# Patient Record
Sex: FEMALE | Race: WHITE | NOT HISPANIC OR LATINO | Employment: OTHER | URBAN - METROPOLITAN AREA
[De-identification: names, ages, dates, MRNs, and addresses within clinical notes are randomized per-mention and may not be internally consistent; named-entity substitution may affect disease eponyms.]

---

## 2017-01-03 ENCOUNTER — ALLSCRIPTS OFFICE VISIT (OUTPATIENT)
Dept: OTHER | Facility: OTHER | Age: 82
End: 2017-01-03

## 2017-02-13 ENCOUNTER — APPOINTMENT (OUTPATIENT)
Dept: LAB | Facility: CLINIC | Age: 82
End: 2017-02-13
Payer: COMMERCIAL

## 2017-02-13 DIAGNOSIS — E05.90 THYROTOXICOSIS WITHOUT THYROID STORM: ICD-10-CM

## 2017-02-13 LAB
T4 FREE SERPL-MCNC: 0.88 NG/DL (ref 0.76–1.46)
TSH SERPL DL<=0.05 MIU/L-ACNC: 4.49 UIU/ML (ref 0.36–3.74)

## 2017-02-13 PROCEDURE — 36415 COLL VENOUS BLD VENIPUNCTURE: CPT

## 2017-02-13 PROCEDURE — 84443 ASSAY THYROID STIM HORMONE: CPT

## 2017-02-13 PROCEDURE — 84439 ASSAY OF FREE THYROXINE: CPT

## 2017-02-15 ENCOUNTER — ALLSCRIPTS OFFICE VISIT (OUTPATIENT)
Dept: OTHER | Facility: OTHER | Age: 82
End: 2017-02-15

## 2017-02-15 LAB — GLUCOSE SERPL-MCNC: 164 MG/DL

## 2017-02-21 ENCOUNTER — ALLSCRIPTS OFFICE VISIT (OUTPATIENT)
Dept: OTHER | Facility: OTHER | Age: 82
End: 2017-02-21

## 2017-03-14 DIAGNOSIS — I10 ESSENTIAL (PRIMARY) HYPERTENSION: ICD-10-CM

## 2017-03-14 DIAGNOSIS — K58.2 MIXED IRRITABLE BOWEL SYNDROME: ICD-10-CM

## 2017-03-14 DIAGNOSIS — E78.5 HYPERLIPIDEMIA: ICD-10-CM

## 2017-03-24 ENCOUNTER — HOSPITAL ENCOUNTER (OUTPATIENT)
Dept: BONE DENSITY | Facility: IMAGING CENTER | Age: 82
Discharge: HOME | End: 2017-03-24
Payer: COMMERCIAL

## 2017-03-24 DIAGNOSIS — M81.0 AGE-RELATED OSTEOPOROSIS WITHOUT CURRENT PATHOLOGICAL FRACTURE: ICD-10-CM

## 2017-03-24 PROCEDURE — 77080 DXA BONE DENSITY AXIAL: CPT

## 2017-05-10 ENCOUNTER — ALLSCRIPTS OFFICE VISIT (OUTPATIENT)
Dept: OTHER | Facility: OTHER | Age: 82
End: 2017-05-10

## 2017-05-15 ENCOUNTER — LAB (OUTPATIENT)
Dept: LAB | Facility: CLINIC | Age: 82
End: 2017-05-15
Payer: COMMERCIAL

## 2017-05-15 DIAGNOSIS — E05.90 THYROTOXICOSIS WITHOUT THYROID STORM: ICD-10-CM

## 2017-05-15 DIAGNOSIS — E03.9 HYPOTHYROIDISM: ICD-10-CM

## 2017-05-15 DIAGNOSIS — E10.40 TYPE 1 DIABETES MELLITUS WITH DIABETIC NEUROPATHY (HCC): ICD-10-CM

## 2017-05-15 DIAGNOSIS — E78.5 HYPERLIPIDEMIA: ICD-10-CM

## 2017-05-15 DIAGNOSIS — I10 ESSENTIAL (PRIMARY) HYPERTENSION: ICD-10-CM

## 2017-05-15 DIAGNOSIS — K58.2 MIXED IRRITABLE BOWEL SYNDROME: ICD-10-CM

## 2017-05-15 DIAGNOSIS — E55.9 VITAMIN D DEFICIENCY: ICD-10-CM

## 2017-05-15 LAB
25(OH)D3 SERPL-MCNC: 37.8 NG/ML (ref 30–100)
ALBUMIN SERPL BCP-MCNC: 3.9 G/DL (ref 3.5–5)
ALP SERPL-CCNC: 76 U/L (ref 46–116)
ALT SERPL W P-5'-P-CCNC: 25 U/L (ref 12–78)
ANION GAP SERPL CALCULATED.3IONS-SCNC: 7 MMOL/L (ref 4–13)
AST SERPL W P-5'-P-CCNC: 14 U/L (ref 5–45)
BILIRUB SERPL-MCNC: 0.51 MG/DL (ref 0.2–1)
BUN SERPL-MCNC: 30 MG/DL (ref 5–25)
CALCIUM SERPL-MCNC: 9.4 MG/DL (ref 8.3–10.1)
CHLORIDE SERPL-SCNC: 103 MMOL/L (ref 100–108)
CHOLEST SERPL-MCNC: 194 MG/DL (ref 50–200)
CO2 SERPL-SCNC: 29 MMOL/L (ref 21–32)
CREAT SERPL-MCNC: 0.9 MG/DL (ref 0.6–1.3)
ERYTHROCYTE [DISTWIDTH] IN BLOOD BY AUTOMATED COUNT: 12.5 % (ref 11.6–15.1)
EST. AVERAGE GLUCOSE BLD GHB EST-MCNC: 146 MG/DL
GFR SERPL CREATININE-BSD FRML MDRD: >60 ML/MIN/1.73SQ M
GLUCOSE P FAST SERPL-MCNC: 66 MG/DL (ref 65–99)
HBA1C MFR BLD: 6.7 % (ref 4.2–6.3)
HCT VFR BLD AUTO: 39.1 % (ref 34.8–46.1)
HDLC SERPL-MCNC: 101 MG/DL (ref 40–60)
HGB BLD-MCNC: 13.1 G/DL (ref 11.5–15.4)
LDLC SERPL CALC-MCNC: 79 MG/DL (ref 0–100)
MCH RBC QN AUTO: 32.5 PG (ref 26.8–34.3)
MCHC RBC AUTO-ENTMCNC: 33.5 G/DL (ref 31.4–37.4)
MCV RBC AUTO: 97 FL (ref 82–98)
PHOSPHATE SERPL-MCNC: 3.5 MG/DL (ref 2.3–4.1)
PLATELET # BLD AUTO: 265 THOUSANDS/UL (ref 149–390)
PMV BLD AUTO: 9.2 FL (ref 8.9–12.7)
POTASSIUM SERPL-SCNC: 4.3 MMOL/L (ref 3.5–5.3)
PROT SERPL-MCNC: 7.7 G/DL (ref 6.4–8.2)
PTH-INTACT SERPL-MCNC: 32.7 PG/ML (ref 14–72)
RBC # BLD AUTO: 4.03 MILLION/UL (ref 3.81–5.12)
SODIUM SERPL-SCNC: 139 MMOL/L (ref 136–145)
T4 FREE SERPL-MCNC: 0.75 NG/DL (ref 0.76–1.46)
TRIGL SERPL-MCNC: 70 MG/DL
TSH SERPL DL<=0.05 MIU/L-ACNC: 12.7 UIU/ML (ref 0.36–3.74)
WBC # BLD AUTO: 5.79 THOUSAND/UL (ref 4.31–10.16)

## 2017-05-15 PROCEDURE — 85027 COMPLETE CBC AUTOMATED: CPT

## 2017-05-15 PROCEDURE — 84100 ASSAY OF PHOSPHORUS: CPT

## 2017-05-15 PROCEDURE — 80061 LIPID PANEL: CPT

## 2017-05-15 PROCEDURE — 80053 COMPREHEN METABOLIC PANEL: CPT

## 2017-05-15 PROCEDURE — 36415 COLL VENOUS BLD VENIPUNCTURE: CPT

## 2017-05-15 PROCEDURE — 84443 ASSAY THYROID STIM HORMONE: CPT

## 2017-05-15 PROCEDURE — 84439 ASSAY OF FREE THYROXINE: CPT

## 2017-05-15 PROCEDURE — 82306 VITAMIN D 25 HYDROXY: CPT

## 2017-05-15 PROCEDURE — 83036 HEMOGLOBIN GLYCOSYLATED A1C: CPT

## 2017-05-15 PROCEDURE — 83970 ASSAY OF PARATHORMONE: CPT

## 2017-05-15 PROCEDURE — 86376 MICROSOMAL ANTIBODY EACH: CPT

## 2017-05-16 DIAGNOSIS — E10.40 TYPE 1 DIABETES MELLITUS WITH DIABETIC NEUROPATHY (HCC): ICD-10-CM

## 2017-05-16 DIAGNOSIS — E05.90 THYROTOXICOSIS WITHOUT THYROID STORM: ICD-10-CM

## 2017-05-16 DIAGNOSIS — E03.9 HYPOTHYROIDISM: ICD-10-CM

## 2017-05-16 DIAGNOSIS — E55.9 VITAMIN D DEFICIENCY: ICD-10-CM

## 2017-05-16 LAB — THYROPEROXIDASE AB SERPL-ACNC: 29 IU/ML (ref 0–34)

## 2017-05-18 ENCOUNTER — GENERIC CONVERSION - ENCOUNTER (OUTPATIENT)
Dept: OTHER | Facility: OTHER | Age: 82
End: 2017-05-18

## 2017-05-22 ENCOUNTER — ALLSCRIPTS OFFICE VISIT (OUTPATIENT)
Dept: OTHER | Facility: OTHER | Age: 82
End: 2017-05-22

## 2017-06-20 ENCOUNTER — ALLSCRIPTS OFFICE VISIT (OUTPATIENT)
Dept: OTHER | Facility: OTHER | Age: 82
End: 2017-06-20

## 2017-06-30 ENCOUNTER — LAB REQUISITION (OUTPATIENT)
Dept: LAB | Facility: HOSPITAL | Age: 82
End: 2017-06-30
Payer: COMMERCIAL

## 2017-06-30 ENCOUNTER — ALLSCRIPTS OFFICE VISIT (OUTPATIENT)
Dept: OTHER | Facility: OTHER | Age: 82
End: 2017-06-30

## 2017-06-30 DIAGNOSIS — N94.9 UNSPECIFIED CONDITION ASSOCIATED WITH FEMALE GENITAL ORGANS AND MENSTRUAL CYCLE: ICD-10-CM

## 2017-06-30 LAB
BACTERIA UR QL AUTO: ABNORMAL /HPF
BILIRUB UR QL STRIP: NEGATIVE
BILIRUB UR QL STRIP: NORMAL
CLARITY UR: CLEAR
CLARITY UR: NORMAL
COLOR UR: YELLOW
COLOR UR: YELLOW
GLUCOSE (HISTORICAL): NORMAL
GLUCOSE UR STRIP-MCNC: NEGATIVE MG/DL
HGB UR QL STRIP.AUTO: ABNORMAL
HGB UR QL STRIP.AUTO: NORMAL
HYALINE CASTS #/AREA URNS LPF: ABNORMAL /LPF
KETONES UR STRIP-MCNC: NEGATIVE MG/DL
KETONES UR STRIP-MCNC: NORMAL MG/DL
LEUKOCYTE ESTERASE UR QL STRIP: ABNORMAL
LEUKOCYTE ESTERASE UR QL STRIP: NORMAL
NITRITE UR QL STRIP: NEGATIVE
NITRITE UR QL STRIP: NORMAL
NON-SQ EPI CELLS URNS QL MICRO: ABNORMAL /HPF
PH UR STRIP.AUTO: 7 [PH]
PH UR STRIP.AUTO: 7.5 [PH] (ref 4.5–8)
PROT UR STRIP-MCNC: NEGATIVE MG/DL
PROT UR STRIP-MCNC: NORMAL MG/DL
RBC #/AREA URNS AUTO: ABNORMAL /HPF
SP GR UR STRIP.AUTO: 1
SP GR UR STRIP.AUTO: 1.01 (ref 1–1.03)
UROBILINOGEN UR QL STRIP.AUTO: 0.2
UROBILINOGEN UR QL STRIP.AUTO: 0.2 E.U./DL
WBC #/AREA URNS AUTO: ABNORMAL /HPF

## 2017-06-30 PROCEDURE — 81001 URINALYSIS AUTO W/SCOPE: CPT | Performed by: NURSE PRACTITIONER

## 2017-07-03 DIAGNOSIS — E03.9 HYPOTHYROIDISM: ICD-10-CM

## 2017-07-03 DIAGNOSIS — M81.0 AGE-RELATED OSTEOPOROSIS WITHOUT CURRENT PATHOLOGICAL FRACTURE: ICD-10-CM

## 2017-07-03 DIAGNOSIS — E55.9 VITAMIN D DEFICIENCY: ICD-10-CM

## 2017-07-03 DIAGNOSIS — E10.40 TYPE 1 DIABETES MELLITUS WITH DIABETIC NEUROPATHY (HCC): ICD-10-CM

## 2017-07-05 ENCOUNTER — APPOINTMENT (OUTPATIENT)
Dept: LAB | Facility: CLINIC | Age: 82
End: 2017-07-05
Payer: COMMERCIAL

## 2017-07-05 DIAGNOSIS — E55.9 VITAMIN D DEFICIENCY: ICD-10-CM

## 2017-07-05 DIAGNOSIS — E10.40 TYPE 1 DIABETES MELLITUS WITH DIABETIC NEUROPATHY (HCC): ICD-10-CM

## 2017-07-05 DIAGNOSIS — M81.0 AGE-RELATED OSTEOPOROSIS WITHOUT CURRENT PATHOLOGICAL FRACTURE: ICD-10-CM

## 2017-07-05 DIAGNOSIS — E03.9 HYPOTHYROIDISM: ICD-10-CM

## 2017-07-05 LAB
T4 FREE SERPL-MCNC: 1.05 NG/DL (ref 0.76–1.46)
TSH SERPL DL<=0.05 MIU/L-ACNC: 3.95 UIU/ML (ref 0.36–3.74)

## 2017-07-05 PROCEDURE — 84443 ASSAY THYROID STIM HORMONE: CPT

## 2017-07-05 PROCEDURE — 84439 ASSAY OF FREE THYROXINE: CPT

## 2017-07-05 PROCEDURE — 36415 COLL VENOUS BLD VENIPUNCTURE: CPT

## 2017-07-10 ENCOUNTER — GENERIC CONVERSION - ENCOUNTER (OUTPATIENT)
Dept: OTHER | Facility: OTHER | Age: 82
End: 2017-07-10

## 2017-08-17 ENCOUNTER — LAB (OUTPATIENT)
Dept: LAB | Facility: CLINIC | Age: 82
End: 2017-08-17
Payer: COMMERCIAL

## 2017-08-17 DIAGNOSIS — E55.9 VITAMIN D DEFICIENCY: ICD-10-CM

## 2017-08-17 DIAGNOSIS — E03.9 HYPOTHYROIDISM: ICD-10-CM

## 2017-08-17 DIAGNOSIS — E10.40 TYPE 1 DIABETES MELLITUS WITH DIABETIC NEUROPATHY (HCC): ICD-10-CM

## 2017-08-17 DIAGNOSIS — M81.0 AGE-RELATED OSTEOPOROSIS WITHOUT CURRENT PATHOLOGICAL FRACTURE: ICD-10-CM

## 2017-08-17 DIAGNOSIS — I10 ESSENTIAL (PRIMARY) HYPERTENSION: ICD-10-CM

## 2017-08-17 DIAGNOSIS — E78.5 HYPERLIPIDEMIA: ICD-10-CM

## 2017-08-17 LAB
ALBUMIN SERPL BCP-MCNC: 3.8 G/DL (ref 3.5–5)
ANION GAP SERPL CALCULATED.3IONS-SCNC: 7 MMOL/L (ref 4–13)
BUN SERPL-MCNC: 25 MG/DL (ref 5–25)
CALCIUM SERPL-MCNC: 8.9 MG/DL (ref 8.3–10.1)
CHLORIDE SERPL-SCNC: 100 MMOL/L (ref 100–108)
CO2 SERPL-SCNC: 26 MMOL/L (ref 21–32)
CREAT SERPL-MCNC: 0.87 MG/DL (ref 0.6–1.3)
EST. AVERAGE GLUCOSE BLD GHB EST-MCNC: 151 MG/DL
GFR SERPL CREATININE-BSD FRML MDRD: 63 ML/MIN/1.73SQ M
GLUCOSE P FAST SERPL-MCNC: 73 MG/DL (ref 65–99)
HBA1C MFR BLD: 6.9 % (ref 4.2–6.3)
PHOSPHATE SERPL-MCNC: 3.4 MG/DL (ref 2.3–4.1)
POTASSIUM SERPL-SCNC: 4.3 MMOL/L (ref 3.5–5.3)
PTH-INTACT SERPL-MCNC: 33.8 PG/ML (ref 14–72)
SODIUM SERPL-SCNC: 133 MMOL/L (ref 136–145)
T4 FREE SERPL-MCNC: 0.95 NG/DL (ref 0.76–1.46)
TSH SERPL DL<=0.05 MIU/L-ACNC: 3.64 UIU/ML (ref 0.36–3.74)

## 2017-08-17 PROCEDURE — 36415 COLL VENOUS BLD VENIPUNCTURE: CPT

## 2017-08-17 PROCEDURE — 84443 ASSAY THYROID STIM HORMONE: CPT

## 2017-08-17 PROCEDURE — 84439 ASSAY OF FREE THYROXINE: CPT

## 2017-08-17 PROCEDURE — 83970 ASSAY OF PARATHORMONE: CPT

## 2017-08-17 PROCEDURE — 80069 RENAL FUNCTION PANEL: CPT

## 2017-08-17 PROCEDURE — 83036 HEMOGLOBIN GLYCOSYLATED A1C: CPT

## 2017-08-18 DIAGNOSIS — E78.5 HYPERLIPIDEMIA: ICD-10-CM

## 2017-08-18 DIAGNOSIS — M81.0 AGE-RELATED OSTEOPOROSIS WITHOUT CURRENT PATHOLOGICAL FRACTURE: ICD-10-CM

## 2017-08-18 DIAGNOSIS — I10 ESSENTIAL (PRIMARY) HYPERTENSION: ICD-10-CM

## 2017-08-18 DIAGNOSIS — E10.40 TYPE 1 DIABETES MELLITUS WITH DIABETIC NEUROPATHY (HCC): ICD-10-CM

## 2017-08-18 DIAGNOSIS — E55.9 VITAMIN D DEFICIENCY: ICD-10-CM

## 2017-08-18 DIAGNOSIS — E03.9 HYPOTHYROIDISM: ICD-10-CM

## 2017-08-20 ENCOUNTER — GENERIC CONVERSION - ENCOUNTER (OUTPATIENT)
Dept: OTHER | Facility: OTHER | Age: 82
End: 2017-08-20

## 2017-09-19 ENCOUNTER — ALLSCRIPTS OFFICE VISIT (OUTPATIENT)
Dept: OTHER | Facility: OTHER | Age: 82
End: 2017-09-19

## 2017-10-02 DIAGNOSIS — E78.5 HYPERLIPIDEMIA: ICD-10-CM

## 2017-10-02 DIAGNOSIS — E10.40 TYPE 1 DIABETES MELLITUS WITH DIABETIC NEUROPATHY (HCC): ICD-10-CM

## 2017-10-02 DIAGNOSIS — I10 ESSENTIAL (PRIMARY) HYPERTENSION: ICD-10-CM

## 2017-10-05 ENCOUNTER — APPOINTMENT (OUTPATIENT)
Dept: LAB | Facility: CLINIC | Age: 82
End: 2017-10-05
Payer: COMMERCIAL

## 2017-10-05 DIAGNOSIS — E10.40 TYPE 1 DIABETES MELLITUS WITH DIABETIC NEUROPATHY (HCC): ICD-10-CM

## 2017-10-05 DIAGNOSIS — E78.5 HYPERLIPIDEMIA: ICD-10-CM

## 2017-10-05 DIAGNOSIS — I10 ESSENTIAL (PRIMARY) HYPERTENSION: ICD-10-CM

## 2017-10-05 LAB
ALBUMIN SERPL BCP-MCNC: 3.8 G/DL (ref 3.5–5)
ALP SERPL-CCNC: 71 U/L (ref 46–116)
ALT SERPL W P-5'-P-CCNC: 21 U/L (ref 12–78)
ANION GAP SERPL CALCULATED.3IONS-SCNC: 8 MMOL/L (ref 4–13)
AST SERPL W P-5'-P-CCNC: 15 U/L (ref 5–45)
BILIRUB SERPL-MCNC: 0.66 MG/DL (ref 0.2–1)
BUN SERPL-MCNC: 24 MG/DL (ref 5–25)
CALCIUM SERPL-MCNC: 9.4 MG/DL (ref 8.3–10.1)
CHLORIDE SERPL-SCNC: 102 MMOL/L (ref 100–108)
CHOLEST SERPL-MCNC: 181 MG/DL (ref 50–200)
CO2 SERPL-SCNC: 26 MMOL/L (ref 21–32)
CREAT SERPL-MCNC: 1.02 MG/DL (ref 0.6–1.3)
ERYTHROCYTE [DISTWIDTH] IN BLOOD BY AUTOMATED COUNT: 12.6 % (ref 11.6–15.1)
GFR SERPL CREATININE-BSD FRML MDRD: 52 ML/MIN/1.73SQ M
GLUCOSE P FAST SERPL-MCNC: 73 MG/DL (ref 65–99)
HCT VFR BLD AUTO: 37.4 % (ref 34.8–46.1)
HDLC SERPL-MCNC: 98 MG/DL (ref 40–60)
HGB BLD-MCNC: 12.8 G/DL (ref 11.5–15.4)
LDLC SERPL CALC-MCNC: 71 MG/DL (ref 0–100)
MCH RBC QN AUTO: 32.9 PG (ref 26.8–34.3)
MCHC RBC AUTO-ENTMCNC: 34.2 G/DL (ref 31.4–37.4)
MCV RBC AUTO: 96 FL (ref 82–98)
PLATELET # BLD AUTO: 278 THOUSANDS/UL (ref 149–390)
PMV BLD AUTO: 9.5 FL (ref 8.9–12.7)
POTASSIUM SERPL-SCNC: 4.3 MMOL/L (ref 3.5–5.3)
PROT SERPL-MCNC: 7.6 G/DL (ref 6.4–8.2)
RBC # BLD AUTO: 3.89 MILLION/UL (ref 3.81–5.12)
SODIUM SERPL-SCNC: 136 MMOL/L (ref 136–145)
TRIGL SERPL-MCNC: 60 MG/DL
TSH SERPL DL<=0.05 MIU/L-ACNC: 4.06 UIU/ML (ref 0.36–3.74)
WBC # BLD AUTO: 5.23 THOUSAND/UL (ref 4.31–10.16)

## 2017-10-05 PROCEDURE — 80061 LIPID PANEL: CPT

## 2017-10-05 PROCEDURE — 85027 COMPLETE CBC AUTOMATED: CPT

## 2017-10-05 PROCEDURE — 36415 COLL VENOUS BLD VENIPUNCTURE: CPT

## 2017-10-05 PROCEDURE — 80053 COMPREHEN METABOLIC PANEL: CPT

## 2017-10-05 PROCEDURE — 84443 ASSAY THYROID STIM HORMONE: CPT

## 2017-10-30 ENCOUNTER — GENERIC CONVERSION - ENCOUNTER (OUTPATIENT)
Dept: OTHER | Facility: OTHER | Age: 82
End: 2017-10-30

## 2017-11-10 ENCOUNTER — ALLSCRIPTS OFFICE VISIT (OUTPATIENT)
Dept: OTHER | Facility: OTHER | Age: 82
End: 2017-11-10

## 2017-11-11 NOTE — PROGRESS NOTES
Assessment  Assessed    1  Hypertension (401 9) (I10)   2  Hyperlipidemia due to type 1 diabetes mellitus (136 83,102  4) (E10 69,E78 5)    Plan  Hyperlipidemia due to type 1 diabetes mellitus    · Follow-up PRN Evaluation and Treatment  Follow-up  Status: Complete  Done:10Nov2017   Ordered; For: Hyperlipidemia due to type 1 diabetes mellitus; Ordered By: Gucci Carrillo Performed:  Due: 11ISZ0158  Hypertension    · EKG/ECG- POC; Status:Complete;   Done: 87ZPR8777   Perform: In Office; Due:10Nov2018; Last Updated By:Jonathan Delarosa; 11/10/2017 10:10:04 AM;Ordered;Ordered By:Sol Cardoza; Discussion/Summary  Cardiology Discussion Summary Free Text Note Form [facility]:   1  Hypertension: BP is stable on medical therapy  Carotid Atherosclerosis: Less than 50% stenosis bilaterally  On aspirin and atorvastatin  Counseling Documentation With Imm: The patient was counseled regarding diagnostic results,-- instructions for management,-- risk factor reductions,-- impressions  total time of encounter was 25 minutes-- and-- 15 minutes was spent counseling  Chief Complaint  Chief Complaint Free Text Note Form: Patient is here for a follow up  Patient denies cardiac complaints  History of Present Illness  Cardiology HPI Free Text Note Form [facility]: Followup for HTN  well  No major issues  No chest pain, no dyspnea, no palpitations  Hypertension (Follow-Up): The patient presents for follow-up of essential hypertension  The patient states she has been stable with her blood pressure control since the last visit  Symptoms: denies impaired vision,-- denies dyspnea,-- denies chest pain,-- denies intermittent leg claudication-- and-- denies lower extremity edema  Associated symptoms include no headache,-- no focal neurologic deficits-- and-- no memory loss  Medications: the patient is adherent with her medication regimen  -- She denies medication side effects        Review of Systems  Cardiology Female ROS:    Cardiac: No complaints of chest pain, no palpitations, no fainting  Skin: No complaints of nonhealing sores or skin rash  Genitourinary: No complaints of recurrent urinary tract infections, frequent urination at night, difficult urination, blood in urine, kidney stones, loss of bladder control, kidney problems, denies any birth control or hormone replacement, is not post menopausal, not currently pregnant  Psychological: No complaints of feeling depressed, anxiety, panic attacks, or difficulty concentrating  General: No complaints of trouble sleeping, lack of energy, fatigue, appetite changes, weight changes, fever, frequent infections, or night sweats  Respiratory: No complaints of shortness of breath, cough with sputum, or wheezing  HEENT: No complaints of serious problems, hearing problems, nose problems, throat problems, or snoring  Gastrointestinal: No complaints of liver problems, nausea, vomiting, heartburn, constipation, bloody stools, diarrhea, problems swallowing, adbominal pain, or rectal bleeding  Hematologic: No complaints of bleeding disorders, anemia, blood clots, or excessive brusing  Neurological: No complaints of numbness, tingling, dizziness, weakness, seizures, headaches, syncope or fainting, AM fatigue, daytime sleepiness, no witnessed apnea episodes  Musculoskeletal: No complaints of arthritis, back pain, or painfull swelling  ROS Reviewed:   ROS reviewed  Active Problems  Problems    1  Adult onset hypothyroidism (244 8) (E03 8)   2  Allergic rhinitis (477 9) (J30 9)   3  Bunion of great toe of left foot (727 1) (M21 612)   4  Chronic foot pain, unspecified laterality (729 5,338 29) (M79 673,G89 29)   5  Chronic interstitial cystitis (595 1) (N30 10)   6  Disc degeneration, 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)   7  Dysfunction of left eustachian tube (381 81) (H69 82)   8  Dysuria (788 1) (R30 0)   9  Generalized anxiety disorder (300 02) (F41 1)   10  Denied: History of self breast exam   11  Hyperlipidemia due to type 1 diabetes mellitus (977 36,879  4) (E10 69,E78 5)   12  Hypertension (401 9) (I10)   13  Irritable bowel syndrome with both constipation and diarrhea (564 1) (K58 2)   14  Lumbar canal stenosis (724 02) (M48 061)   15  Lumbar radiculopathy (724 4) (M54 16)   16  Mastodynia (611 71) (N64 4)   17  Need for immunization against influenza (V04 81) (Z23)   18  Osteoporosis (733 00) (M81 0)   19  Peripheral neuropathy (356 9) (G62 9)   20  Retinopathy, diabetic, proliferative (250 50,362 02) (E11 3599)   21  Sinusitis (473 9) (J32 9)   22  Spondylosis of lumbar region without myelopathy or radiculopathy (721 3) (M47 816)   23  Type 1 diabetes mellitus with diabetic polyneuropathy (250 61,357 2) (E10 42)   24  Type 1 diabetes mellitus with hyperglycemia, with long-term current use of insulin  (250 01,790 29) (E10 65)   25  Vaginal burning (625 8) (N94 9)   26  Vaginal candidiasis (112 1) (B37 3)   27  Vitamin D deficiency (268 9) (E55 9)    Past Medical History  Problems    1  History of Acute foot pain, right (729 5) (M79 671)   2  History of Age At First Period 17 Years Old (Menarche)   3  History of Age At First Pregnancy 34 Years Old   4  History of Currently Wearing Eyeglasses   5  History of Difficulty Falling Asleep   6  History of Encounter for screening mammogram for malignant neoplasm of breast (V76 12) (Z12 31)   7  History of Fracture of fifth metatarsal bone of right foot (825 25) (S92 351A)   8   2 para 2 (V49 89) (Z78 9)   9  History of Hearing Loss (389 9)   10  History Of 2  Previous Pregnancies (V61 5)   11  History of cataract (V12 49) (Z86 69)   12  History of chest pain (V13 89) (Z87 898)   13  History of constipation (V12 79) (Z87 19)   14  History of diarrhea (V12 79) (Z87 898)   15  History of diplopia (V12 49) (Z86 69)   16  History of heartburn (V12 79) (Z87 898)   17  History of herpes zoster (V12 09) (Z86 19)   18   History of hypertension (V12 59) (Z86 79) 19  History of hyperthyroidism (V12 29) (Z86 39)   20  History of neoplasm of uncertain behavior of skin (V13 3) (Z86 03)   21  Denied: History of self breast exam   22  History of syncope (V15 89) (Z87 898)   23  History of thyroid disease (V12 29) (Z86 39)   24  History of upper respiratory infection (V12 09) (Z87 09)   25  History of urinary tract infection (V13 02) (Z87 440)   26  History of Hyponatremia (276 1) (E87 1)   27  History of Need for influenza vaccination (V04 81) (Z23)   28  History of Need for influenza vaccination (V04 81) (Z23)   29  History of Open wound of left foot, initial encounter (892 0) (S91 302A)   30  History of Previous Pregnancies Resulted In 2  Live Birth(S)   31  History of Skin lesions (709 9) (L98 9)   32  History of Type 1 diabetes mellitus without complication (553 22) (A58 2)   33  History of Visit for screening mammogram (V76 12) (Z12 31)   34  History of Vulvitis (616 10) (N76 2)  Active Problems And Past Medical History Reviewed: The active problems and past medical history were reviewed and updated today  Surgical History  Problems    1  History of Appendectomy   2  History of Cataract Surgery   3  History of Complete Colonoscopy   4  History of Excision Of Lesion Ears  Surgical History Reviewed: The surgical history was reviewed and updated today  Family History  Mother    1  Family history of Acute Myocardial Infarction (V17 3)   2  Family history of type 2 diabetes mellitus (V18 0) (Z83 3)  Father    3  Family history of Dementia   4  Family history of cerebrovascular accident (V17 1) (Z82 3)  Maternal Aunt    5  Family history of Breast Cancer (V16 3)  Maternal Uncle    6  Family history of Adenocarcinoma Of The Stomach (V16 0)  Family History    7  Family history of Diabetes Mellitus (V18 0)   8  Family history of Stroke Complications  Family History Reviewed: The family history was reviewed and updated today         Social History  Problems    · Caffeine use (V49 89) (F15 90)   · Denied: History of Drug Use (305 90)   · Marital History -    · Never A Smoker   · No preference on Mandaen beliefs   · Social alcohol use (Z78 9)   · Two children  Social History Reviewed: The social history was reviewed and updated today  Current Meds   1  Advil CAPS; Therapy: (Recorded:30Oct2017) to Recorded   2  AmLODIPine Besylate 5 MG Oral Tablet; take 1 tablet by mouth once daily after  supper; Therapy: 89TIX4701 to (Last Rx:47Low0427)  Requested for: 50VYO4276 Ordered   3  Aspirin 81 MG TABS; TAKE 1 TABLET DAILY; Therapy: (Recorded:11Aug2015) to Recorded   4  Atorvastatin Calcium 10 MG Oral Tablet; Take 1 tablet daily; Therapy: 11Aug2015 to (Evaluate:01Tis5717)  Requested for: 11Aug2017; Last Rx:11Aug2017 Ordered   5  BD Insulin Syr Ultrafine II 31G X 5/16 0 5 ML Miscellaneous; Use 4x daily; Therapy: 89EDG7131 to (Evaluate:44Yus9682)  Requested for: 75QGB5358; Last Rx:40Qjm3912 Ordered   6  Co Q-10 200 MG Oral Capsule; TAKE 1 CAPSULE DAILY; Therapy: (Recorded:11Aug2015) to Recorded   7  Cosopt SOLN; INSTILL 1 DROP INTO RIGHT EYE 2 TIMES DAILY; Therapy: (Recorded:11Aug2015) to Recorded   8  Fluconazole 150 MG Oral Tablet; TAKE AS DIRECTED; Therapy: 08FXP6298 to (Tiny Alosa)  Requested for: 96VGW6077; Last Rx:66Sjt4877 Ordered   9  Fluticasone Propionate 50 MCG/ACT Nasal Suspension; USE 2 SPRAYS IN EACH NOSTRIL ONCE DAILY; Therapy: 22AKC1615 to (Last KS:85WVT0702)  Requested for: 18RHI9091 Ordered   10  HumaLOG 100 UNIT/ML Subcutaneous Solution; INJECT SC 4 UNITS AT BREAKFAST -  5 UNITS AT LUNCH - 7 UNITS AT DINNER PLUS 1 UNIT PER 50  OVER 150; Therapy: 99YNI5992 to [phone]  Requested for: 27EMI4337; Last  Rx:30Oct2017 Ordered   11  HydroCHLOROthiazide 12 5 MG Oral Capsule; take 1 capsule by mouth once a day  as  needed for leg edema; Therapy: 17CDR4732 to (Evaluate:15Jun2018)  Requested for: 20Jun2017; Last  Rx:20Jun2017 Ordered   12  Irbesartan 150 MG Oral Tablet; TAKE 1 TABLET DAILY; Therapy: 73YKW2000 to (Evaluate:31Mar2018)  Requested for: 57Qpj4048; Last  Rx:09Apr2017 Ordered   13  Lantus 100 UNIT/ML Subcutaneous Solution; INJECT 12 UNITS UNDER THE SKIN  NIGHTLY AS DIRECTED; Therapy: 74Kxj3213 to ()  Requested for: 12JLO8275; Last  Rx:30Oct2017 Ordered   14  Levothyroxine Sodium 50 MCG Oral Tablet; TAKE ONE (1) TABLET(S) DAILY; Therapy: 23HFX7847 to (Evaluate:18Wep8886)  Requested for: 38VQY5685; Last  Rx:11Oct2017 Ordered   15  LORazepam 0 5 MG Oral Tablet; take 1 tablet 3 times a day as needed; Therapy: 66RXT1855 to (Lawrence Baires)  Requested for: 46WMK0787; Last  Rx:09Nov2017 Ordered   16  Magnesium 500 MG Oral Tablet; Take 1 tablet daily; Therapy: (Merna Scheuermann) to Recorded   17  OneTouch Ultra Blue In Vitro Strip; ONE TOUCH ULTRA STRIPS   TEST 6 TIMES PER DAY; Therapy: 29TYD8983 to 9615 9032); Last Rx:26Oct2017 Ordered   18  Refresh Tears 0 5 % Ophthalmic Solution; INSTILL 1 DROP INTO BOTH EYES 4 TIMES  DAILY; Therapy: (Recorded:11Aug2015) to Recorded   19  Super B Complex Maxi Oral Tablet; take one every other day; Therapy: 15DHC5666 to Recorded   20  Travatan Z 0 004 % Ophthalmic Solution; instill 2 drops in both eyes at bedtime; Therapy: (Recorded:11Aug2015) to Recorded   21  Turmeric 500 MG Oral Capsule; Take one daily; Therapy: 11CNE8286 to Recorded   22  Vitamin D3 2000 UNIT Oral Tablet; 2 CAPSULES DAILY; Therapy: 34ZFZ4797 to (Last Rx:30Oct2017) Ordered  Medication List Reviewed: The medication list was reviewed and updated today  Allergies  Medication    1   No Known Drug Allergies    Vitals  Vital Signs    Recorded: 10JBX7321 10:12AM   Heart Rate 67   Systolic 993, RUE, Sitting   Diastolic 70, RUE, Sitting   Height 5 ft 5 in   Weight 133 lb    BMI Calculated 22 13   BSA Calculated 1 66       Physical Exam   Constitutional  General appearance: No acute distress, well appearing and well nourished  Eyes  Conjunctiva and Sclera examination: Conjunctiva pink, sclera anicteric  Ears, Nose, Mouth, and Throat - Oropharynx: Clear, nares are clear, mucous membranes are moist   Neck  Neck and thyroid: Normal, supple, trachea midline, no thyromegaly  Pulmonary  Respiratory effort: No increased work of breathing or signs of respiratory distress  Auscultation of lungs: Clear to auscultation, no rales, no rhonchi, no wheezing, good air movement  Cardiovascular  Auscultation of heart: Normal rate and rhythm, normal S1 and S2, no murmurs  Carotid pulses: Normal, 2+ bilaterally  Peripheral vascular exam: Normal pulses throughout, no tenderness, erythema or swelling  Pedal pulses: Normal, 2+ bilaterally  Examination of extremities for edema and/or varicosities: Normal    Abdomen  Abdomen: Non-tender and no distention  Liver and spleen: No hepatomegaly or splenomegaly  Musculoskeletal Gait and station: Normal gait  -- Digits and nails: Normal without clubbing or cyanosis  -- Inspection/palpation of joints, bones, and muscles: Normal, ROM normal    Skin - Skin and subcutaneous tissue: Normal without rashes or lesions  Skin is warm and well perfused, normal turgor  Neurologic - Cranial nerves: II - XII intact  -- Speech: Normal    Psychiatric - Orientation to person, place, and time: Normal -- Mood and affect: Normal       Results/Data  ECG Report: A 12 lead ECG was performed and was normal   Rhythm and rate:  ventricular rate is 67 beats per minute  -- normal sinus rhythm   (1) LIPID PANEL FASTING W DIRECT LDL REFLEX 05Oct2017 07:06AM Terese Cranker Order Number: [id number]     Test Name Result Flag Reference   CHOLESTEROL 181 mg/dL     LDL CHOLESTEROL CALCULATED 71 mg/dL  0-100     Triglyceride:       Normal <150 mg/dl  Borderline High 150-199 mg/dl  High 200-499 mg/dl  Very High >499 mg/dl   Cholesterol:      Desirable <200 mg/dl   Borderline High 200-239 mg/dl   High >239 mg/dl   HDL Cholesterol:      High>59 mg/dL   Low <41 mg/dL   HDL Cholesterol:      High>59 mg/dL   Low <41 mg/dL   This screening LDL is a calculated result  It does not have the accuracy of the Direct Measured LDL in the monitoring of patients with hyperlipidemia and/or statin therapy  Direct Measure LDL (BAP533) must be ordered separately in these patients  TRIGLYCERIDES 60 mg/dL  <=150   Specimen collection should occur prior to N-Acetylcysteine or Metamizole administration due to the potential for falsely depressed results  HDL,DIRECT 98 mg/dL H 40-60   Specimen collection should occur prior to Metamizole administration due to the potential for falsley depressed results  Health Management  Health Maintenance   Medicare Annual Wellness Visit; every 1 year; Next Due: 84Pdn0259; Active    Future Appointments    Date/Time Provider Specialty Site   01/09/2018 01:00 PM BORIS Lee  Family Medicine [address] [place] [address]   11/16/2017 01:30 PM Saritha Hernandez RD Diabetes Educator [facility] ENDOCRINOLOG[facility]   02/20/2018 01:00 PM BORIS Cardenas   Endocrinology [address]       Signatures   Electronically signed by : BORIS Redd ; Nov 10 2017 10:42AM EST                       (Author)

## 2017-11-16 ENCOUNTER — ALLSCRIPTS OFFICE VISIT (OUTPATIENT)
Dept: OTHER | Facility: OTHER | Age: 82
End: 2017-11-16

## 2017-11-20 ENCOUNTER — APPOINTMENT (OUTPATIENT)
Dept: LAB | Facility: CLINIC | Age: 82
End: 2017-11-20
Payer: COMMERCIAL

## 2017-11-20 DIAGNOSIS — M81.0 AGE-RELATED OSTEOPOROSIS WITHOUT CURRENT PATHOLOGICAL FRACTURE: ICD-10-CM

## 2017-11-20 DIAGNOSIS — E10.65 TYPE 1 DIABETES MELLITUS WITH HYPERGLYCEMIA (HCC): ICD-10-CM

## 2017-11-20 DIAGNOSIS — E10.42 TYPE 1 DIABETES MELLITUS WITH DIABETIC POLYNEUROPATHY (HCC): ICD-10-CM

## 2017-11-20 DIAGNOSIS — I10 ESSENTIAL (PRIMARY) HYPERTENSION: ICD-10-CM

## 2017-11-20 DIAGNOSIS — E55.9 VITAMIN D DEFICIENCY: ICD-10-CM

## 2017-11-20 DIAGNOSIS — E03.8 OTHER SPECIFIED HYPOTHYROIDISM: ICD-10-CM

## 2017-11-20 DIAGNOSIS — E10.69 TYPE 1 DIABETES MELLITUS WITH OTHER SPECIFIED COMPLICATION (HCC): ICD-10-CM

## 2017-11-20 LAB
25(OH)D3 SERPL-MCNC: 39.8 NG/ML (ref 30–100)
EST. AVERAGE GLUCOSE BLD GHB EST-MCNC: 143 MG/DL
HBA1C MFR BLD: 6.6 % (ref 4.2–6.3)
T4 FREE SERPL-MCNC: 1.05 NG/DL (ref 0.76–1.46)
TSH SERPL DL<=0.05 MIU/L-ACNC: 1.98 UIU/ML (ref 0.36–3.74)

## 2017-11-20 PROCEDURE — 36415 COLL VENOUS BLD VENIPUNCTURE: CPT

## 2017-11-20 PROCEDURE — 83036 HEMOGLOBIN GLYCOSYLATED A1C: CPT

## 2017-11-20 PROCEDURE — 84439 ASSAY OF FREE THYROXINE: CPT

## 2017-11-20 PROCEDURE — 82306 VITAMIN D 25 HYDROXY: CPT

## 2017-11-20 PROCEDURE — 84443 ASSAY THYROID STIM HORMONE: CPT

## 2017-11-30 ENCOUNTER — GENERIC CONVERSION - ENCOUNTER (OUTPATIENT)
Dept: OTHER | Facility: OTHER | Age: 82
End: 2017-11-30

## 2018-01-09 ENCOUNTER — ALLSCRIPTS OFFICE VISIT (OUTPATIENT)
Dept: OTHER | Facility: OTHER | Age: 83
End: 2018-01-09

## 2018-01-10 DIAGNOSIS — Z12.31 ENCOUNTER FOR SCREENING MAMMOGRAM FOR MALIGNANT NEOPLASM OF BREAST: ICD-10-CM

## 2018-01-11 NOTE — RESULT NOTES
Discussion/Summary   she now has hypothyroidism and we will start her on levothyroxine  we will explain in office visit also  start levothyroxine 25 mcg once a day and follow advise as below  1 Please take your thyroid medication first thing in the morning on empty stomach with a sip of water and wait for 60 minutes before you take any food, medications, drinks, juices, coffee or milk  2  Call us if he develops fatigued, mental slowing or sluggishness      3  Call us if you develop tremors, sweats, or palpitations        Verified Results  (1) HEMOGLOBIN A1C 53LTN9887 07:07AM John Murray Order Number: [id number]     Test Name Result Flag Reference   HEMOGLOBIN A1C 6 7 % H 4 2-6 3   EST  AVG  GLUCOSE 146 mg/dl       (1) T4, FREE 06WVS1964 07:07AM John Murray Order Number: [id number]     Test Name Result Flag Reference   T4,FREE 0 75 ng/dL L 0 76-1 46     (1) TSH 18NCI3569 07:07AM Herb Arryoo    Order Number: [id number]     Test Name Result Flag Reference   TSH 12 700 uIU/mL H 0 358-3 740   - Patient Instructions: This bloodwork is non-fasting  Please drink two glasses of water morning of bloodwork  Patients undergoing fluorescein dye angiography may retain small amounts of fluorescein in the body for 48-72 hours post procedure  Samples containing fluorescein can produce falsely depressed TSH values  If the patient had this procedure,a specimen should be resubmitted post fluorescein clearance            The recommended reference ranges for TSH during pregnancy are as follows:  First trimester 0 1 to 2 5 uIU/mL  Second trimester  0 2 to 3 0 uIU/mL  Third trimester 0 3 to 3 0 uIU/m     (1) RENAL FUNCTION PANEL 33LWK9222 07:07AM John [facility] Order Number: [id number]     Test Name Result Flag Reference   PHOSPHORUS 3 5 mg/dL  2 3-4 1     (1) VITAMIN D 25-HYDROXY 28KRH7901 07:07AM John [facility]itePalir[facility] Order Number: [id number]     Test Name Result Flag Reference   VIT D 25-HYDROX 37 8 ng/mL  30 0-100 0   This assay is a certified procedure of the CDC Vitamin D Standardization Certification Program (VDSCP)     Deficiency <20ng/ml   Insufficiency 20-30ng/ml   Sufficient  ng/ml     *Patients undergoing fluorescein dye angiography may retain small amounts of fluorescein in the body for 48-72 hours post procedure  Samples containing fluorescein can produce falsely elevated Vitamin D values  If the patient had this procedure, a specimen should be resubmitted post fluorescein clearance       (1) PTH N-TERMINAL (INTACT) 26XWE7637 07:07AM Clearance [facility] Order Number: [id number]     Test Name Result Flag Reference   PARATHYROID HORMONE INTACT 32 7 pg/mL  14 0-72 0     (1) THYROID MICROSOMAL ANTIBODY 46ULI1381 07:07AM Clearance [facility] Order Number: [id number]     Test Name Result Flag Reference   THY MICROSOM AB 29 IU/mL  0 - 34   Performed at:  [facility] [place] [address]  [id number]  : Christie Mejía MD, Phone:  [phone]

## 2018-01-11 NOTE — RESULT NOTES
Discussion/Summary   A1c 6 6 better than goal  TSH normal, continue current dose of Levothyroxine  Verified Results  (1) TSH 05PSS5761 10:37AM [facility]     Test Name Result Flag Reference   TSH 1 980 uIU/mL  0 358-3 740   Patients undergoing fluorescein dye angiography may retain small amounts of fluorescein in the body for 48-72 hours post procedure  Samples containing fluorescein can produce falsely depressed TSH values  If the patient had this procedure,a specimen should be resubmitted post fluorescein clearance  The recommended reference ranges for TSH during pregnancy are as follows:  First trimester 0 1 to 2 5 uIU/mL  Second trimester  0 2 to 3 0 uIU/mL  Third trimester 0 3 to 3 0 uIU/m     (1) T4, FREE 20Nov2017 10:37AM [facility]     Test Name Result Flag Reference   T4,FREE 1 05 ng/dL  0 76-1 46   Specimen collection should occur prior to Sulfasalazine administration due to the potential for falsely elevated results  (1) VITAMIN D 25-HYDROXY 14CDJ3086 10:37AM [facility]     Test Name Result Flag Reference   VIT D 25-HYDROX 39 8 ng/mL  30 0-100 0   This assay is a certified procedure of the CDC Vitamin D Standardization Certification Program (VDSCP)     Deficiency <20ng/ml   Insufficiency 20-30ng/ml   Sufficient  ng/ml     *Patients undergoing fluorescein dye angiography may retain small amounts of fluorescein in the body for 48-72 hours post procedure  Samples containing fluorescein can produce falsely elevated Vitamin D values  If the patient had this procedure, a specimen should be resubmitted post fluorescein clearance  (1) HEMOGLOBIN A1C 15RUR3803 10:37AM [facility]     Test Name Result Flag Reference   HEMOGLOBIN A1C 6 6 % H 4 2-6 3   EST  AVG   GLUCOSE 143 mg/dl

## 2018-01-12 NOTE — MISCELLANEOUS
Provider Comments  Provider Comments:   Patient reschedule appointment due to seeing another specialist same day for double vision  Also, patient did not have labs completed so slips were mailed to her        Signatures   Electronically signed by : BORIS Combs ; May 17 2016  1:38PM EST                       (Review)

## 2018-01-12 NOTE — PROGRESS NOTES
Assessment   1  Hyperlipidemia due to type 1 diabetes mellitus (238 29,266  4) (E10 69,E78 5)   2  Adult onset hypothyroidism (244 8) (E03 8)   3  Hypertension (401 9) (I10)   4  Retinopathy, diabetic, proliferative (250 50,362 02) (E11 3599)   5  Type 1 diabetes mellitus with hyperglycemia, with long-term current use of insulin     (250 01,790 29) (E10 65)    Plan   Adult onset hypothyroidism    · Levothyroxine Sodium 50 MCG Oral Tablet; Take 1 tablet daily  Adult onset hypothyroidism, Hyperlipidemia due to type 1 diabetes mellitus,    Hypertension, Retinopathy, diabetic, proliferative, Type 1 diabetes mellitus with    hyperglycemia, with long-term current use of insulin    · (1) CBC/PLT/DIFF; Status:Active; Requested for:57Jmg4312;    · (1) COMPREHENSIVE METABOLIC PANEL; Status:Active; Requested for:44Rbk7427;    · (1) HEMOGLOBIN A1C; Status:Active; Requested for:91Pty5798;    · (1) LIPID PANEL FASTING W DIRECT LDL REFLEX; Status:Active; Requested    for:81Cwq8242;    · (1) TSH; Status:Active; Requested for:36Uei4437;   Encounter for screening mammogram for breast cancer    · * MAMMO SCREENING BILATERAL W CAD; Status:Hold For - Scheduling; Requested    for:10Jan2018;   Generalized anxiety disorder    · LORazepam 0 5 MG Oral Tablet; take 1 tablet 3 times a day as needed  Peripheral neuropathy    · Gabapentin 300 MG Oral Capsule; take 1 capsule  twice a day  and 2 caps at    bedtime    Discussion/Summary      Patient presents for follow-up of chronic medical conditions  1 diabetes  She remains on Lantus, pending follow-up with Endocrinology in a month  Lipitor  Will proceed with blood work as outlined above  controlled, continue regimen of Avapro, HCTZ and Norvasc  diabetic neuropathy  Patient remains on gabapentin  Will increase dose to 600 mg at bedtime, patient will remain on 300 mg twice a day throughout the day   is under ongoing care of redness specialist, ophthalmologist and podiatrist  is an excellent candidate for Prolia  Start of therapy pending insurance approval  Synthroid, will recheck TSH prior to follow up with Endocrinology  maintenance-proceed with annual mammogram  3 months  The patient was counseled regarding instructions for management,-- impressions  Possible side effects of new medications were reviewed with the patient/guardian today  The treatment plan was reviewed with the patient/guardian  The patient/guardian understands and agrees with the treatment plan      Chief Complaint   Pt is here for a f/u appt  Pt states that she has been well  Pt states that her Neuropathy also bothers her  She is requesting refills on two medications    Patient is here today for follow up of chronic conditions described in HPI  History of Present Illness   Patient presents for follow-up of chronic medical conditions  has been feeling stably  She remains on the same daily medications for type 1 diabetes, hyperlipidemia, hypertension and hypothyroidism  has pending follow-up with Endocrinology in February  She will be due for blood work at the same time  She is still experiencing unchanged symptoms of chronic diabetic/peripheral neuropathy    on gabapentin 300 mg po TID - feet hurt in am, patient tolerates current medication regimen well, no significant fatigue  uses advil 200 mg PRN with significant improvement of symptoms  is under care of 2 ophthalmologists for treatment of glaucoma and diabetic retinopathy  are in the process of approving patient's Prolia  She did try Fosamax the past and has developed significant GI side effects/constipation  The patient presents for follow-up of essential hypertension  The patient states she has been doing well with her blood pressure control since the last visit  Symptoms: The patient is currently asymptomatic  denies impaired vision,-- denies dyspnea,-- denies chest pain-- and-- denies intermittent leg claudication        Medications: the patient is adherent with her medication regimen  -- She denies medication side effects  Medication(s): a diuretic,-- a calcium channel blocker-- and-- an angiotensin receptor blocker  Review of Systems        Constitutional: No fever, no chills, feels well, no tiredness, no recent weight gain or weight loss  Eyes: No complaints of eye pain, no red eyes, no eyesight problems, no discharge, no dry eyes, no itching of eyes  ENT: no complaints of earache, no loss of hearing, no nose bleeds, no nasal discharge, no sore throat, no hoarseness  Cardiovascular: No complaints of slow heart rate, no fast heart rate, no chest pain, no palpitations, no leg claudication, no lower extremity edema  Respiratory: No complaints of shortness of breath, no wheezing, no cough, no SOB on exertion, no orthopnea, no PND  Gastrointestinal: No complaints of abdominal pain, no constipation, no nausea or vomiting, no diarrhea, no bloody stools  Genitourinary: No complaints of dysuria, no incontinence, no pelvic pain, no dysmenorrhea, no vaginal discharge or bleeding  Musculoskeletal: No complaints of arthralgias, no myalgias, no joint swelling or stiffness, no limb pain or swelling  Integumentary: No complaints of skin rash or lesions, no itching, no skin wounds, no breast pain or lump  Neurological: No complaints of headache, no confusion, no convulsions, no numbness, no dizziness or fainting, no tingling, no limb weakness, no difficulty walking  Psychiatric: Not suicidal, no sleep disturbance, no anxiety or depression, no change in personality, no emotional problems  Endocrine: No complaints of proptosis, no hot flashes, no muscle weakness, no deepening of the voice, no feelings of weakness  Hematologic/Lymphatic: No complaints of swollen glands, no swollen glands in the neck, does not bleed easily, does not bruise easily  Active Problems   1  Adult onset hypothyroidism (244 8) (E03 8)   2  Allergic rhinitis (477 9) (J30 9)   3  Bunion of great toe of left foot (727 1) (M21 612)   4  Chronic foot pain, unspecified laterality (729 5,338 29) (M79 673,G89 29)   5  Chronic interstitial cystitis (595 1) (N30 10)   6  Disc degeneration, 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)   7  Dysfunction of left eustachian tube (381 81) (H69 82)   8  Dysuria (788 1) (R30 0)   9  Generalized anxiety disorder (300 02) (F41 1)   10  Denied: History of self breast exam   11  Hyperlipidemia due to type 1 diabetes mellitus (602 22,627  4) (E10 69,E78 5)   12  Hypertension (401 9) (I10)   13  Irritable bowel syndrome with both constipation and diarrhea (564 1) (K58 2)   14  Lumbar canal stenosis (724 02) (M48 061)   15  Lumbar radiculopathy (724 4) (M54 16)   16  Mastodynia (611 71) (N64 4)   17  Need for immunization against influenza (V04 81) (Z23)   18  Osteoporosis (733 00) (M81 0)   19  Peripheral neuropathy (356 9) (G62 9)   20  Retinopathy, diabetic, proliferative (250 50,362 02) (E11 3599)   21  Sinusitis (473 9) (J32 9)   22  Spondylosis of lumbar region without myelopathy or radiculopathy (721 3) (M47 816)   23  Type 1 diabetes mellitus with diabetic polyneuropathy (250 61,357 2) (E10 42)   24  Type 1 diabetes mellitus with hyperglycemia, with long-term current use of insulin      (250 01,790 29) (E10 65)   25  Vaginal burning (625 8) (N94 9)   26  Vaginal candidiasis (112 1) (B37 3)   27  Vitamin D deficiency (268 9) (E55 9)    Past Medical History   1  History of Acute foot pain, right (729 5) (M79 671)   2  History of Age At First Period 17 Years Old (Menarche)   3  History of Age At First Pregnancy 34 Years Old   4  History of Currently Wearing Eyeglasses   5  History of Difficulty Falling Asleep   6  History of Encounter for screening mammogram for malignant neoplasm of breast     (V76 12) (Z12 31)   7  History of Fracture of fifth metatarsal bone of right foot (825 25) (S92 351A)   8   2 para 2 (V49 89) (Z78 9)   9  History of Hearing Loss (389 9)   10  History Of 2  Previous Pregnancies (V61 5)   11  History of cataract (V12 49) (Z86 69)   12  History of chest pain (V13 89) (Z87 898)   13  History of constipation (V12 79) (Z87 19)   14  History of diarrhea (V12 79) (Z87 898)   15  History of diplopia (V12 49) (Z86 69)   16  History of heartburn (V12 79) (Z87 898)   17  History of herpes zoster (V12 09) (Z86 19)   18  History of hypertension (V12 59) (Z86 79)   19  History of hyperthyroidism (V12 29) (Z86 39)   20  History of neoplasm of uncertain behavior of skin (V13 3) (Z86 03)   21  Denied: History of self breast exam   22  History of syncope (V15 89) (Z87 898)   23  History of thyroid disease (V12 29) (Z86 39)   24  History of upper respiratory infection (V12 09) (Z87 09)   25  History of urinary tract infection (V13 02) (Z87 440)   26  History of Hyponatremia (276 1) (E87 1)   27  History of Need for influenza vaccination (V04 81) (Z23)   28  History of Need for influenza vaccination (V04 81) (Z23)   29  History of Open wound of left foot, initial encounter (892 0) (S91 302A)   30  History of Previous Pregnancies Resulted In 2  Live Birth(S)   31  History of Skin lesions (709 9) (L98 9)   32  History of Type 1 diabetes mellitus without complication (382 21) (X50 6)   33  History of Visit for screening mammogram (V76 12) (Z12 31)   34  History of Vulvitis (616 10) (N76 2)     The active problems and past medical history were reviewed and updated today  Surgical History   1  History of Appendectomy   2  History of Cataract Surgery   3  History of Complete Colonoscopy   4  History of Excision Of Lesion Ears     The surgical history was reviewed and updated today  Family History   Mother    1  Family history of Acute Myocardial Infarction (V17 3)   2  Family history of type 2 diabetes mellitus (V18 0) (Z83 3)  Father    3  Family history of Dementia   4   Family history of cerebrovascular accident (V17 1) (Z82 3)  Maternal Aunt    5  Family history of Breast Cancer (V16 3)  Maternal Uncle    6  Family history of Adenocarcinoma Of The Stomach (V16 0)  Family History    7  Family history of Diabetes Mellitus (V18 0)   8  Family history of Stroke Complications     The family history was reviewed and updated today  Social History    · Caffeine use (V49 89) (F15 90)   · Denied: History of Drug Use (305 90)   · Marital History -    · Never A Smoker   · No preference on Latter-day beliefs   · Social alcohol use (Z78 9)   · Two children  The social history was reviewed and updated today  Current Meds    1  Advil CAPS; Therapy: (Recorded:30Oct2017) to Recorded   2  AmLODIPine Besylate 5 MG Oral Tablet; take 1 tablet by mouth once daily after  supper; Therapy: 37OMY1523 to (Last Rx:38Ugs4318)  Requested for: 38GXA5621 Ordered   3  Aspirin 81 MG TABS; TAKE 1 TABLET DAILY; Therapy: (Recorded:11Aug2015) to Recorded   4  Atorvastatin Calcium 10 MG Oral Tablet; Take 1 tablet daily; Therapy: 11Aug2015 to (Evaluate:66Hjv8918)  Requested for: 11Aug2017; Last     Rx:11Aug2017 Ordered   5  BD Insulin Syr Ultrafine II 31G X 5/16 0 5 ML Miscellaneous; Use 4x daily; Therapy: 24OVG9754 to (Evaluate:98Tqc8173)  Requested for: 00XRF6983; Last     Rx:10Evh4512 Ordered   6  Co Q-10 200 MG Oral Capsule; TAKE 1 CAPSULE DAILY; Therapy: (Recorded:11Aug2015) to Recorded   7  Cosopt SOLN; INSTILL 1 DROP INTO RIGHT EYE 2 TIMES DAILY; Therapy: (Recorded:11Aug2015) to Recorded   8  Fluconazole 150 MG Oral Tablet; TAKE AS DIRECTED; Therapy: 32LLW3749 to (Martin Carlson)  Requested for: 06QQZ3116; Last     Rx:30Jun2017 Ordered   9  Fluticasone Propionate 50 MCG/ACT Nasal Suspension; USE 2 SPRAYS IN EACH     NOSTRIL ONCE DAILY; Therapy: 06GGX5840 to (Last BH:87LZC1811)  Requested for: 99SXA0115 Ordered   10  Gabapentin 300 MG Oral Capsule; take 1 capsule three times a day;       Therapy: 80LAL3176 to (Evaluate:11Nov2018)  Requested for: 39EYC8398; Last      Rx:16Nov2017 Ordered   11  HumaLOG 100 UNIT/ML Subcutaneous Solution; INJECT SC 4 UNITS AT BREAKFAST -      5 UNITS AT LUNCH - 7 UNITS AT DINNER PLUS 1 UNIT PER 50      OVER 150; Therapy: 26GAN8122 to [phone])  Requested for: 72IZH1350; Last      Rx:02Jan2018 Ordered   12  HydroCHLOROthiazide 12 5 MG Oral Capsule; take 1 capsule by mouth once a day  as      needed for leg edema; Therapy: 29UIO9445 to (Evaluate:15Jun2018)  Requested for: 20Jun2017; Last      Rx:20Jun2017 Ordered   13  Irbesartan 150 MG Oral Tablet; TAKE 1 TABLET DAILY; Therapy: 46KQQ2488 to (Evaluate:31Mar2018)  Requested for: 77Kyp3380; Last      Rx:09Apr2017 Ordered   14  Lantus 100 UNIT/ML Subcutaneous Solution; INJECT 12 UNITS UNDER THE SKIN      NIGHTLY AS DIRECTED; Therapy: 14TRN9029 to (Evaluate:26Nye7582)  Requested for: 00UNZ0575; Last      Rx:16Nov2017 Ordered   15  Levothyroxine Sodium 50 MCG Oral Tablet; Take 1 tablet daily; Therapy: 23ZVL3501 to (Evaluate:04Jun2018)  Requested for: 10ZFM6693; Last      Rx:63Wfr1184 Ordered   16  LORazepam 0 5 MG Oral Tablet; take 1 tablet 3 times a day as needed; Therapy: 68IFX1321 to (Ariella Black)  Requested for: 36UVR4133; Last      Rx:54Sff1396 Ordered   17  Magnesium 500 MG Oral Tablet; Take 1 tablet daily; Therapy: (Relda Layman) to Recorded   18  OneTouch Ultra Blue In Vitro Strip; ONE TOUCH ULTRA STRIPS       TEST 6 TIMES PER DAY; Therapy: 49IFE2912 to [phone]); Last Rx:26Oct2017 Ordered   19  Refresh Tears 0 5 % Ophthalmic Solution; INSTILL 1 DROP INTO BOTH EYES 4 TIMES      DAILY; Therapy: (Recorded:11Aug2015) to Recorded   20  Super B Complex Maxi Oral Tablet; take one every other day; Therapy: 95TAC8826 to Recorded   21  Travatan Z 0 004 % Ophthalmic Solution; instill 2 drops in both eyes at bedtime;       Therapy: (Recorded:11Aug2015) to Recorded 22  Turmeric 500 MG Oral Capsule; Take one daily; Therapy: 93EQB8379 to Recorded   23  Vitamin D3 2000 UNIT Oral Tablet; 2 CAPSULES DAILY; Therapy: 91KYH6551 to (Last Rx:30Oct2017) Ordered     The medication list was reviewed and updated today  Allergies   1  No Known Drug Allergies    Vitals   Vital Signs    Recorded: 08ZEC8680 01:08PM   Temperature 97 6 F   Heart Rate 72   Systolic 014   Diastolic 60   Height 5 ft 5 in   Weight 134 lb 8 oz   BMI Calculated 22 38   BSA Calculated 1 67     Physical Exam        Constitutional      General appearance: No acute distress, well appearing and well nourished  Pulmonary      Respiratory effort: No increased work of breathing or signs of respiratory distress  Auscultation of lungs: Clear to auscultation  Cardiovascular      Auscultation of heart: Normal rate and rhythm, normal S1 and S2, without murmurs  Examination of extremities for edema and/or varicosities: Normal        Carotid pulses: Normal        Musculoskeletal      Gait and station: Normal        Neurologic      Cranial nerves: Cranial nerves 2-12 intact  Psychiatric      Orientation to person, place, and time: Normal        Mood and affect: Normal           Results/Data   (1) TSH 19VDH2139 10:37AM Olga Jaksch      Test Name Result Flag Reference   TSH 1 980 uIU/mL  0 358-3 740   Patients undergoing fluorescein dye angiography may retain small amounts of fluorescein in the body for 48-72 hours post procedure  Samples containing fluorescein can produce falsely depressed TSH values  If the patient had this procedure,a specimen should be resubmitted post fluorescein clearance                           The recommended reference ranges for TSH during pregnancy are as follows:     First trimester 0 1 to 2 5 uIU/mL     Second trimester  0 2 to 3 0 uIU/mL     Third trimester 0 3 to 3 0 uIU/m      (1) T4, FREE 68WCX3846 10:37AM Olga Jaksch      Test Name Result Flag Reference T4,FREE 1 05 ng/dL  0 76-1 46   Specimen collection should occur prior to Sulfasalazine administration due to the potential for falsely elevated results  (1) VITAMIN D 25-HYDROXY 38DBZ2454 10:37AM Yeni Kessler      Test Name Result Flag Reference   VIT D 25-HYDROX 39 8 ng/mL  30 0-100 0   This assay is a certified procedure of the CDC Vitamin D Standardization Certification Program (VDSCP)           Deficiency <20ng/ml      Insufficiency 20-30ng/ml      Sufficient  ng/ml           *Patients undergoing fluorescein dye angiography may retain small amounts of fluorescein in the body for 48-72 hours post procedure  Samples containing fluorescein can produce falsely elevated Vitamin D values  If the patient had this procedure, a specimen should be resubmitted post fluorescein clearance  (1) HEMOGLOBIN A1C 91RVY3526 10:37AM Yeni Kessler      Test Name Result Flag Reference   HEMOGLOBIN A1C 6 6 % H 4 2-6 3   EST  AVG   GLUCOSE 143 mg/dl        (1) CBC/ PLT (NO DIFF) 05Oct2017 07:06AM Junior Weinberg    Order Number: [id number]      Test Name Result Flag Reference   HEMATOCRIT 37 4 %  34 8-46 1   HEMOGLOBIN 12 8 g/dL  11 5-15 4   MCHC 34 2 g/dL  31 4-37 4   MCH 32 9 pg  26 8-34 3   MCV 96 fL  82-98   PLATELET COUNT 782 Thousands/uL  149-390   RBC COUNT 3 89 Million/uL  3 81-5 12   RDW 12 6 %  11 6-15 1   WBC COUNT 5 23 Thousand/uL  4 31-10 16   MPV 9 5 fL  8 9-12 7      (1) COMPREHENSIVE METABOLIC PANEL 21SFL1661 98:74OD Amira Huizar Order Number: [id number]      Test Name Result Flag Reference   SODIUM 136 mmol/L  136-145   POTASSIUM 4 3 mmol/L  3 5-5 3   CHLORIDE 102 mmol/L  100-108   CARBON DIOXIDE 26 mmol/L  21-32   ANION GAP (CALC) 8 mmol/L  4-13   BLOOD UREA NITROGEN 24 mg/dL  5-25   CREATININE 1 02 mg/dL  0 60-1 30   Standardized to IDMS reference method   CALCIUM 9 4 mg/dL  8 3-10 1   BILI, TOTAL 0 66 mg/dL  0 20-1 00   ALK PHOSPHATAS 71 U/L     ALT (SGPT) 21 U/L  12-78   Specimen collection should occur prior to Sulfasalazine and/or Sulfapyridine administration due to the potential for falsely depressed results  AST(SGOT) 15 U/L  5-45   Specimen collection should occur prior to Sulfasalazine administration due to the potential for falsely depressed results  ALBUMIN 3 8 g/dL  3 5-5 0   TOTAL PROTEIN 7 6 g/dL  6 4-8 2   eGFR 52 ml/min/1 73sq m     [facility] Disease Education Program recommendations are as follows:     GFR calculation is accurate only with a steady state creatinine     Chronic Kidney disease less than 60 ml/min/1 73 sq  meters     Kidney failure less than 15 ml/min/1 73 sq  meters  GLUCOSE FASTING 73 mg/dL  65-99   Specimen collection should occur prior to Sulfasalazine administration due to the potential for falsely depressed results  Specimen collection should occur prior to Sulfapyridine administration due to the potential for falsely elevated results  Health Management   Health Maintenance   Medicare Annual Wellness Visit; every 1 year; Next Due: 76Uix7351; Active    Future Appointments      Date/Time Provider Specialty Site   02/20/2018 01:00 PM BORIS Malin  Endocrinology [facility] ENDOCRINOLOGY [facility]     Signatures    Electronically signed by :  BORIS Concepcion ; Gabriel 10 2018  9:10PM EST                       (Author)

## 2018-01-12 NOTE — CONSULTS
Chief Complaint  Patient is here for a follow up  Patient denies cardiac complaints  History of Present Illness  Followup for HTN    DOing well  No major issues  No chest pain, no dyspnea, no palpitations  The patient presents for follow-up of essential hypertension  The patient states she has been stable with her blood pressure control since the last visit  Symptoms: denies impaired vision, denies dyspnea, denies chest pain, denies intermittent leg claudication and denies lower extremity edema  Associated symptoms include no headache, no focal neurologic deficits and no memory loss  Medications: the patient is adherent with her medication regimen  She denies medication side effects  Review of Systems      Cardiac: No complaints of chest pain, no palpitations, no fainting  Skin: No complaints of nonhealing sores or skin rash  Genitourinary: No complaints of recurrent urinary tract infections, frequent urination at night, difficult urination, blood in urine, kidney stones, loss of bladder control, kidney problems, denies any birth control or hormone replacement, is not post menopausal, not currently pregnant  Psychological: No complaints of feeling depressed, anxiety, panic attacks, or difficulty concentrating  General: No complaints of trouble sleeping, lack of energy, fatigue, appetite changes, weight changes, fever, frequent infections, or night sweats  Respiratory: No complaints of shortness of breath, cough with sputum, or wheezing  HEENT: No complaints of serious problems, hearing problems, nose problems, throat problems, or snoring  Gastrointestinal: No complaints of liver problems, nausea, vomiting, heartburn, constipation, bloody stools, diarrhea, problems swallowing, adbominal pain, or rectal bleeding  Hematologic: No complaints of bleeding disorders, anemia, blood clots, or excessive brusing     Neurological: No complaints of numbness, tingling, dizziness, weakness, seizures, headaches, syncope or fainting, AM fatigue, daytime sleepiness, no witnessed apnea episodes  Musculoskeletal: No complaints of arthritis, back pain, or painfull swelling  ROS reviewed  Active Problems    1  Adult onset hypothyroidism (244 8) (E03 8)   2  Allergic rhinitis (477 9) (J30 9)   3  Bunion of great toe of left foot (727 1) (M21 612)   4  Chronic foot pain, unspecified laterality (729 5,338 29) (M79 673,G89 29)   5  Chronic interstitial cystitis (595 1) (N30 10)   6  Disc degeneration, 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)   7  Dysfunction of left eustachian tube (381 81) (H69 82)   8  Dysuria (788 1) (R30 0)   9  Generalized anxiety disorder (300 02) (F41 1)   10  Denied: History of self breast exam   11  Hyperlipidemia due to type 1 diabetes mellitus (217 14,269  4) (E10 69,E78 5)   12  Hypertension (401 9) (I10)   13  Irritable bowel syndrome with both constipation and diarrhea (564 1) (K58 2)   14  Lumbar canal stenosis (724 02) (M48 061)   15  Lumbar radiculopathy (724 4) (M54 16)   16  Mastodynia (611 71) (N64 4)   17  Need for immunization against influenza (V04 81) (Z23)   18  Osteoporosis (733 00) (M81 0)   19  Peripheral neuropathy (356 9) (G62 9)   20  Retinopathy, diabetic, proliferative (250 50,362 02) (E11 3599)   21  Sinusitis (473 9) (J32 9)   22  Spondylosis of lumbar region without myelopathy or radiculopathy (721 3) (M47 816)   23  Type 1 diabetes mellitus with diabetic polyneuropathy (250 61,357 2) (E10 42)   24  Type 1 diabetes mellitus with hyperglycemia, with long-term current use of insulin    (250 01,790 29) (E10 65)   25  Vaginal burning (625 8) (N94 9)   26  Vaginal candidiasis (112 1) (B37 3)   27   Vitamin D deficiency (268 9) (E55 9)    Past Medical History    · History of Acute foot pain, right (729 5) (M79 671)   · History of Age At First Period 17 Years Old (Menarche)   · History of Age At First Pregnancy 34 Years Old   · History of Currently Wearing Eyeglasses   · History of Difficulty Falling Asleep   · History of Encounter for screening mammogram for malignant neoplasm of breast  (V76 12) (Z12 31)   · History of Fracture of fifth metatarsal bone of right foot (825 25) (S92 351A)   ·  2 para 2 (V49 89) (Z78 9)   · History of Hearing Loss (389 9)   · History Of 2  Previous Pregnancies (V61 5)   · History of cataract (V12 49) (Z86 69)   · History of chest pain (V13 89) (E38 277)   · History of constipation (V12 79) (Z87 19)   · History of diarrhea (V12 79) (Q39 869)   · History of diplopia (V12 49) (Z86 69)   · History of heartburn (V12 79) (Z62 346)   · History of herpes zoster (V12 09) (Z86 19)   · History of hypertension (V12 59) (Z86 79)   · History of hyperthyroidism (V12 29) (Z86 39)   · History of neoplasm of uncertain behavior of skin (V13 3) (Z86 03)   · Denied: History of self breast exam   · History of syncope (V15 89) (O45 694)   · History of thyroid disease (V12 29) (Z86 39)   · History of upper respiratory infection (V12 09) (Z87 09)   · History of urinary tract infection (V13 02) (Z87 440)   · History of Hyponatremia (276 1) (E87 1)   · History of Need for influenza vaccination (V04 81) (Z23)   · History of Need for influenza vaccination (V04 81) (Z23)   · History of Open wound of left foot, initial encounter (892 0) (S91 302A)   · History of Previous Pregnancies Resulted In 2  Live Birth(S)   · History of Skin lesions (709 9) (L98 9)   · History of Type 1 diabetes mellitus without complication (506 21) (Q31 7)   · History of Visit for screening mammogram (V76 12) (Z12 31)   · History of Vulvitis (616 10) (N76 2)    The active problems and past medical history were reviewed and updated today  Surgical History    · History of Appendectomy   · History of Cataract Surgery   · History of Complete Colonoscopy   · History of Excision Of Lesion Ears    The surgical history was reviewed and updated today         Family History    · Family history of Acute Myocardial Infarction (V17 3)   · Family history of type 2 diabetes mellitus (V18 0) (Z83 3)    · Family history of Dementia   · Family history of cerebrovascular accident (V17 1) (Z82 3)    · Family history of Breast Cancer (V16 3)    · Family history of Adenocarcinoma Of The Stomach (V16 0)    · Family history of Diabetes Mellitus (V18 0)   · Family history of Stroke Complications    The family history was reviewed and updated today  Social History    · Caffeine use (V49 89) (F15 90)   · Denied: History of Drug Use (305 90)   · Marital History -    · Never A Smoker   · No preference on Protestant beliefs   · Social alcohol use (Z78 9)   · Two children  The social history was reviewed and updated today  Current Meds   1  Advil CAPS; Therapy: (Recorded:2017) to Recorded   2  AmLODIPine Besylate 5 MG Oral Tablet; take 1 tablet by mouth once daily after  supper; Therapy: 60JDR2894 to (Last Rx:02Sia4902)  Requested for: 83RTH7992 Ordered   3  Aspirin 81 MG TABS; TAKE 1 TABLET DAILY; Therapy: (Recorded:2015) to Recorded   4  Atorvastatin Calcium 10 MG Oral Tablet; Take 1 tablet daily; Therapy: 2015 to (Evaluate:45Xxc2151)  Requested for: 2017; Last   Rx:2017 Ordered   5  BD Insulin Syr Ultrafine II 31G X 5/16" 0 5 ML Miscellaneous; Use 4x daily; Therapy: 68VDP1936 to (Evaluate:52Zql2934)  Requested for: 11NZM5772; Last   Rx:07Dlp8961 Ordered   6  Co Q-10 200 MG Oral Capsule; TAKE 1 CAPSULE DAILY; Therapy: (Recorded:2015) to Recorded   7  Cosopt SOLN; INSTILL 1 DROP INTO RIGHT EYE 2 TIMES DAILY; Therapy: (Recorded:2015) to Recorded   8  Fluconazole 150 MG Oral Tablet; TAKE AS DIRECTED; Therapy: 23NUD6959 to (Trang Kan)  Requested for: 70SJY5586; Last   Rx:2017 Ordered   9  Fluticasone Propionate 50 MCG/ACT Nasal Suspension; USE 2 SPRAYS IN EACH   NOSTRIL ONCE DAILY;    Therapy: 85YPD4344 to (Last D08OKW5021)  Requested for: 90SOZ0517 Ordered   10  HumaLOG 100 UNIT/ML Subcutaneous Solution; INJECT SC 4 UNITS AT BREAKFAST -    5 UNITS AT LUNCH - 7 UNITS AT DINNER PLUS 1 UNIT PER 50    OVER 150; Therapy: 70LAR8152 to [phone]  Requested for: 46PGA7027; Last    Rx:30Oct2017 Ordered   11  HydroCHLOROthiazide 12 5 MG Oral Capsule; take 1 capsule by mouth once a day  as    needed for leg edema; Therapy: 23WXH5469 to (Evaluate:15Jun2018)  Requested for: 20Jun2017; Last    Rx:72Ave2657 Ordered   12  Irbesartan 150 MG Oral Tablet; TAKE 1 TABLET DAILY; Therapy: 33UTH4924 to (Evaluate:31Mar2018)  Requested for: 06Frq2630; Last    Rx:26Yhe7806 Ordered   13  Lantus 100 UNIT/ML Subcutaneous Solution; INJECT 12 UNITS UNDER THE SKIN    NIGHTLY AS DIRECTED; Therapy: 82Ioe9845 to [phone]  Requested for: 86NSL8956; Last    Rx:30Oct2017 Ordered   14  Levothyroxine Sodium 50 MCG Oral Tablet; TAKE ONE (1) TABLET(S) DAILY; Therapy: 25HVQ9092 to (Evaluate:75Zet4328)  Requested for: 16LWM5095; Last    Rx:82Qtp5155 Ordered   15  LORazepam 0 5 MG Oral Tablet; take 1 tablet 3 times a day as needed; Therapy: 30BLS8656 to (Kwan Mckeon)  Requested for: 37UKQ8505; Last    Rx:09Nov2017 Ordered   16  Magnesium 500 MG Oral Tablet; Take 1 tablet daily; Therapy: (Refsarmad German) to Recorded   17  OneTouch Ultra Blue In Vitro Strip; ONE TOUCH ULTRA STRIPS     TEST 6 TIMES PER DAY; Therapy: 75LCM0962 to [phone]); Last Rx:26Oct2017 Ordered   18  Refresh Tears 0 5 % Ophthalmic Solution; INSTILL 1 DROP INTO BOTH EYES 4 TIMES    DAILY; Therapy: (Recorded:11Aug2015) to Recorded   19  Super B Complex Maxi Oral Tablet; take one every other day; Therapy: 33UVV8450 to Recorded   20  Travatan Z 0 004 % Ophthalmic Solution; instill 2 drops in both eyes at bedtime; Therapy: (Recorded:11Aug2015) to Recorded   21  Turmeric 500 MG Oral Capsule; Take one daily; Therapy: 41DAJ3131 to Recorded   22   Vitamin D3 2000 UNIT Oral Tablet; 2 CAPSULES DAILY; Therapy: 21JKJ3882 to (Last Rx:30Oct2017) Ordered    The medication list was reviewed and updated today  Allergies    1  No Known Drug Allergies    Vitals   Recorded: 13RDX6438 10:12AM   Heart Rate 67   Systolic 103, RUE, Sitting   Diastolic 70, RUE, Sitting   Height 5 ft 5 in   Weight 133 lb    BMI Calculated 22 13   BSA Calculated 1 66     Physical Exam    Constitutional   General appearance: No acute distress, well appearing and well nourished  Eyes   Conjunctiva and Sclera examination: Conjunctiva pink, sclera anicteric  Ears, Nose, Mouth, and Throat - Oropharynx: Clear, nares are clear, mucous membranes are moist    Neck   Neck and thyroid: Normal, supple, trachea midline, no thyromegaly  Pulmonary   Respiratory effort: No increased work of breathing or signs of respiratory distress  Auscultation of lungs: Clear to auscultation, no rales, no rhonchi, no wheezing, good air movement  Cardiovascular   Auscultation of heart: Normal rate and rhythm, normal S1 and S2, no murmurs  Carotid pulses: Normal, 2+ bilaterally  Peripheral vascular exam: Normal pulses throughout, no tenderness, erythema or swelling  Pedal pulses: Normal, 2+ bilaterally  Examination of extremities for edema and/or varicosities: Normal     Abdomen   Abdomen: Non-tender and no distention  Liver and spleen: No hepatomegaly or splenomegaly  Musculoskeletal Gait and station: Normal gait  Digits and nails: Normal without clubbing or cyanosis  Inspection/palpation of joints, bones, and muscles: Normal, ROM normal     Skin - Skin and subcutaneous tissue: Normal without rashes or lesions  Skin is warm and well perfused, normal turgor  Neurologic - Cranial nerves: II - XII intact   Speech: Normal     Psychiatric - Orientation to person, place, and time: Normal  Mood and affect: Normal       Results/Data  A 12 lead ECG was performed and was normal    Rhythm and rate:  ventricular rate is 67 beats per minute  normal sinus rhythm  (1) LIPID PANEL FASTING W DIRECT LDL REFLEX 05Oct2017 07:06AM Micky Weldon Order Number: [id number]     Test Name Result Flag Reference   CHOLESTEROL 181 mg/dL     LDL CHOLESTEROL CALCULATED 71 mg/dL  0-100   Triglyceride:        Normal <150 mg/dl   Borderline High 150-199 mg/dl   High 200-499 mg/dl   Very High >499 mg/dl      Cholesterol:       Desirable <200 mg/dl    Borderline High 200-239 mg/dl    High >239 mg/dl      HDL Cholesterol:       High>59 mg/dL    Low <41 mg/dL      HDL Cholesterol:       High>59 mg/dL    Low <41 mg/dL      This screening LDL is a calculated result  It does not have the accuracy of the Direct Measured LDL in the monitoring of patients with hyperlipidemia and/or statin therapy  Direct Measure LDL (CMZ918) must be ordered separately in these patients  TRIGLYCERIDES 60 mg/dL  <=150   Specimen collection should occur prior to N-Acetylcysteine or Metamizole administration due to the potential for falsely depressed results  HDL,DIRECT 98 mg/dL H 40-60   Specimen collection should occur prior to Metamizole administration due to the potential for falsley depressed results  Assessment    1  Hypertension (401 9) (I10)   2  Hyperlipidemia due to type 1 diabetes mellitus (598 35,635  4) (E10 69,E78 5)    Plan  Hyperlipidemia due to type 1 diabetes mellitus    · Follow-up PRN Evaluation and Treatment  Follow-up  Status: Complete  Done:  10KKA6245   Ordered; For: Hyperlipidemia due to type 1 diabetes mellitus; Ordered By: Ashley Ramesh Performed:  Due: 19VYZ3798  Hypertension    · EKG/ECG- POC; Status:Complete;   Done: 36SYC9576   Perform: In Office; Due:10Nov2018; Last Updated Marvin rAellano; 11/10/2017 10:10:04 AM;Ordered;  For:Hypertension; Ordered By:Kimberly Cardoza; Discussion/Summary    1  Hypertension: BP is stable on medical therapy       3  Carotid Atherosclerosis: Less than 50% stenosis bilaterally  On aspirin and atorvastatin  The patient was counseled regarding diagnostic results, instructions for management, risk factor reductions, impressions  total time of encounter was 25 minutes and 15 minutes was spent counseling        Future Appointments    Signatures   Electronically signed by : BORIS Trevino ; Nov 10 2017 10:42AM EST                       (Author)

## 2018-01-13 VITALS
HEIGHT: 65 IN | WEIGHT: 136.25 LBS | DIASTOLIC BLOOD PRESSURE: 70 MMHG | TEMPERATURE: 99 F | BODY MASS INDEX: 22.7 KG/M2 | SYSTOLIC BLOOD PRESSURE: 132 MMHG | RESPIRATION RATE: 14 BRPM | HEART RATE: 70 BPM

## 2018-01-13 VITALS
HEIGHT: 65 IN | RESPIRATION RATE: 14 BRPM | DIASTOLIC BLOOD PRESSURE: 60 MMHG | BODY MASS INDEX: 21.89 KG/M2 | SYSTOLIC BLOOD PRESSURE: 142 MMHG | HEART RATE: 74 BPM | WEIGHT: 131.38 LBS | TEMPERATURE: 98.3 F

## 2018-01-13 VITALS
DIASTOLIC BLOOD PRESSURE: 70 MMHG | HEIGHT: 65 IN | WEIGHT: 133 LBS | HEART RATE: 67 BPM | BODY MASS INDEX: 22.16 KG/M2 | SYSTOLIC BLOOD PRESSURE: 130 MMHG

## 2018-01-13 NOTE — RESULT NOTES
Discussion/Summary   A1c 6 9  TSH normal, continue current dose of Levothyroxine  Verified Results  (1) HEMOGLOBIN A1C 17Aug2017 07:09AM Beth Sprinkle   TW Order Number: [id number]     Test Name Result Flag Reference   HEMOGLOBIN A1C 6 9 % H 4 2-6 3   EST  AVG  GLUCOSE 151 mg/dl       (1) RENAL FUNCTION PANEL 17Aug2017 07:09AM Beth Sprinkle   TW Order Number: [id number]     Test Name Result Flag Reference   ANION GAP (CALC) 7 mmol/L  4-13   ALBUMIN 3 8 g/dL  3 5-5 0   BLOOD UREA NITROGEN 25 mg/dL  5-25   CALCIUM 8 9 mg/dL  8 3-10 1   CHLORIDE 100 mmol/L  100-108   CARBON DIOXIDE 26 mmol/L  21-32   CREATININE 0 87 mg/dL  0 60-1 30   Standardized to IDMS reference method   POTASSIUM 4 3 mmol/L  3 5-5 3   SODIUM 133 mmol/L L 136-145   PHOSPHORUS 3 4 mg/dL  2 3-4 1   eGFR 63 ml/min/1 73sq m     GLUCOSE FASTING 73 mg/dL  65-99   Specimen collection should occur prior to Sulfasalazine administration due to the potential for falsely depressed results  Specimen collection should occur prior to Sulfapyridine administration due to the potential for falsely elevated results  Specimen collection should occur prior to Sulfasalazine administration due to the potential for falsely depressed results  Specimen collection should occur prior to Sulfapyridine administration due to the potential for falsely elevated results  National Kidney Disease Education Program recommendations are as follows:  GFR calculation is accurate only with a steady state creatinine  Chronic Kidney disease less than 60 ml/min/1 73 sq  meters  Kidney failure less than 15 ml/min/1 73 sq  meters       (1) PTH N-TERMINAL (INTACT) 17Aug2017 07:09AM Beth Sprinkle   TW Order Number: [id number]     Test Name Result Flag Reference   PARATHYROID HORMONE INTACT 33 8 pg/mL  14 0-72 0     (1) TSH 10YYY7261 07:09AM Beth Sprinkle   TW Order Number: [id number]     Test Name Result Flag Reference   TSH 3 640 uIU/mL  0 358-3 740 Patients undergoing fluorescein dye angiography may retain small amounts of fluorescein in the body for 48-72 hours post procedure  Samples containing fluorescein can produce falsely depressed TSH values  If the patient had this procedure,a specimen should be resubmitted post fluorescein clearance  The recommended reference ranges for TSH during pregnancy are as follows:  First trimester 0 1 to 2 5 uIU/mL  Second trimester  0 2 to 3 0 uIU/mL  Third trimester 0 3 to 3 0 uIU/m     (1) T4, FREE 92Bsw6474 07:09AM Stephanie Duff    Order Number: [id number]     Test Name Result Flag Reference   T4,FREE 0 95 ng/dL  0 76-1 46   Specimen collection should occur prior to Sulfasalazine administration due to the potential for falsely elevated results

## 2018-01-14 VITALS
HEART RATE: 66 BPM | BODY MASS INDEX: 22.85 KG/M2 | DIASTOLIC BLOOD PRESSURE: 64 MMHG | WEIGHT: 137.13 LBS | SYSTOLIC BLOOD PRESSURE: 126 MMHG | HEIGHT: 65 IN

## 2018-01-14 VITALS
BODY MASS INDEX: 22.16 KG/M2 | TEMPERATURE: 98.1 F | WEIGHT: 133 LBS | SYSTOLIC BLOOD PRESSURE: 138 MMHG | HEIGHT: 65 IN | HEART RATE: 72 BPM | DIASTOLIC BLOOD PRESSURE: 80 MMHG

## 2018-01-14 VITALS
WEIGHT: 136 LBS | SYSTOLIC BLOOD PRESSURE: 144 MMHG | BODY MASS INDEX: 22.66 KG/M2 | HEIGHT: 65 IN | TEMPERATURE: 95.6 F | DIASTOLIC BLOOD PRESSURE: 70 MMHG | HEART RATE: 76 BPM

## 2018-01-14 VITALS
HEART RATE: 70 BPM | TEMPERATURE: 97.7 F | HEIGHT: 65 IN | WEIGHT: 134.38 LBS | DIASTOLIC BLOOD PRESSURE: 60 MMHG | BODY MASS INDEX: 22.39 KG/M2 | SYSTOLIC BLOOD PRESSURE: 142 MMHG

## 2018-01-14 VITALS
HEART RATE: 68 BPM | HEIGHT: 65 IN | DIASTOLIC BLOOD PRESSURE: 58 MMHG | RESPIRATION RATE: 16 BRPM | WEIGHT: 134 LBS | SYSTOLIC BLOOD PRESSURE: 116 MMHG | BODY MASS INDEX: 22.33 KG/M2 | TEMPERATURE: 98 F

## 2018-01-14 NOTE — RESULT NOTES
Message   Please call patient  Arterial Dopplers of her legs are normal  No problem with circulation! Her symptoms are triggered by neuropathy     Verified Results  VAS LOWER LIMB ARTERIAL DUPLEX LIMITED - TASH, PRESSURES, WAVEFORMS 97Zjd1272 12:49PM Laura Calle Order Number: [id number]    - Patient Instructions: To schedule this appointment, please contact Central Scheduling at [phone]  Test Name Result Flag Reference   VAS LOWER LIMB ARTERIAL DUPLEX LIMITED - TASH, PRESSURES, WAVEFORMS (Report)     [facility] REPORT   CLINICAL:   Indications: Unspecified atherosclerosis [I70 90]  has severe burning in feet   worse at night and pain throughout the day DM type 1 HTN denies claudication or   rest pain Risk Factors : The patient has history of HTN and diabetes (diabetes)  Clinical   Right Pressure: 174/ mm Hg, Left Pressure: 175/ mm Hg  CONCLUSION:   Impression:   RIGHT LOWER LIMB:   Ankle/Brachial Index: 1 13  which is within normal range  Metatarsal pressure of 135 mmHg   Great toe pressure of 111 mmHg, within the healing range  LEFT LOWER LIMB:   Ankle/Brachial Index:1 13  which is within normal range   Metatarsal pressure of 156 mmHg   Great toe pressure o 131 f mmHg, within the healing range  SIGNATURE:   Electronically Signed by: Angela Oakes MD, [address] on 2016-08-25 06:53:00 PM       Signatures   Electronically signed by :  BORIS Lindsey ; Aug 26 2016  5:38PM EST                       (Author)

## 2018-01-15 NOTE — RESULT NOTES
Message      I only see a T3  Did you get other results? Were they done outside [facility]? Is there a scanned copy of results?  Thanks     Verified Results  (1) T3 TOTAL 92ZKZ9489 07:43AM [place] Prieto     Test Name Result Flag Reference   T3 0 9 ng/mL  0 6-1 8       Signatures   Electronically signed by : BORIS Evans ; Jan 11 2016 10:57PM EST                       (Author)

## 2018-01-15 NOTE — PROGRESS NOTES
Plan     1  DSMT/MNT Time Record; Status:Complete;   Done: 84QFA8069 05:31PM    HumaLOG 100 UNIT/ML Subcutaneous Solution; INJECT SC 4 UNITS AT BREAKFAST - 5 UNITS AT LUNCH - 7 UNITS AT DINNER PLUS 1 UNIT PER 50 OVER 150; Therapy: 52XBZ4929 to (Evaluate:79Ypp9942)  Requested for: 06SSE2747; Last Rx:16Nov2017; Status: ACTIVE Ordered  Rx By: Kailee Chaudhry; Dispense: 30 Days ; #:1 X 10 ML Vial; Refill: 2;   For: Type 1 diabetes mellitus with diabetic polyneuropathy; ADRIAN = N; Verified Transmission to [address] [place] [address]; Msg to Pharmacy: E10 42  Lantus 100 UNIT/ML Subcutaneous Solution; INJECT 12 UNITS UNDER THE SKIN NIGHTLY AS DIRECTED; Therapy: 05Xaw0009 to (Evaluate:17Tth4506)  Requested for: 08WNE5645; Last Rx:16Nov2017; Status: ACTIVE Ordered  Rx By: Kailee Chaudhry; Dispense: 30 Days ; #:1 X 10 ML Vial; Refill: 2;   For: Type 1 diabetes mellitus with diabetic polyneuropathy; ADRIAN = N; Verified Transmission to [address] [place] [address]; Msg to Pharmacy: E10 42     Discussion/Summary    PATIENT EDUCATION RECORD   Indication for Services: type 1 Diabetes Mellitus, hypertension and Peripheral neuropathy  She is ready to learn  She has no barriers to learning  Healthy Eating:   Discussed importance of meal timing/consistency: Method: Instruction and Handout  Response: Verbalizes Understanding   Discussed nutrient types ( Cho/Fat/Protein): Method: Instruction and Handout  Response: Verbalizes Understanding   Discussed portion sizes: Method: Instruction, Handout and Demonstration  Response: Verbalizes Understanding   Discussed food label reading: Method: Instruction, Handout and Demonstration  Response: Verbalizes Understanding  Provided food diary and instructions on use: Method: Instruction and HandoutResponse: Verbalizes Understanding   Provided meal planning: Method: Instruction, Handout and Demonstration  Response: Verbalizes Understanding Her current weight is 133 8  Her keal needs are 1295   Her CHO's per meal are 97 g/day 30% carb    He/She was provided a meal plan for: fixed carbohydrates  Response:   Her current BMI 22  Discussed basic carbohydrate counting: Method: Instruction and Handout  Response: Verbalizes Understanding   Taking Medication:   Discussed devices-syringe/pen  Method: Instruction  Response: [facility]   She uses Syringe  Response:  She demonstrated syringe/pen administration  Discussed site selection and rotation of injections  Method: Instruction and Handout  Response: [facility]  She is taking   She is taking insulin Lantus 12 U pm, Humalog 4-5-7  Discussed onset, peak, and duration of insulin  Method: Instruction  Response: [facility]  Discussed basal-bolus concept  Method: Instruction  Response: [facility]  Healthy Coping Class:   Identified lifestyle behaviors that need to change: Method: Instruction  Response: Verbalizes Understanding   Discussed motivation to change: Method: Instruction  Response: Verbalizes Understanding   Identified goals for behavior change: Method: Instruction  Response: Verbalizes Understanding   Discussed strategies for change: Method: Instruction  Response: Verbalizes Understanding   She participated in goal setting  She was given the following educational materials: portion book, Personal Meal Plan 1295 calories, Planning Healthy Meals and Calorie and Carbohydrate Tracking Books/Websites/Phone Apps   Chief Complaint  Patient is here today for Medical Nutrition Therapy for T1DM      History of Present Illness  Patient is a [de-identified] y/o female with T1DM, HTN  A1c 6 9, BMI 22  Patient tests her BG fasting, before and after meals and bedtime  Some hyperglycemia postprandially and bedtime (200's), DIscussed effect of hyperglycemia on progression of neuropathy  We discussed the effect of carb intake on BG especially ingesting carb snacks between meals when insulin is waning and need for consistency with set bolus doses   Patient is injecting into musculature of thigh which she did admit to having a slower effect on reducing her BG  Suggested injecting in abdominal area, rotating sites  Patients food history shows a medium carb intake at breakfast and lower intake at lunch and dinner  Reviewed carb counting and patient was given a 1300 calorie low carb (30%) meal plan  This is her initial assessment    Present at session: patient    She has no special learning needs  Her caloric needs are 1295  She has had no recent weight change  Patient  shops for food  Patient  cooks the food  She eats breakfast at  AM 3/4 c  Cheerioes, 1/2 large banana, 1 c 1% milk, coffee with half and halfm [place]    She eats lunch at  [facility] with 2 oz meat, 1 oz cheese, tomatoes, cucumbers, 4 oz apple   [facility] crackers with peanut butter   She eats dinner at  PM 4 oz meat/chicken, green beans/broccoli/cauliflower, salad, 1/2 c  fruit (sometimes potato      NUTRITION DIAGNOSES   Food And Nutrition Related Knowledge Defici   Food and nutrition related knowledge deficit related to  lack of prior exposure to accurate nutrition related information  As evidenced by  verbalizes unwillingness or disinterest in learning information  Medical Nutrition Therapy Intervention: Carbohydrate counting, Meal planning, Individualized meal plan and Meal timing  Her comprehension was good   Her motivation was good   Her compliance was fair   Goals:  1  Follow meal plan/count carb due to set bolus doses  2  Eat protein foods in between meals when insulin is waning      Active Problems    1  Adult onset hypothyroidism (244 8) (E03 8)   2  Allergic rhinitis (477 9) (J30 9)   3  Bunion of great toe of left foot (727 1) (M21 612)   4  Chronic foot pain, unspecified laterality (729 5,338 29) (M79 673,G89 29)   5  Chronic interstitial cystitis (595 1) (N30 10)   6  Disc degeneration, 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)   7   Dysfunction of left eustachian tube (381 81) (H69 82)   8  Dysuria (788 1) (R30 0)   9  Generalized anxiety disorder (300 02) (F41 1)   10  Denied: History of self breast exam   11  Hyperlipidemia due to type 1 diabetes mellitus (464 40,552  4) (E10 69,E78 5)   12  Hypertension (401 9) (I10)   13  Irritable bowel syndrome with both constipation and diarrhea (564 1) (K58 2)   14  Lumbar canal stenosis (724 02) (M48 061)   15  Lumbar radiculopathy (724 4) (M54 16)   16  Mastodynia (611 71) (N64 4)   17  Need for immunization against influenza (V04 81) (Z23)   18  Osteoporosis (733 00) (M81 0)   19  Peripheral neuropathy (356 9) (G62 9)   20  Retinopathy, diabetic, proliferative (250 50,362 02) (E11 3599)   21  Sinusitis (473 9) (J32 9)   22  Spondylosis of lumbar region without myelopathy or radiculopathy (721 3) (M47 816)   23  Type 1 diabetes mellitus with diabetic polyneuropathy (250 61,357 2) (E10 42)   24  Type 1 diabetes mellitus with hyperglycemia, with long-term current use of insulin    (250 01,790 29) (E10 65)   25  Vaginal burning (625 8) (N94 9)   26  Vaginal candidiasis (112 1) (B37 3)   27  Vitamin D deficiency (268 9) (E55 9)    Past Medical History    1  History of Acute foot pain, right (729 5) (M79 671)   2  History of Age At First Period 17 Years Old (Menarche)   3  History of Age At First Pregnancy 34 Years Old   4  History of Currently Wearing Eyeglasses   5  History of Difficulty Falling Asleep   6  History of Encounter for screening mammogram for malignant neoplasm of breast   (V76 12) (Z12 31)   7  History of Fracture of fifth metatarsal bone of right foot (825 25) (S92 351A)   8   2 para 2 (V49 89) (Z78 9)   9  History of Hearing Loss (389 9)   10  History Of 2  Previous Pregnancies (V61 5)   11  History of cataract (V12 49) (Z86 69)   12  History of chest pain (V13 89) (Z87 898)   13  History of constipation (V12 79) (Z87 19)   14  History of diarrhea (V12 79) (Z87 898)   15  History of diplopia (V12 49) (Z86 69)   16  History of heartburn (V12 79) (Z87 898)   17  History of herpes zoster (V12 09) (Z86 19)   18  History of hypertension (V12 59) (Z86 79)   19  History of hyperthyroidism (V12 29) (Z86 39)   20  History of neoplasm of uncertain behavior of skin (V13 3) (Z86 03)   21  Denied: History of self breast exam   22  History of syncope (V15 89) (Z87 898)   23  History of thyroid disease (V12 29) (Z86 39)   24  History of upper respiratory infection (V12 09) (Z87 09)   25  History of urinary tract infection (V13 02) (Z87 440)   26  History of Hyponatremia (276 1) (E87 1)   27  History of Need for influenza vaccination (V04 81) (Z23)   28  History of Need for influenza vaccination (V04 81) (Z23)   29  History of Open wound of left foot, initial encounter (892 0) (S91 302A)   30  History of Previous Pregnancies Resulted In 2  Live Birth(S)   31  History of Skin lesions (709 9) (L98 9)   32  History of Type 1 diabetes mellitus without complication (433 83) (D01 6)   33  History of Visit for screening mammogram (V76 12) (Z12 31)   34  History of Vulvitis (616 10) (N76 2)    Surgical History    1  History of Appendectomy   2  History of Cataract Surgery   3  History of Complete Colonoscopy   4  History of Excision Of Lesion Ears    Family History  Mother    1  Family history of Acute Myocardial Infarction (V17 3)   2  Family history of type 2 diabetes mellitus (V18 0) (Z83 3)  Father    3  Family history of Dementia   4  Family history of cerebrovascular accident (V17 1) (Z82 3)  Maternal Aunt    5  Family history of Breast Cancer (V16 3)  Maternal Uncle    6  Family history of Adenocarcinoma Of The Stomach (V16 0)  Family History    7  Family history of Diabetes Mellitus (V18 0)   8   Family history of Stroke Complications    Social History    · Caffeine use (V49 89) (F15 90)   · Denied: History of Drug Use (305 90)   · Marital History -    · Never A Smoker   · No preference on Qu[employer] beliefs   · Social alcohol use (Z78 9)   · Two children    Current Meds   1  Advil CAPS; Therapy: (Recorded:30Oct2017) to Recorded   2  AmLODIPine Besylate 5 MG Oral Tablet; take 1 tablet by mouth once daily after  supper; Therapy: 12JTH6179 to (Last Rx:02Sxw0657)  Requested for: 11DFZ0884 Ordered   3  Aspirin 81 MG TABS; TAKE 1 TABLET DAILY; Therapy: (Recorded:11Aug2015) to Recorded   4  Atorvastatin Calcium 10 MG Oral Tablet; Take 1 tablet daily; Therapy: 11Aug2015 to (Evaluate:30Zoh1064)  Requested for: 11Aug2017; Last   Rx:11Aug2017 Ordered   5  BD Insulin Syr Ultrafine II 31G X 5/16" 0 5 ML Miscellaneous; Use 4x daily; Therapy: 75JKA6487 to (Evaluate:16May2017)  Requested for: 80OEY5648; Last   Rx:67Idi3293 Ordered   6  Co Q-10 200 MG Oral Capsule; TAKE 1 CAPSULE DAILY; Therapy: (Recorded:11Aug2015) to Recorded   7  Cosopt SOLN; INSTILL 1 DROP INTO RIGHT EYE 2 TIMES DAILY; Therapy: (Recorded:11Aug2015) to Recorded   8  Fluconazole 150 MG Oral Tablet; TAKE AS DIRECTED; Therapy: 36HIG3741 to (Yinka Monet)  Requested for: 32FTF8190; Last   Rx:30Jun2017 Ordered   9  Fluticasone Propionate 50 MCG/ACT Nasal Suspension; USE 2 SPRAYS IN EACH   NOSTRIL ONCE DAILY; Therapy: 81GXJ6235 to (Last XN:02FHH9620)  Requested for: 52GDQ8643 Ordered   10  Gabapentin 300 MG Oral Capsule; take 1 capsule three times a day; Therapy: 23ZOH7729 to (Evaluate:11Nov2018)  Requested for: 54PTF4686; Last    Rx:16Nov2017 Ordered   11  HumaLOG 100 UNIT/ML Subcutaneous Solution; INJECT SC 4 UNITS AT BREAKFAST -    5 UNITS AT LUNCH - 7 UNITS AT DINNER PLUS 1 UNIT PER 50    OVER 150; Therapy: 92VCP7009 to [phone])  Requested for: 03BGW0739; Last    Rx:30Oct2017 Ordered   12  HydroCHLOROthiazide 12 5 MG Oral Capsule; take 1 capsule by mouth once a day  as    needed for leg edema; Therapy: 92TQA6250 to (Evaluate:15Jun2018)  Requested for: 20Jun2017; Last    Rx:20Jun2017 Ordered   13   Irbesartan 150 MG Oral Tablet; TAKE 1 TABLET DAILY; Therapy: 89UIU1504 to (Evaluate:31Mar2018)  Requested for: 77Zqg9750; Last    Rx:09Apr2017 Ordered   14  Lantus 100 UNIT/ML Subcutaneous Solution; INJECT 12 UNITS UNDER THE SKIN    NIGHTLY AS DIRECTED; Therapy: 37Iqf0306 to ()  Requested for: 55HOM6639; Last    Rx:30Oct2017 Ordered   15  Levothyroxine Sodium 50 MCG Oral Tablet; TAKE ONE (1) TABLET(S) DAILY; Therapy: 84URG8105 to (Evaluate:47Khs6188)  Requested for: 52UZU5950; Last    Rx:11Oct2017 Ordered   16  LORazepam 0 5 MG Oral Tablet; take 1 tablet 3 times a day as needed; Therapy: 68ZST0675 to (Jeniffer Johansen)  Requested for: 16KJZ9415; Last    Rx:09Nov2017 Ordered   17  Magnesium 500 MG Oral Tablet; Take 1 tablet daily; Therapy: (Victorino Carrel) to Recorded   18  OneTouch Ultra Blue In Vitro Strip; ONE TOUCH ULTRA STRIPS     TEST 6 TIMES PER DAY; Therapy: 54SHT3466 to 446 1104); Last Rx:26Oct2017 Ordered   19  Refresh Tears 0 5 % Ophthalmic Solution; INSTILL 1 DROP INTO BOTH EYES 4 TIMES    DAILY; Therapy: (Recorded:11Aug2015) to Recorded   20  Super B Complex Maxi Oral Tablet; take one every other day; Therapy: 64RLB3248 to Recorded   21  Travatan Z 0 004 % Ophthalmic Solution; instill 2 drops in both eyes at bedtime; Therapy: (Recorded:11Aug2015) to Recorded   22  Turmeric 500 MG Oral Capsule; Take one daily; Therapy: 41TZD6382 to Recorded   23  Vitamin D3 2000 UNIT Oral Tablet; 2 CAPSULES DAILY; Therapy: 17HJW6689 to (Last Rx:30Oct2017) Ordered    Allergies    1   No Known Drug Allergies    Vitals  Signs   Recorded: 60BHM5394 05:31PM   Weight: 133 lb 8 oz  BMI Calculated: 22 22  BSA Calculated: 1 67    Results/Data  DSMT/MNT Time Record 44HOE1263 05:31PM Italo Master     Test Name Result Flag Reference   Date of Service 11/16/17     Start - Stop Time 1:30-2:45     Total MInutes 75     Group Or Individual Instruction I-MNT       Future Appointments    Date/Time Provider Specialty Site   01/09/2018 01:00 PM BORIS Arciniega  [place] [address]   02/20/2018 01:00 PM BORIS Meek   Endocrinology [facility] ENDOCRINOLOGY [facility]     Signatures   Electronically signed by : Deshawn Fink RD; Nov 17 2017 10:28AM EST                       (Author)    Electronically signed by : BORIS Donovan ; Nov 20 2017  9:06AM EST

## 2018-01-16 NOTE — RESULT NOTES
Message   Please call patient, Lyme testing is negative     Verified Results  (1) LYME ANTIBODY PROFILE W/REFLEX TO WESTERN BLOT 26RIM4904 08:33AM Jeniffer Becker Order Number: [id number]     Test Name Result Flag Reference   LYME IGG 0 07  0 00-0 79   NEGATIVE(0 00-0 79)-Absence of detectable Borrelia IgG Antibodies  A negative result does not exclude the possibility of Borrelia infection  If early Lyme disease is suspected,a second sample should be collected & tested 4 weeks after initial testing  LYME IGM 0 18  0 00-0 79   NEGATIVE (0 00-0 79)-Absence of detectable Borrelia IgM antibodies  A negative result does not exclude the possibility of Borrelia infection  If early lyme disease is suspected, a second sample should be collected & tested 4 weeks after initial testing  Signatures   Electronically signed by :  BORIS Madison ; Jul 18 2016  2:10PM EST                       (Author)

## 2018-01-16 NOTE — RESULT NOTES
Message   A1c 7 improved from previous level  Vitamin D 40 at goal, continue OTC D3 3000 iu daily  She needs to reschedule follow-up  Verified Results  (1) HEMOGLOBIN A1C 79ZLR2074 07:05AM [employer] Order Number: [id number]     Test Name Result Flag Reference   HEMOGLOBIN A1C 7 0 % H 4 2-6 3   EST  AVG  GLUCOSE 154 mg/dl       (1) RENAL FUNCTION PANEL 85Htv5791 07:05AM [employer] Order Number: [id number]     Test Name Result Flag Reference   PHOSPHORUS 3 3 mg/dL  2 3-4 1   - Patient Instructions: This is a fasting blood test  Water, black tea or black coffee only after 9:00pm the night before test Drink 2 glasses of water the morning of test      (1) VITAMIN D 25-HYDROXY 96JNL0161 07:05AM [employer] Order Number: [id number]     Test Name Result Flag Reference   VIT D 25-HYDROX 40 2 ng/mL  30 0-100 0   This assay is a certified procedure of the CDC Vitamin D Standardization Certification Program (VDSCP)     Deficiency <20ng/ml   Insufficiency 20-30ng/ml   Sufficient  ng/ml     *Patients undergoing fluorescein dye angiography may retain small amounts of fluorescein in the body for 48-72 hours post procedure  Samples containing fluorescein can produce falsely elevated Vitamin D values  If the patient had this procedure, a specimen should be resubmitted post fluorescein clearance       (1) PTH N-TERMINAL (INTACT) 24JEZ1501 07:05AM [employer] Order Number: [id number]     Test Name Result Flag Reference   PARATHYROID HORMONE INTACT 42 3 pg/mL  14 0-72 0

## 2018-01-16 NOTE — RESULT NOTES
Message   Please call patient  I received her blood work  Sodium level has improved  Please continue same medications and repeat BMP again in 2 weeks, slip printed  Thank you     Verified Results  (1) Aurelia 74DAM9975 11:01AM [employer]    Order Number: [id number]      National Kidney Disease Education Program recommendations are as follows:  GFR calculation is accurate only with a steady state creatinine  Chronic Kidney disease less than 60 ml/min/1 73 sq  meters  Kidney failure less than 15 ml/min/1 73 sq  meters  Test Name Result Flag Reference   GLUCOSE,RANDM 182 mg/dL H    SODIUM 134 mmol/L L 136-145   POTASSIUM 4 8 mmol/L  3 5-5 3   CHLORIDE 100 mmol/L  100-108   CARBON DIOXIDE 29 mmol/L  21-32   ANION GAP (CALC) 5 mmol/L  4-13   BLOOD UREA NITROGEN 32 mg/dL H 5-25   CREATININE 0 96 mg/dL  0 60-1 30   Standardized to IDMS reference method   CALCIUM 9 0 mg/dL  8 3-10 1   eGFR Non-African American 56 2 ml/min/1 73sq m         Signatures   Electronically signed by :  BORIS Sheets ; Jan 29 2016  3:24PM EST                       (Author)

## 2018-01-17 NOTE — RESULT NOTES
Discussion/Summary   TSH improved but still slightly elevated with normal free T4, if patient feels fine, continue Levothyroxine 25 mcg daily in am  Repeat TSH/free T4 in 6 weeks  If having symptoms of hypothyroidism, can take 1 extra tab a week and repeat labs in 6 weeks  Verified Results  (1) T4, FREE 39ASC6067 08:39AM [employer] Order Number: [id number]     Test Name Result Flag Reference   T4,FREE 1 05 ng/dL  0 76-1 46     (1) TSH 14RNA1583 08:39AM [employer] Order Number: [id number]     Test Name Result Flag Reference   TSH 3 950 uIU/mL H 0 358-3 740   Patients undergoing fluorescein dye angiography may retain small amounts of fluorescein in the body for 48-72 hours post procedure  Samples containing fluorescein can produce falsely depressed TSH values  If the patient had this procedure,a specimen should be resubmitted post fluorescein clearance            The recommended reference ranges for TSH during pregnancy are as follows:  First trimester 0 1 to 2 5 uIU/mL  Second trimester  0 2 to 3 0 uIU/mL  Third trimester 0 3 to 3 0 uIU/m

## 2018-01-17 NOTE — MISCELLANEOUS
Message   Recorded as Task   Date: 05/23/2016 04:08 PM, Created By: Finesse Carreon   Task Name: Go to Note   Assigned To: Godwin Kuhn   Regarding Patient: Dwayne Lopez, Status: Active   CommentMelchristina Shannon - 23 May 2016 4:08 PM     TASK Vianey Rodriguez - 24 May 2016 1:27 PM     TASK EDITED  spoke with pt  Jose Corrales - 24 May 2016 1:31 PM     TASK EDITED  pt doesn't want to go to 12 units of Lantus she would like to stay at the 15 units   she is afraid that her morning numbers will be to high   pt refused to decrease lantus dose as advised      Plan  Type 1 diabetes mellitus with diabetic neuropathy    · From  Lantus 100 UNIT/ML Subcutaneous Solution INJECT 14 UNITS  NIGHTLY AS DIRECTED To Lantus 100 UNIT/ML Subcutaneous Solution INJECT 12  UNITS NIGHTLY AS DIRECTED    Signatures   Electronically signed by : BORIS Fisher ; May 24 2016  2:43PM EST                       (Author)

## 2018-01-18 NOTE — RESULT NOTES
Message   I spoke with patient  Informed her about results of x-ray  Appointment with podiatry scheduled for Monday, December 5  I advised patient for minimal weightbearing on the right  She understands and agrees  Verified Results  * XR FOOT 3+ VIEW RIGHT 44Aqv3669 12:56PM Juan Joseph Order Number: [id number]     Test Name Result Flag Reference   XR FOOT 3+ VW RIGHT (Report)     RIGHT FOOT     INDICATION: Pain and swelling laterally  COMPARISON: None     VIEWS: 3; 3 images     FINDINGS:     There is a transverse nondisplaced fracture involving the proximal aspect of the 5th metatarsal      No degenerative changes  No lytic or blastic lesions are seen  Soft tissues are unremarkable  IMPRESSION:     Nondisplaced fracture base of 5th metatarsal       ##imslh##imslh       Workstation performed: [id number]     Signed by:   Siva Snyder MD   12/2/16       Signatures   Electronically signed by :  Vona Harada, M D ; Dec  2 2016  4:04PM EST                       (Author)

## 2018-01-22 VITALS — SYSTOLIC BLOOD PRESSURE: 128 MMHG | HEART RATE: 72 BPM | DIASTOLIC BLOOD PRESSURE: 58 MMHG

## 2018-01-22 VITALS — BODY MASS INDEX: 22.22 KG/M2 | WEIGHT: 133.5 LBS

## 2018-01-22 VITALS
HEIGHT: 65 IN | SYSTOLIC BLOOD PRESSURE: 150 MMHG | DIASTOLIC BLOOD PRESSURE: 84 MMHG | HEART RATE: 76 BPM | BODY MASS INDEX: 22.24 KG/M2 | WEIGHT: 133.5 LBS

## 2018-01-22 VITALS — BODY MASS INDEX: 22.06 KG/M2 | WEIGHT: 132.38 LBS | HEIGHT: 65 IN

## 2018-01-23 VITALS
DIASTOLIC BLOOD PRESSURE: 60 MMHG | HEART RATE: 72 BPM | TEMPERATURE: 97.6 F | HEIGHT: 65 IN | WEIGHT: 134.5 LBS | BODY MASS INDEX: 22.41 KG/M2 | SYSTOLIC BLOOD PRESSURE: 128 MMHG

## 2018-01-23 NOTE — PROGRESS NOTES
Assessment    1  Type 1 diabetes mellitus with diabetic neuropathy (250 61,357 2) (E10 40)   2  Type 1 diabetes mellitus with hypoglycemia and without coma (250 81) (E10 649)   3  Osteoporosis (733 00) (M81 0)   4  Hypertension (401 9) (I10)   5  Hyperlipidemia (272 4) (E78 5)   6  Vitamin D deficiency (268 9) (E55 9)   7  Peripheral neuropathy (356 9) (G62 9)   8  Subclinical hypothyroidism (244 8) (E03 9)        #2 type 1 diabetes mellitus: x 21 yrs, normal gfr, Hemoglobin A1c 7%  -Doing very well on Lantus 13 units at bedtime, Humalog 4-4-7 with B-L-D  - No hypo or hyperglycemia noted in the blood sugar log's,  for most part  -has macular edema and gets inj by eye MD    #3 hypertension and hyperlipidemia:  -usually well controlled on current medications   -labs reviewed and ordered    4 vitamin D deficiency:  - Continue the same D dose and monitor levels    #5 history of hyperthyroidism:   - TSI normal, off MMI  -Now slightly elevated TSH but normal and stable free T4     - give her age and lack of symptoms, can continue to observe for now  - the acceptable upper normal limit of TSH would be 6-7 at her age  - we will consider Lt4 if she has new sx of hypothyroidism    6  Bone health:  -recent toe fracture  -she will update DXA scan with PCP next week      Plan  Hyperthyroidism, Type 1 diabetes mellitus with diabetic neuropathy, Vitamin D deficiency    · (1) HEMOGLOBIN A1C; Status:Active; Requested for:16May2017;    Perform:[facility]; NON:[id number]; Ordered;  For:Hyperthyroidism, Type 1 diabetes mellitus with diabetic neuropathy, Vitamin D deficiency; Ordered By:Godwin Kuhn;   · (1) PTH N-TERMINAL (INTACT); Status:Active; Requested for:16May2017;    Perform:[facility]; BRN:[id number]; Ordered;  For:Hyperthyroidism, Type 1 diabetes mellitus with diabetic neuropathy, Vitamin D deficiency; Ordered By:Godwin Kuhn;   · (1) RENAL FUNCTION PANEL; Status:Active;  Requested for:16May2017; Perform:[facility]; UWP:[id number]; Ordered;  For:Hyperthyroidism, Type 1 diabetes mellitus with diabetic neuropathy, Vitamin D deficiency; Ordered By:Godwin Kuhn;   · (1) T4, FREE; Status:Active; Requested for:16May2017;    Perform:[facility]; UYZ:[id number]; Ordered;  For:Hyperthyroidism, Type 1 diabetes mellitus with diabetic neuropathy, Vitamin D deficiency; Ordered By:Godwin Kuhn;   · (1) TSH; Status:Active; Requested for:16May2017;    Perform:[facility]; IPP:[id number]; Ordered;  For:Hyperthyroidism, Type 1 diabetes mellitus with diabetic neuropathy, Vitamin D deficiency; Ordered By:Godwin Kuhn;   · (1) VITAMIN D 25-HYDROXY; Status:Active; Requested for:16May2017;    Perform:[facility]; MQN:[id number]; Ordered;  For:Hyperthyroidism, Type 1 diabetes mellitus with diabetic neuropathy, Vitamin D deficiency; Ordered By:Godwin Kuhn;   · Follow-up visit in 3 months Evaluation and Treatment  Follow-up  Status: Complete   Done: 43XTA8398   Ordered; For: Hyperthyroidism, Type 1 diabetes mellitus with diabetic neuropathy, Vitamin D deficiency; Ordered By: Donal Slater Performed:  Due: 03LCL0983; Last Updated By: Rafi Solomon; 2/15/2017 3:31:21 PM   · Follow-Up With Advanced Practitioner Evaluation and Treatment  Follow-up  Status:  Complete  Done: 38DHA2726   Ordered; For: Hyperthyroidism, Type 1 diabetes mellitus with diabetic neuropathy, Vitamin D deficiency; Ordered By: Donal Slater Performed:  Due: 38OWM7344; Last Updated By: Rafi Solomon; 2/15/2017 3:32:10 PM  Subclinical hypothyroidism    · (1) THYROID MICROSOMAL ANTIBODY; Status:Active; Requested for:16May2017;    Perform:[facility]; NUW:[id number]; Ordered;   For:Subclinical hypothyroidism; Ordered By:Godwin Kuhn;  Type 1 diabetes mellitus with diabetic neuropathy    · From  HumaLOG 100 UNIT/ML Subcutaneous Solution inject 5 units at  breakfast ; 5 units at lunch ;  7 units at dinner To HumaLOG 100  UNIT/ML Subcutaneous Solution inject 4 units at breakfast ; 4 units at lunch ;  7 units at  dinner   Rx By: Parris Sheridan; Dispense: 0 Days ; #:1 X 10 ML Vial; Refill: 1; For: Type 1 diabetes mellitus with diabetic neuropathy; ADRIAN = N; Print Rx   · From  Lantus 100 UNIT/ML Subcutaneous Solution INJECT 14 UNITS  NIGHTLY AS DIRECTED To Lantus 100 UNIT/ML Subcutaneous Solution INJECT 13  UNITS NIGHTLY AS DIRECTED   Rx By: Parris Sheridan; Dispense: 0 Days ; #:10 ML; Refill: 1; For: Type 1 diabetes mellitus with diabetic neuropathy; ADRIAN = Y; Print Rx   · BD Insulin Syr Ultrafine II 31G X 5/16" 0 5 ML Miscellaneous; Use 4x daily   Rx By: Parris Sheridan; Dispense: 30 Days ; #:2 X 100 Miscellaneous Box; Refill: 2; For: Type 1 diabetes mellitus with diabetic neuropathy; ADRIAN = N; Print Rx; Msg to Pharmacy: DX E11 9  Type 1 diabetes mellitus with hypoglycemia and without coma    · Blood Glucose- POC; Status:Active - Perform Order; Requested for:81Ovf1913;    Performed: In Office; Due:23Jzo1197; Ordered; For:Type 1 diabetes mellitus with hypoglycemia and without coma; Ordered By:Godwin Kuhn; Fasting (Y/N) : No - N    Discussion/Summary  Discussion Summary:   1  Please check blood sugars 2-4 times a day and send us logs  2  please carry glucose tablets with you all the time  3  Please call us probably the blood sugar is less than 70 or more than 300 more than twice  4  Please followed regularly with diabetes educator, podiatrist and ophthalmologist  5  Please perform lab work as advised and make sure we have discussed it in the office visit or on the phone within 7-14 days  6  If you are on insulin therapy, please make sure you rotate insulin injection sites to avoid any hard areas  Medication SE Review and Pt Understands Tx: Possible side effects of new medications were reviewed with the patient/guardian today     Counseling Documentation With Imm: The patient was counseled regarding diagnostic results, instructions for management, risk factor reductions, impressions, risks and benefits of treatment options, importance of compliance with treatment  Chief Complaint  Chief Complaint Free Text Note Form: Follow up      History of Present Illness  HPI: had a toe fracture unclear how, or minimal trauma was in boot x 2 months, healed now  BG are good  no hypoglycemia  fu with Podiatry and eye MD  no complaints  Had sixth n palsy in past which resolved completely   Diabetes: The patient is being seen for routine follow-up of Diabetes Mellitus 1  The HbA1c was   See Medication List for current medication(s)  Source of information reported and indicates that the patient checks her blood sugar three times per day  Fasting blood sugars: generally less than 120  Pre-prandial blood sugars: generally 120-150  Post-prandial blood sugars: generally 120-150  Weight loss diet followed  Her exercise regimen is composed of fewer than three times a week  Diabetes education performed   By report, there is good compliance with treatment, good tolerance of treatment and good symptom control  There are no known contributing risk factors or pertinent lifestyle factors  The patient is currently asymptomatic   Disease Course and Complications:  there have been no previous episodes of diabetic ketoacidosis  there have been no previous hospitalizations  The last dilated fundus exam showed moderate nonproliferative retinopathy  Cardiovascular: no coronary artery disease  Renal: no nephropathy and no proteinuria  Musculoskeletal / Integumentary / Circulatory: no foot ulcers  Neurologic: peripheral neuropathy  Other complications: no stroke history  Pertinent Medical and Social History: hyperglycemia and hyperlipidemia  Hyperlipidemia (Follow-Up): The patient states her hyperlipidemia has been under good control since the last visit  Comorbid Illnesses: diabetes mellitus and hypertension  Interval Events: High HDL   She has no significant interval events  Symptoms: The patient is currently asymptomatic  Medications: the patient is adherent with her medication regimen  She denies medication side effects  Vitamin D Deficiency: The patient is being seen for follow-up of vitamin D deficiency  Disease type: vitamin D deficiency  Recent laboratory results: date, 25-hydroxyvitamin D ng/mL  Current treatment includes vitamin D3 (cholecalciferol)  The patient is currently asymptomatic  Hypertension (Follow-Up): The patient presents for follow-up of essential hypertension  The patient states she has been doing well with her blood pressure control since the last visit  She has no significant interval events  Symptoms: The patient is currently asymptomatic  Review of Systems  ROS Reviewed:   ROS reviewed  Endo Adult ROS Female Established v2 Update - [facility]:   Constitutional/General: no recent weight gain, no recent weight loss, no poor energy/fatigue, no increased energy level, no insomnia/sleep problems, no fever and no feeling weak  Breasts: no nipple discharge  Heart: high blood pressure, but no chest pain/tightness, no rapid/racing heart rate and no palpitations  Genitourinary - Urinary: urinating during the night, but no frequent urination and no excess urination  Eyes: no blurred vision, no double vision, no bulging eyes, no gritty/scratchy eyes and no excessive tearing  Mouth / Throat: no hoarseness and no difficulty swallowing  Neck: no lumps, no swollen glands, no neck pain, no neck stiffness and no enlarged thyroid  Respiratory: no wheezing, no asthma and no persistent cough  Musculoskeletal: muscle aches/pain, joint aches/pain and muscle weakness  Skin & Hair: dry skin, no acne, the hair texture was not oily, no hair loss and no excessive hair growth  Gastrointestinal: constipation, no diarrhea, no waking at night to drink and no stomach ache  Neurological: no blackouts, no weakness and no tremors  Reproductive: Eneida Diamante frequency of period is not applicable  duration of period is not applicable  Date of last menstruation is not applicable  regular periods are not applicable  discomfort with periods is not applicable  excessive bleeding during period is not applicable  mood swings are not applicable  Endocrine: no feeling hot frequently, no feeling cold frequently, no shifts between feeling hot and cold, no cold hands or feet, no excessive sweating, thyroid problems, blood sugar problems, no excessive thirst, no excessive hunger, no change in shoe size, no nausea or vomiting and no shaky hands  Active Problems    1  Abdominal bloating (787 3) (R14 0)   2  Acute foot pain, right (729 5) (M79 671)   3  Allergic rhinitis (477 9) (J30 9)   4  Bunion of great toe of left foot (727 1) (M21 612)   5  Cataract (366 9) (H26 9)   6  Chest pain (786 50) (R07 9)   7  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)   8  Chronic constipation (564 00) (K59 09)   9  Chronic foot pain, unspecified laterality (729 5,338 29) (M79 673,G89 29)   10  Chronic interstitial cystitis (595 1) (N30 10)   11  Diplopia (368 2) (H53 2)   12  Disc degeneration, 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)   13  Encounter for screening mammogram for malignant neoplasm of breast (V76 12)    (Z12 31)   14  Fracture of fifth metatarsal bone of right foot (825 25) (S92 351A)   15  Generalized anxiety disorder (300 02) (F41 1)   16  Denied: History of self breast exam   17  Hyperlipidemia (272 4) (E78 5)   18  Hypertension (401 9) (I10)   19  Hyperthyroidism (242 90) (E05 90)   20  Hyponatremia (276 1) (E87 1)   21  Lower back pain (724 2) (M54 5)   22  Lumbar canal stenosis (724 02) (M48 06)   23  Lumbar radiculopathy (724 4) (M54 16)   24  Mastodynia (611 71) (N64 4)   25  Need for influenza vaccination (V04 81) (Z23)   26  Need for prophylactic vaccination and inoculation against influenza (V04 81) (Z23)   27   Neoplasm of uncertain behavior of skin (238 2) (D48 5) 28  Open wound of left foot, initial encounter (892 0) (S91 302A)   29  Osteoporosis (733 00) (M81 0)   30  Peripheral neuropathy (356 9) (G62 9)   31  Retinopathy, diabetic, proliferative (250 50,362 02) (E11 3599)   32  Sinusitis (473 9) (J32 9)   33  Skin lesions (709 9) (L98 9)   34  Spondylosis of lumbar region without myelopathy or radiculopathy (721 3) (M47 816)   35  Syncope (780 2) (R55)   36  Type 1 diabetes mellitus with diabetic neuropathy (250 61,357 2) (E10 40)   37  Type 1 diabetes mellitus with hypoglycemia and without coma (250 81) (E10 649)   38  Upper respiratory infection (465 9) (J06 9)   39  UTI (urinary tract infection) (599 0) (N39 0)   40  Vaginitis (616 10) (N76 0)   41  Visit for screening mammogram (V76 12) (Z12 31)   42  Vitamin D deficiency (268 9) (E55 9)   43  Vulvar discomfort (625 79) (N94 818)   44  Vulvitis (616 10) (N76 2)    Past Medical History    1  History of Age At First Period 17 Years Old (Menarche)   2  History of Age At First Pregnancy 34 Years Old   3  History of Currently Wearing Eyeglasses   4  History of Difficulty Falling Asleep   5   2 para 2 (V49 89) (Z78 9)   6  History of Hearing Loss (389 9)   7  History Of 2  Previous Pregnancies (V61 5)   8  History of constipation (V12 79) (Z87 19)   9  History of diarrhea (V12 79) (Z87 898)   10  History of heartburn (V12 79) (Z87 898)   11  History of herpes zoster (V12 09) (Z86 19)   12  History of hypertension (V12 59) (Z86 79)   13  Denied: History of self breast exam   14  History of thyroid disease (V12 29) (Z86 39)   15  History of Need for influenza vaccination (V04 81) (Z23)   16  History of Previous Pregnancies Resulted In 2  Live Birth(S)   17  History of Type 1 diabetes mellitus without complication (485 00) (F61 2)  Active Problems And Past Medical History Reviewed: The active problems and past medical history were reviewed and updated today  Surgical History    1  History of Appendectomy   2  History of Cataract Surgery   3  History of Complete Colonoscopy   4  History of Excision Of Lesion Ears  Surgical History Reviewed: The surgical history was reviewed and updated today  Family History  Mother    1  Family history of Acute Myocardial Infarction (V17 3)   2  Family history of type 2 diabetes mellitus (V18 0) (Z83 3)  Father    3  Family history of Dementia   4  Family history of cerebrovascular accident (V17 1) (Z82 3)  Maternal Aunt    5  Family history of Breast Cancer (V16 3)  Maternal Uncle    6  Family history of Adenocarcinoma Of The Stomach (V16 0)  Family History    7  Family history of Diabetes Mellitus (V18 0)   8  Family history of Stroke Complications  Family History Reviewed: The family history was reviewed and updated today  Social History    · Caffeine use (V49 89) (F15 90)   · Denied: History of Drug Use (305 90)   · Marital History -    · Never A Smoker   · No preference on Zoroastrianism beliefs   · Social alcohol use (Z78 9)   · Two children  Social History Reviewed: The social history was reviewed and updated today  Current Meds   1  AmLODIPine Besylate 5 MG Oral Tablet; take 1 tablet by mouth once daily after  supper; Therapy: 36HVB3909 to (Last Rx:06Dec2016)  Requested for: 35URO9262 Ordered   2  Aspirin 81 MG TABS; TAKE 1 TABLET DAILY; Therapy: (Recorded:11Aug2015) to Recorded   3  Atorvastatin Calcium 10 MG Oral Tablet; take one tablet by mouth daily; Therapy: 11Aug2015 to (Evaluate:10Nov2017)  Requested for: 20KDJ9061; Last   Rx:15Nov2016 Ordered   4  BD Insulin Syr Ultrafine II 31G X 5/16" 0 5 ML Miscellaneous; USE AS DIRECTED  4x   daily; Therapy: 34CMA3464 to (Evaluate:13Jun2017)  Requested for: 85AWA9274; Last   Rx:13Xfw0871 Ordered   5  Co Q-10 200 MG Oral Capsule; TAKE 1 CAPSULE DAILY; Therapy: (Recorded:11Aug2015) to Recorded   6  Cosopt SOLN; INSTILL 1 DROP INTO RIGHT EYE 2 TIMES DAILY; Therapy: (Recorded:11Aug2015) to Recorded   7  Gabapentin 300 MG Oral Capsule; TAKE 1 CAPSULE 3 times daily; Therapy: 76IOF1057 to (Evaluate:10Nov2017)  Requested for: 57ALU7175; Last   Rx:80Exx2218 Ordered   8  HumaLOG 100 UNIT/ML Subcutaneous Solution; inject 5 units at breakfast ; 5 units at   lunch ;  7 units at dinner  Requested for: 67SSJ7810; Last Rx:97Ibq4104 Ordered   9  HydroCHLOROthiazide 12 5 MG Oral Capsule; take 1 capsule by mouth once a day  as   needed for leg edema; Therapy: 90DND4501 to (Hammad Valle)  Requested for: 69LFG9712; Last   Rx:08Hrb4638 Ordered   10  Irbesartan 150 MG Oral Tablet; Take 1 tablet daily; Therapy: 20QTU4773 to (Evaluate:10Nov2017)  Requested for: 90IYE0912; Last    Rx:29Uog8725 Ordered   11  Lantus 100 UNIT/ML Subcutaneous Solution; INJECT 14 UNITS NIGHTLY AS    DIRECTED; Therapy: 41OBN1783 to (Evaluate:10Mar2017)  Requested for: 43IKR1852; Last    Rx:40Umb7430 Ordered   12  LORazepam 0 5 MG Oral Tablet; TAKE 1 TABLET THREE TIMES A DAY AS NEEDED PRN    : anxiety; Therapy: 37OKM3167 to (Evaluate:07Uxc9087)  Requested for: 51RAR2403; Last    Rx:34Lvi8865 Ordered   13  Magnesium 500 MG Oral Tablet; Take 1 tablet daily; Therapy: (Alejandrina Azevedo) to Recorded   14  OneTouch Ultra Blue In Vitro Strip; ONE TOUCH ULTRA STRIPS     TEST 6 TIMES PER DAY; Therapy: 32GBN3028 to (Evaluate:61Jfk9312); Last Rx:01Oct2015 Ordered   15  Refresh Tears 0 5 % Ophthalmic Solution; INSTILL 1 DROP INTO BOTH EYES 4 TIMES    DAILY; Therapy: (Recorded:11Aug2015) to Recorded   16  Super B Complex Maxi Oral Tablet; take one every other day; Therapy: 27RZN4084 to Recorded   17  Travatan Z 0 004 % Ophthalmic Solution; instill 2 drops in both eyes at bedtime; Therapy: (Recorded:11Aug2015) to Recorded   18  Turmeric 500 MG Oral Capsule; Take one daily; Therapy: 08POC2693 to Recorded   19  Vitamin D3 3000 UNIT Oral Tablet; take as directed; Therapy: 45UMM6116 to Recorded  Medication List Reviewed:    The medication list was reviewed and updated today  Allergies    1  No Known Drug Allergies    Vitals  Vital Signs    Recorded: 88HOL0759 02:28PM   Heart Rate 76   Systolic 834   Diastolic 84   Height 5 ft 5 in   Weight 133 lb 8 0 oz   BMI Calculated 22 22   BSA Calculated 1 67     Physical Exam    Constitutional   General appearance: No acute distress, well appearing and well nourished  Eyes   Conjunctiva and lids: No swelling, erythema, or discharge  Pupils: Equal, round and reactive to light  The sclera are anicteric  Extraocular movements are intact  Ears, Nose, Mouth, and Throat   External inspection of ears, nose and lips: Normal     Oropharynx: Normal with no erythema, edema, exudate or lesions  Exam of Head: The head is atraumatic and normocephalic  Neck: The neck is supple  The thyroid is normal in size with no palpable nodules  no palpable nodules  Pulmonary   Auscultation of lungs: Clear to auscultation bilaterally with normal chest expansion  Cardiovascular   Auscultation of heart: Normal rate and rhythm with no murmurs, gallops or rubs  Examination of pulses: Dorsalis pedal pulses are +2 and equal bilaterally  Examination of carotids: No bruit    Abdomen   Abdomen: Abdomen is soft, non-tender with normal bowel sounds  Lymphatic   Palpation of lymph nodes: No supraclavicular or suboccipital lymphadenopathy  Musculoskeletal   Inspection/palpation of joints, bones, and muscles: Muscle bulk and tone is normal     Skin   Skin and subcutaneous tissue: Normal skin temperature and color  Neurologic   Reflexes: 2+ and symmetric  Motor Strength: Strength is 5/5 bilaterally  Psychiatric   Orientation to person, place and time: Normal     Mood and affect: Affect and attention span are normal        Results/Data  Diagnostic Studies Reviewed: I personally reviewed the films/images/results in the office today  My interpretation follows   (1) TSH WITH FT4 REFLEX 07POF6374 10: 97SL Rebecca Ace Order Number: [id number]     Test Name Result Flag Reference   TSH 4 490 uIU/mL H 0 358-3 740   Patients undergoing fluorescein dye angiography may retain small amounts of fluorescein in the body for 48-72 hours post procedure  Samples containing fluorescein can produce falsely depressed TSH values  If the patient had this procedure,a specimen should be resubmitted post fluorescein clearance  The recommended reference ranges for TSH during pregnancy are as follows:  First trimester 0 1 to 2 5 uIU/mL  Second trimester  0 2 to 3 0 uIU/mL  Third trimester 0 3 to 3 0 uIU/m   T4,FREE 0 88 ng/dL  0 76-1 46     (1) HEMOGLOBIN A1C 98FYT7464 07:05AM [employer] Order Number: [id number]     Test Name Result Flag Reference   HEMOGLOBIN A1C 7 0 % H 4 2-6 3   EST  AVG  GLUCOSE 154 mg/dl       (1) RENAL FUNCTION PANEL 42Rzj4283 07:05AM [employer] Order Number: [id number]     Test Name Result Flag Reference   PHOSPHORUS 3 3 mg/dL  2 3-4 1   - Patient Instructions: This is a fasting blood test  Water, black tea or black coffee only after 9:00pm the night before test Drink 2 glasses of water the morning of test      (1) VITAMIN D 25-HYDROXY 59LVZ8848 07:05AM [employer] Order Number: [id number]     Test Name Result Flag Reference   VIT D 25-HYDROX 40 2 ng/mL  30 0-100 0   This assay is a certified procedure of the CDC Vitamin D Standardization Certification Program (VDSCP)     Deficiency <20ng/ml   Insufficiency 20-30ng/ml   Sufficient  ng/ml     *Patients undergoing fluorescein dye angiography may retain small amounts of fluorescein in the body for 48-72 hours post procedure  Samples containing fluorescein can produce falsely elevated Vitamin D values  If the patient had this procedure, a specimen should be resubmitted post fluorescein clearance       (1) PTH N-TERMINAL (INTACT) 20OJG4015 07:05AM [employer] Order Number: [id number]     Test Name Result Flag Reference   PARATHYROID HORMONE INTACT 42 3 pg/mL  14 0-72 0     (1) LIPID PANEL FASTING W DIRECT LDL REFLEX 46WHL2607 07:05AM Dylan Capps Order Number: [id number]     Test Name Result Flag Reference   CHOLESTEROL 183 mg/dL     LDL CHOLESTEROL CALCULATED 68 mg/dL  0-100   - Patient Instructions: This is a fasting blood test  Water, black tea or black coffee only after 9:00pm the night before test   Drink 2 glasses of water the morning of test     - Patient Instructions: This is a fasting blood test  Water, black tea or black coffee only after 9:00pm the night before test Drink 2 glasses of water the morning of test   Triglyceride:         Normal              <150 mg/dl       Borderline High    150-199 mg/dl       High               200-499 mg/dl       Very High          >499 mg/dl  Cholesterol:         Desirable        <200 mg/dl      Borderline High  200-239 mg/dl      High             >239 mg/dl  HDL Cholesterol:        High    >59 mg/dL      Low     <41 mg/dL  LDL Cholesterol:        Optimal          <100 mg/dl        Near Optimal     100-129 mg/dl        Above Optimal          Borderline High   130-159 mg/dl          High              160-189 mg/dl          Very High        >189 mg/dl  LDL CALCULATED:    This screening LDL is a calculated result  It does not have the accuracy of the Direct Measured LDL in the monitoring of patients with hyperlipidemia and/or statin therapy  Direct Measure LDL (LAU045) must be ordered separately in these patients  TRIGLYCERIDES 66 mg/dL  <=150   Specimen collection should occur prior to N-Acetylcysteine or Metamizole administration due to the potential for falsely depressed results  HDL,DIRECT 102 mg/dL H 40-60   Specimen collection should occur prior to Metamizole administration due to the potential for falsely depressed results       (1) COMPREHENSIVE METABOLIC PANEL 82VJY6055 97:69VO [facility] Order Number: [id number]     Test Name Result Flag Reference   GLUCOSE,RANDM 146 mg/dL H    If the patient is fasting, the ADA then defines impaired fasting glucose as > 100 mg/dL and diabetes as > or equal to 123 mg/dL  SODIUM 136 mmol/L  136-145   POTASSIUM 4 2 mmol/L  3 5-5 3   CHLORIDE 100 mmol/L  100-108   CARBON DIOXIDE 30 mmol/L  21-32   ANION GAP (CALC) 6 mmol/L  4-13   BLOOD UREA NITROGEN 21 mg/dL  5-25   CREATININE 0 94 mg/dL  0 60-1 30   Standardized to IDMS reference method   CALCIUM 9 7 mg/dL  8 3-10 1   BILI, TOTAL 0 86 mg/dL  0 20-1 00   ALK PHOSPHATAS 84 U/L     ALT (SGPT) 36 U/L  12-78   AST(SGOT) 16 U/L  5-45   ALBUMIN 3 9 g/dL  3 5-5 0   TOTAL PROTEIN 7 8 g/dL  6 4-8 2   eGFR Non-African American 57 4 ml/min/1 73sq m     - Patient Instructions: This is a fasting blood test  Water, black tea or black coffee only after 9:00pm the night before test Drink 2 glasses of water the morning of test   National Kidney Disease Education Program recommendations are as follows:  GFR calculation is accurate only with a steady state creatinine  Chronic Kidney disease less than 60 ml/min/1 73 sq  meters  Kidney failure less than 15 ml/min/1 73 sq  meters  (1) TSH WITH FT4 REFLEX 69MXC1090 07:05AM Jean Carlos Oden    Order Number: [id number]     Test Name Result Flag Reference   TSH 5 360 uIU/mL H 0 358-3 740   - Patient Instructions: This is a fasting blood test  Water, black tea or black coffee only after 9:00pm the night before test Drink 2 glasses of water the morning of test   Patients undergoing fluorescein dye angiography may retain small amounts of fluorescein in the body for 48-72 hours post procedure  Samples containing fluorescein can produce falsely depressed TSH values  If the patient had this procedure,a specimen should be resubmitted post fluorescein clearance            The recommended reference ranges for TSH during pregnancy are as follows:  First trimester 0 1 to 2 5 uIU/mL  Second trimester  0 2 to 3 0 uIU/mL  Third trimester 0 3 to 3 0 uIU/m   T4,FREE 0 96 ng/dL  0 76-1 46   - Patient Instructions: This is a fasting blood test  Water, black tea or black coffee only after 9:00pm the night before test Drink 2 glasses of water the morning of test      Health Management  Health Maintenance   Medicare Annual Wellness Visit; every 1 year; Next Due: 80WBP6500; Overdue    Future Appointments    Date/Time Provider Specialty Site   02/21/2017 01:30 PM BORIS Aguila   Family Medicine [address] [place] [address]   05/22/2017 01:45 PM Tian Michaels, [facility] ENDOCRINOLOGY [facility]     Signatures   Electronically signed by : BORIS Aguilar ; Feb 15 2017  4:53PM EST                       (Author)

## 2018-01-23 NOTE — PROGRESS NOTES
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:  -It is possible that this was related to isolated diabetes cranial neuropathy  -I would however order MRI and MRA of the brain with the orbits to rule out any anatomical lesion  -Continue to follow-up with ophthalmology at [facility] for their advice    #2 type 1 diabetes mellitus: Hemoglobin A1c 7 3%  -Doing very well on Lantus 14 units at bedtime, Humalog 4-4-7  - No hypo or hyperglycemia noted in the blood sugar log's,  for most part    #3 hypertension and hyperlipidemia:  Very well controlled on current medications  4 vitamin D deficiency:  Continue the same D dose and monitor levels    #5 history of hyperthyroidism: Now in remission  Recent TSH and free T4 is normal      Plan  Diplopia    · MRI BRAIN AND ORBITS WO AND W CONTRAST; Status:Need Information - Financial  Authorization; Requested GIANLUCA:[id number];    Perform:[facility] Radiology; MAR:[id number];IXZWXNI; For:Diplopia; Ordered By:Godwin Kuhn;   · MRI BRAIN W WO MRA HEAD WO MRA NECK W WO; Status:Need Information - Financial  Authorization; Requested ZOZ:[id number];    Perform:[facility] Radiology; YTQ:[id number];FXQHREX; For:Diplopia; Ordered By:Godwin Kuhn;   · Follow-up visit in 3 months Evaluation and Treatment  Follow-up  Status: Hold For -  Scheduling  Requested for: 04EVN3767   Ordered; For: Diplopia;  Ordered By: Ryan Chamberlain Performed:  Due: 51CYF9675   · Follow-Up With Advanced Practitioner Evaluation and Treatment  Follow-up  Status: Hold  For - Scheduling  Requested for: 69JFS2852   Ordered; For: Diplopia; Ordered By: Samreen Vega Performed:  Due: 17DJQ8183  Diplopia, Hyperlipidemia, Hypertension, Hyperthyroidism, Peripheral neuropathy,  Retinopathy, diabetic, proliferative, Type 1 diabetes mellitus  with diabetic neuropathy, Vitamin D deficiency    · (1) HEMOGLOBIN A1C; Status:Active; Requested ZOA:[id number];    Perform:[facility]; [facility]; For:Diplopia, Hyperlipidemia, Hypertension, Hyperthyroidism, Peripheral neuropathy, Retinopathy, diabetic, proliferative, Type 1 diabetes mellitus with diabetic neuropathy, Vitamin D deficiency; Ordered By:Godwin Kuhn;   · (1) MICROALBUMIN CREATININE RATIO, RANDOM URINE; Status:Active; Requested  TSS:[id number];    Perform:[facility]; [facility]; For:Diplopia, Hyperlipidemia, Hypertension, Hyperthyroidism, Peripheral neuropathy, Retinopathy, diabetic, proliferative, Type 1 diabetes mellitus with diabetic neuropathy, Vitamin D deficiency; Ordered By:Godwin Kuhn;   · (1) PTH N-TERMINAL (INTACT); Status:Active; Requested VLS:[id number];    Perform:[facility]; [facility]; For:Diplopia, Hyperlipidemia, Hypertension, Hyperthyroidism, Peripheral neuropathy, Retinopathy, diabetic, proliferative, Type 1 diabetes mellitus with diabetic neuropathy, Vitamin D deficiency; Ordered By:Godwin Kuhn;   · (1) RENAL FUNCTION PANEL; Status:Active; Requested TAP:[id number];    Perform:[facility]; [facility]; For:Diplopia, Hyperlipidemia, Hypertension, Hyperthyroidism, Peripheral neuropathy, Retinopathy, diabetic, proliferative, Type 1 diabetes mellitus with diabetic neuropathy, Vitamin D deficiency; Ordered By:Godwin Kuhn;   · (1) T4, FREE; Status:Active; Requested PDO:[id number];    Perform:[facility]; [facility];   For:Diplopia, Hyperlipidemia, Hypertension, Hyperthyroidism, Peripheral neuropathy, Retinopathy, diabetic, proliferative, Type 1 diabetes mellitus with diabetic neuropathy, Vitamin D deficiency; Ordered By:Godwin Kuhn;   · (1) TSH; Status:Active; Requested [id number];    Perform:[facility]; [facility]; For:Diplopia, Hyperlipidemia, Hypertension, Hyperthyroidism, Peripheral neuropathy, Retinopathy, diabetic, proliferative, Type 1 diabetes mellitus with diabetic neuropathy, Vitamin D deficiency; Ordered By:Godwin Kuhn;   · (1) VITAMIN D 25-HYDROXY; Status:Active; Requested CPA:[id number];    Perform:[facility]; [facility]; For:Diplopia, Hyperlipidemia, Hypertension, Hyperthyroidism, Peripheral neuropathy, Retinopathy, diabetic, proliferative, Type 1 diabetes mellitus with diabetic neuropathy, Vitamin D deficiency; Ordered By:Gowdin Kuhn;  Type 1 diabetes mellitus with diabetic neuropathy    · BD Insulin Syr Ultrafine II 31G X 5/16" 0 5 ML Miscellaneous; USE AS  DIRECTED  4x daily   Rx By: Rob Quach; Dispense: 30 Days ; #:2 X 100 Miscellaneous Box; Refill: 1; For: Type 1 diabetes mellitus with diabetic neuropathy; ADRIAN = N; Verified Transmission to [address] [place] [address]; Msg to Pharmacy: E10 40; Last Updated By: System, [employer]; 2016 10:27:29 AM   · Fingerstick - POC; Status:Resulted - Requires Verification,Retrospective By Protocol  Authorization;   Done: 38VWH4341 10:01AM   Performed: In Office; [id number]; Last Updated Vipin Acosta; 2016 10:01:59 AM;Ordered; For:Type 1 diabetes mellitus with diabetic neuropathy; Ordered By:Godwin Kuhn; Discussion/Summary  Discussion Summary:   Pls do MRI of brain as ordered  Counseling Documentation With Imm: The patient was counseled regarding diagnostic results, instructions for management, risk factor reductions, impressions, risks and benefits of treatment options, importance of compliance with treatment  total time of encounter was 46 minutes and 35 minutes was spent counseling     Medication SE Review and Pt Understands Tx: Possible side effects of new medications were reviewed with the patient/guardian today  The treatment plan was reviewed with the patient/guardian  The patient/guardian understands and agrees with the treatment plan      Chief Complaint  Chief Complaint Free Text Note Form: Follow up      History of Present Illness  HPI: Developed double vision in the eye suddenly about a month ago followed by some headaches and sinus pressure  She did receive a course of steroids and also saw an ophthalmologist in [facility]  She was told to have a 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  She is advised eye exercises and patch but she is not doing the latter  Her diabetes is well-controlled on Lantus 14 units and NovoLog 4-4-7 units without any hypo-or hyperglycemia  Her last A1c 7 3%  Diabetes: The patient is being seen for routine follow-up of Diabetes Mellitus 1  The HbA1c was   See Medication List for current medication(s)  Source of information reported and indicates that the patient checks her blood sugar three times per day  Fasting blood sugars: generally less than 120  Pre-prandial blood sugars: generally 120-150  Post-prandial blood sugars: generally 120-150  Weight loss diet followed  Her exercise regimen is composed of fewer than three times a week  Diabetes education performed   By report, there is good compliance with treatment, good tolerance of treatment and good symptom control  There are no known contributing risk factors or pertinent lifestyle factors  The patient is currently asymptomatic   Disease Course and Complications:  there have been no previous episodes of diabetic ketoacidosis  there have been no previous hospitalizations  The last dilated fundus exam showed proliferative retinopathy     Cardiovascular: no coronary artery disease  Renal: no nephropathy and no proteinuria  Musculoskeletal / Integumentary / Circulatory: no foot ulcers  Neurologic: peripheral neuropathy  Other complications: no stroke history  The patient reports hypoglycemia symptoms 1 times a week  Pertinent Medical and Social History: hyperglycemia and hyperlipidemia  Hyperlipidemia (Follow-Up): The patient states her hyperlipidemia has been under good control since the last visit  Comorbid Illnesses: diabetes mellitus and hypertension  Interval Events: High HUD L  She has no significant interval events  Symptoms: The patient is currently asymptomatic  Medications: the patient is adherent with her medication regimen  She denies medication side effects  Vitamin D Deficiency: The patient is being seen for follow-up of vitamin D deficiency  Disease type: vitamin D deficiency  Recent laboratory results: date, 25-hydroxyvitamin D ng/mL  Current treatment includes vitamin D3 (cholecalciferol)  The patient is currently asymptomatic  Review of Systems  Endo Adult ROS Female Established v2 - [facility]:   Constitutional/General: no recent weight gain, no recent weight loss, poor energy/fatigue, no increased energy level, no insomnia/sleep problems, no fever and feeling weak  Breasts: no nipple discharge  Heart: high blood pressure, but no chest pain/tightness, no rapid/racing heart rate and no palpitations  Genitourinary - Urinary urinating during the night, but no frequent urination and no excess urination  Eyes: double vision, but no blurred vision, no bulging eyes, no gritty/scratchy eyes and no excessive tearing  Mouth / Throat: no hoarseness and no difficulty swallowing  Neck: no lumps, no swollen glands, no neck pain, no neck stiffness and no enlarged thyroid  Respiratory: no wheezing, no asthma and no persistent cough  Musculoskeletal: muscle aches/pain, joint aches/pain and muscle weakness  Skin & Hair: no dry skin, no acne, the hair texture was not oily, no hair loss and no excessive hair growth  Gastrointestinal: constipation, no diarrhea, no waking at night to drink and no stomach ache     Neurological: no blackouts, no weakness and no tremors  Reproductive: NA, NA, NA, NA, NA, NA and NA  Endocrine: no feeling hot frequently, no feeling cold frequently, no shifts between feeling hot and cold, no cold hands or feet, no excessive sweating, thyroid problems, blood sugar problems, no excessive thirst, no excessive hunger, no change in shoe size, no nausea or vomiting and no shaky hands  ROS Reviewed:   ROS reviewed  Active Problems    1  Abdominal bloating (787 3) (R14 0)   2  Allergic rhinitis (477 9) (J30 9)   3  Bunion of great toe of left foot (727 1) (M20 12)   4  Cataract (366 9) (H26 9)   5  Chest pain (786 50) (R07 9)   6  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)   7  Chronic constipation (564 00) (K59 09)   8  Chronic interstitial cystitis (595 1) (N30 10)   9  Disc degeneration, 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)   10  Encounter for screening mammogram for malignant neoplasm of breast (V76 12)    (Z12 31)   11  Generalized anxiety disorder (300 02) (F41 1)   12  Denied: History of self breast exam   13  Hyperlipidemia (272 4) (E78 5)   14  Hypertension (401 9) (I10)   15  Hyperthyroidism (242 90) (E05 90)   16  Hyponatremia (276 1) (E87 1)   17  Lower back pain (724 2) (M54 5)   18  Lumbar canal stenosis (724 02) (M48 06)   19  Lumbar radiculopathy (724 4) (M54 16)   20  Mastodynia (611 71) (N64 4)   21  Need for influenza vaccination (V04 81) (Z23)   22  Neoplasm of uncertain behavior of skin (238 2) (D48 5)   23  Open wound of left foot, initial encounter (892 0) (S91 302A)   24  Osteoporosis (733 00) (M81 0)   25  Peripheral neuropathy (356 9) (G62 9)   26  Sinusitis (473 9) (J32 9)   27  Spondylosis of lumbar region without myelopathy or radiculopathy (721 3) (M47 816)   28  Syncope (780 2) (R55)   29  Type 1 diabetes mellitus with diabetic neuropathy (250 61,357 2) (E10 40)   30  UTI (urinary tract infection) (599 0) (N39 0)   31  Vaginitis (616 10) (N76 0)   32   Visit for screening mammogram (V76 12) (Z12 31)   33  Vitamin D deficiency (268 9) (E55 9)   34  Vulvar discomfort (625 79) (N94 818)   35  Vulvitis (616 10) (N76 2)    Past Medical History    1  History of Age At First Period 17 Years Old (Menarche)   2  History of Age At First Pregnancy 34 Years Old   3  History of Currently Wearing Eyeglasses   4  History of Difficulty Falling Asleep   5   2 para 2 (V22 2) (Z33 1)   6  History of Hearing Loss (389 9)   7  History Of 2  Previous Pregnancies (V61 5)   8  History of constipation (V12 79) (Z87 19)   9  History of diarrhea (V12 79) (Z87 898)   10  History of heartburn (V12 79) (Z87 898)   11  History of herpes zoster (V12 09) (Z86 19)   12  History of hypertension (V12 59) (Z86 79)   13  Denied: History of self breast exam   14  History of thyroid disease (V12 29) (Z86 39)   15  History of Need for influenza vaccination (V04 81) (Z23)   16  History of Previous Pregnancies Resulted In 2  Live Birth(S)   17  History of Type 1 diabetes mellitus without complication (188 12) (B33 6)  Active Problems And Past Medical History Reviewed: The active problems and past medical history were reviewed and updated today  Surgical History    1  History of Appendectomy   2  History of Cataract Surgery   3  History of Complete Colonoscopy   4  History of Excision Of Lesion Ears  Surgical History Reviewed: The surgical history was reviewed and updated today  Family History  Mother    1  Family history of Acute Myocardial Infarction (V17 3)   2  Family history of type 2 diabetes mellitus (V18 0) (Z83 3)  Father    3  Family history of Dementia   4  Family history of cerebrovascular accident (V17 1) (Z82 3)  Maternal Aunt    5  Family history of Breast Cancer (V16 3)  Maternal Uncle    6  Family history of Adenocarcinoma Of The Stomach (V16 0)  Family History    7  Family history of Diabetes Mellitus (V18 0)   8  Family history of Stroke Complications  Family History Reviewed:    The family history was reviewed and updated today  Social History    · Caffeine use (V49 89) (F15 90)   · Denied: History of Drug Use (305 90)   · Marital History -    · Never A Smoker   · No preference on Druze beliefs   · Social alcohol use (Z78 9)   · Two children  Social History Reviewed: The social history was reviewed and updated today  Current Meds   1  Advil CAPS; TAKE 1 CAPSULE EVERY 4 TO 6 HOURS; Therapy: (Recorded:11Aug2015) to Recorded   2  Aspirin 81 MG TABS; TAKE 1 TABLET DAILY; Therapy: (Recorded:11Aug2015) to Recorded   3  Atenolol 25 MG Oral Tablet; Take 1 tablet daily; Last Rx:01Oct2015 Ordered   4  Atorvastatin Calcium 10 MG Oral Tablet; take one tablet by mouth daily; Therapy: 11Aug2015 to (Evaluate:08May2017); Last Rx:13May2016 Ordered   5  BD Insulin Syr Ultrafine II 31G X 5/16" 0 5 ML Miscellaneous; USE AS DIRECTED  4x   daily; Therapy: 00PQE8949 to (Evaluate:86Uqr0773)  Requested for: 40UOT8567; Last   Rx:17May2016 Ordered   6  Co Q-10 200 MG Oral Capsule; TAKE 1 CAPSULE DAILY; Therapy: (Recorded:11Aug2015) to Recorded   7  Cosopt SOLN; INSTILL 1 DROP INTO RIGHT EYE 2 TIMES DAILY; Therapy: (Recorded:11Aug2015) to Recorded   8  Estrace 0 1 MG/GM Vaginal Cream; APPLY A PEA-SIZED AMOUNT TO VAGINAL OPENING    2-3 times per week; Therapy: 44LWM3140 to (Last Rx:01Oct2015) Ordered   9  Gabapentin 100 MG Oral Capsule; TAKE 1 -2 CAPSULEs  Bedtime; Therapy: 71GZT3972 to (Evaluate:70Nay9837)  Requested for: 22Mar2016; Last   Rx:22Mar2016 Ordered   10  HumaLOG 100 UNIT/ML Subcutaneous Solution; inject 5 units at breakfast ; 5 units at    lunch ;  7 units at dinner  Requested for: 72JET2510; Last Rx:18Feb2016 Ordered   11  Irbesartan-Hydrochlorothiazide 150-12 5 MG Oral Tablet; Take 1 tablet daily; Therapy: 20JXJ3608 to (Evaluate:76Qjm0174)  Requested for: 73ZSN7139; Last    Rx:89Trh1921 Ordered   12   Lantus 100 UNIT/ML Subcutaneous Solution; INJECT 14 UNITS NIGHTLY AS    DIRECTED; Therapy: 10TEU7188 to (Evaluate:16Qxh1944)  Requested for: 13RPJ2656 Recorded   13  LORazepam 0 5 MG Oral Tablet; TAKE 1 TABLET 3 TIMES DAILY PRN : anxiety; Last    Rx:13May2016 Ordered   14  Magnesium 500 MG Oral Tablet; Take 1 tablet daily; Therapy: (Floy Barley) to Recorded   15  Medrol 4 MG Oral Tablet Therapy Pack; use as directed; Therapy: 32TMB2501 to (Last Rx:13May2016)  Requested for: 52CJA5136 Ordered   16  OneTouch Ultra Blue In Vitro Strip; ONE TOUCH ULTRA STRIPS     TEST 6 TIMES PER DAY; Therapy: 49BRF7863 to (Evaluate:24Csl7480); Last Rx:01Oct2015 Ordered   17  Refresh Tears 0 5 % Ophthalmic Solution; INSTILL 1 DROP INTO BOTH EYES 4 TIMES    DAILY; Therapy: (Recorded:11Aug2015) to Recorded   18  Travatan Z 0 004 % Ophthalmic Solution; instill 2 drops in both eyes at bedtime; Therapy: (Recorded:11Aug2015) to Recorded   19  Vitamin B Complex CAPS; TAKE 1 CAPSULE DAILY; Therapy: (Recorded:11Aug2015) to Recorded   20  Vitamin D 2000 UNIT Oral Tablet; Therapy: (Recorded:31Cwa1426) to Recorded  Medication List Reviewed: The medication list was reviewed and updated today  Allergies    1  No Known Drug Allergies    Vitals  Vital Signs [Data Includes: Current Encounter]    Recorded: [id number] 09:54AM Recorded: 29CSY1048 09:49AM   Heart Rate 58    Systolic 366    Diastolic 72    Height  5 ft 5 in   Weight  130 lb 2 00 oz   BMI Calculated  21 65   BSA Calculated  1 65     Physical Exam    Constitutional   General appearance: No acute distress, well appearing and well nourished  Eyes   Conjunctiva and lids: No swelling, erythema, or discharge  Pupils: Equal, round and reactive to light  The sclera are anicteric  Extraocular movements are intact  Ears, Nose, Mouth, and Throat   External inspection of ears, nose and lips: Normal   Horizontal diplopia   Cannot abduct the right eye with extreme lateral gaze, sees double vision in convergence, visual fields are full  With any I closed the vision is singular  Oropharynx: Normal with no erythema, edema, exudate or lesions  Exam of Head: The head is atraumatic and normocephalic  Neck: The neck is supple  The thyroid is normal in size with no palpable nodules  no palpable nodules  Pulmonary   Auscultation of lungs: Clear to auscultation bilaterally with normal chest expansion  Cardiovascular   Auscultation of heart: Normal rate and rhythm with no murmurs, gallops or rubs  Examination of pulses: Dorsalis pedal pulses are +2 and equal bilaterally  Examination of carotids: No bruit    Abdomen   Abdomen: Abdomen is soft, non-tender with normal bowel sounds  Lymphatic   Palpation of lymph nodes: No supraclavicular or suboccipital lymphadenopathy  Musculoskeletal   Inspection/palpation of joints, bones, and muscles: Muscle bulk and tone is normal     Skin   Skin and subcutaneous tissue: Normal skin temperature and color  Neurologic   Reflexes: 2+ and symmetric  Motor Strength: Strength is 5/5 bilaterally  Psychiatric   Orientation to person, place and time: Normal     Mood and affect: Affect and attention span are normal        Results/Data  Encounter Results   Fingerstick - POC 99GFH0263 10:01AM Page Tmi     Test Name Result Flag Reference   Glucose Finger Stick 138                   Diagnostic Studies Reviewed: I personally reviewed the films/images/results in the office today  My interpretation follows   Results   (1) PTH N-TERMINAL (INTACT) 56BZV3831 10:06AM Page Tim    Order Number: [id number]   Order Number: [id number]     Test Name Result Flag Reference   PARATHYROID HORMONE INTACT 54 6 pg/mL  14 0-72 0     (1) RENAL FUNCTION PANEL 73MVA0335 10:06AM Ava Jon Order Number: [id number]   Order Number: [id number]     Test Name Result Flag Reference   ANION GAP (CALC) 5 mmol/L  4-13   ALBUMIN 3 8 g/dL  3 5-5 0   BLOOD UREA NITROGEN 35 mg/dL H 5-25   National Kidney Disease Education Program recommendations are as follows:  GFR calculation is accurate only with a steady state creatinine  Chronic Kidney disease less than 60 ml/min/1 73 sq  meters  Kidney failure less than 15 ml/min/1 73 sq  meters  CALCIUM 9 1 mg/dL  8 3-10 1   CHLORIDE 100 mmol/L  100-108   CARBON DIOXIDE 29 mmol/L  21-32   CREATININE 1 06 mg/dL  0 60-1 30   Standardized to IDMS reference method   GLUCOSE,RANDM 74 mg/dL     POTASSIUM 5 2 mmol/L  3 5-5 3   eGFR Non-African American 50 1 ml/min/1 73sq m     SODIUM 134 mmol/L L 136-145   PHOSPHORUS 3 7 mg/dL  2 3-4 1     (1) VITAMIN D 25-HYDROXY 31ZCY7477 10:06AM London Dai Order Number: [id number]  TW Order Number: [id number]     Test Name Result Flag Reference   VIT D 25-HYDROX 34 4 ng/mL  30 0-100 0     (1) HEMOGLOBIN A1C 30BDJ5657 10:06AM London Dai Order Number: [id number]   Order Number: [id number]     Test Name Result Flag Reference   HEMOGLOBIN A1C 7 3 % H 4 2-6 3   EST  AVG  GLUCOSE 163 mg/dl       (1) TSH 39YXG2722 10:06AM Chloé Ra    Order Number: [id number]  TW Order Number: [id number]     Test Name Result Flag Reference   TSH 2 440 uIU/mL  0 358-3 740   The recommended reference ranges for TSH during pregnancy are as follows:  First trimester 0 1 to 2 5 uIU/mL  Second trimester  0 2 to 3 0 uIU/mL  Third trimester 0 3 to 3 0 uIU/m     Health Management  Health Maintenance   Medicare Annual Wellness Visit; every 1 year; Next Due: 03TRQ6189; Active    Future Appointments    Date/Time Provider Specialty Site   07/25/2016 01:30 PM BORIS Peterson   Family Medicine [facility]   09/12/2016 09:45 AM Abelino Pat Endocrinology [facility] ENDOCRINOLOGY [facility]     Signatures   Electronically signed by : BORIS Sher ; Jun 9 2016 10:41AM EST                       (Author)    Electronically signed by : BORIS Sher ; Jun 9 2016 10:42AM EST                       (Author)

## 2018-02-05 DIAGNOSIS — E11.8 TYPE 2 DIABETES MELLITUS WITH COMPLICATION, WITH LONG-TERM CURRENT USE OF INSULIN (HCC): Primary | ICD-10-CM

## 2018-02-05 DIAGNOSIS — I10 ESSENTIAL HYPERTENSION: Primary | ICD-10-CM

## 2018-02-05 DIAGNOSIS — Z79.4 TYPE 2 DIABETES MELLITUS WITH COMPLICATION, WITH LONG-TERM CURRENT USE OF INSULIN (HCC): Primary | ICD-10-CM

## 2018-02-05 RX ORDER — INSULIN GLARGINE 100 [IU]/ML
INJECTION, SOLUTION SUBCUTANEOUS
Qty: 10 ML | Refills: 2 | Status: SHIPPED | OUTPATIENT
Start: 2018-02-05 | End: 2018-02-15 | Stop reason: SDUPTHER

## 2018-02-05 RX ORDER — INSULIN GLARGINE 100 [IU]/ML
INJECTION, SOLUTION SUBCUTANEOUS
COMMUNITY
Start: 2015-12-29 | End: 2018-02-05 | Stop reason: SDUPTHER

## 2018-02-05 RX ORDER — AMLODIPINE BESYLATE 5 MG/1
TABLET ORAL
Qty: 90 TABLET | Refills: 3 | Status: SHIPPED | OUTPATIENT
Start: 2018-02-05 | End: 2019-04-29 | Stop reason: SDUPTHER

## 2018-02-15 DIAGNOSIS — E11.8 TYPE 2 DIABETES MELLITUS WITH COMPLICATION, WITH LONG-TERM CURRENT USE OF INSULIN (HCC): ICD-10-CM

## 2018-02-15 DIAGNOSIS — Z79.4 TYPE 2 DIABETES MELLITUS WITH COMPLICATION, WITH LONG-TERM CURRENT USE OF INSULIN (HCC): ICD-10-CM

## 2018-02-15 DIAGNOSIS — Z12.31 ENCOUNTER FOR SCREENING MAMMOGRAM FOR MALIGNANT NEOPLASM OF BREAST: Primary | ICD-10-CM

## 2018-02-15 RX ORDER — INSULIN GLARGINE 100 [IU]/ML
INJECTION, SOLUTION SUBCUTANEOUS
Qty: 10 ML | Refills: 0 | Status: SHIPPED | OUTPATIENT
Start: 2018-02-15 | End: 2018-02-20 | Stop reason: SDUPTHER

## 2018-02-20 DIAGNOSIS — E11.8 TYPE 2 DIABETES MELLITUS WITH COMPLICATION, WITH LONG-TERM CURRENT USE OF INSULIN (HCC): ICD-10-CM

## 2018-02-20 DIAGNOSIS — I10 ESSENTIAL (PRIMARY) HYPERTENSION: ICD-10-CM

## 2018-02-20 DIAGNOSIS — Z79.4 TYPE 2 DIABETES MELLITUS WITH COMPLICATION, WITH LONG-TERM CURRENT USE OF INSULIN (HCC): ICD-10-CM

## 2018-02-20 DIAGNOSIS — E10.65 TYPE 1 DIABETES MELLITUS WITH HYPERGLYCEMIA (HCC): ICD-10-CM

## 2018-02-20 DIAGNOSIS — E03.8 OTHER SPECIFIED HYPOTHYROIDISM: ICD-10-CM

## 2018-02-20 DIAGNOSIS — E11.3599: ICD-10-CM

## 2018-02-20 DIAGNOSIS — E10.69 TYPE 1 DIABETES MELLITUS WITH OTHER SPECIFIED COMPLICATION (HCC): ICD-10-CM

## 2018-02-20 RX ORDER — INSULIN GLARGINE 100 [IU]/ML
12 INJECTION, SOLUTION SUBCUTANEOUS DAILY
Qty: 30 ML | Refills: 3 | Status: SHIPPED | OUTPATIENT
Start: 2018-02-20 | End: 2018-04-09 | Stop reason: SDUPTHER

## 2018-02-27 ENCOUNTER — LAB (OUTPATIENT)
Dept: LAB | Facility: CLINIC | Age: 83
End: 2018-02-27
Payer: COMMERCIAL

## 2018-02-27 ENCOUNTER — TRANSCRIBE ORDERS (OUTPATIENT)
Dept: LAB | Facility: CLINIC | Age: 83
End: 2018-02-27

## 2018-02-27 DIAGNOSIS — E10.69 TYPE 1 DIABETES MELLITUS WITH OTHER SPECIFIED COMPLICATION (HCC): ICD-10-CM

## 2018-02-27 DIAGNOSIS — E11.3599: ICD-10-CM

## 2018-02-27 DIAGNOSIS — E03.8 OTHER SPECIFIED HYPOTHYROIDISM: ICD-10-CM

## 2018-02-27 DIAGNOSIS — E10.65 TYPE 1 DIABETES MELLITUS WITH HYPERGLYCEMIA (HCC): ICD-10-CM

## 2018-02-27 DIAGNOSIS — I10 ESSENTIAL (PRIMARY) HYPERTENSION: ICD-10-CM

## 2018-02-27 LAB
ALBUMIN SERPL BCP-MCNC: 3.7 G/DL (ref 3.5–5)
ALP SERPL-CCNC: 82 U/L (ref 46–116)
ALT SERPL W P-5'-P-CCNC: 23 U/L (ref 12–78)
ANION GAP SERPL CALCULATED.3IONS-SCNC: 5 MMOL/L (ref 4–13)
AST SERPL W P-5'-P-CCNC: 13 U/L (ref 5–45)
BASOPHILS # BLD AUTO: 0.01 THOUSANDS/ΜL (ref 0–0.1)
BASOPHILS NFR BLD AUTO: 0 % (ref 0–1)
BILIRUB SERPL-MCNC: 0.62 MG/DL (ref 0.2–1)
BUN SERPL-MCNC: 24 MG/DL (ref 5–25)
CALCIUM SERPL-MCNC: 9.2 MG/DL (ref 8.3–10.1)
CHLORIDE SERPL-SCNC: 102 MMOL/L (ref 100–108)
CHOLEST SERPL-MCNC: 161 MG/DL (ref 50–200)
CO2 SERPL-SCNC: 29 MMOL/L (ref 21–32)
CREAT SERPL-MCNC: 0.82 MG/DL (ref 0.6–1.3)
EOSINOPHIL # BLD AUTO: 0.19 THOUSAND/ΜL (ref 0–0.61)
EOSINOPHIL NFR BLD AUTO: 3 % (ref 0–6)
ERYTHROCYTE [DISTWIDTH] IN BLOOD BY AUTOMATED COUNT: 12.2 % (ref 11.6–15.1)
EST. AVERAGE GLUCOSE BLD GHB EST-MCNC: 143 MG/DL
GFR SERPL CREATININE-BSD FRML MDRD: 68 ML/MIN/1.73SQ M
GLUCOSE P FAST SERPL-MCNC: 151 MG/DL (ref 65–99)
HBA1C MFR BLD: 6.6 % (ref 4.2–6.3)
HCT VFR BLD AUTO: 35.4 % (ref 34.8–46.1)
HDLC SERPL-MCNC: 91 MG/DL (ref 40–60)
HGB BLD-MCNC: 11.9 G/DL (ref 11.5–15.4)
LDLC SERPL CALC-MCNC: 60 MG/DL (ref 0–100)
LYMPHOCYTES # BLD AUTO: 1.78 THOUSANDS/ΜL (ref 0.6–4.47)
LYMPHOCYTES NFR BLD AUTO: 29 % (ref 14–44)
MCH RBC QN AUTO: 32.2 PG (ref 26.8–34.3)
MCHC RBC AUTO-ENTMCNC: 33.6 G/DL (ref 31.4–37.4)
MCV RBC AUTO: 96 FL (ref 82–98)
MONOCYTES # BLD AUTO: 0.57 THOUSAND/ΜL (ref 0.17–1.22)
MONOCYTES NFR BLD AUTO: 9 % (ref 4–12)
NEUTROPHILS # BLD AUTO: 3.53 THOUSANDS/ΜL (ref 1.85–7.62)
NEUTS SEG NFR BLD AUTO: 59 % (ref 43–75)
NRBC BLD AUTO-RTO: 0 /100 WBCS
PLATELET # BLD AUTO: 287 THOUSANDS/UL (ref 149–390)
PMV BLD AUTO: 9.5 FL (ref 8.9–12.7)
POTASSIUM SERPL-SCNC: 4.3 MMOL/L (ref 3.5–5.3)
PROT SERPL-MCNC: 7.4 G/DL (ref 6.4–8.2)
RBC # BLD AUTO: 3.69 MILLION/UL (ref 3.81–5.12)
SODIUM SERPL-SCNC: 136 MMOL/L (ref 136–145)
TRIGL SERPL-MCNC: 48 MG/DL
TSH SERPL DL<=0.05 MIU/L-ACNC: 3.28 UIU/ML (ref 0.36–3.74)
WBC # BLD AUTO: 6.09 THOUSAND/UL (ref 4.31–10.16)

## 2018-02-27 PROCEDURE — 80053 COMPREHEN METABOLIC PANEL: CPT

## 2018-02-27 PROCEDURE — 36415 COLL VENOUS BLD VENIPUNCTURE: CPT

## 2018-02-27 PROCEDURE — 80061 LIPID PANEL: CPT

## 2018-02-27 PROCEDURE — 85025 COMPLETE CBC W/AUTO DIFF WBC: CPT

## 2018-02-27 PROCEDURE — 84443 ASSAY THYROID STIM HORMONE: CPT

## 2018-02-27 PROCEDURE — 83036 HEMOGLOBIN GLYCOSYLATED A1C: CPT

## 2018-04-02 DIAGNOSIS — I10 ESSENTIAL HYPERTENSION: Primary | ICD-10-CM

## 2018-04-02 RX ORDER — IRBESARTAN 150 MG/1
TABLET ORAL
Qty: 90 TABLET | Refills: 3 | Status: SHIPPED | OUTPATIENT
Start: 2018-04-02 | End: 2019-03-21 | Stop reason: SDUPTHER

## 2018-04-09 ENCOUNTER — OFFICE VISIT (OUTPATIENT)
Dept: FAMILY MEDICINE CLINIC | Facility: CLINIC | Age: 83
End: 2018-04-09
Payer: COMMERCIAL

## 2018-04-09 ENCOUNTER — TELEPHONE (OUTPATIENT)
Dept: FAMILY MEDICINE CLINIC | Facility: CLINIC | Age: 83
End: 2018-04-09

## 2018-04-09 VITALS
HEART RATE: 72 BPM | TEMPERATURE: 98.6 F | WEIGHT: 135.4 LBS | SYSTOLIC BLOOD PRESSURE: 126 MMHG | HEIGHT: 65 IN | BODY MASS INDEX: 22.56 KG/M2 | DIASTOLIC BLOOD PRESSURE: 72 MMHG | RESPIRATION RATE: 14 BRPM

## 2018-04-09 DIAGNOSIS — F41.1 GENERALIZED ANXIETY DISORDER: ICD-10-CM

## 2018-04-09 DIAGNOSIS — E78.5 HYPERLIPIDEMIA, UNSPECIFIED HYPERLIPIDEMIA TYPE: ICD-10-CM

## 2018-04-09 DIAGNOSIS — Z00.00 ENCOUNTER FOR MEDICARE ANNUAL WELLNESS EXAM: ICD-10-CM

## 2018-04-09 DIAGNOSIS — I10 ESSENTIAL HYPERTENSION: ICD-10-CM

## 2018-04-09 DIAGNOSIS — Z79.4 TYPE 2 DIABETES MELLITUS WITH COMPLICATION, WITH LONG-TERM CURRENT USE OF INSULIN (HCC): ICD-10-CM

## 2018-04-09 DIAGNOSIS — E10.3599 PROLIFERATIVE DIABETIC RETINOPATHY ASSOCIATED WITH TYPE 1 DIABETES MELLITUS, UNSPECIFIED LATERALITY, UNSPECIFIED PROLIFERATIVE RETINOPATHY TYPE (HCC): ICD-10-CM

## 2018-04-09 DIAGNOSIS — R26.89 NEED FOR ASSISTANCE DUE TO UNSTEADY GAIT: Primary | ICD-10-CM

## 2018-04-09 DIAGNOSIS — E10.40 TYPE 1 DIABETES MELLITUS WITH DIABETIC NEUROPATHY (HCC): Primary | ICD-10-CM

## 2018-04-09 DIAGNOSIS — E11.8 TYPE 2 DIABETES MELLITUS WITH COMPLICATION, WITH LONG-TERM CURRENT USE OF INSULIN (HCC): ICD-10-CM

## 2018-04-09 PROBLEM — E10.65 TYPE 1 DIABETES MELLITUS WITH HYPERGLYCEMIA, WITH LONG-TERM CURRENT USE OF INSULIN (HCC): Status: ACTIVE | Noted: 2017-10-30

## 2018-04-09 PROBLEM — E03.8 ADULT ONSET HYPOTHYROIDISM: Status: ACTIVE | Noted: 2017-10-18

## 2018-04-09 PROCEDURE — G0439 PPPS, SUBSEQ VISIT: HCPCS | Performed by: FAMILY MEDICINE

## 2018-04-09 PROCEDURE — 99214 OFFICE O/P EST MOD 30 MIN: CPT | Performed by: FAMILY MEDICINE

## 2018-04-09 RX ORDER — DULOXETIN HYDROCHLORIDE 30 MG/1
30 CAPSULE, DELAYED RELEASE ORAL DAILY
Qty: 30 CAPSULE | Refills: 3 | Status: SHIPPED | OUTPATIENT
Start: 2018-04-09 | End: 2018-05-01 | Stop reason: ALTCHOICE

## 2018-04-09 RX ORDER — INSULIN GLARGINE 100 [IU]/ML
INJECTION, SOLUTION SUBCUTANEOUS
Qty: 10 ML | Refills: 11 | Status: SHIPPED | OUTPATIENT
Start: 2018-04-09 | End: 2019-10-18 | Stop reason: HOSPADM

## 2018-04-09 NOTE — PROGRESS NOTES
FAMILY PRACTICE OFFICE VISIT       NAME: Linda Brock  AGE: [de-identified] y o  SEX: female       : 1935        MRN: [id number]    DATE: 2018  TIME: 10:35 PM    Assessment and Plan     Problem List Items Addressed This Visit     Generalized anxiety disorder    Relevant Medications    DULoxetine (CYMBALTA) 30 mg delayed release capsule    Hyperlipidemia    Relevant Orders    Lipid Panel with Direct LDL reflex    Hypertension    Relevant Orders    CBC    Comprehensive metabolic panel    Lipid Panel with Direct LDL reflex    TSH, 3rd generation with T4 reflex    Retinopathy, diabetic, proliferative (HCC)    Relevant Medications    insulin lispro (HUMALOG) 100 units/mL injection    insulin glargine (LANTUS) 100 units/mL subcutaneous injection    Type 1 diabetes mellitus with diabetic neuropathy (HCC) - Primary    Relevant Medications    DULoxetine (CYMBALTA) 30 mg delayed release capsule    insulin lispro (HUMALOG) 100 units/mL injection    BD INSULIN SYRINGE ULTRAFINE 31G X 15/64" 0 3 ML MISC    insulin glargine (LANTUS) 100 units/mL subcutaneous injection    Other Relevant Orders    CBC    Comprehensive metabolic panel    HEMOGLOBIN A1C W/ EAG ESTIMATION    Lipid Panel with Direct LDL reflex    TSH, 3rd generation with T4 reflex      Other Visit Diagnoses     Type 2 diabetes mellitus with complication, with long-term current use of insulin (HCC)        Relevant Medications    insulin lispro (HUMALOG) 100 units/mL injection    insulin glargine (LANTUS) 100 units/mL subcutaneous injection       Patient presents for follow-up of chronic medical conditions  Type 1 diabetes  She remains on regimen of Lantus and Humalog  No hypoglycemia  Hemoglobin A1c is excellent  Painful diabetic neuropathy  Partial relief of symptoms of gabapentin  We discussed additional/alternative medication options  Will try patient on low-dose Cymbalta 30 mg daily  She will contact me with an update in 3-4 weeks    Patient is under ongoing care of 2 ophthalmologists for treatment of glaucoma and diabetic retinopathy  Hypertension-well controlled, continue Norvasc and Avapro  Hyperlipidemia-LDL is at goal, continue Lipitor  Health maintenance-patient is due for mammography  Hypothyroidism-TSH is at goal, continue Synthroid  Osteoporosis-will re-attempt Prolia approval   Blood work and follow-up in 3 months     There are no Patient Instructions on file for this visit  Chief Complaint     Chief Complaint   Patient presents with   [facility] Visit     subsequent    Follow-up     Patient is also here for a followup  Patient c/o a sore on her left foot which isn't healing  History of Present Illness     Patient presents for follow-up of chronic medical conditions  She remains on daily medications for type 1 diabetes, hypertension, hyperlipidemia, chronic diabetic neuropathy  She feels well overall aside from persistent painful diabetic neuropathy, she was evaluated by multiple podiatristsand  currently treated with Gabapentin  300 mg po TID with partial relief of her symptoms  Hypertension-she remains on Avapro and Norvasc daily  Patient uses HCTZ 12 5 mg 3 days a week    Labs d/w pt  HbA1C 6 6, LDL is at goal on Lipitor 10 mg daily    Patient did not see endocrinology   Patient is not  following up with podiatry on a regular basis at present time  Burning on feet at night and with walking   pending mammography   DEXA  - 3/2017 -  Osteoporosis   Insurance denied prolia injections  Regimen for type 1 diabetes includes Lantus and Humalog  No episodes of hypoglycemia  Patient uses lorazepam as needed for symptoms of generalized anxiety disorder  Review of Systems   Review of Systems   Constitutional: Negative  HENT: Negative  Eyes: Negative  Respiratory: Negative  Cardiovascular: Negative  Gastrointestinal: Negative  Endocrine: Negative  Genitourinary: Negative      Musculoskeletal: Negative  Neurological:        As outlined in HPI  Painful diabetic neuropathy, burning sensation in bilateral feet   Hematological: Negative  Psychiatric/Behavioral: The patient is nervous/anxious  Active Problem List     Patient Active Problem List   Diagnosis    Adult onset hypothyroidism    Generalized anxiety disorder    Hyperlipidemia    Hypertension    Retinopathy, diabetic, proliferative ([facility]    Type 1 diabetes mellitus with diabetic neuropathy ([facility] )       Past Medical History:  Past Medical History:   Diagnosis Date    Cataract     Last Assessed: 8/11/2015    Chest pain     Last Assessed: 8/25/2015    Diabetes mellitus ([facility]     Difficulty falling asleep at night until early morning hours     sleeping flat    Diplopia     Last Assessed: 6/9/2016    Fracture of fifth metatarsal bone of right foot     Last Assessed: 12/9/2016    Hearing loss     Herpes zoster     Last Assessed: 12/9/2015    Hypertension     Hyperthyroidism     Last Assessed: 2/25/2017    Hyponatremia     Last Assessed: 3/22/2016    Neoplasm of uncertain behavior of skin     Last Assessed: 10/17/2013    Skin lesions     Last Assessed: 6/27/2016    Syncope     Last Assessed: 9/18/2015    Thyroid disease     Type 1 diabetes mellitus without complication ([facility] )     Wears glasses        Past Surgical History:  Past Surgical History:   Procedure Laterality Date    APPENDECTOMY  1956    CATARACT EXTRACTION      COLONOSCOPY  09/2014    Completed - Dr Natasha Campos, due in 11 years    SKIN LESION EXCISION      Ears       Family History:  Family History   Problem Relation Age of Onset    Heart attack Mother     Diabetes type II Mother     Dementia Father     Stroke Father      CVA    Breast cancer Maternal Aunt     Stomach cancer Maternal Uncle     Diabetes Family     Stroke Family      Complications       Social History:  Social History     Social History    Marital status:       Spouse name: N/A  Number of children: 2    Years of education: N/A     Occupational History    Not on file  Social History Main Topics    Smoking status: Never Smoker    Smokeless tobacco: Never Used    Alcohol use Yes      Comment: social    Drug use: No    Sexual activity: Not on file     Other Topics Concern    Not on file     Social History Narrative    Caffeine use    No preference on Yazidism beliefs           Objective     Vitals:    04/09/18 1304   BP: 126/72   Pulse: 72   Resp: 14   Temp: 98 6 °F (37 °C)     Wt Readings from Last 3 Encounters:   04/09/18 61 4 kg (135 lb 6 4 oz)   01/09/18 61 kg (134 lb 8 oz)   11/16/17 60 6 kg (133 lb 8 oz)       Physical Exam   Constitutional: She is oriented to person, place, and time  She appears well-developed and well-nourished  HENT:   Head: Normocephalic and atraumatic  Eyes: Conjunctivae are normal    Neck: Neck supple  Carotid bruit is not present  Cardiovascular: Normal rate, regular rhythm and normal heart sounds  Pulses are no weak pulses  No murmur heard  Pulses:       Dorsalis pedis pulses are 2+ on the right side, and 2+ on the left side  Pulmonary/Chest: Effort normal and breath sounds normal  No respiratory distress  She has no wheezes  Musculoskeletal: Normal range of motion  Feet:    Feet:   Right Foot:   Skin Integrity: Negative for ulcer, skin breakdown, erythema, warmth, callus or dry skin  Left Foot:   Skin Integrity: Positive for callus (Left big toe)  Negative for ulcer, skin breakdown, erythema, warmth or dry skin  Neurological: She is alert and oriented to person, place, and time  No cranial nerve deficit  Psychiatric: She has a normal mood and affect  Her behavior is normal    Nursing note and vitals reviewed    Right Foot/Ankle   Right Foot Inspection  Skin Exam: skin normal and skin intact no dry skin, no warmth, no callus, no erythema, no maceration, no abnormal color, no pre-ulcer, no ulcer and no callus Toe Exam: ROM and strength within normal limits and erythema  Sensory   Vibration: diminished  Proprioception: intact   Monofilament testing: intact  Vascular    The right DP pulse is 2+  Left Foot/Ankle  Left Foot Inspection  Skin Exam: skin normal, skin intact and callus (Left big toe)no dry skin, no warmth, no erythema, no maceration, normal color, no pre-ulcer and no ulcer                         Toe Exam: ROM and strength within normal limits and erythema                   Sensory   Vibration: diminished  Proprioception: intact  Monofilament: intact  Vascular    The left DP pulse is 2+  Assign Risk Category:  Deformity present; Loss of protective sensation;  No weak pulses       Risk: 1        Pertinent Laboratory/Diagnostic Studies:  Lab Results   Component Value Date    GLUCOSE 146 (H) 12/08/2016    BUN 24 02/27/2018    CREATININE 0 82 02/27/2018    CALCIUM 9 2 02/27/2018     02/27/2018    K 4 3 02/27/2018    CO2 29 02/27/2018     02/27/2018     Lab Results   Component Value Date    ALT 23 02/27/2018    AST 13 02/27/2018    ALKPHOS 82 02/27/2018    BILITOT 0 62 02/27/2018       Lab Results   Component Value Date    WBC 6 09 02/27/2018    HGB 11 9 02/27/2018    HCT 35 4 02/27/2018    MCV 96 02/27/2018     02/27/2018       No results found for: TSH    Lab Results   Component Value Date    CHOL 161 02/27/2018     Lab Results   Component Value Date    TRIG 48 02/27/2018     Lab Results   Component Value Date    HDL 91 (H) 02/27/2018     Lab Results   Component Value Date    LDLCALC 60 02/27/2018     Lab Results   Component Value Date    HGBA1C 6 6 (H) 02/27/2018       Results for orders placed or performed in visit on 02/27/18   CBC and differential   Result Value Ref Range    WBC 6 09 4 31 - 10 16 Thousand/uL    RBC 3 69 (L) 3 81 - 5 12 Million/uL    Hemoglobin 11 9 11 5 - 15 4 g/dL    Hematocrit 35 4 34 8 - 46 1 %    MCV 96 82 - 98 fL    MCH 32 2 26 8 - 34 3 pg    MCHC 33 6 31 4 - 37 4 g/dL    RDW 12 2 11 6 - 15 1 %    MPV 9 5 8 9 - 12 7 fL    Platelets 914 863 - 292 Thousands/uL    nRBC 0 /100 WBCs    Neutrophils Relative 59 43 - 75 %    Lymphocytes Relative 29 14 - 44 %    Monocytes Relative 9 4 - 12 %    Eosinophils Relative 3 0 - 6 %    Basophils Relative 0 0 - 1 %    Neutrophils Absolute 3 53 1 85 - 7 62 Thousands/µL    Lymphocytes Absolute 1 78 0 60 - 4 47 Thousands/µL    Monocytes Absolute 0 57 0 17 - 1 22 Thousand/µL    Eosinophils Absolute 0 19 0 00 - 0 61 Thousand/µL    Basophils Absolute 0 01 0 00 - 0 10 Thousands/µL   Comprehensive metabolic panel   Result Value Ref Range    Sodium 136 136 - 145 mmol/L    Potassium 4 3 3 5 - 5 3 mmol/L    Chloride 102 100 - 108 mmol/L    CO2 29 21 - 32 mmol/L    Anion Gap 5 4 - 13 mmol/L    BUN 24 5 - 25 mg/dL    Creatinine 0 82 0 60 - 1 30 mg/dL    Glucose, Fasting 151 (H) 65 - 99 mg/dL    Calcium 9 2 8 3 - 10 1 mg/dL    AST 13 5 - 45 U/L    ALT 23 12 - 78 U/L    Alkaline Phosphatase 82 46 - 116 U/L    Total Protein 7 4 6 4 - 8 2 g/dL    Albumin 3 7 3 5 - 5 0 g/dL    Total Bilirubin 0 62 0 20 - 1 00 mg/dL    eGFR 68 ml/min/1 73sq m   TSH, 3rd generation   Result Value Ref Range    TSH 3RD GENERATON 3 280 0 358 - 3 740 uIU/mL   Lipid Panel with Direct LDL reflex   Result Value Ref Range    Cholesterol 161 50 - 200 mg/dL    Triglycerides 48 <=150 mg/dL    HDL, Direct 91 (H) 40 - 60 mg/dL    LDL Calculated 60 0 - 100 mg/dL   Hemoglobin A1c   Result Value Ref Range    Hemoglobin A1C 6 6 (H) 4 2 - 6 3 %     mg/dl       Orders Placed This Encounter   Procedures    CBC    Comprehensive metabolic panel    HEMOGLOBIN A1C W/ EAG ESTIMATION    Lipid Panel with Direct LDL reflex    TSH, 3rd generation with T4 reflex       ALLERGIES:  No Known Allergies    Current Medications     Current Outpatient Prescriptions   Medication Sig Dispense Refill    amLODIPine (NORVASC) 5 mg tablet TAKE 1 TABLET DAILY AFTER SUPPER 90 tablet 3    aspirin 81 MG tablet Take 1 tablet by mouth daily      atorvastatin (LIPITOR) 10 mg tablet Take 1 tablet by mouth daily      B Complex-Folic Acid (SUPER B COMPLEX MAXI) TABS Take by mouth      BD INSULIN SYRINGE ULTRAFINE 31G X 15/64" 0 3 ML MISC Inject under the skin 4 (four) times a day 120 each 11    carboxymethylcellulose (REFRESH TEARS) 0 5 % SOLN Apply 1 drop to eye 4 (four) times a day      Cholecalciferol (VITAMIN D3) 2000 units capsule Take 2 capsules by mouth daily      Coenzyme Q10 (CO Q-10) 200 MG CAPS Take 1 capsule by mouth daily      dorzolamide-timolol (COSOPT) 22 3-6 8 MG/ML ophthalmic solution Apply 1 drop to eye 2 (two) times a day      fluticasone (FLONASE) 50 mcg/act nasal spray 2 sprays into each nostril daily      gabapentin (NEURONTIN) 300 mg capsule Take by mouth      glucose blood (ONE TOUCH ULTRA TEST) test strip by In Vitro route      hydrochlorothiazide (MICROZIDE) 12 5 mg capsule Take by mouth      Ibuprofen (ADVIL) 200 MG CAPS Take by mouth      insulin glargine (LANTUS) 100 units/mL subcutaneous injection Inject 12units under the skin nightly as directed 10 mL 11    insulin lispro (HUMALOG) 100 units/mL injection Inject 4 units at breakfast, 5 units at lunch and 7 units at dinnertime 10 mL 11    Insulin Syringe-Needle U-100 (B-D INS SYRINGE 0 5CC/31GX5/16) 31G X 5/16" 0 5 ML MISC by Does not apply route      irbesartan (AVAPRO) 150 mg tablet TAKE 1 TABLET DAILY 90 tablet 3    levothyroxine 50 mcg tablet Take 1 tablet by mouth daily      LORazepam (ATIVAN) 0 5 mg tablet Take 1 tablet by mouth 3 (three) times a day as needed      Magnesium 500 MG TABS Take 1 tablet by mouth daily      travoprost (TRAVATAN Z) 0 004 % ophthalmic solution Apply to eye      DULoxetine (CYMBALTA) 30 mg delayed release capsule Take 1 capsule (30 mg total) by mouth daily 30 capsule 3    Turmeric 500 MG CAPS Take by mouth daily       No current facility-administered medications for this visit  Health Maintenance     Health Maintenance   Topic Date Due    SLP PLAN OF CARE  1935    OPHTHALMOLOGY EXAM  06/01/1947    Depression Screening PHQ-9  06/01/1949    DTaP,Tdap,and Td Vaccines (1 - Tdap) 06/01/1958    Fall Risk  06/01/2002    Urinary Incontinence Screening  06/01/2002    GLAUCOMA SCREENING 67+ YR  06/01/2004    URINE MICROALBUMIN  09/02/2017    Diabetic Foot Exam  11/17/2017    INFLUENZA VACCINE  Completed    PNEUMOCOCCAL POLYSACCHARIDE VACCINE AGE 72 AND OVER  Completed     Immunization History   Administered Date(s) Administered    Influenza 10/01/2010, 10/02/2012, 10/14/2013, 09/10/2014, 10/01/2015, 11/15/2016    Influenza Split High Dose Preservative Free IM 10/01/2015, 11/15/2016, 09/19/2017    Influenza TIV (IM) 1935    Pneumococcal Conjugate 13-Valent 10/19/2015    Pneumococcal Polysaccharide PPV23 11/01/2007    Td (adult), Unspecified 02/01/2003    Zoster 03/11/2013       April Lobo MD

## 2018-04-09 NOTE — PROGRESS NOTES
HPI:  Kaykay Reyes is a [de-identified] y o  female here for her Subsequent Wellness Visit      Patient Active Problem List   Diagnosis    Adult onset hypothyroidism    Generalized anxiety disorder    Hyperlipidemia    Hypertension    Retinopathy, diabetic, proliferative ([address] )    Type 1 diabetes mellitus with diabetic neuropathy ([address] )     Past Medical History:   Diagnosis Date    Cataract     Last Assessed: 8/11/2015    Chest pain     Last Assessed: 8/25/2015    Diabetes mellitus ([address] )     Difficulty falling asleep at night until early morning hours     sleeping flat    Diplopia     Last Assessed: 6/9/2016    Fracture of fifth metatarsal bone of right foot     Last Assessed: 12/9/2016    Hearing loss     Herpes zoster     Last Assessed: 12/9/2015    Hypertension     Hyperthyroidism     Last Assessed: 2/25/2017    Hyponatremia     Last Assessed: 3/22/2016    Neoplasm of uncertain behavior of skin     Last Assessed: 10/17/2013    Skin lesions     Last Assessed: 6/27/2016    Syncope     Last Assessed: 9/18/2015    Thyroid disease     Type 1 diabetes mellitus without complication ([address] )     Wears glasses      Past Surgical History:   Procedure Laterality Date    APPENDECTOMY  1956    CATARACT EXTRACTION      COLONOSCOPY  09/2014    Completed - Dr Natasha Campos, due in 5 years    SKIN LESION EXCISION      Ears     Family History   Problem Relation Age of Onset    Heart attack Mother     Diabetes type II Mother     Dementia Father     Stroke Father      CVA    Breast cancer Maternal Aunt     Stomach cancer Maternal Uncle     Diabetes Family     Stroke Family      Complications     History   Smoking Status    Never Smoker   Smokeless Tobacco    Never Used     History   Alcohol Use    Yes     Comment: social      History   Drug Use No     /72   Pulse 72   Temp 98 6 °F (37 °C) (Tympanic)   Resp 14   Ht 5' 5" (1 651 m)   Wt 61 4 kg (135 lb 6 4 oz)   BMI 22 53 kg/m²       Current Outpatient Prescriptions   Medication Sig Dispense Refill    amLODIPine (NORVASC) 5 mg tablet TAKE 1 TABLET DAILY AFTER SUPPER 90 tablet 3    aspirin 81 MG tablet Take 1 tablet by mouth daily      atorvastatin (LIPITOR) 10 mg tablet Take 1 tablet by mouth daily      B Complex-Folic Acid (SUPER B COMPLEX MAXI) TABS Take by mouth      BD INSULIN SYRINGE ULTRAFINE 31G X 15/64" 0 3 ML MISC Inject under the skin 4 (four) times a day 120 each 11    carboxymethylcellulose (REFRESH TEARS) 0 5 % SOLN Apply 1 drop to eye 4 (four) times a day      Cholecalciferol (VITAMIN D3) 2000 units capsule Take 2 capsules by mouth daily      Coenzyme Q10 (CO Q-10) 200 MG CAPS Take 1 capsule by mouth daily      dorzolamide-timolol (COSOPT) 22 3-6 8 MG/ML ophthalmic solution Apply 1 drop to eye 2 (two) times a day      fluticasone (FLONASE) 50 mcg/act nasal spray 2 sprays into each nostril daily      gabapentin (NEURONTIN) 300 mg capsule Take by mouth      glucose blood (ONE TOUCH ULTRA TEST) test strip by In Vitro route      hydrochlorothiazide (MICROZIDE) 12 5 mg capsule Take by mouth      Ibuprofen (ADVIL) 200 MG CAPS Take by mouth      insulin glargine (LANTUS) 100 units/mL subcutaneous injection Inject 12units under the skin nightly as directed 10 mL 11    insulin lispro (HUMALOG) 100 units/mL injection Inject 4 units at breakfast, 5 units at lunch and 7 units at dinnertime 10 mL 11    Insulin Syringe-Needle U-100 (B-D INS SYRINGE 0 5CC/31GX5/16) 31G X 5/16" 0 5 ML MISC by Does not apply route      irbesartan (AVAPRO) 150 mg tablet TAKE 1 TABLET DAILY 90 tablet 3    levothyroxine 50 mcg tablet Take 1 tablet by mouth daily      LORazepam (ATIVAN) 0 5 mg tablet Take 1 tablet by mouth 3 (three) times a day as needed      Magnesium 500 MG TABS Take 1 tablet by mouth daily      travoprost (TRAVATAN Z) 0 004 % ophthalmic solution Apply to eye      DULoxetine (CYMBALTA) 30 mg delayed release capsule Take 1 capsule (30 mg total) by mouth daily 30 capsule 3    Turmeric 500 MG CAPS Take by mouth daily       No current facility-administered medications for this visit  No Known Allergies  Immunization History   Administered Date(s) Administered    Influenza 10/01/2010, 10/02/2012, 10/14/2013, 09/10/2014, 10/01/2015, 11/15/2016    Influenza Split High Dose Preservative Free IM 10/01/2015, 11/15/2016, 09/19/2017    Influenza TIV (IM) 1935    Pneumococcal Conjugate 13-Valent 10/19/2015    Pneumococcal Polysaccharide PPV23 11/01/2007    Td (adult), Unspecified 02/01/2003    Zoster 03/11/2013       Patient Care Team:  Rusty Siegel MD as PCP - Clarence Haynes MD as PCP - General Lizette Cummings, MD Chen Lisa MD Nanci Dennis, MD Daneen Goon, MELODY Workman, MD Rusty Siegel MD    Medicare Screening Tests and Risk Assessments:  AWV Clinical     ISAR:   Previous hospitalizations?:  No       Once in a Lifetime Medicare Screening:   EKG performed?:  No    AAA screening performed? (if performed, please add date to Health Maintenance):  No       Medicare Screening Tests and Risk Assessment:   AAA Risk Assessment    None Indicated:  Yes    Osteoporosis Risk Assessment     Female:  Yes   :  Yes :  No   Age over 48:  Yes Low body weight (<127lbs):  No   Tobacco use:  No Alcohol use:  Yes   Low calcium diet:  No PMHX of fractures:  Yes   FHX of fractures:  Yes    HIV Risk Assessment    None indicated:  Yes        Drug and Alcohol Use:   Tobacco use    Cigarettes:  never smoker    Tobacco use duration    Tobacco Cessation Readiness    Alcohol use    Alcohol use:  occasional use    Amount of alcohol consumed:  1 drink per week     Concern about alcohol use:  No    Alcohol Treatment Readiness   Illicit Drug Use    Drug use:  never    Drug type:  no sedative use       Diet & Exercise:   Diet   What is your diet?:  Regular, Low Saturated Fat   How many servings a day of the following:   Fruits and Vegetables:  5 or more Meat:  1-2   Whole Grains:  3 Simple Carbs:  0   Dairy:  3 Soda:  0   Coffee:  1 Tea:  0   Exercise    Do you currently exercise?:  yes    Frequency:  daily   stretching        Cognitive Impairment Screening:   Depression screening preformed:  Yes     PHQ-9 Depression scale score:  3   Depression screening results:  no significant symptoms   Cognitive Impairment Screening    Do you have difficulty learning or retaining new information?:  No Do you have difficulty handling new tasks?:  No   Do you have difficulty with reasoning?:  No Do you have difficulty with spatial ability and orientation?:  No   Do you have difficulty with language?:  No Do you have difficulty with behavior?:  No       Functional Ability/Level of Safety:   Hearing    Hearing difficulties:  Yes Bilateral:  slightly decreased   Hearing aid:  No    Hearing Impairment Assessment    Hearing status:  Hearing loss in left ear, Hearing loss in right ear   Current Activities    Status:  unlimited ADL's, unlimited IADL's, unlimited social activities, unlimited driving   Help needed with the folllowing:    Using the phone:  No Transportation:  No   Shopping:  No Preparing Meals:  No   Doing Housework:  Yes Doing Laundry:  No   Managing Medications:  No Managing Money:  No   ADL    Feeding:  Independant   Oral hygiene and Facial grooming:  Independant   Bathing:  Independant   Upper Body Dressing:  Independant   Lower Body Dressing:  Independant   Toileting:  Independant   Bed Mobility:  Independant   Fall Risk   Have you fallen in the last 12 months?:  No Are you unsteady on your feet?:  No    Are you taking any medications that may cause fatigue or dizziness?:  Yes   Do you have any chronic conditions that may contribute to a fall?:  Diabetes, Neuopathy, Arthritis Do you rush to the bathroom potentially risking a fall?:  No   Injury History   Polypharmacy:  Yes Antidepressant Use:  No   Sedative Use: No Antihypertensive Use:  Yes   Previous Fall:  No Alcohol Use:  Yes   Deconditioning:  No Visual Impairment:  No   Cogitive Impairment:  No Mmobility Impairment:  Yes   Postural Hypotension:  No Urinary Incontinence:  No       Home Safety:   Are there hazards in your environment?:  No   Home Safety Risk Factors   Unfamilar with surroundings:  No Uneven floors:  No   Stairs or handrail saftey risk:  No Loose rugs:  No   Household clutter:  No Poor household lighting:  No   No grab bars in bathroom:  No Further evaluation needed:  No       Advanced Directives:   Advanced Directives    Living Will:  Yes Durable POA for healthcare: Yes   Advanced directive:  Yes    Patient's End of Life Decisions        Urinary Incontinence:   Do you have urinary incontinence?:  No        Glaucoma:            Provider Screening     Preventative Screening/Counseling:   Cardiovascular Screening/Counseling:   (Labs Q5 years, EKG optional one-time)   General:  Risks and Benefits Discussed, Screening Current           Diabetes Screening/Counseling:   (2 tests/year if Pre-Diabetes or 1 test/year if no Diabetes)   General:  Risks and Benefits Discussed, Screening Current           Colorectal Cancer Screening/Counseling:   (FOBT Q1 yr; Flex Sig Q4 yrs or Q10 yrs after Screening Colonoscopy; Screening Colonoscpy Q2 yrs High Risk or Q10 yrs Low Risk; Barium Enema Q2 yrs High Risk or Q4 yrs Low Risk)   General:  Risks and Benefits Discussed, Screening Current           Prostate Cancer Screening/Counseling:   (Annual)          Breast Cancer Screening/Counseling:   (Baseline Age 28 - 43; Annual Age 36+)   General:  Risks and Benefits Discussed Due for: Studies:  mammogram         Cervical Cancer Screening/Counseling:   (Annual for High Risk or Childbearing Age with Abnormal Pap in Last 3 yrs;  Every 2 all others)   General:  Risks and Benefits Discussed, Screening Not Indicated           Osteoporosis Screening/Counseling:   (Every 2 Yrs if at risk or more if medically necessary)   General:  Risks and Benefits Discussed, Screening Current Counseling:  Calcium and Vitamin D Intake          AAA Screening/Counseling:   (Once per Lifetime with risk factors)          Glaucoma Screening/Counseling:   (Annual)   General:  Risks and Benefits Discussed, Screening Current          HIV Screening/Counseling:   (Voluntary; Once annually for high risk OR 3 times for Pregnancy at diagnosis of IUP; 3rd trimester; and at Labor         Hepatitis C Screening:             Immunizations:   Influenza (annual):  Risks & Benefits Discussed, Influenza Recommended Annually, Influenza UTD This Year   Pneumococcal (Once in a Lifetime):  Lifetime Vaccine Completed   Zostavax (Medicare D Coverage, Pt >64 yo):  Zostavax Vaccine UTD       Other Preventative Couseling (Non-Medicare Wellness Visit Required): Increased physical activity counseling given       Referrals (Non-Medicare Wellness Visit Required):   referral to podiatry       Medical Equipment/Suppliers:   Diabetic shoes           No exam data present    Physical Exam : MMSE=30  Physical Exam    Reviewed Updated [facility] Prior Wellness Visits:   Last Medicare wellness visit information was reviewed, patient interviewed , no change since last AWVyes  Last Medicare wellness visit information was reviewed, patient interviewed and updates made to the record today yes    Assessment and Plan:  1  Type 1 diabetes mellitus with diabetic neuropathy (HCC)  DULoxetine (CYMBALTA) 30 mg delayed release capsule    insulin lispro (HUMALOG) 100 units/mL injection    BD INSULIN SYRINGE ULTRAFINE 31G X 15/64" 0 3 ML MISC    CBC    Comprehensive metabolic panel    HEMOGLOBIN A1C W/ EAG ESTIMATION    Lipid Panel with Direct LDL reflex    TSH, 3rd generation with T4 reflex    Ambulatory referral to Podiatry   2   Proliferative diabetic retinopathy associated with type 1 diabetes mellitus, unspecified laterality, unspecified proliferative retinopathy type ([facility] [address] )     3  Essential hypertension  CBC    Comprehensive metabolic panel    Lipid Panel with Direct LDL reflex    TSH, 3rd generation with T4 reflex   4  Hyperlipidemia, unspecified hyperlipidemia type  Lipid Panel with Direct LDL reflex   5  Generalized anxiety disorder     6  Type 2 diabetes mellitus with complication, with long-term current use of insulin (HCC)  insulin glargine (LANTUS) 100 units/mL subcutaneous injection   7   Encounter for Medicare annual wellness exam         Health Maintenance Due   Topic Date Due    SLP PLAN OF CARE  1935    OPHTHALMOLOGY EXAM  06/01/1947    Depression Screening PHQ-9  06/01/1949    DTaP,Tdap,and Td Vaccines (1 - Tdap) 06/01/1958    Fall Risk  06/01/2002    Urinary Incontinence Screening  06/01/2002    GLAUCOMA SCREENING 67+ YR  06/01/2004    URINE MICROALBUMIN  09/02/2017    Diabetic Foot Exam  11/17/2017

## 2018-04-17 LAB
LEFT EYE DIABETIC RETINOPATHY: NORMAL
RIGHT EYE DIABETIC RETINOPATHY: NORMAL

## 2018-04-24 DIAGNOSIS — F41.1 GAD (GENERALIZED ANXIETY DISORDER): Primary | ICD-10-CM

## 2018-04-24 NOTE — TELEPHONE ENCOUNTER
Patient called and stated that she was given Duloxetine 30 mg and is taking Gabapentin 900 mg  Should she cut back on the gabapentin or should she be taking both?   Please call to advise

## 2018-04-25 RX ORDER — LORAZEPAM 0.5 MG/1
TABLET ORAL
Qty: 90 TABLET | Refills: 2 | Status: SHIPPED | OUTPATIENT
Start: 2018-04-25 | End: 2018-07-23 | Stop reason: SDUPTHER

## 2018-04-25 NOTE — TELEPHONE ENCOUNTER
Please advise patient to decrease dose of gabapentin from 300 mg 3 times a day to 300 mg twice a day    Thank you

## 2018-04-26 NOTE — TELEPHONE ENCOUNTER
Spoke with patient and gave her instructions on the Gabapentin and she stated that the Cymbalta is giving her an upset stomach and she wants to know how long it would take to know how long it takes to kick in? Patient is taking it with food  She is also constipated    Is this normal?   Please call and advise

## 2018-05-01 ENCOUNTER — TELEPHONE (OUTPATIENT)
Dept: FAMILY MEDICINE CLINIC | Facility: CLINIC | Age: 83
End: 2018-05-01

## 2018-05-01 DIAGNOSIS — E10.40 TYPE 1 DIABETES MELLITUS WITH DIABETIC NEUROPATHY (HCC): Primary | ICD-10-CM

## 2018-05-01 RX ORDER — DULOXETIN HYDROCHLORIDE 20 MG/1
20 CAPSULE, DELAYED RELEASE ORAL DAILY
Qty: 30 CAPSULE | Refills: 0 | Status: SHIPPED | OUTPATIENT
Start: 2018-05-01 | End: 2018-06-05 | Stop reason: SDUPTHER

## 2018-05-01 NOTE — TELEPHONE ENCOUNTER
Patient wants you to know that she stopped taking the Cymbalta for 3 days  It did help & wants to know if she can take a lower dosage? She has 30 mg & wants to know if there is a 15 mg  Please call to advise as soon as you can because this has been going on since week  I have step out later this afternoon to go to a doctors appointment

## 2018-05-01 NOTE — TELEPHONE ENCOUNTER
I can  prescribe Cymbalta 20 milligrams daily, prescription will be sent to patient's pharmacy    Thanks

## 2018-05-07 DIAGNOSIS — E11.69 HYPERLIPIDEMIA ASSOCIATED WITH TYPE 2 DIABETES MELLITUS (HCC): Primary | ICD-10-CM

## 2018-05-07 DIAGNOSIS — E78.5 HYPERLIPIDEMIA ASSOCIATED WITH TYPE 2 DIABETES MELLITUS (HCC): Primary | ICD-10-CM

## 2018-05-07 RX ORDER — ATORVASTATIN CALCIUM 10 MG/1
TABLET, FILM COATED ORAL
Qty: 90 TABLET | Refills: 0 | Status: SHIPPED | OUTPATIENT
Start: 2018-05-07 | End: 2018-12-27 | Stop reason: SDUPTHER

## 2018-05-29 ENCOUNTER — TELEPHONE (OUTPATIENT)
Dept: FAMILY MEDICINE CLINIC | Facility: CLINIC | Age: 83
End: 2018-05-29

## 2018-05-29 NOTE — TELEPHONE ENCOUNTER
I agree, I would retry 30 mg  Of Cymbalta once a day  Since patient has been on the medication for a while she hopefully will tolerate 30 mg dose well now  If she has any leftovers of 30 mg capsule she can start, if she needs a new prescription -please let me know      Thank you

## 2018-05-29 NOTE — TELEPHONE ENCOUNTER
Spoke w/ pt and gave MD instructions   Pt states she has Cymbalta 30 mg tab left over and she will take those and call when needs a refill

## 2018-06-04 ENCOUNTER — LAB (OUTPATIENT)
Dept: LAB | Facility: CLINIC | Age: 83
End: 2018-06-04
Payer: COMMERCIAL

## 2018-06-04 DIAGNOSIS — I10 ESSENTIAL HYPERTENSION: ICD-10-CM

## 2018-06-04 DIAGNOSIS — E78.5 HYPERLIPIDEMIA, UNSPECIFIED HYPERLIPIDEMIA TYPE: ICD-10-CM

## 2018-06-04 DIAGNOSIS — E10.40 TYPE 1 DIABETES MELLITUS WITH DIABETIC NEUROPATHY (HCC): ICD-10-CM

## 2018-06-04 LAB
ALBUMIN SERPL BCP-MCNC: 3.8 G/DL (ref 3.5–5)
ALP SERPL-CCNC: 69 U/L (ref 46–116)
ALT SERPL W P-5'-P-CCNC: 26 U/L (ref 12–78)
ANION GAP SERPL CALCULATED.3IONS-SCNC: 6 MMOL/L (ref 4–13)
AST SERPL W P-5'-P-CCNC: 12 U/L (ref 5–45)
BILIRUB SERPL-MCNC: 0.66 MG/DL (ref 0.2–1)
BUN SERPL-MCNC: 22 MG/DL (ref 5–25)
CALCIUM SERPL-MCNC: 9.2 MG/DL (ref 8.3–10.1)
CHLORIDE SERPL-SCNC: 100 MMOL/L (ref 100–108)
CHOLEST SERPL-MCNC: 163 MG/DL (ref 50–200)
CO2 SERPL-SCNC: 28 MMOL/L (ref 21–32)
CREAT SERPL-MCNC: 0.87 MG/DL (ref 0.6–1.3)
ERYTHROCYTE [DISTWIDTH] IN BLOOD BY AUTOMATED COUNT: 12 % (ref 11.6–15.1)
EST. AVERAGE GLUCOSE BLD GHB EST-MCNC: 146 MG/DL
GFR SERPL CREATININE-BSD FRML MDRD: 63 ML/MIN/1.73SQ M
GLUCOSE P FAST SERPL-MCNC: 170 MG/DL (ref 65–99)
HBA1C MFR BLD: 6.7 % (ref 4.2–6.3)
HCT VFR BLD AUTO: 38 % (ref 34.8–46.1)
HDLC SERPL-MCNC: 89 MG/DL (ref 40–60)
HGB BLD-MCNC: 12.6 G/DL (ref 11.5–15.4)
LDLC SERPL CALC-MCNC: 67 MG/DL (ref 0–100)
MCH RBC QN AUTO: 32.7 PG (ref 26.8–34.3)
MCHC RBC AUTO-ENTMCNC: 33.2 G/DL (ref 31.4–37.4)
MCV RBC AUTO: 99 FL (ref 82–98)
PLATELET # BLD AUTO: 263 THOUSANDS/UL (ref 149–390)
PMV BLD AUTO: 9.7 FL (ref 8.9–12.7)
POTASSIUM SERPL-SCNC: 4.7 MMOL/L (ref 3.5–5.3)
PROT SERPL-MCNC: 7.4 G/DL (ref 6.4–8.2)
RBC # BLD AUTO: 3.85 MILLION/UL (ref 3.81–5.12)
SODIUM SERPL-SCNC: 134 MMOL/L (ref 136–145)
TRIGL SERPL-MCNC: 33 MG/DL
TSH SERPL DL<=0.05 MIU/L-ACNC: 1.85 UIU/ML (ref 0.36–3.74)
WBC # BLD AUTO: 5.25 THOUSAND/UL (ref 4.31–10.16)

## 2018-06-04 PROCEDURE — 80053 COMPREHEN METABOLIC PANEL: CPT

## 2018-06-04 PROCEDURE — 83036 HEMOGLOBIN GLYCOSYLATED A1C: CPT

## 2018-06-04 PROCEDURE — 80061 LIPID PANEL: CPT

## 2018-06-04 PROCEDURE — 85027 COMPLETE CBC AUTOMATED: CPT

## 2018-06-04 PROCEDURE — 84443 ASSAY THYROID STIM HORMONE: CPT

## 2018-06-04 PROCEDURE — 36415 COLL VENOUS BLD VENIPUNCTURE: CPT

## 2018-06-05 ENCOUNTER — OFFICE VISIT (OUTPATIENT)
Dept: FAMILY MEDICINE CLINIC | Facility: CLINIC | Age: 83
End: 2018-06-05
Payer: COMMERCIAL

## 2018-06-05 VITALS
TEMPERATURE: 97.8 F | BODY MASS INDEX: 22.06 KG/M2 | HEIGHT: 65 IN | RESPIRATION RATE: 14 BRPM | SYSTOLIC BLOOD PRESSURE: 128 MMHG | DIASTOLIC BLOOD PRESSURE: 66 MMHG | WEIGHT: 132.4 LBS | HEART RATE: 64 BPM

## 2018-06-05 DIAGNOSIS — I10 ESSENTIAL HYPERTENSION: ICD-10-CM

## 2018-06-05 DIAGNOSIS — E03.8 ADULT ONSET HYPOTHYROIDISM: ICD-10-CM

## 2018-06-05 DIAGNOSIS — E10.3599 PROLIFERATIVE DIABETIC RETINOPATHY ASSOCIATED WITH TYPE 1 DIABETES MELLITUS, UNSPECIFIED LATERALITY, UNSPECIFIED PROLIFERATIVE RETINOPATHY TYPE (HCC): ICD-10-CM

## 2018-06-05 DIAGNOSIS — E78.5 HYPERLIPIDEMIA, UNSPECIFIED HYPERLIPIDEMIA TYPE: ICD-10-CM

## 2018-06-05 DIAGNOSIS — E10.40 TYPE 1 DIABETES MELLITUS WITH DIABETIC NEUROPATHY (HCC): Primary | ICD-10-CM

## 2018-06-05 DIAGNOSIS — F41.1 GENERALIZED ANXIETY DISORDER: ICD-10-CM

## 2018-06-05 PROCEDURE — 99214 OFFICE O/P EST MOD 30 MIN: CPT | Performed by: FAMILY MEDICINE

## 2018-06-05 RX ORDER — DULOXETIN HYDROCHLORIDE 30 MG/1
30 CAPSULE, DELAYED RELEASE ORAL DAILY
Qty: 30 CAPSULE | Refills: 2
Start: 2018-06-05 | End: 2018-09-15 | Stop reason: SDUPTHER

## 2018-06-05 NOTE — PROGRESS NOTES
FAMILY PRACTICE OFFICE VISIT       NAME: Zeb Ren  AGE: 82 y o  SEX: female       : 1935        MRN: [id number]    DATE: 2018  TIME: 11:00 PM    Assessment and Plan     Problem List Items Addressed This Visit     Adult onset hypothyroidism    Relevant Orders    TSH, 3rd generation    Generalized anxiety disorder    Relevant Medications    DULoxetine (CYMBALTA) 30 mg delayed release capsule    Hyperlipidemia    Relevant Orders    Lipid panel    Hypertension    Relevant Orders    Comprehensive metabolic panel    Retinopathy, diabetic, proliferative (HCC)    Type 1 diabetes mellitus with diabetic neuropathy (HCC) - Primary    Relevant Medications    DULoxetine (CYMBALTA) 30 mg delayed release capsule    Other Relevant Orders    CBC    Lipid panel    Vitamin D 25 hydroxy    TSH, 3rd generation    Lipid Panel with Direct LDL reflex       Follow-up chronic medical conditions  Type 1 diabetes, hemoglobin A1c is excellent, rare hypoglycemic episodes  Patient is managing well overall  Continue Lantus and Humalog  Hypothyroidism:  Synthroid 50 mcg daily  Recent TSH is normal   Hypertension:  Continue Avapro, Norvasc and HCTZ 12 5 mg once a day as needed  Hyperlipidemia:  Excellent lipid panel  Patient may reduce dose of Lipitor to 5 days a week  Generalized anxiety disorder:  She remains on lorazepam as needed  Painful diabetic neuropathy  Patient is tolerating Cymbalta 30 mg once a day well  Will continue same dose of medication  She is unfortunately experiencing symptoms of daytime fatigue, it could be related to current gabapentin use  I advised her to consider decreasing dose of gabapentin to 300 mg in the afternoon and using Cymbalta 30 mg once a day either in the morning or at night  Follow-up 3 months with blood work prior  Patient remains under care of Ophthalmology for treatment of retinopathy      Patient Instructions   You may HOLD Lipitor on  and Sundays  Please consider skipping one gabapentin per day and observe of s/o  Fatigue are improving or not ? Please stay  on Cymbalta 30 mg po daily    will repeat blood work in early September            Chief Complaint     Chief Complaint   Patient presents with    Follow-up     Patient is here for a followup  History of Present Illness     Patient presents for follow-up of chronic medical conditions  She is here for follow-up of type 1 diabetes with painful peripheral neuropathy, hyperlipidemia, hypertension, hypothyroidism  She has been feeling stably  She denies chest pain, palpitations, shortness of breath or dizziness  Patient is experiencing chronic and persitent peripheral neuroparthy, burning in her feet, usually worse with walking, relieved by rest   She had extensive evaluation in the past   No evidence of claudication  She overall has improved on gabapentin 300 mg twice a day but is complaining of significant daytime fatigue  Patient recently has started on Cymbalta  She developed fatigue on 30 mg dose and has decrease it to 20 mg for quite a while  She recently was able to restart back on 30 mg daily and is tolerating medication well  She did not notice any significant improvement in symptoms of neuropathy so far   Patient has been using ibuprofen 400 mg once a day that relieves pain in her feet significantly  She denies any adverse side effects    Rare hypoglycemia episodes, in 60s, once a week only  HbA1C is excellent at 6 7    Patient remains unde ongoing care of Ophthalmology,  [address] unfortunately denied Prolia  I believe patient would be poor candidate for Fosamax or Boniva due to type 1 diabetes, and  increased susceptibility to gastroparesis  Review of Systems   Review of Systems   Constitutional: Positive for fatigue  HENT: Negative  Respiratory: Negative  Cardiovascular: Negative  Gastrointestinal: Negative  Endocrine: Negative      Genitourinary: Negative  Musculoskeletal: Positive for arthralgias  Neurological:        Painful peripheral neuropathy bilateral feet, described as burning, worse with ambulation and activity, relieved by rest    Hematological: Negative  Psychiatric/Behavioral: The patient is nervous/anxious          Active Problem List     Patient Active Problem List   Diagnosis    Adult onset hypothyroidism    Generalized anxiety disorder    Hyperlipidemia    Hypertension    Retinopathy, diabetic, proliferative [facility] [address] [facility]    Type 1 diabetes mellitus with diabetic neuropathy ([facility] [address] [facility]       Past Medical History:  Past Medical History:   Diagnosis Date    Cataract     Last Assessed: 8/11/2015    Chest pain     Last Assessed: 8/25/2015    Diabetes mellitus ([facility] [address] [facility]     Difficulty falling asleep at night until early morning hours     sleeping flat    Diplopia     Last Assessed: 6/9/2016    Fracture of fifth metatarsal bone of right foot     Last Assessed: 12/9/2016    Hearing loss     Herpes zoster     Last Assessed: 12/9/2015    Hypertension     Hyperthyroidism     Last Assessed: 2/25/2017    Hyponatremia     Last Assessed: 3/22/2016    Neoplasm of uncertain behavior of skin     Last Assessed: 10/17/2013    Skin lesions     Last Assessed: 6/27/2016    Syncope     Last Assessed: 9/18/2015    Thyroid disease     Type 1 diabetes mellitus without complication ([facility] [address] [facility]     Wears glasses        Past Surgical History:  Past Surgical History:   Procedure Laterality Date    APPENDECTOMY  1956    CATARACT EXTRACTION      COLONOSCOPY  09/2014    Completed - Dr Azam Flores, due in 11 years    SKIN LESION EXCISION      Ears       Family History:  Family History   Problem Relation Age of Onset    Heart attack Mother     Diabetes type II Mother     Dementia Father     Stroke Father      CVA    Breast cancer Maternal Aunt     Stomach cancer Maternal Uncle     Diabetes Family     Stroke Family      Complications       Social History:  Social History     Social History    Marital status:      Spouse name: N/A    Number of children: 2    Years of education: N/A     Occupational History    Not on file  Social History Main Topics    Smoking status: Never Smoker    Smokeless tobacco: Never Used    Alcohol use Yes      Comment: social    Drug use: No    Sexual activity: Not on file     Other Topics Concern    Not on file     Social History Narrative    Caffeine use    No preference on Voodoo beliefs           Objective     Vitals:    06/05/18 1343   BP: 128/66   Pulse: 64   Resp: 14   Temp: 97 8 °F (36 6 °C)     Wt Readings from Last 3 Encounters:   06/05/18 60 1 kg (132 lb 6 4 oz)   04/09/18 61 4 kg (135 lb 6 4 oz)   01/09/18 61 kg (134 lb 8 oz)       Physical Exam   Constitutional: She is oriented to person, place, and time  She appears well-developed and well-nourished  HENT:   Head: Normocephalic and atraumatic  Eyes: Conjunctivae are normal    Neck: Neck supple  Carotid bruit is not present  No thyromegaly present  Cardiovascular: Normal rate, regular rhythm and normal heart sounds  No murmur heard  Pulmonary/Chest: Effort normal and breath sounds normal    Abdominal: Mass:      Musculoskeletal: Normal range of motion  Neurological: She is alert and oriented to person, place, and time  No cranial nerve deficit  Psychiatric: Her behavior is normal  Her mood appears anxious  Nursing note and vitals reviewed        Pertinent Laboratory/Diagnostic Studies:  Lab Results   Component Value Date    GLUCOSE 146 (H) 12/08/2016    BUN 22 06/04/2018    CREATININE 0 87 06/04/2018    CALCIUM 9 2 06/04/2018     (L) 06/04/2018    K 4 7 06/04/2018    CO2 28 06/04/2018     06/04/2018     Lab Results   Component Value Date    ALT 26 06/04/2018    AST 12 06/04/2018    ALKPHOS 69 06/04/2018    BILITOT 0 66 06/04/2018       Lab Results   Component Value Date    WBC 5 25 06/04/2018    HGB 12 6 06/04/2018 HCT 38 0 06/04/2018    MCV 99 (H) 06/04/2018     06/04/2018       No results found for: TSH    Lab Results   Component Value Date    CHOL 163 06/04/2018     Lab Results   Component Value Date    TRIG 33 06/04/2018     Lab Results   Component Value Date    HDL 89 (H) 06/04/2018     Lab Results   Component Value Date    LDLCALC 67 06/04/2018     Lab Results   Component Value Date    HGBA1C 6 7 (H) 06/04/2018       Results for orders placed or performed in visit on 06/04/18   CBC   Result Value Ref Range    WBC 5 25 4 31 - 10 16 Thousand/uL    RBC 3 85 3 81 - 5 12 Million/uL    Hemoglobin 12 6 11 5 - 15 4 g/dL    Hematocrit 38 0 34 8 - 46 1 %    MCV 99 (H) 82 - 98 fL    MCH 32 7 26 8 - 34 3 pg    MCHC 33 2 31 4 - 37 4 g/dL    RDW 12 0 11 6 - 15 1 %    Platelets 500 096 - 661 Thousands/uL    MPV 9 7 8 9 - 12 7 fL   Comprehensive metabolic panel   Result Value Ref Range    Sodium 134 (L) 136 - 145 mmol/L    Potassium 4 7 3 5 - 5 3 mmol/L    Chloride 100 100 - 108 mmol/L    CO2 28 21 - 32 mmol/L    Anion Gap 6 4 - 13 mmol/L    BUN 22 5 - 25 mg/dL    Creatinine 0 87 0 60 - 1 30 mg/dL    Glucose, Fasting 170 (H) 65 - 99 mg/dL    Calcium 9 2 8 3 - 10 1 mg/dL    AST 12 5 - 45 U/L    ALT 26 12 - 78 U/L    Alkaline Phosphatase 69 46 - 116 U/L    Total Protein 7 4 6 4 - 8 2 g/dL    Albumin 3 8 3 5 - 5 0 g/dL    Total Bilirubin 0 66 0 20 - 1 00 mg/dL    eGFR 63 ml/min/1 73sq m   HEMOGLOBIN A1C W/ EAG ESTIMATION   Result Value Ref Range    Hemoglobin A1C 6 7 (H) 4 2 - 6 3 %     mg/dl   Lipid Panel with Direct LDL reflex   Result Value Ref Range    Cholesterol 163 50 - 200 mg/dL    Triglycerides 33 <=150 mg/dL    HDL, Direct 89 (H) 40 - 60 mg/dL    LDL Calculated 67 0 - 100 mg/dL   TSH, 3rd generation with T4 reflex   Result Value Ref Range    TSH 3RD GENERATON 1 850 0 358 - 3 740 uIU/mL       Orders Placed This Encounter   Procedures    CBC    Lipid panel    Vitamin D 25 hydroxy    TSH, 3rd generation    Lipid Panel with Direct LDL reflex    Comprehensive metabolic panel       ALLERGIES:  No Known Allergies    Current Medications     Current Outpatient Prescriptions   Medication Sig Dispense Refill    amLODIPine (NORVASC) 5 mg tablet TAKE 1 TABLET DAILY AFTER SUPPER 90 tablet 3    aspirin 81 MG tablet Take 1 tablet by mouth daily      atorvastatin (LIPITOR) 10 mg tablet TAKE 1 TABLET DAILY 90 tablet 0    B Complex-Folic Acid (SUPER B COMPLEX MAXI) TABS Take by mouth      BD INSULIN SYRINGE ULTRAFINE 31G X 15/64" 0 3 ML MISC Inject under the skin 4 (four) times a day 120 each 11    carboxymethylcellulose (REFRESH TEARS) 0 5 % SOLN Apply 1 drop to eye 4 (four) times a day      Cholecalciferol (VITAMIN D3) 2000 units capsule Take 2 capsules by mouth daily      Coenzyme Q10 (CO Q-10) 200 MG CAPS Take 1 capsule by mouth daily      dorzolamide-timolol (COSOPT) 22 3-6 8 MG/ML ophthalmic solution Apply 1 drop to eye 2 (two) times a day      DULoxetine (CYMBALTA) 30 mg delayed release capsule Take 1 capsule (30 mg total) by mouth daily 30 capsule 2    fluticasone (FLONASE) 50 mcg/act nasal spray 2 sprays into each nostril daily      gabapentin (NEURONTIN) 300 mg capsule Take 300 capsules by mouth 2 (two) times a day       glucose blood (ONE TOUCH ULTRA TEST) test strip by In Vitro route      hydrochlorothiazide (MICROZIDE) 12 5 mg capsule Take by mouth      Ibuprofen (ADVIL) 200 MG CAPS Take by mouth      insulin glargine (LANTUS) 100 units/mL subcutaneous injection Inject 12units under the skin nightly as directed 10 mL 11    insulin lispro (HUMALOG) 100 units/mL injection Inject 4 units at breakfast, 5 units at lunch and 7 units at dinnertime 10 mL 11    Insulin Syringe-Needle U-100 (B-D INS SYRINGE 0 5CC/31GX5/16) 31G X 5/16" 0 5 ML MISC by Does not apply route      irbesartan (AVAPRO) 150 mg tablet TAKE 1 TABLET DAILY 90 tablet 3    levothyroxine 50 mcg tablet Take 1 tablet by mouth daily      LORazepam (ATIVAN) 0 5 mg tablet TAKE ONE TABLET BY MOUTH THREE TIMES A DAY 90 tablet 2    Magnesium 500 MG TABS Take 1 tablet by mouth daily      travoprost (TRAVATAN Z) 0 004 % ophthalmic solution Apply to eye       No current facility-administered medications for this visit            Health Maintenance     Health Maintenance   Topic Date Due    Depression Screening PHQ-9  1935    SLP PLAN OF CARE  1935    OPHTHALMOLOGY EXAM  06/01/1947    DTaP,Tdap,and Td Vaccines (1 - Tdap) 06/01/1958    Fall Risk  06/01/2002    Urinary Incontinence Screening  06/01/2002    GLAUCOMA SCREENING 67+ YR  06/01/2004    URINE MICROALBUMIN  09/02/2017    INFLUENZA VACCINE  09/01/2018    HEMOGLOBIN A1C  12/04/2018    Diabetic Foot Exam  04/09/2019    PNEUMOCOCCAL POLYSACCHARIDE VACCINE AGE 65 AND OVER  Completed     Immunization History   Administered Date(s) Administered    Influenza 10/01/2010, 10/02/2012, 10/14/2013, 09/10/2014, 10/01/2015, 11/15/2016    Influenza Split High Dose Preservative Free IM 10/01/2015, 11/15/2016, 09/19/2017    Influenza TIV (IM) 1935    Pneumococcal Conjugate 13-Valent 10/19/2015    Pneumococcal Polysaccharide PPV23 11/01/2007    Td (adult), Unspecified 02/01/2003    Zoster 03/11/2013       Yandy Bishop MD

## 2018-06-05 NOTE — PATIENT INSTRUCTIONS
You may HOLD Lipitor on Saturdays and Sundays  Please consider skipping one gabapentin per day and observe of s/o  Fatigue are improving or not ?   Please stay  on Cymbalta 30 mg po daily    will repeat blood work in early September

## 2018-07-10 DIAGNOSIS — G62.9 NEUROPATHY: Primary | ICD-10-CM

## 2018-07-10 DIAGNOSIS — E03.9 HYPOTHYROIDISM, UNSPECIFIED TYPE: ICD-10-CM

## 2018-07-11 ENCOUNTER — TELEPHONE (OUTPATIENT)
Dept: FAMILY MEDICINE CLINIC | Facility: CLINIC | Age: 83
End: 2018-07-11

## 2018-07-11 RX ORDER — LEVOTHYROXINE SODIUM 0.05 MG/1
50 TABLET ORAL DAILY
Qty: 90 TABLET | Refills: 0 | Status: SHIPPED | OUTPATIENT
Start: 2018-07-11 | End: 2018-10-08 | Stop reason: SDUPTHER

## 2018-07-11 RX ORDER — GABAPENTIN 300 MG/1
300 CAPSULE ORAL 2 TIMES DAILY
Qty: 60 CAPSULE | Refills: 5 | Status: SHIPPED | OUTPATIENT
Start: 2018-07-11 | End: 2018-11-13 | Stop reason: SDUPTHER

## 2018-07-11 NOTE — TELEPHONE ENCOUNTER
Patient called to check the quantity of the Gabapentin  It should be 60 caps for 1 month supply because she takes 2 a day  Please call if any questions  Also states she only has 2 pills left & need to have both medications called in today if at all possible

## 2018-07-23 DIAGNOSIS — F41.1 GAD (GENERALIZED ANXIETY DISORDER): ICD-10-CM

## 2018-07-23 RX ORDER — LORAZEPAM 0.5 MG/1
TABLET ORAL
Qty: 90 TABLET | Refills: 4 | Status: SHIPPED | OUTPATIENT
Start: 2018-07-23 | End: 2018-12-28 | Stop reason: SDUPTHER

## 2018-08-21 DIAGNOSIS — Z79.4 TYPE 2 DIABETES MELLITUS WITH COMPLICATION, WITH LONG-TERM CURRENT USE OF INSULIN (HCC): ICD-10-CM

## 2018-08-21 DIAGNOSIS — E11.8 TYPE 2 DIABETES MELLITUS WITH COMPLICATION, WITH LONG-TERM CURRENT USE OF INSULIN (HCC): ICD-10-CM

## 2018-08-21 RX ORDER — INSULIN GLARGINE 100 [IU]/ML
INJECTION, SOLUTION SUBCUTANEOUS
Qty: 10 ML | Refills: 2 | Status: SHIPPED | OUTPATIENT
Start: 2018-08-21 | End: 2018-12-17 | Stop reason: SDUPTHER

## 2018-09-05 ENCOUNTER — APPOINTMENT (OUTPATIENT)
Dept: LAB | Facility: CLINIC | Age: 83
End: 2018-09-05
Payer: COMMERCIAL

## 2018-09-05 DIAGNOSIS — E78.5 HYPERLIPIDEMIA, UNSPECIFIED HYPERLIPIDEMIA TYPE: ICD-10-CM

## 2018-09-05 DIAGNOSIS — E10.40 TYPE 1 DIABETES MELLITUS WITH DIABETIC NEUROPATHY (HCC): Primary | ICD-10-CM

## 2018-09-05 DIAGNOSIS — E03.8 ADULT ONSET HYPOTHYROIDISM: ICD-10-CM

## 2018-09-05 DIAGNOSIS — E10.40 TYPE 1 DIABETES MELLITUS WITH DIABETIC NEUROPATHY (HCC): ICD-10-CM

## 2018-09-05 DIAGNOSIS — I10 ESSENTIAL HYPERTENSION: ICD-10-CM

## 2018-09-05 LAB
25(OH)D3 SERPL-MCNC: 39.7 NG/ML (ref 30–100)
ALBUMIN SERPL BCP-MCNC: 3.7 G/DL (ref 3.5–5)
ALP SERPL-CCNC: 69 U/L (ref 46–116)
ALT SERPL W P-5'-P-CCNC: 22 U/L (ref 12–78)
ANION GAP SERPL CALCULATED.3IONS-SCNC: 5 MMOL/L (ref 4–13)
AST SERPL W P-5'-P-CCNC: 15 U/L (ref 5–45)
BILIRUB SERPL-MCNC: 0.76 MG/DL (ref 0.2–1)
BUN SERPL-MCNC: 17 MG/DL (ref 5–25)
CALCIUM SERPL-MCNC: 8.7 MG/DL (ref 8.3–10.1)
CHLORIDE SERPL-SCNC: 102 MMOL/L (ref 100–108)
CHOLEST SERPL-MCNC: 184 MG/DL (ref 50–200)
CO2 SERPL-SCNC: 26 MMOL/L (ref 21–32)
CREAT SERPL-MCNC: 0.88 MG/DL (ref 0.6–1.3)
ERYTHROCYTE [DISTWIDTH] IN BLOOD BY AUTOMATED COUNT: 11.9 % (ref 11.6–15.1)
EST. AVERAGE GLUCOSE BLD GHB EST-MCNC: 123 MG/DL
GFR SERPL CREATININE-BSD FRML MDRD: 62 ML/MIN/1.73SQ M
GLUCOSE P FAST SERPL-MCNC: 157 MG/DL (ref 65–99)
HBA1C MFR BLD: 5.9 % (ref 4.2–6.3)
HCT VFR BLD AUTO: 37 % (ref 34.8–46.1)
HDLC SERPL-MCNC: 97 MG/DL (ref 40–60)
HGB BLD-MCNC: 12.1 G/DL (ref 11.5–15.4)
LDLC SERPL CALC-MCNC: 78 MG/DL (ref 0–100)
MCH RBC QN AUTO: 32.4 PG (ref 26.8–34.3)
MCHC RBC AUTO-ENTMCNC: 32.7 G/DL (ref 31.4–37.4)
MCV RBC AUTO: 99 FL (ref 82–98)
PLATELET # BLD AUTO: 252 THOUSANDS/UL (ref 149–390)
PMV BLD AUTO: 9.3 FL (ref 8.9–12.7)
POTASSIUM SERPL-SCNC: 4.7 MMOL/L (ref 3.5–5.3)
PROT SERPL-MCNC: 7.5 G/DL (ref 6.4–8.2)
RBC # BLD AUTO: 3.74 MILLION/UL (ref 3.81–5.12)
SODIUM SERPL-SCNC: 133 MMOL/L (ref 136–145)
TRIGL SERPL-MCNC: 45 MG/DL
TSH SERPL DL<=0.05 MIU/L-ACNC: 2.47 UIU/ML (ref 0.36–3.74)
WBC # BLD AUTO: 5.47 THOUSAND/UL (ref 4.31–10.16)

## 2018-09-05 PROCEDURE — 83036 HEMOGLOBIN GLYCOSYLATED A1C: CPT

## 2018-09-05 PROCEDURE — 80053 COMPREHEN METABOLIC PANEL: CPT

## 2018-09-05 PROCEDURE — 84443 ASSAY THYROID STIM HORMONE: CPT

## 2018-09-05 PROCEDURE — 85027 COMPLETE CBC AUTOMATED: CPT

## 2018-09-05 PROCEDURE — 80061 LIPID PANEL: CPT

## 2018-09-05 PROCEDURE — 36415 COLL VENOUS BLD VENIPUNCTURE: CPT

## 2018-09-05 PROCEDURE — 82306 VITAMIN D 25 HYDROXY: CPT

## 2018-09-12 RX ORDER — BECAPLERMIN 0.01 %
GEL (GRAM) TOPICAL
COMMUNITY
Start: 2018-06-19 | End: 2018-12-13

## 2018-09-13 ENCOUNTER — OFFICE VISIT (OUTPATIENT)
Dept: FAMILY MEDICINE CLINIC | Facility: CLINIC | Age: 83
End: 2018-09-13
Payer: COMMERCIAL

## 2018-09-13 VITALS
SYSTOLIC BLOOD PRESSURE: 124 MMHG | WEIGHT: 132.2 LBS | HEIGHT: 65 IN | HEART RATE: 68 BPM | BODY MASS INDEX: 22.02 KG/M2 | DIASTOLIC BLOOD PRESSURE: 60 MMHG | TEMPERATURE: 98.6 F | RESPIRATION RATE: 14 BRPM

## 2018-09-13 DIAGNOSIS — E10.40 TYPE 1 DIABETES MELLITUS WITH DIABETIC NEUROPATHY (HCC): Primary | ICD-10-CM

## 2018-09-13 DIAGNOSIS — E03.8 ADULT ONSET HYPOTHYROIDISM: ICD-10-CM

## 2018-09-13 DIAGNOSIS — E78.5 HYPERLIPIDEMIA, UNSPECIFIED HYPERLIPIDEMIA TYPE: ICD-10-CM

## 2018-09-13 DIAGNOSIS — I10 ESSENTIAL HYPERTENSION: ICD-10-CM

## 2018-09-13 DIAGNOSIS — Z23 NEED FOR INFLUENZA VACCINATION: ICD-10-CM

## 2018-09-13 DIAGNOSIS — E10.3599 PROLIFERATIVE DIABETIC RETINOPATHY ASSOCIATED WITH TYPE 1 DIABETES MELLITUS, UNSPECIFIED LATERALITY, UNSPECIFIED PROLIFERATIVE RETINOPATHY TYPE (HCC): ICD-10-CM

## 2018-09-13 DIAGNOSIS — F41.1 GENERALIZED ANXIETY DISORDER: ICD-10-CM

## 2018-09-13 PROCEDURE — 1160F RVW MEDS BY RX/DR IN RCRD: CPT | Performed by: FAMILY MEDICINE

## 2018-09-13 PROCEDURE — G0008 ADMIN INFLUENZA VIRUS VAC: HCPCS

## 2018-09-13 PROCEDURE — 90662 IIV NO PRSV INCREASED AG IM: CPT

## 2018-09-13 PROCEDURE — 3078F DIAST BP <80 MM HG: CPT | Performed by: FAMILY MEDICINE

## 2018-09-13 PROCEDURE — 3074F SYST BP LT 130 MM HG: CPT | Performed by: FAMILY MEDICINE

## 2018-09-13 PROCEDURE — 1160F RVW MEDS BY RX/DR IN RCRD: CPT

## 2018-09-13 PROCEDURE — 3008F BODY MASS INDEX DOCD: CPT | Performed by: FAMILY MEDICINE

## 2018-09-13 PROCEDURE — 3725F SCREEN DEPRESSION PERFORMED: CPT | Performed by: FAMILY MEDICINE

## 2018-09-13 PROCEDURE — 99214 OFFICE O/P EST MOD 30 MIN: CPT | Performed by: FAMILY MEDICINE

## 2018-09-13 NOTE — PROGRESS NOTES
FAMILY PRACTICE OFFICE VISIT       NAME: Reji Chapman  AGE: 82 y o  SEX: female       : 1935        MRN: [id number]        Assessment and Plan     Problem List Items Addressed This Visit     Adult onset hypothyroidism    Relevant Orders    TSH, 3rd generation    Generalized anxiety disorder    Hyperlipidemia    Relevant Orders    Lipid panel    Hypertension    Relevant Orders    Comprehensive metabolic panel    CBC and differential    Hemoglobin A1C    Lipid panel    Basic metabolic panel    Retinopathy, diabetic, proliferative (HCC)    Type 1 diabetes mellitus with diabetic neuropathy (HCC) - Primary    Relevant Orders    Comprehensive metabolic panel    CBC and differential    Hemoglobin A1C    Lipid panel    TSH, 3rd generation      Other Visit Diagnoses     Need for influenza vaccination        Relevant Orders    Flu Vaccine High Dose Split Preservative Free IM (Completed)        Patient presents for follow-up of chronic medical conditions  Type 1 diabetes  I expressed my concern about occasional episodes of hypoglycemia and low level of hemoglobin A1c  Had long discussion with patient explaining that risk of hypoglycemia significantly are higher than risk of hyperglycemia at her reach  I strongly advised her to decrease dose of Lantus from 12 to 10-11 units at bedtime  Patient will continue monitoring her blood sugars and will update me in 2 weeks  I advised her to continue with regular and frequent meals  Hypertension:  Continue Norvasc and Avapro  Recent blood work reveals mild hyponatremia  Will repeat BMP in 10-14 days  Hyperlipidemia:  Continue Lipitor  Diabetic neuropathy:  Continue Cymbalta and gabapentin  Health maintenance:  Flu vaccine administered today  Patient remains under care of Ophthalmology and podiatry  Will repeat complete blood work and schedule follow-up in 3 months  There are no Patient Instructions on file for this visit          Chief Complaint     Chief Complaint   Patient presents with    Follow-up     Patient is here for a followup   Flu Vaccine     Patient would like to recieve the influenza vaccination  History of Present Illness     Patient presents for follow-up of chronic medical conditions  Results of recent blood work discussed  TSH is normal, LDL is at goal   Hemoglobin A1c is low at 5 9  Patient remains on 12 units units of basal insulin  Dose was decreased from 13-12 over 6 months ago  She reports episodes of hypoglycemia no more than once a week  BS 40  Last night    TSH  Normal    HbA1c-  5 9    Lost 3 lbs since last OV    Humalog TID 4 IU qam , 4-5 IU at lunch and 7-8 IU  With dinner    Lantus 12 IU at bedtime     podiatry - Dr Abreu, chronic symptoms of diabetic neuropathy  Patient remains on gabapentin and Cymbalta  Medications work fairly well  Patient remains in daily pain due to neuropathy but reports overall improvement with start of medication regimen  Patient is up-to-date with diabetic eye exam       Patient uses Lipitor 10 mg once a day as directed, she remains on Synthroid for hypothyroidism and Norvasc and Avapro for hypertension  Patient denies chest pain, palpitations, shortness of breath or dizziness  Review of Systems   Review of Systems   Constitutional: Positive for fatigue (Occasional daytime fatigue, relieved by nap/rest)  HENT: Negative  Respiratory: Negative  Cardiovascular: Negative  Gastrointestinal: Negative  Genitourinary: Negative  Musculoskeletal: Positive for arthralgias  Neurological:        As outlined in HPI   Hematological: Negative  Psychiatric/Behavioral: The patient is nervous/anxious          Active Problem List     Patient Active Problem List   Diagnosis    Adult onset hypothyroidism    Generalized anxiety disorder    Hyperlipidemia    Hypertension    Retinopathy, diabetic, proliferative ([address] )    Type 1 diabetes mellitus with diabetic neuropathy [facility])       Past Medical History:  Past Medical History:   Diagnosis Date    Cataract     Last Assessed: 8/11/2015    Chest pain     Last Assessed: 8/25/2015    Diabetes mellitus ([address] )     Difficulty falling asleep at night until early morning hours     sleeping flat    Diplopia     Last Assessed: 6/9/2016    Fracture of fifth metatarsal bone of right foot     Last Assessed: 12/9/2016    Hearing loss     Herpes zoster     Last Assessed: 12/9/2015    Hypertension     Hyperthyroidism     Last Assessed: 2/25/2017    Hyponatremia     Last Assessed: 3/22/2016    Neoplasm of uncertain behavior of skin     Last Assessed: 10/17/2013    Skin lesions     Last Assessed: 6/27/2016    Syncope     Last Assessed: 9/18/2015    Thyroid disease     Type 1 diabetes mellitus without complication [address] [address]     Wears glasses        Past Surgical History:  Past Surgical History:   Procedure Laterality Date    APPENDECTOMY  1956    CATARACT EXTRACTION      COLONOSCOPY  09/2014    Completed - Dr Zahra Link, due in 5 years    SKIN LESION EXCISION      Ears       Family History:  Family History   Problem Relation Age of Onset    Heart attack Mother     Diabetes type II Mother     Dementia Father     Stroke Father         CVA    Breast cancer Maternal Aunt     Stomach cancer Maternal Uncle     Diabetes Family     Stroke Family         Complications       Social History:  Social History     Social History    Marital status:      Spouse name: N/A    Number of children: 2    Years of education: N/A     Occupational History    Not on file       Social History Main Topics    Smoking status: Never Smoker    Smokeless tobacco: Never Used    Alcohol use Yes      Comment: social    Drug use: No    Sexual activity: Not on file     Other Topics Concern    Not on file     Social History Narrative    Caffeine use    No preference on Faith beliefs         PHQ-9 Depression Screening    PHQ-9:    Frequency of the following problems over the past two weeks:       Little interest or pleasure in doing things:  0 - not at all  Feeling down, depressed, or hopeless:  0 - not at all  PHQ-2 Score:  0             Objective     Vitals:    09/13/18 1355   BP: 124/60   Pulse:    Resp:    Temp:      Wt Readings from Last 3 Encounters:   09/13/18 60 kg (132 lb 3 2 oz)   06/05/18 60 1 kg (132 lb 6 4 oz)   04/09/18 61 4 kg (135 lb 6 4 oz)       Physical Exam   Constitutional: She is oriented to person, place, and time  She appears well-developed and well-nourished  HENT:   Head: Normocephalic and atraumatic  Eyes: Conjunctivae are normal    Neck: Neck supple  Carotid bruit is not present  No thyromegaly present  Cardiovascular: Normal rate, regular rhythm and normal heart sounds  No murmur heard  Pulmonary/Chest: Effort normal and breath sounds normal  No respiratory distress  She has no wheezes  Musculoskeletal: Normal range of motion  Neurological: She is alert and oriented to person, place, and time  No cranial nerve deficit  Psychiatric: Her behavior is normal  Her mood appears anxious  Nursing note and vitals reviewed        Pertinent Laboratory/Diagnostic Studies:  Lab Results   Component Value Date    GLUCOSE 75 01/08/2016    BUN 17 09/05/2018    CREATININE 0 88 09/05/2018    CALCIUM 8 7 09/05/2018     (L) 09/05/2018    K 4 7 09/05/2018    CO2 26 09/05/2018     09/05/2018     Lab Results   Component Value Date    ALT 22 09/05/2018    AST 15 09/05/2018    ALKPHOS 69 09/05/2018    BILITOT 0 83 01/08/2016       Lab Results   Component Value Date    WBC 5 47 09/05/2018    HGB 12 1 09/05/2018    HCT 37 0 09/05/2018    MCV 99 (H) 09/05/2018     09/05/2018       No results found for: TSH    Lab Results   Component Value Date    CHOL 183 01/08/2016     Lab Results   Component Value Date    TRIG 45 09/05/2018     Lab Results   Component Value Date    HDL 97 (H) 09/05/2018     Lab Results   Component Value Date    LDLCALC 78 09/05/2018     Lab Results   Component Value Date    HGBA1C 5 9 09/05/2018       Results for orders placed or performed in visit on 09/05/18   CBC   Result Value Ref Range    WBC 5 47 4 31 - 10 16 Thousand/uL    RBC 3 74 (L) 3 81 - 5 12 Million/uL    Hemoglobin 12 1 11 5 - 15 4 g/dL    Hematocrit 37 0 34 8 - 46 1 %    MCV 99 (H) 82 - 98 fL    MCH 32 4 26 8 - 34 3 pg    MCHC 32 7 31 4 - 37 4 g/dL    RDW 11 9 11 6 - 15 1 %    Platelets 865 993 - 624 Thousands/uL    MPV 9 3 8 9 - 12 7 fL   Vitamin D 25 hydroxy   Result Value Ref Range    Vit D, 25-Hydroxy 39 7 30 0 - 100 0 ng/mL   TSH, 3rd generation   Result Value Ref Range    TSH 3RD GENERATON 2 470 0 358 - 3 740 uIU/mL   Lipid Panel with Direct LDL reflex   Result Value Ref Range    Cholesterol 184 50 - 200 mg/dL    Triglycerides 45 <=150 mg/dL    HDL, Direct 97 (H) 40 - 60 mg/dL    LDL Calculated 78 0 - 100 mg/dL   Comprehensive metabolic panel   Result Value Ref Range    Sodium 133 (L) 136 - 145 mmol/L    Potassium 4 7 3 5 - 5 3 mmol/L    Chloride 102 100 - 108 mmol/L    CO2 26 21 - 32 mmol/L    ANION GAP 5 4 - 13 mmol/L    BUN 17 5 - 25 mg/dL    Creatinine 0 88 0 60 - 1 30 mg/dL    Glucose, Fasting 157 (H) 65 - 99 mg/dL    Calcium 8 7 8 3 - 10 1 mg/dL    AST 15 5 - 45 U/L    ALT 22 12 - 78 U/L    Alkaline Phosphatase 69 46 - 116 U/L    Total Protein 7 5 6 4 - 8 2 g/dL    Albumin 3 7 3 5 - 5 0 g/dL    Total Bilirubin 0 76 0 20 - 1 00 mg/dL    eGFR 62 ml/min/1 73sq m   Hemoglobin A1C   Result Value Ref Range    Hemoglobin A1C 5 9 4 2 - 6 3 %     mg/dl       Orders Placed This Encounter   Procedures    Flu Vaccine High Dose Split Preservative Free IM    Comprehensive metabolic panel    CBC and differential    Hemoglobin A1C    Lipid panel    TSH, 3rd generation    Basic metabolic panel       ALLERGIES:  No Known Allergies    Current Medications     Current Outpatient Prescriptions   Medication Sig Dispense Refill    amLODIPine (NORVASC) 5 mg tablet TAKE 1 TABLET DAILY AFTER SUPPER 90 tablet 3    aspirin 81 MG tablet Take 1 tablet by mouth daily      atorvastatin (LIPITOR) 10 mg tablet TAKE 1 TABLET DAILY 90 tablet 0    B Complex-Folic Acid (SUPER B COMPLEX MAXI) TABS Take by mouth      BD INSULIN SYRINGE ULTRAFINE 31G X 15/64" 0 3 ML MISC Inject under the skin 4 (four) times a day 120 each 11    carboxymethylcellulose (REFRESH TEARS) 0 5 % SOLN Apply 1 drop to eye 4 (four) times a day      Cholecalciferol (VITAMIN D3) 2000 units capsule Take 2 capsules by mouth daily      Coenzyme Q10 (CO Q-10) 200 MG CAPS Take 1 capsule by mouth daily      dorzolamide-timolol (COSOPT) 22 3-6 8 MG/ML ophthalmic solution Apply 1 drop to eye 2 (two) times a day      fluticasone (FLONASE) 50 mcg/act nasal spray 2 sprays into each nostril daily      gabapentin (NEURONTIN) 300 mg capsule Take 1 capsule (300 mg total) by mouth 2 (two) times a day 60 capsule 5    glucose blood (ONE TOUCH ULTRA TEST) test strip by In Vitro route      hydrochlorothiazide (MICROZIDE) 12 5 mg capsule Take by mouth      Ibuprofen (ADVIL) 200 MG CAPS Take by mouth      insulin glargine (LANTUS) 100 units/mL subcutaneous injection Inject 12units under the skin nightly as directed 10 mL 11    insulin lispro (HUMALOG) 100 units/mL injection Inject 4 units at breakfast, 5 units at lunch and 7 units at dinnertime 10 mL 11    Insulin Syringe-Needle U-100 (B-D INS SYRINGE 0 5CC/31GX5/16) 31G X 5/16" 0 5 ML MISC by Does not apply route      irbesartan (AVAPRO) 150 mg tablet TAKE 1 TABLET DAILY 90 tablet 3    LANTUS 100 UNIT/ML subcutaneous injection INJECT 12 UNITS UNDER THE SKIN NIGHTLY AS DIRECTED (Patient taking differently: INJECT 12 OR 13 UNITS UNDER THE SKIN NIGHTLY AS DIRECTED) 10 mL 2    levothyroxine 50 mcg tablet Take 1 tablet (50 mcg total) by mouth daily 90 tablet 0    LORazepam (ATIVAN) 0 5 mg tablet TAKE ONE TABLET BY MOUTH THREE TIMES A DAY 90 tablet 4    Magnesium 500 MG TABS Take 1 tablet by mouth daily      REGRANEX 0 01 % gel       travoprost (TRAVATAN Z) 0 004 % ophthalmic solution Apply to eye      DULoxetine (CYMBALTA) 30 mg delayed release capsule TAKE ONE CAPSULE BY MOUTH EVERY DAY 30 capsule 3     No current facility-administered medications for this visit            Health Maintenance     Health Maintenance   Topic Date Due    SLP PLAN OF CARE  1935    DM Eye Exam  06/01/1947    DTaP,Tdap,and Td Vaccines (1 - Tdap) 09/12/2019 (Originally [birth year]1/1956)    HEMOGLOBIN A1C  03/05/2019    Fall Risk  04/09/2019    Urinary Incontinence Screening  04/09/2019    Diabetic Foot Exam  04/09/2019    Depression Screening PHQ  09/13/2019    INFLUENZA VACCINE  Completed    Pneumococcal PPSV23/PCV13 65+ Years / High and Highest Risk  Completed     Immunization History   Administered Date(s) Administered    Influenza 10/01/2010, 10/02/2012, 10/14/2013, 09/10/2014, 10/01/2015, 11/15/2016    Influenza Split High Dose Preservative Free IM 10/01/2015, 11/15/2016, 09/19/2017    Influenza TIV (IM) 1935    Influenza, high dose seasonal 0 5 mL 09/13/2018    Pneumococcal Conjugate 13-Valent 10/19/2015    Pneumococcal Polysaccharide PPV23 11/01/2007    Td (adult), Unspecified 02/01/2003    Zoster 03/11/2013       Geoff Mccullough MD

## 2018-09-15 DIAGNOSIS — E10.40 TYPE 1 DIABETES MELLITUS WITH DIABETIC NEUROPATHY (HCC): ICD-10-CM

## 2018-09-15 RX ORDER — DULOXETIN HYDROCHLORIDE 30 MG/1
CAPSULE, DELAYED RELEASE ORAL
Qty: 30 CAPSULE | Refills: 3 | Status: SHIPPED | OUTPATIENT
Start: 2018-09-15 | End: 2019-01-14 | Stop reason: SDUPTHER

## 2018-09-28 ENCOUNTER — APPOINTMENT (OUTPATIENT)
Dept: LAB | Facility: CLINIC | Age: 83
End: 2018-09-28
Payer: COMMERCIAL

## 2018-09-28 ENCOUNTER — TRANSCRIBE ORDERS (OUTPATIENT)
Dept: LAB | Facility: CLINIC | Age: 83
End: 2018-09-28

## 2018-09-28 ENCOUNTER — HOSPITAL ENCOUNTER (EMERGENCY)
Facility: HOSPITAL | Age: 83
Discharge: HOME | End: 2018-09-28
Attending: EMERGENCY MEDICINE | Admitting: EMERGENCY MEDICINE
Payer: COMMERCIAL

## 2018-09-28 ENCOUNTER — TELEPHONE (OUTPATIENT)
Dept: FAMILY MEDICINE CLINIC | Facility: CLINIC | Age: 83
End: 2018-09-28

## 2018-09-28 VITALS
HEART RATE: 82 BPM | SYSTOLIC BLOOD PRESSURE: 133 MMHG | BODY MASS INDEX: 22.3 KG/M2 | TEMPERATURE: 98.7 F | DIASTOLIC BLOOD PRESSURE: 74 MMHG | OXYGEN SATURATION: 100 % | WEIGHT: 134 LBS | RESPIRATION RATE: 17 BRPM

## 2018-09-28 DIAGNOSIS — E87.6 LOW BLOOD POTASSIUM: Primary | ICD-10-CM

## 2018-09-28 DIAGNOSIS — E87.1 LOW SODIUM LEVELS: ICD-10-CM

## 2018-09-28 DIAGNOSIS — E87.1 HYPONATREMIA: Primary | ICD-10-CM

## 2018-09-28 DIAGNOSIS — I10 ESSENTIAL HYPERTENSION: ICD-10-CM

## 2018-09-28 LAB
ANION GAP SERPL CALCULATED.3IONS-SCNC: 4 MMOL/L (ref 4–13)
ANION GAP SERPL CALCULATED.3IONS-SCNC: 7 MMOL/L (ref 4–13)
BASOPHILS # BLD AUTO: 0.02 THOUSANDS/ΜL (ref 0–0.1)
BASOPHILS NFR BLD AUTO: 0 % (ref 0–1)
BUN SERPL-MCNC: 21 MG/DL (ref 5–25)
BUN SERPL-MCNC: 25 MG/DL (ref 5–25)
CALCIUM SERPL-MCNC: 8.9 MG/DL (ref 8.3–10.1)
CALCIUM SERPL-MCNC: 8.9 MG/DL (ref 8.3–10.1)
CHLORIDE SERPL-SCNC: 96 MMOL/L (ref 100–108)
CHLORIDE SERPL-SCNC: 97 MMOL/L (ref 100–108)
CO2 SERPL-SCNC: 27 MMOL/L (ref 21–32)
CO2 SERPL-SCNC: 29 MMOL/L (ref 21–32)
CREAT SERPL-MCNC: 0.98 MG/DL (ref 0.6–1.3)
CREAT SERPL-MCNC: 1.1 MG/DL (ref 0.6–1.3)
EOSINOPHIL # BLD AUTO: 0.19 THOUSAND/ΜL (ref 0–0.61)
EOSINOPHIL NFR BLD AUTO: 3 % (ref 0–6)
ERYTHROCYTE [DISTWIDTH] IN BLOOD BY AUTOMATED COUNT: 11.9 % (ref 11.6–15.1)
GFR SERPL CREATININE-BSD FRML MDRD: 47 ML/MIN/1.73SQ M
GFR SERPL CREATININE-BSD FRML MDRD: 54 ML/MIN/1.73SQ M
GLUCOSE SERPL-MCNC: 147 MG/DL (ref 65–140)
GLUCOSE SERPL-MCNC: 201 MG/DL (ref 65–140)
HCT VFR BLD AUTO: 36.1 % (ref 34.8–46.1)
HGB BLD-MCNC: 12.2 G/DL (ref 11.5–15.4)
IMM GRANULOCYTES # BLD AUTO: 0.01 THOUSAND/UL (ref 0–0.2)
IMM GRANULOCYTES NFR BLD AUTO: 0 % (ref 0–2)
LYMPHOCYTES # BLD AUTO: 1.7 THOUSANDS/ΜL (ref 0.6–4.47)
LYMPHOCYTES NFR BLD AUTO: 29 % (ref 14–44)
MCH RBC QN AUTO: 32.7 PG (ref 26.8–34.3)
MCHC RBC AUTO-ENTMCNC: 33.8 G/DL (ref 31.4–37.4)
MCV RBC AUTO: 97 FL (ref 82–98)
MONOCYTES # BLD AUTO: 0.63 THOUSAND/ΜL (ref 0.17–1.22)
MONOCYTES NFR BLD AUTO: 11 % (ref 4–12)
NEUTROPHILS # BLD AUTO: 3.39 THOUSANDS/ΜL (ref 1.85–7.62)
NEUTS SEG NFR BLD AUTO: 57 % (ref 43–75)
NRBC BLD AUTO-RTO: 0 /100 WBCS
PLATELET # BLD AUTO: 252 THOUSANDS/UL (ref 149–390)
PMV BLD AUTO: 9.1 FL (ref 8.9–12.7)
POTASSIUM SERPL-SCNC: 3.9 MMOL/L (ref 3.5–5.3)
POTASSIUM SERPL-SCNC: 5.5 MMOL/L (ref 3.5–5.3)
RBC # BLD AUTO: 3.73 MILLION/UL (ref 3.81–5.12)
SODIUM SERPL-SCNC: 128 MMOL/L (ref 136–145)
SODIUM SERPL-SCNC: 132 MMOL/L (ref 136–145)
WBC # BLD AUTO: 5.94 THOUSAND/UL (ref 4.31–10.16)

## 2018-09-28 PROCEDURE — 93005 ELECTROCARDIOGRAM TRACING: CPT

## 2018-09-28 PROCEDURE — 80048 BASIC METABOLIC PNL TOTAL CA: CPT

## 2018-09-28 PROCEDURE — 85025 COMPLETE CBC W/AUTO DIFF WBC: CPT | Performed by: EMERGENCY MEDICINE

## 2018-09-28 PROCEDURE — 36415 COLL VENOUS BLD VENIPUNCTURE: CPT

## 2018-09-28 PROCEDURE — 96360 HYDRATION IV INFUSION INIT: CPT

## 2018-09-28 PROCEDURE — 99284 EMERGENCY DEPT VISIT MOD MDM: CPT

## 2018-09-28 PROCEDURE — 80048 BASIC METABOLIC PNL TOTAL CA: CPT | Performed by: EMERGENCY MEDICINE

## 2018-09-28 RX ADMIN — SODIUM CHLORIDE 500 ML: 0.9 INJECTION, SOLUTION INTRAVENOUS at 19:17

## 2018-09-28 NOTE — TELEPHONE ENCOUNTER
----- Message from Romy Vallejo MD sent at 9/28/2018  4:54 PM EDT -----  Please try to reach patient or her daughter  I just called both numbers, no answer, I did not leave messages  I just received her blood work  It reveals elevated potassium level and low sodium level of 128  This blood work needs to be repeated ASAP, preferably today, low sodium and elevated potassium can cause irregular heart rhythm or excessive muscle twitching   If patient is agreeable-I will place orders to either [facility] or [employer]    I just need her to be re-evaluated, blood work redone, thank you, please let me know

## 2018-09-28 NOTE — ED PROVIDER NOTES
History  Chief Complaint   Patient presents with    Abnormal Lab     pt had labs taken today, was told to come in for low sodium and high potassium of 5 5  pt states she has no complaints, feels well     81-year-old female with history of hypertension, diabetes, hyperlipidemia presents to the emergency department after being called regarding an abnormal lab value  Patient was found to have a sodium of 128 and a potassium of 5 5 on outpatient testing and her primary care physician told her to come to the emergency department for further evaluation  Patient is completely asymptomatic at this time she denies any palpitations no chest pain no shortness of breath no nausea or vomiting  Patient does state that she has been eating spinach and bananas every day and thinks this may be contributing to her high potassium level  Patient's remaining ROS is negative  History provided by:  Patient   used: No        Prior to Admission Medications   Prescriptions Last Dose Informant Patient Reported? Taking?    B Complex-Folic Acid (SUPER B COMPLEX MAXI) TABS   Yes No   Sig: Take by mouth   BD INSULIN SYRINGE ULTRAFINE 31G X 15/64" 0 3 ML MISC   No No   Sig: Inject under the skin 4 (four) times a day   Cholecalciferol (VITAMIN D3) 2000 units capsule   Yes No   Sig: Take 2 capsules by mouth daily   Coenzyme Q10 (CO Q-10) 200 MG CAPS   Yes No   Sig: Take 1 capsule by mouth daily   DULoxetine (CYMBALTA) 30 mg delayed release capsule   No No   Sig: TAKE ONE CAPSULE BY MOUTH EVERY DAY   Ibuprofen (ADVIL) 200 MG CAPS   Yes No   Sig: Take by mouth   Insulin Syringe-Needle U-100 (B-D INS SYRINGE 0 5CC/31GX5/16) 31G X 5/16" 0 5 ML MISC   Yes No   Sig: by Does not apply route   LANTUS 100 UNIT/ML subcutaneous injection  Self No No   Sig: INJECT 12 UNITS UNDER THE SKIN NIGHTLY AS DIRECTED   Patient taking differently: INJECT 12 OR 13 UNITS UNDER THE SKIN NIGHTLY AS DIRECTED   LORazepam (ATIVAN) 0 5 mg tablet   No No   Sig: TAKE ONE TABLET BY MOUTH THREE TIMES A DAY   Magnesium 500 MG TABS   Yes No   Sig: Take 1 tablet by mouth daily   REGRANEX 0 01 % gel   Yes No   amLODIPine (NORVASC) 5 mg tablet   No No   Sig: TAKE 1 TABLET DAILY AFTER SUPPER   aspirin 81 MG tablet   Yes No   Sig: Take 1 tablet by mouth daily   atorvastatin (LIPITOR) 10 mg tablet   No No   Sig: TAKE 1 TABLET DAILY   carboxymethylcellulose (REFRESH TEARS) 0 5 % SOLN   Yes No   Sig: Apply 1 drop to eye 4 (four) times a day   dorzolamide-timolol (COSOPT) 22 3-6 8 MG/ML ophthalmic solution   Yes No   Sig: Apply 1 drop to eye 2 (two) times a day   fluticasone (FLONASE) 50 mcg/act nasal spray   Yes No   Si sprays into each nostril daily   gabapentin (NEURONTIN) 300 mg capsule   No No   Sig: Take 1 capsule (300 mg total) by mouth 2 (two) times a day   glucose blood (ONE TOUCH ULTRA TEST) test strip   Yes No   Sig: by In Vitro route   hydrochlorothiazide (MICROZIDE) 12 5 mg capsule   Yes No   Sig: Take by mouth   insulin glargine (LANTUS) 100 units/mL subcutaneous injection   No No   Sig: Inject 12units under the skin nightly as directed   insulin lispro (HUMALOG) 100 units/mL injection   No No   Sig: Inject 4 units at breakfast, 5 units at lunch and 7 units at dinnertime   irbesartan (AVAPRO) 150 mg tablet   No No   Sig: TAKE 1 TABLET DAILY   levothyroxine 50 mcg tablet   No No   Sig: Take 1 tablet (50 mcg total) by mouth daily   travoprost (TRAVATAN Z) 0 004 % ophthalmic solution   Yes No   Sig: Apply to eye      Facility-Administered Medications: None       Past Medical History:   Diagnosis Date    Cataract     Last Assessed: 2015    Chest pain     Last Assessed: 2015    Diabetes mellitus ([facility] [address] )     Difficulty falling asleep at night until early morning hours     sleeping flat    Diplopia     Last Assessed: 2016    Fracture of fifth metatarsal bone of right foot     Last Assessed: 2016    Hearing loss     Herpes zoster     Last Assessed: 12/9/2015    Hypertension     Hyperthyroidism     Last Assessed: 2/25/2017    Hyponatremia     Last Assessed: 3/22/2016    Neoplasm of uncertain behavior of skin     Last Assessed: 10/17/2013    Skin lesions     Last Assessed: 6/27/2016    Syncope     Last Assessed: 9/18/2015    Thyroid disease     Type 1 diabetes mellitus without complication ([facility] [address] [facility]     Wears glasses        Past Surgical History:   Procedure Laterality Date    APPENDECTOMY  1956    CATARACT EXTRACTION      COLONOSCOPY  09/2014    Completed - Dr Desirae Bhatia, due in 5 years    SKIN LESION EXCISION      Ears       Family History   Problem Relation Age of Onset    Heart attack Mother     Diabetes type II Mother     Dementia Father     Stroke Father         CVA    Breast cancer Maternal Aunt     Stomach cancer Maternal Uncle     Diabetes Family     Stroke Family         Complications     I have reviewed and agree with the history as documented  Social History   Substance Use Topics    Smoking status: Never Smoker    Smokeless tobacco: Never Used    Alcohol use Yes      Comment: social        Review of Systems   Constitutional: Negative for chills and fever  HENT: Negative for sore throat  Eyes: Negative for visual disturbance  Respiratory: Negative for chest tightness, shortness of breath and wheezing  Cardiovascular: Negative for chest pain  Gastrointestinal: Negative for abdominal pain, constipation, diarrhea, nausea and vomiting  Genitourinary: Negative for dysuria and hematuria  Musculoskeletal: Negative for arthralgias and myalgias  Skin: Negative for color change  Neurological: Negative for light-headedness  Hematological: Negative for adenopathy  Psychiatric/Behavioral: Negative for agitation and behavioral problems  All other systems reviewed and are negative        Physical Exam  ED Triage Vitals   Temperature Pulse Respirations Blood Pressure SpO2   09/28/18 1819 09/28/18 1819 09/28/18 1819 09/28/18 1819 09/28/18 1819   98 7 °F (37 1 °C) 76 18 155/68 99 %      Temp Source Heart Rate Source Patient Position - Orthostatic VS BP Location FiO2 (%)   09/28/18 1819 09/28/18 1915 09/28/18 1915 09/28/18 1915 --   Tympanic Monitor Sitting Right arm       Pain Score       09/28/18 1819       No Pain           Orthostatic Vital Signs  Vitals:    09/28/18 1819 09/28/18 1915 09/28/18 2000   BP: 155/68 155/67 133/74   Pulse: 76 72 82   Patient Position - Orthostatic VS:  Sitting Sitting       Physical Exam   Constitutional: She is oriented to person, place, and time  She appears well-developed and well-nourished  No distress  HENT:   Head: Normocephalic and atraumatic  Eyes: Conjunctivae and EOM are normal  No scleral icterus  Neck: Normal range of motion  Neck supple  Cardiovascular: Normal rate, regular rhythm and normal heart sounds  No murmur heard  Pulmonary/Chest: Effort normal and breath sounds normal  No respiratory distress  Abdominal: Soft  Bowel sounds are normal  There is no tenderness  Musculoskeletal: Normal range of motion  Neurological: She is alert and oriented to person, place, and time  Skin: Skin is warm and dry  Psychiatric: She has a normal mood and affect  Her behavior is normal    Nursing note and vitals reviewed        ED Medications  Medications   sodium chloride 0 9 % bolus 500 mL (0 mL Intravenous Stopped 9/28/18 2017)       Diagnostic Studies  Results Reviewed     Procedure Component Value Units Date/Time    Basic metabolic panel [[id number]]  (Abnormal) Collected:  09/28/18 1916    Lab Status:  Final result Specimen:  Blood from Arm, Left Updated:  09/28/18 1946     Sodium 132 (L) mmol/L      Potassium 3 9 mmol/L      Chloride 96 (L) mmol/L      CO2 29 mmol/L      ANION GAP 7 mmol/L      BUN 25 mg/dL      Creatinine 1 10 mg/dL      Glucose 147 (H) mg/dL      Calcium 8 9 mg/dL      eGFR 47 ml/min/1 73sq m     Narrative:         National Kidney Disease Education Program recommendations are as follows:  GFR calculation is accurate only with a steady state creatinine  Chronic Kidney disease less than 60 ml/min/1 73 sq  meters  Kidney failure less than 15 ml/min/1 73 sq  meters      CBC and differential [[id number]]  (Abnormal) Collected:  09/28/18 1916    Lab Status:  Final result Specimen:  Blood from Arm, Left Updated:  09/28/18 1928     WBC 5 94 Thousand/uL      RBC 3 73 (L) Million/uL      Hemoglobin 12 2 g/dL      Hematocrit 36 1 %      MCV 97 fL      MCH 32 7 pg      MCHC 33 8 g/dL      RDW 11 9 %      MPV 9 1 fL      Platelets 922 Thousands/uL      nRBC 0 /100 WBCs      Neutrophils Relative 57 %      Immat GRANS % 0 %      Lymphocytes Relative 29 %      Monocytes Relative 11 %      Eosinophils Relative 3 %      Basophils Relative 0 %      Neutrophils Absolute 3 39 Thousands/µL      Immature Grans Absolute 0 01 Thousand/uL      Lymphocytes Absolute 1 70 Thousands/µL      Monocytes Absolute 0 63 Thousand/µL      Eosinophils Absolute 0 19 Thousand/µL      Basophils Absolute 0 02 Thousands/µL                  No orders to display         Procedures  ECG 12 Lead Documentation  Date/Time: 9/28/2018 8:36 PM  Performed by: Adrienne Eugene by: Karina Chance     ECG reviewed by me, the ED Provider: yes    Patient location:  ED  Previous ECG:     Previous ECG:  Unavailable    Comparison to cardiac monitor: Yes    Interpretation:     Interpretation: normal    Rate:     ECG rate assessment: normal    Rhythm:     Rhythm: sinus rhythm    Ectopy:     Ectopy: none    QRS:     QRS axis:  Normal  Conduction:     Conduction: normal    ST segments:     ST segments:  Normal  T waves:     T waves: normal            Phone Consults  ED Phone Contact    ED Course  ED Course as of Sep 28 2037   Fri Sep 28, 2018   1955 Sodium: (!) 132           Identification of Seniors at Risk      Most Recent Value   (ISAR) Identification of Seniors at Risk   Before the illness or injury that brought you to the Emergency, did you need someone to help you on a regular basis? 0 Filed at: 09/28/2018 1821   In the last 24 hours, have you needed more help than usual?  0 Filed at: 09/28/2018 1821   Have you been hospitalized for one or more nights during the past 6 months? 0 Filed at: 09/28/2018 1821   In general, do you see well?  0 Filed at: 09/28/2018 1821   In general, do you have serious problems with your memory? 0 Filed at: 09/28/2018 1821   Do you take more than three different medications every day? 1 Filed at: 09/28/2018 1821   ISAR Score  1 Filed at: 09/28/2018 1821                          [facility]  Number of Diagnoses or Management Options  Hyponatremia: new and requires workup  Diagnosis management comments: Patient presenting for abnormal laboratories, will repeat  Patient found to be mildly hyponatremic at 132,  patient was not hyperkalemic potassium was 3 9, discussed management with changing her antidepression medications with her CP patient and patient's daughter plan  EKG normal as noted above  Amount and/or Complexity of Data Reviewed  Clinical lab tests: ordered and reviewed  Tests in the medicine section of CPT®: ordered and reviewed  Review and summarize past medical records: yes  Independent visualization of images, tracings, or specimens: yes      CritCare Time    Disposition  Final diagnoses:   Hyponatremia     Time reflects when diagnosis was documented in both MDM as applicable and the Disposition within this note     Time User Action Codes Description Comment    9/28/2018  7:56 PM Anand Brandt Add [E87 1] Hyponatremia       ED Disposition     ED Disposition Condition Comment    Discharge  Chandra Fuelling discharge to home/self care      Condition at discharge: Stable        Follow-up Information     Follow up With Specialties Details Why Contact Info Additional Information    Luana Pereira MD [facility]   [address] [facility] [address]  Emergency Department Emergency Medicine   [address]  [phone]  ED, [address], [address]          Discharge Medication List as of 9/28/2018  7:56 PM      CONTINUE these medications which have NOT CHANGED    Details   amLODIPine (NORVASC) 5 mg tablet TAKE 1 TABLET DAILY AFTER SUPPER, Normal      aspirin 81 MG tablet Take 1 tablet by mouth daily, Historical Med      atorvastatin (LIPITOR) 10 mg tablet TAKE 1 TABLET DAILY, Normal      B Complex-Folic Acid (SUPER B COMPLEX MAXI) TABS Take by mouth, Starting Tue 11/15/2016, Historical Med      BD INSULIN SYRINGE ULTRAFINE 31G X 15/64" 0 3 ML MISC Inject under the skin 4 (four) times a day, Starting Mon 4/9/2018, Print      carboxymethylcellulose (REFRESH TEARS) 0 5 % SOLN Apply 1 drop to eye 4 (four) times a day, Historical Med      Cholecalciferol (VITAMIN D3) 2000 units capsule Take 2 capsules by mouth daily, Starting Tue 11/15/2016, Historical Med      Coenzyme Q10 (CO Q-10) 200 MG CAPS Take 1 capsule by mouth daily, Historical Med      dorzolamide-timolol (COSOPT) 22 3-6 8 MG/ML ophthalmic solution Apply 1 drop to eye 2 (two) times a day, Historical Med      DULoxetine (CYMBALTA) 30 mg delayed release capsule TAKE ONE CAPSULE BY MOUTH EVERY DAY, Normal      fluticasone (FLONASE) 50 mcg/act nasal spray 2 sprays into each nostril daily, Starting Wed 5/10/2017, Historical Med      gabapentin (NEURONTIN) 300 mg capsule Take 1 capsule (300 mg total) by mouth 2 (two) times a day, Starting Wed 7/11/2018, Normal      glucose blood (ONE TOUCH ULTRA TEST) test strip by In Vitro route, Starting Thu 10/1/2015, Historical Med      hydrochlorothiazide (MICROZIDE) 12 5 mg capsule Take by mouth, Starting Tue 11/29/2016, Historical Med      Ibuprofen (ADVIL) 200 MG CAPS Take by mouth, Historical Med      !! insulin glargine (LANTUS) 100 units/mL subcutaneous injection Inject 12units under the skin nightly as directed, Print      insulin lispro (HUMALOG) 100 units/mL injection Inject 4 units at breakfast, 5 units at lunch and 7 units at dinnertime, Normal      Insulin Syringe-Needle U-100 (B-D INS SYRINGE 0 5CC/31GX5/16) 31G X 5/16" 0 5 ML MISC by Does not apply route, Starting Tue 5/17/2016, Historical Med      irbesartan (AVAPRO) 150 mg tablet TAKE 1 TABLET DAILY, Normal      !! LANTUS 100 UNIT/ML subcutaneous injection INJECT 12 UNITS UNDER THE SKIN NIGHTLY AS DIRECTED, Normal      levothyroxine 50 mcg tablet Take 1 tablet (50 mcg total) by mouth daily, Starting Wed 7/11/2018, Normal      LORazepam (ATIVAN) 0 5 mg tablet TAKE ONE TABLET BY MOUTH THREE TIMES A DAY, Normal      Magnesium 500 MG TABS Take 1 tablet by mouth daily, Historical Med      REGRANEX 0 01 % gel Starting Tue 6/19/2018, Historical Med      travoprost (TRAVATAN Z) 0 004 % ophthalmic solution Apply to eye, Historical Med       !! - Potential duplicate medications found  Please discuss with provider  No discharge procedures on file  ED Provider  Attending physically available and evaluated Roseline Carney I managed the patient along with the ED Attending      Electronically Signed by         Beata Christy MD  09/28/18 2037

## 2018-09-28 NOTE — ED ATTENDING ATTESTATION
I, Dania Yo DO, saw and evaluated the patient  I have discussed the patient with the resident/non-physician practitioner and agree with the resident's/non-physician practitioner's findings, Plan of Care, and MDM as documented in the resident's/non-physician practitioner's note, except where noted  All available labs and Radiology studies were reviewed  At this point I agree with the current assessment done in the Emergency Department  I have conducted an independent evaluation of this patient a history and physical is as follows:      Critical Care Time  CritCare Time    Procedures     82 yr old fem to the ED with elevated Na and low K  Recently started on Cymbalta  No sx  Exm; vitals normal  Pln: fluids, EKG, recheck lab

## 2018-09-28 NOTE — DISCHARGE INSTRUCTIONS
Hyponatremia   WHAT YOU NEED TO KNOW:   Hyponatremia occurs when the amount of sodium (salt) in your blood is lower than normal  Sodium is an electrolyte (mineral) that helps your muscles, heart, and digestive system work properly  It helps control blood pressure and fluid balance  DISCHARGE INSTRUCTIONS:   Follow up with your healthcare provider as directed: You may need to return for more tests  Write down your questions so you remember to ask them during your visits  Nutrition:  You may need to increase your intake of sodium  Foods that are high in sodium include milk, packaged snacks such as pretzels, or processed meats (vickers, sausage, and ham)  Ask your dietitian to help you create a meal plan that is right for you  Liquids: Follow your healthcare provider's advice if you need to limit the amount of liquid you drink  Ask how much liquid to drink each day and which liquids are best for you  You may be asked to drink liquids that have water, sugar, and salt, such as juices, milk, or sports drinks  These liquids help your body hold in fluid and prevent dehydration  Contact your healthcare provider if:   · You have muscle cramps or twitching  · You feel very weak or tired  · You have nausea or are vomiting  · You have questions or concerns about your condition or care  Return to the emergency department if:   · You have a seizure  · You have an irregular heartbeat  · You have trouble breathing  · You cannot move your arms and legs  · You are confused or cannot think clearly  © 2017 [address] [employer] Information is for End User's use only and may not be sold, redistributed or otherwise used for commercial purposes  All illustrations and images included in CareNotes® are the copyrighted property of [employer]  or [employer]  The above information is an  only  It is not intended as medical advice for individual conditions or treatments   Talk to your doctor, nurse or pharmacist before following any medical regimen to see if it is safe and effective for you

## 2018-09-29 LAB
ATRIAL RATE: 71 BPM
P AXIS: 76 DEGREES
PR INTERVAL: 164 MS
QRS AXIS: 62 DEGREES
QRSD INTERVAL: 74 MS
QT INTERVAL: 374 MS
QTC INTERVAL: 406 MS
T WAVE AXIS: 58 DEGREES
VENTRICULAR RATE: 71 BPM

## 2018-09-29 PROCEDURE — 93010 ELECTROCARDIOGRAM REPORT: CPT | Performed by: PHYSICIAN ASSISTANT

## 2018-10-04 ENCOUNTER — VBI (OUTPATIENT)
Dept: ADMINISTRATIVE | Facility: OTHER | Age: 83
End: 2018-10-04

## 2018-10-05 NOTE — TELEPHONE ENCOUNTER
[facility]    ED Visit Information     Ed visit date: 9/28/18  Diagnosis Description: Hyponatremia  In Network? Yes [facility]  Discharge status: Home  Discharged with meds ? No  Number of ED visits to date: 1  ED Severity:3     Outreach Information    Outreach successful: Yes 2  Date letter mailed:0  Date Finalized:10/5/18    Care Coordination    Follow up appointment with pcp: no declined  Transportation issues ? No    Value [employer] type:  3 Day Outreach  Emergent necessity warranted by diagnosis:  Yes  [facility] PCP:  Yes  Transportation:  Self Transport  Called PCP first?:  Yes  Told to go to ED by PCP?:  Yes  Same-Day or Next Day Appointment Offered?:  No  Would have used same-day or next-day if offered?:  No  Feels able to call PCP for urgent problems ?:  Yes  Understands what emergencies can be handled by PCP ?:  Yes  Ever any problems getting appointment with PCP for minor emergency/urgency problems?:  No  Practice Contacted Patient ?:  No  Pt had ED follow up with pcp/staff ?:  No    Seen for follow-up out of network ?:  No  Reason Patient went to ED instead of Urgent Care or PCP?:  PCP instructions  Urgent care Education?:  Yes  Spoke with Nancy Warner today and she stated her PCP sent her to the ED  PCP office called her about her most recent blood work that was done, and was abnormal  PCP office instructed her to go to ED and  have labs repeated,and tests done  Pt stated everything came back normal, "somewhere there  was a mistake"   She is fine and declined f/u at this time  Pt states she is aware of [facility] in her area

## 2018-10-08 DIAGNOSIS — E03.9 HYPOTHYROIDISM, UNSPECIFIED TYPE: ICD-10-CM

## 2018-10-08 RX ORDER — LEVOTHYROXINE SODIUM 0.05 MG/1
TABLET ORAL
Qty: 90 TABLET | Refills: 1 | Status: SHIPPED | OUTPATIENT
Start: 2018-10-08 | End: 2019-04-05 | Stop reason: SDUPTHER

## 2018-10-15 ENCOUNTER — HOSPITAL ENCOUNTER (OUTPATIENT)
Dept: MAMMOGRAPHY | Facility: CLINIC | Age: 83
Discharge: HOME | End: 2018-10-15
Payer: COMMERCIAL

## 2018-10-15 DIAGNOSIS — Z12.31 ENCOUNTER FOR SCREENING MAMMOGRAM FOR MALIGNANT NEOPLASM OF BREAST: ICD-10-CM

## 2018-10-15 PROCEDURE — 77067 SCR MAMMO BI INCL CAD: CPT

## 2018-10-15 PROCEDURE — 77063 BREAST TOMOSYNTHESIS BI: CPT

## 2018-10-17 ENCOUNTER — TELEPHONE (OUTPATIENT)
Dept: FAMILY MEDICINE CLINIC | Facility: CLINIC | Age: 83
End: 2018-10-17

## 2018-10-17 DIAGNOSIS — E10.40 TYPE 1 DIABETES MELLITUS WITH DIABETIC NEUROPATHY (HCC): Primary | ICD-10-CM

## 2018-10-17 NOTE — TELEPHONE ENCOUNTER
Patient called stating that she needs One Touch ultra Test strips   11 boxes and She tests 6 times a day sent to [facility]    Please fax script to [phone]

## 2018-11-13 DIAGNOSIS — G62.9 NEUROPATHY: ICD-10-CM

## 2018-11-13 RX ORDER — GABAPENTIN 300 MG/1
300 CAPSULE ORAL 2 TIMES DAILY
Qty: 60 CAPSULE | Refills: 0 | Status: SHIPPED | OUTPATIENT
Start: 2018-11-13 | End: 2018-12-13 | Stop reason: SDUPTHER

## 2018-12-06 ENCOUNTER — APPOINTMENT (OUTPATIENT)
Dept: LAB | Facility: CLINIC | Age: 83
End: 2018-12-06
Payer: COMMERCIAL

## 2018-12-06 ENCOUNTER — TRANSCRIBE ORDERS (OUTPATIENT)
Dept: LAB | Facility: CLINIC | Age: 83
End: 2018-12-06

## 2018-12-06 DIAGNOSIS — E10.40 TYPE 1 DIABETES MELLITUS WITH DIABETIC NEUROPATHY (HCC): ICD-10-CM

## 2018-12-06 DIAGNOSIS — I10 ESSENTIAL HYPERTENSION: ICD-10-CM

## 2018-12-06 DIAGNOSIS — E03.8 ADULT ONSET HYPOTHYROIDISM: ICD-10-CM

## 2018-12-06 DIAGNOSIS — E78.5 HYPERLIPIDEMIA, UNSPECIFIED HYPERLIPIDEMIA TYPE: ICD-10-CM

## 2018-12-06 LAB
ALBUMIN SERPL BCP-MCNC: 3.7 G/DL (ref 3.5–5)
ALP SERPL-CCNC: 79 U/L (ref 46–116)
ALT SERPL W P-5'-P-CCNC: 31 U/L (ref 12–78)
ANION GAP SERPL CALCULATED.3IONS-SCNC: 9 MMOL/L (ref 4–13)
AST SERPL W P-5'-P-CCNC: 14 U/L (ref 5–45)
BASOPHILS # BLD AUTO: 0.02 THOUSANDS/ΜL (ref 0–0.1)
BASOPHILS NFR BLD AUTO: 0 % (ref 0–1)
BILIRUB SERPL-MCNC: 0.8 MG/DL (ref 0.2–1)
BUN SERPL-MCNC: 21 MG/DL (ref 5–25)
CALCIUM SERPL-MCNC: 9.1 MG/DL (ref 8.3–10.1)
CHLORIDE SERPL-SCNC: 101 MMOL/L (ref 100–108)
CHOLEST SERPL-MCNC: 176 MG/DL (ref 50–200)
CO2 SERPL-SCNC: 26 MMOL/L (ref 21–32)
CREAT SERPL-MCNC: 0.91 MG/DL (ref 0.6–1.3)
EOSINOPHIL # BLD AUTO: 0.17 THOUSAND/ΜL (ref 0–0.61)
EOSINOPHIL NFR BLD AUTO: 3 % (ref 0–6)
ERYTHROCYTE [DISTWIDTH] IN BLOOD BY AUTOMATED COUNT: 11.9 % (ref 11.6–15.1)
EST. AVERAGE GLUCOSE BLD GHB EST-MCNC: 151 MG/DL
GFR SERPL CREATININE-BSD FRML MDRD: 59 ML/MIN/1.73SQ M
GLUCOSE P FAST SERPL-MCNC: 132 MG/DL (ref 65–99)
HBA1C MFR BLD: 6.9 % (ref 4.2–6.3)
HCT VFR BLD AUTO: 37.4 % (ref 34.8–46.1)
HDLC SERPL-MCNC: 93 MG/DL (ref 40–60)
HGB BLD-MCNC: 12.5 G/DL (ref 11.5–15.4)
IMM GRANULOCYTES # BLD AUTO: 0.03 THOUSAND/UL (ref 0–0.2)
IMM GRANULOCYTES NFR BLD AUTO: 1 % (ref 0–2)
LDLC SERPL CALC-MCNC: 76 MG/DL (ref 0–100)
LYMPHOCYTES # BLD AUTO: 1.56 THOUSANDS/ΜL (ref 0.6–4.47)
LYMPHOCYTES NFR BLD AUTO: 26 % (ref 14–44)
MCH RBC QN AUTO: 33.1 PG (ref 26.8–34.3)
MCHC RBC AUTO-ENTMCNC: 33.4 G/DL (ref 31.4–37.4)
MCV RBC AUTO: 99 FL (ref 82–98)
MONOCYTES # BLD AUTO: 0.58 THOUSAND/ΜL (ref 0.17–1.22)
MONOCYTES NFR BLD AUTO: 10 % (ref 4–12)
NEUTROPHILS # BLD AUTO: 3.63 THOUSANDS/ΜL (ref 1.85–7.62)
NEUTS SEG NFR BLD AUTO: 60 % (ref 43–75)
NONHDLC SERPL-MCNC: 83 MG/DL
NRBC BLD AUTO-RTO: 0 /100 WBCS
PLATELET # BLD AUTO: 253 THOUSANDS/UL (ref 149–390)
PMV BLD AUTO: 9.6 FL (ref 8.9–12.7)
POTASSIUM SERPL-SCNC: 4.9 MMOL/L (ref 3.5–5.3)
PROT SERPL-MCNC: 7.4 G/DL (ref 6.4–8.2)
RBC # BLD AUTO: 3.78 MILLION/UL (ref 3.81–5.12)
SODIUM SERPL-SCNC: 136 MMOL/L (ref 136–145)
TRIGL SERPL-MCNC: 36 MG/DL
TSH SERPL DL<=0.05 MIU/L-ACNC: 2.99 UIU/ML (ref 0.36–3.74)
WBC # BLD AUTO: 5.99 THOUSAND/UL (ref 4.31–10.16)

## 2018-12-06 PROCEDURE — 83036 HEMOGLOBIN GLYCOSYLATED A1C: CPT

## 2018-12-06 PROCEDURE — 84443 ASSAY THYROID STIM HORMONE: CPT

## 2018-12-06 PROCEDURE — 80053 COMPREHEN METABOLIC PANEL: CPT

## 2018-12-06 PROCEDURE — 36415 COLL VENOUS BLD VENIPUNCTURE: CPT

## 2018-12-06 PROCEDURE — 80061 LIPID PANEL: CPT

## 2018-12-06 PROCEDURE — 85025 COMPLETE CBC W/AUTO DIFF WBC: CPT

## 2018-12-13 ENCOUNTER — OFFICE VISIT (OUTPATIENT)
Dept: FAMILY MEDICINE CLINIC | Facility: CLINIC | Age: 83
End: 2018-12-13
Payer: COMMERCIAL

## 2018-12-13 VITALS
DIASTOLIC BLOOD PRESSURE: 58 MMHG | HEIGHT: 65 IN | RESPIRATION RATE: 16 BRPM | SYSTOLIC BLOOD PRESSURE: 128 MMHG | WEIGHT: 133 LBS | HEART RATE: 80 BPM | BODY MASS INDEX: 22.16 KG/M2 | TEMPERATURE: 98.1 F

## 2018-12-13 DIAGNOSIS — F41.1 GENERALIZED ANXIETY DISORDER: ICD-10-CM

## 2018-12-13 DIAGNOSIS — E10.40 TYPE 1 DIABETES MELLITUS WITH DIABETIC NEUROPATHY (HCC): ICD-10-CM

## 2018-12-13 DIAGNOSIS — E10.3599 PROLIFERATIVE DIABETIC RETINOPATHY ASSOCIATED WITH TYPE 1 DIABETES MELLITUS, UNSPECIFIED LATERALITY, UNSPECIFIED PROLIFERATIVE RETINOPATHY TYPE (HCC): ICD-10-CM

## 2018-12-13 DIAGNOSIS — I10 ESSENTIAL HYPERTENSION: ICD-10-CM

## 2018-12-13 DIAGNOSIS — E03.8 ADULT ONSET HYPOTHYROIDISM: ICD-10-CM

## 2018-12-13 DIAGNOSIS — G62.9 NEUROPATHY: ICD-10-CM

## 2018-12-13 DIAGNOSIS — E78.5 HYPERLIPIDEMIA, UNSPECIFIED HYPERLIPIDEMIA TYPE: Primary | ICD-10-CM

## 2018-12-13 PROCEDURE — 1036F TOBACCO NON-USER: CPT | Performed by: FAMILY MEDICINE

## 2018-12-13 PROCEDURE — 4040F PNEUMOC VAC/ADMIN/RCVD: CPT | Performed by: FAMILY MEDICINE

## 2018-12-13 PROCEDURE — 3074F SYST BP LT 130 MM HG: CPT | Performed by: FAMILY MEDICINE

## 2018-12-13 PROCEDURE — 3078F DIAST BP <80 MM HG: CPT | Performed by: FAMILY MEDICINE

## 2018-12-13 PROCEDURE — 1160F RVW MEDS BY RX/DR IN RCRD: CPT | Performed by: FAMILY MEDICINE

## 2018-12-13 PROCEDURE — 3008F BODY MASS INDEX DOCD: CPT | Performed by: FAMILY MEDICINE

## 2018-12-13 PROCEDURE — 99214 OFFICE O/P EST MOD 30 MIN: CPT | Performed by: FAMILY MEDICINE

## 2018-12-13 RX ORDER — GABAPENTIN 300 MG/1
300 CAPSULE ORAL 3 TIMES DAILY
Qty: 90 CAPSULE | Refills: 11 | Status: SHIPPED | OUTPATIENT
Start: 2018-12-13 | End: 2019-10-18 | Stop reason: HOSPADM

## 2018-12-13 NOTE — PROGRESS NOTES
FAMILY PRACTICE OFFICE VISIT       NAME: Berkeley Sandifer  AGE: 82 y o  SEX: female       : 1935        MRN: [id number]        Assessment and Plan     Problem List Items Addressed This Visit     Adult onset hypothyroidism    Generalized anxiety disorder    Hyperlipidemia - Primary    Relevant Orders    CBC    Comprehensive metabolic panel    Hemoglobin A1C    Lipid panel    TSH, 3rd generation    Vitamin D 25 hydroxy    Hypertension    Relevant Orders    CBC    Comprehensive metabolic panel    Hemoglobin A1C    Lipid panel    TSH, 3rd generation    Vitamin D 25 hydroxy    Retinopathy, diabetic, proliferative (HCC)    Type 1 diabetes mellitus with diabetic neuropathy (HCC)    Relevant Orders    CBC    Comprehensive metabolic panel    Hemoglobin A1C    Lipid panel    TSH, 3rd generation    Vitamin D 25 hydroxy      Other Visit Diagnoses     Neuropathy        Relevant Medications    gabapentin (NEURONTIN) 300 mg capsule      Type 1 diabetes  Continue regimen of Lantus and regular insulin  Patient's hemoglobin A1c went up and is currently 6 9  I advised patient that I would prefer to keep her hemoglobin A1c in that range to avoid any hypoglycemia  Patient is maintaining tight blood sugar control with 11 units of Lantus and t i d  Injections of regular insulin  Hypothyroidism:  Synthroid 50 mcg daily  Recent TSH is normal   Hypertension:  Continue Avapro, Norvasc and HCTZ 12 5 mg once a day as needed  Hyperlipidemia:  Excellent lipid panel  Patient uses Lipitor to 5 days a week  Generalized anxiety disorder:  She remains on lorazepam as needed  Persistent symptoms of painful diabetic neuropathy  She has been using ibuprofen 400 mg once a daily with excellent control of her symptoms  Patient is tolerating Cymbalta 30 mg once a day well, she uses medication at bedtime  She Has been experiencing symptoms of daytime somnolence, possibly side effect of gabapentin    I advised her to consider decreasing dose of gabapentin to 300 mg twice a day, alternatively she may try 300 mg in the morning and 600 mg at night  Patient remains under care of Ophthalmology for treatment of retinopathy and glaucoma  Patient remains under ongoing care of Podiatry, longstanding symptoms of diabetic neuropathy  Patient is up-to-date with pneumococcal and influenza vaccinations  Blood work and follow-up in 3 months  There are no Patient Instructions on file for this visit  [employer] software was used to dictate this note  It may contain errors with dictating incorrect words/spelling  Please contact provider directly with any questions  Chief Complaint     Chief Complaint   Patient presents with    Follow-up     Pt is here for 3 mos f/u test results       History of Present Illness       Patient presents for follow-up  Results of recent blood work discussed  Hemoglobin A1c has improved currently at 6 9  Patient is using basal insulin at 11 units at night, less frequent hypoglycemic episodes  Patient uses regular insulin with meals as directed  She remains on medications for type 1 diabetes, hypothyroidism, hyperlipidemia, hypertension  She denies chest pain, palpitations, shortness of breath or dizziness  Patient remains under care of Ophthalmology and podiatry  Generalized anxiety disorder  Chronic pain  Patient remains on Cymbalta 30 mg once a day and lorazepam as needed  She tolerates current medication regimen well, denies symptoms of daytime somnolence   Dr Vincent - follows glaucoma   DR Torres-follows macular degeneration    [address] [place] [address], regular visits on a monthly basis  Chronic symptoms of painful peripheral neuropathy due to diabetes  Review of Systems   Review of Systems   Constitutional: Negative  HENT: Negative  Eyes: Negative  Respiratory: Negative  Cardiovascular: Negative  Gastrointestinal: Negative  Endocrine: Negative  Genitourinary: Negative  Musculoskeletal: Positive for arthralgias  Skin: Negative  Allergic/Immunologic: Negative  Neurological:        Chronic symptoms of painful peripheral neuropathy  Hematological: Negative  Psychiatric/Behavioral: The patient is nervous/anxious          Active Problem List     Patient Active Problem List   Diagnosis    Adult onset hypothyroidism    Generalized anxiety disorder    Hyperlipidemia    Hypertension    [facility]inopathy, diabetic, proliferative [facility]    Type 1 diabetes mellitus with diabetic neuropathy ([facility] )       Past Medical History:  Past Medical History:   Diagnosis Date    Cataract     Last Assessed: 8/11/2015    Chest pain     Last Assessed: 8/25/2015    Diabetes mellitus ([facility]     Difficulty falling asleep at night until early morning hours     sleeping flat    Diplopia     Last Assessed: 6/9/2016    Fracture of fifth metatarsal bone of right foot     Last Assessed: 12/9/2016    Hearing loss     Herpes zoster     Last Assessed: 12/9/2015    Hypertension     Hyperthyroidism     Last Assessed: 2/25/2017    Hyponatremia     Last Assessed: 3/22/2016    Neoplasm of uncertain behavior of skin     Last Assessed: 10/17/2013    Skin lesions     Last Assessed: 6/27/2016    Syncope     Last Assessed: 9/18/2015    Thyroid disease     [facility]     Wears glasses        Past Surgical History:  Past Surgical History:   Procedure Laterality Date    APPENDECTOMY  1956    CATARACT EXTRACTION      COLONOSCOPY  09/2014    Completed - Dr Nallely Cristobal, due in 11 years    SKIN LESION EXCISION      Ears       Family History:  Family History   Problem Relation Age of Onset    Heart attack Mother     Diabetes type II Mother     Dementia Father     Stroke Father         CVA    Breast cancer Maternal Aunt     Stomach cancer Maternal Uncle     Diabetes Family     Stroke Family         Complications       Social History:  Social History Social History    Marital status:      Spouse name: N/A    Number of children: 2    Years of education: N/A     Occupational History    Not on file  Social History Main Topics    Smoking status: Never Smoker    Smokeless tobacco: Never Used    Alcohol use Yes      Comment: social    Drug use: No    Sexual activity: Not on file     Other Topics Concern    Not on file     Social History Narrative    Caffeine use    No preference on Confucianist beliefs           Objective     Vitals:    12/13/18 1304   BP: 128/58   Pulse: 80   Resp: 16   Temp: 98 1 °F (36 7 °C)   TempSrc: Tympanic   Weight: 60 3 kg (133 lb)   Height: 5' 5" (1 651 m)     Wt Readings from Last 3 Encounters:   12/13/18 60 3 kg (133 lb)   09/28/18 60 8 kg (134 lb)   09/13/18 60 kg (132 lb 3 2 oz)       Physical Exam   Constitutional: She is oriented to person, place, and time  She appears well-developed and well-nourished  HENT:   Head: Normocephalic and atraumatic  Eyes: Conjunctivae are normal    Neck: Neck supple  Carotid bruit is not present  No thyromegaly present  Cardiovascular: Normal rate, regular rhythm and normal heart sounds  No murmur heard  Pulmonary/Chest: Effort normal and breath sounds normal  No respiratory distress  She has no wheezes  Abdominal: She exhibits no abdominal bruit  Musculoskeletal: Normal range of motion  She exhibits no edema  Neurological: She is alert and oriented to person, place, and time  No cranial nerve deficit  Coordination normal    Psychiatric: She has a normal mood and affect  Her behavior is normal    Nursing note and vitals reviewed        Pertinent Laboratory/Diagnostic Studies:  Lab Results   Component Value Date    GLUCOSE 75 01/08/2016    BUN 21 12/06/2018    CREATININE 0 91 12/06/2018    CALCIUM 9 1 12/06/2018     (L) 01/08/2016    K 4 9 12/06/2018    CO2 26 12/06/2018     12/06/2018     Lab Results   Component Value Date    ALT 31 12/06/2018    AST 14 12/06/2018    ALKPHOS 79 12/06/2018    BILITOT 0 83 01/08/2016       Lab Results   Component Value Date    WBC 5 99 12/06/2018    HGB 12 5 12/06/2018    HCT 37 4 12/06/2018    MCV 99 (H) 12/06/2018     12/06/2018       No results found for: TSH    Lab Results   Component Value Date    CHOL 183 01/08/2016     Lab Results   Component Value Date    TRIG 36 12/06/2018     Lab Results   Component Value Date    HDL 93 (H) 12/06/2018     Lab Results   Component Value Date    LDLCALC 76 12/06/2018     Lab Results   Component Value Date    HGBA1C 6 9 (H) 12/06/2018       Results for orders placed or performed in visit on 12/06/18   Comprehensive metabolic panel   Result Value Ref Range    Sodium 136 136 - 145 mmol/L    Potassium 4 9 3 5 - 5 3 mmol/L    Chloride 101 100 - 108 mmol/L    CO2 26 21 - 32 mmol/L    ANION GAP 9 4 - 13 mmol/L    BUN 21 5 - 25 mg/dL    Creatinine 0 91 0 60 - 1 30 mg/dL    Glucose, Fasting 132 (H) 65 - 99 mg/dL    Calcium 9 1 8 3 - 10 1 mg/dL    AST 14 5 - 45 U/L    ALT 31 12 - 78 U/L    Alkaline Phosphatase 79 46 - 116 U/L    Total Protein 7 4 6 4 - 8 2 g/dL    Albumin 3 7 3 5 - 5 0 g/dL    Total Bilirubin 0 80 0 20 - 1 00 mg/dL    eGFR 59 ml/min/1 73sq m   CBC and differential   Result Value Ref Range    WBC 5 99 4 31 - 10 16 Thousand/uL    RBC 3 78 (L) 3 81 - 5 12 Million/uL    Hemoglobin 12 5 11 5 - 15 4 g/dL    Hematocrit 37 4 34 8 - 46 1 %    MCV 99 (H) 82 - 98 fL    MCH 33 1 26 8 - 34 3 pg    MCHC 33 4 31 4 - 37 4 g/dL    RDW 11 9 11 6 - 15 1 %    MPV 9 6 8 9 - 12 7 fL    Platelets 409 764 - 056 Thousands/uL    nRBC 0 /100 WBCs    Neutrophils Relative 60 43 - 75 %    Immat GRANS % 1 0 - 2 %    Lymphocytes Relative 26 14 - 44 %    Monocytes Relative 10 4 - 12 %    Eosinophils Relative 3 0 - 6 %    Basophils Relative 0 0 - 1 %    Neutrophils Absolute 3 63 1 85 - 7 62 Thousands/µL    Immature Grans Absolute 0 03 0 00 - 0 20 Thousand/uL    Lymphocytes Absolute 1 56 0 60 - 4 47 Thousands/µL Monocytes Absolute 0 58 0 17 - 1 22 Thousand/µL    Eosinophils Absolute 0 17 0 00 - 0 61 Thousand/µL    Basophils Absolute 0 02 0 00 - 0 10 Thousands/µL   Hemoglobin A1C   Result Value Ref Range    Hemoglobin A1C 6 9 (H) 4 2 - 6 3 %     mg/dl   Lipid panel   Result Value Ref Range    Cholesterol 176 50 - 200 mg/dL    Triglycerides 36 <=150 mg/dL    HDL, Direct 93 (H) 40 - 60 mg/dL    LDL Calculated 76 0 - 100 mg/dL    Non-HDL-Chol (CHOL-HDL) 83 mg/dl   TSH, 3rd generation   Result Value Ref Range    TSH 3RD GENERATON 2 994 0 358 - 3 740 uIU/mL       Orders Placed This Encounter   Procedures    CBC    Comprehensive metabolic panel    Hemoglobin A1C    Lipid panel    TSH, 3rd generation    Vitamin D 25 hydroxy       ALLERGIES:  No Known Allergies    Current Medications     Current Outpatient Prescriptions   Medication Sig Dispense Refill    amLODIPine (NORVASC) 5 mg tablet TAKE 1 TABLET DAILY AFTER SUPPER 90 tablet 3    aspirin 81 MG tablet Take 1 tablet by mouth daily      atorvastatin (LIPITOR) 10 mg tablet TAKE 1 TABLET DAILY 90 tablet 0    B Complex-Folic Acid (SUPER B COMPLEX MAXI) TABS Take by mouth      BD INSULIN SYRINGE ULTRAFINE 31G X 15/64" 0 3 ML MISC Inject under the skin 4 (four) times a day 120 each 11    carboxymethylcellulose (REFRESH TEARS) 0 5 % SOLN Apply 1 drop to eye 4 (four) times a day      Cholecalciferol (VITAMIN D3) 2000 units capsule Take 2 capsules by mouth daily      Coenzyme Q10 (CO Q-10) 200 MG CAPS Take 1 capsule by mouth daily      dorzolamide-timolol (COSOPT) 22 3-6 8 MG/ML ophthalmic solution Apply 1 drop to eye 2 (two) times a day      DULoxetine (CYMBALTA) 30 mg delayed release capsule TAKE ONE CAPSULE BY MOUTH EVERY DAY 30 capsule 3    fluticasone (FLONASE) 50 mcg/act nasal spray 2 sprays into each nostril daily      gabapentin (NEURONTIN) 300 mg capsule Take 1 capsule (300 mg total) by mouth 3 (three) times a day 90 capsule 11    glucose blood (ONE TOUCH ULTRA TEST) test strip 1 each by Other route 6 (six) times a day 100 each 11    hydrochlorothiazide (MICROZIDE) 12 5 mg capsule Take by mouth        Ibuprofen (ADVIL) 200 MG CAPS Take by mouth      insulin glargine (LANTUS) 100 units/mL subcutaneous injection Inject 12units under the skin nightly as directed 10 mL 11    insulin lispro (HUMALOG) 100 units/mL injection Inject 4 units at breakfast, 5 units at lunch and 7 units at dinnertime 10 mL 11    Insulin Syringe-Needle U-100 (B-D INS SYRINGE 0 5CC/31GX5/16) 31G X 5/16" 0 5 ML MISC by Does not apply route      irbesartan (AVAPRO) 150 mg tablet TAKE 1 TABLET DAILY 90 tablet 3    LANTUS 100 UNIT/ML subcutaneous injection INJECT 12 UNITS UNDER THE SKIN NIGHTLY AS DIRECTED (Patient taking differently: INJECT 12 OR 13 UNITS UNDER THE SKIN NIGHTLY AS DIRECTED) 10 mL 2    levothyroxine 50 mcg tablet TAKE ONE TABLET BY MOUTH EVERY DAY 90 tablet 1    LORazepam (ATIVAN) 0 5 mg tablet TAKE ONE TABLET BY MOUTH THREE TIMES A DAY 90 tablet 4    Magnesium 500 MG TABS Take 1 tablet by mouth daily      travoprost (TRAVATAN Z) 0 004 % ophthalmic solution Apply to eye       No current facility-administered medications for this visit            Health Maintenance     Health Maintenance   Topic Date Due    Medicare Annual Wellness Visit (AWV)  1935    SLP PLAN OF CARE  1935    DTaP,Tdap,and Td Vaccines (1 - Tdap) 09/12/2019 (Originally 2/2/2003)    Fall Risk  04/09/2019    Urinary Incontinence Screening  04/09/2019    Diabetic Foot Exam  04/09/2019    DM Eye Exam  04/17/2019    HEMOGLOBIN A1C  06/06/2019    Depression Screening PHQ  09/13/2019    INFLUENZA VACCINE  Completed    Pneumococcal PPSV23/PCV13 65+ Years / High and Highest Risk  Completed     Immunization History   Administered Date(s) Administered    Influenza 10/01/2010, 10/02/2012, 10/14/2013, 09/10/2014, 10/01/2015, 11/15/2016    Influenza Split High Dose Preservative Free IM 10/01/2015, 11/15/2016, 09/19/2017    Influenza TIV (IM) 1935    Influenza, high dose seasonal 0 5 mL 09/13/2018    Pneumococcal Conjugate 13-Valent 10/19/2015    Pneumococcal Polysaccharide PPV23 11/01/2007    Td (adult), Unspecified 02/01/2003    Zoster 03/11/2013       Su Farris MD

## 2018-12-17 DIAGNOSIS — E11.8 TYPE 2 DIABETES MELLITUS WITH COMPLICATION, WITH LONG-TERM CURRENT USE OF INSULIN (HCC): ICD-10-CM

## 2018-12-17 DIAGNOSIS — Z79.4 TYPE 2 DIABETES MELLITUS WITH COMPLICATION, WITH LONG-TERM CURRENT USE OF INSULIN (HCC): ICD-10-CM

## 2018-12-17 RX ORDER — INSULIN GLARGINE 100 [IU]/ML
INJECTION, SOLUTION SUBCUTANEOUS
Qty: 10 ML | Refills: 2 | Status: SHIPPED | OUTPATIENT
Start: 2018-12-17 | End: 2019-04-16 | Stop reason: SDUPTHER

## 2018-12-27 DIAGNOSIS — E78.5 HYPERLIPIDEMIA ASSOCIATED WITH TYPE 2 DIABETES MELLITUS (HCC): ICD-10-CM

## 2018-12-27 DIAGNOSIS — E11.69 HYPERLIPIDEMIA ASSOCIATED WITH TYPE 2 DIABETES MELLITUS (HCC): ICD-10-CM

## 2018-12-27 RX ORDER — ATORVASTATIN CALCIUM 10 MG/1
TABLET, FILM COATED ORAL
Qty: 90 TABLET | Refills: 0 | Status: SHIPPED | OUTPATIENT
Start: 2018-12-27 | End: 2019-03-21 | Stop reason: ALTCHOICE

## 2018-12-28 DIAGNOSIS — F41.1 GAD (GENERALIZED ANXIETY DISORDER): ICD-10-CM

## 2018-12-28 RX ORDER — LORAZEPAM 0.5 MG/1
0.5 TABLET ORAL 3 TIMES DAILY
Qty: 90 TABLET | Refills: 0 | Status: SHIPPED | OUTPATIENT
Start: 2018-12-28 | End: 2019-02-08 | Stop reason: SDUPTHER

## 2019-01-14 DIAGNOSIS — E10.40 TYPE 1 DIABETES MELLITUS WITH DIABETIC NEUROPATHY (HCC): ICD-10-CM

## 2019-01-14 RX ORDER — DULOXETIN HYDROCHLORIDE 30 MG/1
CAPSULE, DELAYED RELEASE ORAL
Qty: 30 CAPSULE | Refills: 5 | Status: SHIPPED | OUTPATIENT
Start: 2019-01-14 | End: 2019-07-10 | Stop reason: SDUPTHER

## 2019-02-06 DIAGNOSIS — F41.1 GAD (GENERALIZED ANXIETY DISORDER): ICD-10-CM

## 2019-02-06 RX ORDER — LORAZEPAM 0.5 MG/1
TABLET ORAL
Qty: 90 TABLET | Refills: 0 | Status: CANCELLED | OUTPATIENT
Start: 2019-02-06

## 2019-02-08 ENCOUNTER — TELEPHONE (OUTPATIENT)
Dept: FAMILY MEDICINE CLINIC | Facility: CLINIC | Age: 84
End: 2019-02-08

## 2019-02-08 DIAGNOSIS — F41.1 GAD (GENERALIZED ANXIETY DISORDER): ICD-10-CM

## 2019-02-08 RX ORDER — LORAZEPAM 0.5 MG/1
0.5 TABLET ORAL 3 TIMES DAILY
Qty: 90 TABLET | Refills: 3 | Status: SHIPPED | OUTPATIENT
Start: 2019-02-08 | End: 2019-03-21 | Stop reason: SDUPTHER

## 2019-02-08 NOTE — TELEPHONE ENCOUNTER
RE: LORazepam (ATIVAN) 0 5 mg tablet [Pharmacy Med Name    Patient is checking on the status of this refill  She states she called a few days ago & she stopped by the pharmacy & it's still not there  Please call to advise

## 2019-03-12 ENCOUNTER — APPOINTMENT (OUTPATIENT)
Dept: LAB | Facility: CLINIC | Age: 84
End: 2019-03-12
Payer: COMMERCIAL

## 2019-03-12 ENCOUNTER — TRANSCRIBE ORDERS (OUTPATIENT)
Dept: LAB | Facility: CLINIC | Age: 84
End: 2019-03-12

## 2019-03-12 DIAGNOSIS — I10 ESSENTIAL HYPERTENSION: ICD-10-CM

## 2019-03-12 DIAGNOSIS — E78.5 HYPERLIPIDEMIA, UNSPECIFIED HYPERLIPIDEMIA TYPE: ICD-10-CM

## 2019-03-12 DIAGNOSIS — E10.40 TYPE 1 DIABETES MELLITUS WITH DIABETIC NEUROPATHY (HCC): ICD-10-CM

## 2019-03-12 LAB
25(OH)D3 SERPL-MCNC: 48.8 NG/ML (ref 30–100)
ALBUMIN SERPL BCP-MCNC: 3.7 G/DL (ref 3.5–5)
ALP SERPL-CCNC: 87 U/L (ref 46–116)
ALT SERPL W P-5'-P-CCNC: 22 U/L (ref 12–78)
ANION GAP SERPL CALCULATED.3IONS-SCNC: 6 MMOL/L (ref 4–13)
AST SERPL W P-5'-P-CCNC: 10 U/L (ref 5–45)
BILIRUB SERPL-MCNC: 0.75 MG/DL (ref 0.2–1)
BUN SERPL-MCNC: 20 MG/DL (ref 5–25)
CALCIUM SERPL-MCNC: 9 MG/DL (ref 8.3–10.1)
CHLORIDE SERPL-SCNC: 104 MMOL/L (ref 100–108)
CHOLEST SERPL-MCNC: 199 MG/DL (ref 50–200)
CO2 SERPL-SCNC: 27 MMOL/L (ref 21–32)
CREAT SERPL-MCNC: 0.89 MG/DL (ref 0.6–1.3)
ERYTHROCYTE [DISTWIDTH] IN BLOOD BY AUTOMATED COUNT: 11.9 % (ref 11.6–15.1)
EST. AVERAGE GLUCOSE BLD GHB EST-MCNC: 140 MG/DL
GFR SERPL CREATININE-BSD FRML MDRD: 61 ML/MIN/1.73SQ M
GLUCOSE P FAST SERPL-MCNC: 127 MG/DL (ref 65–99)
HBA1C MFR BLD: 6.5 % (ref 4.2–6.3)
HCT VFR BLD AUTO: 37 % (ref 34.8–46.1)
HDLC SERPL-MCNC: 94 MG/DL (ref 40–60)
HGB BLD-MCNC: 12 G/DL (ref 11.5–15.4)
LDLC SERPL CALC-MCNC: 91 MG/DL (ref 0–100)
MCH RBC QN AUTO: 32.3 PG (ref 26.8–34.3)
MCHC RBC AUTO-ENTMCNC: 32.4 G/DL (ref 31.4–37.4)
MCV RBC AUTO: 100 FL (ref 82–98)
NONHDLC SERPL-MCNC: 105 MG/DL
PLATELET # BLD AUTO: 271 THOUSANDS/UL (ref 149–390)
PMV BLD AUTO: 9.2 FL (ref 8.9–12.7)
POTASSIUM SERPL-SCNC: 4.3 MMOL/L (ref 3.5–5.3)
PROT SERPL-MCNC: 7.1 G/DL (ref 6.4–8.2)
RBC # BLD AUTO: 3.71 MILLION/UL (ref 3.81–5.12)
SODIUM SERPL-SCNC: 137 MMOL/L (ref 136–145)
TRIGL SERPL-MCNC: 69 MG/DL
TSH SERPL DL<=0.05 MIU/L-ACNC: 3.33 UIU/ML (ref 0.36–3.74)
WBC # BLD AUTO: 5.15 THOUSAND/UL (ref 4.31–10.16)

## 2019-03-12 PROCEDURE — 80053 COMPREHEN METABOLIC PANEL: CPT

## 2019-03-12 PROCEDURE — 36415 COLL VENOUS BLD VENIPUNCTURE: CPT

## 2019-03-12 PROCEDURE — 85027 COMPLETE CBC AUTOMATED: CPT

## 2019-03-12 PROCEDURE — 82306 VITAMIN D 25 HYDROXY: CPT

## 2019-03-12 PROCEDURE — 80061 LIPID PANEL: CPT

## 2019-03-12 PROCEDURE — 84443 ASSAY THYROID STIM HORMONE: CPT

## 2019-03-12 PROCEDURE — 83036 HEMOGLOBIN GLYCOSYLATED A1C: CPT

## 2019-03-21 ENCOUNTER — OFFICE VISIT (OUTPATIENT)
Dept: FAMILY MEDICINE CLINIC | Facility: CLINIC | Age: 84
End: 2019-03-21
Payer: COMMERCIAL

## 2019-03-21 VITALS
DIASTOLIC BLOOD PRESSURE: 70 MMHG | SYSTOLIC BLOOD PRESSURE: 130 MMHG | WEIGHT: 133 LBS | TEMPERATURE: 98 F | RESPIRATION RATE: 16 BRPM | BODY MASS INDEX: 22.16 KG/M2 | HEART RATE: 68 BPM | HEIGHT: 65 IN

## 2019-03-21 DIAGNOSIS — R10.84 GENERALIZED ABDOMINAL PAIN: ICD-10-CM

## 2019-03-21 DIAGNOSIS — E10.40 TYPE 1 DIABETES MELLITUS WITH DIABETIC NEUROPATHY (HCC): Primary | ICD-10-CM

## 2019-03-21 DIAGNOSIS — E78.5 HYPERLIPIDEMIA, UNSPECIFIED HYPERLIPIDEMIA TYPE: ICD-10-CM

## 2019-03-21 DIAGNOSIS — K43.2 INCISIONAL HERNIA, WITHOUT OBSTRUCTION OR GANGRENE: ICD-10-CM

## 2019-03-21 DIAGNOSIS — E10.3599 PROLIFERATIVE DIABETIC RETINOPATHY ASSOCIATED WITH TYPE 1 DIABETES MELLITUS, UNSPECIFIED LATERALITY, UNSPECIFIED PROLIFERATIVE RETINOPATHY TYPE (HCC): ICD-10-CM

## 2019-03-21 DIAGNOSIS — F41.1 GAD (GENERALIZED ANXIETY DISORDER): ICD-10-CM

## 2019-03-21 DIAGNOSIS — I10 ESSENTIAL HYPERTENSION: ICD-10-CM

## 2019-03-21 DIAGNOSIS — E03.8 ADULT ONSET HYPOTHYROIDISM: ICD-10-CM

## 2019-03-21 PROCEDURE — 99215 OFFICE O/P EST HI 40 MIN: CPT | Performed by: FAMILY MEDICINE

## 2019-03-21 PROCEDURE — 3075F SYST BP GE 130 - 139MM HG: CPT | Performed by: FAMILY MEDICINE

## 2019-03-21 PROCEDURE — 1160F RVW MEDS BY RX/DR IN RCRD: CPT | Performed by: FAMILY MEDICINE

## 2019-03-21 PROCEDURE — 3078F DIAST BP <80 MM HG: CPT | Performed by: FAMILY MEDICINE

## 2019-03-21 RX ORDER — HYDROCHLOROTHIAZIDE 12.5 MG/1
CAPSULE, GELATIN COATED ORAL
Qty: 90 CAPSULE | Refills: 1 | Status: SHIPPED | OUTPATIENT
Start: 2019-03-21 | End: 2019-08-13

## 2019-03-21 RX ORDER — LORAZEPAM 0.5 MG/1
0.5 TABLET ORAL 3 TIMES DAILY
Qty: 90 TABLET | Refills: 5 | Status: SHIPPED | OUTPATIENT
Start: 2019-03-21

## 2019-03-21 RX ORDER — PRAVASTATIN SODIUM 20 MG
20 TABLET ORAL
Qty: 90 TABLET | Refills: 1 | Status: SHIPPED | OUTPATIENT
Start: 2019-03-21 | End: 2019-06-10 | Stop reason: SDUPTHER

## 2019-03-21 RX ORDER — IRBESARTAN 150 MG/1
150 TABLET ORAL DAILY
Qty: 90 TABLET | Refills: 3 | Status: SHIPPED | OUTPATIENT
Start: 2019-03-21 | End: 2019-10-18 | Stop reason: HOSPADM

## 2019-03-21 NOTE — PROGRESS NOTES
FAMILY PRACTICE OFFICE VISIT       NAME: Nila Smith  AGE: 82 y o  SEX: female       : 1935        MRN: [id number]        Assessment and Plan     Problem List Items Addressed This Visit        Endocrine    Adult onset hypothyroidism     TSH is normal, continue Synthroid 50 mcg daily         Retinopathy, diabetic, proliferative (HCC)     Lab Results   Component Value Date    HGBA1C 6 5 (H) 2019     Patient remains under care of Ophthalmology  No results for input(s): POCGLU in the last 72 hours  Blood Sugar Average: Last 72 hrs:           Type 1 diabetes mellitus with diabetic neuropathy (HCC) - Primary     Lab Results   Component Value Date    HGBA1C 6 5 (H) 2019     Continue Lantus 11 units at bedtime and regular insulin with meals, patient uses 4 units at breakfast, 5 units at lunch and 7 units at dinner time  Very rare episodes of hypoglycemia  Patient is following up with Podiatry and Ophthalmology on a regular basis  Painful symptoms of diabetic neuropathy  Continue Cymbalta 30 mg once a day and gabapentin 300 mg twice a day  No results for input(s): POCGLU in the last 72 hours  Blood Sugar Average: Last 72 hrs:           Relevant Orders    CBC    Comprehensive metabolic panel    Hemoglobin A1C    Lipid Panel with Direct LDL reflex    TSH, 3rd generation       Cardiovascular and Mediastinum    Hypertension     Continue regimen of Norvasc 5 mg once a day, Avapro 150 mg daily and HCTZ 12 5 mg that patient uses on ,  and   Relevant Medications    hydrochlorothiazide (MICROZIDE) 12 5 mg capsule    irbesartan (AVAPRO) 150 mg tablet    Other Relevant Orders    CBC    Comprehensive metabolic panel       Other    ANAYA (generalized anxiety disorder)     Patient uses lorazepam as needed, no sedating side effects  Relevant Medications    LORazepam (ATIVAN) 0 5 mg tablet    Hyperlipidemia     Chronic myalgias    Patient has been using Lipitor 10 mg 5 days out of 7  LDL is suboptimal   She would like to try alternative statin  Will switch therapy to pravastatin 20 mg once a day and follow-up on labs in 3 months  Relevant Medications    pravastatin (PRAVACHOL) 20 mg tablet    Other Relevant Orders    Hemoglobin A1C    Lipid Panel with Direct LDL reflex    TSH, 3rd generation      Other Visit Diagnoses     Generalized abdominal pain        Relevant Orders    US abdomen complete    Incisional hernia, without obstruction or gangrene           Patient with type 1 diabetes painful diabetic neuropathy and known diabetic retinopathy presents for follow-up of chronic medical conditions  Results of recent blood work discussed, hemoglobin A1c is excellent at 6 5  Rare and infrequent episodes of hypoglycemia  Patient remains under close care of Ophthalmology and podiatry  Chronic symptoms of painful diabetic neuropathy  Assessment and plan as outlined above  Will discontinue Lipitor due to concern of side effects per patient, will try her on pravastatin 20 mg daily and will follow LFTs and lipids closely  Incisional hernia right abdomen, easily retractable and nontender  Patient admits to occasional abdominal bloating, mild right upper quadrant tenderness on exam   Will proceed with abdominal ultrasound  Patient will continue same daily medications otherwise and schedule follow-up with blood work in 3 months  There are no Patient Instructions on file for this visit  [employer] software was used to dictate this note  It may contain errors with dictating incorrect words/spelling  Please contact provider directly with any questions  Chief Complaint     Chief Complaint   Patient presents with    Follow-up     Pt is here for 3 mo f/u blood work       History of Present Illness      Follow-up of chronic medical conditions  Results of recent blood work discussed  Patient is compliant with daily medications  Blood sugar readings reviewed    No hypoglycemia  Patient remains on Lantus 11 units at bedtime and uses regular insulin at mealtime  Total cholesterol is 199 with LDL of 91  Cholesterol 1 up with reduction dose of Lipitor from 10 mg every day to 10 mg 5 days a week  Patient is complaining of chronic bilateral calf pain which she partially attributes to Lipitor and would like to try alternative anti-lipid medication  She denies leg swelling or discoloration      Chronic symptoms of diabetic neuropathy, constant pain, burning in both feet  Patient remains under care of Podiatry, every 9 weeks  Poor healing dry lesion left big toe  Patient has tried multiple diabetic shoes and is not able to find a pair that is comfortable for her  Patient remains on Cymbalta 30 mg once a day and gabapentin 300 mg b i d  For symptoms of painful diabetic neuropathy  Medications are helpful but she has been experiencing symptoms of daytime fatigue, patient is napping frequently after lunch  Patient denies symptoms of chest pain, palpitations, shortness of breath or dizziness  She remains on medications for hypertension and hypothyroidism as directed  Patient is concerned about abdominal protrusion on the right side that is apparent only with standing  Occasional abdominal bloating  Bowel movements are regular  No blood or melena, no nausea vomiting, appetite is normal, weight is stable  Review of Systems   Review of Systems   Constitutional: Positive for fatigue  HENT: Negative  Eyes: Negative  Respiratory: Negative  Cardiovascular: Negative  Gastrointestinal: Positive for abdominal distention  Negative for blood in stool, constipation and nausea  As outlined in HPI   Endocrine: Negative  Genitourinary: Negative  Musculoskeletal: Positive for arthralgias and myalgias  Skin: Negative  Allergic/Immunologic: Negative  Neurological: Positive for numbness  Negative for dizziness and headaches          Painful symptoms of diabetic neuropathy, bilateral feet   Hematological: Negative  Psychiatric/Behavioral: The patient is nervous/anxious  Active Problem List     Patient Active Problem List   Diagnosis    Adult onset hypothyroidism    ANAYA (generalized anxiety disorder)    Hyperlipidemia    Hypertension    [facility]inopathy, diabetic[facility] proliferative [facility]    Type 1 diabetes mellitus with diabetic neuropathy ([facility] )       Past Medical History:  Past Medical History:   Diagnosis Date    Cataract     Last Assessed: 8/11/2015    Chest pain     Last Assessed: 8/25/2015    Diabetes mellitus ([facility]     Difficulty falling asleep at night until early morning hours     sleeping flat    Diplopia     Last Assessed: 6/9/2016    Fracture of fifth metatarsal bone of right foot     Last Assessed: 12/9/2016    Hearing loss     Herpes zoster     Last Assessed: 12/9/2015    Hypertension     Hyperthyroidism     Last Assessed: 2/25/2017    Hyponatremia     Last Assessed: 3/22/2016    Neoplasm of uncertain behavior of skin     Last Assessed: 10/17/2013    Skin lesions     Last Assessed: 6/27/2016    Syncope     Last Assessed: 9/18/2015    Thyroid disease     [facility]     Wears glasses        Past Surgical History:  Past Surgical History:   Procedure Laterality Date    APPENDECTOMY  1956    CATARACT EXTRACTION      COLONOSCOPY  09/2014    Completed - Dr Devyn Winter, due in 5 years    SKIN LESION EXCISION      Ears       Family History:  Family History   Problem Relation Age of Onset    Heart attack Mother     Diabetes type II Mother     Dementia Father     Stroke Father         CVA    Breast cancer Maternal Aunt     Stomach cancer Maternal Uncle     Diabetes Family     Stroke Family         Complications       Social History:  Social History     Socioeconomic History    Marital status:       Spouse name: Not on file    Number of children: 2    Years of education: Not on file    Highest education level: Not on file   Occupational History    Not on file   Social Needs    Financial resource strain: Not on file    Food insecurity:     Worry: Not on file     Inability: Not on file    Transportation needs:     Medical: Not on file     Non-medical: Not on file   Tobacco Use    Smoking status: Never Smoker    Smokeless tobacco: Never Used   Substance and Sexual Activity    Alcohol use: Yes     Comment: social    Drug use: No    Sexual activity: Not on file   Lifestyle    Physical activity:     Days per week: Not on file     Minutes per session: Not on file    Stress: Not on file   Relationships    Social connections:     Talks on phone: Not on file     Gets together: Not on file     Attends Church service: Not on file     Active member of club or organization: Not on file     Attends meetings of clubs or organizations: Not on file     Relationship status: Not on file    Intimate partner violence:     Fear of current or ex partner: Not on file     Emotionally abused: Not on file     Physically abused: Not on file     Forced sexual activity: Not on file   Other Topics Concern    Not on file   Social History Narrative    Caffeine use    No preference on Church beliefs     PHQ-9 Depression Screening    PHQ-9:    Frequency of the following problems over the past two weeks:                    Objective     Vitals:    03/21/19 1101   BP: 130/70   Pulse: 68   Resp: 16   Temp: 98 °F (36 7 °C)   TempSrc: Tympanic   Weight: 60 3 kg (133 lb)   Height: 5' 5" (1 651 m)     Wt Readings from Last 3 Encounters:   03/21/19 60 3 kg (133 lb)   12/13/18 60 3 kg (133 lb)   09/28/18 60 8 kg (134 lb)       Physical Exam   Constitutional: She is oriented to person, place, and time  She appears well-developed and well-nourished  HENT:   Head: Normocephalic and atraumatic  Eyes: Conjunctivae are normal    Neck: Neck supple  Carotid bruit is not present   No thyromegaly present  Cardiovascular: Normal rate, regular rhythm and normal heart sounds  Pulses are no weak pulses  No murmur heard  Pulses:       Dorsalis pedis pulses are 2+ on the right side, and 2+ on the left side  Pulmonary/Chest: Effort normal and breath sounds normal  No respiratory distress  She has no wheezes  Abdominal: Soft  She exhibits no distension and no abdominal bruit  Bowel sounds are increased  There is no hepatomegaly  There is tenderness in the right upper quadrant  There is no rigidity, no guarding and negative Sommers's sign  A hernia is present  Incisional hernia at the site of right lateral abdominal scar reportedly after remote appendectomy  Easily retractable, no tenderness   Musculoskeletal: Normal range of motion  She exhibits no edema  Feet:    Feet:   Right Foot:   Skin Integrity: Negative for ulcer, skin breakdown, erythema, warmth, callus or dry skin  Left Foot:   Skin Integrity: Negative for ulcer, skin breakdown, erythema, warmth, callus or dry skin  Neurological: She is alert and oriented to person, place, and time  No cranial nerve deficit  Coordination normal    Psychiatric: She has a normal mood and affect  Her behavior is normal    Nursing note and vitals reviewed  Patient's shoes and socks removed  Right Foot/Ankle   Right Foot Inspection  Skin Exam: skin normal and skin intact no dry skin, no warmth, no callus, no erythema, no maceration, no abnormal color, no pre-ulcer, no ulcer and no callus                          Toe Exam: ROM and strength within normal limits  Sensory   Vibration: intact  Proprioception: intact   Monofilament testing: diminished  Vascular    The right DP pulse is 2+       Left Foot/Ankle  Left Foot Inspection  Skin Exam: skin normal and skin intactno dry skin, no warmth, no erythema, no maceration, normal color, no pre-ulcer, no ulcer and no callus                         Toe Exam: ROM and strength within normal limits Sensory   Vibration: intact  Proprioception: intact  Monofilament: diminished  Vascular    The left DP pulse is 2+  Assign Risk Category:  No deformity present; Loss of protective sensation;  No weak pulses       Risk: 1           Pertinent Laboratory/Diagnostic Studies:  Lab Results   Component Value Date    GLUCOSE 75 01/08/2016    BUN 20 03/12/2019    CREATININE 0 89 03/12/2019    CALCIUM 9 0 03/12/2019     (L) 01/08/2016    K 4 3 03/12/2019    CO2 27 03/12/2019     03/12/2019     Lab Results   Component Value Date    ALT 22 03/12/2019    AST 10 03/12/2019    ALKPHOS 87 03/12/2019    BILITOT 0 83 01/08/2016       Lab Results   Component Value Date    WBC 5 15 03/12/2019    HGB 12 0 03/12/2019    HCT 37 0 03/12/2019     (H) 03/12/2019     03/12/2019       No results found for: TSH    Lab Results   Component Value Date    CHOL 183 01/08/2016     Lab Results   Component Value Date    TRIG 69 03/12/2019     Lab Results   Component Value Date    HDL 94 (H) 03/12/2019     Lab Results   Component Value Date    LDLCALC 91 03/12/2019     Lab Results   Component Value Date    HGBA1C 6 5 (H) 03/12/2019       Results for orders placed or performed in visit on 03/12/19   CBC   Result Value Ref Range    WBC 5 15 4 31 - 10 16 Thousand/uL    RBC 3 71 (L) 3 81 - 5 12 Million/uL    Hemoglobin 12 0 11 5 - 15 4 g/dL    Hematocrit 37 0 34 8 - 46 1 %     (H) 82 - 98 fL    MCH 32 3 26 8 - 34 3 pg    MCHC 32 4 31 4 - 37 4 g/dL    RDW 11 9 11 6 - 15 1 %    Platelets 065 274 - 399 Thousands/uL    MPV 9 2 8 9 - 12 7 fL   Comprehensive metabolic panel   Result Value Ref Range    Sodium 137 136 - 145 mmol/L    Potassium 4 3 3 5 - 5 3 mmol/L    Chloride 104 100 - 108 mmol/L    CO2 27 21 - 32 mmol/L    ANION GAP 6 4 - 13 mmol/L    BUN 20 5 - 25 mg/dL    Creatinine 0 89 0 60 - 1 30 mg/dL    Glucose, Fasting 127 (H) 65 - 99 mg/dL    Calcium 9 0 8 3 - 10 1 mg/dL    AST 10 5 - 45 U/L    ALT 22 12 - 78 U/L Alkaline Phosphatase 87 46 - 116 U/L    Total Protein 7 1 6 4 - 8 2 g/dL    Albumin 3 7 3 5 - 5 0 g/dL    Total Bilirubin 0 75 0 20 - 1 00 mg/dL    eGFR 61 ml/min/1 73sq m   Hemoglobin A1C   Result Value Ref Range    Hemoglobin A1C 6 5 (H) 4 2 - 6 3 %     mg/dl   Lipid panel   Result Value Ref Range    Cholesterol 199 50 - 200 mg/dL    Triglycerides 69 <=150 mg/dL    HDL, Direct 94 (H) 40 - 60 mg/dL    LDL Calculated 91 0 - 100 mg/dL    Non-HDL-Chol (CHOL-HDL) 105 mg/dl   TSH, 3rd generation   Result Value Ref Range    TSH 3RD GENERATON 3 330 0 358 - 3 740 uIU/mL   Vitamin D 25 hydroxy   Result Value Ref Range    Vit D, 25-Hydroxy 48 8 30 0 - 100 0 ng/mL       Orders Placed This Encounter   Procedures    US abdomen complete    CBC    Comprehensive metabolic panel    Hemoglobin A1C    Lipid Panel with Direct LDL reflex    TSH, 3rd generation       ALLERGIES:  No Known Allergies    Current Medications     Current Outpatient Medications   Medication Sig Dispense Refill    amLODIPine (NORVASC) 5 mg tablet TAKE 1 TABLET DAILY AFTER SUPPER 90 tablet 3    aspirin 81 MG tablet Take 1 tablet by mouth daily      B Complex-Folic Acid (SUPER B COMPLEX MAXI) TABS Take by mouth      BD INSULIN SYRINGE ULTRAFINE 31G X 15/64" 0 3 ML MISC Inject under the skin 4 (four) times a day 120 each 11    Calcium Polycarbophil (KONSYL FIBER PO) Take by mouth      carboxymethylcellulose (REFRESH TEARS) 0 5 % SOLN Apply 1 drop to eye 4 (four) times a day      Cholecalciferol (VITAMIN D3) 2000 units capsule Take 2 capsules by mouth daily      Coenzyme Q10 (CO Q-10) 200 MG CAPS Take 1 capsule by mouth daily      dorzolamide-timolol (COSOPT) 22 3-6 8 MG/ML ophthalmic solution Apply 1 drop to eye 2 (two) times a day      DULoxetine (CYMBALTA) 30 mg delayed release capsule TAKE ONE CAPSULE BY MOUTH EVERY DAY 30 capsule 5    fluticasone (FLONASE) 50 mcg/act nasal spray 2 sprays into each nostril daily      gabapentin (NEURONTIN) 300 mg capsule Take 1 capsule (300 mg total) by mouth 3 (three) times a day 90 capsule 11    glucose blood (ONE TOUCH ULTRA TEST) test strip 1 each by Other route 6 (six) times a day 100 each 11    hydrochlorothiazide (MICROZIDE) 12 5 mg capsule Take 1 capsule once a day as needed PRN : leg edema 90 capsule 1    Ibuprofen (ADVIL) 200 MG CAPS Take by mouth      insulin glargine (LANTUS) 100 units/mL subcutaneous injection Inject 12units under the skin nightly as directed 10 mL 11    insulin lispro (HUMALOG) 100 units/mL injection Inject 4 units at breakfast, 5 units at lunch and 7 units at dinnertime 10 mL 11    Insulin Syringe-Needle U-100 (B-D INS SYRINGE 0 5CC/31GX5/16) 31G X 5/16" 0 5 ML MISC by Does not apply route      irbesartan (AVAPRO) 150 mg tablet Take 1 tablet (150 mg total) by mouth daily 90 tablet 3    LANTUS 100 UNIT/ML subcutaneous injection INJECT 12 UNITS UNDER THE SKIN NIGHTLY AS DIRECTED 10 mL 2    levothyroxine 50 mcg tablet TAKE ONE TABLET BY MOUTH EVERY DAY 90 tablet 1    lidocaine (XYLOCAINE) 2 % topical gel apply take affected area daily  0    LORazepam (ATIVAN) 0 5 mg tablet Take 1 tablet (0 5 mg total) by mouth 3 (three) times a day 90 tablet 5    Magnesium 500 MG TABS Take 1 tablet by mouth daily      travoprost (TRAVATAN Z) 0 004 % ophthalmic solution Apply to eye      pravastatin (PRAVACHOL) 20 mg tablet Take 1 tablet (20 mg total) by mouth daily at bedtime 90 tablet 1     No current facility-administered medications for this visit            Health Maintenance     Health Maintenance   Topic Date Due    SLP PLAN OF CARE  1935    HEPATITIS B VACCINES (1 of 3 - Risk 3-dose series) 04/20/2019 (Originally 6/1/1954)    DTaP,Tdap,and Td Vaccines (1 - Tdap) 09/12/2019 (Originally 2/2/2003)    Fall Risk  04/09/2019    Urinary Incontinence Screening  04/09/2019    Medicare Annual Wellness Visit (AWV)  04/09/2019    Diabetic Foot Exam  04/09/2019    DM Eye Exam  04/17/2019    HEMOGLOBIN A1C  09/12/2019    Depression Screening PHQ  09/13/2019    BMI: Adult  03/21/2020    INFLUENZA VACCINE  Completed    Pneumococcal PPSV23/PCV13 65+ Years / High and Highest Risk  Completed     Immunization History   Administered Date(s) Administered    INFLUENZA 10/01/2010, 10/02/2012, 10/14/2013, 09/10/2014, 10/01/2015, 11/15/2016    Influenza Split High Dose Preservative Free IM 10/01/2015, 11/15/2016, 09/19/2017    Influenza TIV (IM) 1935    Influenza, high dose seasonal 0 5 mL 09/13/2018    Pneumococcal Conjugate 13-Valent 10/19/2015    Pneumococcal Polysaccharide PPV23 11/01/2007    Td (adult), Unspecified 02/01/2003    Zoster 03/11/2013       Beatrice Childress MD

## 2019-03-25 NOTE — ASSESSMENT & PLAN NOTE
Continue regimen of Norvasc 5 mg once a day, Avapro 150 mg daily and HCTZ 12 5 mg that patient uses on Mondays, Wednesdays and Fridays

## 2019-03-25 NOTE — ASSESSMENT & PLAN NOTE
Chronic myalgias  Patient has been using Lipitor 10 mg 5 days out of 7  LDL is suboptimal   She would like to try alternative statin  Will switch therapy to pravastatin 20 mg once a day and follow-up on labs in 3 months

## 2019-03-25 NOTE — ASSESSMENT & PLAN NOTE
Lab Results   Component Value Date    HGBA1C 6 5 (H) 03/12/2019     Patient remains under care of Ophthalmology  No results for input(s): POCGLU in the last 72 hours      Blood Sugar Average: Last 72 hrs:

## 2019-03-25 NOTE — ASSESSMENT & PLAN NOTE
Lab Results   Component Value Date    HGBA1C 6 5 (H) 03/12/2019     Continue Lantus 11 units at bedtime and regular insulin with meals, patient uses 4 units at breakfast, 5 units at lunch and 7 units at dinner time  Very rare episodes of hypoglycemia  Patient is following up with Podiatry and Ophthalmology on a regular basis  Painful symptoms of diabetic neuropathy  Continue Cymbalta 30 mg once a day and gabapentin 300 mg twice a day  No results for input(s): POCGLU in the last 72 hours      Blood Sugar Average: Last 72 hrs:

## 2019-04-05 DIAGNOSIS — E03.9 HYPOTHYROIDISM, UNSPECIFIED TYPE: ICD-10-CM

## 2019-04-06 RX ORDER — LEVOTHYROXINE SODIUM 0.05 MG/1
50 TABLET ORAL DAILY
Qty: 90 TABLET | Refills: 1 | Status: SHIPPED | OUTPATIENT
Start: 2019-04-06 | End: 2019-10-01 | Stop reason: SDUPTHER

## 2019-04-08 ENCOUNTER — HOSPITAL ENCOUNTER (OUTPATIENT)
Dept: ULTRASOUND IMAGING | Facility: HOSPITAL | Age: 84
Discharge: HOME | End: 2019-04-08
Payer: COMMERCIAL

## 2019-04-08 DIAGNOSIS — R10.84 GENERALIZED ABDOMINAL PAIN: ICD-10-CM

## 2019-04-08 PROCEDURE — 76700 US EXAM ABDOM COMPLETE: CPT

## 2019-04-12 ENCOUNTER — TELEPHONE (OUTPATIENT)
Dept: FAMILY MEDICINE CLINIC | Facility: CLINIC | Age: 84
End: 2019-04-12

## 2019-04-16 DIAGNOSIS — E11.8 TYPE 2 DIABETES MELLITUS WITH COMPLICATION, WITH LONG-TERM CURRENT USE OF INSULIN (HCC): ICD-10-CM

## 2019-04-16 DIAGNOSIS — E10.40 TYPE 1 DIABETES MELLITUS WITH DIABETIC NEUROPATHY (HCC): ICD-10-CM

## 2019-04-16 DIAGNOSIS — Z79.4 TYPE 2 DIABETES MELLITUS WITH COMPLICATION, WITH LONG-TERM CURRENT USE OF INSULIN (HCC): ICD-10-CM

## 2019-04-16 RX ORDER — INSULIN GLARGINE 100 [IU]/ML
INJECTION, SOLUTION SUBCUTANEOUS
Qty: 10 ML | Refills: 2 | Status: SHIPPED | OUTPATIENT
Start: 2019-04-16 | End: 2019-08-13

## 2019-04-29 DIAGNOSIS — I10 ESSENTIAL HYPERTENSION: ICD-10-CM

## 2019-04-29 RX ORDER — AMLODIPINE BESYLATE 5 MG/1
TABLET ORAL
Qty: 90 TABLET | Refills: 3 | Status: SHIPPED | OUTPATIENT
Start: 2019-04-29 | End: 2019-10-18 | Stop reason: HOSPADM

## 2019-05-10 ENCOUNTER — OFFICE VISIT (OUTPATIENT)
Dept: FAMILY MEDICINE CLINIC | Facility: CLINIC | Age: 84
End: 2019-05-10
Payer: COMMERCIAL

## 2019-05-10 VITALS
RESPIRATION RATE: 16 BRPM | TEMPERATURE: 98.3 F | DIASTOLIC BLOOD PRESSURE: 60 MMHG | HEIGHT: 65 IN | BODY MASS INDEX: 21.89 KG/M2 | SYSTOLIC BLOOD PRESSURE: 130 MMHG | OXYGEN SATURATION: 99 % | HEART RATE: 62 BPM | WEIGHT: 131.4 LBS

## 2019-05-10 DIAGNOSIS — E11.40 DIABETIC NEUROPATHY, PAINFUL (HCC): ICD-10-CM

## 2019-05-10 DIAGNOSIS — E10.40 TYPE 1 DIABETES MELLITUS WITH DIABETIC NEUROPATHY (HCC): Primary | ICD-10-CM

## 2019-05-10 PROCEDURE — 99213 OFFICE O/P EST LOW 20 MIN: CPT | Performed by: FAMILY MEDICINE

## 2019-05-12 PROBLEM — G89.29 OTHER CHRONIC PAIN: Status: RESOLVED | Noted: 2019-05-12 | Resolved: 2019-05-12

## 2019-05-12 PROBLEM — E11.40 DIABETIC NEUROPATHY, PAINFUL (HCC): Status: ACTIVE | Noted: 2019-05-12

## 2019-05-12 PROBLEM — G89.29 OTHER CHRONIC PAIN: Status: ACTIVE | Noted: 2019-05-12

## 2019-05-17 DIAGNOSIS — E10.40 TYPE 1 DIABETES MELLITUS WITH DIABETIC NEUROPATHY (HCC): ICD-10-CM

## 2019-06-06 LAB
LEFT EYE DIABETIC RETINOPATHY: NORMAL
RIGHT EYE DIABETIC RETINOPATHY: NORMAL

## 2019-06-10 DIAGNOSIS — E78.5 HYPERLIPIDEMIA, UNSPECIFIED HYPERLIPIDEMIA TYPE: ICD-10-CM

## 2019-06-10 RX ORDER — PRAVASTATIN SODIUM 20 MG
20 TABLET ORAL
Qty: 90 TABLET | Refills: 1 | Status: SHIPPED | OUTPATIENT
Start: 2019-06-10 | End: 2019-09-04 | Stop reason: SDUPTHER

## 2019-06-21 ENCOUNTER — APPOINTMENT (OUTPATIENT)
Dept: LAB | Facility: CLINIC | Age: 84
End: 2019-06-21
Payer: COMMERCIAL

## 2019-06-21 ENCOUNTER — TRANSCRIBE ORDERS (OUTPATIENT)
Dept: LAB | Facility: CLINIC | Age: 84
End: 2019-06-21

## 2019-06-21 DIAGNOSIS — I10 ESSENTIAL HYPERTENSION: ICD-10-CM

## 2019-06-21 DIAGNOSIS — E78.5 HYPERLIPIDEMIA, UNSPECIFIED HYPERLIPIDEMIA TYPE: ICD-10-CM

## 2019-06-21 DIAGNOSIS — E10.40 TYPE 1 DIABETES MELLITUS WITH DIABETIC NEUROPATHY (HCC): ICD-10-CM

## 2019-06-21 LAB
ALBUMIN SERPL BCP-MCNC: 3.7 G/DL (ref 3.5–5)
ALP SERPL-CCNC: 84 U/L (ref 46–116)
ALT SERPL W P-5'-P-CCNC: 21 U/L (ref 12–78)
ANION GAP SERPL CALCULATED.3IONS-SCNC: 3 MMOL/L (ref 4–13)
AST SERPL W P-5'-P-CCNC: 8 U/L (ref 5–45)
BILIRUB SERPL-MCNC: 0.54 MG/DL (ref 0.2–1)
BUN SERPL-MCNC: 27 MG/DL (ref 5–25)
CALCIUM SERPL-MCNC: 8.7 MG/DL (ref 8.3–10.1)
CHLORIDE SERPL-SCNC: 103 MMOL/L (ref 100–108)
CHOLEST SERPL-MCNC: 174 MG/DL (ref 50–200)
CO2 SERPL-SCNC: 28 MMOL/L (ref 21–32)
CREAT SERPL-MCNC: 0.92 MG/DL (ref 0.6–1.3)
ERYTHROCYTE [DISTWIDTH] IN BLOOD BY AUTOMATED COUNT: 11.9 % (ref 11.6–15.1)
EST. AVERAGE GLUCOSE BLD GHB EST-MCNC: 131 MG/DL
GFR SERPL CREATININE-BSD FRML MDRD: 58 ML/MIN/1.73SQ M
GLUCOSE P FAST SERPL-MCNC: 74 MG/DL (ref 65–99)
HBA1C MFR BLD: 6.2 % (ref 4.2–6.3)
HCT VFR BLD AUTO: 37 % (ref 34.8–46.1)
HDLC SERPL-MCNC: 84 MG/DL (ref 40–60)
HGB BLD-MCNC: 12.2 G/DL (ref 11.5–15.4)
LDLC SERPL CALC-MCNC: 82 MG/DL (ref 0–100)
MCH RBC QN AUTO: 32.7 PG (ref 26.8–34.3)
MCHC RBC AUTO-ENTMCNC: 33 G/DL (ref 31.4–37.4)
MCV RBC AUTO: 99 FL (ref 82–98)
PLATELET # BLD AUTO: 266 THOUSANDS/UL (ref 149–390)
PMV BLD AUTO: 9.6 FL (ref 8.9–12.7)
POTASSIUM SERPL-SCNC: 4.1 MMOL/L (ref 3.5–5.3)
PROT SERPL-MCNC: 7.2 G/DL (ref 6.4–8.2)
RBC # BLD AUTO: 3.73 MILLION/UL (ref 3.81–5.12)
SODIUM SERPL-SCNC: 134 MMOL/L (ref 136–145)
TRIGL SERPL-MCNC: 41 MG/DL
TSH SERPL DL<=0.05 MIU/L-ACNC: 1.89 UIU/ML (ref 0.36–3.74)
WBC # BLD AUTO: 7.02 THOUSAND/UL (ref 4.31–10.16)

## 2019-06-21 PROCEDURE — 36415 COLL VENOUS BLD VENIPUNCTURE: CPT

## 2019-06-21 PROCEDURE — 80061 LIPID PANEL: CPT

## 2019-06-21 PROCEDURE — 83036 HEMOGLOBIN GLYCOSYLATED A1C: CPT

## 2019-06-21 PROCEDURE — 80053 COMPREHEN METABOLIC PANEL: CPT

## 2019-06-21 PROCEDURE — 84443 ASSAY THYROID STIM HORMONE: CPT

## 2019-06-21 PROCEDURE — 85027 COMPLETE CBC AUTOMATED: CPT

## 2019-06-28 ENCOUNTER — OFFICE VISIT (OUTPATIENT)
Dept: FAMILY MEDICINE CLINIC | Facility: CLINIC | Age: 84
End: 2019-06-28
Payer: COMMERCIAL

## 2019-06-28 VITALS
OXYGEN SATURATION: 98 % | TEMPERATURE: 96.5 F | SYSTOLIC BLOOD PRESSURE: 118 MMHG | DIASTOLIC BLOOD PRESSURE: 62 MMHG | RESPIRATION RATE: 16 BRPM | HEIGHT: 65 IN | WEIGHT: 131.8 LBS | BODY MASS INDEX: 21.96 KG/M2 | HEART RATE: 68 BPM

## 2019-06-28 DIAGNOSIS — I10 ESSENTIAL HYPERTENSION: ICD-10-CM

## 2019-06-28 DIAGNOSIS — E10.3599 PROLIFERATIVE DIABETIC RETINOPATHY ASSOCIATED WITH TYPE 1 DIABETES MELLITUS, UNSPECIFIED LATERALITY, UNSPECIFIED PROLIFERATIVE RETINOPATHY TYPE (HCC): ICD-10-CM

## 2019-06-28 DIAGNOSIS — E03.8 ADULT ONSET HYPOTHYROIDISM: ICD-10-CM

## 2019-06-28 DIAGNOSIS — E10.40 TYPE 1 DIABETES MELLITUS WITH DIABETIC NEUROPATHY (HCC): Primary | ICD-10-CM

## 2019-06-28 DIAGNOSIS — E78.5 HYPERLIPIDEMIA, UNSPECIFIED HYPERLIPIDEMIA TYPE: ICD-10-CM

## 2019-06-28 DIAGNOSIS — Z00.00 ENCOUNTER FOR MEDICARE ANNUAL WELLNESS EXAM: ICD-10-CM

## 2019-06-28 DIAGNOSIS — F41.1 GAD (GENERALIZED ANXIETY DISORDER): Chronic | ICD-10-CM

## 2019-06-28 DIAGNOSIS — Z23 ENCOUNTER FOR IMMUNIZATION: ICD-10-CM

## 2019-06-28 DIAGNOSIS — E11.40 DIABETIC NEUROPATHY, PAINFUL (HCC): ICD-10-CM

## 2019-06-28 DIAGNOSIS — M81.0 AGE-RELATED OSTEOPOROSIS WITHOUT CURRENT PATHOLOGICAL FRACTURE: ICD-10-CM

## 2019-06-28 PROCEDURE — 4040F PNEUMOC VAC/ADMIN/RCVD: CPT

## 2019-06-28 PROCEDURE — 99214 OFFICE O/P EST MOD 30 MIN: CPT | Performed by: FAMILY MEDICINE

## 2019-06-28 PROCEDURE — 1125F AMNT PAIN NOTED PAIN PRSNT: CPT | Performed by: FAMILY MEDICINE

## 2019-06-28 PROCEDURE — G0439 PPPS, SUBSEQ VISIT: HCPCS | Performed by: FAMILY MEDICINE

## 2019-06-28 PROCEDURE — 1160F RVW MEDS BY RX/DR IN RCRD: CPT | Performed by: FAMILY MEDICINE

## 2019-06-28 PROCEDURE — G0009 ADMIN PNEUMOCOCCAL VACCINE: HCPCS

## 2019-06-28 PROCEDURE — 3078F DIAST BP <80 MM HG: CPT | Performed by: FAMILY MEDICINE

## 2019-06-28 PROCEDURE — 3074F SYST BP LT 130 MM HG: CPT | Performed by: FAMILY MEDICINE

## 2019-06-28 PROCEDURE — 1170F FXNL STATUS ASSESSED: CPT | Performed by: FAMILY MEDICINE

## 2019-06-28 PROCEDURE — 90732 PPSV23 VACC 2 YRS+ SUBQ/IM: CPT

## 2019-06-30 PROBLEM — M81.0 AGE-RELATED OSTEOPOROSIS WITHOUT CURRENT PATHOLOGICAL FRACTURE: Chronic | Status: ACTIVE | Noted: 2019-06-30

## 2019-06-30 PROBLEM — M81.0 AGE-RELATED OSTEOPOROSIS WITHOUT CURRENT PATHOLOGICAL FRACTURE: Status: ACTIVE | Noted: 2019-06-30

## 2019-06-30 PROBLEM — E10.40 TYPE 1 DIABETES MELLITUS WITH DIABETIC NEUROPATHY (HCC): Chronic | Status: ACTIVE | Noted: 2017-10-30

## 2019-07-08 ENCOUNTER — APPOINTMENT (OUTPATIENT)
Dept: LAB | Facility: CLINIC | Age: 84
End: 2019-07-08
Payer: COMMERCIAL

## 2019-07-08 DIAGNOSIS — I10 ESSENTIAL HYPERTENSION: ICD-10-CM

## 2019-07-08 LAB
ANION GAP SERPL CALCULATED.3IONS-SCNC: 4 MMOL/L (ref 4–13)
BUN SERPL-MCNC: 24 MG/DL (ref 5–25)
CALCIUM SERPL-MCNC: 8.9 MG/DL (ref 8.3–10.1)
CHLORIDE SERPL-SCNC: 103 MMOL/L (ref 100–108)
CO2 SERPL-SCNC: 26 MMOL/L (ref 21–32)
CREAT SERPL-MCNC: 0.97 MG/DL (ref 0.6–1.3)
GFR SERPL CREATININE-BSD FRML MDRD: 55 ML/MIN/1.73SQ M
GLUCOSE SERPL-MCNC: 152 MG/DL (ref 65–140)
POTASSIUM SERPL-SCNC: 4.7 MMOL/L (ref 3.5–5.3)
SODIUM SERPL-SCNC: 133 MMOL/L (ref 136–145)

## 2019-07-08 PROCEDURE — 80048 BASIC METABOLIC PNL TOTAL CA: CPT

## 2019-07-08 PROCEDURE — 36415 COLL VENOUS BLD VENIPUNCTURE: CPT

## 2019-07-10 DIAGNOSIS — E10.40 TYPE 1 DIABETES MELLITUS WITH DIABETIC NEUROPATHY (HCC): ICD-10-CM

## 2019-07-10 RX ORDER — DULOXETIN HYDROCHLORIDE 30 MG/1
30 CAPSULE, DELAYED RELEASE ORAL DAILY
Qty: 30 CAPSULE | Refills: 5 | Status: SHIPPED | OUTPATIENT
Start: 2019-07-10 | End: 2019-10-18 | Stop reason: HOSPADM

## 2019-07-10 NOTE — TELEPHONE ENCOUNTER
----- Message from Tere Townsend MD sent at 7/10/2019  3:59 PM EDT -----  Please contact patient  I received results of her blood work  Her sodium level is still slightly decreased  I would like her to hold HCTZ, water pill, that she uses few days a week if possible  I would like to repeat her BMP again in 2-3 weeks, nonfasting, I will place orders    Thank you

## 2019-07-11 ENCOUNTER — TELEPHONE (OUTPATIENT)
Dept: FAMILY MEDICINE CLINIC | Facility: CLINIC | Age: 84
End: 2019-07-11

## 2019-07-11 NOTE — TELEPHONE ENCOUNTER
Patient called stating she is confused on taking her Hydrochlorothiazide 12 5 mg , she states Dr Vipul Martinez told her to take the medication differently and she is only taking it on Monday, Wednesdays, and Fridays  Can someone call patient to clarify the directions of the medication

## 2019-07-11 NOTE — TELEPHONE ENCOUNTER
My message from earlier was to hold HCTZ as I am concerned that HCTZ is contributing to  decreased sodium levels    Normally patient was taking it on Mondays, Wednesdays and Fridays  Now I would like her to stop HCTZ altogether

## 2019-07-25 ENCOUNTER — TELEPHONE (OUTPATIENT)
Dept: FAMILY MEDICINE CLINIC | Facility: CLINIC | Age: 84
End: 2019-07-25

## 2019-07-25 ENCOUNTER — TRANSCRIBE ORDERS (OUTPATIENT)
Dept: LAB | Facility: CLINIC | Age: 84
End: 2019-07-25

## 2019-07-25 NOTE — TELEPHONE ENCOUNTER
Patient was to do follow up lab work but was told by the lab that there was no order in the system  She wants to know if she still has to do the labwork since Dr Ronn Valverde had taken her off the medication  Patient states she wants to wait to give this message to Dr Ronn Valverde upon her return   (sending to Dr Ronn Valverde tasks)

## 2019-07-29 ENCOUNTER — APPOINTMENT (OUTPATIENT)
Dept: LAB | Facility: CLINIC | Age: 84
End: 2019-07-29
Payer: COMMERCIAL

## 2019-07-29 ENCOUNTER — CLINICAL SUPPORT (OUTPATIENT)
Dept: FAMILY MEDICINE CLINIC | Facility: CLINIC | Age: 84
End: 2019-07-29
Payer: COMMERCIAL

## 2019-07-29 DIAGNOSIS — I10 ESSENTIAL HYPERTENSION: Chronic | ICD-10-CM

## 2019-07-29 DIAGNOSIS — E87.1 LOW SODIUM LEVELS: ICD-10-CM

## 2019-07-29 DIAGNOSIS — I10 ESSENTIAL HYPERTENSION: Primary | Chronic | ICD-10-CM

## 2019-07-29 DIAGNOSIS — M81.0 AGE-RELATED OSTEOPOROSIS WITHOUT CURRENT PATHOLOGICAL FRACTURE: Primary | Chronic | ICD-10-CM

## 2019-07-29 LAB
ANION GAP SERPL CALCULATED.3IONS-SCNC: 3 MMOL/L (ref 4–13)
BUN SERPL-MCNC: 22 MG/DL (ref 5–25)
CALCIUM SERPL-MCNC: 9.1 MG/DL (ref 8.3–10.1)
CHLORIDE SERPL-SCNC: 103 MMOL/L (ref 100–108)
CO2 SERPL-SCNC: 27 MMOL/L (ref 21–32)
CREAT SERPL-MCNC: 1.01 MG/DL (ref 0.6–1.3)
GFR SERPL CREATININE-BSD FRML MDRD: 52 ML/MIN/1.73SQ M
GLUCOSE SERPL-MCNC: 239 MG/DL (ref 65–140)
POTASSIUM SERPL-SCNC: 4.8 MMOL/L (ref 3.5–5.3)
SODIUM SERPL-SCNC: 133 MMOL/L (ref 136–145)

## 2019-07-29 PROCEDURE — 36415 COLL VENOUS BLD VENIPUNCTURE: CPT

## 2019-07-29 PROCEDURE — 80048 BASIC METABOLIC PNL TOTAL CA: CPT

## 2019-07-29 PROCEDURE — 96372 THER/PROPH/DIAG INJ SC/IM: CPT

## 2019-07-29 NOTE — TELEPHONE ENCOUNTER
As per my message as of July 8th , patient was supposed to repeat BMP in 2-3 weeks, I am placing order for BMP now  She should proceed    Thank you

## 2019-07-30 ENCOUNTER — TELEPHONE (OUTPATIENT)
Dept: FAMILY MEDICINE CLINIC | Facility: CLINIC | Age: 84
End: 2019-07-30

## 2019-07-30 DIAGNOSIS — E87.1 HYPONATREMIA: Primary | ICD-10-CM

## 2019-07-30 NOTE — TELEPHONE ENCOUNTER
----- Message from Braeden Moncada MD sent at 7/30/2019  9:59 AM EDT -----  Please contact patient  Her sodium level is still stably and slightly decreased  No changes  I would like her to proceed with evaluation by  [facility]  I will place orders  Thank you    Please continue same daily medications for now

## 2019-07-31 ENCOUNTER — TELEPHONE (OUTPATIENT)
Dept: FAMILY MEDICINE CLINIC | Facility: CLINIC | Age: 84
End: 2019-07-31

## 2019-07-31 NOTE — TELEPHONE ENCOUNTER
----- Message from Terri Sow MD sent at 7/30/2019  9:59 AM EDT -----  Please contact patient  Her sodium level is still stably and slightly decreased  No changes  I would like her to proceed with evaluation by  [facility]  I will place orders  Thank you    Please continue same daily medications for now

## 2019-07-31 NOTE — TELEPHONE ENCOUNTER
----- Message from April Lobo MD sent at 7/30/2019  9:59 AM EDT -----  Please contact patient  Her sodium level is still stably and slightly decreased  No changes  I would like her to proceed with evaluation by  [facility]  I will place orders  Thank you    Please continue same daily medications for now

## 2019-08-05 ENCOUNTER — TELEPHONE (OUTPATIENT)
Dept: NEPHROLOGY | Facility: CLINIC | Age: 84
End: 2019-08-05

## 2019-08-06 ENCOUNTER — CONSULT (OUTPATIENT)
Dept: NEPHROLOGY | Facility: CLINIC | Age: 84
End: 2019-08-06
Payer: COMMERCIAL

## 2019-08-06 VITALS — WEIGHT: 130 LBS | BODY MASS INDEX: 21.97 KG/M2

## 2019-08-06 DIAGNOSIS — E87.1 HYPONATREMIA: Primary | ICD-10-CM

## 2019-08-06 DIAGNOSIS — I10 ESSENTIAL HYPERTENSION: Chronic | ICD-10-CM

## 2019-08-06 DIAGNOSIS — R31.29 OTHER MICROSCOPIC HEMATURIA: ICD-10-CM

## 2019-08-06 LAB
SL AMB  POCT GLUCOSE, UA: ABNORMAL
SL AMB LEUKOCYTE ESTERASE,UA: ABNORMAL
SL AMB POCT BILIRUBIN,UA: ABNORMAL
SL AMB POCT BLOOD,UA: ABNORMAL
SL AMB POCT CLARITY,UA: CLEAR
SL AMB POCT COLOR,UA: YELLOW
SL AMB POCT KETONES,UA: ABNORMAL
SL AMB POCT NITRITE,UA: ABNORMAL
SL AMB POCT PH,UA: ABNORMAL
SL AMB POCT SPECIFIC GRAVITY,UA: 1.01
SL AMB POCT URINE PROTEIN: ABNORMAL
SL AMB POCT UROBILINOGEN: ABNORMAL

## 2019-08-06 PROCEDURE — 99204 OFFICE O/P NEW MOD 45 MIN: CPT | Performed by: INTERNAL MEDICINE

## 2019-08-06 PROCEDURE — 81002 URINALYSIS NONAUTO W/O SCOPE: CPT | Performed by: INTERNAL MEDICINE

## 2019-08-06 RX ORDER — TORSEMIDE 5 MG/1
5 TABLET ORAL DAILY
Qty: 30 TABLET | Refills: 5 | Status: SHIPPED | OUTPATIENT
Start: 2019-08-06 | End: 2019-10-18 | Stop reason: HOSPADM

## 2019-08-06 NOTE — PATIENT INSTRUCTIONS
1  Medication changes today:  -begin torsemide 5 mg daily but wait until you have done your testing now which includes the urine tests    2  Please go for  labs 1 week -2 weeks after beginning the above medications  3  Please go for a CT scan of the head chest abdomen pelvis in the very near future over the next few weeks  4  Please wait 2 weeks after making the above medication changes in take 1 week a blood pressure readings morning and evening:  -the morning readings before taking any medications  -the evening readings closer to bedtime but before taking her evening medications  THEN PLEASE BRING IN YOUR BLOOD PRESSURE MACHINE ALONG WITH YOUR BLOOD PRESSURE READINGS TO SEE 1 OF MY ADVANCED PRACTITIONER'S IN ABOUT 3-4 WEEKS TO CORRELATE YOUR BLOOD PRESSURE MACHINE AND TO MAKE SURE YOUR  BLOOD PRESSURE IS AT GOAL  YOUR BLOOD PRESSURE GOAL IS LESS THAN 135/85 ON AVERAGE AT HOME    5  Please go for nonfasting lab work in 1 month    6  Follow-up in 3 months:  -please bring in 1 week a blood pressure readings morning evening, sitting and standing:  · Take the morning readings before any medications  · Take the evening readings closer to bedtime  · When taking standing readings, keep your arm supported at heart level and not dangling  -please go for nonfasting lab work prior to your appointment    7  Measures to help keep your sodium normal:  -please keep to a 1500 mL or 48 oz fluid restriction  -if you become dehydrated use Gatorade or Powerade sugar free  -add small amount of salt to each meal 3 times a day such as a half a tsp  -AVOID ADVIL AND MEDICATIONS LIKE ADVIL IF POSSIBLE  AS DISCUSSED PLEASE TALK WITH HER FAMILY PHYSICIAN TO COME UP WITH A SCHEME/PLAN TO KEEP YOUR NEUROPATHY CONTROLLED AT THE SAME TIME AVOIDING ADVIL AS MUCH AS POSSIBLE  USE CERTAINLY CAN TAKE ADVIL IF ABSOLUTELY NECESSARY  8  PLEASE CONTACT YOUR COLORECTAL SURGEON/GI REGARDING A COLONOSCOPY    I have made the referral as requested

## 2019-08-06 NOTE — LETTER
August 6, 2019     Dorinda Aguilar MD  [address] [address]    Patient: Pina Vazquez   YOB: 1935   Date of Visit: 8/6/2019       Dear Dr Fozia Patel: Thank you for referring Ernestina Llamas to me for evaluation  Below are my notes for this consultation  If you have questions, please do not hesitate to call me  I look forward to following your patient along with you  Sincerely,        Lance Douglass MD        CC: No Recipients  Lance Douglass MD  8/6/2019  9:03 AM  Sign at close encounter  Consultation - Nephrology 8/6/2019        History of Present Illness   Reason for Consult / Principal Problem:  Hyponatremia    HPI: Pina Vazquez is a 82y o  year old female with a history of diabetes mellitus type 2 complicated by retinopathy and neuropathy/hypothyroidism/hypertension/dyslipidemia who we are asked to see regarding hyponatremia  Reviewing the sodium levels:  -08/14/2015 133  -since that time ranging 133-139  -03/12/2019 137  -06/21/2019 134  -07/08/2019 133  -07/29/2019:  133 associated with a glucose of 239 correcting about 135  TSH 1 89 as of 06/21/2019  Abdominal ultrasound as of 04/08/2019:  Right-sided extrarenal pelvis that evidence of hydronephrosis otherwise unremarkable  She drinks about 4-8 oz of water a day  Patient was on HCTZ but apparently stopped approximately 4-6 months ago  No leg edema  Advil 2 a day and Cymbalta and gabapentin for neuropathy  No history of weight loss or loss of appetite    History of diabetes mellitus type 1 late onset for approximately 26 years complicated by retinopathy and neuropathy the insulin-dependent  History of hypertension for approximately 10 years:   Well controlled   -Avapro 150 mg daily in the morning  -amlodipine 5 mg daily at supper            General:  Overall feeling well except for neuropathy, good appetite energy, no fevers chills cough or colds and no weight change  Cardiovascular:  No chest pain or shortness of breath or leg swelling  Respiratory:  No coughing or wheezing  Gastrointestinal:  No nausea vomiting diarrhea occasional constipation, no abdominal pain no hematochezia and no melanotic type stools  Genitourinary:  No dysuria, hematuria, foamy urine; episodes of cystitis in the past, but no kidney stones  Neurology:  No headaches, occasional dizziness and lightheadedness if she stands abruptly; she has significant neuropathy of her feet and legs  Rest of review of systems as completely reviewed with the patient are negative    Historical Information   Past Medical History:   Diagnosis Date    Cataract     Last Assessed: 8/11/2015    Chest pain     Last Assessed: 8/25/2015    Diabetes mellitus ([facility] [address] )     Difficulty falling asleep at night until early morning hours     sleeping flat    Diplopia     Last Assessed: 6/9/2016    Fracture of fifth metatarsal bone of right foot     Last Assessed: 12/9/2016    Hearing loss     Herpes zoster     Last Assessed: 12/9/2015    Hyperlipidemia     Hypertension     Hyperthyroidism     Last Assessed: 2/25/2017    Hyponatremia     Last Assessed: 3/22/2016    Neoplasm of uncertain behavior of skin     Last Assessed: 10/17/2013    Skin lesions     Last Assessed: 6/27/2016    Syncope     Last Assessed: 9/18/2015    Thyroid disease     Type 1 diabetes mellitus without complication ([facility] [address] )     Wears glasses    ·  Hypothyroidism at this time  · No CAD and CHF  · No history of cancer  · No lung  Disease, emphysema or asthma  · No CVA or seizures  · No liver disease      Past Surgical History:   Procedure Laterality Date    APPENDECTOMY  1956    CATARACT EXTRACTION      COLONOSCOPY  09/2014    Completed - Dr Oliver Sever, due in 5 years    SKIN LESION EXCISION      Ears     Social History   Social History     Substance and Sexual Activity   Alcohol Use Yes    Comment: social     Social History     Substance and Sexual Activity   Drug Use No     Social History     Tobacco Use   Smoking Status Never Smoker   Smokeless Tobacco Never Used    No history of smoking  Only social alcohol  She is active but no dedicated exercise  She does add occasional salt to eating    Family History   Problem Relation Age of Onset    Heart attack Mother     Diabetes type II Mother     Dementia Father     Stroke Father         CVA    Breast cancer Maternal Aunt     Stomach cancer Maternal Uncle     Diabetes Family     Stroke Family         Complications       Meds/Allergies   all current active meds have been reviewed, current meds:   Current Outpatient Medications:     amLODIPine (NORVASC) 5 mg tablet, TAKE 1 TABLET DAILY AFTER SUPPER, Disp: 90 tablet, Rfl: 3    aspirin 81 MG tablet, Take 1 tablet by mouth daily, Disp: , Rfl:     B Complex-Folic Acid (SUPER B COMPLEX MAXI) TABS, Take by mouth, Disp: , Rfl:     BD INSULIN SYRINGE ULTRAFINE 31G X 15/64" 0 3 ML MISC, Inject under the skin 4 (four) times a day, Disp: 120 each, Rfl: 11    Calcium Polycarbophil (KONSYL FIBER PO), Take by mouth, Disp: , Rfl:     carboxymethylcellulose (REFRESH TEARS) 0 5 % SOLN, Apply 1 drop to eye 4 (four) times a day, Disp: , Rfl:     cholecalciferol (VITAMIN D3) 1,000 units tablet, Take 3,000 capsules by mouth daily , Disp: , Rfl:     cyanocobalamin 1000 MCG tablet, Take 1,000 mcg by mouth daily, Disp: , Rfl:     dorzolamide-timolol (COSOPT) 22 3-6 8 MG/ML ophthalmic solution, Apply 1 drop to eye 2 (two) times a day, Disp: , Rfl:     DULoxetine (CYMBALTA) 30 mg delayed release capsule, Take 1 capsule (30 mg total) by mouth daily, Disp: 30 capsule, Rfl: 5    gabapentin (NEURONTIN) 300 mg capsule, Take 1 capsule (300 mg total) by mouth 3 (three) times a day (Patient taking differently: Take 300 mg by mouth 2 (two) times a day ), Disp: 90 capsule, Rfl: 11    glucose blood (ONE TOUCH ULTRA TEST) test strip, 1 each by Other route 6 (six) times a day, Disp: 100 each, Rfl: 11    Ibuprofen (ADVIL) 200 MG CAPS, Take by mouth, Disp: , Rfl:     insulin glargine (LANTUS) 100 units/mL subcutaneous injection, Inject 12units under the skin nightly as directed, Disp: 10 mL, Rfl: 11    insulin lispro (HUMALOG) 100 units/mL injection, INJECT 4 UNITS AT BREAKFAST, 5 UNITS AT LUNCH, AND 7 UNITS AT DINNER TIME (Patient taking differently: INJECT 4 UNITS AT BREAKFAST, 4 UNITS AT LUNCH, AND 7 UNITS AT DINNER TIME), Disp: 10 mL, Rfl: 11    Insulin Syringe-Needle U-100 (B-D INS SYRINGE 0 5CC/31GX5/16) 31G X 5/16" 0 5 ML MISC, by Does not apply route, Disp: , Rfl:     irbesartan (AVAPRO) 150 mg tablet, Take 1 tablet (150 mg total) by mouth daily, Disp: 90 tablet, Rfl: 3    LANTUS 100 UNIT/ML subcutaneous injection, INJECT 12 UNITS UNDER THE SKIN NIGHTLY AS DIRECTED, Disp: 10 mL, Rfl: 2    levothyroxine 50 mcg tablet, Take 1 tablet (50 mcg total) by mouth daily, Disp: 90 tablet, Rfl: 1    LORazepam (ATIVAN) 0 5 mg tablet, Take 1 tablet (0 5 mg total) by mouth 3 (three) times a day (Patient taking differently: Take 0 5 mg by mouth 2 (two) times a day ), Disp: 90 tablet, Rfl: 5    Magnesium 500 MG TABS, Take 1 tablet by mouth daily, Disp: , Rfl:     pravastatin (PRAVACHOL) 20 mg tablet, Take 1 tablet (20 mg total) by mouth daily at bedtime, Disp: 90 tablet, Rfl: 1    travoprost (TRAVATAN Z) 0 004 % ophthalmic solution, Apply to eye, Disp: , Rfl:     fluticasone (FLONASE) 50 mcg/act nasal spray, 2 sprays into each nostril daily, Disp: , Rfl:     hydrochlorothiazide (MICROZIDE) 12 5 mg capsule, Take 1 capsule once a day as needed PRN : leg edema (Patient not taking: Reported on 8/6/2019), Disp: 90 capsule, Rfl: 1    lidocaine (XYLOCAINE) 2 % topical gel, apply take affected area daily, Disp: , Rfl: 0    torsemide (DEMADEX) 5 MG tablet, Take 1 tablet (5 mg total) by mouth daily, Disp: 30 tablet, Rfl: 5    Allergies   Allergen Reactions    Thiazide-Type Diuretics      Hyponatremia       Objective   Weight:  59 kg  BP sitting on right:  146/64 with a heart rate of 72 and regular, same on left  BP standing on right:  150/64 with a heart rate of 72 and regular  Body mass index is 21 97 kg/m²  General:  Well-developed well-nourished no acute distress  Skin:  No acute rash  Eyes:  No scleral icterus, and noninjected  ENT:  Normocephalic/atraumatic, mucous membranes moist  Neck:  Supple, no jugular venous distention, no carotid bruits, 1+ carotid upstroke, no thyromegaly  Back:  CVA tenderness  Chest:  Clear to auscultation percussion, good respiratory effort, no use of accessory respiratory muscles  CVS:  Regular rate and rhythm without a murmur rub or gallops appreciable  Abdomen:  Soft and nontender, no hepatosplenomegaly or bruits and no masses  Extremities:  No clubbing, no cyanosis, no edema, 1+ dorsalis pedis pulses, no femoral bruits  Neuro:  No gross focality  Psych:  Alert oriented and appropriate    Current Weight:   Weight (last 2 days)     Date/Time   Weight    08/06/19 0756   59 (130)                Lab Results:  I have personally reviewed pertinent labs  Results for orders placed or performed in visit on 08/06/19   POCT urine dip   Result Value Ref Range    LEUKOCYTE ESTERASE,UA neg     NITRITE,UA neg     SL AMB POCT UROBILINOGEN neg     POCT URINE PROTEIN neg      PH,UA 6 5  BLOOD,UA +     SPECIFIC GRAVITY,UA 1 015     KETONES,UA neg     BILIRUBIN,UA neg     GLUCOSE, UA neg      COLOR,UA yellow     CLARITY,UA clear                ASSESSMENT AND PLAN:  1  Hyponatremia:  Differential diagnosis would include thiazide diuretics/rule out adrenal insufficiency, thyroid profile is normal; SIADH? Etiology; NSAIDs are contributing  Also, the hyperglycemia partially corrects the sodium at 135 but has not done so in the past   Recommendations: Workup:  -urine sodium  -urine osmolality  -serum osmolality  -a m   Cortisol  -uric acid  -CT of the head  -CT of the chest  -CT the abdomen pelvis with oral contrast only given diabetes mellitus  Treatment:  -modest fluid restriction 48 oz or 1500 mL per day  -add small amount of salt to each food  -if any situation of dehydration use electrolyte solutions such as Gatorade or Powerade  -AVOID THIAZIDE DIURETICS  -add torsemide 5 mg daily after obtaining the urinary studies both for hypertension and hyponatremia  -I HAVE ENCOURAGED HER TO CHECK WITH HER FAMILY PHYSICIAN REGARDING STOPPING THE ADVIL AND SEARCHING FOR ANOTHER COMBINATION THAT WILL CONTROL HER NEUROPATHY  SHE IS AGREEABLE WITH THIS PLAN  -GOAL SODIUM IS CLOSE  TO GIVE BUFFER IN CASE AN ACUTE EVENT  Further workup and treatment recommendations will be forthcoming depending upon the above evaluation and course    2  Hypertension:  Her blood pressure appears potentially well controlled especially with the low diastolic readings but we need to get formal readings from home given borderline high systolic readings  Recommendations:  -maintain Avapro 150 mg daily in the morning  -maintain amlodipine 5 mg daily in the evening  -add torsemide 5 mg daily  -obtain readings at home for 1 week in about 2-3 weeks morning evening; bring in the readings as well as the blood pressure machine for correlation with the office machine  -GOAL BLOOD PRESSURE AT THIS TIME LESS THAN 135/85 GIVEN LACK OF PAD/CAD/PROTEINURIA    3 ?  Microscopic hematuria:  Only by dipstick not by microscopic exam   I will recheck in the lab  If persistent potentially further evaluation  4  Patient is due for colonoscopy  I have encouraged her to contact the colorectal surgeon  I have discussed the above workup and plan with the patient and her daughter  They are agreeable  I have answered all questions  Patient Instructions   1  Medication changes today:  -begin torsemide 5 mg daily but wait until you have done your testing now which includes the urine tests    2  Please go for  labs 1 week -2 weeks after beginning the above medications  3   Please go for a CT scan of the head chest abdomen pelvis in the very near future over the next few weeks  4  Please wait 2 weeks after making the above medication changes in take 1 week a blood pressure readings morning and evening:  -the morning readings before taking any medications  -the evening readings closer to bedtime but before taking her evening medications  THEN PLEASE BRING IN YOUR BLOOD PRESSURE MACHINE ALONG WITH YOUR BLOOD PRESSURE READINGS TO SEE 1 OF MY ADVANCED PRACTITIONER'S IN ABOUT 3-4 WEEKS TO CORRELATE YOUR BLOOD PRESSURE MACHINE AND TO MAKE SURE YOUR  BLOOD PRESSURE IS AT GOAL  YOUR BLOOD PRESSURE GOAL IS LESS THAN 135/85 ON AVERAGE AT HOME    5  Please go for nonfasting lab work in 1 month    6  Follow-up in 3 months:  -please bring in 1 week a blood pressure readings morning evening, sitting and standing:  · Take the morning readings before any medications  · Take the evening readings closer to bedtime  · When taking standing readings, keep your arm supported at heart level and not dangling  -please go for nonfasting lab work prior to your appointment    7  Measures to help keep your sodium normal:  -please keep to a 1500 mL or 48 oz fluid restriction  -if you become dehydrated use Gatorade or Powerade sugar free  -add small amount of salt to each meal 3 times a day such as a half a tsp  -AVOID ADVIL AND MEDICATIONS LIKE ADVIL IF POSSIBLE  AS DISCUSSED PLEASE TALK WITH HER FAMILY PHYSICIAN TO COME UP WITH A SCHEME/PLAN TO KEEP YOUR NEUROPATHY CONTROLLED AT THE SAME TIME AVOIDING ADVIL AS MUCH AS POSSIBLE  USE CERTAINLY CAN TAKE ADVIL IF ABSOLUTELY NECESSARY  8  PLEASE CONTACT YOUR COLORECTAL SURGEON/GI REGARDING A COLONOSCOPY  I have made the referral as requested        Portions of the record may have been created with voice recognition software   Occasional wrong word or "sound a like" substitutions may have occurred due to the inherent limitations of voice recognition software   Read the chart carefully and recognize, using context, where substitutions have occurred      Petros Dong MD

## 2019-08-07 ENCOUNTER — APPOINTMENT (OUTPATIENT)
Dept: LAB | Facility: CLINIC | Age: 84
End: 2019-08-07
Payer: COMMERCIAL

## 2019-08-07 ENCOUNTER — TELEPHONE (OUTPATIENT)
Dept: NEPHROLOGY | Facility: CLINIC | Age: 84
End: 2019-08-07

## 2019-08-07 DIAGNOSIS — I10 ESSENTIAL HYPERTENSION: Primary | ICD-10-CM

## 2019-08-07 DIAGNOSIS — E87.1 HYPONATREMIA: ICD-10-CM

## 2019-08-07 DIAGNOSIS — I10 ESSENTIAL HYPERTENSION: Chronic | ICD-10-CM

## 2019-08-07 LAB
ALBUMIN SERPL BCP-MCNC: 4.1 G/DL (ref 3.5–5)
ALP SERPL-CCNC: 90 U/L (ref 46–116)
ALT SERPL W P-5'-P-CCNC: 19 U/L (ref 12–78)
ANION GAP SERPL CALCULATED.3IONS-SCNC: 6 MMOL/L (ref 4–13)
AST SERPL W P-5'-P-CCNC: 10 U/L (ref 5–45)
BACTERIA UR QL AUTO: ABNORMAL /HPF
BILIRUB SERPL-MCNC: 0.79 MG/DL (ref 0.2–1)
BILIRUB UR QL STRIP: NEGATIVE
BUN SERPL-MCNC: 20 MG/DL (ref 5–25)
CALCIUM SERPL-MCNC: 9 MG/DL (ref 8.3–10.1)
CHLORIDE SERPL-SCNC: 104 MMOL/L (ref 100–108)
CK SERPL-CCNC: 65 U/L (ref 26–192)
CLARITY UR: CLEAR
CO2 SERPL-SCNC: 27 MMOL/L (ref 21–32)
COLOR UR: YELLOW
CORTIS AM PEAK SERPL-MCNC: 25.3 UG/DL (ref 4.2–22.4)
CREAT SERPL-MCNC: 0.85 MG/DL (ref 0.6–1.3)
CREAT UR-MCNC: 51.1 MG/DL
GFR SERPL CREATININE-BSD FRML MDRD: 64 ML/MIN/1.73SQ M
GLUCOSE P FAST SERPL-MCNC: 175 MG/DL (ref 65–99)
GLUCOSE UR STRIP-MCNC: ABNORMAL MG/DL
HGB UR QL STRIP.AUTO: ABNORMAL
HYALINE CASTS #/AREA URNS LPF: ABNORMAL /LPF
KETONES UR STRIP-MCNC: NEGATIVE MG/DL
LEUKOCYTE ESTERASE UR QL STRIP: ABNORMAL
MAGNESIUM SERPL-MCNC: 2.7 MG/DL (ref 1.6–2.6)
NITRITE UR QL STRIP: NEGATIVE
NON-SQ EPI CELLS URNS QL MICRO: ABNORMAL /HPF
OSMOLALITY UR/SERPL-RTO: 294 MMOL/KG (ref 282–298)
OSMOLALITY UR: 427 MMOL/KG
PH UR STRIP.AUTO: 7 [PH]
POTASSIUM SERPL-SCNC: 4.8 MMOL/L (ref 3.5–5.3)
PROT SERPL-MCNC: 7.7 G/DL (ref 6.4–8.2)
PROT UR STRIP-MCNC: NEGATIVE MG/DL
PROT UR-MCNC: 8 MG/DL
PROT/CREAT UR: 0.16 MG/G{CREAT} (ref 0–0.1)
RBC #/AREA URNS AUTO: ABNORMAL /HPF
SODIUM 24H UR-SCNC: 65 MOL/L
SODIUM SERPL-SCNC: 137 MMOL/L (ref 136–145)
SP GR UR STRIP.AUTO: 1.02 (ref 1–1.03)
TSH SERPL DL<=0.05 MIU/L-ACNC: 2.36 UIU/ML (ref 0.36–3.74)
URATE SERPL-MCNC: 3.4 MG/DL (ref 2–6.8)
UROBILINOGEN UR QL STRIP.AUTO: 0.2 E.U./DL
WBC #/AREA URNS AUTO: ABNORMAL /HPF

## 2019-08-07 PROCEDURE — 36415 COLL VENOUS BLD VENIPUNCTURE: CPT

## 2019-08-07 PROCEDURE — 81001 URINALYSIS AUTO W/SCOPE: CPT

## 2019-08-07 PROCEDURE — 82550 ASSAY OF CK (CPK): CPT

## 2019-08-07 PROCEDURE — 82570 ASSAY OF URINE CREATININE: CPT | Performed by: INTERNAL MEDICINE

## 2019-08-07 PROCEDURE — 82533 TOTAL CORTISOL: CPT

## 2019-08-07 PROCEDURE — 80053 COMPREHEN METABOLIC PANEL: CPT

## 2019-08-07 PROCEDURE — 83930 ASSAY OF BLOOD OSMOLALITY: CPT

## 2019-08-07 PROCEDURE — 84443 ASSAY THYROID STIM HORMONE: CPT

## 2019-08-07 PROCEDURE — 83735 ASSAY OF MAGNESIUM: CPT

## 2019-08-07 PROCEDURE — 83935 ASSAY OF URINE OSMOLALITY: CPT | Performed by: INTERNAL MEDICINE

## 2019-08-07 PROCEDURE — 84550 ASSAY OF BLOOD/URIC ACID: CPT

## 2019-08-07 PROCEDURE — 84156 ASSAY OF PROTEIN URINE: CPT | Performed by: INTERNAL MEDICINE

## 2019-08-07 PROCEDURE — 84300 ASSAY OF URINE SODIUM: CPT | Performed by: INTERNAL MEDICINE

## 2019-08-07 NOTE — TELEPHONE ENCOUNTER
----- Message from Jovana Parks MD sent at 8/7/2019 11:51 AM EDT -----  Given her high serum osmolality I would check an SPEP/UPEP and light chain ratio to rule out multiple myeloma  It is non likely diagnosis but just to be sure

## 2019-08-07 NOTE — TELEPHONE ENCOUNTER
I spoke to patient and she is aware of results and is agreeable to have follow up lab work  I will mail out spep/upep and light chains

## 2019-08-08 ENCOUNTER — TELEPHONE (OUTPATIENT)
Dept: NEPHROLOGY | Facility: CLINIC | Age: 84
End: 2019-08-08

## 2019-08-08 NOTE — TELEPHONE ENCOUNTER
----- Message from Yaneli Verdin MD sent at 8/7/2019  4:20 PM EDT -----  Please let the patient know that her sodium is doing well a ready at 137 and the rest of the lab work thus far is okay

## 2019-08-12 ENCOUNTER — APPOINTMENT (OUTPATIENT)
Dept: LAB | Facility: CLINIC | Age: 84
End: 2019-08-12
Payer: COMMERCIAL

## 2019-08-12 DIAGNOSIS — I10 ESSENTIAL HYPERTENSION: Chronic | ICD-10-CM

## 2019-08-12 DIAGNOSIS — E87.1 HYPONATREMIA: ICD-10-CM

## 2019-08-12 DIAGNOSIS — R31.29 OTHER MICROSCOPIC HEMATURIA: ICD-10-CM

## 2019-08-12 PROCEDURE — 84166 PROTEIN E-PHORESIS/URINE/CSF: CPT | Performed by: PATHOLOGY

## 2019-08-12 PROCEDURE — 84166 PROTEIN E-PHORESIS/URINE/CSF: CPT

## 2019-08-13 ENCOUNTER — OFFICE VISIT (OUTPATIENT)
Dept: FAMILY MEDICINE CLINIC | Facility: CLINIC | Age: 84
End: 2019-08-13
Payer: COMMERCIAL

## 2019-08-13 VITALS
TEMPERATURE: 98.4 F | WEIGHT: 130.8 LBS | HEIGHT: 65 IN | DIASTOLIC BLOOD PRESSURE: 68 MMHG | OXYGEN SATURATION: 98 % | HEART RATE: 69 BPM | RESPIRATION RATE: 16 BRPM | SYSTOLIC BLOOD PRESSURE: 142 MMHG | BODY MASS INDEX: 21.79 KG/M2

## 2019-08-13 DIAGNOSIS — N94.9 VAGINAL BURNING: Primary | ICD-10-CM

## 2019-08-13 DIAGNOSIS — R31.29 OTHER MICROSCOPIC HEMATURIA: ICD-10-CM

## 2019-08-13 DIAGNOSIS — N89.8 VAGINAL ITCHING: ICD-10-CM

## 2019-08-13 LAB
SL AMB  POCT GLUCOSE, UA: NORMAL
SL AMB LEUKOCYTE ESTERASE,UA: NORMAL
SL AMB POCT BILIRUBIN,UA: NORMAL
SL AMB POCT BLOOD,UA: NORMAL
SL AMB POCT CLARITY,UA: CLEAR
SL AMB POCT COLOR,UA: NORMAL
SL AMB POCT KETONES,UA: NORMAL
SL AMB POCT NITRITE,UA: NORMAL
SL AMB POCT PH,UA: 7.5
SL AMB POCT SPECIFIC GRAVITY,UA: 1
SL AMB POCT URINE PROTEIN: NORMAL
SL AMB POCT UROBILINOGEN: 0.2

## 2019-08-13 PROCEDURE — 87186 SC STD MICRODIL/AGAR DIL: CPT | Performed by: FAMILY MEDICINE

## 2019-08-13 PROCEDURE — 87077 CULTURE AEROBIC IDENTIFY: CPT | Performed by: FAMILY MEDICINE

## 2019-08-13 PROCEDURE — 81003 URINALYSIS AUTO W/O SCOPE: CPT | Performed by: FAMILY MEDICINE

## 2019-08-13 PROCEDURE — 99213 OFFICE O/P EST LOW 20 MIN: CPT | Performed by: FAMILY MEDICINE

## 2019-08-13 PROCEDURE — 87086 URINE CULTURE/COLONY COUNT: CPT | Performed by: FAMILY MEDICINE

## 2019-08-13 PROCEDURE — 1160F RVW MEDS BY RX/DR IN RCRD: CPT | Performed by: FAMILY MEDICINE

## 2019-08-13 RX ORDER — FLUCONAZOLE 150 MG/1
150 TABLET ORAL ONCE
Qty: 2 TABLET | Refills: 0 | Status: SHIPPED | OUTPATIENT
Start: 2019-08-13 | End: 2019-08-13

## 2019-08-13 NOTE — PROGRESS NOTES
FAMILY PRACTICE OFFICE VISIT       NAME: Juliana Sofia  AGE: 82 y o  SEX: female       : 1935        MRN: [id number]    DATE: 8/15/2019  TIME: 10:43 PM    Assessment and Plan     Problem List Items Addressed This Visit        Musculoskeletal and Integument    Vaginal itching       Genitourinary    Other microscopic hematuria    Relevant Orders    POCT urine dip auto non-scope (Completed)    Urine culture (Completed)       Other    Vaginal burning - Primary        56-year-old female with a history of diabetes presents today for evaluation of vaginal burning, itching that started last night  Patient feels this is similar to when she had her yeast infection  I will go ahead and start empiric treatment with Diflucan  May benefit from external hydrocortisone if symptoms persist as patient has atrophic vaginitis as well  POC urine dip positive for microscopic blood  Will send for urinalysis and culture  There are no Patient Instructions on file for this visit  Chief Complaint     Chief Complaint   Patient presents with    Vaginitis     Pt is here for vaginal burning, itching 1 + days       History of Present Illness     HPI  56-year-old female presents with 1 day history of Vaginal itching, burning  Symptoms started last night  Patient states she did take Advil    took advil  Patient states she did start using an ointment that she has home which has helped  Blood sugars have been good overall  Denies discharge present  Review of Systems   Review of Systems   Constitutional: Negative for chills and fever  Gastrointestinal: Negative for abdominal pain and constipation  Genitourinary: Negative for dysuria          Vaginal burning and itching       Active Problem List     Patient Active Problem List   Diagnosis    Adult onset hypothyroidism    ANAYA (generalized anxiety disorder)    Hyperlipidemia    Hypertension    [facility]inopathy, diabetic, proliferative [facility]    Type 1 diabetes mellitus with diabetic neuropathy ([facility] )    [facility], [facility]    Age-related osteoporosis without current pathological fracture    Hyponatremia    Other microscopic hematuria    Vaginal burning    Vaginal itching       Past Medical History:  Past Medical History:   Diagnosis Date    Cataract     Last Assessed: 8/11/2015    Chest pain     Last Assessed: 8/25/2015    Diabetes mellitus ([facility]     Difficulty falling asleep at night until early morning hours     sleeping flat    Diplopia     Last Assessed: 6/9/2016    Fracture of fifth metatarsal bone of right foot     Last Assessed: 12/9/2016    Hearing loss     Herpes zoster     Last Assessed: 12/9/2015    Hyperlipidemia     Hypertension     Hyperthyroidism     Last Assessed: 2/25/2017    Hyponatremia     Last Assessed: 3/22/2016    Neoplasm of uncertain behavior of skin     Last Assessed: 10/17/2013    Skin lesions     Last Assessed: 6/27/2016    Syncope     Last Assessed: 9/18/2015    Thyroid disease     Type 1 diabetes mellitus without complication ([facility] )     Wears glasses        Past Surgical History:  Past Surgical History:   Procedure Laterality Date    APPENDECTOMY  1956    CATARACT EXTRACTION      COLONOSCOPY  09/2014    Completed - Dr Dedra Carlton, due in 5 years    SKIN LESION EXCISION      Ears       Family History:  Family History   Problem Relation Age of Onset    Heart attack Mother     Diabetes type II Mother     Dementia Father     Stroke Father         CVA    Breast cancer Maternal Aunt     Stomach cancer Maternal Uncle     Diabetes Family     Stroke Family         Complications       Social History:  Social History     Socioeconomic History    Marital status:       Spouse name: Not on file    Number of children: 2    Years of education: Not on file    Highest education level: Not on file   Occupational History    Not on file   Social Needs    Financial resource strain: Not on file   Yash-Oscar insecurity:     Worry: Not on file     Inability: Not on file    Transportation needs:     Medical: Not on file     Non-medical: Not on file   Tobacco Use    Smoking status: Never Smoker    Smokeless tobacco: Never Used   Substance and Sexual Activity    Alcohol use: Yes     Comment: social    Drug use: No    Sexual activity: Not on file   Lifestyle    Physical activity:     Days per week: Not on file     Minutes per session: Not on file    Stress: Not on file   Relationships    Social connections:     Talks on phone: Not on file     Gets together: Not on file     Attends Orthodox service: Not on file     Active member of club or organization: Not on file     Attends meetings of clubs or organizations: Not on file     Relationship status: Not on file    Intimate partner violence:     Fear of current or ex partner: Not on file     Emotionally abused: Not on file     Physically abused: Not on file     Forced sexual activity: Not on file   Other Topics Concern    Not on file   Social History Narrative    Caffeine use    No preference on Orthodox beliefs     I have reviewed the patient's medical history in detail; there are no changes to the history as noted in the electronic medical record  Objective     Vitals:    08/13/19 1459   BP: 142/68   Pulse: 69   Resp: 16   Temp: 98 4 °F (36 9 °C)   SpO2: 98%     Wt Readings from Last 3 Encounters:   08/13/19 59 3 kg (130 lb 12 8 oz)   08/06/19 59 kg (130 lb)   06/28/19 59 8 kg (131 lb 12 8 oz)       Physical Exam   Constitutional: She appears well-developed and well-nourished  HENT:   Head: Normocephalic and atraumatic  Eyes: Pupils are equal, round, and reactive to light  Conjunctivae are normal    Neck: Neck supple  Cardiovascular: Normal rate, regular rhythm and normal heart sounds  Pulmonary/Chest: Effort normal and breath sounds normal    Abdominal: Soft  Bowel sounds are normal  She exhibits no distension  There is no tenderness     Genitourinary: Genitourinary Comments: Mild clear discharge noted  Erythema noted over perineum  Atrophic vaginitis   Lymphadenopathy:     She has no cervical adenopathy  Nursing note and vitals reviewed  Pertinent Laboratory/Diagnostic Studies:  Lab Results   Component Value Date    GLUCOSE 75 01/08/2016    BUN 19 08/14/2019    CREATININE 0 93 08/14/2019    CALCIUM 9 7 08/14/2019     (L) 01/08/2016    K 4 7 08/14/2019    CO2 28 08/14/2019    CL 99 (L) 08/14/2019     Lab Results   Component Value Date    ALT 19 08/07/2019    AST 10 08/07/2019    ALKPHOS 90 08/07/2019    BILITOT 0 83 01/08/2016       Lab Results   Component Value Date    WBC 7 02 06/21/2019    HGB 12 2 06/21/2019    HCT 37 0 06/21/2019    MCV 99 (H) 06/21/2019     06/21/2019       No results found for: TSH    Lab Results   Component Value Date    CHOL 183 01/08/2016     Lab Results   Component Value Date    TRIG 41 06/21/2019     Lab Results   Component Value Date    HDL 84 (H) 06/21/2019     Lab Results   Component Value Date    LDLCALC 82 06/21/2019     Lab Results   Component Value Date    HGBA1C 6 2 06/21/2019       Results for orders placed or performed in visit on 08/13/19   Urine culture   Result Value Ref Range    Urine Culture Culture results to follow      POCT urine dip auto non-scope   Result Value Ref Range     COLOR,UA pale     CLARITY,UA clear     SPECIFIC GRAVITY,UA 1 005      PH,UA 7 5     LEUKOCYTE ESTERASE,UA -     NITRITE,UA -     GLUCOSE, UA -     KETONES,UA -     BILIRUBIN,UA -     BLOOD,UA 5-10     POCT URINE PROTEIN -     SL AMB POCT UROBILINOGEN 0 2        Orders Placed This Encounter   Procedures    Urine culture    POCT urine dip auto non-scope       ALLERGIES:  Allergies   Allergen Reactions    Thiazide-Type Diuretics      Hyponatremia       Current Medications     Current Outpatient Medications   Medication Sig Dispense Refill    amLODIPine (NORVASC) 5 mg tablet TAKE 1 TABLET DAILY AFTER SUPPER 90 tablet 3    aspirin 81 MG tablet Take 1 tablet by mouth daily      B Complex-Folic Acid (SUPER B COMPLEX MAXI) TABS Take by mouth      Calcium Polycarbophil (KONSYL FIBER PO) Take by mouth      carboxymethylcellulose (REFRESH TEARS) 0 5 % SOLN Apply 1 drop to eye 4 (four) times a day      cholecalciferol (VITAMIN D3) 1,000 units tablet Take 3,000 capsules by mouth daily       cyanocobalamin 1000 MCG tablet Take 1,000 mcg by mouth daily      dorzolamide-timolol (COSOPT) 22 3-6 8 MG/ML ophthalmic solution Apply 1 drop to eye 2 (two) times a day      DULoxetine (CYMBALTA) 30 mg delayed release capsule Take 1 capsule (30 mg total) by mouth daily 30 capsule 5    fluticasone (FLONASE) 50 mcg/act nasal spray 2 sprays into each nostril daily      gabapentin (NEURONTIN) 300 mg capsule Take 1 capsule (300 mg total) by mouth 3 (three) times a day (Patient taking differently: Take 300 mg by mouth 2 (two) times a day ) 90 capsule 11    glucose blood (ONE TOUCH ULTRA TEST) test strip 1 each by Other route 6 (six) times a day 100 each 11    insulin glargine (LANTUS) 100 units/mL subcutaneous injection Inject 12units under the skin nightly as directed 10 mL 11    insulin lispro (HUMALOG) 100 units/mL injection INJECT 4 UNITS AT BREAKFAST, 5 UNITS AT LUNCH, AND 7 UNITS AT DINNER TIME (Patient taking differently: INJECT 4 UNITS AT BREAKFAST, 4 UNITS AT LUNCH, AND 7 UNITS AT DINNER TIME) 10 mL 11    Insulin Syringe-Needle U-100 (B-D INS SYRINGE 0 5CC/31GX5/16) 31G X 5/16" 0 5 ML MISC by Does not apply route      irbesartan (AVAPRO) 150 mg tablet Take 1 tablet (150 mg total) by mouth daily 90 tablet 3    levothyroxine 50 mcg tablet Take 1 tablet (50 mcg total) by mouth daily 90 tablet 1    LORazepam (ATIVAN) 0 5 mg tablet Take 1 tablet (0 5 mg total) by mouth 3 (three) times a day (Patient taking differently: Take 0 5 mg by mouth 2 (two) times a day ) 90 tablet 5    Magnesium 500 MG TABS Take 1 tablet by mouth daily      pravastatin (PRAVACHOL) 20 mg tablet Take 1 tablet (20 mg total) by mouth daily at bedtime 90 tablet 1    torsemide (DEMADEX) 5 MG tablet Take 1 tablet (5 mg total) by mouth daily 30 tablet 5    travoprost (TRAVATAN Z) 0 004 % ophthalmic solution Apply to eye      BD INSULIN SYRINGE ULTRAFINE 31G X 15/64" 0 3 ML MISC Inject under the skin 4 (four) times a day (Patient not taking: Reported on 8/13/2019) 120 each 11     No current facility-administered medications for this visit            Health Maintenance     Health Maintenance   Topic Date Due    SLP PLAN OF CARE  1935    HEPATITIS B VACCINES (1 of 3 - Risk 3-dose series) 06/01/1956    INFLUENZA VACCINE  07/01/2019    CRC Screening: Colonoscopy  09/05/2019    DTaP,Tdap,and Td Vaccines (1 - Tdap) 09/12/2019 (Originally 2/2/2003)    HEMOGLOBIN A1C  12/21/2019    Diabetic Foot Exam  03/25/2020    Fall Risk  06/28/2020    Depression Screening PHQ  06/28/2020    Urinary Incontinence Screening  06/28/2020    Medicare Annual Wellness Visit (AWV)  06/28/2020    BMI: Adult  08/13/2020    DM Eye Exam  06/06/2021    Pneumococcal Vaccine: 65+ Years  Completed    Pneumococcal Vaccine: Pediatrics (0 to 5 Years) and At-Risk Patients (6 to 59 Years)  Aged [employer] History   Administered Date(s) Administered    INFLUENZA 10/01/2010, 10/02/2012, 10/14/2013, 09/10/2014, 10/01/2015, 11/15/2016    Influenza Split High Dose Preservative Free IM 10/01/2015, 11/15/2016, 09/19/2017    Influenza TIV (IM) 1935    Influenza, high dose seasonal 0 5 mL 09/13/2018    Pneumococcal Conjugate 13-Valent 10/19/2015    Pneumococcal Polysaccharide PPV23 11/01/2007, 06/28/2019    Td (adult), Unspecified 02/01/2003    Zoster 03/11/2013    Zoster Vaccine Recombinant 08/09/2019       Liam Singh MD

## 2019-08-14 ENCOUNTER — APPOINTMENT (OUTPATIENT)
Dept: LAB | Facility: CLINIC | Age: 84
End: 2019-08-14
Payer: COMMERCIAL

## 2019-08-14 DIAGNOSIS — E87.1 HYPONATREMIA: Primary | ICD-10-CM

## 2019-08-14 LAB
ALBUMIN UR ELPH-MCNC: 100 %
ALPHA1 GLOB MFR UR ELPH: 0 %
ALPHA2 GLOB MFR UR ELPH: 0 %
ANION GAP SERPL CALCULATED.3IONS-SCNC: 8 MMOL/L (ref 4–13)
B-GLOBULIN MFR UR ELPH: 0 %
BUN SERPL-MCNC: 19 MG/DL (ref 5–25)
CALCIUM SERPL-MCNC: 9.7 MG/DL (ref 8.3–10.1)
CHLORIDE SERPL-SCNC: 99 MMOL/L (ref 100–108)
CO2 SERPL-SCNC: 28 MMOL/L (ref 21–32)
CREAT SERPL-MCNC: 0.93 MG/DL (ref 0.6–1.3)
GAMMA GLOB MFR UR ELPH: 0 %
GFR SERPL CREATININE-BSD FRML MDRD: 57 ML/MIN/1.73SQ M
GLUCOSE SERPL-MCNC: 146 MG/DL (ref 65–140)
POTASSIUM SERPL-SCNC: 4.7 MMOL/L (ref 3.5–5.3)
PROT PATTERN UR ELPH-IMP: NORMAL
PROT UR-MCNC: 6 MG/DL
SODIUM SERPL-SCNC: 135 MMOL/L (ref 136–145)

## 2019-08-14 PROCEDURE — 84165 PROTEIN E-PHORESIS SERUM: CPT

## 2019-08-14 PROCEDURE — 84165 PROTEIN E-PHORESIS SERUM: CPT | Performed by: PATHOLOGY

## 2019-08-14 PROCEDURE — 83883 ASSAY NEPHELOMETRY NOT SPEC: CPT

## 2019-08-14 PROCEDURE — 80048 BASIC METABOLIC PNL TOTAL CA: CPT

## 2019-08-14 PROCEDURE — 36415 COLL VENOUS BLD VENIPUNCTURE: CPT

## 2019-08-14 NOTE — Clinical Note
See my note and order for chest x-ray I did order it have the patient go for chest x-ray at this time

## 2019-08-14 NOTE — PROGRESS NOTES
I did a peer to peer review with Dr Jerry Dillard with the patient's insurance to denied a CT of the chest   She recommended a chest x-ray 1st and then depending upon the results we can appeal for a CT of the chest   I did state my concern that there have been some cases with a chest x-ray was negative but the CT of the chest was positive  She understood this  She still insisted we do a plain chest x-ray

## 2019-08-15 ENCOUNTER — HOSPITAL ENCOUNTER (OUTPATIENT)
Dept: RADIOLOGY | Facility: HOSPITAL | Age: 84
Discharge: HOME | End: 2019-08-15
Attending: INTERNAL MEDICINE
Payer: COMMERCIAL

## 2019-08-15 ENCOUNTER — TELEPHONE (OUTPATIENT)
Dept: NEPHROLOGY | Facility: CLINIC | Age: 84
End: 2019-08-15

## 2019-08-15 DIAGNOSIS — E87.1 HYPONATREMIA: ICD-10-CM

## 2019-08-15 DIAGNOSIS — E87.1 HYPONATREMIA: Primary | ICD-10-CM

## 2019-08-15 DIAGNOSIS — I10 ESSENTIAL HYPERTENSION: ICD-10-CM

## 2019-08-15 PROBLEM — N89.8 VAGINAL ITCHING: Status: ACTIVE | Noted: 2019-08-15

## 2019-08-15 PROBLEM — N94.9 VAGINAL BURNING: Status: ACTIVE | Noted: 2019-08-15

## 2019-08-15 LAB
ALBUMIN SERPL ELPH-MCNC: 4.38 G/DL (ref 3.5–5)
ALBUMIN SERPL ELPH-MCNC: 58.4 % (ref 52–65)
ALPHA1 GLOB SERPL ELPH-MCNC: 0.36 G/DL (ref 0.1–0.4)
ALPHA1 GLOB SERPL ELPH-MCNC: 4.8 % (ref 2.5–5)
ALPHA2 GLOB SERPL ELPH-MCNC: 0.85 G/DL (ref 0.4–1.2)
ALPHA2 GLOB SERPL ELPH-MCNC: 11.3 % (ref 7–13)
BETA GLOB ABNORMAL SERPL ELPH-MCNC: 0.49 G/DL (ref 0.4–0.8)
BETA1 GLOB SERPL ELPH-MCNC: 6.5 % (ref 5–13)
BETA2 GLOB SERPL ELPH-MCNC: 4.9 % (ref 2–8)
BETA2+GAMMA GLOB SERPL ELPH-MCNC: 0.37 G/DL (ref 0.2–0.5)
GAMMA GLOB ABNORMAL SERPL ELPH-MCNC: 1.06 G/DL (ref 0.5–1.6)
GAMMA GLOB SERPL ELPH-MCNC: 14.1 % (ref 12–22)
IGG/ALB SER: 1.4 {RATIO} (ref 1.1–1.8)
KAPPA LC FREE SER-MCNC: 21.8 MG/L (ref 3.3–19.4)
KAPPA LC FREE/LAMBDA FREE SER: 1.7 {RATIO} (ref 0.26–1.65)
LAMBDA LC FREE SERPL-MCNC: 12.8 MG/L (ref 5.7–26.3)
PROT PATTERN SERPL ELPH-IMP: NORMAL
PROT SERPL-MCNC: 7.5 G/DL (ref 6.4–8.2)

## 2019-08-15 PROCEDURE — 71046 X-RAY EXAM CHEST 2 VIEWS: CPT

## 2019-08-15 NOTE — TELEPHONE ENCOUNTER
----- Message from Yaneli Verdin MD sent at 8/14/2019  9:23 AM EDT -----  See my note and order for chest x-ray I did order it have the patient go for chest x-ray at this time

## 2019-08-15 NOTE — TELEPHONE ENCOUNTER
----- Message from Stella Brush MD sent at 8/15/2019  1:08 PM EDT -----  Let the patient know that so far her testing is negative  Her sodium remains essentially goal at 135  Continue current regimen    Repeat a basic metabolic profile in 1 month nonfasting

## 2019-08-15 NOTE — TELEPHONE ENCOUNTER
Patient is aware that insurance will not cover her CT , I advised pt per insurance they want her to have  Chest xray done as soon as she can depending on results insurance will decide to cover CT or deny it will have to go for an appeal again

## 2019-08-15 NOTE — TELEPHONE ENCOUNTER
I spoke to patient she is aware labs are negative will continue same regimen  Pt will follow up in 1 month with nonfasting blood work

## 2019-08-16 ENCOUNTER — TELEPHONE (OUTPATIENT)
Dept: NEPHROLOGY | Facility: CLINIC | Age: 84
End: 2019-08-16

## 2019-08-16 DIAGNOSIS — R82.79 POSITIVE URINE CULTURE: Primary | ICD-10-CM

## 2019-08-16 DIAGNOSIS — E10.3599 PROLIFERATIVE DIABETIC RETINOPATHY ASSOCIATED WITH TYPE 1 DIABETES MELLITUS, UNSPECIFIED LATERALITY, UNSPECIFIED PROLIFERATIVE RETINOPATHY TYPE (HCC): ICD-10-CM

## 2019-08-16 DIAGNOSIS — E87.1 HYPONATREMIA: Primary | ICD-10-CM

## 2019-08-16 DIAGNOSIS — E10.40 TYPE 1 DIABETES MELLITUS WITH DIABETIC NEUROPATHY (HCC): Primary | Chronic | ICD-10-CM

## 2019-08-16 LAB
BACTERIA UR CULT: ABNORMAL
BACTERIA UR CULT: ABNORMAL

## 2019-08-16 RX ORDER — NITROFURANTOIN 25; 75 MG/1; MG/1
100 CAPSULE ORAL 2 TIMES DAILY
Qty: 10 CAPSULE | Refills: 0 | Status: SHIPPED | OUTPATIENT
Start: 2019-08-16 | End: 2019-08-21

## 2019-08-16 NOTE — TELEPHONE ENCOUNTER
Pt called and had a culture done and was diagnosed with a UTI and was given antibiotics  She wants to know if you will still want her to go ahead with scheduling the CT scan or did you want to hold off on it for now  She doesn't want to do it if its not necessary

## 2019-08-19 ENCOUNTER — TELEPHONE (OUTPATIENT)
Dept: NEPHROLOGY | Facility: CLINIC | Age: 84
End: 2019-08-19

## 2019-08-19 DIAGNOSIS — E10.40 TYPE 1 DIABETES MELLITUS WITH DIABETIC NEUROPATHY (HCC): Primary | ICD-10-CM

## 2019-08-19 DIAGNOSIS — E10.3599 PROLIFERATIVE DIABETIC RETINOPATHY ASSOCIATED WITH TYPE 1 DIABETES MELLITUS, UNSPECIFIED LATERALITY, UNSPECIFIED PROLIFERATIVE RETINOPATHY TYPE (HCC): ICD-10-CM

## 2019-08-19 DIAGNOSIS — E87.1 HYPONATREMIA: ICD-10-CM

## 2019-08-19 NOTE — TELEPHONE ENCOUNTER
Lm for patient stating Chest XR was normal will follow up with Ct chest wo contrast     Diagnosis ? - please advise

## 2019-08-19 NOTE — TELEPHONE ENCOUNTER
I spoke to patient she will call to schedule Ct daughter will be taking her   Made aware to scheduled to Ct abdomen pelvis w contrast     Pt aware that chest XR is still pending we will call with results once radiologist read the test

## 2019-08-19 NOTE — TELEPHONE ENCOUNTER
I WOULD DO THE CT SCAN TO MAKE SURE NOTHING ELSE IS GOING ON AT DOES NOT HAVE ANYTHING TO DO WITH HER UTI

## 2019-08-19 NOTE — TELEPHONE ENCOUNTER
----- Message from Silvio Mart MD sent at 8/19/2019  2:37 PM EDT -----  The chest x-ray was normal  Please see if we can do the CT of the chest without contrast

## 2019-08-19 NOTE — TELEPHONE ENCOUNTER
----- Message from Ed Gallagher MD sent at 8/16/2019  7:40 AM EDT -----  Nora Prom chain ratio is slightly abnormal  Please repeat a light chain ratio at this time

## 2019-08-21 ENCOUNTER — APPOINTMENT (OUTPATIENT)
Dept: LAB | Facility: CLINIC | Age: 84
End: 2019-08-21
Payer: COMMERCIAL

## 2019-08-21 DIAGNOSIS — E87.1 HYPONATREMIA: ICD-10-CM

## 2019-08-21 DIAGNOSIS — E10.3599 PROLIFERATIVE DIABETIC RETINOPATHY ASSOCIATED WITH TYPE 1 DIABETES MELLITUS, UNSPECIFIED LATERALITY, UNSPECIFIED PROLIFERATIVE RETINOPATHY TYPE (HCC): ICD-10-CM

## 2019-08-21 DIAGNOSIS — I10 ESSENTIAL HYPERTENSION: ICD-10-CM

## 2019-08-21 DIAGNOSIS — E10.40 TYPE 1 DIABETES MELLITUS WITH DIABETIC NEUROPATHY (HCC): ICD-10-CM

## 2019-08-21 LAB
ANION GAP SERPL CALCULATED.3IONS-SCNC: 4 MMOL/L (ref 4–13)
BUN SERPL-MCNC: 20 MG/DL (ref 5–25)
CALCIUM SERPL-MCNC: 9.2 MG/DL (ref 8.3–10.1)
CHLORIDE SERPL-SCNC: 100 MMOL/L (ref 100–108)
CO2 SERPL-SCNC: 28 MMOL/L (ref 21–32)
CREAT SERPL-MCNC: 1.02 MG/DL (ref 0.6–1.3)
GFR SERPL CREATININE-BSD FRML MDRD: 51 ML/MIN/1.73SQ M
GLUCOSE SERPL-MCNC: 118 MG/DL (ref 65–140)
POTASSIUM SERPL-SCNC: 5 MMOL/L (ref 3.5–5.3)
SODIUM SERPL-SCNC: 132 MMOL/L (ref 136–145)

## 2019-08-21 PROCEDURE — 80048 BASIC METABOLIC PNL TOTAL CA: CPT

## 2019-08-21 PROCEDURE — 36415 COLL VENOUS BLD VENIPUNCTURE: CPT

## 2019-08-21 PROCEDURE — 83883 ASSAY NEPHELOMETRY NOT SPEC: CPT

## 2019-08-22 DIAGNOSIS — E10.40 TYPE 1 DIABETES MELLITUS WITH DIABETIC NEUROPATHY (HCC): ICD-10-CM

## 2019-08-22 DIAGNOSIS — I10 ESSENTIAL HYPERTENSION: ICD-10-CM

## 2019-08-22 DIAGNOSIS — E87.1 HYPONATREMIA: Primary | ICD-10-CM

## 2019-08-22 LAB
KAPPA LC FREE SER-MCNC: 22.6 MG/L (ref 3.3–19.4)
KAPPA LC FREE/LAMBDA FREE SER: 1.97 {RATIO} (ref 0.26–1.65)
LAMBDA LC FREE SERPL-MCNC: 11.5 MG/L (ref 5.7–26.3)

## 2019-08-22 RX ORDER — SODIUM CHLORIDE 1000 MG
1 TABLET, SOLUBLE MISCELLANEOUS 3 TIMES DAILY
Qty: 270 TABLET | Refills: 3 | Status: SHIPPED | OUTPATIENT
Start: 2019-08-22 | End: 2019-09-30

## 2019-08-22 NOTE — TELEPHONE ENCOUNTER
I Lm for patient  stating her sodium was low; per Dr Alarcon would like pt to start sodium chloride tablets 1 g T  I D     - Fluid restriction of 48 oz a day and follow up BMP in 2 weeks

## 2019-08-22 NOTE — TELEPHONE ENCOUNTER
----- Message from Lance Douglass MD sent at 8/22/2019  8:08 AM EDT -----  The patient's sodium is slightly lower  Recommend:  1  Sodium chloride tablets 1 g t i d  With meals that can be crushed in applesauce if needed  2  Maintain 1500 mL fluid restriction or 48 oz a day  3   Follow-up basic metabolic profile 1-2 weeks after making the above medication changes

## 2019-08-23 ENCOUNTER — TELEPHONE (OUTPATIENT)
Dept: NEPHROLOGY | Facility: CLINIC | Age: 84
End: 2019-08-23

## 2019-08-23 DIAGNOSIS — E87.0 HYPONATREMIA WITH INCREASED SERUM OSMOLALITY: ICD-10-CM

## 2019-08-23 DIAGNOSIS — E87.1 HYPONATREMIA: Primary | ICD-10-CM

## 2019-08-23 DIAGNOSIS — R76.8 ELEVATED SERUM IMMUNOGLOBULIN FREE LIGHT CHAIN LEVEL: ICD-10-CM

## 2019-08-23 DIAGNOSIS — E87.1 HYPONATREMIA WITH INCREASED SERUM OSMOLALITY: ICD-10-CM

## 2019-08-23 NOTE — TELEPHONE ENCOUNTER
----- Message from Yaneli Verdin MD sent at 8/23/2019  7:53 AM EDT -----  The patient definitely needs a hematology consultation for elevated light chain ratio  Patient also with hyponatremia and elevated serum osmolality concerning for potential possibility of myeloma type problems

## 2019-08-27 DIAGNOSIS — Z79.4 TYPE 2 DIABETES MELLITUS WITH COMPLICATION, WITH LONG-TERM CURRENT USE OF INSULIN (HCC): ICD-10-CM

## 2019-08-27 DIAGNOSIS — E11.8 TYPE 2 DIABETES MELLITUS WITH COMPLICATION, WITH LONG-TERM CURRENT USE OF INSULIN (HCC): ICD-10-CM

## 2019-08-27 RX ORDER — INSULIN GLARGINE 100 [IU]/ML
INJECTION, SOLUTION SUBCUTANEOUS
Qty: 10 ML | Refills: 2 | Status: SHIPPED | OUTPATIENT
Start: 2019-08-27 | End: 2019-09-30

## 2019-08-28 NOTE — TELEPHONE ENCOUNTER
I spoke to patient she stated that she would like to wait until Friday to discuss hematology appt  Patient will have CT Friday along with blood workl

## 2019-08-29 ENCOUNTER — TELEPHONE (OUTPATIENT)
Dept: NEPHROLOGY | Facility: CLINIC | Age: 84
End: 2019-08-29

## 2019-08-29 ENCOUNTER — TELEPHONE (OUTPATIENT)
Dept: OTHER | Facility: HOSPITAL | Age: 84
End: 2019-08-29

## 2019-08-29 NOTE — TELEPHONE ENCOUNTER
Patient called the office asking if you could please give her daughter a call she would like to speak with you about why exactly her mother needs to see Hematology

## 2019-08-29 NOTE — TELEPHONE ENCOUNTER
I called and left a message at both phone numbers regarding patient's daughter's questions concerning hematology referral   Left a message for them leave a phone number where they can be reached and I will call them back tomorrow 08/30/2019

## 2019-08-30 ENCOUNTER — TELEPHONE (OUTPATIENT)
Dept: NEPHROLOGY | Facility: CLINIC | Age: 84
End: 2019-08-30

## 2019-08-30 ENCOUNTER — HOSPITAL ENCOUNTER (OUTPATIENT)
Dept: CT IMAGING | Facility: HOSPITAL | Age: 84
Discharge: HOME | End: 2019-08-30
Attending: INTERNAL MEDICINE
Payer: COMMERCIAL

## 2019-08-30 ENCOUNTER — TELEPHONE (OUTPATIENT)
Dept: SURGICAL ONCOLOGY | Facility: CLINIC | Age: 84
End: 2019-08-30

## 2019-08-30 PROCEDURE — 70450 CT HEAD/BRAIN W/O DYE: CPT

## 2019-08-30 PROCEDURE — 74177 CT ABD & PELVIS W/CONTRAST: CPT

## 2019-08-30 RX ADMIN — IOHEXOL 100 ML: 350 INJECTION, SOLUTION INTRAVENOUS at 16:59

## 2019-08-30 NOTE — TELEPHONE ENCOUNTER
New Patient Encounter    New Patient Intake Form   Patient Details:  Sai Rees  1935  [id number]    Background Information:  [id number] starts by opening a telephone encounter and gathering the following information   Who is calling to schedule? If not self, relationship to patient? Self   Referring Provider Nyasia Ramires   What is the diagnosis? Immunoglobulin free LT chains blood       When was the diagnosis? 8/14/2019   Is patient aware of diagnosis? 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 If so: when, where? Yes   Are records in [facility]? yes   Was the patient told to bring a disk? no   Scheduling Information:   Preferred [place]:  Any     Requesting Specific Provider? no   Are there any dates/time the patient cannot be seen? no   Counseling Pre-Screen:  If the patient answers YES to any of the below questions, please route to the appropriate location specific counselor    Have you felt anxious or worried about cancer and the treatment you are receiving? No   Has your diagnosis caused physical, emotional, or financial hardship for you? No   Note: Do not ask the patient about transportation issues/needs  Please notate if the patient brings it up and the counselor will schedule accordingly  Miscellaneous: na   After completing the above information, please route to Financial Counselor and the appropriate Nurse Navigator for review

## 2019-08-30 NOTE — TELEPHONE ENCOUNTER
I spoke to patient daughter and hematology appt scheduled on Oct  15 with Dr Nesbitt  [facility] scheduling out into october now  - is that okay or is there someone else you would recommend

## 2019-08-30 NOTE — TELEPHONE ENCOUNTER
Patient sees Dr Alarcon and the daughter Amanda Curry is calling with some questions and can be reached at [phone]

## 2019-09-03 ENCOUNTER — TELEPHONE (OUTPATIENT)
Dept: NEPHROLOGY | Facility: CLINIC | Age: 84
End: 2019-09-03

## 2019-09-03 RX ORDER — NETARSUDIL 0.2 MG/ML
1 SOLUTION/ DROPS OPHTHALMIC; TOPICAL
COMMUNITY
Start: 2019-08-23

## 2019-09-03 RX ORDER — FLUCONAZOLE 150 MG/1
TABLET ORAL
COMMUNITY
Start: 2019-08-13 | End: 2019-09-04

## 2019-09-03 NOTE — TELEPHONE ENCOUNTER
Dr Jose Maria Menjivar would be another alternative for the patient  Please check with the  to see if there is any way that we can get the patient earlier to see Dr Annabella Tamayo or Dr Moise Memory  Please let them know that her daughter works for the hospital in an administrative fashion!   Let me know and I can help

## 2019-09-03 NOTE — TELEPHONE ENCOUNTER
Message left on patient's voicemail that CT scan was negative         ----- Message from Naresh Weathers MD sent at 9/3/2019 12:04 PM EDT -----  Please let the patient know her CT of the head was negative

## 2019-09-04 ENCOUNTER — OFFICE VISIT (OUTPATIENT)
Dept: FAMILY MEDICINE CLINIC | Facility: CLINIC | Age: 84
End: 2019-09-04
Payer: COMMERCIAL

## 2019-09-04 VITALS
HEIGHT: 65 IN | SYSTOLIC BLOOD PRESSURE: 138 MMHG | TEMPERATURE: 98.1 F | WEIGHT: 132 LBS | RESPIRATION RATE: 16 BRPM | HEART RATE: 64 BPM | DIASTOLIC BLOOD PRESSURE: 70 MMHG | BODY MASS INDEX: 21.99 KG/M2 | OXYGEN SATURATION: 98 %

## 2019-09-04 DIAGNOSIS — E87.1 HYPONATREMIA: Primary | ICD-10-CM

## 2019-09-04 DIAGNOSIS — E78.5 HYPERLIPIDEMIA, UNSPECIFIED HYPERLIPIDEMIA TYPE: ICD-10-CM

## 2019-09-04 DIAGNOSIS — E10.40 TYPE 1 DIABETES MELLITUS WITH DIABETIC NEUROPATHY (HCC): Chronic | ICD-10-CM

## 2019-09-04 PROCEDURE — 99214 OFFICE O/P EST MOD 30 MIN: CPT | Performed by: FAMILY MEDICINE

## 2019-09-04 RX ORDER — PRAVASTATIN SODIUM 20 MG
20 TABLET ORAL
Qty: 30 TABLET | Refills: 11 | Status: SHIPPED | OUTPATIENT
Start: 2019-09-04 | End: 2019-10-18 | Stop reason: HOSPADM

## 2019-09-04 NOTE — PROGRESS NOTES
FAMILY PRACTICE OFFICE VISIT       NAME: Rakesh Torres  AGE: 82 y o  SEX: female       : 1935        MRN: [id number]        Assessment and Plan     Problem List Items Addressed This Visit        Endocrine    Type 1 diabetes mellitus with diabetic neuropathy ([address] ) (Chronic)       Other    Hyperlipidemia (Chronic)    Relevant Medications    pravastatin (PRAVACHOL) 20 mg tablet    Hyponatremia - Primary       Patient presents for follow-up  She remains under care of [facility] due to hyponatremia  I emphasized importance of proper follow-up and explain rational for extensive diagnostic workup that was ordered by Dr Ginette Barth  I advised her to compliant with physicians advised to use sodium chloride as directed  Patient will be proceeding with repeat BMP tomorrow  Will be awaiting results of CT abdomen and pelvis  Patient will keep her regular scheduled appointment/checkup on   I will let patient discuss necessity of Hematology follow-up with Dr Ginette Barth at her next follow-up with Nephrology  All questions answered to patient's satisfaction today  I have spent 20 minutes with Patient  today in which greater than 50% of this time was spent in counseling/coordination of care regarding Diagnostic results, Risks and benefits of tx options, Intructions for management, Patient and family education, Importance of tx compliance, Risk factor reductions and Impressions  There are no Patient Instructions on file for this visit  Discussed with the patient and all questioned fully answered  She will call me if any problems arise  M*[employer] software was used to dictate this note  It may contain errors with dictating incorrect words/spelling  Please contact provider directly with any questions         Chief Complaint     Chief Complaint   Patient presents with    Follow-up     Pt is here to discuss blood work results, sodium ,potassium       History of Present Illness     Patient presents for follow-up  She was recently referred to [facility] for evaluation of chronic hyponatremia  Patient is here to discuss current medications  She was started on Demadex 5 mg daily and sodium chloride tablets  Patient has been using salt tablets b i d  Instead of t i d  As advised by Nephrology  Patient was advised to proceed with CT brain, chest, abdomen and pelvis for further evaluation of hyponatremia  She is questioning clinical rationale for extensive workup  Chest x-ray performed on August 15th was normal   Insurance did not approve CT chest   CT brain performed on August 30th is unremarkable  Results of CT abdomen and pelvis, performed August 30th, are still pending at the time of the office visit  Patient subsequently was also referred to Hematology   She is questioning importance of this appointment  I reviewed recent workup performed by Nephrology and noticed mild elevation of Ig Kappa Free Light Chains  Serum electrophoresis was normal     Patient feels stably, most recent BMP performed on August 21st, reveals sodium level of 132 with potassium 5 0 and stable GFR/creatinine        Review of Systems   Review of Systems   Constitutional: Negative  Respiratory: Negative  Cardiovascular: Negative  Gastrointestinal: Negative  Neurological:        Chronic symptoms of diabetic neuropathy   Psychiatric/Behavioral: The patient is nervous/anxious (Concerned about recent health developments)          Active Problem List     Patient Active Problem List   Diagnosis    Adult onset hypothyroidism    ANAYA (generalized anxiety disorder)    Hyperlipidemia    Hypertension    Retinopathy, diabetic, proliferative ([address] )    Type 1 diabetes mellitus with diabetic neuropathy ([address] )    Diabetic neuropathy, painful ([address] )    Age-related osteoporosis without current pathological fracture    Hyponatremia    Other microscopic hematuria    Vaginal burning    Vaginal itching Past Medical History:  Past Medical History:   Diagnosis Date    Cataract     Last Assessed: 8/11/2015    Chest pain     Last Assessed: 8/25/2015    Diabetes mellitus ([facility] [address] )     Difficulty falling asleep at night until early morning hours     sleeping flat    Diplopia     Last Assessed: 6/9/2016    Fracture of fifth metatarsal bone of right foot     Last Assessed: 12/9/2016    Hearing loss     Herpes zoster     Last Assessed: 12/9/2015    Hyperlipidemia     Hypertension     Hyperthyroidism     Last Assessed: 2/25/2017    Hyponatremia     Last Assessed: 3/22/2016    Neoplasm of uncertain behavior of skin     Last Assessed: 10/17/2013    Skin lesions     Last Assessed: 6/27/2016    Syncope     Last Assessed: 9/18/2015    Thyroid disease     Type 1 diabetes mellitus without complication ([facility] [address] )     Wears glasses        Past Surgical History:  Past Surgical History:   Procedure Laterality Date    APPENDECTOMY  1956    CATARACT EXTRACTION      COLONOSCOPY  09/2014    Completed - Dr Nargis Mora, due in 5 years    SKIN LESION EXCISION      Ears       Family History:  Family History   Problem Relation Age of Onset    Heart attack Mother     Diabetes type II Mother     Dementia Father     Stroke Father         CVA    Breast cancer Maternal Aunt     Stomach cancer Maternal Uncle     Diabetes Family     Stroke Family         Complications       Social History:  Social History     Socioeconomic History    Marital status:       Spouse name: Not on file    Number of children: 2    Years of education: Not on file    Highest education level: Not on file   Occupational History    Not on file   Social Needs    Financial resource strain: Not on file    Food insecurity:     Worry: Not on file     Inability: Not on file    Transportation needs:     Medical: Not on file     Non-medical: Not on file   Tobacco Use    Smoking status: Never Smoker    Smokeless tobacco: Never Used   Substance and Sexual Activity    Alcohol use: Yes     Comment: social    Drug use: No    Sexual activity: Not on file   Lifestyle    Physical activity:     Days per week: Not on file     Minutes per session: Not on file    Stress: Not on file   Relationships    Social connections:     Talks on phone: Not on file     Gets together: Not on file     Attends Yazidism service: Not on file     Active member of club or organization: Not on file     Attends meetings of clubs or organizations: Not on file     Relationship status: Not on file    Intimate partner violence:     Fear of current or ex partner: Not on file     Emotionally abused: Not on file     Physically abused: Not on file     Forced sexual activity: Not on file   Other Topics Concern    Not on file   Social History Narrative    Caffeine use    No preference on Yazidism beliefs       Objective     Vitals:    09/04/19 1409   BP: 138/70   Pulse: 64   Resp: 16   Temp: 98 1 °F (36 7 °C)   TempSrc: Tympanic   SpO2: 98%   Weight: 59 9 kg (132 lb)   Height: 5' 4 5" (1 638 m)     Wt Readings from Last 3 Encounters:   09/04/19 59 9 kg (132 lb)   08/13/19 59 3 kg (130 lb 12 8 oz)   08/06/19 59 kg (130 lb)       Physical Exam   Constitutional: She is oriented to person, place, and time  She appears well-developed and well-nourished  Neurological: She is alert and oriented to person, place, and time  Psychiatric: She has a normal mood and affect  Her behavior is normal    Nursing note and vitals reviewed        Pertinent Laboratory/Diagnostic Studies:  Lab Results   Component Value Date    GLUCOSE 75 01/08/2016    BUN 20 08/21/2019    CREATININE 1 02 08/21/2019    CALCIUM 9 2 08/21/2019     (L) 01/08/2016    K 5 0 08/21/2019    CO2 28 08/21/2019     08/21/2019     Lab Results   Component Value Date    ALT 19 08/07/2019    AST 10 08/07/2019    ALKPHOS 90 08/07/2019    BILITOT 0 83 01/08/2016       Lab Results   Component Value Date    WBC 7 02 06/21/2019 HGB 12 2 06/21/2019    HCT 37 0 06/21/2019    MCV 99 (H) 06/21/2019     06/21/2019       No results found for: TSH    Lab Results   Component Value Date    CHOL 183 01/08/2016     Lab Results   Component Value Date    TRIG 41 06/21/2019     Lab Results   Component Value Date    HDL 84 (H) 06/21/2019     Lab Results   Component Value Date    LDLCALC 82 06/21/2019     Lab Results   Component Value Date    HGBA1C 6 2 06/21/2019       Results for orders placed or performed in visit on 99/16/87   Basic metabolic panel   Result Value Ref Range    Sodium 132 (L) 136 - 145 mmol/L    Potassium 5 0 3 5 - 5 3 mmol/L    Chloride 100 100 - 108 mmol/L    CO2 28 21 - 32 mmol/L    ANION GAP 4 4 - 13 mmol/L    BUN 20 5 - 25 mg/dL    Creatinine 1 02 0 60 - 1 30 mg/dL    Glucose 118 65 - 140 mg/dL    Calcium 9 2 8 3 - 10 1 mg/dL    eGFR 51 ml/min/1 73sq m   Immunoglobulin free LT chains blood   Result Value Ref Range    Ig Kappa Free Light Chain 22 6 (H) 3 3 - 19 4 mg/L    Ig Lambda Free Light Chain 11 5 5 7 - 26 3 mg/L    Kappa/Lambda FluidC Ratio 1 97 (H) 0 26 - 1 65       No orders of the defined types were placed in this encounter        ALLERGIES:  Allergies   Allergen Reactions    Thiazide-Type Diuretics      Hyponatremia       Current Medications     Current Outpatient Medications   Medication Sig Dispense Refill    amLODIPine (NORVASC) 5 mg tablet TAKE 1 TABLET DAILY AFTER SUPPER 90 tablet 3    aspirin 81 MG tablet Take 1 tablet by mouth daily      B Complex-Folic Acid (SUPER B COMPLEX MAXI) TABS Take by mouth      Calcium Polycarbophil (KONSYL FIBER PO) Take by mouth      carboxymethylcellulose (REFRESH TEARS) 0 5 % SOLN Apply 1 drop to eye 4 (four) times a day      cholecalciferol (VITAMIN D3) 1,000 units tablet Take 3,000 capsules by mouth daily       cyanocobalamin 1000 MCG tablet Take 1,000 mcg by mouth daily      dorzolamide-timolol (COSOPT) 22 3-6 8 MG/ML ophthalmic solution Apply 1 drop to eye 2 (two) times a day      DULoxetine (CYMBALTA) 30 mg delayed release capsule Take 1 capsule (30 mg total) by mouth daily 30 capsule 5    fluticasone (FLONASE) 50 mcg/act nasal spray 2 sprays into each nostril daily      gabapentin (NEURONTIN) 300 mg capsule Take 1 capsule (300 mg total) by mouth 3 (three) times a day (Patient taking differently: Take 300 mg by mouth 2 (two) times a day ) 90 capsule 11    glucose blood (ONE TOUCH ULTRA TEST) test strip 1 each by Other route 6 (six) times a day 100 each 11    insulin glargine (LANTUS) 100 units/mL subcutaneous injection Inject 12units under the skin nightly as directed 10 mL 11    insulin lispro (HUMALOG) 100 units/mL injection INJECT 4 UNITS AT BREAKFAST, 5 UNITS AT LUNCH, AND 7 UNITS AT DINNER TIME (Patient taking differently: INJECT 4 UNITS AT BREAKFAST, 4 UNITS AT LUNCH, AND 7 UNITS AT DINNER TIME) 10 mL 11    Insulin Syringe-Needle U-100 (B-D INS SYRINGE 0 5CC/31GX5/16) 31G X 5/16" 0 5 ML MISC by Does not apply route      irbesartan (AVAPRO) 150 mg tablet Take 1 tablet (150 mg total) by mouth daily 90 tablet 3    LANTUS 100 UNIT/ML subcutaneous injection INJECT 12 UNITS UNDER THE SKIN NIGHTLY AS DIRECTED 10 mL 2    levothyroxine 50 mcg tablet Take 1 tablet (50 mcg total) by mouth daily 90 tablet 1    LORazepam (ATIVAN) 0 5 mg tablet Take 1 tablet (0 5 mg total) by mouth 3 (three) times a day (Patient taking differently: Take 0 5 mg by mouth 2 (two) times a day ) 90 tablet 5    Magnesium 500 MG TABS Take 1 tablet by mouth daily      pravastatin (PRAVACHOL) 20 mg tablet Take 1 tablet (20 mg total) by mouth daily at bedtime 30 tablet 11    RHOPRESSA 0 02 % SOLN       sodium chloride 1 g tablet Take 1 tablet (1 g total) by mouth 3 (three) times a day (Patient taking differently: Take 1 g by mouth 2 (two) times a day ) 270 tablet 3    torsemide (DEMADEX) 5 MG tablet Take 1 tablet (5 mg total) by mouth daily 30 tablet 5    travoprost (TRAVATAN Z) 0 004 % ophthalmic solution Apply to eye      BD INSULIN SYRINGE ULTRAFINE 31G X 15/64" 0 3 ML MISC Inject under the skin 4 (four) times a day (Patient not taking: Reported on 9/4/2019) 120 each 11     No current facility-administered medications for this visit          Medications Discontinued During This Encounter   Medication Reason    fluconazole (DIFLUCAN) 150 mg tablet     pravastatin (PRAVACHOL) 20 mg tablet Reorder       Health Maintenance     Health Maintenance   Topic Date Due    SLP PLAN OF CARE  1935    HEPATITIS B VACCINES (1 of 3 - Risk 3-dose series) 06/01/1956    INFLUENZA VACCINE  07/01/2019    CRC Screening: Colonoscopy  09/05/2019    DTaP,Tdap,and Td Vaccines (1 - Tdap) 09/12/2019 (Originally 2/2/2003)    HEMOGLOBIN A1C  12/21/2019    Diabetic Foot Exam  03/25/2020    Fall Risk  06/28/2020    Depression Screening PHQ  06/28/2020    Urinary Incontinence Screening  06/28/2020    Medicare Annual Wellness Visit (AWV)  06/28/2020    BMI: Adult  09/04/2020    DM Eye Exam  06/06/2021    Pneumococcal Vaccine: 65+ Years  Completed    Pneumococcal Vaccine: Pediatrics (0 to 5 Years) and At-Risk Patients (6 to 59 Years)  Aged [employer] History   Administered Date(s) Administered    INFLUENZA 10/01/2010, 10/02/2012, 10/14/2013, 09/10/2014, 10/01/2015, 11/15/2016    Influenza Split High Dose Preservative Free IM 10/01/2015, 11/15/2016, 09/19/2017    Influenza TIV (IM) 1935    Influenza, high dose seasonal 0 5 mL 09/13/2018    Pneumococcal Conjugate 13-Valent 10/19/2015    Pneumococcal Polysaccharide PPV23 11/01/2007, 06/28/2019    Td (adult), Unspecified 02/01/2003    Zoster 03/11/2013    Zoster Vaccine Recombinant 08/09/2019       Rusty Siegel MD

## 2019-09-05 ENCOUNTER — APPOINTMENT (OUTPATIENT)
Dept: LAB | Facility: CLINIC | Age: 84
End: 2019-09-05
Payer: COMMERCIAL

## 2019-09-05 DIAGNOSIS — E87.1 HYPONATREMIA: ICD-10-CM

## 2019-09-05 DIAGNOSIS — E10.40 TYPE 1 DIABETES MELLITUS WITH DIABETIC NEUROPATHY (HCC): ICD-10-CM

## 2019-09-05 DIAGNOSIS — I10 ESSENTIAL HYPERTENSION: ICD-10-CM

## 2019-09-05 LAB
ANION GAP SERPL CALCULATED.3IONS-SCNC: 8 MMOL/L (ref 4–13)
BUN SERPL-MCNC: 20 MG/DL (ref 5–25)
CALCIUM SERPL-MCNC: 9.6 MG/DL (ref 8.3–10.1)
CHLORIDE SERPL-SCNC: 105 MMOL/L (ref 100–108)
CO2 SERPL-SCNC: 26 MMOL/L (ref 21–32)
CREAT SERPL-MCNC: 0.93 MG/DL (ref 0.6–1.3)
GFR SERPL CREATININE-BSD FRML MDRD: 57 ML/MIN/1.73SQ M
GLUCOSE P FAST SERPL-MCNC: 119 MG/DL (ref 65–99)
POTASSIUM SERPL-SCNC: 4.9 MMOL/L (ref 3.5–5.3)
SODIUM SERPL-SCNC: 139 MMOL/L (ref 136–145)

## 2019-09-05 PROCEDURE — 80048 BASIC METABOLIC PNL TOTAL CA: CPT

## 2019-09-05 PROCEDURE — 36415 COLL VENOUS BLD VENIPUNCTURE: CPT

## 2019-09-09 ENCOUNTER — TELEPHONE (OUTPATIENT)
Dept: NEPHROLOGY | Facility: CLINIC | Age: 84
End: 2019-09-09

## 2019-09-09 NOTE — TELEPHONE ENCOUNTER
Left a message with the patient to call regarding the results of the ct scan  I also left a message with the patient's daughter to call the office

## 2019-09-09 NOTE — TELEPHONE ENCOUNTER
Pt called [facility] office about US of the ovary  I let her know I did not see the order in the chart and that the EO team will be calling her

## 2019-09-09 NOTE — TELEPHONE ENCOUNTER
I spoke with the patient and reviewed the results of the CT scan of the abdomen pelvis demonstrating small tiny probable cysts of the liver and an abnormally enlarged heterogeneous appearing right ovary  I told her that they recommend an ultrasound of the ovary  We will arrange for this  If this is abnormal I would refer her to gynecology  I will also discuss this with her daughter  Answered all questions

## 2019-09-10 DIAGNOSIS — Q50.39 OVARIAN ANOMALY: Primary | ICD-10-CM

## 2019-09-16 ENCOUNTER — TELEPHONE (OUTPATIENT)
Dept: NEPHROLOGY | Facility: CLINIC | Age: 84
End: 2019-09-16

## 2019-09-16 ENCOUNTER — HOSPITAL ENCOUNTER (OUTPATIENT)
Dept: ULTRASOUND IMAGING | Facility: HOSPITAL | Age: 84
Discharge: HOME | End: 2019-09-16
Attending: INTERNAL MEDICINE
Payer: COMMERCIAL

## 2019-09-16 DIAGNOSIS — E87.1 HYPONATREMIA: Primary | ICD-10-CM

## 2019-09-16 DIAGNOSIS — Q50.39 OVARIAN ANOMALY: ICD-10-CM

## 2019-09-16 PROCEDURE — 76830 TRANSVAGINAL US NON-OB: CPT

## 2019-09-16 PROCEDURE — 76856 US EXAM PELVIC COMPLETE: CPT

## 2019-09-16 RX ORDER — SODIUM CHLORIDE 1000 MG
1 TABLET, SOLUBLE MISCELLANEOUS DAILY
COMMUNITY
End: 2019-10-18 | Stop reason: HOSPADM

## 2019-09-16 NOTE — PROGRESS NOTES
Called pt  Advised her of Dr Tomas Mahoney recommendation to decrease salt tablets to daily  Repeat nonfasting BMP in two weeks

## 2019-09-16 NOTE — TELEPHONE ENCOUNTER
----- Message from Marvin Reyes MD sent at 9/16/2019 11:51 AM EDT -----  Please see my note with recommendations

## 2019-09-16 NOTE — PROGRESS NOTES
Her sodium is doing extremely well at a level of 139  Recommend:  1  Decrease sodium chloride to 1 g once a day if she is on a twice a day at this time  2  Repeat a basic metabolic profile in 2 weeks nonfasting  No other changes at this time

## 2019-09-16 NOTE — TELEPHONE ENCOUNTER
I called the patient to review the ultrasound of the pelvis  It did show a complex cystic and solid 7 7 centimeter right ovarian mass  They did recommend a gynecologic oncology consultation  I spoke with the patient about this as well as her daughter and we will get Dr Gwen Murdock to see the patient  I have placed a phone call and will talk with him the next 1-2 days to set up an appointment  I answered all questions

## 2019-09-17 ENCOUNTER — DOCUMENTATION (OUTPATIENT)
Dept: NEPHROLOGY | Facility: CLINIC | Age: 84
End: 2019-09-17

## 2019-09-17 ENCOUNTER — TELEPHONE (OUTPATIENT)
Dept: SURGICAL ONCOLOGY | Facility: CLINIC | Age: 84
End: 2019-09-17

## 2019-09-17 NOTE — PROGRESS NOTES
I spoke with  Dr Fabio Carpio who will see the patient and and evaluate the abnormal pelvic ultrasound including the ovarian lesion

## 2019-09-17 NOTE — TELEPHONE ENCOUNTER
New Patient Encounter    New Patient Intake Form   Patient Details:  Pina Vazquez  1935  [id number]    Background Information:  [id number] starts by opening a telephone encounter and gathering the following information   Who is calling to schedule? If not self, relationship to patient? SELF   Referring Provider Dorinda Aguilar   What is the diagnosis? left ovarian mass   When was the diagnosis? 9/2019   Is patient aware of diagnosis? 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 If so: when, where? Yes   Are records in [facility]? yes   Was the patient told to bring a disk? no   Scheduling Information:   Preferred [place]:  Any     Requesting Specific Provider? NO   Are there any dates/time the patient cannot be seen? NO   Counseling Pre-Screen:  If the patient answers YES to any of the below questions, please route to the appropriate location specific counselor    Have you felt anxious or worried about cancer and the treatment you are receiving? No   Has your diagnosis caused physical, emotional, or financial hardship for you? No   Note: Do not ask the patient about transportation issues/needs  Please notate if the patient brings it up and the counselor will schedule accordingly  Miscellaneous: NA    After completing the above information, please route to Financial Counselor and the appropriate Nurse Navigator for review

## 2019-09-20 ENCOUNTER — CONSULT (OUTPATIENT)
Dept: GYNECOLOGIC ONCOLOGY | Facility: CLINIC | Age: 84
End: 2019-09-20
Payer: COMMERCIAL

## 2019-09-20 VITALS
BODY MASS INDEX: 21.39 KG/M2 | WEIGHT: 128.4 LBS | HEART RATE: 82 BPM | SYSTOLIC BLOOD PRESSURE: 120 MMHG | TEMPERATURE: 98.2 F | HEIGHT: 65 IN | DIASTOLIC BLOOD PRESSURE: 64 MMHG

## 2019-09-20 DIAGNOSIS — N83.8 RIGHT TUBO-OVARIAN MASS: Primary | ICD-10-CM

## 2019-09-20 DIAGNOSIS — E10.40 TYPE 1 DIABETES MELLITUS WITH DIABETIC NEUROPATHY (HCC): Chronic | ICD-10-CM

## 2019-09-20 PROCEDURE — 99205 OFFICE O/P NEW HI 60 MIN: CPT | Performed by: OBSTETRICS & GYNECOLOGY

## 2019-09-20 RX ORDER — HEPARIN SODIUM 5000 [USP'U]/ML
5000 INJECTION, SOLUTION INTRAVENOUS; SUBCUTANEOUS
Status: CANCELLED | OUTPATIENT
Start: 2019-10-04 | End: 2019-10-05

## 2019-09-20 RX ORDER — CEFAZOLIN SODIUM 1 G/50ML
1000 SOLUTION INTRAVENOUS ONCE
Status: CANCELLED | OUTPATIENT
Start: 2019-10-04 | End: 2019-09-20

## 2019-09-20 RX ORDER — SODIUM CHLORIDE, SODIUM LACTATE, POTASSIUM CHLORIDE, CALCIUM CHLORIDE 600; 310; 30; 20 MG/100ML; MG/100ML; MG/100ML; MG/100ML
125 INJECTION, SOLUTION INTRAVENOUS CONTINUOUS
Status: CANCELLED | OUTPATIENT
Start: 2019-10-04

## 2019-09-20 NOTE — LETTER
September 20, 2019     Adam Acevedo MD  [address]    Patient: Juliana Sofia   YOB: 1935   Date of Visit: 9/20/2019       Dear Dr Jazmin Mott: Thank you for referring Neema Bermudez to me for evaluation  Below are the relevant portions of my H&P  If you have questions, please do not hesitate to call me  I look forward to following Jovani Zambrano along with you  Sincerely,        Lauren Sanchez MD        CC: No Recipients  Lauren Sanchez MD  9/20/2019  1:57 PM  Signed  Assessment/Plan:    Problem List Items Addressed This Visit        Endocrine    Type 1 diabetes mellitus with diabetic neuropathy ([facility] [address] ) (Chronic)    Relevant Orders    Ambulatory referral to Geriatrics       Other    Right tubo-ovarian mass - Primary     28-year-old with a 7 7 x 4 8 centimeter right complex adnexal mass, possible endometrial polyp  She has type 1 diabetes  Performance status is 0   1  I discussed the risks and benefits of total laparoscopic hysterectomy, bilateral salpingo-oophorectomy with intraoperative frozen section analysis and possible conversion to exploratory laparotomy, ovarian cancer staging including omentectomy, peritoneal biopsies, lymphadenectomy  She understands the risks and benefits of the operation agrees to proceed as outlined  2  Preoperative medical risk evaluation at [facility]  3  Postoperative consultation with geriatrics  Thank you for the courtesy of this consultation  All questions were answered by the end of the visit           Relevant Orders    Case request operating room: HYSTERECTOMY LAPAROSCOPIC TOTAL ([address]) (Completed)    Type and screen    CBC and differential    Comprehensive metabolic panel    APTT    Protime-INR    HEMOGLOBIN A1C W/ EAG ESTIMATION        TSH    EKG 12 lead    XR chest pa & lateral    Ambulatory referral to Geriatrics              CHIEF COMPLAINT:  Complex ovarian mass identified by CT imaging          Patient ID: Yohannes Ramirez is a 82 y o  female  61-year-old with hyponatremia who had a CT scan of the abdomen pelvis performed on 8/30/2018  I personally reviewed these images  The ovary was complex in appearance and measured approximately 5 4 x 3 3 centimeters  There is no evidence of pelvic or periaortic lymphadenopathy, ascites, omental caking  She then had a pelvic ultrasound on 9/16/2019 that revealed the uterus to measure 4 1 x 3 8 centimeters with a small endometrial fluid collection, possible polypoid structure measuring 1 x 0 9 centimeters  The right ovary measured 7 7 x 4 8 centimeters  It was complex  She is not having postmenopausal bleeding or pelvic pain  She is referred as a consultation from Dr Carlos Bailey to discuss treatment options for her complex right ovarian mass  Review of Systems   Constitutional: Positive for fatigue  All other systems reviewed and are negative        Current Outpatient Medications   Medication Sig Dispense Refill    amLODIPine (NORVASC) 5 mg tablet TAKE 1 TABLET DAILY AFTER SUPPER 90 tablet 3    aspirin 81 MG tablet Take 1 tablet by mouth daily      B Complex-Folic Acid (SUPER B COMPLEX MAXI) TABS Take by mouth      BD INSULIN SYRINGE ULTRAFINE 31G X 15/64" 0 3 ML MISC Inject under the skin 4 (four) times a day (Patient not taking: Reported on 9/4/2019) 120 each 11    Calcium Polycarbophil (KONSYL FIBER PO) Take by mouth      carboxymethylcellulose (REFRESH TEARS) 0 5 % SOLN Apply 1 drop to eye 4 (four) times a day      cholecalciferol (VITAMIN D3) 1,000 units tablet Take 3,000 capsules by mouth daily       cyanocobalamin 1000 MCG tablet Take 1,000 mcg by mouth daily      dorzolamide-timolol (COSOPT) 22 3-6 8 MG/ML ophthalmic solution Apply 1 drop to eye 2 (two) times a day      DULoxetine (CYMBALTA) 30 mg delayed release capsule Take 1 capsule (30 mg total) by mouth daily 30 capsule 5    fluticasone (FLONASE) 50 mcg/act nasal spray 2 sprays into each nostril daily      gabapentin (NEURONTIN) 300 mg capsule Take 1 capsule (300 mg total) by mouth 3 (three) times a day (Patient taking differently: Take 300 mg by mouth 2 (two) times a day ) 90 capsule 11    glucose blood (ONE TOUCH ULTRA TEST) test strip 1 each by Other route 6 (six) times a day 100 each 11    insulin glargine (LANTUS) 100 units/mL subcutaneous injection Inject 12units under the skin nightly as directed 10 mL 11    insulin lispro (HUMALOG) 100 units/mL injection INJECT 4 UNITS AT BREAKFAST, 5 UNITS AT LUNCH, AND 7 UNITS AT DINNER TIME (Patient taking differently: INJECT 4 UNITS AT BREAKFAST, 4 UNITS AT LUNCH, AND 7 UNITS AT DINNER TIME) 10 mL 11    Insulin Syringe-Needle U-100 (B-D INS SYRINGE 0 5CC/31GX5/16) 31G X 5/16" 0 5 ML MISC by Does not apply route      irbesartan (AVAPRO) 150 mg tablet Take 1 tablet (150 mg total) by mouth daily 90 tablet 3    LANTUS 100 UNIT/ML subcutaneous injection INJECT 12 UNITS UNDER THE SKIN NIGHTLY AS DIRECTED 10 mL 2    levothyroxine 50 mcg tablet Take 1 tablet (50 mcg total) by mouth daily 90 tablet 1    LORazepam (ATIVAN) 0 5 mg tablet Take 1 tablet (0 5 mg total) by mouth 3 (three) times a day (Patient taking differently: Take 0 5 mg by mouth 2 (two) times a day ) 90 tablet 5    Magnesium 500 MG TABS Take 1 tablet by mouth daily      pravastatin (PRAVACHOL) 20 mg tablet Take 1 tablet (20 mg total) by mouth daily at bedtime 30 tablet 11    RHOPRESSA 0 02 % SOLN       sodium chloride 1 g tablet Take 1 tablet (1 g total) by mouth 3 (three) times a day (Patient taking differently: Take 1 g by mouth 2 (two) times a day ) 270 tablet 3    sodium chloride 1 g tablet Take 1 g by mouth daily      torsemide (DEMADEX) 5 MG tablet Take 1 tablet (5 mg total) by mouth daily 30 tablet 5    travoprost (TRAVATAN Z) 0 004 % ophthalmic solution Apply to eye       No current facility-administered medications for this visit          Allergies Allergen Reactions    Thiazide-Type Diuretics      Hyponatremia       Past Medical History:   Diagnosis Date    Cataract     Last Assessed: 2015    Chest pain     Last Assessed: 2015    Diabetes mellitus ([address] )     Difficulty falling asleep at night until early morning hours     sleeping flat    Diplopia     Last Assessed: 2016    Fracture of fifth metatarsal bone of right foot     Last Assessed: 2016    Hearing loss     Herpes zoster     Last Assessed: 2015    Hyperlipidemia     Hypertension     Hyperthyroidism     Last Assessed: 2017    Hyponatremia     Last Assessed: 3/22/2016    Neoplasm of uncertain behavior of skin     Last Assessed: 10/17/2013    Skin lesions     Last Assessed: 2016    Syncope     Last Assessed: 2015    Thyroid disease     Type 1 diabetes mellitus without complication ([address] )     Wears glasses        Past Surgical History:   Procedure Laterality Date    APPENDECTOMY      CATARACT EXTRACTION      COLONOSCOPY  2014    Completed - Dr Dolan Barthel, due in 5 years    SKIN LESION EXCISION      Ears       OB History        2    Para   2    Term                AB        Living           SAB        TAB        Ectopic        Multiple        Live Births   2           Obstetric Comments   Menarche 17years old  Age of first pregnancy 34years old               Family History   Problem Relation Age of Onset    Heart attack Mother     Diabetes type II Mother     Dementia Father     Stroke Father         CVA    Breast cancer Maternal Aunt     Stomach cancer Maternal Uncle     Diabetes Family     Stroke Family         Complications       The following portions of the patient's history were reviewed and updated as appropriate: allergies, current medications, past family history, past medical history, past social history, past surgical history and problem list       Objective:    Blood pressure 120/64, pulse 82, temperature 98 2 °F (36 8 °C), temperature source Oral, height 5' 4 5" (1 638 m), weight 58 2 kg (128 lb 6 4 oz)  Body mass index is 21 7 kg/m²  Physical Exam   Constitutional: She is oriented to person, place, and time  She appears well-developed and well-nourished  No distress  HENT:   Head: Normocephalic and atraumatic  Neck: Normal range of motion  Neck supple  No thyromegaly present  Cardiovascular: Normal rate, regular rhythm and normal heart sounds  Pulmonary/Chest: Effort normal and breath sounds normal    Abdominal: Soft  She exhibits no distension and no mass  There is no tenderness  There is no rebound and no guarding  No hernia  Genitourinary:   Genitourinary Comments: The external female genitalia is normal  The bartholin's, uretheral and skenes glands are normal  The urethral meatus is normal (midline with no lesions)  Anus without fissure or lesion  Speculum exam reveals vagina without lesion or discharge  Cervix is normal appearing without visible lesions  No bleeding  No significant cystocele or rectocele noted  Bimanual exam notes a uterus with normal contour, mobility  No tenderness  There is fullness in the pelvis adjacent to the uterus  There is no evidence of parametrial disease  Bladder is without fullness, mass or tenderness  Musculoskeletal: She exhibits no edema  Lymphadenopathy:     She has no cervical adenopathy  Neurological: She is alert and oriented to person, place, and time  Skin: Skin is warm and dry  She is not diaphoretic  Right lower quadrant vertical incision   Psychiatric: She has a normal mood and affect   Her behavior is normal  Judgment and thought content normal          No results found for:   Lab Results   Component Value Date    WBC 7 02 06/21/2019    HGB 12 2 06/21/2019    HCT 37 0 06/21/2019    MCV 99 (H) 06/21/2019     06/21/2019     Lab Results   Component Value Date     (L) 01/08/2016    K 4 9 09/05/2019     09/05/2019    CO2 26 09/05/2019    ANIONGAP 6 01/08/2016    BUN 20 09/05/2019    CREATININE 0 93 09/05/2019    GLUCOSE 75 01/08/2016    GLUF 119 (H) 09/05/2019    CALCIUM 9 6 09/05/2019    AST 10 08/07/2019    ALT 19 08/07/2019    ALKPHOS 90 08/07/2019    PROT 7 7 01/08/2016    BILITOT 0 83 01/08/2016    EGFR 57 09/05/2019

## 2019-09-20 NOTE — H&P
Assessment/Plan:    Problem List Items Addressed This Visit        Endocrine    Type 1 diabetes mellitus with diabetic neuropathy ([facility] [address] ) (Chronic)    Relevant Orders    Ambulatory referral to Geriatrics       Other    Right tubo-ovarian mass - Primary     68-year-old with a 7 7 x 4 8 centimeter right complex adnexal mass, possible endometrial polyp  She has type 1 diabetes  Performance status is 0   1  I discussed the risks and benefits of total laparoscopic hysterectomy, bilateral salpingo-oophorectomy with intraoperative frozen section analysis and possible conversion to exploratory laparotomy, ovarian cancer staging including omentectomy, peritoneal biopsies, lymphadenectomy  She understands the risks and benefits of the operation agrees to proceed as outlined  2  Preoperative medical risk evaluation at [facility]  3  Postoperative consultation with geriatrics  Thank you for the courtesy of this consultation  All questions were answered by the end of the visit  Relevant Orders    Case request operating room: HYSTERECTOMY LAPAROSCOPIC TOTAL ([address]) (Completed)    Type and screen    CBC and differential    Comprehensive metabolic panel    APTT    Protime-INR    HEMOGLOBIN A1C W/ EAG ESTIMATION        TSH    EKG 12 lead    XR chest pa & lateral    Ambulatory referral to Geriatrics              CHIEF COMPLAINT:  Complex ovarian mass identified by CT imaging          Patient ID: Linda Brock is a 82 y o  female  68-year-old with hyponatremia who had a CT scan of the abdomen pelvis performed on 8/30/2018  I personally reviewed these images  The ovary was complex in appearance and measured approximately 5 4 x 3 3 centimeters  There is no evidence of pelvic or periaortic lymphadenopathy, ascites, omental caking    She then had a pelvic ultrasound on 9/16/2019 that revealed the uterus to measure 4 1 x 3 8 centimeters with a small endometrial fluid collection, possible polypoid structure measuring 1 x 0 9 centimeters  The right ovary measured 7 7 x 4 8 centimeters  It was complex  She is not having postmenopausal bleeding or pelvic pain  She is referred as a consultation from Dr Fozia Patel to discuss treatment options for her complex right ovarian mass  Review of Systems   Constitutional: Positive for fatigue  All other systems reviewed and are negative        Current Outpatient Medications   Medication Sig Dispense Refill    amLODIPine (NORVASC) 5 mg tablet TAKE 1 TABLET DAILY AFTER SUPPER 90 tablet 3    aspirin 81 MG tablet Take 1 tablet by mouth daily      B Complex-Folic Acid (SUPER B COMPLEX MAXI) TABS Take by mouth      BD INSULIN SYRINGE ULTRAFINE 31G X 15/64" 0 3 ML MISC Inject under the skin 4 (four) times a day (Patient not taking: Reported on 9/4/2019) 120 each 11    Calcium Polycarbophil (KONSYL FIBER PO) Take by mouth      carboxymethylcellulose (REFRESH TEARS) 0 5 % SOLN Apply 1 drop to eye 4 (four) times a day      cholecalciferol (VITAMIN D3) 1,000 units tablet Take 3,000 capsules by mouth daily       cyanocobalamin 1000 MCG tablet Take 1,000 mcg by mouth daily      dorzolamide-timolol (COSOPT) 22 3-6 8 MG/ML ophthalmic solution Apply 1 drop to eye 2 (two) times a day      DULoxetine (CYMBALTA) 30 mg delayed release capsule Take 1 capsule (30 mg total) by mouth daily 30 capsule 5    fluticasone (FLONASE) 50 mcg/act nasal spray 2 sprays into each nostril daily      gabapentin (NEURONTIN) 300 mg capsule Take 1 capsule (300 mg total) by mouth 3 (three) times a day (Patient taking differently: Take 300 mg by mouth 2 (two) times a day ) 90 capsule 11    glucose blood (ONE TOUCH ULTRA TEST) test strip 1 each by Other route 6 (six) times a day 100 each 11    insulin glargine (LANTUS) 100 units/mL subcutaneous injection Inject 12units under the skin nightly as directed 10 mL 11    insulin lispro (HUMALOG) 100 units/mL injection INJECT 4 UNITS AT BREAKFAST, 5 UNITS AT LUNCH, AND 7 UNITS AT DINNER TIME (Patient taking differently: INJECT 4 UNITS AT BREAKFAST, 4 UNITS AT LUNCH, AND 7 UNITS AT DINNER TIME) 10 mL 11    Insulin Syringe-Needle U-100 (B-D INS SYRINGE 0 5CC/31GX5/16) 31G X 5/16" 0 5 ML MISC by Does not apply route      irbesartan (AVAPRO) 150 mg tablet Take 1 tablet (150 mg total) by mouth daily 90 tablet 3    LANTUS 100 UNIT/ML subcutaneous injection INJECT 12 UNITS UNDER THE SKIN NIGHTLY AS DIRECTED 10 mL 2    levothyroxine 50 mcg tablet Take 1 tablet (50 mcg total) by mouth daily 90 tablet 1    LORazepam (ATIVAN) 0 5 mg tablet Take 1 tablet (0 5 mg total) by mouth 3 (three) times a day (Patient taking differently: Take 0 5 mg by mouth 2 (two) times a day ) 90 tablet 5    Magnesium 500 MG TABS Take 1 tablet by mouth daily      pravastatin (PRAVACHOL) 20 mg tablet Take 1 tablet (20 mg total) by mouth daily at bedtime 30 tablet 11    RHOPRESSA 0 02 % SOLN       sodium chloride 1 g tablet Take 1 tablet (1 g total) by mouth 3 (three) times a day (Patient taking differently: Take 1 g by mouth 2 (two) times a day ) 270 tablet 3    sodium chloride 1 g tablet Take 1 g by mouth daily      torsemide (DEMADEX) 5 MG tablet Take 1 tablet (5 mg total) by mouth daily 30 tablet 5    travoprost (TRAVATAN Z) 0 004 % ophthalmic solution Apply to eye       No current facility-administered medications for this visit          Allergies   Allergen Reactions    Thiazide-Type Diuretics      Hyponatremia       Past Medical History:   Diagnosis Date    Cataract     Last Assessed: 8/11/2015    Chest pain     Last Assessed: 8/25/2015    Diabetes mellitus ([facility] [address] )     Difficulty falling asleep at night until early morning hours     sleeping flat    Diplopia     Last Assessed: 6/9/2016    Fracture of fifth metatarsal bone of right foot     Last Assessed: 12/9/2016    Hearing loss     Herpes zoster     Last Assessed: 12/9/2015    Hyperlipidemia     Hypertension     Hyperthyroidism     Last Assessed: 2017    Hyponatremia     Last Assessed: 3/22/2016    Neoplasm of uncertain behavior of skin     Last Assessed: 10/17/2013    Skin lesions     Last Assessed: 2016    Syncope     Last Assessed: 2015    Thyroid disease     Type 1 diabetes mellitus without complication ([address] )     Wears glasses        Past Surgical History:   Procedure Laterality Date    APPENDECTOMY      CATARACT EXTRACTION      COLONOSCOPY  2014    Completed - Dr Martina Hernandez, due in 5 years    SKIN LESION EXCISION      Ears       OB History        2    Para   2    Term                AB        Living           SAB        TAB        Ectopic        Multiple        Live Births   2           Obstetric Comments   Menarche 17years old  Age of first pregnancy 34years old               Family History   Problem Relation Age of Onset    Heart attack Mother     Diabetes type II Mother     Dementia Father     Stroke Father         CVA    Breast cancer Maternal Aunt     Stomach cancer Maternal Uncle     Diabetes Family     Stroke Family         Complications       The following portions of the patient's history were reviewed and updated as appropriate: allergies, current medications, past family history, past medical history, past social history, past surgical history and problem list       Objective:    Blood pressure 120/64, pulse 82, temperature 98 2 °F (36 8 °C), temperature source Oral, height 5' 4 5" (1 638 m), weight 58 2 kg (128 lb 6 4 oz)  Body mass index is 21 7 kg/m²  Physical Exam   Constitutional: She is oriented to person, place, and time  She appears well-developed and well-nourished  No distress  HENT:   Head: Normocephalic and atraumatic  Neck: Normal range of motion  Neck supple  No thyromegaly present  Cardiovascular: Normal rate, regular rhythm and normal heart sounds     Pulmonary/Chest: Effort normal and breath sounds normal  Abdominal: Soft  She exhibits no distension and no mass  There is no tenderness  There is no rebound and no guarding  No hernia  Genitourinary:   Genitourinary Comments: The external female genitalia is normal  The bartholin's, uretheral and skenes glands are normal  The urethral meatus is normal (midline with no lesions)  Anus without fissure or lesion  Speculum exam reveals vagina without lesion or discharge  Cervix is normal appearing without visible lesions  No bleeding  No significant cystocele or rectocele noted  Bimanual exam notes a uterus with normal contour, mobility  No tenderness  There is fullness in the pelvis adjacent to the uterus  There is no evidence of parametrial disease  Bladder is without fullness, mass or tenderness  Musculoskeletal: She exhibits no edema  Lymphadenopathy:     She has no cervical adenopathy  Neurological: She is alert and oriented to person, place, and time  Skin: Skin is warm and dry  She is not diaphoretic  Right lower quadrant vertical incision   Psychiatric: She has a normal mood and affect   Her behavior is normal  Judgment and thought content normal          No results found for:   Lab Results   Component Value Date    WBC 7 02 06/21/2019    HGB 12 2 06/21/2019    HCT 37 0 06/21/2019    MCV 99 (H) 06/21/2019     06/21/2019     Lab Results   Component Value Date     (L) 01/08/2016    K 4 9 09/05/2019     09/05/2019    CO2 26 09/05/2019    ANIONGAP 6 01/08/2016    BUN 20 09/05/2019    CREATININE 0 93 09/05/2019    GLUCOSE 75 01/08/2016    GLUF 119 (H) 09/05/2019    CALCIUM 9 6 09/05/2019    AST 10 08/07/2019    ALT 19 08/07/2019    ALKPHOS 90 08/07/2019    PROT 7 7 01/08/2016    BILITOT 0 83 01/08/2016    EGFR 57 09/05/2019

## 2019-09-20 NOTE — PATIENT INSTRUCTIONS
1  Nothing to eat or drink after midnight prior to the operation  You are allowed clear liquids until 3 hours prior to the scheduled start time of surgery  No milk products or solid food for 8 hours prior to surgery  2   Please avoid ibuprofen, aspirin, fish oil for 7 days prior to surgery  3  Take your amlodipine, gabapentin, thyroid medication the morning of surgery with a sip of water  Please do not take any of your other medications the morning of surgery  4  Use half the dose of your Lantus the night before surgery because you will not be eating in the morning

## 2019-09-20 NOTE — H&P (VIEW-ONLY)
Assessment/Plan:    Problem List Items Addressed This Visit        Endocrine    Type 1 diabetes mellitus with diabetic neuropathy ([facility] [address] ) (Chronic)    Relevant Orders    Ambulatory referral to Geriatrics       Other    Right tubo-ovarian mass - Primary     61-year-old with a 7 7 x 4 8 centimeter right complex adnexal mass, possible endometrial polyp  She has type 1 diabetes  Performance status is 0   1  I discussed the risks and benefits of total laparoscopic hysterectomy, bilateral salpingo-oophorectomy with intraoperative frozen section analysis and possible conversion to exploratory laparotomy, ovarian cancer staging including omentectomy, peritoneal biopsies, lymphadenectomy  She understands the risks and benefits of the operation agrees to proceed as outlined  2  Preoperative medical risk evaluation at geriatric assessment Center  3  Postoperative consultation with geriatrics  Thank you for the courtesy of this consultation  All questions were answered by the end of the visit  Relevant Orders    Case request operating room: HYSTERECTOMY LAPAROSCOPIC TOTAL ([address]) (Completed)    Type and screen    CBC and differential    Comprehensive metabolic panel    APTT    Protime-INR    HEMOGLOBIN A1C W/ EAG ESTIMATION        TSH    EKG 12 lead    XR chest pa & lateral    Ambulatory referral to Geriatrics              CHIEF COMPLAINT:  Complex ovarian mass identified by CT imaging          Patient ID: Mahesh Roberson is a 82 y o  female  61-year-old with hyponatremia who had a CT scan of the abdomen pelvis performed on 8/30/2018  I personally reviewed these images  The ovary was complex in appearance and measured approximately 5 4 x 3 3 centimeters  There is no evidence of pelvic or periaortic lymphadenopathy, ascites, omental caking    She then had a pelvic ultrasound on 9/16/2019 that revealed the uterus to measure 4 1 x 3 8 centimeters with a small endometrial fluid collection, possible polypoid structure measuring 1 x 0 9 centimeters  The right ovary measured 7 7 x 4 8 centimeters  It was complex  She is not having postmenopausal bleeding or pelvic pain  She is referred as a consultation from Dr Krystle Uriarte to discuss treatment options for her complex right ovarian mass  Review of Systems   Constitutional: Positive for fatigue  All other systems reviewed and are negative        Current Outpatient Medications   Medication Sig Dispense Refill    amLODIPine (NORVASC) 5 mg tablet TAKE 1 TABLET DAILY AFTER SUPPER 90 tablet 3    aspirin 81 MG tablet Take 1 tablet by mouth daily      B Complex-Folic Acid (SUPER B COMPLEX MAXI) TABS Take by mouth      BD INSULIN SYRINGE ULTRAFINE 31G X 15/64" 0 3 ML MISC Inject under the skin 4 (four) times a day (Patient not taking: Reported on 9/4/2019) 120 each 11    Calcium Polycarbophil (KONSYL FIBER PO) Take by mouth      carboxymethylcellulose (REFRESH TEARS) 0 5 % SOLN Apply 1 drop to eye 4 (four) times a day      cholecalciferol (VITAMIN D3) 1,000 units tablet Take 3,000 capsules by mouth daily       cyanocobalamin 1000 MCG tablet Take 1,000 mcg by mouth daily      dorzolamide-timolol (COSOPT) 22 3-6 8 MG/ML ophthalmic solution Apply 1 drop to eye 2 (two) times a day      DULoxetine (CYMBALTA) 30 mg delayed release capsule Take 1 capsule (30 mg total) by mouth daily 30 capsule 5    fluticasone (FLONASE) 50 mcg/act nasal spray 2 sprays into each nostril daily      gabapentin (NEURONTIN) 300 mg capsule Take 1 capsule (300 mg total) by mouth 3 (three) times a day (Patient taking differently: Take 300 mg by mouth 2 (two) times a day ) 90 capsule 11    glucose blood (ONE TOUCH ULTRA TEST) test strip 1 each by Other route 6 (six) times a day 100 each 11    insulin glargine (LANTUS) 100 units/mL subcutaneous injection Inject 12units under the skin nightly as directed 10 mL 11    insulin lispro (HUMALOG) 100 units/mL injection INJECT 4 UNITS AT BREAKFAST, 5 UNITS AT LUNCH, AND 7 UNITS AT DINNER TIME (Patient taking differently: INJECT 4 UNITS AT BREAKFAST, 4 UNITS AT LUNCH, AND 7 UNITS AT DINNER TIME) 10 mL 11    Insulin Syringe-Needle U-100 (B-D INS SYRINGE 0 5CC/31GX5/16) 31G X 5/16" 0 5 ML MISC by Does not apply route      irbesartan (AVAPRO) 150 mg tablet Take 1 tablet (150 mg total) by mouth daily 90 tablet 3    LANTUS 100 UNIT/ML subcutaneous injection INJECT 12 UNITS UNDER THE SKIN NIGHTLY AS DIRECTED 10 mL 2    levothyroxine 50 mcg tablet Take 1 tablet (50 mcg total) by mouth daily 90 tablet 1    LORazepam (ATIVAN) 0 5 mg tablet Take 1 tablet (0 5 mg total) by mouth 3 (three) times a day (Patient taking differently: Take 0 5 mg by mouth 2 (two) times a day ) 90 tablet 5    Magnesium 500 MG TABS Take 1 tablet by mouth daily      pravastatin (PRAVACHOL) 20 mg tablet Take 1 tablet (20 mg total) by mouth daily at bedtime 30 tablet 11    RHOPRESSA 0 02 % SOLN       sodium chloride 1 g tablet Take 1 tablet (1 g total) by mouth 3 (three) times a day (Patient taking differently: Take 1 g by mouth 2 (two) times a day ) 270 tablet 3    sodium chloride 1 g tablet Take 1 g by mouth daily      torsemide (DEMADEX) 5 MG tablet Take 1 tablet (5 mg total) by mouth daily 30 tablet 5    travoprost (TRAVATAN Z) 0 004 % ophthalmic solution Apply to eye       No current facility-administered medications for this visit          Allergies   Allergen Reactions    Thiazide-Type Diuretics      Hyponatremia       Past Medical History:   Diagnosis Date    Cataract     Last Assessed: 8/11/2015    Chest pain     Last Assessed: 8/25/2015    Diabetes mellitus ([facility] [address] )     Difficulty falling asleep at night until early morning hours     sleeping flat    Diplopia     Last Assessed: 6/9/2016    Fracture of fifth metatarsal bone of right foot     Last Assessed: 12/9/2016    Hearing loss     Herpes zoster     Last Assessed: 12/9/2015    Hyperlipidemia     Hypertension     Hyperthyroidism     Last Assessed: 2017    Hyponatremia     Last Assessed: 3/22/2016    Neoplasm of uncertain behavior of skin     Last Assessed: 10/17/2013    Skin lesions     Last Assessed: 2016    Syncope     Last Assessed: 2015    Thyroid disease     Type 1 diabetes mellitus without complication ([address] )     Wears glasses        Past Surgical History:   Procedure Laterality Date    APPENDECTOMY      CATARACT EXTRACTION      COLONOSCOPY  2014    Completed - Dr Dorothy Worley, due in 5 years    SKIN LESION EXCISION      Ears       OB History        2    Para   2    Term                AB        Living           SAB        TAB        Ectopic        Multiple        Live Births   2           Obstetric Comments   Menarche 17years old  Age of first pregnancy 34years old               Family History   Problem Relation Age of Onset    Heart attack Mother     Diabetes type II Mother     Dementia Father     Stroke Father         CVA    Breast cancer Maternal Aunt     Stomach cancer Maternal Uncle     Diabetes Family     Stroke Family         Complications       The following portions of the patient's history were reviewed and updated as appropriate: allergies, current medications, past family history, past medical history, past social history, past surgical history and problem list       Objective:    Blood pressure 120/64, pulse 82, temperature 98 2 °F (36 8 °C), temperature source Oral, height 5' 4 5" (1 638 m), weight 58 2 kg (128 lb 6 4 oz)  Body mass index is 21 7 kg/m²  Physical Exam   Constitutional: She is oriented to person, place, and time  She appears well-developed and well-nourished  No distress  HENT:   Head: Normocephalic and atraumatic  Neck: Normal range of motion  Neck supple  No thyromegaly present  Cardiovascular: Normal rate, regular rhythm and normal heart sounds     Pulmonary/Chest: Effort normal and breath sounds normal  Abdominal: Soft  She exhibits no distension and no mass  There is no tenderness  There is no rebound and no guarding  No hernia  Genitourinary:   Genitourinary Comments: The external female genitalia is normal  The bartholin's, uretheral and skenes glands are normal  The urethral meatus is normal (midline with no lesions)  Anus without fissure or lesion  Speculum exam reveals vagina without lesion or discharge  Cervix is normal appearing without visible lesions  No bleeding  No significant cystocele or rectocele noted  Bimanual exam notes a uterus with normal contour, mobility  No tenderness  There is fullness in the pelvis adjacent to the uterus  There is no evidence of parametrial disease  Bladder is without fullness, mass or tenderness  Musculoskeletal: She exhibits no edema  Lymphadenopathy:     She has no cervical adenopathy  Neurological: She is alert and oriented to person, place, and time  Skin: Skin is warm and dry  She is not diaphoretic  Right lower quadrant vertical incision   Psychiatric: She has a normal mood and affect   Her behavior is normal  Judgment and thought content normal          No results found for:   Lab Results   Component Value Date    WBC 7 02 06/21/2019    HGB 12 2 06/21/2019    HCT 37 0 06/21/2019    MCV 99 (H) 06/21/2019     06/21/2019     Lab Results   Component Value Date     (L) 01/08/2016    K 4 9 09/05/2019     09/05/2019    CO2 26 09/05/2019    ANIONGAP 6 01/08/2016    BUN 20 09/05/2019    CREATININE 0 93 09/05/2019    GLUCOSE 75 01/08/2016    GLUF 119 (H) 09/05/2019    CALCIUM 9 6 09/05/2019    AST 10 08/07/2019    ALT 19 08/07/2019    ALKPHOS 90 08/07/2019    PROT 7 7 01/08/2016    BILITOT 0 83 01/08/2016    EGFR 57 09/05/2019

## 2019-09-20 NOTE — ASSESSMENT & PLAN NOTE
82year-old with a 7 7 x 4 8 centimeter right complex adnexal mass, possible endometrial polyp  She has type 1 diabetes  Performance status is 0   1  I discussed the risks and benefits of total laparoscopic hysterectomy, bilateral salpingo-oophorectomy with intraoperative frozen section analysis and possible conversion to exploratory laparotomy, ovarian cancer staging including omentectomy, peritoneal biopsies, lymphadenectomy  She understands the risks and benefits of the operation agrees to proceed as outlined  2  Preoperative medical risk evaluation at ger[facility] assessment Center  3  Postoperative consultation with geriatrics  Thank you for the courtesy of this consultation  All questions were answered by the end of the visit

## 2019-09-25 ENCOUNTER — APPOINTMENT (OUTPATIENT)
Dept: LAB | Facility: CLINIC | Age: 84
End: 2019-09-25
Payer: COMMERCIAL

## 2019-09-25 DIAGNOSIS — N83.8 RIGHT TUBO-OVARIAN MASS: ICD-10-CM

## 2019-09-25 DIAGNOSIS — E10.40 TYPE 1 DIABETES MELLITUS WITH DIABETIC NEUROPATHY (HCC): ICD-10-CM

## 2019-09-25 DIAGNOSIS — E78.5 HYPERLIPIDEMIA, UNSPECIFIED HYPERLIPIDEMIA TYPE: ICD-10-CM

## 2019-09-25 DIAGNOSIS — E03.8 ADULT ONSET HYPOTHYROIDISM: ICD-10-CM

## 2019-09-25 LAB
ABO GROUP BLD: NORMAL
ALBUMIN SERPL BCP-MCNC: 3.7 G/DL (ref 3.5–5)
ALP SERPL-CCNC: 80 U/L (ref 46–116)
ALT SERPL W P-5'-P-CCNC: 21 U/L (ref 12–78)
ANION GAP SERPL CALCULATED.3IONS-SCNC: 3 MMOL/L (ref 4–13)
APTT PPP: 30 SECONDS (ref 23–37)
AST SERPL W P-5'-P-CCNC: 10 U/L (ref 5–45)
BASOPHILS # BLD AUTO: 0.03 THOUSANDS/ΜL (ref 0–0.1)
BASOPHILS NFR BLD AUTO: 1 % (ref 0–1)
BILIRUB SERPL-MCNC: 0.66 MG/DL (ref 0.2–1)
BLD GP AB SCN SERPL QL: NEGATIVE
BUN SERPL-MCNC: 23 MG/DL (ref 5–25)
CALCIUM SERPL-MCNC: 9 MG/DL (ref 8.3–10.1)
CANCER AG125 SERPL-ACNC: 54.6 U/ML (ref 0–30)
CHLORIDE SERPL-SCNC: 103 MMOL/L (ref 100–108)
CHOLEST SERPL-MCNC: 190 MG/DL (ref 50–200)
CO2 SERPL-SCNC: 27 MMOL/L (ref 21–32)
CREAT SERPL-MCNC: 0.97 MG/DL (ref 0.6–1.3)
EOSINOPHIL # BLD AUTO: 0.14 THOUSAND/ΜL (ref 0–0.61)
EOSINOPHIL NFR BLD AUTO: 3 % (ref 0–6)
ERYTHROCYTE [DISTWIDTH] IN BLOOD BY AUTOMATED COUNT: 12.3 % (ref 11.6–15.1)
EST. AVERAGE GLUCOSE BLD GHB EST-MCNC: 146 MG/DL
GFR SERPL CREATININE-BSD FRML MDRD: 55 ML/MIN/1.73SQ M
GLUCOSE P FAST SERPL-MCNC: 143 MG/DL (ref 65–99)
HBA1C MFR BLD: 6.7 % (ref 4.2–6.3)
HCT VFR BLD AUTO: 39.2 % (ref 34.8–46.1)
HDLC SERPL-MCNC: 93 MG/DL (ref 40–60)
HGB BLD-MCNC: 12.7 G/DL (ref 11.5–15.4)
IMM GRANULOCYTES # BLD AUTO: 0.03 THOUSAND/UL (ref 0–0.2)
IMM GRANULOCYTES NFR BLD AUTO: 1 % (ref 0–2)
INR PPP: 1 (ref 0.84–1.19)
LDLC SERPL CALC-MCNC: 87 MG/DL (ref 0–100)
LYMPHOCYTES # BLD AUTO: 1.39 THOUSANDS/ΜL (ref 0.6–4.47)
LYMPHOCYTES NFR BLD AUTO: 26 % (ref 14–44)
MCH RBC QN AUTO: 32.5 PG (ref 26.8–34.3)
MCHC RBC AUTO-ENTMCNC: 32.4 G/DL (ref 31.4–37.4)
MCV RBC AUTO: 100 FL (ref 82–98)
MONOCYTES # BLD AUTO: 0.45 THOUSAND/ΜL (ref 0.17–1.22)
MONOCYTES NFR BLD AUTO: 8 % (ref 4–12)
NEUTROPHILS # BLD AUTO: 3.39 THOUSANDS/ΜL (ref 1.85–7.62)
NEUTS SEG NFR BLD AUTO: 61 % (ref 43–75)
NRBC BLD AUTO-RTO: 0 /100 WBCS
PLATELET # BLD AUTO: 272 THOUSANDS/UL (ref 149–390)
PMV BLD AUTO: 10 FL (ref 8.9–12.7)
POTASSIUM SERPL-SCNC: 4.4 MMOL/L (ref 3.5–5.3)
PROT SERPL-MCNC: 7.7 G/DL (ref 6.4–8.2)
PROTHROMBIN TIME: 12.8 SECONDS (ref 11.6–14.5)
RBC # BLD AUTO: 3.91 MILLION/UL (ref 3.81–5.12)
RH BLD: POSITIVE
SODIUM SERPL-SCNC: 133 MMOL/L (ref 136–145)
SPECIMEN EXPIRATION DATE: NORMAL
TRIGL SERPL-MCNC: 52 MG/DL
TSH SERPL DL<=0.05 MIU/L-ACNC: 2.43 UIU/ML (ref 0.36–3.74)
WBC # BLD AUTO: 5.43 THOUSAND/UL (ref 4.31–10.16)

## 2019-09-25 PROCEDURE — 86900 BLOOD TYPING SEROLOGIC ABO: CPT

## 2019-09-25 PROCEDURE — 85025 COMPLETE CBC W/AUTO DIFF WBC: CPT

## 2019-09-25 PROCEDURE — 80053 COMPREHEN METABOLIC PANEL: CPT

## 2019-09-25 PROCEDURE — 86304 IMMUNOASSAY TUMOR CA 125: CPT

## 2019-09-25 PROCEDURE — 36415 COLL VENOUS BLD VENIPUNCTURE: CPT

## 2019-09-25 PROCEDURE — 86901 BLOOD TYPING SEROLOGIC RH(D): CPT

## 2019-09-25 PROCEDURE — 86850 RBC ANTIBODY SCREEN: CPT

## 2019-09-25 PROCEDURE — 80061 LIPID PANEL: CPT

## 2019-09-25 PROCEDURE — 85610 PROTHROMBIN TIME: CPT

## 2019-09-25 PROCEDURE — 84443 ASSAY THYROID STIM HORMONE: CPT

## 2019-09-25 PROCEDURE — 85730 THROMBOPLASTIN TIME PARTIAL: CPT

## 2019-09-25 PROCEDURE — 83036 HEMOGLOBIN GLYCOSYLATED A1C: CPT

## 2019-09-29 ENCOUNTER — ANESTHESIA EVENT (OUTPATIENT)
Dept: PERIOP | Facility: HOSPITAL | Age: 84
DRG: 736 | End: 2019-09-29
Payer: COMMERCIAL

## 2019-09-30 ENCOUNTER — OFFICE VISIT (OUTPATIENT)
Dept: FAMILY MEDICINE CLINIC | Facility: CLINIC | Age: 84
End: 2019-09-30
Payer: COMMERCIAL

## 2019-09-30 VITALS
BODY MASS INDEX: 21.63 KG/M2 | DIASTOLIC BLOOD PRESSURE: 60 MMHG | TEMPERATURE: 99.1 F | SYSTOLIC BLOOD PRESSURE: 122 MMHG | HEIGHT: 65 IN | HEART RATE: 60 BPM | RESPIRATION RATE: 16 BRPM | OXYGEN SATURATION: 96 % | WEIGHT: 129.8 LBS

## 2019-09-30 DIAGNOSIS — N83.8 RIGHT TUBO-OVARIAN MASS: ICD-10-CM

## 2019-09-30 DIAGNOSIS — E03.8 ADULT ONSET HYPOTHYROIDISM: ICD-10-CM

## 2019-09-30 DIAGNOSIS — Z01.818 PRE-OP EXAM: Primary | ICD-10-CM

## 2019-09-30 DIAGNOSIS — E11.40 DIABETIC NEUROPATHY, PAINFUL (HCC): ICD-10-CM

## 2019-09-30 DIAGNOSIS — E87.1 HYPONATREMIA: ICD-10-CM

## 2019-09-30 DIAGNOSIS — I10 ESSENTIAL HYPERTENSION: Chronic | ICD-10-CM

## 2019-09-30 DIAGNOSIS — F41.1 GAD (GENERALIZED ANXIETY DISORDER): Chronic | ICD-10-CM

## 2019-09-30 DIAGNOSIS — E10.40 TYPE 1 DIABETES MELLITUS WITH DIABETIC NEUROPATHY (HCC): Chronic | ICD-10-CM

## 2019-09-30 DIAGNOSIS — E78.5 HYPERLIPIDEMIA, UNSPECIFIED HYPERLIPIDEMIA TYPE: Chronic | ICD-10-CM

## 2019-09-30 PROCEDURE — 93000 ELECTROCARDIOGRAM COMPLETE: CPT | Performed by: FAMILY MEDICINE

## 2019-09-30 PROCEDURE — 3074F SYST BP LT 130 MM HG: CPT | Performed by: FAMILY MEDICINE

## 2019-09-30 PROCEDURE — 3078F DIAST BP <80 MM HG: CPT | Performed by: FAMILY MEDICINE

## 2019-09-30 PROCEDURE — 99214 OFFICE O/P EST MOD 30 MIN: CPT | Performed by: FAMILY MEDICINE

## 2019-09-30 NOTE — ANESTHESIA PREPROCEDURE EVALUATION
Review of Systems/Medical History  Patient summary reviewed  Chart reviewed  No history of anesthetic complications     Cardiovascular  Hyperlipidemia, Hypertension controlled,    Pulmonary  Negative pulmonary ROS        GI/Hepatic  Negative GI/hepatic ROS          Negative  ROS        Endo/Other  Diabetes well controlled type 1 Insulin, History of thyroid disease , hypothyroidism,      GYN  Negative gynecology ROS          Hematology  Negative hematology ROS      Musculoskeletal    Arthritis     Neurology    Diabetic neuropathy, Visual impairment (glaucoma),    Psychology   Anxiety, Depression , being treated for depression,              Physical Exam    Airway    Mallampati score: II  TM Distance: >3 FB  Neck ROM: full     Dental   No notable dental hx     Cardiovascular  Rhythm: regular, Rate: normal, Cardiovascular exam normal    Pulmonary  Pulmonary exam normal Breath sounds clear to auscultation,     Other Findings        Anesthesia Plan  ASA Score- 2     Anesthesia Type- general with ASA Monitors  Additional Monitors:   Airway Plan:     Comment: Preop heparin approved nd given as per surgeon request   POCT glucose ~250  Kady Serum Plan Factors-Patient not instructed to abstain from smoking on day of procedure  Patient did not smoke on day of surgery  Induction- intravenous  Postoperative Plan-     Informed Consent- Anesthetic plan and risks discussed with patient

## 2019-09-30 NOTE — PRE-PROCEDURE INSTRUCTIONS
Pre-Surgery Instructions:   Medication Instructions    amLODIPine (NORVASC) 5 mg tablet Instructed patient per Anesthesia Guidelines   aspirin 81 MG tablet Patient was instructed by Physician and understands   Calcium Polycarbophil (KONSYL FIBER PO) Instructed patient per Anesthesia Guidelines   carboxymethylcellulose (REFRESH TEARS) 0 5 % SOLN Instructed patient per Anesthesia Guidelines   cholecalciferol (VITAMIN D3) 1,000 units tablet Instructed patient per Anesthesia Guidelines   dorzolamide-timolol (COSOPT) 22 3-6 8 MG/ML ophthalmic solution Instructed patient per Anesthesia Guidelines   DULoxetine (CYMBALTA) 30 mg delayed release capsule Instructed patient per Anesthesia Guidelines   fluticasone (FLONASE) 50 mcg/act nasal spray Instructed patient per Anesthesia Guidelines   gabapentin (NEURONTIN) 300 mg capsule Instructed patient per Anesthesia Guidelines   insulin glargine (LANTUS) 100 units/mL subcutaneous injection Instructed patient per Anesthesia Guidelines   insulin lispro (HUMALOG) 100 units/mL injection Instructed patient per Anesthesia Guidelines   irbesartan (AVAPRO) 150 mg tablet Instructed patient per Anesthesia Guidelines   levothyroxine 50 mcg tablet Instructed patient per Anesthesia Guidelines   LORazepam (ATIVAN) 0 5 mg tablet Instructed patient per Anesthesia Guidelines   Magnesium 500 MG TABS Instructed patient per Anesthesia Guidelines   pravastatin (PRAVACHOL) 20 mg tablet Instructed patient per Anesthesia Guidelines   RHOPRESSA 0 02 % SOLN Instructed patient per Anesthesia Guidelines   sodium chloride 1 g tablet Instructed patient per Anesthesia Guidelines   torsemide (DEMADEX) 5 MG tablet Instructed patient per Anesthesia Guidelines   travoprost (TRAVATAN Z) 0 004 % ophthalmic solution Instructed patient per Anesthesia Guidelines     Instructed to take gabapentin/ levothyroxine/ lorazepam am of surgery with sip of water per anesthesia DR Nikki Douglas  Can use eye drops in am as ordered  To take 5 units lantus insulin pm prior to surgery

## 2019-09-30 NOTE — PROGRESS NOTES
FAMILY PRACTICE OFFICE VISIT       NAME: Jennifer Flannery  AGE: 82 y o  SEX: female       : 1935        MRN: [id number]        Assessment and Plan     Problem List Items Addressed This Visit        Endocrine    Type 1 diabetes mellitus with diabetic neuropathy ([address] ) (Chronic)       Lab Results   Component Value Date    HGBA1C 6 7 (H) 2019 ·    Continue Lantus and regular insulin  · Usual dose of Lantus 11 units at bedtime, she will reduce dose to 5 units night before surgery         Adult onset hypothyroidism     · Continue levothyroxine 50 mcg once a day         Diabetic neuropathy, painful (HCC)       Lab Results   Component Value Date    HGBA1C 6 7 (H) 2019    Continue regimen of gabapentin 300 mg b i d  And Cymbalta 30 mg daily            Cardiovascular and Mediastinum    Hypertension (Chronic)     · Blood pressure is well controlled  · Continue regimen of Avapro 150 mg daily and Norvasc 5 mg daily  · Patient may hold Avapro as per anesthesia/preop instructions            Other    ANAYA (generalized anxiety disorder) (Chronic)     · Patient uses lorazepam 0 5 mg b i d / as needed, no adverse side effects         Hyperlipidemia (Chronic)     · Continue pravastatin 20 mg daily         Hyponatremia (Chronic)     · Recent extensive evaluation by [facility]  · Patient uses salt tablets 1 g once a day along with torsemide 5 mg daily         Right tubo-ovarian mass     Pending laparoscopic QASIM/ BSO , Dr Megha Barreto           Other Visit Diagnoses     Pre-op exam    -  Primary    Relevant Orders    POCT ECG (Completed)       Patient presents for preop evaluation prior to laparoscopic total abdominal hysterectomy on   She denies symptoms of chest pain, palpitations, shortness of breath or dizziness  EKG performed in the office today is normal   Patient is acceptable risk for surgical procedure as scheduled    She will continue daily medications for chronic medical conditions as outlined above  Patient will reduce dose of Lantus to 5 units the night before surgery  She remains under ongoing care of [facility] for treatment of hyponatremia  There are no Patient Instructions on file for this visit  Discussed with the patient and all questioned fully answered  She will call me if any problems arise  M*Modal software was used to dictate this note  It may contain errors with dictating incorrect words/spelling  Please contact provider directly with any questions  Chief Complaint     Chief Complaint   Patient presents with    Pre-op Exam       History of Present Illness      Patient presents for follow-up of chronic medical conditions in preop evaluation  She is scheduled for Lap QASIM/BSO 10/4/19, Dr Mckeon   Tubo-ovarian  masss [facility]  Patient presented with persistent symptoms of hyponatremia and I have referred her to [facility] for further evaluation  They proceeded with complete blood work including CT abdomen and pelvis which has incidentally revealed right ovarian mass  Patient was subsequently referred to gyn Oncology   is elevated at 54 6  Patient denies symptoms of pelvic pain, pressure dysuria or abdominal discomfort  She denies symptoms of chest pain, palpitations, shortness of breath or dizziness  She remains on medications for hypertension, hypothyroidism and is being treated with basal and regular insulin for type 1 diabetes  Patient is understandably nervous about forthcoming surgery  Results of recent blood work performed on September 25th reviewed  Normal CBC and TSH  Hemoglobin A1c is 6 7   CMP with fasting blood sugar 143, normal LFTs, GFR of 55, sodium level 133  Patient remains on amlodipine 5 mg daily and Avapro 150 mg daily for hypertension  She remains on levothyroxine for hypothyroidism  Patient uses Cymbalta 30 mg daily along with gabapentin 300 mg b i d  For painful diabetic neuropathy    Normal bowel movements  Patient remains under care of [facility], Dr Casey Pulliam            Review of Systems   Review of Systems   Constitutional: Negative  HENT: Negative  Eyes: Negative  Respiratory: Negative  Cardiovascular: Negative  Gastrointestinal: Negative  Endocrine: Negative  Genitourinary: Negative  Musculoskeletal: Negative  Skin: Negative  Allergic/Immunologic: Negative  Neurological: Negative  Hematological: Negative  Psychiatric/Behavioral: The patient is nervous/anxious          Active Problem List     Patient Active Problem List   Diagnosis    Adult onset hypothyroidism    ANAYA (generalized anxiety disorder)    Hyperlipidemia    Hypertension    Retinopathy, diabetic, proliferative ([facility] )    Type 1 diabetes mellitus with diabetic neuropathy ([facility] )    Diabetic neuropathy, painful ([facility] )    Age-related osteoporosis without current pathological fracture    Hyponatremia    Other microscopic hematuria    Vaginal burning    Vaginal itching    Right tubo-ovarian mass       Past Medical History:  Past Medical History:   Diagnosis Date    Anxiety     Arthritis     Constipation     Diabetes mellitus ([facility] )     IDDM    Frequent UTI     Glaucoma     Hearing loss     Hyperlipidemia     Hypertension     Hyperthyroidism     in past    Hyponatremia     Hypothyroid     Neuropathy     bles    Right tubo-ovarian mass     Wears glasses        Past Surgical History:  Past Surgical History:   Procedure Laterality Date    APPENDECTOMY  1956    CATARACT EXTRACTION Bilateral     COLONOSCOPY  09/2014    Completed - Dr Julia Severe, due in 11 years    SKIN LESION EXCISION      Ears       Family History:  Family History   Problem Relation Age of Onset    Heart attack Mother     Diabetes type II Mother     Dementia Father     Stroke Father         CVA    Breast cancer Maternal Aunt     Stomach cancer Maternal Uncle     Diabetes Family     Stroke Family Complications       Social History:  Social History     Socioeconomic History    Marital status:       Spouse name: Not on file    Number of children: 2    Years of education: Not on file    Highest education level: Not on file   Occupational History    Not on file   Social Needs    Financial resource strain: Not on file    Food insecurity:     Worry: Not on file     Inability: Not on file    Transportation needs:     Medical: Not on file     Non-medical: Not on file   Tobacco Use    Smoking status: Never Smoker    Smokeless tobacco: Never Used   Substance and Sexual Activity    Alcohol use: Yes     Frequency: Monthly or less     Drinks per session: 1 or 2     Binge frequency: Never     Comment: wine    Drug use: No    Sexual activity: Not on file   Lifestyle    Physical activity:     Days per week: Not on file     Minutes per session: Not on file    Stress: Not on file   Relationships    Social connections:     Talks on phone: Not on file     Gets together: Not on file     Attends Yarsani service: Not on file     Active member of club or organization: Not on file     Attends meetings of clubs or organizations: Not on file     Relationship status: Not on file    Intimate partner violence:     Fear of current or ex partner: Not on file     Emotionally abused: Not on file     Physically abused: Not on file     Forced sexual activity: Not on file   Other Topics Concern    Not on file   Social History Narrative    Caffeine use    No preference on Yarsani beliefs       Objective     Vitals:    09/30/19 1303 09/30/19 1349   BP: 100/60 122/60   Pulse: 60    Resp: 16    Temp: 99 1 °F (37 3 °C)    TempSrc: Tympanic    SpO2: 96%    Weight: 58 9 kg (129 lb 12 8 oz)    Height: 5' 4 5" (1 638 m)      Wt Readings from Last 3 Encounters:   09/30/19 58 9 kg (129 lb 12 8 oz)   09/20/19 58 2 kg (128 lb 6 4 oz)   09/04/19 59 9 kg (132 lb)       Physical Exam   Constitutional: She is oriented to person, place, and time  She appears well-developed and well-nourished  HENT:   Head: Normocephalic and atraumatic  Eyes: Conjunctivae are normal    Neck: Neck supple  No JVD present  Carotid bruit is not present  No thyromegaly present  Cardiovascular: Normal rate, regular rhythm and normal heart sounds  No murmur heard  Pulmonary/Chest: Effort normal and breath sounds normal  No respiratory distress  She has no wheezes  Abdominal: Soft  Bowel sounds are normal  She exhibits no distension and no abdominal bruit  There is no tenderness  Musculoskeletal: Normal range of motion  She exhibits no edema  Neurological: She is alert and oriented to person, place, and time  No cranial nerve deficit  Coordination normal    Psychiatric: Her behavior is normal  Thought content normal  Her mood appears anxious  Cognition and memory are normal    Nursing note and vitals reviewed      EKG:  Normal sinus rhythm, 74 beats per minute, no ischemic changes    Pertinent Laboratory/Diagnostic Studies:  Lab Results   Component Value Date    GLUCOSE 75 01/08/2016    BUN 20 10/01/2019    CREATININE 1 03 10/01/2019    CALCIUM 9 1 10/01/2019     (L) 01/08/2016    K 5 2 10/01/2019    CO2 28 10/01/2019     10/01/2019     Lab Results   Component Value Date    ALT 21 09/25/2019    AST 10 09/25/2019    ALKPHOS 80 09/25/2019    BILITOT 0 83 01/08/2016       Lab Results   Component Value Date    WBC 5 43 09/25/2019    HGB 12 7 09/25/2019    HCT 39 2 09/25/2019     (H) 09/25/2019     09/25/2019       No results found for: TSH    Lab Results   Component Value Date    CHOL 183 01/08/2016     Lab Results   Component Value Date    TRIG 52 09/25/2019     Lab Results   Component Value Date    HDL 93 (H) 09/25/2019     Lab Results   Component Value Date    LDLCALC 87 09/25/2019     Lab Results   Component Value Date    HGBA1C 6 7 (H) 09/25/2019       Results for orders placed or performed in visit on 09/25/19   Comprehensive metabolic panel   Result Value Ref Range    Sodium 133 (L) 136 - 145 mmol/L    Potassium 4 4 3 5 - 5 3 mmol/L    Chloride 103 100 - 108 mmol/L    CO2 27 21 - 32 mmol/L    ANION GAP 3 (L) 4 - 13 mmol/L    BUN 23 5 - 25 mg/dL    Creatinine 0 97 0 60 - 1 30 mg/dL    Glucose, Fasting 143 (H) 65 - 99 mg/dL    Calcium 9 0 8 3 - 10 1 mg/dL    AST 10 5 - 45 U/L    ALT 21 12 - 78 U/L    Alkaline Phosphatase 80 46 - 116 U/L    Total Protein 7 7 6 4 - 8 2 g/dL    Albumin 3 7 3 5 - 5 0 g/dL    Total Bilirubin 0 66 0 20 - 1 00 mg/dL    eGFR 55 ml/min/1 73sq m   Hemoglobin A1C   Result Value Ref Range    Hemoglobin A1C 6 7 (H) 4 2 - 6 3 %     mg/dl   Lipid Panel with Direct LDL reflex   Result Value Ref Range    Cholesterol 190 50 - 200 mg/dL    Triglycerides 52 <=150 mg/dL    HDL, Direct 93 (H) 40 - 60 mg/dL    LDL Calculated 87 0 - 100 mg/dL   TSH, 3rd generation   Result Value Ref Range    TSH 3RD GENERATON 2 430 0 358 - 3 740 uIU/mL   CBC and differential   Result Value Ref Range    WBC 5 43 4 31 - 10 16 Thousand/uL    RBC 3 91 3 81 - 5 12 Million/uL    Hemoglobin 12 7 11 5 - 15 4 g/dL    Hematocrit 39 2 34 8 - 46 1 %     (H) 82 - 98 fL    MCH 32 5 26 8 - 34 3 pg    MCHC 32 4 31 4 - 37 4 g/dL    RDW 12 3 11 6 - 15 1 %    MPV 10 0 8 9 - 12 7 fL    Platelets 270 053 - 817 Thousands/uL    nRBC 0 /100 WBCs    Neutrophils Relative 61 43 - 75 %    Immat GRANS % 1 0 - 2 %    Lymphocytes Relative 26 14 - 44 %    Monocytes Relative 8 4 - 12 %    Eosinophils Relative 3 0 - 6 %    Basophils Relative 1 0 - 1 %    Neutrophils Absolute 3 39 1 85 - 7 62 Thousands/µL    Immature Grans Absolute 0 03 0 00 - 0 20 Thousand/uL    Lymphocytes Absolute 1 39 0 60 - 4 47 Thousands/µL    Monocytes Absolute 0 45 0 17 - 1 22 Thousand/µL    Eosinophils Absolute 0 14 0 00 - 0 61 Thousand/µL    Basophils Absolute 0 03 0 00 - 0 10 Thousands/µL   APTT   Result Value Ref Range    PTT 30 23 - 37 seconds   Protime-INR   Result Value Ref Range Protime 12 8 11 6 - 14 5 seconds    INR 1 00 0 84 - 1 19      Result Value Ref Range     54 6 (H) 0 0 - 30 0 U/mL   Type and screen   Result Value Ref Range    ABO Grouping A     Rh Factor Positive     Antibody Screen Negative     Specimen Expiration Date 20191023        Orders Placed This Encounter   Procedures    POCT ECG       ALLERGIES:  Allergies   Allergen Reactions    Thiazide-Type Diuretics      Hyponatremia       Current Medications     Current Outpatient Medications   Medication Sig Dispense Refill    amLODIPine (NORVASC) 5 mg tablet TAKE 1 TABLET DAILY AFTER SUPPER 90 tablet 3    aspirin 81 MG tablet Take 1 tablet by mouth daily      Calcium Polycarbophil (KONSYL FIBER PO) Take by mouth daily       carboxymethylcellulose (REFRESH TEARS) 0 5 % SOLN Apply 1 drop to eye 4 (four) times a day      cholecalciferol (VITAMIN D3) 1,000 units tablet Take 3,000 capsules by mouth daily       cyanocobalamin 1000 MCG tablet Take 1,000 mcg by mouth daily      dorzolamide-timolol (COSOPT) 22 3-6 8 MG/ML ophthalmic solution Administer 1 drop to both eyes 2 (two) times a day       DULoxetine (CYMBALTA) 30 mg delayed release capsule Take 1 capsule (30 mg total) by mouth daily (Patient taking differently: Take 30 mg by mouth daily at bedtime ) 30 capsule 5    fluticasone (FLONASE) 50 mcg/act nasal spray 2 sprays into each nostril daily as needed       gabapentin (NEURONTIN) 300 mg capsule Take 1 capsule (300 mg total) by mouth 3 (three) times a day (Patient taking differently: Take 300 mg by mouth 2 (two) times a day ) 90 capsule 11    insulin glargine (LANTUS) 100 units/mL subcutaneous injection Inject 12units under the skin nightly as directed (Patient taking differently: Inject 11 Units under the skin daily at bedtime ) 10 mL 11    insulin lispro (HUMALOG) 100 units/mL injection INJECT 4 UNITS AT BREAKFAST, 5 UNITS AT LUNCH, AND 7 UNITS AT DINNER TIME (Patient taking differently: Before meals acc to sliding scale) 10 mL 11    irbesartan (AVAPRO) 150 mg tablet Take 1 tablet (150 mg total) by mouth daily 90 tablet 3    levothyroxine 50 mcg tablet Take 1 tablet (50 mcg total) by mouth daily 90 tablet 1    LORazepam (ATIVAN) 0 5 mg tablet Take 1 tablet (0 5 mg total) by mouth 3 (three) times a day (Patient taking differently: Take 0 5 mg by mouth 2 (two) times a day ) 90 tablet 5    Magnesium 500 MG TABS Take 1 tablet by mouth daily      pravastatin (PRAVACHOL) 20 mg tablet Take 1 tablet (20 mg total) by mouth daily at bedtime 30 tablet 11    RHOPRESSA 0 02 % SOLN Administer 1 drop to the right eye daily at bedtime       sodium chloride 1 g tablet Take 1 g by mouth daily      torsemide (DEMADEX) 5 MG tablet Take 1 tablet (5 mg total) by mouth daily 30 tablet 5    travoprost (TRAVATAN Z) 0 004 % ophthalmic solution Administer 1 drop to both eyes daily at bedtime        No current facility-administered medications for this visit          Medications Discontinued During This Encounter   Medication Reason    BD INSULIN SYRINGE ULTRAFINE 31G X 15/64" 0 3 ML MISC     LANTUS 100 UNIT/ML subcutaneous injection     sodium chloride 1 g tablet        Health Maintenance     Health Maintenance   Topic Date Due    SLP PLAN OF CARE  1935    HEPATITIS B VACCINES (1 of 3 - Risk 3-dose series) 06/01/1956    DTaP,Tdap,and Td Vaccines (1 - Tdap) 02/02/2003    INFLUENZA VACCINE  07/01/2019    CRC Screening: Colonoscopy  09/05/2019    Diabetic Foot Exam  03/25/2020    HEMOGLOBIN A1C  03/25/2020    Fall Risk  06/28/2020    Depression Screening PHQ  06/28/2020    Urinary Incontinence Screening  06/28/2020    Medicare Annual Wellness Visit (AWV)  06/28/2020    BMI: Adult  09/30/2020    DM Eye Exam  06/06/2021    Pneumococcal Vaccine: 65+ Years  Completed    Pneumococcal Vaccine: Pediatrics (0 to 5 Years) and At-Risk Patients (6 to 59 Years)  Aged [employer] History   Administered Date(s) Administered    INFLUENZA 10/01/2010, 10/02/2012, 10/14/2013, 09/10/2014, 10/01/2015, 11/15/2016    Influenza Split High Dose Preservative Free IM 10/01/2015, 11/15/2016, 09/19/2017    Influenza TIV (IM) 1935    Influenza, high dose seasonal 0 5 mL 09/13/2018    Pneumococcal Conjugate 13-Valent 10/19/2015    Pneumococcal Polysaccharide PPV23 11/01/2007, 06/28/2019    Td (adult), Unspecified 02/01/2003    Zoster 03/11/2013    Zoster Vaccine Recombinant 08/09/2019       Constance Gutierrez MD

## 2019-10-01 ENCOUNTER — TRANSCRIBE ORDERS (OUTPATIENT)
Dept: LAB | Facility: CLINIC | Age: 84
End: 2019-10-01

## 2019-10-01 ENCOUNTER — APPOINTMENT (OUTPATIENT)
Dept: LAB | Facility: CLINIC | Age: 84
DRG: 736 | End: 2019-10-01
Payer: COMMERCIAL

## 2019-10-01 DIAGNOSIS — E03.9 HYPOTHYROIDISM, UNSPECIFIED TYPE: ICD-10-CM

## 2019-10-01 DIAGNOSIS — E87.1 HYPONATREMIA: ICD-10-CM

## 2019-10-01 DIAGNOSIS — E11.40 DIABETIC NEUROPATHY, PAINFUL (HCC): ICD-10-CM

## 2019-10-01 DIAGNOSIS — E10.40 TYPE 1 DIABETES MELLITUS WITH DIABETIC NEUROPATHY (HCC): Primary | Chronic | ICD-10-CM

## 2019-10-01 LAB
ANION GAP SERPL CALCULATED.3IONS-SCNC: 2 MMOL/L (ref 4–13)
BUN SERPL-MCNC: 20 MG/DL (ref 5–25)
CALCIUM SERPL-MCNC: 9.1 MG/DL (ref 8.3–10.1)
CHLORIDE SERPL-SCNC: 104 MMOL/L (ref 100–108)
CO2 SERPL-SCNC: 28 MMOL/L (ref 21–32)
CREAT SERPL-MCNC: 1.03 MG/DL (ref 0.6–1.3)
GFR SERPL CREATININE-BSD FRML MDRD: 51 ML/MIN/1.73SQ M
GLUCOSE SERPL-MCNC: 139 MG/DL (ref 65–140)
POTASSIUM SERPL-SCNC: 5.2 MMOL/L (ref 3.5–5.3)
SODIUM SERPL-SCNC: 134 MMOL/L (ref 136–145)

## 2019-10-01 PROCEDURE — 80048 BASIC METABOLIC PNL TOTAL CA: CPT

## 2019-10-01 PROCEDURE — 36415 COLL VENOUS BLD VENIPUNCTURE: CPT

## 2019-10-01 RX ORDER — LEVOTHYROXINE SODIUM 0.05 MG/1
50 TABLET ORAL DAILY
Qty: 90 TABLET | Refills: 3 | OUTPATIENT
Start: 2019-10-01

## 2019-10-01 NOTE — TELEPHONE ENCOUNTER
I spoke to patient and she made me aware that her PCP would not refill her medication due to it being a Dr Valdez script

## 2019-10-02 ENCOUNTER — TELEPHONE (OUTPATIENT)
Dept: NEPHROLOGY | Facility: CLINIC | Age: 84
End: 2019-10-02

## 2019-10-02 ENCOUNTER — DOCUMENTATION (OUTPATIENT)
Dept: NEPHROLOGY | Facility: CLINIC | Age: 84
End: 2019-10-02

## 2019-10-02 DIAGNOSIS — E11.40 DIABETIC NEUROPATHY, PAINFUL (HCC): ICD-10-CM

## 2019-10-02 DIAGNOSIS — E10.3599 PROLIFERATIVE DIABETIC RETINOPATHY ASSOCIATED WITH TYPE 1 DIABETES MELLITUS, UNSPECIFIED LATERALITY, UNSPECIFIED PROLIFERATIVE RETINOPATHY TYPE (HCC): ICD-10-CM

## 2019-10-02 DIAGNOSIS — E10.40 TYPE 1 DIABETES MELLITUS WITH DIABETIC NEUROPATHY (HCC): Primary | Chronic | ICD-10-CM

## 2019-10-02 PROBLEM — E87.1 HYPONATREMIA: Chronic | Status: ACTIVE | Noted: 2019-08-06

## 2019-10-02 RX ORDER — LEVOTHYROXINE SODIUM 0.05 MG/1
50 TABLET ORAL DAILY
Qty: 90 TABLET | Refills: 1 | Status: SHIPPED | OUTPATIENT
Start: 2019-10-02

## 2019-10-02 NOTE — TELEPHONE ENCOUNTER
----- Message from Aleksandra Javier MD sent at 10/1/2019 12:40 PM EDT -----  Overall the labs look reasonably good with sodium 134  However the potassium is high normal at 5 2  Recommendations:  1  Decrease irbesartan to 75 mg daily which is a half of 150 mg tablet  2  Modestly low-potassium diet  3   Repeat a basic metabolic profile nonfasting in 1 week after making the above change

## 2019-10-02 NOTE — ASSESSMENT & PLAN NOTE
· Recent extensive evaluation by [facility]  · Patient uses salt tablets 1 g once a day along with torsemide 5 mg daily

## 2019-10-02 NOTE — TELEPHONE ENCOUNTER
See if there is any way she could have a basic metabolic profile today nonfasting  She definitely should have a follow-up basic metabolic profile within the 1st week after surgery

## 2019-10-02 NOTE — ASSESSMENT & PLAN NOTE
Lab Results   Component Value Date    HGBA1C 6 7 (H) 09/25/2019    Continue regimen of gabapentin 300 mg b i d   And Cymbalta 30 mg daily

## 2019-10-02 NOTE — ASSESSMENT & PLAN NOTE
Lab Results   Component Value Date    HGBA1C 6 7 (H) 09/25/2019   ·  Continue Lantus and regular insulin    · Usual dose of Lantus 11 units at bedtime, she will reduce dose to 5 units night before surgery

## 2019-10-02 NOTE — TELEPHONE ENCOUNTER
Patient returned call she stated she is unable to go for blood work today or tomorrow most recent was 10/1  patient is aware that she will have to go for blood work 1 week after the surgery

## 2019-10-02 NOTE — TELEPHONE ENCOUNTER
I spoke to patient regarding medication changes and low K diet  - patient is unaware if she can have the BMP done due to having surgery done on 10/4  Unaware what she will be able to do after the surgery

## 2019-10-02 NOTE — ASSESSMENT & PLAN NOTE
· Blood pressure is well controlled  · Continue regimen of Avapro 150 mg daily and Norvasc 5 mg daily    · Patient may hold Avapro as per anesthesia/preop instructions

## 2019-10-02 NOTE — TELEPHONE ENCOUNTER
Lm for patient to hold her irbesartan due to surgery coming up and having possible BMP either today or tomorrow  Bmp 1 week after surgery is possible

## 2019-10-03 ENCOUNTER — APPOINTMENT (OUTPATIENT)
Dept: LAB | Facility: CLINIC | Age: 84
DRG: 736 | End: 2019-10-03
Payer: COMMERCIAL

## 2019-10-03 DIAGNOSIS — E10.40 TYPE 1 DIABETES MELLITUS WITH DIABETIC NEUROPATHY (HCC): Chronic | ICD-10-CM

## 2019-10-03 DIAGNOSIS — E11.40 DIABETIC NEUROPATHY, PAINFUL (HCC): ICD-10-CM

## 2019-10-03 DIAGNOSIS — E10.3599 PROLIFERATIVE DIABETIC RETINOPATHY ASSOCIATED WITH TYPE 1 DIABETES MELLITUS, UNSPECIFIED LATERALITY, UNSPECIFIED PROLIFERATIVE RETINOPATHY TYPE (HCC): ICD-10-CM

## 2019-10-03 LAB
ANION GAP SERPL CALCULATED.3IONS-SCNC: 4 MMOL/L (ref 4–13)
BUN SERPL-MCNC: 23 MG/DL (ref 5–25)
CALCIUM SERPL-MCNC: 9.4 MG/DL (ref 8.3–10.1)
CHLORIDE SERPL-SCNC: 103 MMOL/L (ref 100–108)
CO2 SERPL-SCNC: 27 MMOL/L (ref 21–32)
CREAT SERPL-MCNC: 0.93 MG/DL (ref 0.6–1.3)
GFR SERPL CREATININE-BSD FRML MDRD: 57 ML/MIN/1.73SQ M
GLUCOSE SERPL-MCNC: 89 MG/DL (ref 65–140)
POTASSIUM SERPL-SCNC: 4.6 MMOL/L (ref 3.5–5.3)
SODIUM SERPL-SCNC: 134 MMOL/L (ref 136–145)

## 2019-10-03 PROCEDURE — 80048 BASIC METABOLIC PNL TOTAL CA: CPT

## 2019-10-03 PROCEDURE — 36415 COLL VENOUS BLD VENIPUNCTURE: CPT

## 2019-10-04 ENCOUNTER — ANESTHESIA (OUTPATIENT)
Dept: PERIOP | Facility: HOSPITAL | Age: 84
DRG: 736 | End: 2019-10-04
Payer: COMMERCIAL

## 2019-10-04 ENCOUNTER — HOSPITAL ENCOUNTER (INPATIENT)
Facility: HOSPITAL | Age: 84
LOS: 14 days | Discharge: NURSING, REHAB OR LONG-TERM CARE | DRG: 736 | End: 2019-10-18
Attending: OBSTETRICS & GYNECOLOGY | Admitting: OBSTETRICS & GYNECOLOGY
Payer: COMMERCIAL

## 2019-10-04 DIAGNOSIS — F41.1 GAD (GENERALIZED ANXIETY DISORDER): Chronic | ICD-10-CM

## 2019-10-04 DIAGNOSIS — E78.5 HYPERLIPIDEMIA, UNSPECIFIED HYPERLIPIDEMIA TYPE: Chronic | ICD-10-CM

## 2019-10-04 DIAGNOSIS — I46.9 CARDIAC ARREST (HCC): ICD-10-CM

## 2019-10-04 DIAGNOSIS — K92.2 GI BLEED: ICD-10-CM

## 2019-10-04 DIAGNOSIS — I99.8 ISCHEMIA: ICD-10-CM

## 2019-10-04 DIAGNOSIS — E87.1 HYPONATREMIA: Chronic | ICD-10-CM

## 2019-10-04 DIAGNOSIS — I10 HYPERTENSION: Chronic | ICD-10-CM

## 2019-10-04 DIAGNOSIS — N83.8 RIGHT TUBO-OVARIAN MASS: ICD-10-CM

## 2019-10-04 DIAGNOSIS — E10.40 TYPE 1 DIABETES MELLITUS WITH DIABETIC NEUROPATHY (HCC): Primary | Chronic | ICD-10-CM

## 2019-10-04 DIAGNOSIS — E11.3599: ICD-10-CM

## 2019-10-04 DIAGNOSIS — I48.91 A-FIB (HCC): ICD-10-CM

## 2019-10-04 DIAGNOSIS — E10.3599 PROLIFERATIVE DIABETIC RETINOPATHY ASSOCIATED WITH TYPE 1 DIABETES MELLITUS, UNSPECIFIED LATERALITY, UNSPECIFIED PROLIFERATIVE RETINOPATHY TYPE (HCC): ICD-10-CM

## 2019-10-04 DIAGNOSIS — A41.9 SEPSIS (HCC): ICD-10-CM

## 2019-10-04 DIAGNOSIS — C56.9 OVARIAN CANCER (HCC): ICD-10-CM

## 2019-10-04 DIAGNOSIS — I48.0 PAROXYSMAL ATRIAL FIBRILLATION (HCC): ICD-10-CM

## 2019-10-04 DIAGNOSIS — J18.9 PNEUMONIA: ICD-10-CM

## 2019-10-04 LAB
ABO GROUP BLD: NORMAL
BLD GP AB SCN SERPL QL: NEGATIVE
GLUCOSE SERPL-MCNC: 229 MG/DL (ref 65–140)
GLUCOSE SERPL-MCNC: 241 MG/DL (ref 65–140)
GLUCOSE SERPL-MCNC: 251 MG/DL (ref 65–140)
GLUCOSE SERPL-MCNC: 254 MG/DL (ref 65–140)
GLUCOSE SERPL-MCNC: 292 MG/DL (ref 65–140)
RH BLD: POSITIVE
SPECIMEN EXPIRATION DATE: NORMAL

## 2019-10-04 PROCEDURE — 0UT9FZZ RESECTION OF UTERUS, VIA NATURAL OR ARTIFICIAL OPENING WITH PERCUTANEOUS ENDOSCOPIC ASSISTANCE: ICD-10-PCS | Performed by: OBSTETRICS & GYNECOLOGY

## 2019-10-04 PROCEDURE — 86900 BLOOD TYPING SEROLOGIC ABO: CPT | Performed by: OBSTETRICS & GYNECOLOGY

## 2019-10-04 PROCEDURE — 88307 TISSUE EXAM BY PATHOLOGIST: CPT | Performed by: PATHOLOGY

## 2019-10-04 PROCEDURE — 38770 REMOVE PELVIS LYMPH NODES: CPT | Performed by: OBSTETRICS & GYNECOLOGY

## 2019-10-04 PROCEDURE — 88112 CYTOPATH CELL ENHANCE TECH: CPT | Performed by: PATHOLOGY

## 2019-10-04 PROCEDURE — 88342 IMHCHEM/IMCYTCHM 1ST ANTB: CPT | Performed by: PATHOLOGY

## 2019-10-04 PROCEDURE — 0UT7FZZ RESECTION OF BILATERAL FALLOPIAN TUBES, VIA NATURAL OR ARTIFICIAL OPENING WITH PERCUTANEOUS ENDOSCOPIC ASSISTANCE: ICD-10-PCS | Performed by: OBSTETRICS & GYNECOLOGY

## 2019-10-04 PROCEDURE — 0UT2FZZ RESECTION OF BILATERAL OVARIES, VIA NATURAL OR ARTIFICIAL OPENING WITH PERCUTANEOUS ENDOSCOPIC ASSISTANCE: ICD-10-PCS | Performed by: OBSTETRICS & GYNECOLOGY

## 2019-10-04 PROCEDURE — 88361 TUMOR IMMUNOHISTOCHEM/COMPUT: CPT | Performed by: PATHOLOGY

## 2019-10-04 PROCEDURE — C9290 INJ, BUPIVACAINE LIPOSOME: HCPCS | Performed by: ANESTHESIOLOGY

## 2019-10-04 PROCEDURE — 07BC0ZX EXCISION OF PELVIS LYMPHATIC, OPEN APPROACH, DIAGNOSTIC: ICD-10-PCS | Performed by: OBSTETRICS & GYNECOLOGY

## 2019-10-04 PROCEDURE — 88341 IMHCHEM/IMCYTCHM EA ADD ANTB: CPT | Performed by: PATHOLOGY

## 2019-10-04 PROCEDURE — 0DBU0ZZ EXCISION OF OMENTUM, OPEN APPROACH: ICD-10-PCS | Performed by: OBSTETRICS & GYNECOLOGY

## 2019-10-04 PROCEDURE — 88309 TISSUE EXAM BY PATHOLOGIST: CPT | Performed by: PATHOLOGY

## 2019-10-04 PROCEDURE — 0DB80ZZ EXCISION OF SMALL INTESTINE, OPEN APPROACH: ICD-10-PCS | Performed by: OBSTETRICS & GYNECOLOGY

## 2019-10-04 PROCEDURE — 88331 PATH CONSLTJ SURG 1 BLK 1SPC: CPT | Performed by: PATHOLOGY

## 2019-10-04 PROCEDURE — 86850 RBC ANTIBODY SCREEN: CPT | Performed by: OBSTETRICS & GYNECOLOGY

## 2019-10-04 PROCEDURE — 88305 TISSUE EXAM BY PATHOLOGIST: CPT | Performed by: PATHOLOGY

## 2019-10-04 PROCEDURE — 86923 COMPATIBILITY TEST ELECTRIC: CPT

## 2019-10-04 PROCEDURE — 58571 TLH W/T/O 250 G OR LESS: CPT | Performed by: OBSTETRICS & GYNECOLOGY

## 2019-10-04 PROCEDURE — 82948 REAGENT STRIP/BLOOD GLUCOSE: CPT

## 2019-10-04 PROCEDURE — 86901 BLOOD TYPING SEROLOGIC RH(D): CPT | Performed by: OBSTETRICS & GYNECOLOGY

## 2019-10-04 PROCEDURE — C1765 ADHESION BARRIER: HCPCS | Performed by: OBSTETRICS & GYNECOLOGY

## 2019-10-04 RX ORDER — LEVOTHYROXINE SODIUM 0.05 MG/1
50 TABLET ORAL
Status: DISCONTINUED | OUTPATIENT
Start: 2019-10-05 | End: 2019-10-08

## 2019-10-04 RX ORDER — GLYCOPYRROLATE 0.2 MG/ML
INJECTION INTRAMUSCULAR; INTRAVENOUS AS NEEDED
Status: DISCONTINUED | OUTPATIENT
Start: 2019-10-04 | End: 2019-10-04 | Stop reason: SURG

## 2019-10-04 RX ORDER — HYDROMORPHONE HCL/PF 1 MG/ML
0.5 SYRINGE (ML) INJECTION
Status: DISCONTINUED | OUTPATIENT
Start: 2019-10-04 | End: 2019-10-04 | Stop reason: HOSPADM

## 2019-10-04 RX ORDER — MEPERIDINE HYDROCHLORIDE 50 MG/ML
12.5 INJECTION INTRAMUSCULAR; INTRAVENOUS; SUBCUTANEOUS ONCE AS NEEDED
Status: DISCONTINUED | OUTPATIENT
Start: 2019-10-04 | End: 2019-10-04 | Stop reason: HOSPADM

## 2019-10-04 RX ORDER — DOCUSATE SODIUM 100 MG/1
100 CAPSULE, LIQUID FILLED ORAL 2 TIMES DAILY
Status: DISCONTINUED | OUTPATIENT
Start: 2019-10-04 | End: 2019-10-08

## 2019-10-04 RX ORDER — OXYCODONE HYDROCHLORIDE 10 MG/1
10 TABLET ORAL EVERY 4 HOURS PRN
Status: DISCONTINUED | OUTPATIENT
Start: 2019-10-04 | End: 2019-10-08

## 2019-10-04 RX ORDER — ONDANSETRON 2 MG/ML
4 INJECTION INTRAMUSCULAR; INTRAVENOUS ONCE AS NEEDED
Status: DISCONTINUED | OUTPATIENT
Start: 2019-10-04 | End: 2019-10-04 | Stop reason: HOSPADM

## 2019-10-04 RX ORDER — DEXAMETHASONE SODIUM PHOSPHATE 10 MG/ML
INJECTION, SOLUTION INTRAMUSCULAR; INTRAVENOUS AS NEEDED
Status: DISCONTINUED | OUTPATIENT
Start: 2019-10-04 | End: 2019-10-04 | Stop reason: SURG

## 2019-10-04 RX ORDER — METOPROLOL TARTRATE 5 MG/5ML
5 INJECTION INTRAVENOUS EVERY 6 HOURS PRN
Status: DISCONTINUED | OUTPATIENT
Start: 2019-10-04 | End: 2019-10-08

## 2019-10-04 RX ORDER — CEFAZOLIN SODIUM 1 G/50ML
1000 SOLUTION INTRAVENOUS ONCE
Status: COMPLETED | OUTPATIENT
Start: 2019-10-04 | End: 2019-10-04

## 2019-10-04 RX ORDER — ONDANSETRON 2 MG/ML
4 INJECTION INTRAMUSCULAR; INTRAVENOUS EVERY 6 HOURS PRN
Status: DISCONTINUED | OUTPATIENT
Start: 2019-10-04 | End: 2019-10-04 | Stop reason: SDUPTHER

## 2019-10-04 RX ORDER — SODIUM CHLORIDE 9 MG/ML
INJECTION, SOLUTION INTRAVENOUS CONTINUOUS PRN
Status: DISCONTINUED | OUTPATIENT
Start: 2019-10-04 | End: 2019-10-04 | Stop reason: SURG

## 2019-10-04 RX ORDER — FENTANYL CITRATE 50 UG/ML
INJECTION, SOLUTION INTRAMUSCULAR; INTRAVENOUS AS NEEDED
Status: DISCONTINUED | OUTPATIENT
Start: 2019-10-04 | End: 2019-10-04 | Stop reason: SURG

## 2019-10-04 RX ORDER — BUPIVACAINE HYDROCHLORIDE 2.5 MG/ML
INJECTION, SOLUTION INFILTRATION; PERINEURAL AS NEEDED
Status: DISCONTINUED | OUTPATIENT
Start: 2019-10-04 | End: 2019-10-04 | Stop reason: HOSPADM

## 2019-10-04 RX ORDER — ROCURONIUM BROMIDE 10 MG/ML
INJECTION, SOLUTION INTRAVENOUS AS NEEDED
Status: DISCONTINUED | OUTPATIENT
Start: 2019-10-04 | End: 2019-10-04 | Stop reason: SURG

## 2019-10-04 RX ORDER — SODIUM CHLORIDE 9 MG/ML
100 INJECTION, SOLUTION INTRAVENOUS CONTINUOUS
Status: CANCELLED | OUTPATIENT
Start: 2019-10-04

## 2019-10-04 RX ORDER — DOCUSATE SODIUM 100 MG/1
100 CAPSULE, LIQUID FILLED ORAL 2 TIMES DAILY
Status: CANCELLED | OUTPATIENT
Start: 2019-10-04

## 2019-10-04 RX ORDER — ONDANSETRON 2 MG/ML
4 INJECTION INTRAMUSCULAR; INTRAVENOUS EVERY 6 HOURS PRN
Status: CANCELLED | OUTPATIENT
Start: 2019-10-04

## 2019-10-04 RX ORDER — SODIUM CHLORIDE 9 MG/ML
100 INJECTION, SOLUTION INTRAVENOUS CONTINUOUS
Status: DISCONTINUED | OUTPATIENT
Start: 2019-10-04 | End: 2019-10-05

## 2019-10-04 RX ORDER — OXYCODONE HYDROCHLORIDE 5 MG/1
5 TABLET ORAL EVERY 4 HOURS PRN
Status: DISCONTINUED | OUTPATIENT
Start: 2019-10-04 | End: 2019-10-08

## 2019-10-04 RX ORDER — SIMETHICONE 80 MG
80 TABLET,CHEWABLE ORAL 4 TIMES DAILY PRN
Status: CANCELLED | OUTPATIENT
Start: 2019-10-04

## 2019-10-04 RX ORDER — TRAVOPROST OPHTHALMIC SOLUTION 0.04 MG/ML
1 SOLUTION OPHTHALMIC
Status: DISCONTINUED | OUTPATIENT
Start: 2019-10-04 | End: 2019-10-18 | Stop reason: HOSPADM

## 2019-10-04 RX ORDER — ACETAMINOPHEN 160 MG/5ML
650 SUSPENSION, ORAL (FINAL DOSE FORM) ORAL EVERY 6 HOURS PRN
Status: DISCONTINUED | OUTPATIENT
Start: 2019-10-04 | End: 2019-10-05

## 2019-10-04 RX ORDER — HYDROMORPHONE HCL/PF 1 MG/ML
0.5 SYRINGE (ML) INJECTION
Status: DISCONTINUED | OUTPATIENT
Start: 2019-10-04 | End: 2019-10-04

## 2019-10-04 RX ORDER — EPHEDRINE SULFATE 50 MG/ML
INJECTION INTRAVENOUS AS NEEDED
Status: DISCONTINUED | OUTPATIENT
Start: 2019-10-04 | End: 2019-10-04 | Stop reason: SURG

## 2019-10-04 RX ORDER — ONDANSETRON 2 MG/ML
INJECTION INTRAMUSCULAR; INTRAVENOUS AS NEEDED
Status: DISCONTINUED | OUTPATIENT
Start: 2019-10-04 | End: 2019-10-04 | Stop reason: SURG

## 2019-10-04 RX ORDER — DORZOLAMIDE HYDROCHLORIDE AND TIMOLOL MALEATE 20; 5 MG/ML; MG/ML
1 SOLUTION/ DROPS OPHTHALMIC 2 TIMES DAILY
Status: DISCONTINUED | OUTPATIENT
Start: 2019-10-04 | End: 2019-10-18 | Stop reason: HOSPADM

## 2019-10-04 RX ORDER — TRISODIUM CITRATE DIHYDRATE AND CITRIC ACID MONOHYDRATE 500; 334 MG/5ML; MG/5ML
30 SOLUTION ORAL ONCE
Status: CANCELLED | OUTPATIENT
Start: 2019-10-04

## 2019-10-04 RX ORDER — FENTANYL CITRATE/PF 50 MCG/ML
50 SYRINGE (ML) INJECTION
Status: DISCONTINUED | OUTPATIENT
Start: 2019-10-04 | End: 2019-10-04 | Stop reason: HOSPADM

## 2019-10-04 RX ORDER — DULOXETIN HYDROCHLORIDE 30 MG/1
30 CAPSULE, DELAYED RELEASE ORAL
Status: DISCONTINUED | OUTPATIENT
Start: 2019-10-04 | End: 2019-10-08

## 2019-10-04 RX ORDER — LIDOCAINE HYDROCHLORIDE 10 MG/ML
INJECTION, SOLUTION INFILTRATION; PERINEURAL AS NEEDED
Status: DISCONTINUED | OUTPATIENT
Start: 2019-10-04 | End: 2019-10-04 | Stop reason: SURG

## 2019-10-04 RX ORDER — NEOSTIGMINE METHYLSULFATE 1 MG/ML
INJECTION INTRAVENOUS AS NEEDED
Status: DISCONTINUED | OUTPATIENT
Start: 2019-10-04 | End: 2019-10-04 | Stop reason: SURG

## 2019-10-04 RX ORDER — MIDAZOLAM HYDROCHLORIDE 1 MG/ML
INJECTION INTRAMUSCULAR; INTRAVENOUS AS NEEDED
Status: DISCONTINUED | OUTPATIENT
Start: 2019-10-04 | End: 2019-10-04 | Stop reason: SURG

## 2019-10-04 RX ORDER — DIPHENHYDRAMINE HYDROCHLORIDE 50 MG/ML
25 INJECTION INTRAMUSCULAR; INTRAVENOUS EVERY 6 HOURS PRN
Status: DISCONTINUED | OUTPATIENT
Start: 2019-10-04 | End: 2019-10-07 | Stop reason: ALTCHOICE

## 2019-10-04 RX ORDER — SODIUM CHLORIDE 9 MG/ML
125 INJECTION, SOLUTION INTRAVENOUS CONTINUOUS
Status: DISCONTINUED | OUTPATIENT
Start: 2019-10-04 | End: 2019-10-04 | Stop reason: SDUPTHER

## 2019-10-04 RX ORDER — DIPHENHYDRAMINE HYDROCHLORIDE 50 MG/ML
25 INJECTION INTRAMUSCULAR; INTRAVENOUS EVERY 6 HOURS PRN
Status: DISCONTINUED | OUTPATIENT
Start: 2019-10-04 | End: 2019-10-04 | Stop reason: SDUPTHER

## 2019-10-04 RX ORDER — NALOXONE HYDROCHLORIDE 0.4 MG/ML
0.1 INJECTION, SOLUTION INTRAMUSCULAR; INTRAVENOUS; SUBCUTANEOUS
Status: DISCONTINUED | OUTPATIENT
Start: 2019-10-04 | End: 2019-10-08

## 2019-10-04 RX ORDER — MINERAL OIL AND PETROLATUM 150; 830 MG/G; MG/G
OINTMENT OPHTHALMIC
Status: DISCONTINUED | OUTPATIENT
Start: 2019-10-04 | End: 2019-10-18 | Stop reason: HOSPADM

## 2019-10-04 RX ORDER — PROPOFOL 10 MG/ML
INJECTION, EMULSION INTRAVENOUS AS NEEDED
Status: DISCONTINUED | OUTPATIENT
Start: 2019-10-04 | End: 2019-10-04 | Stop reason: SURG

## 2019-10-04 RX ORDER — HEPARIN SODIUM 5000 [USP'U]/ML
5000 INJECTION, SOLUTION INTRAVENOUS; SUBCUTANEOUS
Status: COMPLETED | OUTPATIENT
Start: 2019-10-04 | End: 2019-10-04

## 2019-10-04 RX ORDER — ONDANSETRON 2 MG/ML
4 INJECTION INTRAMUSCULAR; INTRAVENOUS EVERY 6 HOURS PRN
Status: DISCONTINUED | OUTPATIENT
Start: 2019-10-04 | End: 2019-10-18 | Stop reason: HOSPADM

## 2019-10-04 RX ADMIN — ONDANSETRON 4 MG: 2 INJECTION INTRAMUSCULAR; INTRAVENOUS at 08:03

## 2019-10-04 RX ADMIN — SODIUM CHLORIDE 100 ML/HR: 0.9 INJECTION, SOLUTION INTRAVENOUS at 14:23

## 2019-10-04 RX ADMIN — FENTANYL CITRATE 25 MCG: 50 INJECTION, SOLUTION INTRAMUSCULAR; INTRAVENOUS at 10:17

## 2019-10-04 RX ADMIN — SODIUM CHLORIDE 100 ML/HR: 0.9 INJECTION, SOLUTION INTRAVENOUS at 19:27

## 2019-10-04 RX ADMIN — EPHEDRINE SULFATE 5 MG: 50 INJECTION, SOLUTION INTRAVENOUS at 09:43

## 2019-10-04 RX ADMIN — HEPARIN SODIUM 5000 UNITS: 5000 INJECTION INTRAVENOUS; SUBCUTANEOUS at 06:57

## 2019-10-04 RX ADMIN — ONDANSETRON 4 MG: 2 INJECTION INTRAMUSCULAR; INTRAVENOUS at 16:05

## 2019-10-04 RX ADMIN — LIDOCAINE HYDROCHLORIDE 50 MG: 10 INJECTION, SOLUTION INFILTRATION; PERINEURAL at 07:42

## 2019-10-04 RX ADMIN — ONDANSETRON 4 MG: 2 INJECTION INTRAMUSCULAR; INTRAVENOUS at 12:22

## 2019-10-04 RX ADMIN — ROCURONIUM BROMIDE 20 MG: 10 INJECTION, SOLUTION INTRAVENOUS at 08:55

## 2019-10-04 RX ADMIN — DULOXETINE HYDROCHLORIDE 30 MG: 30 CAPSULE, DELAYED RELEASE ORAL at 21:22

## 2019-10-04 RX ADMIN — PROPOFOL 130 MG: 10 INJECTION, EMULSION INTRAVENOUS at 07:42

## 2019-10-04 RX ADMIN — ROCURONIUM BROMIDE 20 MG: 10 INJECTION, SOLUTION INTRAVENOUS at 10:25

## 2019-10-04 RX ADMIN — SODIUM CHLORIDE: 0.9 INJECTION, SOLUTION INTRAVENOUS at 07:48

## 2019-10-04 RX ADMIN — CEFAZOLIN SODIUM 1000 MG: 1 SOLUTION INTRAVENOUS at 11:19

## 2019-10-04 RX ADMIN — SODIUM CHLORIDE: 0.9 INJECTION, SOLUTION INTRAVENOUS at 10:09

## 2019-10-04 RX ADMIN — HYDROMORPHONE HYDROCHLORIDE 0.5 MG: 1 INJECTION, SOLUTION INTRAMUSCULAR; INTRAVENOUS; SUBCUTANEOUS at 13:14

## 2019-10-04 RX ADMIN — INSULIN LISPRO 2 UNITS: 100 INJECTION, SOLUTION INTRAVENOUS; SUBCUTANEOUS at 19:25

## 2019-10-04 RX ADMIN — INSULIN LISPRO 2 UNITS: 100 INJECTION, SOLUTION INTRAVENOUS; SUBCUTANEOUS at 21:26

## 2019-10-04 RX ADMIN — INSULIN HUMAN 2 UNITS: 100 INJECTION, SOLUTION PARENTERAL at 12:58

## 2019-10-04 RX ADMIN — FENTANYL CITRATE 50 MCG: 50 INJECTION, SOLUTION INTRAMUSCULAR; INTRAVENOUS at 07:39

## 2019-10-04 RX ADMIN — FENTANYL CITRATE 50 MCG: 50 INJECTION, SOLUTION INTRAMUSCULAR; INTRAVENOUS at 07:35

## 2019-10-04 RX ADMIN — EPHEDRINE SULFATE 5 MG: 50 INJECTION, SOLUTION INTRAVENOUS at 09:51

## 2019-10-04 RX ADMIN — FENTANYL CITRATE 50 MCG: 50 INJECTION INTRAMUSCULAR; INTRAVENOUS at 13:01

## 2019-10-04 RX ADMIN — ROCURONIUM BROMIDE 50 MG: 10 INJECTION, SOLUTION INTRAVENOUS at 07:42

## 2019-10-04 RX ADMIN — TRAVOPROST 1 DROP: 0.04 SOLUTION/ DROPS OPHTHALMIC at 21:24

## 2019-10-04 RX ADMIN — FENTANYL CITRATE 50 MCG: 50 INJECTION INTRAMUSCULAR; INTRAVENOUS at 12:50

## 2019-10-04 RX ADMIN — FENTANYL CITRATE 50 MCG: 50 INJECTION, SOLUTION INTRAMUSCULAR; INTRAVENOUS at 09:27

## 2019-10-04 RX ADMIN — Medication: at 15:15

## 2019-10-04 RX ADMIN — DORZOLAMIDE HYDROCHLORIDE AND TIMOLOL MALEATE 1 DROP: 20; 5 SOLUTION/ DROPS OPHTHALMIC at 21:28

## 2019-10-04 RX ADMIN — FENTANYL CITRATE 25 MCG: 50 INJECTION, SOLUTION INTRAMUSCULAR; INTRAVENOUS at 09:06

## 2019-10-04 RX ADMIN — MIDAZOLAM 2 MG: 1 INJECTION INTRAMUSCULAR; INTRAVENOUS at 07:32

## 2019-10-04 RX ADMIN — SODIUM CHLORIDE 125 ML/HR: 0.9 INJECTION, SOLUTION INTRAVENOUS at 06:57

## 2019-10-04 RX ADMIN — SODIUM CHLORIDE: 0.9 INJECTION, SOLUTION INTRAVENOUS at 08:10

## 2019-10-04 RX ADMIN — ACETAMINOPHEN 650 MG: 160 SUSPENSION ORAL at 19:25

## 2019-10-04 RX ADMIN — DEXAMETHASONE SODIUM PHOSPHATE 4 MG: 10 INJECTION, SOLUTION INTRAMUSCULAR; INTRAVENOUS at 08:03

## 2019-10-04 RX ADMIN — NEOSTIGMINE METHYLSULFATE 3 MG: 1 INJECTION, SOLUTION INTRAVENOUS at 12:05

## 2019-10-04 RX ADMIN — DOCUSATE SODIUM 100 MG: 100 CAPSULE, LIQUID FILLED ORAL at 19:25

## 2019-10-04 RX ADMIN — GLYCOPYRROLATE 0.6 MG: 0.2 INJECTION INTRAMUSCULAR; INTRAVENOUS at 12:05

## 2019-10-04 RX ADMIN — EPHEDRINE SULFATE 5 MG: 50 INJECTION, SOLUTION INTRAVENOUS at 09:45

## 2019-10-04 RX ADMIN — CEFAZOLIN SODIUM 1000 MG: 1 SOLUTION INTRAVENOUS at 07:27

## 2019-10-04 RX ADMIN — ROCURONIUM BROMIDE 10 MG: 10 INJECTION, SOLUTION INTRAVENOUS at 09:05

## 2019-10-04 RX ADMIN — HYDROMORPHONE HYDROCHLORIDE 0.5 MG: 1 INJECTION, SOLUTION INTRAMUSCULAR; INTRAVENOUS; SUBCUTANEOUS at 14:19

## 2019-10-04 RX ADMIN — SODIUM CHLORIDE: 0.9 INJECTION, SOLUTION INTRAVENOUS at 11:04

## 2019-10-04 NOTE — DISCHARGE INSTRUCTIONS
[facility]  Drs Roselie Simmonds, Gurmeet 1978, and [facility]  [phone]    Hysterectomy Discharge Instructions    WHAT YOU NEED TO KNOW:   A hysterectomy is surgery to remove your uterus  Your ovaries, fallopian tubes, cervix, or part of your vagina may also need to be removed  The organs and tissue that will be removed depends on your medical condition  After a hysterectomy, you will not be able to become pregnant  If your ovaries are removed, you will go through menopause if you have not already  DISCHARGE INSTRUCTIONS:   Contact your doctor at the number above if:   · You have a fever over 101o  · You have nausea or are vomiting that does not improve after a light meal    · Your pain is getting worse, even after you take medicine  · You feel pain or burning when you urinate, or you have trouble urinating  · You have pus or a foul-smelling odor coming from your vagina  · Your wound is red, swollen, or draining pus  · You see new or an increased amount of bright red blood coming from your vagina or your incisions  · You have questions or concerns about your condition or care  Seek care immediately:   · Your arm or leg feels warm, tender, and painful  It may look swollen and red  · You have increasing abdominal or pelvic pain  · You have heavy vaginal bleeding that fills 1 or more sanitary pads in 1 hour  Call 911 for any of the following:   · You feel lightheaded, short of breath, and have chest pain  · You cough up blood  Medicines: You may need any of the following:  · Prescription medicine may be given  You may receive a prescription for pain medication or be advised to use over the counter (OTC) pain medication such as acetaminophen (Tylenol) or ibuprofen (Advil, Motrin)  Ask your healthcare provider how to take this medicine safely  · NSAIDs , such as ibuprofen, help decrease swelling, pain, and fever   NSAIDs can cause stomach bleeding or kidney problems in certain people  If you take blood thinner medicine, always ask your healthcare provider if NSAIDs are safe for you  Always read the medicine label and follow directions  · Stool softeners help treat or prevent constipation  · Take your medicine as directed  Contact your healthcare provider if you think your medicine is not helping or if you have side effects  Tell him or her if you are allergic to any medicine  Keep a list of the medicines, vitamins, and herbs you take  Include the amounts, and when and why you take them  Bring the list or the pill bottles to follow-up visits  Carry your medicine list with you in case of an emergency  Activity:   · Rest as needed  Get up and move around as directed to help prevent blood clots  Start with short walks and slowly increase the distance every day  Limit the number of times you climb stairs to 2 times each day for the first week  Plan most of your daily activities on one level of your home  · Do not lift objects heavier than 10 pounds for 6 weeks  Avoid strenuous activity for 2 weeks  · Do not strain during bowel movements  High-fiber foods and extra liquids can help you prevent constipation  Examples of high-fiber foods are fruit and bran  Prune juice and water are good liquids to drink  · Do not have sex, use tampons, or douche for up to 8 weeks  You may shower as soon as the day after surgery  Tub baths can be taken starting 2 weeks after surgery  Do not go into pools or hot tubs until cleared by your doctor  · Ask when it is safe for you to drive  It is generally safe to drive after 2 weeks and when no longer taking prescription pain medication  · Ask when you may return to work and to other regular activities  Wound care: Care for your abdominal incisions as directed  Carefully wash around the wound with soap and water   If you have Hibiclens or medicated soap that you were instructed to use before surgery, you may use that to wash with for up to 2 days after surgery  If not, any mild non-scented, non-abrasive soap is safe  It is okay to let the soap and water run over your incision  Do not scrub your incision  Dry the area and put on new, clean bandages as directed  Change your bandages when they get wet or dirty  If you have strips of medical tape, let them fall off on their own  It may take 7 to 14 days for them to fall off  Check your incision every day for redness, swelling, or pus  Deep breathing: Take deep breaths and cough 10 times each hour  This will decrease your risk for a lung infection  Take a deep breath and hold it for as long as you can  Let the air out and then cough strongly  Deep breaths help open your airway  You may be given an incentive spirometer to help you take deep breaths  Put the plastic piece in your mouth and take a slow, deep breath, then let the air out and cough  Repeat these steps 10 times every hour  Get support: This surgery may be life-changing for you and your family  You will no longer be able to get pregnant  Sudden changes in the levels of your hormones may occur and cause mood swings and depression  You may feel angry, sad, or frightened, or cry frequently and unexpectedly  These feelings are normal  Talk to your healthcare provider about where you can get support  You can also ask if hormone replacement medicine is right for you  Follow up with your healthcare provider or gynecologist as directed: You may need to return to have stitches removed, and for other tests  Write down your questions so you remember to ask them during your visits  © 2017 [employer] Information is for End User's use only and may not be sold, redistributed or otherwise used for commercial purposes  All illustrations and images included in CareNotes® are the copyrighted property of [employer]  or [employer]    The above information is an educational aid only  It is not intended as medical advice for individual conditions or treatments  Talk to your doctor, nurse or pharmacist before following any medical regimen to see if it is safe and effective for you

## 2019-10-04 NOTE — ANESTHESIA PROCEDURE NOTES
Peripheral Block    Patient location during procedure: OR  Start time: 10/4/2019 12:25 PM  Reason for block: post-op pain management  Staffing  Anesthesiologist: Roz Bowling DO  Preanesthetic Checklist  Completed: patient identified, site marked, surgical consent, pre-op evaluation, timeout performed, IV checked, risks and benefits discussed and monitors and equipment checked  Peripheral Block  Patient position: supine  Prep: ChloraPrep  Patient monitoring: heart rate, cardiac monitor, continuous pulse ox and frequent blood pressure checks  Block type: TAP  Laterality: left  Injection technique: single-shot  Procedures: ultrasound guided, Ultrasound guidance required for the procedure to increase accuracy and safety of medication placement and decrease risk of complications  Ultrasound permanent image saved  Needle  Needle type: Stimuplex   Needle gauge: 27 G  Needle length: 10 cm  Needle localization: ultrasound guidance  Assessment  Injection assessment: incremental injection and negative aspiration for heme  Heart rate change: no  Slow fractionated injection: yes  Post-procedure:  site cleaned  patient tolerated the procedure well with no immediate complications  Additional Notes  Left ultrasound guided TAP Block performed using a combination of Exparel 10ml, Bupivavcaine 0 25%10 ml, plus 5 ml of saline   Procedure tolerated well by sera

## 2019-10-04 NOTE — OP NOTE
OPERATIVE REPORT  PATIENT NAME: Francisco Javier Cabello    :  1935  MRN: [id number]  Pt Location: [facility] OR ROOM 06    SURGERY DATE: 10/4/2019    Surgeon(s) and Role:     * Reanna Shin MD - Primary     * Milagros Zimmerman MD - Fellow    Preop Diagnosis:  Right tubo-ovarian mass [N83 8]    Post-Op Diagnosis Codes:     * Right tubo-ovarian mass [N83 8]    Procedure(s) (LRB):  LAP TOTAL HYSTERECTOMY, BSO (N/A)  EXPLORATORY LAPAROTOMY  EXLAP OMENTECTOMY  PELVIC AND DEANGELO-AORTIC LYMPH NODE DISSECTION  PERITONEAL BIOPSY (N/A), ovarian cancer staging    Specimen(s):  ID Type Source Tests Collected by Time Destination   1 : pelvic washings  cytology Washing Pelvic Washing NON-GYNECOLOGIC CYTOLOGY Reanna Shin MD 10/4/2019 0827    2 : UTERUS  CERVIX  BILATERAL TUBES  AND OVARIES Frozen Ovaries, endometerium, Uterus Tissue Uterus w/Bilateral Ovaries and Fallopian Tubes TISSUE EXAM Reanna Shin MD 10/4/2019 6585    3 : SMALL BOWEL MESENTARY Tissue Small Bowel, NOS TISSUE EXAM Reanna Shin MD 10/4/2019 0946    4 : RIGHT GUTTER TISSUE Tissue Pelvic TISSUE EXAM Reanna Shin MD 10/4/2019 0949    5 : RIGHT DIAPHRAM NODULE Tissue Soft Tissue, Other TISSUE EXAM Reanna Shin MD 10/4/2019 5639    6 : GASTRO-COLIC OMENTEUM Tissue Soft Tissue, Other TISSUE EXAM Reanna Shin MD 10/4/2019 1005    7 : LEFT GUTTER TISSUE Tissue Soft Tissue, Other TISSUE EXAM Reanna Shin MD 10/4/2019 1006    8 : PELVIC LYMPH NODE  RIGHT Tissue Pelvic TISSUE EXAM Reanna Shin MD 10/4/2019 1013    9 : RIGHT DEANGELO-AORTIC LYMPH NODE Tissue Soft Tissue, Other TISSUE EXAM Reanna Shin MD 10/4/2019 1036    10 : RIGHT PELVIC SIDE WALL TISSUE Tissue Pelvic TISSUE EXAM Reanna Shin MD 10/4/2019 1038    11 : LEFT DEANGELO-AORTIC LYMPH NODE Tissue Pelvic TISSUE EXAM Raenna Shin MD 10/4/2019 1049    12 : LEFT PELVIC LYMPH NODE Tissue Pelvic TISSUE EXAM Reanna Shin MD 10/4/2019 1057    13 : LEFT PELVIC SIDE-WALL Tissue Pelvic TISSUE Berta Worley MD 10/4/2019 1106    14 : ANTERIR CULDE-SAC Tissue Pelvic TISSUE EXAM Khoa Pierre MD 10/4/2019 1109    15 : POSTERIOR CULD-SAC Tissue Pelvic TISSUE EXAM Khoa Pierre MD 10/4/2019 1112        Estimated Blood Loss:   75 mL    Drains:  NG/OG/Enteral Tube Orogastric 18 Fr Center mouth (Active)   Number of days: 0       Urethral Catheter Non-latex 16 Fr  (Active)   Number of days: 0       Anesthesia Type:   General    Operative Indications:  Right tubo-ovarian mass [N808]  82year-old with a complex 8 cm right-sided pelvic mass, preoperative  a 56 units/ml  No evidence of extra pelvic disease based on CT imaging presents for definitive operative management and potential surgical staging  Operative Findings:  1  Exam under anesthesia revealed a grossly normal cervix and mobile uterus  The mass was present in the posterior pelvis  2  On laparoscopy, there was no evidence of gross peritoneal disease  There was a calcified implant present on the anterior abdominal wall adjacent to the right diaphragm  In the pelvis, there was a large complex mass in the right ovary  There was suspicious for malignancy  There was no visible evidence of pelvic peritoneal disease  The left ovary and fallopian tube were grossly normal   3  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  4  Based on the frozen section diagnosis, incision was made to perform exploratory laparotomy  There were 2 small cystic implants on the small bowel mesentery not overly concerning for malignancy but these were removed  The calcified lesion adjacent to the diaphragm was removed  There was no other peritoneal disease  Given the lack of gross disease, ovarian cancer staging was performed  There were no suspicious lymph nodes  No gross omental implants  At the conclusion of the procedure, there is no gross visible disease      Complications:   None    Procedure and Technique:  After informed consent was obtained, the patient was taken to the operating room where general endotracheal anesthesia was administered without incident  She was then prepped and draped in normal sterile fashion in the low dorsal lithotomy position  Examination under anesthesia was performed with findings noted as above  A speculum was placed in patient's vagina  The anterior lip of the cervix was grasped with single-tooth tenaculum  The uterus was sounded to 6 cm  The cervix was dilated  A 0 Vicryl stay suture was placed on the cervix and used to secure the ELIZABETH uterine manipulator and koh cup into place  A Ji catheter was inserted  Attention was then turned the abdomen  0 25% Marcaine was then used to infiltrate the skin prior to placement of any laparoscopic trocar  The 1st insertion site was just above the supraumbilical fold  A 5 mm skin incision was made using 11 blade scalpel  Through this was passed a 5 mm trocar under direct visualization into the abdominal cavity  The abdomen is then insufflated to 15 mmHg using CO2 gas  Findings on laparoscopy are noted as above  Additional 5 mm trocars then placed under direct visualization  Peritoneal cytology was then obtained  The bilateral retroperitoneal spaces were opened by transecting the round ligaments bilaterally using the EnSeal bipolar cautery device  The ureters were identified coursing normally within the medial leaf of the broad ligaments bilaterally  The bilateral infundibulopelvic ligaments were then skeletonized, cauterized and transected  The vesicovaginal space was then developed  The bladder was taken down below the level of the external os of the cervix  The bilateral uterine vessels were then skeletonized, cauterized and transected with EnSeal   The cardinal ligaments were then cauterized and transected with EnSeal   A circumferential colpotomy was then performed using monopolar cautery    The uterus, cervix, left tube and ovary were then removed transvaginally  The right ovary was then placed in an Endo-Catch bag unruptured and removed transvaginally  The vaginal apex was then closed using a running 2-0 stratafix suture with excellent hemostasis noted  Frozen section results then returned adenocarcinoma for the ovarian mass  The decision was then made to perform exploratory laparotomy  A midline vertical incision was made using cutting current cautery  This was taken down to the underlying layer of fascia using cautery  The fascia was opened the midline the fascial incision extended superiorly and inferiorly  The peritoneum was then identified and entered using cautery  The gas was expelled  The trocars removed  A Bookwalter retractor was placed  The omentum was freed from adhesions to the anterior abdominal wall from her prior surgery  A gastrocolic omentectomy was then performed in the usual fashion using the EnSeal bipolar cautery device  Again, no gross lesions were identified  The right upper quadrant calcified lesion was removed  Complete exploration of the abdomen and pelvis was then performed with findings noted as above  The 2 small 3 mm cystic lesions on the small bowel mesentery were then removed  A single 3-0 Vicryl suture was placed at 1 was locations to control hemostasis  There was no evidence of ischemia  The bowel was then packed away with moist laparotomy sponges  The bilateral perivesical and pararectal spaces were opened  Appropriate retractors were placed  Lymph node-bearing tissue was then removed bilaterally from the common iliac vessel superiorly to the deep circumflex iliac vein inferiorly to the ureter and superior vesical artery medially to the genitofemoral nerve laterally  Hemostasis was achieved at all sites using cautery as well as hemoclips    Lymph node-bearing tissue was then removed bilaterally from the obturator fossae superior to the obturator nerve with excellent visualization of the obturator nerve throughout the dissections  Attention was then turned to the bilateral periaortic lymph node dissection  On the right side, appropriate retractors were placed  Incision was made overlying the right common iliac artery and extended up the aorta at the level of the inferior mesenteric artery  The peritoneum ureter reflected laterally and lymph node-bearing tissue was removed from the aorta medially to the psoas muscle laterally overlying the inferior vena cava  The inferior border of the dissection was the common iliac artery  Hemostasis was achieved using hemoclips and cautery  Attention was then turned to the left periaortic lymph node dissection  The retroperitoneal incision on the left side of the pelvis with extended superiorly and the peritoneum was reflected medially exposing the ureter and the periaortic lymph nodes  The left periaortic lymph nodes then removed from the inferior mesenteric artery superiorly to the common iliac vessel inferiorly overlying the vertebral bodies  Hemostasis was achieved using cautery and hemoclips  Peritoneal biopsies were then obtained from the bilateral pericolic gutters, bilateral pelvic sidewalls, anterior and posterior cul-de-sacs  The pelvis and abdomen then irrigated  Hemostasis was noted to be adequate  The retractors were removed  Gloves were exchanged  The incision was then reapproximated using 1  Looped PDS in a running fashion  The subcutaneous space was irrigated  The subcutaneous adipose tissue was closed using 2-0 plain suture followed by 3-0 stratafix in a subcuticular fashion to the skin  Histoacryl was then applied  The laparoscopic port sites were closed using 4-0 Monocryl followed by histoacryl  A sterile dressing was applied to the midline vertical incision  Anesthesia then performed a TAP block  The patient was then awakened and transferred to the recovery room stable condition    All instrument counts correct x2 for the procedure  Estimated blood loss 75 mL      I was present for the entire procedure    Patient Disposition:  PACU     SIGNATURE: Sheridan Nielson MD  DATE: October 4, 2019  TIME: 11:58 AM

## 2019-10-04 NOTE — INTERVAL H&P NOTE
H&P reviewed  After examining the patient I find no changes in the patients condition since the H&P had been written      Vitals:    10/04/19 0621   BP: 152/66   Pulse: 67   Resp: 16   Temp: 98 2 °F (36 8 °C)   SpO2: 99%

## 2019-10-04 NOTE — DISCHARGE SUMMARY
Discharge Summary   Jayleen Covarrubias MRN: [id number]  Unit/Bed#: E5 -01 Encounter: [id number]      Admission Date: 10/4/2019     Discharge Date: ***    Attending:  Dr Ced Kasper:  Critical Care  Gastroenterology  Interventional Radiology    Principal Diagnosis: Right tubo-ovarian mass [N83 8]    Procedures:   Laparoscopic total hysterectomy, BSO, exploratory laparotomy, omentectomy, periaortic lymph node, pelvic lymph node dissection, peritoneal biopsies    Hospital course: Patient is an 82year old female with history of complex adnexal mass, without evidence of pelvic and para-aortic lymphadenopathy, ascites, omental caking admitted for total laparoscopic hysterectomy and bilateral salpingo-oophorectomy  Because 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, she had an exploratory laparotomy for omentectomy, peritoneal biopsies, and pelvic and para-aortic lymphadenectomy  During her postoperative course, her dilaudid pump was discontinued and she was transitioned to oral analgesia  She was continued on her depression, hypertension, and hypothyroidism medication  She failed her voiding trial twice and her alexandre was replaced  She was also put on sliding scale insulin algorithm 2 for her T1DM  Despite this, her glucose levels were not well controlled and she was transitioned to an insulin drip  On POD #3 (10/7), she developed a postoperative ileus  She then began complaining of dyspnea, tachypnea, and difficulty speaking in full sentences  A rapid response was called  An extensive laboratory workup and imaging was completed, remarkable for metabolic acidosis, acute kidney injury, bilateral pleural effusions, and a markedly distended stomach  She was transferred to the ICU  A few minutes after she was transferred, she had profuse coffee ground emesis, began aspirating and became unresponsive with no palpable pulse  A code blue was called   She was placed on ventilatory support and arterial line and central access were placed  Acute hypoxic respiratory failure  She was intubated and sedated immediately after code blue  She was started on cefepime and flagyl and subsequently fluconazole for budding yeast in sputum culture  She was taken off pressors and was extubated on 10/10  Severe metabolic acidosis  Prior to code, her lactic acid was 6 8  This increased to 7 8 and then decreased to 5 4 to 3 2 with notable improvement on bicarb infusion  CTA 10/7 showed no PE but reported bilateral pleural effusions, bilateral atelectasis, hiatal hernia with gastric and esophageal distension  CT head showed no acute changes  CTAP 10/8 showed dilated small bowel and gas in colon as well as a pelvic hematoma [id number]  Acute kidney injury  Cr 0 72 --> 0 96 --> 1 6 --> 1 53 --> 0 95 --> 0 75  All nephrotoxic agents were discontinued  Postoperative ileus  Patient was placed on NPO status with NG tube in place  After extubation, she was started on tube feeds  Anemia  Hgb 10 3 --> 10 0 --> 9 2 --> 7 2 --> 8 0 --> 6 3 --> 6 5 --> 2U pRBC -> 7 8 --> 7 7 --> 10 1, stable  She was started on protonix infusion and continued on protonix BID  DVT ppx  SCDs, ambulation  Lovenox discontinued due to acute renal injury  After her bleeding was noted to be stable, she was started on a heparin drip       Lab Results:   Lab Results   Component Value Date    WBC 5 43 09/25/2019    HGB 12 7 09/25/2019    HCT 39 2 09/25/2019     (H) 09/25/2019     09/25/2019     Lab Results   Component Value Date    GLUCOSE 75 01/08/2016    CALCIUM 9 4 10/03/2019     (L) 01/08/2016    K 4 6 10/03/2019    CO2 27 10/03/2019     10/03/2019    BUN 23 10/03/2019    CREATININE 0 93 10/03/2019     Lab Results   Component Value Date/Time    POCGLU 251 (H) 10/04/2019 04:30 PM    POCGLU 241 (H) 10/04/2019 01:34 PM    POCGLU 292 (H) 10/04/2019 12:47 PM    POCGLU 254 (H) 10/04/2019 06:35 AM    POCGLU 164 02/15/2017 02:33 PM     Lab Results   Component Value Date    PTT 30 09/25/2019     Lab Results   Component Value Date    INR 1 00 09/25/2019    PROTIME 12 8 88/45/3732       Complications: none apparent    Condition at discharge: stable     Discharge instructions/Information to patient and family:   See After Visit Summary for information provided to patient and family  Provisions for Follow-Up Care:  See After Visit Summary for information related to follow-up care and any pertinent home health orders  Disposition: See After Visit Summary for discharge disposition information  Planned Readmission: Yes, pending clinical status    Discharge Medications: For a complete list of the patient's medications, please refer to her med rec      ***

## 2019-10-04 NOTE — PLAN OF CARE
Problem: Potential for Falls  Goal: Patient will remain free of falls  Description  INTERVENTIONS:  - Assess patient frequently for physical needs  -  Identify cognitive and physical deficits and behaviors that affect risk of falls    -  [place] fall precautions as indicated by assessment   - Educate patient/family on patient safety including physical limitations  - Instruct patient to call for assistance with activity based on assessment  - Modify environment to reduce risk of injury  - Consider OT/PT consult to assist with strengthening/mobility  Outcome: Progressing     Problem: GENITOURINARY - ADULT  Goal: Absence of urinary retention  Description  INTERVENTIONS:  - Assess patient's ability to void and empty bladder  - Monitor I/O  - Bladder scan as needed  - Discuss with physician/AP medications to alleviate retention as needed  - Discuss catheterization for long term situations as appropriate   Outcome: Progressing  Goal: Urinary catheter remains patent  Description  INTERVENTIONS:  - Assess patency of urinary catheter  - If patient has a chronic alexandre, consider changing catheter if non-functioning  - Follow guidelines for intermittent irrigation of non-functioning urinary catheter  Outcome: Progressing     Problem: METABOLIC, FLUID AND ELECTROLYTES - ADULT  Goal: Electrolytes maintained within normal limits  Description  INTERVENTIONS:  - Monitor labs and assess patient for signs and symptoms of electrolyte imbalances  - Administer electrolyte replacement as ordered  - Monitor response to electrolyte replacements, including repeat lab results as appropriate  - Instruct patient on fluid and nutrition as appropriate  Outcome: Progressing  Goal: Fluid balance maintained  Description  INTERVENTIONS:  - Monitor labs   - Monitor I/O and WT  - Instruct patient on fluid and nutrition as appropriate  - Assess for signs & symptoms of volume excess or deficit  Outcome: Progressing  Goal: Glucose maintained within target range  Description  INTERVENTIONS:  - Monitor Blood Glucose as ordered  - Assess for signs and symptoms of hyperglycemia and hypoglycemia  - Administer ordered medications to maintain glucose within target range  - Assess nutritional intake and initiate nutrition service referral as needed  Outcome: Progressing     Problem: SKIN/TISSUE INTEGRITY - ADULT  Goal: Skin integrity remains intact  Description  INTERVENTIONS  - Identify patients at risk for skin breakdown  - Assess and monitor skin integrity  - Assess and monitor nutrition and hydration status  - Monitor labs (i e  albumin)  - Assess for incontinence   - Turn and reposition patient  - Assist with mobility/ambulation  - Relieve pressure over bony prominences  - Avoid friction and shearing  - Provide appropriate hygiene as needed including keeping skin clean and dry  - Evaluate need for skin moisturizer/barrier cream  - Collaborate with interdisciplinary team (i e  Nutrition, Rehabilitation, etc )   - Patient/family teaching  Outcome: Progressing  Goal: Incision(s), wounds(s) or drain site(s) healing without S/S of infection  Description  INTERVENTIONS  - Assess and document risk factors for skin impairment   - Assess and document dressing, incision, wound bed, drain sites and surrounding tissue  - Consider nutrition services referral as needed  - Oral mucous membranes remain intact  - Provide patient/ family education  Outcome: Progressing

## 2019-10-04 NOTE — ANESTHESIA PROCEDURE NOTES
Peripheral Block    Patient location during procedure: OR  Start time: 10/4/2019 12:20 PM  Staffing  Anesthesiologist: Ximena Ortiz DO  Preanesthetic Checklist  Completed: patient identified, site marked, surgical consent, pre-op evaluation, timeout performed, IV checked, risks and benefits discussed and monitors and equipment checked  Peripheral Block  Patient position: supine  Prep: ChloraPrep  Patient monitoring: heart rate, cardiac monitor, continuous pulse ox and frequent blood pressure checks  Block type: TAP  Laterality: right  Injection technique: single-shot  Procedures: ultrasound guided, Ultrasound guidance required for the procedure to increase accuracy and safety of medication placement and decrease risk of complications  Ultrasound permanent image saved  Needle  Needle type: Stimuplex   Needle gauge: 27 G  Needle length: 10 cm  Needle localization: ultrasound guidance  Assessment  Injection assessment: incremental injection and negative aspiration for heme  Heart rate change: no  Slow fractionated injection: yes  Post-procedure:  site cleaned  patient tolerated the procedure well with no immediate complications  Additional Notes  Right ultrasound guided TAP Block performed using a combination of Exparel 10ml, Bupivavcaine 0 25%10 ml, plus 5 ml of saline   Procedure tolerated well by sera

## 2019-10-04 NOTE — UTILIZATION REVIEW
Initial Clinical Review    SUBMITTING   AS  AN URGENT  REQUEST  UNPLANNED  IP  PROCEDURE  INITIALLY  SCHEDULED/PLANNED  AS  OP  SURGERY      PLS  NOTE:      PROCEDURE  WAS   ELECTIVE  OP  SURGERY  D/T    INTRA OP   FINDINGS    OF  OVARIAN  CANCER WITH  LAP  HYSTER,     SURGERY   CONVERTED  TO   EXPLORATORY  LAPAROTOMY  WITH    OVARIAN CANCER STAGING    Elective    IP    surgical procedure    Age/Sex: 82 y o  female     Surgery Date:    10/4/2019    Procedure: LAP TOTAL HYSTERECTOMY, BSO (N/A)  EXPLORATORY LAPAROTOMY  EXLAP OMENTECTOMY  PELVIC AND DEANGELO-AORTIC LYMPH NODE DISSECTION  PERITONEAL BIOPSY (N/A), ovarian cancer staging    IP only procedures  [id number] and 21074 Open Laparotomy Lymphadenectomy and Omentectomy   Also 64157 for the Laparoscopic Hysterectomy          Anesthesia:     general    Operative Findings: Exam under anesthesia revealed a grossly normal cervix and mobile uterus  The mass was present in the posterior pelvis  2  On laparoscopy, there was no evidence of gross peritoneal disease  There was a calcified implant present on the anterior abdominal wall adjacent to the right diaphragm  In the pelvis, there was a large complex mass in the right ovary  There was suspicious for malignancy  There was no visible evidence of pelvic peritoneal disease  The left ovary and fallopian tube were grossly normal   3  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  4  Based on the frozen section diagnosis, incision was made to perform exploratory laparotomy  There were 2 small cystic implants on the small bowel mesentery not overly concerning for malignancy but these were removed  The calcified lesion adjacent to the diaphragm was removed  There was no other peritoneal disease  Given the lack of gross disease, ovarian cancer staging was performed  There were no suspicious lymph nodes  No gross omental implants    At the conclusion of the procedure, there is no gross visible disease        Admission Orders: Date/Time/Statement: Inpatient Admission Orders (From admission, onward)     Ordered        10/04/19 1203  Inpatient Admission  Once                   Orders Placed This Encounter   Procedures    Inpatient Admission     Standing Status:   Standing     Number of Occurrences:   1     Order Specific Question:   Admitting Physician     Answer:   Guillermo Lopez [[id number]]     Order Specific Question:   Level of Care     Answer:   Med Surg [[id number]]     Order Specific Question:   Estimated length of stay     Answer:   Inpatient Only Surgery     Vital Signs: /58   Pulse 84   Temp 98 1 °F (36 7 °C)   Resp 20   Ht 5' 5" (1 651 m)   Wt 58 5 kg (129 lb)   SpO2 100%   Breastfeeding? No   BMI 21 47 kg/m²      Diet:    NPO/sips    MOBILITY  As  nasim    DVT Prophylaxis:   SCD'S      Medications/Pain Control:   Current Facility-Administered Medications:  acetaminophen 650 mg Oral Q6H PRN   diphenhydrAMINE 25 mg Intravenous Q6H PRN   docusate sodium 100 mg Oral BID   enoxaparin 40 mg Subcutaneous Daily   HYDROmorphone  Intravenous Continuous   insulin lispro 4 Units Subcutaneous TID With Meals   metoprolol 5 mg Intravenous Q6H PRN   naloxone 0 1 mg Intravenous Q3 min PRN   ondansetron 4 mg Intravenous Q6H PRN   oxyCODONE 10 mg Oral Q4H PRN   oxyCODONE 5 mg Oral Q4H PRN   sodium chloride 100 mL/hr Intravenous Continuous       Network Utilization Review Department  Phone: [phone]; Fax [phone]  [email or web address]  ATTENTION: Please call with any questions or concerns to [phone]  and carefully listen to the prompts so that you are directed to the right person  Send all requests for admission clinical reviews, approved or denied determinations and any other requests to fax [phone]   All voicemails are confidential

## 2019-10-05 LAB
ANION GAP SERPL CALCULATED.3IONS-SCNC: 7 MMOL/L (ref 4–13)
BUN SERPL-MCNC: 14 MG/DL (ref 5–25)
CALCIUM SERPL-MCNC: 6.3 MG/DL (ref 8.3–10.1)
CHLORIDE SERPL-SCNC: 112 MMOL/L (ref 100–108)
CO2 SERPL-SCNC: 22 MMOL/L (ref 21–32)
CREAT SERPL-MCNC: 0.72 MG/DL (ref 0.6–1.3)
ERYTHROCYTE [DISTWIDTH] IN BLOOD BY AUTOMATED COUNT: 12.2 % (ref 11.6–15.1)
GFR SERPL CREATININE-BSD FRML MDRD: 78 ML/MIN/1.73SQ M
GLUCOSE SERPL-MCNC: 184 MG/DL (ref 65–140)
GLUCOSE SERPL-MCNC: 223 MG/DL (ref 65–140)
GLUCOSE SERPL-MCNC: 249 MG/DL (ref 65–140)
GLUCOSE SERPL-MCNC: 306 MG/DL (ref 65–140)
GLUCOSE SERPL-MCNC: 353 MG/DL (ref 65–140)
GLUCOSE SERPL-MCNC: 373 MG/DL (ref 65–140)
HCT VFR BLD AUTO: 35 % (ref 34.8–46.1)
HGB BLD-MCNC: 11 G/DL (ref 11.5–15.4)
MAGNESIUM SERPL-MCNC: 1.6 MG/DL (ref 1.6–2.6)
MCH RBC QN AUTO: 32.6 PG (ref 26.8–34.3)
MCHC RBC AUTO-ENTMCNC: 31.4 G/DL (ref 31.4–37.4)
MCV RBC AUTO: 104 FL (ref 82–98)
PLATELET # BLD AUTO: 232 THOUSANDS/UL (ref 149–390)
PMV BLD AUTO: 9 FL (ref 8.9–12.7)
POTASSIUM SERPL-SCNC: 4.4 MMOL/L (ref 3.5–5.3)
RBC # BLD AUTO: 3.37 MILLION/UL (ref 3.81–5.12)
SODIUM SERPL-SCNC: 141 MMOL/L (ref 136–145)
WBC # BLD AUTO: 15.06 THOUSAND/UL (ref 4.31–10.16)

## 2019-10-05 PROCEDURE — 83735 ASSAY OF MAGNESIUM: CPT | Performed by: OBSTETRICS & GYNECOLOGY

## 2019-10-05 PROCEDURE — 99024 POSTOP FOLLOW-UP VISIT: CPT | Performed by: OBSTETRICS & GYNECOLOGY

## 2019-10-05 PROCEDURE — 85027 COMPLETE CBC AUTOMATED: CPT | Performed by: OBSTETRICS & GYNECOLOGY

## 2019-10-05 PROCEDURE — 80048 BASIC METABOLIC PNL TOTAL CA: CPT | Performed by: OBSTETRICS & GYNECOLOGY

## 2019-10-05 PROCEDURE — 82948 REAGENT STRIP/BLOOD GLUCOSE: CPT

## 2019-10-05 RX ORDER — INSULIN GLARGINE 100 [IU]/ML
11 INJECTION, SOLUTION SUBCUTANEOUS
Status: DISCONTINUED | OUTPATIENT
Start: 2019-10-05 | End: 2019-10-08

## 2019-10-05 RX ORDER — SODIUM CHLORIDE 450 MG/100ML
75 INJECTION, SOLUTION INTRAVENOUS CONTINUOUS
Status: DISCONTINUED | OUTPATIENT
Start: 2019-10-05 | End: 2019-10-06

## 2019-10-05 RX ORDER — METOCLOPRAMIDE HYDROCHLORIDE 5 MG/ML
10 INJECTION INTRAMUSCULAR; INTRAVENOUS EVERY 6 HOURS PRN
Status: DISCONTINUED | OUTPATIENT
Start: 2019-10-05 | End: 2019-10-08

## 2019-10-05 RX ORDER — ACETAMINOPHEN 160 MG/5ML
650 SUSPENSION, ORAL (FINAL DOSE FORM) ORAL EVERY 6 HOURS
Status: DISCONTINUED | OUTPATIENT
Start: 2019-10-05 | End: 2019-10-08

## 2019-10-05 RX ORDER — LOSARTAN POTASSIUM 50 MG/1
50 TABLET ORAL DAILY
Status: DISCONTINUED | OUTPATIENT
Start: 2019-10-05 | End: 2019-10-08

## 2019-10-05 RX ORDER — INSULIN GLARGINE 100 [IU]/ML
11 INJECTION, SOLUTION SUBCUTANEOUS
Status: DISCONTINUED | OUTPATIENT
Start: 2019-10-05 | End: 2019-10-05

## 2019-10-05 RX ADMIN — ONDANSETRON 4 MG: 2 INJECTION INTRAMUSCULAR; INTRAVENOUS at 13:53

## 2019-10-05 RX ADMIN — LOSARTAN POTASSIUM 50 MG: 50 TABLET, FILM COATED ORAL at 08:53

## 2019-10-05 RX ADMIN — DORZOLAMIDE HYDROCHLORIDE AND TIMOLOL MALEATE 1 DROP: 20; 5 SOLUTION/ DROPS OPHTHALMIC at 08:36

## 2019-10-05 RX ADMIN — SODIUM CHLORIDE 75 ML/HR: 0.45 INJECTION, SOLUTION INTRAVENOUS at 09:14

## 2019-10-05 RX ADMIN — DOCUSATE SODIUM 100 MG: 100 CAPSULE, LIQUID FILLED ORAL at 08:53

## 2019-10-05 RX ADMIN — ACETAMINOPHEN 650 MG: 160 SUSPENSION ORAL at 09:01

## 2019-10-05 RX ADMIN — SODIUM CHLORIDE 100 ML/HR: 0.9 INJECTION, SOLUTION INTRAVENOUS at 05:36

## 2019-10-05 RX ADMIN — ENOXAPARIN SODIUM 40 MG: 40 INJECTION SUBCUTANEOUS at 08:53

## 2019-10-05 RX ADMIN — INSULIN LISPRO 2 UNITS: 100 INJECTION, SOLUTION INTRAVENOUS; SUBCUTANEOUS at 08:39

## 2019-10-05 RX ADMIN — SODIUM CHLORIDE 75 ML/HR: 0.45 INJECTION, SOLUTION INTRAVENOUS at 22:10

## 2019-10-05 RX ADMIN — ACETAMINOPHEN 650 MG: 160 SUSPENSION ORAL at 03:48

## 2019-10-05 RX ADMIN — INSULIN LISPRO 3 UNITS: 100 INJECTION, SOLUTION INTRAVENOUS; SUBCUTANEOUS at 18:34

## 2019-10-05 RX ADMIN — METOCLOPRAMIDE 10 MG: 5 INJECTION, SOLUTION INTRAMUSCULAR; INTRAVENOUS at 18:35

## 2019-10-05 RX ADMIN — DOCUSATE SODIUM 100 MG: 100 CAPSULE, LIQUID FILLED ORAL at 18:31

## 2019-10-05 RX ADMIN — ACETAMINOPHEN 650 MG: 160 SUSPENSION ORAL at 15:53

## 2019-10-05 RX ADMIN — INSULIN GLARGINE 11 UNITS: 100 INJECTION, SOLUTION SUBCUTANEOUS at 22:07

## 2019-10-05 RX ADMIN — TRAVOPROST 1 DROP: 0.04 SOLUTION/ DROPS OPHTHALMIC at 22:07

## 2019-10-05 RX ADMIN — DORZOLAMIDE HYDROCHLORIDE AND TIMOLOL MALEATE 1 DROP: 20; 5 SOLUTION/ DROPS OPHTHALMIC at 18:30

## 2019-10-05 RX ADMIN — DULOXETINE HYDROCHLORIDE 30 MG: 30 CAPSULE, DELAYED RELEASE ORAL at 22:06

## 2019-10-05 RX ADMIN — LEVOTHYROXINE SODIUM 50 MCG: 50 TABLET ORAL at 05:36

## 2019-10-05 RX ADMIN — ACETAMINOPHEN 650 MG: 160 SUSPENSION ORAL at 22:07

## 2019-10-05 RX ADMIN — INSULIN LISPRO 2 UNITS: 100 INJECTION, SOLUTION INTRAVENOUS; SUBCUTANEOUS at 12:12

## 2019-10-05 NOTE — QUICK NOTE
Patient seen and examined, resting comfortably in bed, pain well controlled with PCA pump, alexandre removed this AM, tolerating diet  Denies chest pain, sob, nausea/vomiting, fevers/chills    Vitals:    10/05/19 0755   BP: 146/59   Pulse: 93   Resp: 18   Temp: 97 5 °F (36 4 °C)   SpO2: 100%       Physical Exam   Constitutional: She is oriented to person, place, and time  Cardiovascular: Normal rate and regular rhythm  Pulmonary/Chest: Breath sounds normal    Abdominal: Soft  Appropriatley tender to palpation, incision clean, dry and intact, bowel sounds present   Musculoskeletal: Normal range of motion  No lower extremity/calf tenderness to palpation    Neurological: She is alert and oriented to person, place, and time  Skin: Skin is warm and dry  Psychiatric: She has a normal mood and affect   Her behavior is normal        83 y/o P2 with Right Ovarian mass consistent with adenocarcinoma s/p Total Laparoscopic Hysterectomy, Exploratory Laparotomy, Pelvic and Paraaortic Lymphadenectomy, Omentectomy, peritoneal biopsy and ovarian cancer surgical staging to no gross residual disease, on 10/5, POD#1 doing well  -To continue current management as per [address] Team

## 2019-10-05 NOTE — PLAN OF CARE
Problem: Potential for Falls  Goal: Patient will remain free of falls  Description  INTERVENTIONS:  - Assess patient frequently for physical needs  -  Identify cognitive and physical deficits and behaviors that affect risk of falls    -  [place] fall precautions as indicated by assessment   - Educate patient/family on patient safety including physical limitations  - Instruct patient to call for assistance with activity based on assessment  - Modify environment to reduce risk of injury  - Consider OT/PT consult to assist with strengthening/mobility  Outcome: Progressing     Problem: GENITOURINARY - ADULT  Goal: Absence of urinary retention  Description  INTERVENTIONS:  - Assess patient's ability to void and empty bladder  - Monitor I/O  - Bladder scan as needed  - Discuss with physician/AP medications to alleviate retention as needed  - Discuss catheterization for long term situations as appropriate   Outcome: Progressing  Goal: Urinary catheter remains patent  Description  INTERVENTIONS:  - Assess patency of urinary catheter  - If patient has a chronic alexandre, consider changing catheter if non-functioning  - Follow guidelines for intermittent irrigation of non-functioning urinary catheter  Outcome: Progressing     Problem: METABOLIC, FLUID AND ELECTROLYTES - ADULT  Goal: Electrolytes maintained within normal limits  Description  INTERVENTIONS:  - Monitor labs and assess patient for signs and symptoms of electrolyte imbalances  - Administer electrolyte replacement as ordered  - Monitor response to electrolyte replacements, including repeat lab results as appropriate  - Instruct patient on fluid and nutrition as appropriate  Outcome: Progressing  Goal: Fluid balance maintained  Description  INTERVENTIONS:  - Monitor labs   - Monitor I/O and WT  - Instruct patient on fluid and nutrition as appropriate  - Assess for signs & symptoms of volume excess or deficit  Outcome: Progressing  Goal: Glucose maintained within target range  Description  INTERVENTIONS:  - Monitor Blood Glucose as ordered  - Assess for signs and symptoms of hyperglycemia and hypoglycemia  - Administer ordered medications to maintain glucose within target range  - Assess nutritional intake and initiate nutrition service referral as needed  Outcome: Progressing     Problem: SKIN/TISSUE INTEGRITY - ADULT  Goal: Skin integrity remains intact  Description  INTERVENTIONS  - Identify patients at risk for skin breakdown  - Assess and monitor skin integrity  - Assess and monitor nutrition and hydration status  - Monitor labs (i e  albumin)  - Assess for incontinence   - Turn and reposition patient  - Assist with mobility/ambulation  - Relieve pressure over bony prominences  - Avoid friction and shearing  - Provide appropriate hygiene as needed including keeping skin clean and dry  - Evaluate need for skin moisturizer/barrier cream  - Collaborate with interdisciplinary team (i e  Nutrition, Rehabilitation, etc )   - Patient/family teaching  Outcome: Progressing  Goal: Incision(s), wounds(s) or drain site(s) healing without S/S of infection  Description  INTERVENTIONS  - Assess and document risk factors for skin impairment   - Assess and document dressing, incision, wound bed, drain sites and surrounding tissue  - Consider nutrition services referral as needed  - Oral mucous membranes remain intact  - Provide patient/ family education  Outcome: Progressing     Problem: Prexisting or High Potential for Compromised Skin Integrity  Goal: Skin integrity is maintained or improved  Description  INTERVENTIONS:  - Identify patients at risk for skin breakdown  - Assess and monitor skin integrity  - Assess and monitor nutrition and hydration status  - Monitor labs   - Assess for incontinence   - Turn and reposition patient  - Assist with mobility/ambulation  - Relieve pressure over bony prominences  - Avoid friction and shearing  - Provide appropriate hygiene as needed including keeping skin clean and dry  - Evaluate need for skin moisturizer/barrier cream  - Collaborate with interdisciplinary team   - Patient/family teaching  - Consider wound care consult   Outcome: Progressing

## 2019-10-05 NOTE — QUICK NOTE
Informed by nursing that patient unable to urinate since alexandre removal this AM after 0900   Nurse unable to obtain adequate bladder scan due to midline incision, but will straight cath x1 and repeat voiding trial  If unable to void, will replace alexandre catheter    Yuliet Vasquez MD  OBGYN, PGY-3  10/5/2019 6:12 PM

## 2019-10-05 NOTE — PLAN OF CARE
Problem: Potential for Falls  Goal: Patient will remain free of falls  Description  INTERVENTIONS:  - Assess patient frequently for physical needs  -  Identify cognitive and physical deficits and behaviors that affect risk of falls    -  [place] fall precautions as indicated by assessment   - Educate patient/family on patient safety including physical limitations  - Instruct patient to call for assistance with activity based on assessment  - Modify environment to reduce risk of injury  - Consider OT/PT consult to assist with strengthening/mobility  10/5/2019 1848 by Kedar Lockett RN  Outcome: Progressing  10/5/2019 1846 by Kedar Lockett RN  Outcome: Progressing     Problem: GENITOURINARY - ADULT  Goal: Absence of urinary retention  Description  INTERVENTIONS:  - Assess patient's ability to void and empty bladder  - Monitor I/O  - Bladder scan as needed  - Discuss with physician/AP medications to alleviate retention as needed  - Discuss catheterization for long term situations as appropriate   10/5/2019 1848 by Kedar Lockett RN  Outcome: Progressing  10/5/2019 1846 by Kedar Lockett RN  Outcome: Progressing  Goal: Urinary catheter remains patent  Description  INTERVENTIONS:  - Assess patency of urinary catheter  - If patient has a chronic alexandre, consider changing catheter if non-functioning  - Follow guidelines for intermittent irrigation of non-functioning urinary catheter  10/5/2019 1848 by Kedar Lockett RN  Outcome: Progressing  10/5/2019 1846 by Kedar Lockett RN  Outcome: Progressing     Problem: METABOLIC, FLUID AND ELECTROLYTES - ADULT  Goal: Electrolytes maintained within normal limits  Description  INTERVENTIONS:  - Monitor labs and assess patient for signs and symptoms of electrolyte imbalances  - Administer electrolyte replacement as ordered  - Monitor response to electrolyte replacements, including repeat lab results as appropriate  - Instruct patient on fluid and nutrition as appropriate  10/5/2019 1848 by Kedar Lockett RN  Outcome: Progressing  10/5/2019 1846 by Kedar Lockett RN  Outcome: Progressing  Goal: Fluid balance maintained  Description  INTERVENTIONS:  - Monitor labs   - Monitor I/O and WT  - Instruct patient on fluid and nutrition as appropriate  - Assess for signs & symptoms of volume excess or deficit  10/5/2019 1848 by Kedar Lockett RN  Outcome: Progressing  10/5/2019 1846 by Kedar Lockett RN  Outcome: Progressing  Goal: Glucose maintained within target range  Description  INTERVENTIONS:  - Monitor Blood Glucose as ordered  - Assess for signs and symptoms of hyperglycemia and hypoglycemia  - Administer ordered medications to maintain glucose within target range  - Assess nutritional intake and initiate nutrition service referral as needed  10/5/2019 1848 by Kedar Lockett RN  Outcome: Progressing  10/5/2019 1846 by Kedar Lockett RN  Outcome: Progressing     Problem: SKIN/TISSUE INTEGRITY - ADULT  Goal: Skin integrity remains intact  Description  INTERVENTIONS  - Identify patients at risk for skin breakdown  - Assess and monitor skin integrity  - Assess and monitor nutrition and hydration status  - Monitor labs (i e  albumin)  - Assess for incontinence   - Turn and reposition patient  - Assist with mobility/ambulation  - Relieve pressure over bony prominences  - Avoid friction and shearing  - Provide appropriate hygiene as needed including keeping skin clean and dry  - Evaluate need for skin moisturizer/barrier cream  - Collaborate with interdisciplinary team (i e  Nutrition, Rehabilitation, etc )   - Patient/family teaching  10/5/2019 1848 by Kedar Lockett RN  Outcome: Progressing  10/5/2019 1846 by Kedar Locektt RN  Outcome: Progressing  Goal: Incision(s), wounds(s) or drain site(s) healing without S/S of infection  Description  INTERVENTIONS  - Assess and document risk factors for skin impairment   - Assess and document dressing, incision, wound bed, drain sites and surrounding tissue  - Consider nutrition services referral as needed  - Oral mucous membranes remain intact  - Provide patient/ family education  10/5/2019 1848 by Edson Nava RN  Outcome: Progressing  10/5/2019 1846 by Edson Nava RN  Outcome: Progressing     Problem: Prexisting or High Potential for Compromised Skin Integrity  Goal: Skin integrity is maintained or improved  Description  INTERVENTIONS:  - Identify patients at risk for skin breakdown  - Assess and monitor skin integrity  - Assess and monitor nutrition and hydration status  - Monitor labs   - Assess for incontinence   - Turn and reposition patient  - Assist with mobility/ambulation  - Relieve pressure over bony prominences  - Avoid friction and shearing  - Provide appropriate hygiene as needed including keeping skin clean and dry  - Evaluate need for skin moisturizer/barrier cream  - Collaborate with interdisciplinary team   - Patient/family teaching  - Consider wound care consult   10/5/2019 1848 by Edson Nava RN  Outcome: Progressing  10/5/2019 1846 by Edson Nava RN  Outcome: Progressing

## 2019-10-05 NOTE — PROGRESS NOTES
Gyn Oncology Progress note   Jennifer Flannery 82 y o  female MRN: [id number]  Unit/Bed#: E5 -01 Encounter: [id number]    A/P: 83yo s/p TLH, BSO, ex lap, pelvic and para-aorti lymphadenectomy, omentectomy, peritoneal biopsies, & ovarian cancer staging for right tubo-ovarian mass consistent with adenocarcinoma    1)Right Tubo-ovarian mass  Frozen consistent with adenocarcinoma, f/u final pathology  S/p surgery listed above  Continue routine post-op care  Encouraged ambulation and Incentive spirometry  Hgb 11 0  Alexandre catheter in place: since 0250 2 9cc/kg/hr  Plan to discontinue later this AM per patient request  F/u Inpatient geriatrics consult  2) Analgesia:  S/p TAPs block  Dilaudid pCA: 1 attempt and 1 given  Pt attempted to press button this morning, however, locked out  Will scheduleTylenol  Motrin PRN  Oxycodone 5-10mg for moderate-severe pain PRN  3) Type 1 Diabetes  Currently on SSI algorithm 1  Blood sugars as follows: 229 (2109) and 184 (0459)  Will transition to home meds today: Humalog 4/5/7 units with meals, Lantus 12units qHS  4) Dry eyes  Lubrifresh PRN, cosopt BID, Travoprost qD  5) Depression  Cymbalta 30mg qHS  6) HTN:   Bps: 117-142/58-66  Metoprolol 5mg PRN  Avapro to be resumed: 150mg daily  7) Hypothyroidism  Levothyroxine 50mg daily  8)F/E/N: regular diet, IVF NS 100mL/hr  9) DVT PPx: SCDs, Lovenox 40mg q24hrs    Jennifer Flannery is doing okay  She states she feels "so-so " She states her pain had been controlled until she slept  She states because she was sleeping she was unable to press her pain control button  Pt would like tylenol scheduled in addition to her PRN meds  Pt reports nausea after surgery but denies vomiting  She states she has minimal appetite this morning and will slowly advance her diet  She wants to keep her alexandre in for some more time as she doesn't think she would be able to get out of bed right away   However, patient is motivated to get OOB with assistance- encouraged getting up and into chair today  Pt reports "gas pain," but has not passed flatus or BM  She reports mouth dryness- thirsty  Patient denies fever, chills, chest pain, shortness of breath, or calf tenderness  /59 (BP Location: Left arm)   Pulse 93   Temp 97 5 °F (36 4 °C) (Temporal)   Resp 18   Ht 5' 5" (1 651 m)   Wt 58 5 kg (129 lb)   SpO2 100%   Breastfeeding? No   BMI 21 47 kg/m²     I/O last 3 completed shifts: In: 5984 8 [P O :480; I V :5504 8]  Out: 2250 [Urine:2175; Blood:75]  No intake/output data recorded  Lab Results   Component Value Date    WBC 15 06 (H) 10/05/2019    HGB 11 0 (L) 10/05/2019    HCT 35 0 10/05/2019     (H) 10/05/2019     10/05/2019       Lab Results   Component Value Date    GLUCOSE 75 01/08/2016    CALCIUM 6 3 (L) 10/05/2019     (L) 01/08/2016    K 4 4 10/05/2019    CO2 22 10/05/2019     (H) 10/05/2019    BUN 14 10/05/2019    CREATININE 0 72 10/05/2019       Lab Results   Component Value Date/Time    POCGLU 223 (H) 10/05/2019 08:03 AM    POCGLU 229 (H) 10/04/2019 09:09 PM    POCGLU 251 (H) 10/04/2019 04:30 PM    POCGLU 241 (H) 10/04/2019 01:34 PM    POCGLU 292 (H) 10/04/2019 12:47 PM    POCGLU 164 02/15/2017 02:33 PM       Physical Exam   Constitutional: She is oriented to person, place, and time  She appears well-developed and well-nourished  No distress  Cardiovascular: Normal rate, regular rhythm and normal heart sounds  Exam reveals no gallop and no friction rub  No murmur heard  Pulmonary/Chest: Effort normal and breath sounds normal  No stridor  No respiratory distress  She has no wheezes  She has no rales  O2 via NC   Abdominal: Soft  Bowel sounds are normal  There is tenderness (appropriately tender for post-op)  There is no rebound and no guarding  Incisions C/D/I   Sterile dressing over midline vertical incision     Genitourinary:   Genitourinary Comments: Ji in place with clear, yellow urine: 150cc in bag Musculoskeletal: Normal range of motion  She exhibits no edema, tenderness or deformity  SCDs on and on   Neurological: She is alert and oriented to person, place, and time  Skin: She is not diaphoretic  Psychiatric: She has a normal mood and affect  Her behavior is normal  Judgment and thought content normal    Vitals reviewed      Willian Reed MD  OBGYN, PGY-3  10/5/2019 8:09 AM

## 2019-10-06 PROBLEM — N89.8 VAGINAL ITCHING: Status: RESOLVED | Noted: 2019-08-15 | Resolved: 2019-10-06

## 2019-10-06 PROBLEM — E10.65 TYPE 1 DIABETES MELLITUS WITH HYPERGLYCEMIA (HCC): Status: ACTIVE | Noted: 2019-10-06

## 2019-10-06 PROBLEM — C56.9 OVARIAN CANCER (HCC): Status: ACTIVE | Noted: 2019-09-20

## 2019-10-06 PROBLEM — R31.29 OTHER MICROSCOPIC HEMATURIA: Status: RESOLVED | Noted: 2019-08-06 | Resolved: 2019-10-06

## 2019-10-06 PROBLEM — N94.9 VAGINAL BURNING: Status: RESOLVED | Noted: 2019-08-15 | Resolved: 2019-10-06

## 2019-10-06 LAB
ANION GAP SERPL CALCULATED.3IONS-SCNC: 9 MMOL/L (ref 4–13)
BASOPHILS # BLD AUTO: 0.01 THOUSANDS/ΜL (ref 0–0.1)
BASOPHILS NFR BLD AUTO: 0 % (ref 0–1)
BUN SERPL-MCNC: 19 MG/DL (ref 5–25)
CALCIUM SERPL-MCNC: 7 MG/DL (ref 8.3–10.1)
CHLORIDE SERPL-SCNC: 96 MMOL/L (ref 100–108)
CO2 SERPL-SCNC: 21 MMOL/L (ref 21–32)
CREAT SERPL-MCNC: 0.96 MG/DL (ref 0.6–1.3)
EOSINOPHIL # BLD AUTO: 0 THOUSAND/ΜL (ref 0–0.61)
EOSINOPHIL NFR BLD AUTO: 0 % (ref 0–6)
ERYTHROCYTE [DISTWIDTH] IN BLOOD BY AUTOMATED COUNT: 12.1 % (ref 11.6–15.1)
GFR SERPL CREATININE-BSD FRML MDRD: 55 ML/MIN/1.73SQ M
GLUCOSE SERPL-MCNC: 243 MG/DL (ref 65–140)
GLUCOSE SERPL-MCNC: 337 MG/DL (ref 65–140)
GLUCOSE SERPL-MCNC: 346 MG/DL (ref 65–140)
GLUCOSE SERPL-MCNC: 376 MG/DL (ref 65–140)
GLUCOSE SERPL-MCNC: 388 MG/DL (ref 65–140)
GLUCOSE SERPL-MCNC: 438 MG/DL (ref 65–140)
HCT VFR BLD AUTO: 32.8 % (ref 34.8–46.1)
HGB BLD-MCNC: 10.3 G/DL (ref 11.5–15.4)
IMM GRANULOCYTES # BLD AUTO: 0.1 THOUSAND/UL (ref 0–0.2)
IMM GRANULOCYTES NFR BLD AUTO: 1 % (ref 0–2)
LYMPHOCYTES # BLD AUTO: 0.62 THOUSANDS/ΜL (ref 0.6–4.47)
LYMPHOCYTES NFR BLD AUTO: 4 % (ref 14–44)
MCH RBC QN AUTO: 31.9 PG (ref 26.8–34.3)
MCHC RBC AUTO-ENTMCNC: 31.4 G/DL (ref 31.4–37.4)
MCV RBC AUTO: 102 FL (ref 82–98)
MONOCYTES # BLD AUTO: 0.85 THOUSAND/ΜL (ref 0.17–1.22)
MONOCYTES NFR BLD AUTO: 6 % (ref 4–12)
NEUTROPHILS # BLD AUTO: 13.06 THOUSANDS/ΜL (ref 1.85–7.62)
NEUTS SEG NFR BLD AUTO: 89 % (ref 43–75)
NRBC BLD AUTO-RTO: 0 /100 WBCS
PLATELET # BLD AUTO: 252 THOUSANDS/UL (ref 149–390)
PLATELET # BLD AUTO: 278 THOUSANDS/UL (ref 149–390)
PMV BLD AUTO: 9.2 FL (ref 8.9–12.7)
PMV BLD AUTO: 9.2 FL (ref 8.9–12.7)
POTASSIUM SERPL-SCNC: 5.1 MMOL/L (ref 3.5–5.3)
RBC # BLD AUTO: 3.23 MILLION/UL (ref 3.81–5.12)
SODIUM SERPL-SCNC: 126 MMOL/L (ref 136–145)
WBC # BLD AUTO: 14.64 THOUSAND/UL (ref 4.31–10.16)

## 2019-10-06 PROCEDURE — 80048 BASIC METABOLIC PNL TOTAL CA: CPT | Performed by: OBSTETRICS & GYNECOLOGY

## 2019-10-06 PROCEDURE — 99024 POSTOP FOLLOW-UP VISIT: CPT | Performed by: OBSTETRICS & GYNECOLOGY

## 2019-10-06 PROCEDURE — 82948 REAGENT STRIP/BLOOD GLUCOSE: CPT

## 2019-10-06 PROCEDURE — 0T9B7ZZ DRAINAGE OF BLADDER, VIA NATURAL OR ARTIFICIAL OPENING: ICD-10-PCS | Performed by: OBSTETRICS & GYNECOLOGY

## 2019-10-06 PROCEDURE — 85025 COMPLETE CBC W/AUTO DIFF WBC: CPT | Performed by: OBSTETRICS & GYNECOLOGY

## 2019-10-06 PROCEDURE — 85049 AUTOMATED PLATELET COUNT: CPT | Performed by: STUDENT IN AN ORGANIZED HEALTH CARE EDUCATION/TRAINING PROGRAM

## 2019-10-06 RX ORDER — LORAZEPAM 0.5 MG/1
0.25 TABLET ORAL ONCE
Status: DISCONTINUED | OUTPATIENT
Start: 2019-10-06 | End: 2019-10-06

## 2019-10-06 RX ORDER — LORAZEPAM 0.5 MG/1
0.25 TABLET ORAL ONCE
Status: COMPLETED | OUTPATIENT
Start: 2019-10-06 | End: 2019-10-06

## 2019-10-06 RX ORDER — SIMETHICONE 80 MG
80 TABLET,CHEWABLE ORAL EVERY 6 HOURS PRN
Status: DISCONTINUED | OUTPATIENT
Start: 2019-10-06 | End: 2019-10-08

## 2019-10-06 RX ORDER — SODIUM CHLORIDE 9 MG/ML
125 INJECTION, SOLUTION INTRAVENOUS CONTINUOUS
Status: DISPENSED | OUTPATIENT
Start: 2019-10-06 | End: 2019-10-06

## 2019-10-06 RX ORDER — SODIUM CHLORIDE 9 MG/ML
75 INJECTION, SOLUTION INTRAVENOUS CONTINUOUS
Status: DISCONTINUED | OUTPATIENT
Start: 2019-10-06 | End: 2019-10-07

## 2019-10-06 RX ORDER — LORAZEPAM 1 MG/1
0.5 TABLET ORAL ONCE
Status: DISCONTINUED | OUTPATIENT
Start: 2019-10-06 | End: 2019-10-06

## 2019-10-06 RX ADMIN — LORAZEPAM 0.25 MG: 0.5 TABLET ORAL at 21:33

## 2019-10-06 RX ADMIN — DOCUSATE SODIUM 100 MG: 100 CAPSULE, LIQUID FILLED ORAL at 17:19

## 2019-10-06 RX ADMIN — INSULIN GLARGINE 11 UNITS: 100 INJECTION, SOLUTION SUBCUTANEOUS at 21:33

## 2019-10-06 RX ADMIN — ENOXAPARIN SODIUM 40 MG: 40 INJECTION SUBCUTANEOUS at 08:13

## 2019-10-06 RX ADMIN — LOSARTAN POTASSIUM 50 MG: 50 TABLET, FILM COATED ORAL at 08:13

## 2019-10-06 RX ADMIN — ACETAMINOPHEN 650 MG: 160 SUSPENSION ORAL at 21:33

## 2019-10-06 RX ADMIN — INSULIN LISPRO 4 UNITS: 100 INJECTION, SOLUTION INTRAVENOUS; SUBCUTANEOUS at 15:18

## 2019-10-06 RX ADMIN — DOCUSATE SODIUM 100 MG: 100 CAPSULE, LIQUID FILLED ORAL at 08:13

## 2019-10-06 RX ADMIN — INSULIN LISPRO 4 UNITS: 100 INJECTION, SOLUTION INTRAVENOUS; SUBCUTANEOUS at 10:51

## 2019-10-06 RX ADMIN — ACETAMINOPHEN 650 MG: 160 SUSPENSION ORAL at 10:52

## 2019-10-06 RX ADMIN — OXYCODONE HYDROCHLORIDE 5 MG: 5 TABLET ORAL at 08:13

## 2019-10-06 RX ADMIN — LEVOTHYROXINE SODIUM 50 MCG: 50 TABLET ORAL at 05:06

## 2019-10-06 RX ADMIN — METOCLOPRAMIDE 10 MG: 5 INJECTION, SOLUTION INTRAMUSCULAR; INTRAVENOUS at 01:32

## 2019-10-06 RX ADMIN — SODIUM CHLORIDE 125 ML/HR: 0.9 INJECTION, SOLUTION INTRAVENOUS at 09:47

## 2019-10-06 RX ADMIN — ONDANSETRON 4 MG: 2 INJECTION INTRAMUSCULAR; INTRAVENOUS at 07:56

## 2019-10-06 RX ADMIN — OXYCODONE HYDROCHLORIDE 10 MG: 10 TABLET ORAL at 21:33

## 2019-10-06 RX ADMIN — OXYCODONE HYDROCHLORIDE 10 MG: 10 TABLET ORAL at 17:18

## 2019-10-06 RX ADMIN — TRAVOPROST 1 DROP: 0.04 SOLUTION/ DROPS OPHTHALMIC at 21:34

## 2019-10-06 RX ADMIN — DORZOLAMIDE HYDROCHLORIDE AND TIMOLOL MALEATE 1 DROP: 20; 5 SOLUTION/ DROPS OPHTHALMIC at 17:21

## 2019-10-06 RX ADMIN — ACETAMINOPHEN 650 MG: 160 SUSPENSION ORAL at 15:17

## 2019-10-06 RX ADMIN — DULOXETINE HYDROCHLORIDE 30 MG: 30 CAPSULE, DELAYED RELEASE ORAL at 21:35

## 2019-10-06 RX ADMIN — SODIUM CHLORIDE 75 ML/HR: 0.9 INJECTION, SOLUTION INTRAVENOUS at 17:23

## 2019-10-06 RX ADMIN — INSULIN LISPRO 2 UNITS: 100 INJECTION, SOLUTION INTRAVENOUS; SUBCUTANEOUS at 21:33

## 2019-10-06 RX ADMIN — OXYCODONE HYDROCHLORIDE 10 MG: 10 TABLET ORAL at 13:18

## 2019-10-06 RX ADMIN — OXYCODONE HYDROCHLORIDE 5 MG: 5 TABLET ORAL at 15:07

## 2019-10-06 RX ADMIN — DORZOLAMIDE HYDROCHLORIDE AND TIMOLOL MALEATE 1 DROP: 20; 5 SOLUTION/ DROPS OPHTHALMIC at 08:14

## 2019-10-06 RX ADMIN — INSULIN LISPRO 4 UNITS: 100 INJECTION, SOLUTION INTRAVENOUS; SUBCUTANEOUS at 08:14

## 2019-10-06 RX ADMIN — SIMETHICONE CHEW TAB 80 MG 80 MG: 80 TABLET ORAL at 17:18

## 2019-10-06 NOTE — PROGRESS NOTES
Gyn Oncology Progress note   Izabel Garcia 82 y o  female MRN: [id number]  Unit/Bed#: E5 -01 Encounter: [id number]    Assessment: 82 y o  POD# 2 from [facility], BSO, ex lap, pelvic and para-aortic lymphadenectomy, omentectomy, peritoneal biopsies, and ovarian cancer staging for right tubo-ovarian mass consistent with adenocarcinoma    Plan:    1) Right tubo-ovarian mass, s/p surgery   Frozen consistent with adenocarcinoma, follow up final pathology  Continue routine postoperative care    2) Urinary retention  Ji catheter removed 10/5 at 0930  Straight cathed for 600cc at 1800 and 300cc at 0230 this morning  She is due to void by 0930 today  3) Analgesia  S/p TAPs block  Discontinue dilaudid PCA pump today with transition to [facility], motrin prn, oxycodone 5-10mg for moderate-severe pain prn    4) T1DM  She has been on SSI algorithm 1 and lantus 12U at night  However, glucose levels were 249/306/353/373 yesterday  Upon discussion with nursing staff, patient has been refusing half if not all of her insulin doses because she has not been eating her regular diet  I discussed the importance of glucose control and using insulin as needed  Follow up inpatient geriatrics consult for T1DM  Home meds: humalog 4/5/7 with meals, lantus 12U at night    5) Dry eyes  Lubifresh prn, cosopt BID, travoprost qD    6) Depression  Cymbalta 30mg qHS    7) Hypertension  BP 110s-140s/50s-80s  Metoprolol 5mg prn  Avapro 150mg daily    8) Hypothyroidism  Levothyroxine 50mcg daily    9) FEN: diabetic diet    10) DVT ppx: SCDs, lovenox 40mg q24 hours      Subjective:    Izabel Garcia is doing well but worried about pain control  She is worried about ambulation because of her surgery and is worried her pain will not be adequately controlled with movement  I discussed possibly discontinuing dilaudid PCA pump and controlling her pain with oral analgesia to facilitate ambulation around the unit today   Patient amenable to change in analgesia  She reports intermittent nausea with analgesia but no vomiting  She was able to eat a full dinner last night but does not have an appetite this morning  She has been belching and also passing some gas but has not had a bowel movement  She denies fever, chills, chest pain, shortness of breath, calf tenderness  /72 (BP Location: Left arm)   Pulse 95   Temp 98 9 °F (37 2 °C) (Temporal)   Resp 17   Ht 5' 5" (1 651 m)   Wt 58 5 kg (129 lb)   SpO2 98%   Breastfeeding? No   BMI 21 47 kg/m²     I/O last 3 completed shifts: In: 6344 8 [P O :840; I V :5504 8]  Out: 2550 [Urine:2475; Blood:75]  I/O this shift:  In: 1242 8 [P O :240; I V :1002 8]  Out: 350 [Urine:350]    Lab Results   Component Value Date    WBC 15 06 (H) 10/05/2019    HGB 11 0 (L) 10/05/2019    HCT 35 0 10/05/2019     (H) 10/05/2019     10/06/2019       Lab Results   Component Value Date    GLUCOSE 75 01/08/2016    CALCIUM 6 3 (L) 10/05/2019     (L) 01/08/2016    K 4 4 10/05/2019    CO2 22 10/05/2019     (H) 10/05/2019    BUN 14 10/05/2019    CREATININE 0 72 10/05/2019         Physical Exam   Constitutional: She is oriented to person, place, and time  She appears well-developed and well-nourished  Cardiovascular: Normal rate, regular rhythm and normal heart sounds  Exam reveals no gallop and no friction rub  No murmur heard  Pulmonary/Chest: Effort normal and breath sounds normal  No stridor  No respiratory distress  She has no wheezes  She has no rales  Abdominal: Soft  Bowel sounds are normal  She exhibits no distension and no mass  There is no tenderness  There is no rebound and no guarding  No hernia  Vertical incision clean, dry, intact without signs of inflammation   Neurological: She is alert and oriented to person, place, and time  Psychiatric: She has a normal mood and affect  Her behavior is normal  Judgment and thought content normal    Vitals reviewed        Andrews Pascal MD  10/6/2019  6:34 AM

## 2019-10-06 NOTE — PROGRESS NOTES
Patient failed second voiding trial      Ji was removed yesterday morning at 0930  She was straight cath'd for 600cc at 1800 and again for 300cc at 0230 this morning  She was given 6 hours for spontaneous void but continues to be unable to do so  Ji to be placed in at this time       Discussed with Dr Anibal Archer MD  OBGYN, PGY-3  10/6/2019  11:17 AM

## 2019-10-07 ENCOUNTER — APPOINTMENT (INPATIENT)
Dept: RADIOLOGY | Facility: HOSPITAL | Age: 84
DRG: 736 | End: 2019-10-07
Payer: COMMERCIAL

## 2019-10-07 ENCOUNTER — ANESTHESIA EVENT (INPATIENT)
Dept: ANESTHESIOLOGY | Facility: HOSPITAL | Age: 84
DRG: 736 | End: 2019-10-07
Payer: COMMERCIAL

## 2019-10-07 ENCOUNTER — ANESTHESIA (INPATIENT)
Dept: ANESTHESIOLOGY | Facility: HOSPITAL | Age: 84
DRG: 736 | End: 2019-10-07
Payer: COMMERCIAL

## 2019-10-07 ENCOUNTER — APPOINTMENT (INPATIENT)
Dept: CT IMAGING | Facility: HOSPITAL | Age: 84
DRG: 736 | End: 2019-10-07
Payer: COMMERCIAL

## 2019-10-07 LAB
ALBUMIN SERPL BCP-MCNC: 1.6 G/DL (ref 3.5–5)
ALP SERPL-CCNC: 63 U/L (ref 46–116)
ALT SERPL W P-5'-P-CCNC: 116 U/L (ref 12–78)
ANION GAP SERPL CALCULATED.3IONS-SCNC: 13 MMOL/L (ref 4–13)
ANION GAP SERPL CALCULATED.3IONS-SCNC: 14 MMOL/L (ref 4–13)
ARTERIAL PATENCY WRIST A: NO
ARTERIAL PATENCY WRIST A: YES
AST SERPL W P-5'-P-CCNC: 108 U/L (ref 5–45)
BASE EXCESS BLDA CALC-SCNC: -11 MMOL/L
BASE EXCESS BLDA CALC-SCNC: -17.7 MMOL/L
BASE EXCESS BLDA CALC-SCNC: -9.2 MMOL/L
BASOPHILS # BLD AUTO: 0.01 THOUSANDS/ΜL (ref 0–0.1)
BASOPHILS NFR BLD AUTO: 0 % (ref 0–1)
BETA-HYDROXYBUTYRATE: 0.2 MMOL/L
BILIRUB SERPL-MCNC: 0.39 MG/DL (ref 0.2–1)
BODY TEMPERATURE: 97 DEGREES FEHRENHEIT
BUN SERPL-MCNC: 26 MG/DL (ref 5–25)
BUN SERPL-MCNC: 27 MG/DL (ref 5–25)
CA-I BLD-SCNC: 0.89 MMOL/L (ref 1.12–1.32)
CALCIUM SERPL-MCNC: 6 MG/DL (ref 8.3–10.1)
CALCIUM SERPL-MCNC: 7.1 MG/DL (ref 8.3–10.1)
CHLORIDE SERPL-SCNC: 100 MMOL/L (ref 100–108)
CHLORIDE SERPL-SCNC: 95 MMOL/L (ref 100–108)
CO2 SERPL-SCNC: 16 MMOL/L (ref 21–32)
CO2 SERPL-SCNC: 19 MMOL/L (ref 21–32)
CREAT SERPL-MCNC: 1.53 MG/DL (ref 0.6–1.3)
CREAT SERPL-MCNC: 1.6 MG/DL (ref 0.6–1.3)
EOSINOPHIL # BLD AUTO: 0 THOUSAND/ΜL (ref 0–0.61)
EOSINOPHIL NFR BLD AUTO: 0 % (ref 0–6)
ERYTHROCYTE [DISTWIDTH] IN BLOOD BY AUTOMATED COUNT: 12 % (ref 11.6–15.1)
ERYTHROCYTE [DISTWIDTH] IN BLOOD BY AUTOMATED COUNT: 12.1 % (ref 11.6–15.1)
ERYTHROCYTE [DISTWIDTH] IN BLOOD BY AUTOMATED COUNT: 12.1 % (ref 11.6–15.1)
GFR SERPL CREATININE-BSD FRML MDRD: 30 ML/MIN/1.73SQ M
GFR SERPL CREATININE-BSD FRML MDRD: 31 ML/MIN/1.73SQ M
GLUCOSE SERPL-MCNC: 264 MG/DL (ref 65–140)
GLUCOSE SERPL-MCNC: 285 MG/DL (ref 65–140)
GLUCOSE SERPL-MCNC: 301 MG/DL (ref 65–140)
GLUCOSE SERPL-MCNC: 363 MG/DL (ref 65–140)
GLUCOSE SERPL-MCNC: 371 MG/DL (ref 65–140)
GLUCOSE SERPL-MCNC: 383 MG/DL (ref 65–140)
GLUCOSE SERPL-MCNC: 394 MG/DL (ref 65–140)
HCO3 BLDA-SCNC: 11.9 MMOL/L (ref 22–28)
HCO3 BLDA-SCNC: 13.4 MMOL/L (ref 22–28)
HCO3 BLDA-SCNC: 16.3 MMOL/L (ref 22–28)
HCT VFR BLD AUTO: 23 % (ref 34.8–46.1)
HCT VFR BLD AUTO: 28.6 % (ref 34.8–46.1)
HCT VFR BLD AUTO: 30 % (ref 34.8–46.1)
HGB BLD-MCNC: 10 G/DL (ref 11.5–15.4)
HGB BLD-MCNC: 7.2 G/DL (ref 11.5–15.4)
HGB BLD-MCNC: 8 G/DL (ref 11.5–15.4)
HGB BLD-MCNC: 9.2 G/DL (ref 11.5–15.4)
HOROWITZ INDEX BLDA+IHG-RTO: 100 MM[HG]
HOROWITZ INDEX BLDA+IHG-RTO: 100 MM[HG]
IMM GRANULOCYTES # BLD AUTO: 0.06 THOUSAND/UL (ref 0–0.2)
IMM GRANULOCYTES NFR BLD AUTO: 1 % (ref 0–2)
INR PPP: 1.72 (ref 0.84–1.19)
LACTATE SERPL-SCNC: 6.8 MMOL/L (ref 0.5–2)
LACTATE SERPL-SCNC: 7.8 MMOL/L (ref 0.5–2)
LYMPHOCYTES # BLD AUTO: 0.51 THOUSANDS/ΜL (ref 0.6–4.47)
LYMPHOCYTES NFR BLD AUTO: 4 % (ref 14–44)
MAGNESIUM SERPL-MCNC: 2.2 MG/DL (ref 1.6–2.6)
MCH RBC QN AUTO: 32.5 PG (ref 26.8–34.3)
MCH RBC QN AUTO: 32.7 PG (ref 26.8–34.3)
MCH RBC QN AUTO: 33 PG (ref 26.8–34.3)
MCHC RBC AUTO-ENTMCNC: 31.3 G/DL (ref 31.4–37.4)
MCHC RBC AUTO-ENTMCNC: 32.2 G/DL (ref 31.4–37.4)
MCHC RBC AUTO-ENTMCNC: 33.3 G/DL (ref 31.4–37.4)
MCV RBC AUTO: 101 FL (ref 82–98)
MCV RBC AUTO: 106 FL (ref 82–98)
MCV RBC AUTO: 98 FL (ref 82–98)
MONOCYTES # BLD AUTO: 0.55 THOUSAND/ΜL (ref 0.17–1.22)
MONOCYTES NFR BLD AUTO: 5 % (ref 4–12)
NEUTROPHILS # BLD AUTO: 10.89 THOUSANDS/ΜL (ref 1.85–7.62)
NEUTS SEG NFR BLD AUTO: 90 % (ref 43–75)
NRBC BLD AUTO-RTO: 0 /100 WBCS
O2 CT BLDA-SCNC: 11.8 ML/DL (ref 16–23)
O2 CT BLDA-SCNC: 13.9 ML/DL (ref 16–23)
O2 CT BLDA-SCNC: 9.7 ML/DL (ref 16–23)
OB PNL GAST: POSITIVE
OXYHGB MFR BLDA: 82 % (ref 94–97)
OXYHGB MFR BLDA: 86.1 % (ref 94–97)
OXYHGB MFR BLDA: 94.7 % (ref 94–97)
PCO2 BLDA: 25.7 MM HG (ref 36–44)
PCO2 BLDA: 33.6 MM HG (ref 36–44)
PCO2 BLDA: 45.8 MM HG (ref 36–44)
PCO2 TEMP ADJ BLDA: 24.7 MM HG (ref 36–44)
PEEP RESPIRATORY: 10 CM[H2O]
PEEP RESPIRATORY: 5 CM[H2O]
PH BLD: 7.35 [PH] (ref 7.35–7.45)
PH BLDA: 7.03 [PH] (ref 7.35–7.45)
PH BLDA: 7.3 [PH] (ref 7.35–7.45)
PH BLDA: 7.33 [PH] (ref 7.35–7.45)
PHOSPHATE SERPL-MCNC: 4.2 MG/DL (ref 2.3–4.1)
PLATELET # BLD AUTO: 223 THOUSANDS/UL (ref 149–390)
PLATELET # BLD AUTO: 270 THOUSANDS/UL (ref 149–390)
PLATELET # BLD AUTO: 292 THOUSANDS/UL (ref 149–390)
PMV BLD AUTO: 9.3 FL (ref 8.9–12.7)
PMV BLD AUTO: 9.4 FL (ref 8.9–12.7)
PMV BLD AUTO: 9.6 FL (ref 8.9–12.7)
PO2 BLD: 71.4 MM HG (ref 75–129)
PO2 BLDA: 49 MM HG (ref 75–129)
PO2 BLDA: 72.3 MM HG (ref 75–129)
PO2 BLDA: 75.8 MM HG (ref 75–129)
POTASSIUM SERPL-SCNC: 4.6 MMOL/L (ref 3.5–5.3)
POTASSIUM SERPL-SCNC: 5.6 MMOL/L (ref 3.5–5.3)
PROT SERPL-MCNC: 4.1 G/DL (ref 6.4–8.2)
PROTHROMBIN TIME: 20.5 SECONDS (ref 11.6–14.5)
RBC # BLD AUTO: 2.18 MILLION/UL (ref 3.81–5.12)
RBC # BLD AUTO: 2.83 MILLION/UL (ref 3.81–5.12)
RBC # BLD AUTO: 3.06 MILLION/UL (ref 3.81–5.12)
SODIUM SERPL-SCNC: 127 MMOL/L (ref 136–145)
SODIUM SERPL-SCNC: 130 MMOL/L (ref 136–145)
SPECIMEN SOURCE: ABNORMAL
TROPONIN I SERPL-MCNC: 1.91 NG/ML
VENT AC: 16
VENT AC: 24
VENT- AC: AC
VENT- AC: AC
VT SETTING VENT: 400 ML
VT SETTING VENT: 450 ML
WBC # BLD AUTO: 11.34 THOUSAND/UL (ref 4.31–10.16)
WBC # BLD AUTO: 12.02 THOUSAND/UL (ref 4.31–10.16)
WBC # BLD AUTO: 12.93 THOUSAND/UL (ref 4.31–10.16)

## 2019-10-07 PROCEDURE — 80053 COMPREHEN METABOLIC PANEL: CPT | Performed by: NURSE PRACTITIONER

## 2019-10-07 PROCEDURE — 82330 ASSAY OF CALCIUM: CPT | Performed by: NURSE PRACTITIONER

## 2019-10-07 PROCEDURE — 84484 ASSAY OF TROPONIN QUANT: CPT | Performed by: NURSE PRACTITIONER

## 2019-10-07 PROCEDURE — 5A12012 PERFORMANCE OF CARDIAC OUTPUT, SINGLE, MANUAL: ICD-10-PCS | Performed by: INTERNAL MEDICINE

## 2019-10-07 PROCEDURE — 82805 BLOOD GASES W/O2 SATURATION: CPT | Performed by: NURSE PRACTITIONER

## 2019-10-07 PROCEDURE — 92950 HEART/LUNG RESUSCITATION CPR: CPT

## 2019-10-07 PROCEDURE — 94002 VENT MGMT INPAT INIT DAY: CPT

## 2019-10-07 PROCEDURE — 71045 X-RAY EXAM CHEST 1 VIEW: CPT

## 2019-10-07 PROCEDURE — 36620 INSERTION CATHETER ARTERY: CPT

## 2019-10-07 PROCEDURE — 03HY32Z INSERTION OF MONITORING DEVICE INTO UPPER ARTERY, PERCUTANEOUS APPROACH: ICD-10-PCS | Performed by: OBSTETRICS & GYNECOLOGY

## 2019-10-07 PROCEDURE — 0BH18EZ INSERTION OF ENDOTRACHEAL AIRWAY INTO TRACHEA, VIA NATURAL OR ARTIFICIAL OPENING ENDOSCOPIC: ICD-10-PCS | Performed by: INTERNAL MEDICINE

## 2019-10-07 PROCEDURE — 99222 1ST HOSP IP/OBS MODERATE 55: CPT | Performed by: INTERNAL MEDICINE

## 2019-10-07 PROCEDURE — 93005 ELECTROCARDIOGRAM TRACING: CPT

## 2019-10-07 PROCEDURE — 71275 CT ANGIOGRAPHY CHEST: CPT

## 2019-10-07 PROCEDURE — 83735 ASSAY OF MAGNESIUM: CPT | Performed by: NURSE PRACTITIONER

## 2019-10-07 PROCEDURE — 85018 HEMOGLOBIN: CPT | Performed by: NURSE PRACTITIONER

## 2019-10-07 PROCEDURE — 5A1955Z RESPIRATORY VENTILATION, GREATER THAN 96 CONSECUTIVE HOURS: ICD-10-PCS | Performed by: INTERNAL MEDICINE

## 2019-10-07 PROCEDURE — C9113 INJ PANTOPRAZOLE SODIUM, VIA: HCPCS | Performed by: NURSE PRACTITIONER

## 2019-10-07 PROCEDURE — 80048 BASIC METABOLIC PNL TOTAL CA: CPT | Performed by: NURSE PRACTITIONER

## 2019-10-07 PROCEDURE — 70450 CT HEAD/BRAIN W/O DYE: CPT

## 2019-10-07 PROCEDURE — 82271 OCCULT BLOOD OTHER SOURCES: CPT | Performed by: NURSE PRACTITIONER

## 2019-10-07 PROCEDURE — 02HV33Z INSERTION OF INFUSION DEVICE INTO SUPERIOR VENA CAVA, PERCUTANEOUS APPROACH: ICD-10-PCS | Performed by: OBSTETRICS & GYNECOLOGY

## 2019-10-07 PROCEDURE — 82948 REAGENT STRIP/BLOOD GLUCOSE: CPT

## 2019-10-07 PROCEDURE — 4A133J1 MONITORING OF ARTERIAL PULSE, PERIPHERAL, PERCUTANEOUS APPROACH: ICD-10-PCS | Performed by: OBSTETRICS & GYNECOLOGY

## 2019-10-07 PROCEDURE — 85610 PROTHROMBIN TIME: CPT | Performed by: NURSE PRACTITIONER

## 2019-10-07 PROCEDURE — 82010 KETONE BODYS QUAN: CPT | Performed by: NURSE PRACTITIONER

## 2019-10-07 PROCEDURE — 85027 COMPLETE CBC AUTOMATED: CPT | Performed by: NURSE PRACTITIONER

## 2019-10-07 PROCEDURE — 85025 COMPLETE CBC W/AUTO DIFF WBC: CPT | Performed by: OBSTETRICS & GYNECOLOGY

## 2019-10-07 PROCEDURE — NC001 PR NO CHARGE: Performed by: NURSE PRACTITIONER

## 2019-10-07 PROCEDURE — 36600 WITHDRAWAL OF ARTERIAL BLOOD: CPT

## 2019-10-07 PROCEDURE — 74018 RADEX ABDOMEN 1 VIEW: CPT

## 2019-10-07 PROCEDURE — 84100 ASSAY OF PHOSPHORUS: CPT | Performed by: NURSE PRACTITIONER

## 2019-10-07 PROCEDURE — 83605 ASSAY OF LACTIC ACID: CPT | Performed by: NURSE PRACTITIONER

## 2019-10-07 PROCEDURE — 36556 INSERT NON-TUNNEL CV CATH: CPT | Performed by: NURSE PRACTITIONER

## 2019-10-07 PROCEDURE — 99291 CRITICAL CARE FIRST HOUR: CPT | Performed by: NURSE PRACTITIONER

## 2019-10-07 PROCEDURE — 99024 POSTOP FOLLOW-UP VISIT: CPT | Performed by: OBSTETRICS & GYNECOLOGY

## 2019-10-07 PROCEDURE — 4A133B1 MONITORING OF ARTERIAL PRESSURE, PERIPHERAL, PERCUTANEOUS APPROACH: ICD-10-PCS | Performed by: OBSTETRICS & GYNECOLOGY

## 2019-10-07 RX ORDER — SODIUM CHLORIDE, SODIUM LACTATE, POTASSIUM CHLORIDE, CALCIUM CHLORIDE 600; 310; 30; 20 MG/100ML; MG/100ML; MG/100ML; MG/100ML
125 INJECTION, SOLUTION INTRAVENOUS CONTINUOUS
Status: DISCONTINUED | OUTPATIENT
Start: 2019-10-07 | End: 2019-10-07

## 2019-10-07 RX ORDER — FENTANYL CITRATE-0.9 % NACL/PF 10 MCG/ML
50 PLASTIC BAG, INJECTION (ML) INTRAVENOUS CONTINUOUS
Status: DISCONTINUED | OUTPATIENT
Start: 2019-10-07 | End: 2019-10-09

## 2019-10-07 RX ORDER — LORAZEPAM 2 MG/ML
2 INJECTION INTRAMUSCULAR EVERY 4 HOURS PRN
Status: DISCONTINUED | OUTPATIENT
Start: 2019-10-07 | End: 2019-10-10

## 2019-10-07 RX ORDER — PROPOFOL 10 MG/ML
INJECTION, EMULSION INTRAVENOUS
Status: COMPLETED
Start: 2019-10-07 | End: 2019-10-07

## 2019-10-07 RX ORDER — HYDROMORPHONE HCL/PF 1 MG/ML
0.2 SYRINGE (ML) INJECTION
Status: DISCONTINUED | OUTPATIENT
Start: 2019-10-07 | End: 2019-10-08

## 2019-10-07 RX ORDER — FENTANYL CITRATE/PF 50 MCG/ML
50 SYRINGE (ML) INJECTION EVERY 2 HOUR PRN
Status: DISCONTINUED | OUTPATIENT
Start: 2019-10-07 | End: 2019-10-10

## 2019-10-07 RX ORDER — LORAZEPAM 2 MG/ML
INJECTION INTRAMUSCULAR
Status: COMPLETED
Start: 2019-10-07 | End: 2019-10-07

## 2019-10-07 RX ORDER — CEFEPIME HYDROCHLORIDE 1 G/1
1000 INJECTION, POWDER, FOR SOLUTION INTRAMUSCULAR; INTRAVENOUS EVERY 24 HOURS
Status: DISCONTINUED | OUTPATIENT
Start: 2019-10-07 | End: 2019-10-07 | Stop reason: CLARIF

## 2019-10-07 RX ORDER — FENTANYL CITRATE 50 UG/ML
INJECTION, SOLUTION INTRAMUSCULAR; INTRAVENOUS
Status: COMPLETED
Start: 2019-10-07 | End: 2019-10-07

## 2019-10-07 RX ORDER — LIDOCAINE 50 MG/G
1 PATCH TOPICAL DAILY
Status: DISCONTINUED | OUTPATIENT
Start: 2019-10-07 | End: 2019-10-08

## 2019-10-07 RX ORDER — NOREPINEPHRINE BITARTRATE 1 MG/ML
INJECTION, SOLUTION INTRAVENOUS
Status: COMPLETED
Start: 2019-10-07 | End: 2019-10-07

## 2019-10-07 RX ORDER — ALBUMIN (HUMAN) 12.5 G/50ML
25 SOLUTION INTRAVENOUS ONCE
Status: COMPLETED | OUTPATIENT
Start: 2019-10-07 | End: 2019-10-08

## 2019-10-07 RX ADMIN — LOSARTAN POTASSIUM 50 MG: 50 TABLET, FILM COATED ORAL at 08:31

## 2019-10-07 RX ADMIN — NOREPINEPHRINE BITARTRATE 21 MCG/MIN: 1 INJECTION INTRAVENOUS at 19:45

## 2019-10-07 RX ADMIN — PROPOFOL 20 MCG/KG/MIN: 10 INJECTION, EMULSION INTRAVENOUS at 19:45

## 2019-10-07 RX ADMIN — NOREPINEPHRINE BITARTRATE 30 MCG/MIN: 1 INJECTION INTRAVENOUS at 18:41

## 2019-10-07 RX ADMIN — METOCLOPRAMIDE 10 MG: 5 INJECTION, SOLUTION INTRAMUSCULAR; INTRAVENOUS at 03:13

## 2019-10-07 RX ADMIN — DORZOLAMIDE HYDROCHLORIDE AND TIMOLOL MALEATE 1 DROP: 20; 5 SOLUTION/ DROPS OPHTHALMIC at 08:32

## 2019-10-07 RX ADMIN — IODIXANOL 85 ML: 320 INJECTION, SOLUTION INTRAVASCULAR at 17:03

## 2019-10-07 RX ADMIN — PROPOFOL 1000000 MCG: 10 INJECTION, EMULSION INTRAVENOUS at 19:23

## 2019-10-07 RX ADMIN — FENTANYL CITRATE 50 MCG: 50 INJECTION INTRAMUSCULAR; INTRAVENOUS at 23:09

## 2019-10-07 RX ADMIN — ONDANSETRON 4 MG: 2 INJECTION INTRAMUSCULAR; INTRAVENOUS at 05:41

## 2019-10-07 RX ADMIN — ACETAMINOPHEN 650 MG: 160 SUSPENSION ORAL at 15:05

## 2019-10-07 RX ADMIN — LORAZEPAM 2 MG: 2 INJECTION INTRAMUSCULAR; INTRAVENOUS at 23:08

## 2019-10-07 RX ADMIN — SIMETHICONE CHEW TAB 80 MG 80 MG: 80 TABLET ORAL at 03:12

## 2019-10-07 RX ADMIN — ACETAMINOPHEN 650 MG: 160 SUSPENSION ORAL at 03:12

## 2019-10-07 RX ADMIN — VASOPRESSIN 0.03 UNITS/MIN: 20 INJECTION INTRAVENOUS at 23:23

## 2019-10-07 RX ADMIN — METOCLOPRAMIDE 10 MG: 5 INJECTION, SOLUTION INTRAMUSCULAR; INTRAVENOUS at 09:20

## 2019-10-07 RX ADMIN — EPINEPHRINE 1 MG: 0.1 INJECTION, SOLUTION ENDOTRACHEAL; INTRACARDIAC; INTRAVENOUS at 18:30

## 2019-10-07 RX ADMIN — SODIUM CHLORIDE 8 MG/HR: 9 INJECTION, SOLUTION INTRAVENOUS at 21:12

## 2019-10-07 RX ADMIN — SODIUM CHLORIDE 1000 ML: 0.9 INJECTION, SOLUTION INTRAVENOUS at 18:30

## 2019-10-07 RX ADMIN — HYDROMORPHONE HYDROCHLORIDE 0.2 MG: 1 INJECTION, SOLUTION INTRAMUSCULAR; INTRAVENOUS; SUBCUTANEOUS at 14:55

## 2019-10-07 RX ADMIN — LORAZEPAM 2 MG: 2 INJECTION INTRAMUSCULAR; INTRAVENOUS at 19:18

## 2019-10-07 RX ADMIN — DOCUSATE SODIUM 100 MG: 100 CAPSULE, LIQUID FILLED ORAL at 08:31

## 2019-10-07 RX ADMIN — NOREPINEPHRINE BITARTRATE 4000 MCG: 1 INJECTION INTRAVENOUS at 19:45

## 2019-10-07 RX ADMIN — ENOXAPARIN SODIUM 40 MG: 40 INJECTION SUBCUTANEOUS at 08:32

## 2019-10-07 RX ADMIN — CEFEPIME HYDROCHLORIDE 1000 MG: 1 INJECTION, POWDER, FOR SOLUTION INTRAMUSCULAR; INTRAVENOUS at 22:38

## 2019-10-07 RX ADMIN — LEVOTHYROXINE SODIUM 50 MCG: 50 TABLET ORAL at 05:42

## 2019-10-07 RX ADMIN — OXYCODONE HYDROCHLORIDE 5 MG: 5 TABLET ORAL at 05:42

## 2019-10-07 RX ADMIN — SIMETHICONE CHEW TAB 80 MG 80 MG: 80 TABLET ORAL at 15:06

## 2019-10-07 RX ADMIN — OXYCODONE HYDROCHLORIDE 10 MG: 10 TABLET ORAL at 01:39

## 2019-10-07 RX ADMIN — INSULIN LISPRO 3 UNITS: 100 INJECTION, SOLUTION INTRAVENOUS; SUBCUTANEOUS at 08:31

## 2019-10-07 RX ADMIN — SODIUM BICARBONATE 125 ML/HR: 84 INJECTION, SOLUTION INTRAVENOUS at 21:11

## 2019-10-07 RX ADMIN — EPINEPHRINE 1 MG: 0.1 INJECTION, SOLUTION ENDOTRACHEAL; INTRACARDIAC; INTRAVENOUS at 18:34

## 2019-10-07 RX ADMIN — Medication 50 MCG/HR: at 23:39

## 2019-10-07 RX ADMIN — HYDROMORPHONE HYDROCHLORIDE 0.2 MG: 1 INJECTION, SOLUTION INTRAMUSCULAR; INTRAVENOUS; SUBCUTANEOUS at 11:32

## 2019-10-07 RX ADMIN — INSULIN LISPRO 2 UNITS: 100 INJECTION, SOLUTION INTRAVENOUS; SUBCUTANEOUS at 11:26

## 2019-10-07 RX ADMIN — INSULIN LISPRO 2 UNITS: 100 INJECTION, SOLUTION INTRAVENOUS; SUBCUTANEOUS at 09:23

## 2019-10-07 RX ADMIN — CALCIUM GLUCONATE 2 G: 98 INJECTION, SOLUTION INTRAVENOUS at 21:02

## 2019-10-07 RX ADMIN — SODIUM CHLORIDE 75 ML/HR: 0.9 INJECTION, SOLUTION INTRAVENOUS at 05:49

## 2019-10-07 RX ADMIN — ALBUMIN (HUMAN) 25 G: 0.25 INJECTION, SOLUTION INTRAVENOUS at 23:51

## 2019-10-07 RX ADMIN — Medication 50 MEQ: at 22:29

## 2019-10-07 NOTE — ANESTHESIA POSTPROCEDURE EVALUATION
Post-Op Assessment Note    CV Status:  Unstable      Airway: intubated   Post Op Vitals Reviewed: Yes      Staff: Anesthesiologist           BP      Temp     Pulse     Resp      SpO2

## 2019-10-07 NOTE — PROGRESS NOTES
Due to multiple symptoms (dysphasia, tachypnea, shortness of breath) and concern for metabolic acidosis given her postoperative uncontrolled T1DM, rapid response was called to evaluate patient      Roderick Boyd MD  OBGYN, PGY-3  10/7/2019  6:02 PM

## 2019-10-07 NOTE — ADDENDUM NOTE
Addendum  created 10/07/19 1853 by Karolynn Bosworth, DO    Child order released for a procedure order, Intraprocedure Blocks edited, LDA created via procedure documentation, Sign clinical note

## 2019-10-07 NOTE — CONSULTS
Consultation - Geriatric Medicine   Kasey Kendrick 82 y o  female MRN: [id number]  Unit/Bed#: E5 -01 Encounter: [id number]      Assessment/Plan     Right tubo-ovarian mass   -s/p ex-lap with lymph node dissection ovarian cancer staging with Dr Carley Chaparro 10/4/19  -ovarian cancer new diagnosis, awaiting final pathology report for final staging  -continue multimodal pain control  -continue aggressive treatment of nausea/vomiting   -continue tx per Gyn-Onc  -continue supportive care, consider  services/emotional or spiritual care for support through new diagnosis    Anemia  -consider postop anemia versus dilutional  -HGB 12 7 09/25/2019, currently 10 0   -monitoring and treatment per surgical team    Acute pain due to surgery  -discussed with Gyn Onc resident, pt not tolerating PO Roxicodone, consider holding oxycodone and starting dilaudid 0 2mg Q3H PRN as has tolerated Dilaudid earlier this admission  -recommend adjustment of oxycodone doses to geriatric pain protocol including oxycodone 2 5 mg Q4H PRN and oxycodone 5 mg Q4H PRN  -once nausea improves and tolerating orals would recommend scheduling Tylenol 975mg Q8H   -recommend discontinuation of Motrin, avoidance of NSAIDs due to history of diabetes and GFR 55  -highly suspicious that nausea, vomiting, and retching likely contributing to acute pain  -continue treatment of acute ileus as well as nausea which will likely help improve pain control  -continue stool softener bowel regimen to prevent constipation to will likely contribute to pain and nausea    Hyponatremia  -sodium low at 126 yesterday,141 day prior, likely related to hospitalization, poor oral intake, and dehydration  -primary team managing maintenance fluids    Nausea and vomiting  -continue aggressive treatment of nausea/vomiting, currently on Zofran and Reglan, could consider addition of low-dose Haldol or dexamethasone if no improvement with current regimen  -continue treatment of ileus per primary team, consider IV fluids if patient not tolerating PO  -QTc 428, continue to monitor closely especially with possibility of hypokalemia due to vomiting     Urinary retention  -multifactorial including pain and recent abdominal surgery  -failed voiding trial over weekend, currently on urinary retention protocol  -currently with Ji catheter at time of exam    DM-I with retinopathy and neuropathy  -A1c on 09/25/2019 was 6 7  -home gabapentin on hold due to poor oral intake/vomiting  -current home regimen includes Lantus 12 units HS, and lispro 4/5/7U TID w meals  -continue Lantus HS with ISS for mealtime coverage  -goal glucose 140-180 for optimal wound healing and avoidance of hypoglycemia -currently remains above goal, likely stress related as well as dose of dexamethasone 10/04/2019, no IV medications noted to be compounded with glucose, continue current regimen and consider additional sliding scale as needed    Hypothyroidism  -TSH 2 430 (9/25/2019)  -continue home levothyroxine 50 mcg daily  -continue close outpatient follow-up with PCP    Osteoporosis  -vitamin-D at goal (48 8) March 2019  -encourage at least a 1000IU vitamin-D, and 1200 mg calcium daily from diet preferentially, supplement as needed    Fall precautions  -continue fall precautions  -encourage assistance with all transfers encourage use of call bell    Delirium precautions  -maintain normal sleep-wake cycle  -ensure pain well controlled  -ensure adequate nutrition  -monitor for fecal and urinary retention which can contribute to delirium    Impaired vision  -requires use of glasses, encourage use of appropriate times  -encourage adequate lighting and appropriate footwear with all transfers to reduce risk of mechanical falls    Cognitive screening  -patient is A/Ox4 today  -per family completely independent baseline  -defer further cognitive evaluation until acute pain and nausea have improved    History of Present Illness   Physician Requesting Consult: Dano Brown MD  Reason for Consult / Principal Problem: Ovarian Cancer  Hx and PE limited by: Acute pain/nauseas/vomiting  Additional history obtained from: Patients daughter Mirian Mosher (via phone)    HPI: Margaret Chirinos is a 82y o  year old female with HTN, HLD, and DM-I who is admitted following total laparoscopic hysterectomy, BSO, ex lap, and pelvic and para-aortic lymphadenectomy for right tubo-ovarian mass concerning for adenocarcinoma  She is being seen in consultation by Geriatrics for delirium prevention  Cal Orellanatist is seen examined at the bedside where she is lying resting, she continues to have nausea and vomiting with 2 episodes of emesis prior to my arrival this morning  She relates continued emesis to  oxycodone dosing which she feels is almost immediately followed by vomiting, she denies nausea or vomiting following Dilaudid administration earlier this admission  She continues to have significant amount of pain and is anxious to eat anything or move as she does not want to get nauseous and vomit again  She reports that she did not sleep well overnight as she had taken a nap during the day and had difficulty returning to sleep overnight and is very tired this morning  Rounding performed with RN during am signout, recommendations discussed directly with surgical Resident via Craig  Inpatient consult to Gerontology  Consult performed by: Leonila Gerard DO  Consult ordered by: Elena Noe MD        Review of Systems   Constitutional: Positive for appetite change (Poor appetite due to pain)  Negative for chills and fever  HENT: Negative for congestion, hearing loss and trouble swallowing  Eyes: Negative for visual disturbance  Respiratory: Negative for shortness of breath  Gastrointestinal: Positive for abdominal pain, nausea and vomiting  Genitourinary: Positive for difficulty urinating (Currently with indwelling Ji catheter)     Musculoskeletal: Negative  Skin: Negative  Neurological: Negative  Negative for weakness  Psychiatric/Behavioral: Positive for sleep disturbance  All other systems reviewed and are negative      Historical Information   Past Medical History:   Diagnosis Date    Anxiety     Arthritis     Constipation     Diabetes mellitus ([address] )     IDDM    Frequent UTI     Glaucoma     Hearing loss     Hyperlipidemia     Hypertension     Hyperthyroidism     in past    Hyponatremia     Hypothyroid     Neuropathy     bles    Right tubo-ovarian mass     Wears glasses      Past Surgical History:   Procedure Laterality Date    APPENDECTOMY  1956    CATARACT EXTRACTION Bilateral     COLONOSCOPY  09/2014    Completed - Dr Lui Vergara, due in 5 years    HYSTERECTOMY N/A 10/4/2019    Procedure: LAP TOTAL HYSTERECTOMY, BSO;  Surgeon: Reanna Shin MD;  Location: [facility];  Service: Gynecology Oncology    LAPAROTOMY N/A 10/4/2019    Procedure: EXPLORATORY LAPAROTOMY  EXLAP OMENTECTOMY  PELVIC AND DEANGELO-AORTIC LYMPH NODE DISSECTION  PERITONEAL BIOPSY TRANS ABDOMINUS BLOCK;  Surgeon: Reanna Shin MD;  Location: [facility];  Service: Gynecology Oncology    SKIN LESION EXCISION      Ears     Social History   Social History     Substance and Sexual Activity   Alcohol Use Yes    Frequency: Monthly or less    Drinks per session: 1 or 2    Binge frequency: Never    Comment: wine     Social History     Substance and Sexual Activity   Drug Use No     Social History     Tobacco Use   Smoking Status Never Smoker   Smokeless Tobacco Never Used     Family History:   Family History   Problem Relation Age of Onset    Heart attack Mother     Diabetes type II Mother     Dementia Father     Stroke Father         CVA    Breast cancer Maternal Aunt     Stomach cancer Maternal Uncle     Diabetes Family     Stroke Family         Complications     Meds/Allergies   all current active meds have been reviewed    Allergies   Allergen Reactions    Thiazide-Type Diuretics      Hyponatremia     Objective     Intake/Output Summary (Last 24 hours) at 10/7/2019 1104  Last data filed at 10/7/2019 3391  Gross per 24 hour   Intake 1925 83 ml   Output 725 ml   Net 1200 83 ml     Invasive Devices     Peripheral Intravenous Line            Peripheral IV 10/04/19 Left Wrist 3 days    Peripheral IV 10/04/19 Right Wrist 3 days          Drain            Urethral Catheter 16 Fr  less than 1 day              Physical Exam   Constitutional: She is oriented to person, place, and time  She appears well-developed and well-nourished  She appears distressed  Appears younger than stated age  Appears uncomfortable with emesis basin held to face   HENT:   Head: Normocephalic and atraumatic  Mouth/Throat: Oropharynx is clear and moist    Eyes: Conjunctivae are normal  No scleral icterus  Neck: Normal range of motion  No tracheal deviation present  Cardiovascular: Intact distal pulses  No murmur heard  Slightly tachycardic   Pulmonary/Chest: Effort normal and breath sounds normal  No respiratory distress  Abdominal: There is tenderness  Absent bowel sounds   Musculoskeletal: Normal range of motion  She exhibits no edema or tenderness  Neurological: She is alert and oriented to person, place, and time  Coordination normal    Skin: Skin is warm and dry  Midline surgical incision clean and dry, no surrounding erythema   Psychiatric: She has a normal mood and affect  Nursing note and vitals reviewed      Lab Results:   I have personally reviewed pertinent lab results including the following:  -labs reviewed including sodium 126, potassium 5 1, chloride 96, BUN 19, creatinine 0 96, glucose 376, calcium 7, GFR 55  -hemoglobin 10, hematocrit 30, WBC 12, platelets 247    Therapies:   PT:  Ordered    VTE Prophylaxis: Enoxaparin (Lovenox)     Family and Social Support:  Good family support including daughter and granddaughter    Goals of Care:  Adequate pain and nausea control

## 2019-10-07 NOTE — QUICK NOTE
Gynecologic Oncology Update    I was notified by OBGYN resident about changes in patient status earlier this afternoon  Briefly, she was noted to be dyspneic, tachypneic and unable to speak in full sentences  She was unable to communicate other symptoms  Extensive laboratory workup and imaging was completed and eventually a rapid response was called  Initial concern for acute pulmonary embolism was ruled out but CT revealed the presence of bilateral pleural effusions and a markedly distended stomach  Laboratory evaluation demonstrated metabolic acidosis, acute kidney injury, other changes noted  Patient was transferred to the intensive care unit for further evaluation and treatment  I met the patient in ICU room 2 upon her arrival   She appeared pale, quite uncomfortable and dyspneic  Abdomen was markedly distended  Given evidence of marked stomach distension on CT scan I recommended elevating head of the bed to 45°  and  placement of nasogastric tube for decompression  Right as I communicated my impressions and recommendations to ICU's team, it was obvious that patient was actively aspirating and eventually became unresponsive  Pulse was not palpable  A code blue was called  CPR was started and patient was intubated, placed on ventilatory support and she is currently in the process of having arterial line/central access placed with plan to consider cooling given with missed arrest in the hospital     I have personally updated patient's daughter Anibal Swetha throughout this process  Will continue to monitor and aggressively support/treat  Appreciate ICU, anesthesia and ancillary services support      Teresa Marcum MD, [address]  10/7/2019  6:52 PM

## 2019-10-07 NOTE — PROGRESS NOTES
Gyn Oncology Progress note   Brent Nagy 82 y o  female MRN: [id number]  Unit/Bed#: E5 -01 Encounter: [id number]    Assessment: 82 y o  POD# 3 from   [address], BSO, ex lap, pelvic and para-aortic lymphadenectomy, omentectomy, peritoneal biopsies, and ovarian cancer staging for right tubo-ovarian mass consistent with adenocarcinoma    Plan:    1) Right tubo-ovarian mass, s/p surgery   Frozen consistent with adenocarcinoma, follow up final pathology  Continue routine postoperative care    2) Urinary retention  Ji in place due to failed voiding trial x 2  UO 525cc overnight    3) Postoperative ileus  No bowel sounds on physical exam this morning  One episode of vomiting this AM  + belching, - flatus   Will change diet to diabetic clear diet     4) Analgesia  S/p TAPs block, s/p dilaudid   Tylenol [address], motrin prn, oxycodone 5-10mg for moderate-severe pain prn    5) T1DM  She has been on SSI algorithm 1 and lantus 12U at night  Her glucose was not controlled over the weekend secondary to refusal to take full dose of insulin  Compliance was discussed with her on Saturday, she has since been taking regimented dose of insulin  Follow up inpatient geriatrics consult for T1DM  Home meds: humalog 4/5/7 with meals, lantus 12U at night    6) Dry eyes  Lubifresh prn, cosopt BID, travoprost qD    7) Depression  Cymbalta 30mg qHS    8) Hypertension  BP 140s-150s/60s-80s  Metoprolol 5mg prn  Avapro 150mg daily    9) Hypothyroidism  Levothyroxine 50mcg daily    10) FEN: diabetic clear liquid diet     11) DVT ppx: SCDs, lovenox 40mg q24 hours      Subjective:    Brent Nagy is doing well  She was able to ambulate three times around the [place] yesterday  Her pain is adequately managed with oral analgesia  She had an episode of vomiting this morning shortly after she became nausea with roxicodone  She has also been persistently belching but has not passed any gas   She denies fever, chills, shortness of breath, chest pain, lower extremity tenderness  She is tolerating small amount of food but notes a decreased appetite  /87 (BP Location: Left arm)   Pulse (!) 106   Temp (!) 97 4 °F (36 3 °C) (Temporal)   Resp 18   Ht 5' 5" (1 651 m)   Wt 58 5 kg (129 lb)   SpO2 95%   Breastfeeding? No   BMI 21 47 kg/m²     I/O last 3 completed shifts: In: 3168 6 [P O :720; I V :2448 6]  Out: 1625 [Urine:1625]  No intake/output data recorded  Lab Results   Component Value Date    WBC 12 02 (H) 10/07/2019    HGB 10 0 (L) 10/07/2019    HCT 30 0 (L) 10/07/2019    MCV 98 10/07/2019     10/07/2019       Lab Results   Component Value Date    GLUCOSE 75 01/08/2016    CALCIUM 7 0 (L) 10/06/2019     (L) 01/08/2016    K 5 1 10/06/2019    CO2 21 10/06/2019    CL 96 (L) 10/06/2019    BUN 19 10/06/2019    CREATININE 0 96 10/06/2019         Physical Exam   Constitutional: She is oriented to person, place, and time  She appears well-developed and well-nourished  Cardiovascular: Normal rate, regular rhythm and normal heart sounds  Exam reveals no gallop and no friction rub  No murmur heard  Pulmonary/Chest: Effort normal and breath sounds normal  No stridor  No respiratory distress  She has no wheezes  She has no rales  Abdominal: Soft  Bowel sounds are normal  She exhibits no distension and no mass  There is no tenderness  There is no rebound and no guarding  No hernia  Vertical incision clean, dry, intact without signs of inflammation   Neurological: She is alert and oriented to person, place, and time  Psychiatric: She has a normal mood and affect  Her behavior is normal  Judgment and thought content normal    Vitals reviewed        Franko Duke MD  10/7/2019  8:49 AM

## 2019-10-07 NOTE — RAPID RESPONSE
Progress Note - Rapid Response   Erika Luna 82 y o  female MRN: [id number]    Time Called ( Time): 4011  Date Called: 10/7/2019  Level of Care: MS  Room#: 2871  Arrival Time ( Time): 7121  Event End Time ( Time): 0457  Primary reason for call: Acute change in RR  Interventions:  Airway/Breathing:  No Intervention  Circulation: EKG  Other Treatments: N/A       Assessment:   1  Acute respiratory insuffiencey  2  Tachycardia  3  Hyperglycemia with history of type I DM  4  Right tubo-ovarian mass s/p surgery    Plan:   · CTA chest per surgery  · CBC, BMP, ABG  · Insulin infusion  · KUB  · EKG       HPI/Chief Complaint (Background/Situation):   Erika Luna is a 82y o  year old female s/p hysterectomy, exploratory laparotomy, pelvic and periaortic lymph node dissections for ovarian cancer  Patient has become progressively short of breath since noon  She remains 100% on room air but is complaining of shortness of breath and inability to speak in full sentence without feeling winded  We will obtain a KUB due to abdominal distention, ABG, BMP to rule out metabolic acidosis       Historical Information   Past Medical History:   Diagnosis Date    Anxiety     Arthritis     Constipation     Diabetes mellitus ([address] )     IDDM    Frequent UTI     Glaucoma     Hearing loss     Hyperlipidemia     Hypertension     Hyperthyroidism     in past    Hyponatremia     Hypothyroid     Neuropathy     bles    Right tubo-ovarian mass     Wears glasses      Past Surgical History:   Procedure Laterality Date    APPENDECTOMY  1956    CATARACT EXTRACTION Bilateral     COLONOSCOPY  09/2014    Completed - Dr Jerry Mayfield, due in 5 years    HYSTERECTOMY N/A 10/4/2019    Procedure: LAP TOTAL HYSTERECTOMY, BSO;  Surgeon: Linda Jacques MD;  Location: [facility];  Service: Gynecology Oncology    LAPAROTOMY N/A 10/4/2019    Procedure: EXPLORATORY LAPAROTOMY  EXLAP OMENTECTOMY  PELVIC AND DEANGELO-AORTIC LYMPH NODE DISSECTION  PERITONEAL BIOPSY TRANS ABDOMINUS BLOCK;  Surgeon: Gloris Najjar, MD;  Location: [facility];  Service: Gynecology Oncology    SKIN LESION EXCISION      Ears     Social History   Social History     Substance and Sexual Activity   Alcohol Use Yes    Frequency: Monthly or less    Drinks per session: 1 or 2    Binge frequency: Never    Comment: wine     Social History     Substance and Sexual Activity   Drug Use No     Social History     Tobacco Use   Smoking Status Never Smoker   Smokeless Tobacco Never Used     Family History: non-contributory    Meds/Allergies     Current Facility-Administered Medications:  acetaminophen 650 mg Oral Q6H Shaji Stiles MD    artificial tear  Both Eyes HS PRN Shavonne Huddleston MD    docusate sodium 100 mg Oral BID Ken Leon MD    dorzolamide-timolol 1 drop Both Eyes BID Shavonne Huddleston MD    DULoxetine 30 mg Oral HS Shavonne Huddleston MD    enoxaparin 40 mg Subcutaneous QAM Shavonne Huddleston MD    HYDROmorphone 0 2 mg Intravenous Q3H PRN Kim Faith DO    insulin glargine 11 Units Subcutaneous HS Shaji Stiles MD    insulin regular (HumuLIN R,NovoLIN R) infusion 0 3-21 Units/hr Intravenous Titrated Shavonne Huddleston MD    levothyroxine 50 mcg Oral Early Morning Shavonne Huddleston MD    losartan 50 mg Oral Daily Shaji Stiles MD    metoclopramide 10 mg Intravenous Q6H PRN Shaji Stiles MD    metoprolol 5 mg Intravenous Z0K PRN Ken Leon MD    naloxone 0 1 mg Intravenous Q3 min PRN Ken Leon MD    Netarsudil Dimesylate 1 drop Right Eye HS Shavonne Huddleston MD    ondansetron 4 mg Intravenous F2I PRN Ken Leon MD    oxyCODONE 10 mg Oral B2L PRN Ken Leon MD    oxyCODONE 5 mg Oral E3P PRN Ken Leon MD    simethicone 80 mg Oral Q6H PRN Shavonne Huddleston MD    sodium chloride 75 mL/hr Intravenous Continuous Shavonne Huddleston MD Last Rate: 75 mL/hr (10/07/19 0549)   travoprost 1 drop Both Eyes HS Shavonne Huddleston MD          insulin regular (HumuLIN R,NovoLIN R) infusion 0 3-21 Units/hr    sodium chloride 75 mL/hr Last Rate: 75 mL/hr (10/07/19 0549)       Allergies   Allergen Reactions    Thiazide-Type Diuretics      Hyponatremia       ROS: Respiratory ROS: positive for - tachypnea    Vitals:     Physical Exam:  Gen:older, pale appearing female  HEENT: NC/AT PERRLA EOMI oropharynx moist  Neck: supple, no JVD, trachea midline  Chest: CTA  Cor: Clear S1 and S2  Abd: firm, +tenderness, no bowel sounds noted  Ext: no edema  Neuro: non-focal  Skin: W/D      Intake/Output Summary (Last 24 hours) at 10/7/2019 1615  Last data filed at 10/7/2019 0552  Gross per 24 hour   Intake 1480 ml   Output 725 ml   Net 755 ml       Respiratory    Lab Data (Last 4 hours)    None         O2/Vent Data (Last 4 hours)    None              Invasive Devices     Peripheral Intravenous Line            Peripheral IV 10/04/19 Left Wrist 3 days    Peripheral IV 10/04/19 Right Wrist 3 days    Peripheral IV 10/07/19 Right Antecubital less than 1 day          Drain            Urethral Catheter 16 Fr  1 day                DIAGNOSTIC DATA:    Lab: I have personally reviewed pertinent lab results     CBC:   Results from last 7 days   Lab Units 10/07/19  0505   WBC Thousand/uL 12 02*   HEMOGLOBIN g/dL 10 0*   HEMATOCRIT % 30 0*   PLATELETS Thousands/uL 270     CMP:   Results from last 7 days   Lab Units 10/06/19  0647 10/05/19  0459 10/03/19  0950   POTASSIUM mmol/L 5 1 4 4 4 6   CHLORIDE mmol/L 96* 112* 103   CO2 mmol/L 21 22 27   BUN mg/dL 19 14 23   CREATININE mg/dL 0 96 0 72 0 93   CALCIUM mg/dL 7 0* 6 3* 9 4     PT/INR:   No results found for: PT, INR,   Magnesium: No components found for: MAG,   Phosphorous: No results found for: PHOS    Microbiology:  Lab Results   Component Value Date    URINECX 40,000-49,000 cfu/ml Enterococcus faecalis (A) 08/13/2019    URINECX <10,000 cfu/ml - X2 Gram Negative Kwame (A) 08/13/2019         OUTCOME:   Stayed in room   Family notified of transfer: yes  Family member contacted: daughter at bedside  Code Status: No Order  Critical Care Time: Total Critical Care time spent 35 minutes excluding procedures, teaching and family updates

## 2019-10-07 NOTE — PROGRESS NOTES
Subjective:     Lea Dwyer is a 82 y o  female POD #3 from [facility], BSO, ex lap, pelvic and para-aortic lymphadenectomy, omentectomy, peritoneal biopsies, and ovarian cancer staging for right tubo-ovarian mass consistent with adenocarcinoma  I was called into patient room by RN to assess acute onset tachypnea and difficulty speaking  Patient states she went for a walk around the unit around noon  She came back into the room and shortly after, started having tachypnea  States she has shortness of breath and difficulty speaking  She also feels bloated  Her abdominal pain is relatively controlled with oral analgesia  She has had minimal appetite    Objective:    Vitals: Blood pressure 91/68, pulse 98, temperature (!) 97 °F (36 1 °C), temperature source Temporal, resp  rate 18, height 5' 5" (1 651 m), weight 58 5 kg (129 lb), SpO2 95 %, not currently breastfeeding  Body mass index is 21 47 kg/m²  Physical Exam   Constitutional: She is oriented to person, place, and time  She appears well-developed and well-nourished  She appears distressed  Pulmonary/Chest: Breath sounds normal  No stridor  She is in respiratory distress  She has no wheezes  She has no rales  She exhibits no tenderness  Abdominal: She exhibits distension  She exhibits no mass  There is no tenderness  There is no rebound and no guarding  Neurological: She is alert and oriented to person, place, and time  She displays normal reflexes  No cranial nerve deficit or sensory deficit  She exhibits normal muscle tone  Coordination normal    Skin: She is diaphoretic  No pallor  Assessment/Plan:    Patient is POD #3, presenting with acute onset shortness of breath and difficulty speaking      SpO2 % on room air  Physical exam lungs CTAB but patient appears to be taking small quick breaths and looks uncomfortable  Will complete CTA stat to rule out PE     Discussed with Dr Enrique Shafer MD  10/7/2019  3:31 PM

## 2019-10-07 NOTE — ANESTHESIA PREPROCEDURE EVALUATION
Review of Systems/Medical History  Patient summary reviewed  Chart reviewed  No history of anesthetic complications     Cardiovascular  Hyperlipidemia, Hypertension controlled,    Pulmonary  Negative pulmonary ROS        GI/Hepatic  Negative GI/hepatic ROS          Negative  ROS        Endo/Other  Diabetes well controlled type 1 Insulin, History of thyroid disease , hypothyroidism,      GYN  Negative gynecology ROS          Hematology  Negative hematology ROS      Musculoskeletal    Arthritis     Neurology    Diabetic neuropathy, Visual impairment (glaucoma),    Psychology   Anxiety, Depression , being treated for depression,              Physical Exam    Airway    Mallampati score: II  TM Distance: >3 FB  Neck ROM: full     Dental   No notable dental hx     Cardiovascular  Rhythm: regular, Rate: normal, Cardiovascular exam normal    Pulmonary  Pulmonary exam normal Breath sounds clear to auscultation,     Other Findings        Anesthesia Plan  ASA Score- 5 Emergent    Anesthesia Type-   Additional Monitors:   Airway Plan:     Comment:  Emergent intubation code blue    Plan Factors-Patient not instructed to abstain from smoking on day of procedure  Patient did not smoke on day of surgery      Induction-     Postoperative Plan-     Informed Consent-

## 2019-10-07 NOTE — ANESTHESIA PROCEDURE NOTES
Arterial Line Insertion  Performed by: Inocente Foster DO  Authorized by: Inocente Foster DO   Consent: The procedure was performed in an emergent situation  Verbal consent not obtained  Written consent not obtained  Patient understanding: patient does not state understanding of the procedure being performed  Patient consent: the patient's understanding of the procedure does not match consent given  Procedure consent: procedure consent does not match procedure scheduled  Relevant documents: relevant documents not present or verified  Test results: test results not available  Site marked: the operative site was not marked  Radiology Images: No Radiology Images displayed or report reviewed      Patient identity confirmed: arm band  Orientation:  Left  Location: brachial artery  Sedation:  Patient sedated: no    Procedure Details:  Needle gauge: 22  Number of attempts: 1    Post-procedure:  Post-procedure: chlorhexidine patch applied  Waveform: good waveform  Patient tolerance: Patient tolerated the procedure well with no immediate complications

## 2019-10-07 NOTE — SOCIAL WORK
CM attempted open assessment twice this afternoon but pt had been called a rapid response and medical staff was in pt room  CM dept will attempt tomorrow

## 2019-10-08 ENCOUNTER — APPOINTMENT (INPATIENT)
Dept: CT IMAGING | Facility: HOSPITAL | Age: 84
DRG: 736 | End: 2019-10-08
Payer: COMMERCIAL

## 2019-10-08 LAB
ABO GROUP BLD BPU: NORMAL
ABO GROUP BLD BPU: NORMAL
ABO GROUP BLD: NORMAL
ALBUMIN SERPL BCP-MCNC: 2 G/DL (ref 3.5–5)
ALP SERPL-CCNC: 34 U/L (ref 46–116)
ALT SERPL W P-5'-P-CCNC: 197 U/L (ref 12–78)
ANION GAP SERPL CALCULATED.3IONS-SCNC: 9 MMOL/L (ref 4–13)
AST SERPL W P-5'-P-CCNC: 192 U/L (ref 5–45)
ATRIAL RATE: 98 BPM
BASE EXCESS BLDA CALC-SCNC: -1.9 MMOL/L
BASE EXCESS BLDA CALC-SCNC: -3.1 MMOL/L
BILIRUB SERPL-MCNC: 0.5 MG/DL (ref 0.2–1)
BLD GP AB SCN SERPL QL: NEGATIVE
BPU ID: NORMAL
BPU ID: NORMAL
BUN SERPL-MCNC: 32 MG/DL (ref 5–25)
CALCIUM SERPL-MCNC: 7.2 MG/DL (ref 8.3–10.1)
CHLORIDE SERPL-SCNC: 102 MMOL/L (ref 100–108)
CO2 SERPL-SCNC: 24 MMOL/L (ref 21–32)
CREAT SERPL-MCNC: 1.25 MG/DL (ref 0.6–1.3)
CROSSMATCH: NORMAL
CROSSMATCH: NORMAL
ERYTHROCYTE [DISTWIDTH] IN BLOOD BY AUTOMATED COUNT: 12.1 % (ref 11.6–15.1)
ERYTHROCYTE [DISTWIDTH] IN BLOOD BY AUTOMATED COUNT: 12.3 % (ref 11.6–15.1)
GFR SERPL CREATININE-BSD FRML MDRD: 40 ML/MIN/1.73SQ M
GLUCOSE SERPL-MCNC: 140 MG/DL (ref 65–140)
GLUCOSE SERPL-MCNC: 155 MG/DL (ref 65–140)
GLUCOSE SERPL-MCNC: 172 MG/DL (ref 65–140)
GLUCOSE SERPL-MCNC: 195 MG/DL (ref 65–140)
GLUCOSE SERPL-MCNC: 233 MG/DL (ref 65–140)
GLUCOSE SERPL-MCNC: 234 MG/DL (ref 65–140)
GLUCOSE SERPL-MCNC: 295 MG/DL (ref 65–140)
GLUCOSE SERPL-MCNC: 316 MG/DL (ref 65–140)
GLUCOSE SERPL-MCNC: 350 MG/DL (ref 65–140)
GLUCOSE SERPL-MCNC: 52 MG/DL (ref 65–140)
GLUCOSE SERPL-MCNC: 73 MG/DL (ref 65–140)
HCO3 BLDA-SCNC: 20.8 MMOL/L (ref 22–28)
HCO3 BLDA-SCNC: 21.7 MMOL/L (ref 22–28)
HCT VFR BLD AUTO: 18.5 % (ref 34.8–46.1)
HCT VFR BLD AUTO: 19.3 % (ref 34.8–46.1)
HGB BLD-MCNC: 6.3 G/DL (ref 11.5–15.4)
HGB BLD-MCNC: 6.5 G/DL (ref 11.5–15.4)
HGB BLD-MCNC: 7.8 G/DL (ref 11.5–15.4)
HOROWITZ INDEX BLDA+IHG-RTO: 100 MM[HG]
HOROWITZ INDEX BLDA+IHG-RTO: 100 MM[HG]
LACTATE SERPL-SCNC: 3.2 MMOL/L (ref 0.5–2)
LACTATE SERPL-SCNC: 5.4 MMOL/L (ref 0.5–2)
MCH RBC QN AUTO: 32.2 PG (ref 26.8–34.3)
MCH RBC QN AUTO: 32.8 PG (ref 26.8–34.3)
MCHC RBC AUTO-ENTMCNC: 33.7 G/DL (ref 31.4–37.4)
MCHC RBC AUTO-ENTMCNC: 34.1 G/DL (ref 31.4–37.4)
MCV RBC AUTO: 96 FL (ref 82–98)
MCV RBC AUTO: 96 FL (ref 82–98)
O2 CT BLDA-SCNC: 9 ML/DL (ref 16–23)
O2 CT BLDA-SCNC: 9.1 ML/DL (ref 16–23)
OXYHGB MFR BLDA: 86.4 % (ref 94–97)
OXYHGB MFR BLDA: 92.9 % (ref 94–97)
P AXIS: 55 DEGREES
PCO2 BLDA: 31.1 MM HG (ref 36–44)
PCO2 BLDA: 31.8 MM HG (ref 36–44)
PEEP RESPIRATORY: 16 CM[H2O]
PEEP RESPIRATORY: 16 CM[H2O]
PH BLDA: 7.43 [PH] (ref 7.35–7.45)
PH BLDA: 7.46 [PH] (ref 7.35–7.45)
PLATELET # BLD AUTO: 170 THOUSANDS/UL (ref 149–390)
PLATELET # BLD AUTO: 185 THOUSANDS/UL (ref 149–390)
PMV BLD AUTO: 9.2 FL (ref 8.9–12.7)
PMV BLD AUTO: 9.3 FL (ref 8.9–12.7)
PO2 BLDA: 50 MM HG (ref 75–129)
PO2 BLDA: 65.8 MM HG (ref 75–129)
POTASSIUM SERPL-SCNC: 3.3 MMOL/L (ref 3.5–5.3)
PR INTERVAL: 132 MS
PROT SERPL-MCNC: 3.9 G/DL (ref 6.4–8.2)
QRS AXIS: 35 DEGREES
QRSD INTERVAL: 68 MS
QT INTERVAL: 334 MS
QTC INTERVAL: 426 MS
RBC # BLD AUTO: 1.92 MILLION/UL (ref 3.81–5.12)
RBC # BLD AUTO: 2.02 MILLION/UL (ref 3.81–5.12)
RH BLD: POSITIVE
SODIUM SERPL-SCNC: 135 MMOL/L (ref 136–145)
SPECIMEN EXPIRATION DATE: NORMAL
SPECIMEN SOURCE: ABNORMAL
SPECIMEN SOURCE: ABNORMAL
T WAVE AXIS: 20 DEGREES
UNIT DISPENSE STATUS: NORMAL
UNIT DISPENSE STATUS: NORMAL
UNIT PRODUCT CODE: NORMAL
UNIT PRODUCT CODE: NORMAL
UNIT RH: NORMAL
UNIT RH: NORMAL
VENT AC: 28
VENT AC: 28
VENT- AC: AC
VENT- AC: AC
VENTRICULAR RATE: 98 BPM
VT SETTING VENT: 420 ML
VT SETTING VENT: 420 ML
WBC # BLD AUTO: 2.61 THOUSAND/UL (ref 4.31–10.16)
WBC # BLD AUTO: 3.32 THOUSAND/UL (ref 4.31–10.16)

## 2019-10-08 PROCEDURE — 99024 POSTOP FOLLOW-UP VISIT: CPT | Performed by: OBSTETRICS & GYNECOLOGY

## 2019-10-08 PROCEDURE — 80053 COMPREHEN METABOLIC PANEL: CPT | Performed by: NURSE PRACTITIONER

## 2019-10-08 PROCEDURE — C9113 INJ PANTOPRAZOLE SODIUM, VIA: HCPCS | Performed by: NURSE PRACTITIONER

## 2019-10-08 PROCEDURE — 82805 BLOOD GASES W/O2 SATURATION: CPT | Performed by: NURSE PRACTITIONER

## 2019-10-08 PROCEDURE — 86900 BLOOD TYPING SEROLOGIC ABO: CPT | Performed by: NURSE PRACTITIONER

## 2019-10-08 PROCEDURE — 94003 VENT MGMT INPAT SUBQ DAY: CPT

## 2019-10-08 PROCEDURE — 86850 RBC ANTIBODY SCREEN: CPT | Performed by: NURSE PRACTITIONER

## 2019-10-08 PROCEDURE — 86923 COMPATIBILITY TEST ELECTRIC: CPT

## 2019-10-08 PROCEDURE — 30233N1 TRANSFUSION OF NONAUTOLOGOUS RED BLOOD CELLS INTO PERIPHERAL VEIN, PERCUTANEOUS APPROACH: ICD-10-PCS | Performed by: OBSTETRICS & GYNECOLOGY

## 2019-10-08 PROCEDURE — 99291 CRITICAL CARE FIRST HOUR: CPT | Performed by: INTERNAL MEDICINE

## 2019-10-08 PROCEDURE — 93010 ELECTROCARDIOGRAM REPORT: CPT | Performed by: INTERNAL MEDICINE

## 2019-10-08 PROCEDURE — P9016 RBC LEUKOCYTES REDUCED: HCPCS

## 2019-10-08 PROCEDURE — 83605 ASSAY OF LACTIC ACID: CPT | Performed by: NURSE PRACTITIONER

## 2019-10-08 PROCEDURE — ND001 PR NO DOCUMENTATION: Performed by: RADIOLOGY

## 2019-10-08 PROCEDURE — 85027 COMPLETE CBC AUTOMATED: CPT | Performed by: NURSE PRACTITIONER

## 2019-10-08 PROCEDURE — 71250 CT THORAX DX C-: CPT

## 2019-10-08 PROCEDURE — 85018 HEMOGLOBIN: CPT | Performed by: NURSE PRACTITIONER

## 2019-10-08 PROCEDURE — 86901 BLOOD TYPING SEROLOGIC RH(D): CPT | Performed by: NURSE PRACTITIONER

## 2019-10-08 PROCEDURE — 93005 ELECTROCARDIOGRAM TRACING: CPT

## 2019-10-08 PROCEDURE — 82948 REAGENT STRIP/BLOOD GLUCOSE: CPT

## 2019-10-08 PROCEDURE — 74176 CT ABD & PELVIS W/O CONTRAST: CPT

## 2019-10-08 RX ORDER — PANTOPRAZOLE SODIUM 40 MG/1
40 INJECTION, POWDER, FOR SOLUTION INTRAVENOUS EVERY 12 HOURS SCHEDULED
Status: DISCONTINUED | OUTPATIENT
Start: 2019-10-08 | End: 2019-10-11

## 2019-10-08 RX ORDER — METOPROLOL TARTRATE 5 MG/5ML
2.5 INJECTION INTRAVENOUS EVERY 6 HOURS PRN
Status: DISCONTINUED | OUTPATIENT
Start: 2019-10-08 | End: 2019-10-10

## 2019-10-08 RX ORDER — CHLORHEXIDINE GLUCONATE 0.12 MG/ML
15 RINSE ORAL EVERY 12 HOURS SCHEDULED
Status: DISCONTINUED | OUTPATIENT
Start: 2019-10-08 | End: 2019-10-10

## 2019-10-08 RX ORDER — POTASSIUM CHLORIDE 29.8 MG/ML
40 INJECTION INTRAVENOUS ONCE
Status: COMPLETED | OUTPATIENT
Start: 2019-10-08 | End: 2019-10-08

## 2019-10-08 RX ORDER — DEXTROSE MONOHYDRATE 25 G/50ML
INJECTION, SOLUTION INTRAVENOUS
Status: COMPLETED
Start: 2019-10-08 | End: 2019-10-08

## 2019-10-08 RX ORDER — DEXTROSE MONOHYDRATE 25 G/50ML
50 INJECTION, SOLUTION INTRAVENOUS ONCE
Status: COMPLETED | OUTPATIENT
Start: 2019-10-08 | End: 2019-10-08

## 2019-10-08 RX ORDER — PROPOFOL 10 MG/ML
5-50 INJECTION, EMULSION INTRAVENOUS
Status: DISCONTINUED | OUTPATIENT
Start: 2019-10-08 | End: 2019-10-09

## 2019-10-08 RX ADMIN — Medication 50 MCG/HR: at 12:31

## 2019-10-08 RX ADMIN — DEXTROSE 50 % IN WATER (D50W) INTRAVENOUS SYRINGE 50 ML: at 14:04

## 2019-10-08 RX ADMIN — CHLORHEXIDINE GLUCONATE 0.12% ORAL RINSE 15 ML: 1.2 LIQUID ORAL at 09:08

## 2019-10-08 RX ADMIN — INSULIN LISPRO 2 UNITS: 100 INJECTION, SOLUTION INTRAVENOUS; SUBCUTANEOUS at 23:48

## 2019-10-08 RX ADMIN — PANTOPRAZOLE SODIUM 40 MG: 40 INJECTION, POWDER, FOR SOLUTION INTRAVENOUS at 20:56

## 2019-10-08 RX ADMIN — CHLORHEXIDINE GLUCONATE 0.12% ORAL RINSE 15 ML: 1.2 LIQUID ORAL at 20:10

## 2019-10-08 RX ADMIN — VASOPRESSIN 0.03 UNITS/MIN: 20 INJECTION INTRAVENOUS at 08:02

## 2019-10-08 RX ADMIN — CEFEPIME HYDROCHLORIDE 1000 MG: 1 INJECTION, POWDER, FOR SOLUTION INTRAMUSCULAR; INTRAVENOUS at 21:00

## 2019-10-08 RX ADMIN — NOREPINEPHRINE BITARTRATE 7.5 MCG/MIN: 1 INJECTION INTRAVENOUS at 23:58

## 2019-10-08 RX ADMIN — TRAVOPROST 1 DROP: 0.04 SOLUTION/ DROPS OPHTHALMIC at 21:46

## 2019-10-08 RX ADMIN — METRONIDAZOLE 500 MG: 500 INJECTION, SOLUTION INTRAVENOUS at 12:26

## 2019-10-08 RX ADMIN — VASOPRESSIN 0.03 UNITS/MIN: 20 INJECTION INTRAVENOUS at 18:10

## 2019-10-08 RX ADMIN — SODIUM CHLORIDE 8 MG/HR: 9 INJECTION, SOLUTION INTRAVENOUS at 05:57

## 2019-10-08 RX ADMIN — POTASSIUM CHLORIDE 40 MEQ: 400 INJECTION, SOLUTION INTRAVENOUS at 07:34

## 2019-10-08 RX ADMIN — INSULIN LISPRO 1 UNITS: 100 INJECTION, SOLUTION INTRAVENOUS; SUBCUTANEOUS at 18:13

## 2019-10-08 RX ADMIN — NOREPINEPHRINE BITARTRATE 10 MCG/MIN: 1 INJECTION INTRAVENOUS at 15:40

## 2019-10-08 RX ADMIN — DORZOLAMIDE HYDROCHLORIDE AND TIMOLOL MALEATE 1 DROP: 20; 5 SOLUTION/ DROPS OPHTHALMIC at 18:10

## 2019-10-08 RX ADMIN — METRONIDAZOLE 500 MG: 500 INJECTION, SOLUTION INTRAVENOUS at 20:09

## 2019-10-08 RX ADMIN — NOREPINEPHRINE BITARTRATE 20 MCG/MIN: 1 INJECTION INTRAVENOUS at 08:19

## 2019-10-08 RX ADMIN — SODIUM CHLORIDE 8 MG/HR: 9 INJECTION, SOLUTION INTRAVENOUS at 15:14

## 2019-10-08 RX ADMIN — METRONIDAZOLE 500 MG: 500 INJECTION, SOLUTION INTRAVENOUS at 08:10

## 2019-10-08 RX ADMIN — METOPROLOL TARTRATE 2.5 MG: 5 INJECTION, SOLUTION INTRAVENOUS at 20:43

## 2019-10-08 RX ADMIN — DORZOLAMIDE HYDROCHLORIDE AND TIMOLOL MALEATE 1 DROP: 20; 5 SOLUTION/ DROPS OPHTHALMIC at 09:06

## 2019-10-08 RX ADMIN — DEXTROSE MONOHYDRATE 50 ML: 25 INJECTION, SOLUTION INTRAVENOUS at 14:04

## 2019-10-08 RX ADMIN — SODIUM BICARBONATE 125 ML/HR: 84 INJECTION, SOLUTION INTRAVENOUS at 06:34

## 2019-10-08 RX ADMIN — NOREPINEPHRINE BITARTRATE 22 MCG/MIN: 1 INJECTION INTRAVENOUS at 05:18

## 2019-10-08 RX ADMIN — SODIUM CHLORIDE 7 UNITS/HR: 9 INJECTION, SOLUTION INTRAVENOUS at 00:25

## 2019-10-08 RX ADMIN — PROPOFOL 30 MCG/KG/MIN: 10 INJECTION, EMULSION INTRAVENOUS at 03:38

## 2019-10-08 RX ADMIN — NOREPINEPHRINE BITARTRATE 22 MCG/MIN: 1 INJECTION INTRAVENOUS at 01:42

## 2019-10-08 RX ADMIN — PROPOFOL 25 MCG/KG/MIN: 10 INJECTION, EMULSION INTRAVENOUS at 13:01

## 2019-10-08 RX ADMIN — PROPOFOL 20 MCG/KG/MIN: 10 INJECTION, EMULSION INTRAVENOUS at 21:45

## 2019-10-08 RX ADMIN — WHITE PETROLATUM 57.7 %-MINERAL OIL 31.9 % EYE OINTMENT: at 09:05

## 2019-10-08 RX ADMIN — NOREPINEPHRINE BITARTRATE 25 MCG/MIN: 1 INJECTION INTRAVENOUS at 11:14

## 2019-10-08 RX ADMIN — METRONIDAZOLE 500 MG: 500 INJECTION, SOLUTION INTRAVENOUS at 00:24

## 2019-10-08 NOTE — SOCIAL WORK
CM following, not appropriate for a CM assessment at this time as the patient is critically ill and her prognosis is guarded  CM will assess when/if appropriate

## 2019-10-08 NOTE — CONSULTS
Patient MRN: [id number]  Date of Service: 10/8/2019  Referring Physician: Grant Baires  Provider Creating Note: Tiffani Andrade MD  PCP: Liliam Lyn    Reason for Consult: GI bleed    HISTORY OF PRESENT ILLNESS:  Manfred Shafer is a 82 y o  female who was admitted after total hysterectomy with bilateral oophorectomy for apparent adenocarcinoma  Postop a reportedly developed an ileus and then became sick remarkable for and metabolic acidosis  Shortly after transfer she vomited coffee-ground material aspirated a lot of material per the ICU staff and has been on a ventilator for several days  Her hemoglobin decreased and IR was consulted as her apparently is a hematoma per IR was not felt to be the sole cause of the anemia  Her vital signs were stable there is no sign of active bleeding  Discussed with patient's daughter    The patient is intubated and cannot give further history  I discussed with ICU staff an ICU nurse  NG tube shows coffee-ground material but not a lot of material was coming out  She is having no evidence of rectal bleeding in a with a rectal tube in place  Her hemoglobin is decreased to 6 3 but is reasonably stable    Review of Systems:  Not obtainable    General:   No fever or chills; No significant weight loss or gain  EENT:   No ear pain, facial swelling; No sneezing, sore throat  Skin:   No rashes, color changes  Respiratory:     No shortness of breath, cough, wheezing, stridor  Cardiovascular:     No chest pain, palpitations  Gastrointestinal:    As per HPI  Musculoskeletal:     No arthralgias, myalgias, swelling  Neurologic:   No dizziness, numbness, weakness  No speech difficulties     Psych:   No agitation, suicidal ideations    Otherwise, All other twelve-point review of systems normal      Past Medical History:   Diagnosis Date    Anxiety     Arthritis     Constipation     Diabetes mellitus ([facility] [address] )     IDDM    Frequent UTI     Glaucoma     Hearing loss     Hyperlipidemia     Hypertension     Hyperthyroidism     in past    Hyponatremia     Hypothyroid     Neuropathy     bles    Right tubo-ovarian mass     Wears glasses      Past Surgical History:   Procedure Laterality Date    APPENDECTOMY  1954    CATARACT EXTRACTION Bilateral     COLONOSCOPY  09/2014    Completed - Dr Lupe Nolasco, due in 5 years    HYSTERECTOMY N/A 10/4/2019    Procedure: LAP TOTAL HYSTERECTOMY, BSO;  Surgeon: Mauri Mayfield MD;  Location: [facility];  Service: Gynecology Oncology    LAPAROTOMY N/A 10/4/2019    Procedure: EXPLORATORY LAPAROTOMY  EXLAP OMENTECTOMY  PELVIC AND DEANGELO-AORTIC LYMPH NODE DISSECTION  PERITONEAL BIOPSY TRANS ABDOMINUS BLOCK;  Surgeon: Mauri Mayfield MD;  Location: [facility];  Service: Gynecology Oncology    SKIN LESION EXCISION      Ears     Allergies   Allergen Reactions    Thiazide-Type Diuretics      Hyponatremia       Medications:  Home Medications  Prior to Admission medications    Medication Sig Start Date End Date Taking?  Authorizing Provider   amLODIPine (NORVASC) 5 mg tablet TAKE 1 TABLET DAILY AFTER SUPPER 4/29/19  Yes Shilpa Appiah MD   aspirin 81 MG tablet Take 1 tablet by mouth daily   Yes Historical Provider, MD   Calcium Polycarbophil (KONSYL FIBER PO) Take by mouth daily    Yes Historical Provider, MD   carboxymethylcellulose (REFRESH TEARS) 0 5 % SOLN Apply 1 drop to eye 4 (four) times a day   Yes Historical Provider, MD   cholecalciferol (VITAMIN D3) 1,000 units tablet Take 3,000 capsules by mouth daily  11/15/16  Yes Historical Provider, MD   dorzolamide-timolol (COSOPT) 22 3-6 8 MG/ML ophthalmic solution Administer 1 drop to both eyes 2 (two) times a day    Yes Historical Provider, MD   DULoxetine (CYMBALTA) 30 mg delayed release capsule Take 1 capsule (30 mg total) by mouth daily  Patient taking differently: Take 30 mg by mouth daily at bedtime  7/10/19  Yes Shilpa Appiah MD   fluticasone (FLONASE) 50 mcg/act nasal spray 2 sprays into each nostril daily as needed  5/10/17  Yes Historical Provider, MD   gabapentin (NEURONTIN) 300 mg capsule Take 1 capsule (300 mg total) by mouth 3 (three) times a day  Patient taking differently: Take 300 mg by mouth 2 (two) times a day  12/13/18  Yes Inez Goodwin MD   insulin glargine (LANTUS) 100 units/mL subcutaneous injection Inject 12units under the skin nightly as directed  Patient taking differently: Inject 11 Units under the skin daily at bedtime  4/9/18  Yes Inez Goodwin MD   insulin lispro (HUMALOG) 100 units/mL injection INJECT 4 UNITS AT BREAKFAST, 5 UNITS AT LUNCH, AND 7 UNITS AT CEDAR RIDGE TIME  Patient taking differently: Before meals acc to sliding scale 4/16/19  Yes Inez Goodwin MD   irbesartan (AVAPRO) 150 mg tablet Take 1 tablet (150 mg total) by mouth daily  Patient taking differently: Take 75 mg by mouth daily  3/21/19  Yes Inez Goodwin MD   levothyroxine 50 mcg tablet Take 1 tablet (50 mcg total) by mouth daily 10/2/19  Yes Inez Goodwin MD   LORazepam (ATIVAN) 0 5 mg tablet Take 1 tablet (0 5 mg total) by mouth 3 (three) times a day  Patient taking differently: Take 0 5 mg by mouth 2 (two) times a day  3/21/19  Yes Inez Goodwin MD   Magnesium 500 MG TABS Take 1 tablet by mouth daily   Yes Historical Provider, MD   pravastatin (PRAVACHOL) 20 mg tablet Take 1 tablet (20 mg total) by mouth daily at bedtime 9/4/19  Yes Inez Goodwin MD   RHOPRESSA 0 02 % SOLN Administer 1 drop to the right eye daily at bedtime  8/23/19  Yes Historical Provider, MD   sodium chloride 1 g tablet Take 1 g by mouth daily   Yes Historical Provider, MD   torsemide (DEMADEX) 5 MG tablet Take 1 tablet (5 mg total) by mouth daily 8/6/19  Yes Saray Bishop MD   travoprost (TRAVATAN Z) 0 004 % ophthalmic solution Administer 1 drop to both eyes daily at bedtime    Yes Historical Provider, MD   cyanocobalamin 1000 MCG tablet Take 1,000 mcg by mouth daily    Historical Provider, MD       Inhouse Medications    Current Facility-Administered Medications:     artificial tear (LUBRIFRESH P M ) ophthalmic ointment, , Both Eyes, HS PRN    cefepime (MAXIPIME) 1,000 mg in dextrose 5 % 50 mL IVPB, 1,000 mg, Intravenous, Q24H, Stopped at 10/07/19 2308    chlorhexidine (PERIDEX) 0 12 % oral rinse 15 mL, 15 mL, Swish & Spit, Q12H [facility], 15 mL at 10/08/19 0908    dorzolamide-timolol (COSOPT) 22 3-6 8 MG/ML ophthalmic solution 1 drop, 1 drop, Both Eyes, BID, 1 drop at 10/08/19 0906    fentaNYL (SUBLIMAZE) injection 50 mcg, 50 mcg, Intravenous, Q2H PRN    fentaNYL 1000 mcg in sodium chloride 0 9% 100mL infusion, 50 mcg/hr, Intravenous, Continuous, 50 mcg/hr at 10/08/19 1231    insulin regular (HumuLIN R,NovoLIN R) 1 Units/mL in sodium chloride 0 9 % 100 mL infusion, 0 3-21 Units/hr, Intravenous, Titrated, 1 Units/hr at 10/08/19 0800    lactated ringers bolus 1,000 mL, 1,000 mL, Intravenous, Once    LORazepam (ATIVAN) 2 mg/mL injection 2 mg, 2 mg, Intravenous, Q4H PRN    metroNIDAZOLE (FLAGYL) IVPB (premix) 500 mg, 500 mg, Intravenous, Q8H, 500 mg at 10/08/19 1226    Netarsudil Dimesylate 0 02 % SOLN 1 drop, 1 drop, Right Eye, HS, Stopped at 10/07/19 2200    norepinephrine (LEVOPHED) 4 mg (STANDARD CONCENTRATION) IV in sodium chloride 0 9% 250 mL, 1-30 mcg/min, Intravenous, Titrated, 15 mcg/min at 10/08/19 1337    ondansetron (ZOFRAN) injection 4 mg, 4 mg, Intravenous, Q6H PRN, 4 mg at 10/07/19 0541    pantoprazole (PROTONIX) 80 mg in sodium chloride 0 9 % 100 mL infusion, 8 mg/hr, Intravenous, Continuous, 8 mg/hr at 10/08/19 0557    pantoprazole (PROTONIX) injection 40 mg, 40 mg, Intravenous, Q12H GENNARO    propofol (DIPRIVAN) 1000 mg in 100 mL infusion (premix), 5-50 mcg/kg/min, Intravenous, Titrated, 25 mcg/kg/min at 10/08/19 1301    travoprost (TRAVATAN-Z) 0 004 % ophthalmic solution 1 drop, 1 drop, Both Eyes, HS, Stopped at 10/07/19 2200    vasopressin (PITRESSIN) 20 Units in sodium chloride 0 9 % 100 mL infusion, 0 03 Units/min, Intravenous, Continuous, 0 03 Units/min at 10/08/19 0802      Social History   reports that she has never smoked  She has never used smokeless tobacco  She reports that she drinks alcohol  She reports that she does not use drugs  Family History  Family History   Problem Relation Age of Onset    Heart attack Mother     Diabetes type II Mother    Klaus Zavaleta Dementia Father     Stroke Father         CVA    Breast cancer Maternal Aunt     Stomach cancer Maternal Uncle     Diabetes Family     Stroke Family         Complications         OBJECTIVE:    BP (!) 80/36   Pulse 78   Temp 97 7 °F (36 5 °C) (Temporal)   Resp 20   Ht 5' 5" (1 651 m)   Wt 58 5 kg (129 lb)   SpO2 98%   Breastfeeding? No   BMI 21 47 kg/m²   HEENT anicteric on vent NG tube is noted  Abdomen protuberant soft  Extremities without movement but patient sedated    Neurologically unresponsive sedated on vent    Laboratory Studies:  Results from last 7 days   Lab Units 10/08/19  0938 10/08/19  0740 10/07/19  2035 10/07/19  1920 10/07/19  1640 10/07/19  0505 10/06/19  0647 10/06/19  0545 10/05/19  0459   WBC Thousand/uL 3 32* 2 61*  --  11 34* 12 93* 12 02* 14 64*  --  15 06*   HEMOGLOBIN g/dL 6 5* 6 3* 8 0* 7 2* 9 2* 10 0* 10 3*  --  11 0*   HEMATOCRIT % 19 3* 18 5*  --  23 0* 28 6* 30 0* 32 8*  --  35 0   MCV fL 96 96  --  106* 101* 98 102*  --  104*   PLATELETS Thousands/uL 185 170  --  223 292 270 278 252 232   INR   --   --   --  1 72*  --   --   --   --   --      Results from last 7 days   Lab Units 10/08/19  0439 10/07/19  1920 10/07/19  1640 10/06/19  0647 10/05/19  0459 10/03/19  0950   POTASSIUM mmol/L 3 3* 4 6 5 6* 5 1 4 4 4 6   CHLORIDE mmol/L 102 100 95* 96* 112* 103   CO2 mmol/L 24 16* 19* 21 22 27   BUN mg/dL 32* 27* 26* 19 14 23   CREATININE mg/dL 1 25 1 53* 1 60* 0 96 0 72 0 93   CALCIUM mg/dL 7 2* 6 0* 7 1* 7 0* 6 3* 9 4   ALK PHOS U/L 34* 63  --   --   --   --    ALT U/L 197* 116*  --   --   --   --    AST U/L 192* 108*  --   --   --   --            Imaging and Other Studies:  Ct Chest Abdomen Pelvis Wo Contrast    Addendum Date: 10/8/2019 Addendum:   ADDENDUM: Dense perihilar opacities may be due to aspiration and/or ARDS  Result Date: 10/8/2019  Narrative: CT CHEST, ABDOMEN AND PELVIS WITHOUT IV CONTRAST INDICATION:   Sepsis  COMPARISON:  CT of the chest on 10/7/2019  CT of abdomen pelvis on 8/30/2019  TECHNIQUE: CT examination of the chest, abdomen and pelvis was performed without intravenous contrast   Axial, sagittal, and coronal 2D reformatted images were created from the source data and submitted for interpretation  Radiation dose length product (DLP) for this visit:  607 mGy-cm   This examination, like all CT scans performed in the [facility], was performed utilizing techniques to minimize radiation dose exposure, including the use of iterative reconstruction and automated exposure control  Enteric contrast was not administered  FINDINGS: CHEST LUNGS:  There is endotracheal tube which terminates above the aaron  There is interval development of dense perihilar opacities more severe within the left greater than right lower lobes  The central airways are patent  PLEURA:  Bilateral pleural effusions similar prior examination  HEART/GREAT VESSELS:  Coronary artery calcifications  No pericardial effusion  Right-sided central venous catheter terminating within the superior vena cava  MEDIASTINUM AND MYRNA:  Unremarkable  CHEST WALL AND LOWER NECK:   Persistent anterior chest wall subcutaneous emphysema which may be postsurgical  ABDOMEN LIVER/BILIARY TREE:  Unremarkable  GALLBLADDER:  Hyperdensity within the gallbladder may be due to vicarious excretion of contrast material  SPLEEN:  Unremarkable  PANCREAS:  Unremarkable  ADRENAL GLANDS:  Unremarkable  KIDNEYS/URETERS:  Persistent bilateral nephrograms may be seen in setting of acute tubular necrosis    No hydronephrosis  STOMACH AND BOWEL: Evaluation limited due to lack of oral and enteric contrast   Enteric tube is coiled within the stomach with its tip in the distal stomach  There are multiple dilated loops of small bowel measuring up to 4 cm in diameter with scattered areas of long segment fecalization  No definite abrupt transition point is seen  Gas is seen within the colon  APPENDIX:  No findings to suggest appendicitis  ABDOMINOPELVIC CAVITY:  Near resolution of previously seen ascites  There is an high density collection within the pelvis measuring 7 6 x 5 1 x 7 4 cm (series 2, image 94) compatible with hematoma  3 4 x 1 6 cm low-density collection along the left external iliac vessels may reflect a small lymphocele  VESSELS:  Mild atherosclerotic calcifications  PELVIS REPRODUCTIVE ORGANS:  Status post hysterectomy  URINARY BLADDER:  Bladder is collapsed about a Ji catheter and contains a small amount of excreted contrast  ABDOMINAL WALL/INGUINAL REGIONS:  Diffuse subcutaneous edema  Scattered subcutaneous emphysema is seen which may be postsurgical  OSSEOUS STRUCTURES:  No acute fracture or destructive osseous lesion  Degenerative changes of the osseous structures  Impression: 1  Interval development of dense perihilar opacities most severe within the left greater than right lower lobes most compatible with aspiration in the setting of intubation  Bilateral pleural effusions similar prior examination  2   Persistent bilateral nephrograms which may be seen in the setting of acute tubular necrosis  3   Multiple dilated loops of small bowel measuring up to 4 cm in diameter with scattered areas of long segment fecalization  No definite abrupt transition point is seen however evaluation is limited due to lack of oral and enteric contrast   Gas is seen within the colon  Findings may be due to severe ileus versus partial bowel obstruction   4   High density collection within the pelvis measuring 7 6 x 5 1 x 7 4 cm compatible with hematoma  5   3 4 x 1 6 cm low density collection along the left external iliac vessels may reflect a small lymphocele  The study was marked in [facility] for immediate notification  Workstation performed: [id number]     Xr Chest Portable    Result Date: 10/8/2019  Narrative: CHEST INDICATION:   hypoxia  COMPARISON:  10/7/2019 EXAM PERFORMED/VIEWS:  XR CHEST PORTABLE  AP semierect Images: 1 FINDINGS:  Endotracheal tube is present, in satisfactory position with its tip above the level of the aaron  Enteric tube is present with its tip extending below the left hemidiaphragm  Right IJ catheter terminates at the caval atrial junction  No pneumothorax  Cardiomediastinal silhouette appears unremarkable  Persistent bilateral airspace opacities which appears more consolidative in the left lower lobe concerning for aspiration  No large pleural effusions or pneumothorax  Osseous structures appear within normal limits for patient age  Impression: 1  Lines and tubes stable  2   Persistent bilateral airspace opacities more consolidative in the left lower lobe, likely infectious versus atypical edema  Workstation performed: [id number]     Xr Abdomen 1 View Kub    Result Date: 10/8/2019  Narrative: OBSTRUCTION SERIES INDICATION:   abdominal distention  COMPARISON:  CT scan 8/30/2019  EXAM PERFORMED/VIEWS:  XR ABDOMEN 1 VIEW KUB FINDINGS: There is marked distention of the stomach  Bowel gas pattern is otherwise unremarkable  No free air beneath the hemidiaphragms  There is bilateral renal contrast excretion related to CT PE study performed earlier same day  Osseous structures are unremarkable  Examination of the chest reveals a normal cardiomediastinal silhouette  Lungs are clear  Impression: Marked gastric distention  Workstation performed: [id number]     Ct Head Wo Contrast    Result Date: 10/7/2019  Narrative: CT BRAIN - WITHOUT CONTRAST INDICATION:   Cerebral edema    Status post code   Status post IVC filter placement earlier today  COMPARISON:  None  TECHNIQUE:  CT examination of the brain was performed  In addition to axial images, coronal 2D reformatted images were created and submitted for interpretation  Radiation dose length product (DLP) for this visit:  921 mGy-cm   This examination, like all CT scans performed in the [facility], was performed utilizing techniques to minimize radiation dose exposure, including the use of iterative reconstruction and automated exposure control  IMAGE QUALITY:  Diagnostic  FINDINGS: PARENCHYMA:  No intracranial mass, mass effect or midline shift  No CT signs of acute infarction  No acute parenchymal hemorrhage  Atherosclerotic calcifications noted  VENTRICLES AND EXTRA-AXIAL SPACES:  Normal for the patient's age  VISUALIZED ORBITS AND PARANASAL SINUSES:  Small air-fluid level in left maxillary sinus noted  Bilateral lens implants noted  Endotracheal tube noted on the  view  CALVARIUM AND EXTRACRANIAL SOFT TISSUES:  Normal      Impression: No acute intracranial hemorrhage, mass effect or edema  Workstation performed: [id number]     Xr Chest Portable Icu    Result Date: 10/8/2019  Narrative: CHEST INDICATION:   et tube  COMPARISON:  8/15/2019; 10/8/2019 EXAM PERFORMED/VIEWS:  XR CHEST PORTABLE ICU FINDINGS:  Endotracheal tube is present, in satisfactory position with its tip above the level of the aaron  Enteric tube is present with its tip extending below the left hemidiaphragm  Right IJ catheter noted tip in the mid SVC  Cardiomediastinal silhouette appears unremarkable  Multifocal strandy densities noted  Subcutaneous gas noted within the right arm and left chest/abdominal wall  Osseous structures appear within normal limits for patient age  Impression: 1  Multifocal parenchymal opacities may represent multifocal pneumonia versus ARDS versus pulmonary alveolar edema  2   Subcutaneous emphysema   Workstation performed: [id number]     Cta Chest Pe Study    Result Date: 10/7/2019  Narrative: CTA - CHEST WITH IV CONTRAST - PULMONARY ANGIOGRAM INDICATION:   Shortness of breath  COMPARISON: CT abdomen and pelvis study of August 30, 2019  TECHNIQUE: CTA examination of the chest was performed using angiographic technique according to a protocol specifically tailored to evaluate for pulmonary embolism  Axial, sagittal, and coronal 2D reformatted images were created from the source data and  submitted for interpretation  In addition, coronal 3D MIP postprocessing was performed on the acquisition scanner  Radiation dose length product (DLP) for this visit:  196 mGy-cm   This examination, like all CT scans performed in the [facility], was performed utilizing techniques to minimize radiation dose exposure, including the use of iterative reconstruction and automated exposure control  IV Contrast:  85 mL of iodixanol (VISIPAQUE)  FINDINGS: PULMONARY ARTERIAL TREE:  No pulmonary embolus is seen  LUNGS:  Mosaic attenuation  Groundglass nodule in the left upper lobe on image 51 of series 3, measuring 4 mm  2 mm granuloma along the right major fissure on image 55 of series 3   5 to 6 mm groundglass nodule in the right middle lobe on image 57 of series 3  Atelectasis in the right lower lobe with a perifissural 2 mm nodular density in adjacent calcified granuloma on image 68 of series 3  This is likely inflammatory  Bibasilar compressive  PLEURA:  Moderate size bilateral pleural effusions  HEART/GREAT VESSELS:  Unremarkable for patient's age  MEDIASTINUM AND MYRNA:  Dilated debris-filled esophagus with hiatal hernia and markedly dilated stomach  Punctate calcification within the right thyroid lobe CHEST WALL AND LOWER NECK:   Chest wall emphysema noted subcutaneously as well as in the subpectoral regions and left more than right axilla   VISUALIZED STRUCTURES IN THE UPPER ABDOMEN:  Markedly dilated debris-filled stomach  Gastric outlet obstruction not excluded  Cirrhotic appearance to the liver with ascites  OSSEOUS STRUCTURES:  No acute fracture or destructive osseous lesion  Schmorl's node deformity in the superior endplate of O93  Impression: 1  No evidence of pulmonary embolism  Chest wall emphysema, of uncertain etiology  Recommend correlation with clinical history  2   Moderate bilateral pleural effusions and mild compressive bibasilar atelectasis with mosaic attenuation, correlate for congestive heart failure  3   Scattered 5-6mmgroundglass nodules and calcified granulomas  These findings are likely post infectious/inflammatory  Follow-up CT chest evaluation in 3 months is recommended  4   Hiatal hernia with marked gastric and esophageal distention which appears to be debris filled  Gastric outlet obstruction not excluded  5   Nodular liver surface and abdominal ascites  Given the patient's history of a complex right adnexal mass, peritoneal carcinomatosis is not excluded  Consider endogastric placement and additional imaging of the abdomen and pelvis  I personally discussed this study with Ms Karin Huerta, the covering nurse in the ICU, on 10/7/2019 at 5:26 PM   Workstation performed: [id number]     Us Pelvis Complete W Transvaginal    Result Date: 9/16/2019  Narrative: PELVIC ULTRASOUND, COMPLETE INDICATION:  82years old  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  Follow-up right ovarian abnormality seen on CT scan  COMPARISON: CT scan 8/30/2019  TECHNIQUE:   Transabdominal pelvic ultrasound was performed in sagittal and transverse planes with a curvilinear transducer  Additional transvaginal imaging was performed to better evaluate the endometrium and ovaries  Imaging included volumetric sweeps as well as traditional still imaging technique  FINDINGS: UTERUS: The uterus is anteverted in position, measuring 4 1 x 2 6 x 3 8 cm   Contour and echotexture appear normal  The cervix shows no suspicious abnormality  ENDOMETRIUM:  There is small fluid in the endometrial canal, with normal single wall thickness of 1-2 mm  There is a nonvascular isoechoic soft tissue focus with cystic spaces within the endometrium measuring approximately 1 0 x 0 6 x 0 9 cm  This may represent a polyp, however neoplasm is not excluded  OVARIES/ADNEXA: Right ovary: There is a cystic and solid mass occupying the ovary, measuring 7 7 x 4 4 x 4 8 cm, with abnormally increased intrinsic vascularity  This is suspicious for neoplasm  Left ovary:  1 8 x 1 4 x 0 9 cm  No suspicious left ovarian abnormality  Doppler flow within normal limits  No suspicious adnexal mass or loculated collections  There is small free fluid in the right adnexa  Impression:  Complex cystic and solid 7 7 cm right ovarian mass again identified, concerning for neoplasm  Recommend gynecologic oncology consultation  1 0 cm isoechoic focus in the endometrium which may represent polyp versus neoplasm  The study was marked in [facility] for significant notification  Workstation performed: [id number]         ASSESSMENT AND PLAN:  1  Probable  GI bleed but seemingly not active presently  Would continue PPI  In discussion with daughter would prefer holding on endoscopy today and this urgent  Discussed with ICU  2  Metabolic acidosis-etiology not clear considering CT scan findings would watch closely for ischemia and consider having General surgery follow along  Discussed with ICU will contact us if situation changes            Hanny Garcia MD

## 2019-10-08 NOTE — PROCEDURES
Central Line Insertion  Date/Time: 10/7/2019 8:00 PM  Performed by: JIM Calles  Authorized by: JIM Calles     Patient location:  ICU  Consent:     Consent obtained:  Emergent situation  Universal protocol:     Relevant documents present and verified: yes      Test results available and properly labeled: yes      Radiology Images displayed and confirmed  If images not available, report reviewed: yes      Required blood products, implants, devices, and special equipment available: yes      Site/side marked: yes      Immediately prior to procedure, a time out was called: yes      Patient identity confirmed:  Arm band  Pre-procedure details:     Hand hygiene: Hand hygiene performed prior to insertion      Sterile barrier technique: All elements of maximal sterile technique followed      Skin preparation:  ChloraPrep    Skin preparation agent: Skin preparation agent completely dried prior to procedure    Indications:     Central line indications: medications requiring central line    Anesthesia (see MAR for exact dosages): Anesthesia method:  None  Procedure details:     Location:  Right internal jugular    Vessel type: vein      Laterality:  Right    Patient position:  Flat    Catheter type:  Triple lumen 16cm    Catheter size:  7 5 Fr    Landmarks identified: yes      Ultrasound guidance: yes      Sterile ultrasound techniques: Sterile gel and sterile probe covers were used      Number of attempts:  1    Successful placement: yes    Post-procedure details:     Post-procedure:  Dressing applied and line sutured    Assessment:  Blood return through all ports, no pneumothorax on x-ray, free fluid flow and placement verified by x-ray    Patient tolerance of procedure:   Tolerated well, no immediate complications    Observer: Yes      Observer name:  Juan Andrade

## 2019-10-08 NOTE — PROGRESS NOTES
Progress Note - Critical Care   Perry Tena 82 y o  female MRN: [id number]  Unit/Bed#: ICU 02 Encounter: [id number]    Assessment/Plan:  1  Acute hypoxic respiratory failure likely multifactorial related to aspiration, pneumonitis versus pneumonia and possible ARDS  · We will continue the patient on vent support for now with a goal to maintain her oxygen saturation greater than 90%  · We continue with sedation with a goal to maintain her RASS negative for to -5 given her hypoxemia  · We will continue her on cefepime and Flagyl given the likelihood of aspiration  2  Severe metabolic acidosis  · This has improved on the bicarb infusion  · We will attempt to discontinue the bicarbonate infusion and just continue with fluid administration  3  Acute kidney injury likely related to 2    · We will continue to monitor her urine output and renal indices closely  4  Anemia, likely acute blood loss in the setting of her recent surgery and possible gastritis, intra-abdominal hematoma  · Will continue the Protonix infusion for now  · May require an EGD  5  Status post exploratory lap, TLH, BSO, pelvic and giulia aortic lymphadectomy, omentectomy, peritoneal biopsies and ovarian cancer staging for right tubo-ovarian mass consistent with adenocarcinoma, postop day 4  · Management per gynecology oncology is recommendations  6  Hypokalemia-this will be repleted  Critical Care Time:   Documented critical care time excludes any procedures documented elsewhere  It also excludes any family updates    _____________________________________________________________________    HPI/24hr events:   Events of yesterday are noted    The patient had significant hypoxia which has improved some this morning with high peep and low tidal volume and sedation    Medications:    Current Facility-Administered Medications:  acetaminophen 650 mg Oral Q6H JIM Wallace    artificial tear  Both Eyes HS PRN JIM Alicia    cefepime 1,000 mg Intravenous Q24H JIM Ross Last Rate: Stopped (10/07/19 2308)   docusate sodium 100 mg Oral BID Lucendia Patriot, CRADILENE    dorzolamide-timolol 1 drop Both Eyes BID Lucendia Patriot, CRNP    DULoxetine 30 mg Oral HS Lucendia Patriot, CRNP    enoxaparin 40 mg Subcutaneous QAM Lucendia Patriot, CRNP    EPINEPHrine        fentaNYL 50 mcg Intravenous Q2H PRN Wilfrid Benavidez, JIM    fentaNYL 50 mcg/hr Intravenous Continuous JIM Ross Last Rate: 50 mcg/hr (10/07/19 2339)   HYDROmorphone 0 2 mg Intravenous Q3H PRN Lucendia Patriot, CRNP    insulin glargine 11 Units Subcutaneous HS [facility], CRADILENE    insulin regular (HumuLIN R,NovoLIN R) infusion 0 3-21 Units/hr Intravenous Titrated Lucendia Patriot, CRNP Last Rate: 5 Units/hr (10/08/19 0606)   lactated ringers 1,000 mL Intravenous Once Lucendia Patriot, CRNP    levothyroxine 50 mcg Oral Early Morning Lucendia Patriot, CRNP    [facility] Hold] lidocaine 1 patch Topical Daily Jenni Barbara,     LORazepam 2 mg Intravenous Q4H PRN JIM Ross    losartan 50 mg Oral Daily Lucendia Patriot, CRNP    metoclopramide 10 mg Intravenous Q6H PRN Lucendia Patriot, CRNP    metoprolol 5 mg Intravenous Q6H PRN Lucendia Patriot, CRNP    metroNIDAZOLE 500 mg Intravenous Q8H Lucendia Patriot, CRNP Last Rate: Stopped (10/08/19 0115)   naloxone 0 1 mg Intravenous Q3 min PRN Lucendia Patriot, CRNP    Netarsudil Dimesylate 1 drop Right Eye HS Lucendia Patriot, CRNP    norepinephrine 1-30 mcg/min Intravenous Titrated Lucendia Patriot, CRNP Last Rate: 22 mcg/min (10/08/19 0540)   ondansetron 4 mg Intravenous Q6H PRN Lucendia Patriot, CRNP    oxyCODONE 10 mg Oral Q4H PRN Lucendia Patriot, CRNP    oxyCODONE 5 mg Oral Q4H PRN Lucendia Patriot, CRNP    pantoprozole (PROTONIX) infusion (Continuous) 8 mg/hr Intravenous Continuous JIM Ross Last Rate: 8 mg/hr (10/08/19 0557)   propofol 5-50 mcg/kg/min Intravenous Titrated JIM Ross Last Rate: 30 mcg/kg/min (10/08/19 0338)   simethicone 80 mg Oral Q6H PRN Dominique Paris, CRNP    sodium bicarbonate infusion 125 mL/hr Intravenous Continuous Allen Luz, CRNP Last Rate: 125 mL/hr (10/07/19 2111)   travoprost 1 drop Both Eyes HS Ruthie BrittonJIM    vasopressin (PITRESSIN) in 0 9 % sodium chloride 100 mL 0 03 Units/min Intravenous Continuous Allen Luz, CRNP Last Rate: 0 03 Units/min (10/07/19 2323)         fentaNYL 50 mcg/hr Last Rate: 50 mcg/hr (10/07/19 2339)   insulin regular (HumuLIN R,NovoLIN R) infusion 0 3-21 Units/hr Last Rate: 5 Units/hr (10/08/19 0606)   norepinephrine 1-30 mcg/min Last Rate: 22 mcg/min (10/08/19 0540)   pantoprozole (PROTONIX) infusion (Continuous) 8 mg/hr Last Rate: 8 mg/hr (10/08/19 0557)   propofol 5-50 mcg/kg/min Last Rate: 30 mcg/kg/min (10/08/19 0338)   sodium bicarbonate infusion 125 mL/hr Last Rate: 125 mL/hr (10/07/19 2111)   vasopressin (PITRESSIN) in 0 9 % sodium chloride 100 mL 0 03 Units/min Last Rate: 0 03 Units/min (10/07/19 2323)         Physical exam:  Vitals: Body mass index is 21 47 kg/m²  Blood pressure (!) 82/38, pulse 82, temperature (!) 97 1 °F (36 2 °C), temperature source Temporal, resp   rate (!) 27, height 5' 5" (1 651 m), weight 58 5 kg (129 lb), SpO2 99 %, not currently breastfeeding ,  Temp  Min: 96 8 °F (36 °C)  Max: 99 3 °F (37 4 °C)  IBW: 57 kg    SpO2: 99 %  SpO2 Activity: At Rest  O2 Device: None (Room air)      Intake/Output Summary (Last 24 hours) at 10/8/2019 0615  Last data filed at 10/8/2019 0400  Gross per 24 hour   Intake 1982 47 ml   Output 1655 ml   Net 327 47 ml       Invasive/non-invasive ventilation settings:   Respiratory    Lab Data (Last 4 hours)      10/08 0439            pH, Arterial       7 461           pCO2, Arterial       31 1           pO2, Arterial       65 8           HCO3, Arterial       21 7           Base Excess, Arterial       -1 9                O2/Vent Data           Most Recent         Vent Mode   AC/VC      Resp Rate (BPM) (BPM)   28      Vt (mL) (mL)   420 FIO2 (%) (%)   100      PEEP (cmH2O) (cmH2O)   16      MV   12 6                Invasive Devices     Central Venous Catheter Line            CVC Central Lines 10/07/19 Triple 16cm less than 1 day          Peripheral Intravenous Line            Peripheral IV 10/07/19 Right Antecubital less than 1 day          Arterial Line            Arterial Line 10/07/19 Right Radial less than 1 day          Drain            NG/OG/Enteral Tube Orogastric 16 Fr Left mouth 1 day    Urethral Catheter 16 Fr  1 day          Airway            ETT  Cuffed 8 mm less than 1 day    ETT  Cuffed; Hi-Lo 7 mm less than 1 day                  Physical Exam:  Gen:  Sedated  HEENT:  Pupils are equal round reactive to light  The oropharynx is intubated but otherwise clear  Neck:  Supple negative for lymphadenopathy  Chest:  Diminished with some crackles in the bases bilaterally  Cor:  Regular rate and rhythm  Abd:  Distended, the incision is clean dry and intact  Ext:  There is no significant edema clubbing or cyanosis  Neuro:  Sedated with minimal purposeful movement  Skin:  Warm and dry      Diagnostic Data:  Lab: I have personally reviewed pertinent lab results     CBC:   Results from last 7 days   Lab Units 10/07/19  2035 10/07/19  1920 10/07/19  1640 10/07/19  0505   WBC Thousand/uL  --  11 34* 12 93* 12 02*   HEMOGLOBIN g/dL 8 0* 7 2* 9 2* 10 0*   HEMATOCRIT %  --  23 0* 28 6* 30 0*   PLATELETS Thousands/uL  --  223 292 270       CMP:   Results from last 7 days   Lab Units 10/08/19  0439 10/07/19  1920 10/07/19  1640   SODIUM mmol/L 135* 130* 127*   POTASSIUM mmol/L 3 3* 4 6 5 6*   CHLORIDE mmol/L 102 100 95*   CO2 mmol/L 24 16* 19*   BUN mg/dL 32* 27* 26*   CREATININE mg/dL 1 25 1 53* 1 60*   CALCIUM mg/dL 7 2* 6 0* 7 1*   ALK PHOS U/L 34* 63  --    ALT U/L 197* 116*  --    AST U/L 192* 108*  --      PT/INR:   Lab Results   Component Value Date    INR 1 72 (H) 10/07/2019   ,   Magnesium:   Results from last 7 days   Lab Units 10/07/19  1920 10/05/19  0459   MAGNESIUM mg/dL 2 2 1 6     Phosphorous:   Results from last 7 days   Lab Units 10/07/19  1920   PHOSPHORUS mg/dL 4 2*       Microbiology:        Imaging:      Cardiac lab/EKG/telemetry/ECHO:       VTE Prophylaxis: SCD's    Code Status: No Order    JIM Dewey    Portions of the record may have been created with voice recognition software  Occasional wrong word or "sound a like" substitutions may have occurred due to the inherent limitations of voice recognition software  Read the chart carefully and recognize, using context, where substitutions have occurred

## 2019-10-08 NOTE — PLAN OF CARE
Problem: Potential for Falls  Goal: Patient will remain free of falls  Description  INTERVENTIONS:  - Assess patient frequently for physical needs  -  Identify cognitive and physical deficits and behaviors that affect risk of falls    -  [place] fall precautions as indicated by assessment   - Educate patient/family on patient safety including physical limitations  - Instruct patient to call for assistance with activity based on assessment  - Modify environment to reduce risk of injury  - Consider OT/PT consult to assist with strengthening/mobility  Outcome: Progressing     Problem: GENITOURINARY - ADULT  Goal: Absence of urinary retention  Description  INTERVENTIONS:  - Assess patient's ability to void and empty bladder  - Monitor I/O  - Bladder scan as needed  - Discuss with physician/AP medications to alleviate retention as needed  - Discuss catheterization for long term situations as appropriate   Outcome: Progressing  Goal: Urinary catheter remains patent  Description  INTERVENTIONS:  - Assess patency of urinary catheter  - If patient has a chronic alexandre, consider changing catheter if non-functioning  - Follow guidelines for intermittent irrigation of non-functioning urinary catheter  Outcome: Progressing     Problem: METABOLIC, FLUID AND ELECTROLYTES - ADULT  Goal: Electrolytes maintained within normal limits  Description  INTERVENTIONS:  - Monitor labs and assess patient for signs and symptoms of electrolyte imbalances  - Administer electrolyte replacement as ordered  - Monitor response to electrolyte replacements, including repeat lab results as appropriate  - Instruct patient on fluid and nutrition as appropriate  Outcome: Progressing  Goal: Fluid balance maintained  Description  INTERVENTIONS:  - Monitor labs   - Monitor I/O and WT  - Instruct patient on fluid and nutrition as appropriate  - Assess for signs & symptoms of volume excess or deficit  Outcome: Progressing  Goal: Glucose maintained within target range  Description  INTERVENTIONS:  - Monitor Blood Glucose as ordered  - Assess for signs and symptoms of hyperglycemia and hypoglycemia  - Administer ordered medications to maintain glucose within target range  - Assess nutritional intake and initiate nutrition service referral as needed  Outcome: Progressing     Problem: SKIN/TISSUE INTEGRITY - ADULT  Goal: Skin integrity remains intact  Description  INTERVENTIONS  - Identify patients at risk for skin breakdown  - Assess and monitor skin integrity  - Assess and monitor nutrition and hydration status  - Monitor labs (i e  albumin)  - Assess for incontinence   - Turn and reposition patient  - Assist with mobility/ambulation  - Relieve pressure over bony prominences  - Avoid friction and shearing  - Provide appropriate hygiene as needed including keeping skin clean and dry  - Evaluate need for skin moisturizer/barrier cream  - Collaborate with interdisciplinary team (i e  Nutrition, Rehabilitation, etc )   - Patient/family teaching  Outcome: Progressing  Goal: Incision(s), wounds(s) or drain site(s) healing without S/S of infection  Description  INTERVENTIONS  - Assess and document risk factors for skin impairment   - Assess and document dressing, incision, wound bed, drain sites and surrounding tissue  - Consider nutrition services referral as needed  - Oral mucous membranes remain intact  - Provide patient/ family education  Outcome: Progressing  Goal: Oral mucous membranes remain intact  Description  INTERVENTIONS  - Assess oral mucosa and hygiene practices  - Implement preventative oral hygiene regimen  - Implement oral medicated treatments as ordered  - Initiate Nutrition services referral as needed  Outcome: Progressing     Problem: Prexisting or High Potential for Compromised Skin Integrity  Goal: Skin integrity is maintained or improved  Description  INTERVENTIONS:  - Identify patients at risk for skin breakdown  - Assess and monitor skin integrity  - Assess and monitor nutrition and hydration status  - Monitor labs   - Assess for incontinence   - Turn and reposition patient  - Assist with mobility/ambulation  - Relieve pressure over bony prominences  - Avoid friction and shearing  - Provide appropriate hygiene as needed including keeping skin clean and dry  - Evaluate need for skin moisturizer/barrier cream  - Collaborate with interdisciplinary team   - Patient/family teaching  - Consider wound care consult   Outcome: Progressing     Problem: NEUROSENSORY - ADULT  Goal: Achieves stable or improved neurological status  Description  INTERVENTIONS  - Monitor and report changes in neurological status  - Monitor vital signs such as temperature, blood pressure, glucose, and any other labs ordered   - Initiate measures to prevent increased intracranial pressure  - Monitor for seizure activity and implement precautions if appropriate      Outcome: Progressing  Goal: Remains free of injury related to seizures activity  Description  INTERVENTIONS  - Maintain airway, patient safety  and administer oxygen as ordered  - Monitor patient for seizure activity, document and report duration and description of seizure to physician/advanced practitioner  - If seizure occurs,  ensure patient safety during seizure  - Reorient patient post seizure  - Seizure pads on all 4 side rails  - Instruct patient/family to notify RN of any seizure activity including if an aura is experienced  - Instruct patient/family to call for assistance with activity based on nursing assessment  - Administer anti-seizure medications if ordered    Outcome: Progressing  Goal: Achieves maximal functionality and self care  Description  INTERVENTIONS  - Monitor swallowing and airway patency with patient fatigue and changes in neurological status  - Encourage and assist patient to increase activity and self care     - Encourage visually impaired, hearing impaired and aphasic patients to use assistive/communication devices  Outcome: Progressing     Problem: CARDIOVASCULAR - ADULT  Goal: Maintains optimal cardiac output and hemodynamic stability  Description  INTERVENTIONS:  - Monitor I/O, vital signs and rhythm  - Monitor for S/S and trends of decreased cardiac output  - Administer and titrate ordered vasoactive medications to optimize hemodynamic stability  - Assess quality of pulses, skin color and temperature  - Assess for signs of decreased coronary artery perfusion  - Instruct patient to report change in severity of symptoms  Outcome: Progressing  Goal: Absence of cardiac dysrhythmias or at baseline rhythm  Description  INTERVENTIONS:  - Continuous cardiac monitoring, vital signs, obtain 12 lead EKG if ordered  - Administer antiarrhythmic and heart rate control medications as ordered  - Monitor electrolytes and administer replacement therapy as ordered  Outcome: Progressing     Problem: RESPIRATORY - ADULT  Goal: Achieves optimal ventilation and oxygenation  Description  INTERVENTIONS:  - Assess for changes in respiratory status  - Assess for changes in mentation and behavior  - Position to facilitate oxygenation and minimize respiratory effort  - Oxygen administered by appropriate delivery if ordered  - Initiate smoking cessation education as indicated  - Encourage broncho-pulmonary hygiene including cough, deep breathe, Incentive Spirometry  - Assess the need for suctioning and aspirate as needed  - Assess and instruct to report SOB or any respiratory difficulty  - Respiratory Therapy support as indicated  Outcome: Progressing     Problem: HEMATOLOGIC - ADULT  Goal: Maintains hematologic stability  Description  INTERVENTIONS  - Assess for signs and symptoms of bleeding or hemorrhage  - Monitor labs  - Administer supportive blood products/factors as ordered and appropriate  Outcome: Progressing     Problem: MUSCULOSKELETAL - ADULT  Goal: Maintain or return mobility to safest level of function  Description  INTERVENTIONS:  - Assess patient's ability to carry out ADLs; assess patient's baseline for ADL function and identify physical deficits which impact ability to perform ADLs (bathing, care of mouth/teeth, toileting, grooming, dressing, etc )  - Assess/evaluate cause of self-care deficits   - Assess range of motion  - Assess patient's mobility  - Assess patient's need for assistive devices and provide as appropriate  - Encourage maximum independence but intervene and supervise when necessary  - Involve family in performance of ADLs  - Assess for home care needs following discharge   - Consider OT consult to assist with ADL evaluation and planning for discharge  - Provide patient education as appropriate  Outcome: Progressing  Goal: Maintain proper alignment of affected body part  Description  INTERVENTIONS:  - Support, maintain and protect limb and body alignment  - Provide patient/ family with appropriate education  Outcome: Progressing

## 2019-10-08 NOTE — PROGRESS NOTES
Gyn Oncology Progress note   Omar Gardner 82 y o  female MRN: [id number]  Unit/Bed#: ICU 02 Encounter: [id number]    Assessment: 82 y o  POD# 4 from TLH, BSO, ex lap, pelvic and para-aortic lymphadenectomy, omentectomy, peritoneal biopsies, and ovarian cancer staging for right tubo-ovarian mass consistent with adenocarcinoma, s/p code 10/7    Plan:    1) Acute hypoxic respiratory failure  Management per critical care team  Currently intubated and sedated  Continue cefepime and flagyl    2) Severe metabolic acidosis  Lactic acidosis 6 8 --> code --> 7 8 --> 5 4, improving on bicarb infusion  CTA 10/7: no PE, bilateral pleural effusion, bilateral atelectasis, hiatal hernia with gastric and esophageal distension  CT head 10/7: no acute changes  CTAP 10/8: dense perihilar opacities, persistent bilateral nephrograms, dilated small bowel, gas in colon, possible hematoma in pelvis 7x5x7, possible lymphocele along left external iliac vessel  Management per critical care team    3) Acute kidney injury  Cr 0 72 --> 0 96 --> 1 6 --> 1 53 overnight  Discontinue nephrotoxic agents, continue to monitor UO    4) Postoperative ileus vs SBO  NPO, NG tube in place (output 1400cc since placement)    5) Hyponatremia  Na 141 --> 126 --> 127 --> 130  Continue to trend    6) Anemia  Hgb 10 3 -->10 0 --> 9 2 --> 7 2 --> 8 0  Continue protonix infusion    7) Right tubo-ovarian mass, s/p surgery   Frozen consistent with adenocarcinoma, follow up final pathology  Continue routine postoperative care    8) Urinary retention  Ji in place due to failed voiding trial x 2  UO 330cc overnight    9) T1DM  Transitioned from SSI algorithm 2 and lantus 12U at night to insulin drip for better control of her glucose    Home meds: humalog 4/5/7 with meals, lantus 12U at night    10) Analgesia  S/p TAPs block, s/p dilaudid   Tylenol [address], motrin prn, oxycodone 5-10mg for moderate-severe pain prn  Dilaudid prn    11) Dry eyes  Lubifresh prn, cosopt BID, travoprost qD    12) Depression  Cymbalta 30mg qHS    13) Hypertension  BP 140s-150s/60s-80s  Metoprolol 5mg prn  Avapro 150mg daily    14) Hypothyroidism  Levothyroxine 50mcg daily    15) FEN: NPO    16) DVT ppx: SCDs, lovenox 40mg q24 hours      Subjective:    Nick García is currently sedated  BP (!) 82/38   Pulse 74   Temp (!) 97 1 °F (36 2 °C) (Temporal)   Resp (!) 27   Ht 5' 5" (1 651 m)   Wt 58 5 kg (129 lb)   SpO2 99%   Breastfeeding? No   BMI 21 47 kg/m²     I/O last 3 completed shifts: In: 1925 8 [P O :480; I V :1445 8]  Out: 9439 [[id number]]  I/O this shift:  In: 2256 4 [I V :1906 4; IV Piggyback:350]  Out: 3642 [Urine:330; Emesis/NG output:1400]    Lab Results   Component Value Date    WBC 11 34 (H) 10/07/2019    HGB 8 0 (L) 10/07/2019    HCT 23 0 (L) 10/07/2019     (H) 10/07/2019     10/07/2019       Lab Results   Component Value Date    GLUCOSE 75 01/08/2016    CALCIUM 7 2 (L) 10/08/2019     (L) 01/08/2016    K 3 3 (L) 10/08/2019    CO2 24 10/08/2019     10/08/2019    BUN 32 (H) 10/08/2019    CREATININE 1 25 10/08/2019         Physical Exam   Constitutional: She is oriented to person, place, and time  She appears well-developed and well-nourished  No distress  Sedated   Cardiovascular: Normal rate, regular rhythm and normal heart sounds  Exam reveals no gallop and no friction rub  No murmur heard  Triple lumen and A line in place   Pulmonary/Chest: Effort normal and breath sounds normal  No stridor  No respiratory distress  She has no wheezes  She has no rales  Abdominal: She exhibits distension  She exhibits no mass  There is no tenderness  There is no rebound and no guarding  No hernia  Bowel sounds not present  Vertical incision clean, dry, intact without signs of inflammation   Neurological: She is alert and oriented to person, place, and time  Skin: She is not diaphoretic  Vitals reviewed        Flo Arteaga MD  10/8/2019  6:21 AM

## 2019-10-08 NOTE — PROGRESS NOTES
Pastoral Care Progress Note    10/8/2019  Patient: Rosemary Smyth : 1935  Admission Date & Time: 10/4/2019 0547  MRN: [id number] [facility]: [id number]                     Chaplaincy Interventions Utilized:   Empowerment: Clarified, confirmed, or reviewed information from treatment team , Encouraged self-care, Normalized experience of patient/family, Provided anticipatory guidance and Provided anxiety containment    Exploration: Explored emotional needs & resources and Identified, evaluated & reinforced appropriate coping strategies    Collaboration: Consulted with interdisciplinary team    Relationship Building: Cultivated a relationship of care and support, Listened empathically, Hospitality and Provided silent and supportive presence    Ritual: Provided prayer            Chaplaincy Outcomes Achieved:  Developed chaplaincy care plan, Emotional resources utilized and Spiritual resources utilized          Spiritual Coping Strategies Utilized:   Spiritual practices, Spiritual comfort and Positive spiritual reframing       10/08/19 1600   Clinical Encounter Type   Visited With Patient and family together   Crisis Visit Critical Care   Referral From Nurse   Rastafarian Encounters   Rastafarian Needs Prayer   Sacramental Encounters   Sacrament Other Other (Comment)  ([facility])   Patient Spiritual Encounters   Spiritual Encounter Notes Patient was transferred to the ICU yesterday following a rapid response and later needed to be resusitated in the unit  Patient is currently intubated and her condition is described as guarded   met with patient's family at bedside and offered prayer as well as a pastoral blessing  Family Spiritual Encounters   Family Coping Open/discussion; Anxiety; Accepting   Family Normalization 3   Family Participation in Care 4   Family Support During Treatment 4   Caregiver-Patient Relationship 5

## 2019-10-08 NOTE — CONSULTS
IR consulted for possible embolization due to a pelvic hematoma seen on yesterday's non-contrast CT scan  The patient has a drop in Hgb down to 6 3 and the CT scan showed a 7 6 cm high density pelvic fluid collection  This collection is too small to account for this Hgb drop  Suggest non-contrast CT scan today to see if the collection is much larger  Patient will be receiving PRBC's for now  We will hold off on any CTA or embolization for now until reviewing the non-contrast CTA  The patient also has heme positive stool and there could be GI bleeding

## 2019-10-08 NOTE — QUICK NOTE
GYNECOLOGIC ONCOLOGY FOLLOW-UP    Pt seen and examined  Case d/w ICU's CRNP and IR attendings  Drop in Hb noted  Pt otherwise stable  No increased pressor requirement, BPs indeed improved  In light of hematoma found on CT last night consideration given to IR intervention / embolization  However, given POD4 with previously stable Hbs until last night events, relatively small hematoma which would not explain Hb deficit as well as large hematemesis last night, we have agreed on recommending against intervention at this point  Agree with transfusion PRBCs  Will also consider FFP if INR continues to uptrend  Laboratory parameters including Cr and Lactate improving  I have updated pt's daughter about pt status and plan      Mary Ellen Jin MD, [address]  10/8/2019  2:06 PM

## 2019-10-09 ENCOUNTER — APPOINTMENT (INPATIENT)
Dept: RADIOLOGY | Facility: HOSPITAL | Age: 84
DRG: 736 | End: 2019-10-09
Payer: COMMERCIAL

## 2019-10-09 LAB
ABO GROUP BLD BPU: NORMAL
ANION GAP SERPL CALCULATED.3IONS-SCNC: 8 MMOL/L (ref 4–13)
ARTERIAL PATENCY WRIST A: YES
ATRIAL RATE: 111 BPM
ATRIAL RATE: 157 BPM
BASE EXCESS BLDA CALC-SCNC: -0.1 MMOL/L
BPU ID: NORMAL
BUN SERPL-MCNC: 25 MG/DL (ref 5–25)
CALCIUM SERPL-MCNC: 7 MG/DL (ref 8.3–10.1)
CHLORIDE SERPL-SCNC: 104 MMOL/L (ref 100–108)
CO2 SERPL-SCNC: 26 MMOL/L (ref 21–32)
CREAT SERPL-MCNC: 0.95 MG/DL (ref 0.6–1.3)
CROSSMATCH: NORMAL
ERYTHROCYTE [DISTWIDTH] IN BLOOD BY AUTOMATED COUNT: 14.6 % (ref 11.6–15.1)
GFR SERPL CREATININE-BSD FRML MDRD: 56 ML/MIN/1.73SQ M
GLUCOSE SERPL-MCNC: 179 MG/DL (ref 65–140)
GLUCOSE SERPL-MCNC: 206 MG/DL (ref 65–140)
GLUCOSE SERPL-MCNC: 207 MG/DL (ref 65–140)
GLUCOSE SERPL-MCNC: 217 MG/DL (ref 65–140)
GLUCOSE SERPL-MCNC: 220 MG/DL (ref 65–140)
GLUCOSE SERPL-MCNC: 232 MG/DL (ref 65–140)
GLUCOSE SERPL-MCNC: 258 MG/DL (ref 65–140)
HCO3 BLDA-SCNC: 22.7 MMOL/L (ref 22–28)
HCT VFR BLD AUTO: 23 % (ref 34.8–46.1)
HGB BLD-MCNC: 7.8 G/DL (ref 11.5–15.4)
HOROWITZ INDEX BLDA+IHG-RTO: 50 MM[HG]
INR PPP: 1.57 (ref 0.84–1.19)
MCH RBC QN AUTO: 32.2 PG (ref 26.8–34.3)
MCHC RBC AUTO-ENTMCNC: 33.9 G/DL (ref 31.4–37.4)
MCV RBC AUTO: 95 FL (ref 82–98)
O2 CT BLDA-SCNC: 13.5 ML/DL (ref 16–23)
OXYHGB MFR BLDA: 89.6 % (ref 94–97)
P AXIS: 72 DEGREES
PCO2 BLDA: 31 MM HG (ref 36–44)
PEEP RESPIRATORY: 8 CM[H2O]
PH BLDA: 7.48 [PH] (ref 7.35–7.45)
PLATELET # BLD AUTO: 147 THOUSANDS/UL (ref 149–390)
PMV BLD AUTO: 9.9 FL (ref 8.9–12.7)
PO2 BLDA: 50.2 MM HG (ref 75–129)
POTASSIUM SERPL-SCNC: 3.9 MMOL/L (ref 3.5–5.3)
PR INTERVAL: 150 MS
PROTHROMBIN TIME: 19 SECONDS (ref 11.6–14.5)
QRS AXIS: -72 DEGREES
QRS AXIS: 76 DEGREES
QRSD INTERVAL: 113 MS
QRSD INTERVAL: 117 MS
QT INTERVAL: 271 MS
QT INTERVAL: 333 MS
QTC INTERVAL: 438 MS
QTC INTERVAL: 453 MS
RBC # BLD AUTO: 2.42 MILLION/UL (ref 3.81–5.12)
SODIUM SERPL-SCNC: 138 MMOL/L (ref 136–145)
SPECIMEN SOURCE: ABNORMAL
T WAVE AXIS: 46 DEGREES
T WAVE AXIS: 72 DEGREES
UNIT DISPENSE STATUS: NORMAL
UNIT PRODUCT CODE: NORMAL
UNIT RH: NORMAL
VENT AC: 20
VENT- AC: AC
VENTRICULAR RATE: 111 BPM
VENTRICULAR RATE: 157 BPM
VT SETTING VENT: 420 ML
WBC # BLD AUTO: 8.29 THOUSAND/UL (ref 4.31–10.16)

## 2019-10-09 PROCEDURE — 93010 ELECTROCARDIOGRAM REPORT: CPT

## 2019-10-09 PROCEDURE — 93010 ELECTROCARDIOGRAM REPORT: CPT | Performed by: INTERNAL MEDICINE

## 2019-10-09 PROCEDURE — 87077 CULTURE AEROBIC IDENTIFY: CPT | Performed by: NURSE PRACTITIONER

## 2019-10-09 PROCEDURE — 87106 FUNGI IDENTIFICATION YEAST: CPT | Performed by: NURSE PRACTITIONER

## 2019-10-09 PROCEDURE — 99291 CRITICAL CARE FIRST HOUR: CPT | Performed by: INTERNAL MEDICINE

## 2019-10-09 PROCEDURE — 85027 COMPLETE CBC AUTOMATED: CPT | Performed by: NURSE PRACTITIONER

## 2019-10-09 PROCEDURE — 87186 SC STD MICRODIL/AGAR DIL: CPT | Performed by: NURSE PRACTITIONER

## 2019-10-09 PROCEDURE — 82805 BLOOD GASES W/O2 SATURATION: CPT | Performed by: NURSE PRACTITIONER

## 2019-10-09 PROCEDURE — C9113 INJ PANTOPRAZOLE SODIUM, VIA: HCPCS | Performed by: NURSE PRACTITIONER

## 2019-10-09 PROCEDURE — 80048 BASIC METABOLIC PNL TOTAL CA: CPT | Performed by: NURSE PRACTITIONER

## 2019-10-09 PROCEDURE — 85610 PROTHROMBIN TIME: CPT | Performed by: NURSE PRACTITIONER

## 2019-10-09 PROCEDURE — 94003 VENT MGMT INPAT SUBQ DAY: CPT

## 2019-10-09 PROCEDURE — 71045 X-RAY EXAM CHEST 1 VIEW: CPT

## 2019-10-09 PROCEDURE — 93005 ELECTROCARDIOGRAM TRACING: CPT

## 2019-10-09 PROCEDURE — 87070 CULTURE OTHR SPECIMN AEROBIC: CPT | Performed by: NURSE PRACTITIONER

## 2019-10-09 PROCEDURE — 87205 SMEAR GRAM STAIN: CPT | Performed by: NURSE PRACTITIONER

## 2019-10-09 PROCEDURE — 99024 POSTOP FOLLOW-UP VISIT: CPT | Performed by: OBSTETRICS & GYNECOLOGY

## 2019-10-09 PROCEDURE — 82948 REAGENT STRIP/BLOOD GLUCOSE: CPT

## 2019-10-09 RX ORDER — METOPROLOL TARTRATE 5 MG/5ML
5 INJECTION INTRAVENOUS ONCE
Status: COMPLETED | OUTPATIENT
Start: 2019-10-09 | End: 2019-10-09

## 2019-10-09 RX ORDER — FUROSEMIDE 10 MG/ML
40 INJECTION INTRAMUSCULAR; INTRAVENOUS ONCE
Status: COMPLETED | OUTPATIENT
Start: 2019-10-09 | End: 2019-10-09

## 2019-10-09 RX ADMIN — METRONIDAZOLE 500 MG: 500 INJECTION, SOLUTION INTRAVENOUS at 13:12

## 2019-10-09 RX ADMIN — TRAVOPROST 1 DROP: 0.04 SOLUTION/ DROPS OPHTHALMIC at 21:34

## 2019-10-09 RX ADMIN — CHLORHEXIDINE GLUCONATE 0.12% ORAL RINSE 15 ML: 1.2 LIQUID ORAL at 10:09

## 2019-10-09 RX ADMIN — CHLORHEXIDINE GLUCONATE 0.12% ORAL RINSE 15 ML: 1.2 LIQUID ORAL at 20:24

## 2019-10-09 RX ADMIN — DORZOLAMIDE HYDROCHLORIDE AND TIMOLOL MALEATE 1 DROP: 20; 5 SOLUTION/ DROPS OPHTHALMIC at 09:20

## 2019-10-09 RX ADMIN — INSULIN LISPRO 2 UNITS: 100 INJECTION, SOLUTION INTRAVENOUS; SUBCUTANEOUS at 23:59

## 2019-10-09 RX ADMIN — INSULIN LISPRO 1 UNITS: 100 INJECTION, SOLUTION INTRAVENOUS; SUBCUTANEOUS at 17:55

## 2019-10-09 RX ADMIN — METOPROLOL TARTRATE 2.5 MG: 5 INJECTION, SOLUTION INTRAVENOUS at 13:59

## 2019-10-09 RX ADMIN — METRONIDAZOLE 500 MG: 500 INJECTION, SOLUTION INTRAVENOUS at 20:25

## 2019-10-09 RX ADMIN — METOPROLOL TARTRATE 5 MG: 5 INJECTION, SOLUTION INTRAVENOUS at 16:45

## 2019-10-09 RX ADMIN — PANTOPRAZOLE SODIUM 40 MG: 40 INJECTION, POWDER, FOR SOLUTION INTRAVENOUS at 10:09

## 2019-10-09 RX ADMIN — DORZOLAMIDE HYDROCHLORIDE AND TIMOLOL MALEATE 1 DROP: 20; 5 SOLUTION/ DROPS OPHTHALMIC at 17:54

## 2019-10-09 RX ADMIN — METRONIDAZOLE 500 MG: 500 INJECTION, SOLUTION INTRAVENOUS at 05:10

## 2019-10-09 RX ADMIN — PANTOPRAZOLE SODIUM 40 MG: 40 INJECTION, POWDER, FOR SOLUTION INTRAVENOUS at 20:25

## 2019-10-09 RX ADMIN — CEFEPIME HYDROCHLORIDE 1000 MG: 1 INJECTION, POWDER, FOR SOLUTION INTRAMUSCULAR; INTRAVENOUS at 21:17

## 2019-10-09 RX ADMIN — INSULIN LISPRO 2 UNITS: 100 INJECTION, SOLUTION INTRAVENOUS; SUBCUTANEOUS at 05:18

## 2019-10-09 RX ADMIN — FUROSEMIDE 40 MG: 10 INJECTION, SOLUTION INTRAMUSCULAR; INTRAVENOUS at 10:31

## 2019-10-09 RX ADMIN — VASOPRESSIN 0.03 UNITS/MIN: 20 INJECTION INTRAVENOUS at 05:13

## 2019-10-09 RX ADMIN — PROPOFOL 20 MCG/KG/MIN: 10 INJECTION, EMULSION INTRAVENOUS at 03:13

## 2019-10-09 RX ADMIN — INSULIN LISPRO 1 UNITS: 100 INJECTION, SOLUTION INTRAVENOUS; SUBCUTANEOUS at 12:41

## 2019-10-09 NOTE — PROGRESS NOTES
Patient Name: Perry Tena  Patient MRN: [id number]  Date: 10/09/19  Service: [facility]    Subjective   No events noted overnight  Discussed with nursing team - minimal coffee-ground material noted and OG tube being, minimal output from rectal tube  Hemoglobin remains stable  Son updated at bedside  Vitals  Blood pressure 128/84, pulse (!) 148, temperature 99 9 °F (37 7 °C), temperature source Temporal, resp  rate 20, height 5' 5" (1 651 m), weight 58 5 kg (129 lb), SpO2 91 %, not currently breastfeeding  Physical Exam:     General Appearance:    Opens eyes to name, no distress, well developed, well    nourished   Head:    Normocephalic without obvious abnormality   Eyes:    PERRL, conjunctiva/corneas clear, EOM's intact        Neck:   Supple, + IJ triple-lumen   Throat:   Intubated   Lungs:     Clear to auscultation bilaterally, no wheezing or rhonchi   Heart:    Irregular, tachycardic   Abdomen:     Soft, non-tender, distended  bowel sounds hypoactiv      Extremities:   Extremities without edema   Psych  Derm:   Unable to evaluate    No jaundice   Neurologic:   Sedated, opens eyes to name         Laboratory Studies  Results from last 7 days   Lab Units 10/09/19  0504 10/08/19  1617 10/08/19  0938 10/08/19  0740 10/07/19  2035 10/07/19  1920 10/07/19  1640 10/07/19  0505 10/06/19  0647   WBC Thousand/uL 8 29  --  3 32* 2 61*  --  11 34* 12 93* 12 02* 14 64*   HEMOGLOBIN g/dL 7 8* 7 8* 6 5* 6 3* 8 0* 7 2* 9 2* 10 0* 10 3*   HEMATOCRIT % 23 0*  --  19 3* 18 5*  --  23 0* 28 6* 30 0* 32 8*   PLATELETS Thousands/uL 147*  --  185 170  --  223 292 270 278   INR  1 57*  --   --   --   --  1 72*  --   --   --      Results from last 7 days   Lab Units 10/09/19  0504 10/08/19  0439 10/07/19  1920 10/07/19  1640 10/06/19  0647   SODIUM mmol/L 138 135* 130* 127* 126*   POTASSIUM mmol/L 3 9 3 3* 4 6 5 6* 5 1   CHLORIDE mmol/L 104 102 100 95* 96*   CO2 mmol/L 26 24 16* 19* 21   BUN mg/dL 25 32* 27* 26* 19   CREATININE mg/dL 0 95 1 25 1 53* 1 60* 0 96   CALCIUM mg/dL 7 0* 7 2* 6 0* 7 1* 7 0*   ALBUMIN g/dL  --  2 0* 1 6*  --   --    TOTAL BILIRUBIN mg/dL  --  0 50 0 39  --   --    ALK PHOS U/L  --  34* 63  --   --    ALT U/L  --  197* 116*  --   --    AST U/L  --  192* 108*  --   --          Imaging and Other Studies      Inhouse Medications     Current Facility-Administered Medications:     artificial tear (LUBRIFRESH P M ) ophthalmic ointment, , Both Eyes, HS PRN    cefepime (MAXIPIME) 1,000 mg in dextrose 5 % 50 mL IVPB, 1,000 mg, Intravenous, Q24H, Stopped at 10/08/19 2201    chlorhexidine (PERIDEX) 0 12 % oral rinse 15 mL, 15 mL, Swish & Spit, Q12H [address], 15 mL at 10/09/19 1009    dorzolamide-timolol (COSOPT) 22 3-6 8 MG/ML ophthalmic solution 1 drop, 1 drop, Both Eyes, BID, 1 drop at 10/09/19 0920    fentaNYL (SUBLIMAZE) injection 50 mcg, 50 mcg, Intravenous, Q2H PRN    insulin lispro (HumaLOG) 100 units/mL subcutaneous injection 1-5 Units, 1-5 Units, Subcutaneous, Q6H [address], 2 Units at 10/09/19 0518 **AND** Fingerstick Glucose (POCT), , , Q6H    lactated ringers bolus 1,000 mL, 1,000 mL, Intravenous, Once    LORazepam (ATIVAN) 2 mg/mL injection 2 mg, 2 mg, Intravenous, Q4H PRN    metoprolol (LOPRESSOR) injection 2 5 mg, 2 5 mg, Intravenous, Q6H PRN, 2 5 mg at 10/08/19 2043    metroNIDAZOLE (FLAGYL) IVPB (premix) 500 mg, 500 mg, Intravenous, Q8H, 500 mg at 10/09/19 0510    Netarsudil Dimesylate 0 02 % SOLN 1 drop, 1 drop, Right Eye, HS, 1 drop at 10/08/19 2146    ondansetron (ZOFRAN) injection 4 mg, 4 mg, Intravenous, Q6H PRN, 4 mg at 10/07/19 0541    pantoprazole (PROTONIX) injection 40 mg, 40 mg, Intravenous, Q12H [address], 40 mg at 10/09/19 1009    travoprost (TRAVATAN-Z) 0 004 % ophthalmic solution 1 drop, 1 drop, Both Eyes, HS, 1 drop at 10/08/19 7226      Assessment/Plan:    1  Probably UGI bleed likely related to stress gastritis vs ulcer vs ischemia in the setting of recent ileus   Continue current conservative management on PPI  Monitor hemoglobin  No current plans for endoscopy  2  Acute blood loss anemia in the setting of recent surgery, malignancy and hematoma on CT  Continue to monitor, transfuse as needed  Hemoglobin stable  3  Status post ex-lap TLH/BSO, pelvic and periaotric lymphadectomy, omental biopsies and ovarian 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

## 2019-10-09 NOTE — PROGRESS NOTES
Progress Note - Critical Care   Izabel Garcia 82 y o  female MRN: [id number]  Unit/Bed#: ICU 02 Encounter: [id number]    Assessment/Plan:  1  Acute hypoxic respiratory failure likely multifactorial related to aspiration, aspiration pneumonitis and probable ARDS  · We will continue vent support for now with a goal to maintain her saturations greater than 90%  · We will continue ARDS net lung protective strategy  2  Anemia, likely acute blood loss in the setting of her recent surgery, possible gastritis and inter abdominal hematoma  · Will continue to monitor her hemoglobin  She is status post transfusion of 2 units of packed red blood cells yesterday  · We will continue her on Protonix b i d  For now  · Her hemoglobin is stable  3  Upper gastrointestinal bleeding, possible gastritis versus stress ulcer versus ischemia in the setting of her recent ileus  · Appreciate Gastroenterology assistance with the care this patient  · Will continue Protonix as noted above  4  Acute kidney injury likely multifactorial related to her recent surgical procedure, 1  And 2    · We will continue to monitor her urine output and renal indices closely  5  Severe metabolic acidosis-improved  6  Status post exploratory laparotomy TLH, BS0, pelvic and periaortic lymphadectomy, omental biopsies and ovarian staging for right tubo-ovarian mass consistent with adenocarcinoma, postop day 5  · Management per gynecology oncology is recommendations  7  Encephalopathy, likely multifactorial toxic versus metabolic, possible anoxic injury  · We will continue to monitor her mental status closely    Critical Care Time:   Documented critical care time excludes any procedures documented elsewhere  It also excludes any family updates    _____________________________________________________________________    HPI/24hr events:   No events overnight    Her oxygenation is improved and she is off pressors    Medications:    Current Facility-Administered Medications:  artificial tear  Both Eyes HS PRN JIM Skinner    cefepime 1,000 mg Intravenous Q24H JIM Ross Last Rate: Stopped (10/08/19 2201)   chlorhexidine 15 mL Swish & Spit Q12H [facility] JIM Rajan    dorzolamide-timolol 1 drop Both Eyes BID JIM Skinner    fentaNYL 50 mcg Intravenous Q2H PRN JIM Ross    fentaNYL 50 mcg/hr Intravenous Continuous JIM Ross Last Rate: 25 mcg/hr (10/09/19 0716)   insulin lispro 1-5 Units Subcutaneous Q6H [facility] JIM Kim    insulin regular (HumuLIN R,NovoLIN R) infusion 0 3-21 Units/hr Intravenous Titrated JIM Coffman Last Rate: Stopped (10/08/19 1729)   lactated ringers 1,000 mL Intravenous Once JIM Skinner    LORazepam 2 mg Intravenous Q4H PRN JIM Ross    metoprolol 2 5 mg Intravenous Q6H PRN JIM Ross    metroNIDAZOLE 500 mg Intravenous Q8H JIM Skinner Last Rate: 500 mg (10/09/19 0510)   Netarsudil Dimesylate 1 drop Right Eye HS JIM Skinner    norepinephrine 1-30 mcg/min Intravenous Titrated JIM Skinner Last Rate: Stopped (10/09/19 0717)   ondansetron 4 mg Intravenous Q6H PRN JIM Skinner    pantoprazole 40 mg Intravenous Q12H [facility] JIM Kim    propofol 5-50 mcg/kg/min Intravenous Titrated JIM Ross Last Rate: Stopped (10/09/19 0717)   travoprost 1 drop Both Eyes HS JIM Wallace    vasopressin (PITRESSIN) in 0 9 % sodium chloride 100 mL 0 03 Units/min Intravenous Continuous JIM Ross Last Rate: 0 03 Units/min (10/09/19 0513)         fentaNYL 50 mcg/hr Last Rate: 25 mcg/hr (10/09/19 0716)   insulin regular (HumuLIN R,NovoLIN R) infusion 0 3-21 Units/hr Last Rate: Stopped (10/08/19 1729)   norepinephrine 1-30 mcg/min Last Rate: Stopped (10/09/19 0717)   propofol 5-50 mcg/kg/min Last Rate: Stopped (10/09/19 0717)   vasopressin (PITRESSIN) in 0 9 % sodium chloride 100 mL 0 03 Units/min Last Rate: 0 03 Units/min (10/09/19 9317)         Physical exam:  Vitals: Body mass index is 21 47 kg/m²  Blood pressure (!) 122/48, pulse (!) 116, temperature 98 7 °F (37 1 °C), temperature source Temporal, resp  rate 20, height 5' 5" (1 651 m), weight 58 5 kg (129 lb), SpO2 91 %, not currently breastfeeding ,  Temp  Min: 96 8 °F (36 °C)  Max: 99 3 °F (37 4 °C)  IBW: 57 kg    SpO2: 91 %  SpO2 Activity: At Rest  O2 Device: None (Room air)      Intake/Output Summary (Last 24 hours) at 10/9/2019 0746  Last data filed at 10/9/2019 0601  Gross per 24 hour   Intake 2693 16 ml   Output 995 ml   Net 1698 16 ml       Invasive/non-invasive ventilation settings:   Respiratory    Lab Data (Last 4 hours)      10/09 0533            pH, Arterial       7 483           pCO2, Arterial       31 0           pO2, Arterial       50 2           HCO3, Arterial       22 7           Base Excess, Arterial       -0 1                O2/Vent Data     None              Invasive Devices     Central Venous Catheter Line            CVC Central Lines 10/07/19 Triple 16cm 1 day          Peripheral Intravenous Line            Peripheral IV 10/07/19 Right Antecubital 1 day          Arterial Line            Arterial Line 10/07/19 Right Radial 1 day          Drain            NG/OG/Enteral Tube Orogastric 16 Fr Left mouth 2 days    Urethral Catheter 16 Fr  2 days    Rectal Tube With balloon less than 1 day          Airway            ETT  Cuffed 8 mm 1 day    ETT  Cuffed; Hi-Lo 7 mm 1 day                  Physical Exam:  Gen:  Sedated, opens her eyes to stimulation  HEENT:  Pupils are equal round reactive to light  The oropharynx is intubated but otherwise clear  Neck:  Supple negative for lymphadenopathy    The right IJ triple-lumen site is clean dry and intact  Chest:  Diminished in the bases bilaterally  Cor:  Irregular and mildly tachycardic  Abd:  Distended but soft  Ext:  There is no significant edema  Neuro:  Sedated, opens her eyes to stimulation  Skin:  Warm and dry      Diagnostic Data:  Lab: I have personally reviewed pertinent lab results  CBC:   Results from last 7 days   Lab Units 10/09/19  0504 10/08/19  1617 10/08/19  0938 10/08/19  0740   WBC Thousand/uL 8 29  --  3 32* 2 61*   HEMOGLOBIN g/dL 7 8* 7 8* 6 5* 6 3*   HEMATOCRIT % 23 0*  --  19 3* 18 5*   PLATELETS Thousands/uL 147*  --  185 170       CMP:   Results from last 7 days   Lab Units 10/09/19  0504 10/08/19  0439 10/07/19  1920   SODIUM mmol/L 138 135* 130*   POTASSIUM mmol/L 3 9 3 3* 4 6   CHLORIDE mmol/L 104 102 100   CO2 mmol/L 26 24 16*   BUN mg/dL 25 32* 27*   CREATININE mg/dL 0 95 1 25 1 53*   CALCIUM mg/dL 7 0* 7 2* 6 0*   ALK PHOS U/L  --  34* 63   ALT U/L  --  197* 116*   AST U/L  --  192* 108*     PT/INR:   Lab Results   Component Value Date    INR 1 57 (H) 10/09/2019   ,   Magnesium:   Results from last 7 days   Lab Units 10/07/19  1920 10/05/19  0459   MAGNESIUM mg/dL 2 2 1 6     Phosphorous:   Results from last 7 days   Lab Units 10/07/19  1920   PHOSPHORUS mg/dL 4 2*       Microbiology:        Imaging:      Cardiac lab/EKG/telemetry/ECHO:       VTE Prophylaxis:  SCDs    Code Status: No Order    JIM Jordan    Portions of the record may have been created with voice recognition software  Occasional wrong word or "sound a like" substitutions may have occurred due to the inherent limitations of voice recognition software  Read the chart carefully and recognize, using context, where substitutions have occurred

## 2019-10-09 NOTE — PLAN OF CARE
Problem: Potential for Falls  Goal: Patient will remain free of falls  Description  INTERVENTIONS:  - Assess patient frequently for physical needs  -  Identify cognitive and physical deficits and behaviors that affect risk of falls    -  Conifer fall precautions as indicated by assessment   - Educate patient/family on patient safety including physical limitations  - Instruct patient to call for assistance with activity based on assessment  - Modify environment to reduce risk of injury  - Consider OT/PT consult to assist with strengthening/mobility  Outcome: Progressing     Problem: GENITOURINARY - ADULT  Goal: Absence of urinary retention  Description  INTERVENTIONS:  - Assess patient's ability to void and empty bladder  - Monitor I/O  - Bladder scan as needed  - Discuss with physician/AP medications to alleviate retention as needed  - Discuss catheterization for long term situations as appropriate   Outcome: Progressing  Goal: Urinary catheter remains patent  Description  INTERVENTIONS:  - Assess patency of urinary catheter  - If patient has a chronic alexandre, consider changing catheter if non-functioning  - Follow guidelines for intermittent irrigation of non-functioning urinary catheter  Outcome: Progressing     Problem: METABOLIC, FLUID AND ELECTROLYTES - ADULT  Goal: Electrolytes maintained within normal limits  Description  INTERVENTIONS:  - Monitor labs and assess patient for signs and symptoms of electrolyte imbalances  - Administer electrolyte replacement as ordered  - Monitor response to electrolyte replacements, including repeat lab results as appropriate  - Instruct patient on fluid and nutrition as appropriate  Outcome: Progressing  Goal: Fluid balance maintained  Description  INTERVENTIONS:  - Monitor labs   - Monitor I/O and WT  - Instruct patient on fluid and nutrition as appropriate  - Assess for signs & symptoms of volume excess or deficit  Outcome: Progressing  Goal: Glucose maintained within target range  Description  INTERVENTIONS:  - Monitor Blood Glucose as ordered  - Assess for signs and symptoms of hyperglycemia and hypoglycemia  - Administer ordered medications to maintain glucose within target range  - Assess nutritional intake and initiate nutrition service referral as needed  Outcome: Progressing     Problem: SKIN/TISSUE INTEGRITY - ADULT  Goal: Skin integrity remains intact  Description  INTERVENTIONS  - Identify patients at risk for skin breakdown  - Assess and monitor skin integrity  - Assess and monitor nutrition and hydration status  - Monitor labs (i e  albumin)  - Assess for incontinence   - Turn and reposition patient  - Assist with mobility/ambulation  - Relieve pressure over bony prominences  - Avoid friction and shearing  - Provide appropriate hygiene as needed including keeping skin clean and dry  - Evaluate need for skin moisturizer/barrier cream  - Collaborate with interdisciplinary team (i e  Nutrition, Rehabilitation, etc )   - Patient/family teaching  Outcome: Progressing  Goal: Incision(s), wounds(s) or drain site(s) healing without S/S of infection  Description  INTERVENTIONS  - Assess and document risk factors for skin impairment   - Assess and document dressing, incision, wound bed, drain sites and surrounding tissue  - Consider nutrition services referral as needed  - Oral mucous membranes remain intact  - Provide patient/ family education  Outcome: Progressing  Goal: Oral mucous membranes remain intact  Description  INTERVENTIONS  - Assess oral mucosa and hygiene practices  - Implement preventative oral hygiene regimen  - Implement oral medicated treatments as ordered  - Initiate Nutrition services referral as needed  Outcome: Progressing     Problem: Prexisting or High Potential for Compromised Skin Integrity  Goal: Skin integrity is maintained or improved  Description  INTERVENTIONS:  - Identify patients at risk for skin breakdown  - Assess and monitor skin integrity  - Assess and monitor nutrition and hydration status  - Monitor labs   - Assess for incontinence   - Turn and reposition patient  - Assist with mobility/ambulation  - Relieve pressure over bony prominences  - Avoid friction and shearing  - Provide appropriate hygiene as needed including keeping skin clean and dry  - Evaluate need for skin moisturizer/barrier cream  - Collaborate with interdisciplinary team   - Patient/family teaching  - Consider wound care consult   Outcome: Progressing     Problem: NEUROSENSORY - ADULT  Goal: Achieves stable or improved neurological status  Description  INTERVENTIONS  - Monitor and report changes in neurological status  - Monitor vital signs such as temperature, blood pressure, glucose, and any other labs ordered   - Initiate measures to prevent increased intracranial pressure  - Monitor for seizure activity and implement precautions if appropriate      Outcome: Progressing  Goal: Remains free of injury related to seizures activity  Description  INTERVENTIONS  - Maintain airway, patient safety  and administer oxygen as ordered  - Monitor patient for seizure activity, document and report duration and description of seizure to physician/advanced practitioner  - If seizure occurs,  ensure patient safety during seizure  - Reorient patient post seizure  - Seizure pads on all 4 side rails  - Instruct patient/family to notify RN of any seizure activity including if an aura is experienced  - Instruct patient/family to call for assistance with activity based on nursing assessment  - Administer anti-seizure medications if ordered    Outcome: Progressing  Goal: Achieves maximal functionality and self care  Description  INTERVENTIONS  - Monitor swallowing and airway patency with patient fatigue and changes in neurological status  - Encourage and assist patient to increase activity and self care     - Encourage visually impaired, hearing impaired and aphasic patients to use assistive/communication devices  Outcome: Progressing     Problem: CARDIOVASCULAR - ADULT  Goal: Maintains optimal cardiac output and hemodynamic stability  Description  INTERVENTIONS:  - Monitor I/O, vital signs and rhythm  - Monitor for S/S and trends of decreased cardiac output  - Administer and titrate ordered vasoactive medications to optimize hemodynamic stability  - Assess quality of pulses, skin color and temperature  - Assess for signs of decreased coronary artery perfusion  - Instruct patient to report change in severity of symptoms  Outcome: Progressing  Goal: Absence of cardiac dysrhythmias or at baseline rhythm  Description  INTERVENTIONS:  - Continuous cardiac monitoring, vital signs, obtain 12 lead EKG if ordered  - Administer antiarrhythmic and heart rate control medications as ordered  - Monitor electrolytes and administer replacement therapy as ordered  Outcome: Progressing     Problem: HEMATOLOGIC - ADULT  Goal: Maintains hematologic stability  Description  INTERVENTIONS  - Assess for signs and symptoms of bleeding or hemorrhage  - Monitor labs  - Administer supportive blood products/factors as ordered and appropriate  Outcome: Progressing     Problem: MUSCULOSKELETAL - ADULT  Goal: Maintain or return mobility to safest level of function  Description  INTERVENTIONS:  - Assess patient's ability to carry out ADLs; assess patient's baseline for ADL function and identify physical deficits which impact ability to perform ADLs (bathing, care of mouth/teeth, toileting, grooming, dressing, etc )  - Assess/evaluate cause of self-care deficits   - Assess range of motion  - Assess patient's mobility  - Assess patient's need for assistive devices and provide as appropriate  - Encourage maximum independence but intervene and supervise when necessary  - Involve family in performance of ADLs  - Assess for home care needs following discharge   - Consider OT consult to assist with ADL evaluation and planning for discharge  - Provide patient education as appropriate  Outcome: Progressing  Goal: Maintain proper alignment of affected body part  Description  INTERVENTIONS:  - Support, maintain and protect limb and body alignment  - Provide patient/ family with appropriate education  Outcome: Progressing     Problem: Nutrition/Hydration-ADULT  Goal: Nutrient/Hydration intake appropriate for improving, restoring or maintaining nutritional needs  Description  Monitor and assess patient's nutrition/hydration status for malnutrition  Collaborate with interdisciplinary team and initiate plan and interventions as ordered  Monitor patient's weight and dietary intake as ordered or per policy  Utilize nutrition screening tool and intervene as necessary  Determine patient's food preferences and provide high-protein, high-caloric foods as appropriate       INTERVENTIONS:  - Monitor oral intake, urinary output, labs, and treatment plans  - Assess nutrition and hydration status and recommend course of action  - Evaluate amount of meals eaten  - Assist patient with eating if necessary   - Allow adequate time for meals  - Recommend/ encourage appropriate diets, oral nutritional supplements, and vitamin/mineral supplements  - Order, calculate, and assess calorie counts as needed  - Recommend, monitor, and adjust tube feedings and TPN/PPN based on assessed needs  - Assess need for intravenous fluids  - Provide specific nutrition/hydration education as appropriate  - Include patient/family/caregiver in decisions related to nutrition  Outcome: Progressing     Problem: RESPIRATORY - ADULT  Goal: Achieves optimal ventilation and oxygenation  Description  INTERVENTIONS:  - Assess for changes in respiratory status  - Assess for changes in mentation and behavior  - Position to facilitate oxygenation and minimize respiratory effort  - Oxygen administered by appropriate delivery if ordered  - Initiate smoking cessation education as indicated  - Encourage broncho-pulmonary hygiene including cough, deep breathe, Incentive Spirometry  - Assess the need for suctioning and aspirate as needed  - Assess and instruct to report SOB or any respiratory difficulty  - Respiratory Therapy support as indicated  Outcome: Not Progressing

## 2019-10-09 NOTE — PROGRESS NOTES
Pastoral Care Progress Note    10/9/2019  Patient: Rosemary Smyth : 1935  Admission Date & Time: 10/4/2019 0547  MRN: [id number] [facility]: [id number]                     Chaplaincy Interventions Utilized:   Empowerment: Encouraged self-care, Normalized experience of patient/family, Provided anxiety containment and Reframed experience of patient/family    Exploration: Identified, evaluated & reinforced appropriate coping strategies    Collaboration: Advocated for patient/family and Consulted with interdisciplinary team    Relationship Building: Cultivated a relationship of care and support, Listened empathically, Mediated conflict and Provided silent and supportive presence    Ritual: Provided prayer            Chaplaincy Outcomes Achieved:  Improved communication and Verbally processed emotions        Spiritual Coping Strategies Utilized:   Connectedness and Spiritual comfort       10/09/19 1300   Clinical Encounter Type   Visited With Patient and family together   Crisis Visit Critical Care   Patient Spiritual Encounters   Spiritual Encounter Notes Visited with family at bedside  Family reports that they are hopeful as there has been some improvement in patient's condition since yesterday, though she remains intubated   offered comfort and support

## 2019-10-09 NOTE — PROGRESS NOTES
Gyn Oncology Progress note   Mahesh Roberson 82 y o  female MRN: [id number]  Unit/Bed#: ICU 02 Encounter: [id number]    Assessment: 82 y o  POD# 5 from [facility], BSO, ex lap, pelvic and para-aortic lymphadenectomy, omentectomy, peritoneal biopsies, and ovarian cancer staging for right tubo-ovarian mass consistent with adenocarcinoma, s/p code 10/7    Plan:    1) Acute hypoxic respiratory failure  Management per critical care team  Currently intubated and sedated  Continue cefepime and flagyl    2) Severe metabolic acidosis  Lactic acidosis 6 8 --> code --> 7 8 --> 5 4, s/p bicarb infusion --> 3 2  CTA 10/7: no PE, bilateral pleural effusion, bilateral atelectasis, hiatal hernia with gastric and esophageal distension  CT head 10/7: no acute changes  CTAP 10/8: dense perihilar opacities, persistent bilateral nephrograms, dilated small bowel, gas in colon, possible hematoma in pelvis 7x5x7, possible lymphocele along left external iliac vessel  Management per critical care team    3) Acute kidney injury  Cr 0 72 --> 0 96 --> 1 6 --> 1 53 --> 0 95 today  Discontinue nephrotoxic agents, continue to monitor UO    4) Postoperative ileus vs SBO  NPO, OG tube in place (400cc coffee ground emesis in cannister)    5) Hyponatremia  Na 141 --> 126 --> 127 --> 130 --> 138  Continue to trend    6) Anemia  Likely secondary to GI bleed  Per gastroenterology, no plan for endoscopy at this time  Will monitor patient closely  Continue protonix BID  Hgb 10 3 -->10 0 --> 9 2 --> 7 2 --> 8 0 --> 6 5 --> 2U pRBC --> 7 8    7) Right tubo-ovarian mass, s/p surgery   Frozen consistent with adenocarcinoma, follow up final pathology  Continue routine postoperative care    8) Urinary retention  Ji in place due to failed voiding trial x 2  UO 895cc over last 24 hours     9) T1DM  Transitioned from SSI algorithm 2 and lantus 12U at night to insulin drip for better control of her glucose on 10/7  Transitioned back to SSI on 10/8    Home meds: humalog 4/5/7 with meals, lantus 12U at night    10) Analgesia  S/p TAPs block, s/p dilaudid   Tylenol [address], motrin prn, oxycodone 5-10mg for moderate-severe pain prn  Dilaudid prn    11) Dry eyes  Lubifresh prn, cosopt BID, travoprost qD    12) Depression  Cymbalta 30mg qHS - on hold    13) Hypertension  Metoprolol 2 5mg prn  Avapro 150mg daily    14) Hypothyroidism  Levothyroxine 50mcg daily - on hold    15) FEN: NPO    16) DVT ppx: SCDs, lovenox held      Subjective:    Omar Gardner is currently sedated  /57   Pulse (!) 118   Temp 98 7 °F (37 1 °C) (Temporal)   Resp 20   Ht 5' 5" (1 651 m)   Wt 58 5 kg (129 lb)   SpO2 90%   Breastfeeding? No   BMI 21 47 kg/m²     I/O last 3 completed shifts: In: 4508 5 [I V :3558 5; Blood:300; IV Piggyback:650]  Out: 2400 [Urine:1000; Emesis/NG output:1400]  I/O this shift:  In: 662 7 [I V :512 7; IV Piggyback:150]  Out: 525 [Urine:525]    Lab Results   Component Value Date    WBC 8 29 10/09/2019    HGB 7 8 (L) 10/09/2019    HCT 23 0 (L) 10/09/2019    MCV 95 10/09/2019     (L) 10/09/2019       Lab Results   Component Value Date    GLUCOSE 75 01/08/2016    CALCIUM 7 0 (L) 10/09/2019     (L) 01/08/2016    K 3 9 10/09/2019    CO2 26 10/09/2019     10/09/2019    BUN 25 10/09/2019    CREATININE 0 95 10/09/2019         Physical Exam   Constitutional: She is oriented to person, place, and time  She appears well-developed and well-nourished  No distress  Sedated   HENT:   OG tube in place   Cardiovascular: Normal rate, regular rhythm and normal heart sounds  Exam reveals no gallop and no friction rub  No murmur heard  Triple lumen and A line in place   Pulmonary/Chest: Effort normal and breath sounds normal  No stridor  No respiratory distress  She has no wheezes  She has no rales  Abdominal: She exhibits distension  She exhibits no mass  There is no tenderness  There is no rebound and no guarding  No hernia     Bowel sounds minimal  Vertical incision clean, dry, intact without signs of inflammation   Neurological: She is alert and oriented to person, place, and time  Skin: She is not diaphoretic  Vitals reviewed        Flo Arteaga MD  10/9/2019  6:06 AM

## 2019-10-10 ENCOUNTER — APPOINTMENT (INPATIENT)
Dept: NON INVASIVE DIAGNOSTICS | Facility: HOSPITAL | Age: 84
DRG: 736 | End: 2019-10-10
Payer: COMMERCIAL

## 2019-10-10 LAB
ANION GAP SERPL CALCULATED.3IONS-SCNC: 9 MMOL/L (ref 4–13)
APTT PPP: 33 SECONDS (ref 23–37)
APTT PPP: 73 SECONDS (ref 23–37)
ARTERIAL PATENCY WRIST A: YES
ATRIAL RATE: 91 BPM
BASE EXCESS BLDA CALC-SCNC: -1.2 MMOL/L
BUN SERPL-MCNC: 27 MG/DL (ref 5–25)
CALCIUM SERPL-MCNC: 7.1 MG/DL (ref 8.3–10.1)
CHLORIDE SERPL-SCNC: 104 MMOL/L (ref 100–108)
CO2 SERPL-SCNC: 28 MMOL/L (ref 21–32)
CREAT SERPL-MCNC: 0.95 MG/DL (ref 0.6–1.3)
ERYTHROCYTE [DISTWIDTH] IN BLOOD BY AUTOMATED COUNT: 14.5 % (ref 11.6–15.1)
ERYTHROCYTE [DISTWIDTH] IN BLOOD BY AUTOMATED COUNT: 14.5 % (ref 11.6–15.1)
GFR SERPL CREATININE-BSD FRML MDRD: 56 ML/MIN/1.73SQ M
GLUCOSE SERPL-MCNC: 132 MG/DL (ref 65–140)
GLUCOSE SERPL-MCNC: 132 MG/DL (ref 65–140)
GLUCOSE SERPL-MCNC: 135 MG/DL (ref 65–140)
GLUCOSE SERPL-MCNC: 140 MG/DL (ref 65–140)
GLUCOSE SERPL-MCNC: 140 MG/DL (ref 65–140)
GLUCOSE SERPL-MCNC: 160 MG/DL (ref 65–140)
GLUCOSE SERPL-MCNC: 197 MG/DL (ref 65–140)
GLUCOSE SERPL-MCNC: 199 MG/DL (ref 65–140)
GLUCOSE SERPL-MCNC: 204 MG/DL (ref 65–140)
HCO3 BLDA-SCNC: 22.9 MMOL/L (ref 22–28)
HCT VFR BLD AUTO: 23.6 % (ref 34.8–46.1)
HCT VFR BLD AUTO: 24 % (ref 34.8–46.1)
HGB BLD-MCNC: 7.7 G/DL (ref 11.5–15.4)
HGB BLD-MCNC: 8 G/DL (ref 11.5–15.4)
INR PPP: 1.36 (ref 0.84–1.19)
IPAP: 16
MCH RBC QN AUTO: 31.8 PG (ref 26.8–34.3)
MCH RBC QN AUTO: 32.4 PG (ref 26.8–34.3)
MCHC RBC AUTO-ENTMCNC: 32.6 G/DL (ref 31.4–37.4)
MCHC RBC AUTO-ENTMCNC: 33.3 G/DL (ref 31.4–37.4)
MCV RBC AUTO: 97 FL (ref 82–98)
MCV RBC AUTO: 98 FL (ref 82–98)
NON VENT- BIPAP: ABNORMAL
O2 CT BLDA-SCNC: 15.6 ML/DL (ref 16–23)
OXYHGB MFR BLDA: 97.4 % (ref 94–97)
P AXIS: 67 DEGREES
PCO2 BLDA: 36.4 MM HG (ref 36–44)
PEEP MAX SETTING VENT: 8 CM[H2O]
PH BLDA: 7.42 [PH] (ref 7.35–7.45)
PLATELET # BLD AUTO: 161 THOUSANDS/UL (ref 149–390)
PLATELET # BLD AUTO: 177 THOUSANDS/UL (ref 149–390)
PMV BLD AUTO: 9.5 FL (ref 8.9–12.7)
PMV BLD AUTO: 9.7 FL (ref 8.9–12.7)
PO2 BLDA: 102.4 MM HG (ref 75–129)
POTASSIUM SERPL-SCNC: 3.1 MMOL/L (ref 3.5–5.3)
PR INTERVAL: 133 MS
PROTHROMBIN TIME: 17 SECONDS (ref 11.6–14.5)
QRS AXIS: 72 DEGREES
QRSD INTERVAL: 83 MS
QT INTERVAL: 338 MS
QTC INTERVAL: 416 MS
RBC # BLD AUTO: 2.42 MILLION/UL (ref 3.81–5.12)
RBC # BLD AUTO: 2.47 MILLION/UL (ref 3.81–5.12)
SODIUM SERPL-SCNC: 141 MMOL/L (ref 136–145)
SPECIMEN SOURCE: ABNORMAL
T WAVE AXIS: -7 DEGREES
VENT BIPAP FIO2: 100 %
VENTRICULAR RATE: 91 BPM
WBC # BLD AUTO: 12.96 THOUSAND/UL (ref 4.31–10.16)
WBC # BLD AUTO: 15.13 THOUSAND/UL (ref 4.31–10.16)

## 2019-10-10 PROCEDURE — 99222 1ST HOSP IP/OBS MODERATE 55: CPT

## 2019-10-10 PROCEDURE — 93018 CV STRESS TEST I&R ONLY: CPT | Performed by: INTERNAL MEDICINE

## 2019-10-10 PROCEDURE — 82805 BLOOD GASES W/O2 SATURATION: CPT | Performed by: NURSE PRACTITIONER

## 2019-10-10 PROCEDURE — 82948 REAGENT STRIP/BLOOD GLUCOSE: CPT

## 2019-10-10 PROCEDURE — 94660 CPAP INITIATION&MGMT: CPT

## 2019-10-10 PROCEDURE — 93010 ELECTROCARDIOGRAM REPORT: CPT | Performed by: INTERNAL MEDICINE

## 2019-10-10 PROCEDURE — 85730 THROMBOPLASTIN TIME PARTIAL: CPT | Performed by: NURSE PRACTITIONER

## 2019-10-10 PROCEDURE — 93971 EXTREMITY STUDY: CPT | Performed by: SURGERY

## 2019-10-10 PROCEDURE — 80048 BASIC METABOLIC PNL TOTAL CA: CPT | Performed by: NURSE PRACTITIONER

## 2019-10-10 PROCEDURE — C9113 INJ PANTOPRAZOLE SODIUM, VIA: HCPCS | Performed by: NURSE PRACTITIONER

## 2019-10-10 PROCEDURE — 85730 THROMBOPLASTIN TIME PARTIAL: CPT | Performed by: INTERNAL MEDICINE

## 2019-10-10 PROCEDURE — 78452 HT MUSCLE IMAGE SPECT MULT: CPT | Performed by: INTERNAL MEDICINE

## 2019-10-10 PROCEDURE — 93016 CV STRESS TEST SUPVJ ONLY: CPT | Performed by: INTERNAL MEDICINE

## 2019-10-10 PROCEDURE — 85027 COMPLETE CBC AUTOMATED: CPT | Performed by: NURSE PRACTITIONER

## 2019-10-10 PROCEDURE — 99024 POSTOP FOLLOW-UP VISIT: CPT | Performed by: OBSTETRICS & GYNECOLOGY

## 2019-10-10 PROCEDURE — 93306 TTE W/DOPPLER COMPLETE: CPT

## 2019-10-10 PROCEDURE — 36600 WITHDRAWAL OF ARTERIAL BLOOD: CPT

## 2019-10-10 PROCEDURE — 85610 PROTHROMBIN TIME: CPT | Performed by: NURSE PRACTITIONER

## 2019-10-10 PROCEDURE — 94668 MNPJ CHEST WALL SBSQ: CPT

## 2019-10-10 PROCEDURE — 93971 EXTREMITY STUDY: CPT

## 2019-10-10 PROCEDURE — 99291 CRITICAL CARE FIRST HOUR: CPT | Performed by: INTERNAL MEDICINE

## 2019-10-10 PROCEDURE — 94003 VENT MGMT INPAT SUBQ DAY: CPT

## 2019-10-10 RX ORDER — FUROSEMIDE 10 MG/ML
40 INJECTION INTRAMUSCULAR; INTRAVENOUS ONCE
Status: COMPLETED | OUTPATIENT
Start: 2019-10-10 | End: 2019-10-10

## 2019-10-10 RX ORDER — METOPROLOL TARTRATE 5 MG/5ML
2.5 INJECTION INTRAVENOUS EVERY 6 HOURS
Status: DISCONTINUED | OUTPATIENT
Start: 2019-10-10 | End: 2019-10-10

## 2019-10-10 RX ORDER — METOPROLOL TARTRATE 5 MG/5ML
5 INJECTION INTRAVENOUS ONCE
Status: COMPLETED | OUTPATIENT
Start: 2019-10-10 | End: 2019-10-10

## 2019-10-10 RX ORDER — DILTIAZEM HYDROCHLORIDE 120 MG/1
120 CAPSULE, COATED, EXTENDED RELEASE ORAL DAILY
Status: DISCONTINUED | OUTPATIENT
Start: 2019-10-11 | End: 2019-10-11

## 2019-10-10 RX ORDER — HEPARIN SODIUM 1000 [USP'U]/ML
3900 INJECTION, SOLUTION INTRAVENOUS; SUBCUTANEOUS ONCE
Status: COMPLETED | OUTPATIENT
Start: 2019-10-10 | End: 2019-10-10

## 2019-10-10 RX ORDER — HEPARIN SODIUM 1000 [USP'U]/ML
3900 INJECTION, SOLUTION INTRAVENOUS; SUBCUTANEOUS AS NEEDED
Status: DISCONTINUED | OUTPATIENT
Start: 2019-10-10 | End: 2019-10-15

## 2019-10-10 RX ORDER — HEPARIN SODIUM 10000 [USP'U]/100ML
3-20 INJECTION, SOLUTION INTRAVENOUS
Status: DISCONTINUED | OUTPATIENT
Start: 2019-10-10 | End: 2019-10-15

## 2019-10-10 RX ORDER — HEPARIN SODIUM 5000 [USP'U]/ML
5000 INJECTION, SOLUTION INTRAVENOUS; SUBCUTANEOUS EVERY 8 HOURS SCHEDULED
Status: DISCONTINUED | OUTPATIENT
Start: 2019-10-10 | End: 2019-10-10

## 2019-10-10 RX ORDER — POTASSIUM CHLORIDE 20MEQ/15ML
40 LIQUID (ML) ORAL ONCE
Status: COMPLETED | OUTPATIENT
Start: 2019-10-10 | End: 2019-10-10

## 2019-10-10 RX ORDER — FLUCONAZOLE 2 MG/ML
400 INJECTION, SOLUTION INTRAVENOUS EVERY 24 HOURS
Status: DISCONTINUED | OUTPATIENT
Start: 2019-10-10 | End: 2019-10-10

## 2019-10-10 RX ORDER — METOPROLOL TARTRATE 5 MG/5ML
5 INJECTION INTRAVENOUS EVERY 6 HOURS
Status: DISCONTINUED | OUTPATIENT
Start: 2019-10-10 | End: 2019-10-15

## 2019-10-10 RX ORDER — POTASSIUM CHLORIDE 29.8 MG/ML
40 INJECTION INTRAVENOUS ONCE
Status: COMPLETED | OUTPATIENT
Start: 2019-10-10 | End: 2019-10-10

## 2019-10-10 RX ORDER — DILTIAZEM HYDROCHLORIDE 5 MG/ML
10 INJECTION INTRAVENOUS EVERY 2 HOUR PRN
Status: DISCONTINUED | OUTPATIENT
Start: 2019-10-10 | End: 2019-10-15

## 2019-10-10 RX ORDER — HEPARIN SODIUM 1000 [USP'U]/ML
1950 INJECTION, SOLUTION INTRAVENOUS; SUBCUTANEOUS AS NEEDED
Status: DISCONTINUED | OUTPATIENT
Start: 2019-10-10 | End: 2019-10-15

## 2019-10-10 RX ADMIN — FENTANYL CITRATE 50 MCG: 50 INJECTION INTRAMUSCULAR; INTRAVENOUS at 04:33

## 2019-10-10 RX ADMIN — HEPARIN SODIUM AND DEXTROSE 12 UNITS/KG/HR: 10000; 5 INJECTION INTRAVENOUS at 11:50

## 2019-10-10 RX ADMIN — METRONIDAZOLE 500 MG: 500 INJECTION, SOLUTION INTRAVENOUS at 20:19

## 2019-10-10 RX ADMIN — INSULIN LISPRO 2 UNITS: 100 INJECTION, SOLUTION INTRAVENOUS; SUBCUTANEOUS at 23:51

## 2019-10-10 RX ADMIN — SODIUM CHLORIDE 2 UNITS/HR: 9 INJECTION, SOLUTION INTRAVENOUS at 05:33

## 2019-10-10 RX ADMIN — METOPROLOL TARTRATE 5 MG: 5 INJECTION, SOLUTION INTRAVENOUS at 23:57

## 2019-10-10 RX ADMIN — HEPARIN SODIUM 5000 UNITS: 5000 INJECTION INTRAVENOUS; SUBCUTANEOUS at 08:16

## 2019-10-10 RX ADMIN — METOPROLOL TARTRATE 2.5 MG: 5 INJECTION, SOLUTION INTRAVENOUS at 01:03

## 2019-10-10 RX ADMIN — METOPROLOL TARTRATE 5 MG: 5 INJECTION, SOLUTION INTRAVENOUS at 18:08

## 2019-10-10 RX ADMIN — POTASSIUM CHLORIDE 40 MEQ: 29.8 INJECTION, SOLUTION INTRAVENOUS at 06:41

## 2019-10-10 RX ADMIN — POTASSIUM CHLORIDE 40 MEQ: 20 SOLUTION ORAL at 07:30

## 2019-10-10 RX ADMIN — TRAVOPROST 1 DROP: 0.04 SOLUTION/ DROPS OPHTHALMIC at 21:28

## 2019-10-10 RX ADMIN — DORZOLAMIDE HYDROCHLORIDE AND TIMOLOL MALEATE 1 DROP: 20; 5 SOLUTION/ DROPS OPHTHALMIC at 18:08

## 2019-10-10 RX ADMIN — HEPARIN SODIUM 3900 UNITS: 1000 INJECTION INTRAVENOUS; SUBCUTANEOUS at 10:45

## 2019-10-10 RX ADMIN — FUROSEMIDE 40 MG: 10 INJECTION, SOLUTION INTRAMUSCULAR; INTRAVENOUS at 10:44

## 2019-10-10 RX ADMIN — POTASSIUM CHLORIDE 40 MEQ: 29.8 INJECTION, SOLUTION INTRAVENOUS at 10:45

## 2019-10-10 RX ADMIN — PANTOPRAZOLE SODIUM 40 MG: 40 INJECTION, POWDER, FOR SOLUTION INTRAVENOUS at 20:19

## 2019-10-10 RX ADMIN — FLUCONAZOLE, SODIUM CHLORIDE 400 MG: 2 INJECTION INTRAVENOUS at 08:17

## 2019-10-10 RX ADMIN — METRONIDAZOLE 500 MG: 500 INJECTION, SOLUTION INTRAVENOUS at 04:20

## 2019-10-10 RX ADMIN — METOPROLOL TARTRATE 5 MG: 5 INJECTION, SOLUTION INTRAVENOUS at 15:15

## 2019-10-10 RX ADMIN — CEFEPIME HYDROCHLORIDE 1000 MG: 1 INJECTION, POWDER, FOR SOLUTION INTRAMUSCULAR; INTRAVENOUS at 21:27

## 2019-10-10 RX ADMIN — METOPROLOL TARTRATE 2.5 MG: 5 INJECTION, SOLUTION INTRAVENOUS at 04:43

## 2019-10-10 RX ADMIN — DILTIAZEM HYDROCHLORIDE 10 MG: 5 INJECTION INTRAVENOUS at 20:54

## 2019-10-10 RX ADMIN — METOPROLOL TARTRATE 2.5 MG: 5 INJECTION, SOLUTION INTRAVENOUS at 12:26

## 2019-10-10 RX ADMIN — MORPHINE SULFATE 2 MG: 2 INJECTION, SOLUTION INTRAMUSCULAR; INTRAVENOUS at 21:26

## 2019-10-10 RX ADMIN — CHLORHEXIDINE GLUCONATE 0.12% ORAL RINSE 15 ML: 1.2 LIQUID ORAL at 09:19

## 2019-10-10 RX ADMIN — PANTOPRAZOLE SODIUM 40 MG: 40 INJECTION, POWDER, FOR SOLUTION INTRAVENOUS at 09:19

## 2019-10-10 RX ADMIN — METRONIDAZOLE 500 MG: 500 INJECTION, SOLUTION INTRAVENOUS at 13:00

## 2019-10-10 RX ADMIN — DORZOLAMIDE HYDROCHLORIDE AND TIMOLOL MALEATE 1 DROP: 20; 5 SOLUTION/ DROPS OPHTHALMIC at 09:19

## 2019-10-10 NOTE — PLAN OF CARE
Problem: Potential for Falls  Goal: Patient will remain free of falls  Description  INTERVENTIONS:  - Assess patient frequently for physical needs  -  Identify cognitive and physical deficits and behaviors that affect risk of falls    -  [place] fall precautions as indicated by assessment   - Educate patient/family on patient safety including physical limitations  - Instruct patient to call for assistance with activity based on assessment  - Modify environment to reduce risk of injury  - Consider OT/PT consult to assist with strengthening/mobility  Outcome: Progressing     Problem: GENITOURINARY - ADULT  Goal: Absence of urinary retention  Description  INTERVENTIONS:  - Assess patient's ability to void and empty bladder  - Monitor I/O  - Bladder scan as needed  - Discuss with physician/AP medications to alleviate retention as needed  - Discuss catheterization for long term situations as appropriate   Outcome: Progressing  Goal: Urinary catheter remains patent  Description  INTERVENTIONS:  - Assess patency of urinary catheter  - If patient has a chronic alexandre, consider changing catheter if non-functioning  - Follow guidelines for intermittent irrigation of non-functioning urinary catheter  Outcome: Progressing     Problem: METABOLIC, FLUID AND ELECTROLYTES - ADULT  Goal: Electrolytes maintained within normal limits  Description  INTERVENTIONS:  - Monitor labs and assess patient for signs and symptoms of electrolyte imbalances  - Administer electrolyte replacement as ordered  - Monitor response to electrolyte replacements, including repeat lab results as appropriate  - Instruct patient on fluid and nutrition as appropriate  Outcome: Progressing  Goal: Fluid balance maintained  Description  INTERVENTIONS:  - Monitor labs   - Monitor I/O and WT  - Instruct patient on fluid and nutrition as appropriate  - Assess for signs & symptoms of volume excess or deficit  Outcome: Progressing  Goal: Glucose maintained within target range  Description  INTERVENTIONS:  - Monitor Blood Glucose as ordered  - Assess for signs and symptoms of hyperglycemia and hypoglycemia  - Administer ordered medications to maintain glucose within target range  - Assess nutritional intake and initiate nutrition service referral as needed  Outcome: Progressing     Problem: SKIN/TISSUE INTEGRITY - ADULT  Goal: Skin integrity remains intact  Description  INTERVENTIONS  - Identify patients at risk for skin breakdown  - Assess and monitor skin integrity  - Assess and monitor nutrition and hydration status  - Monitor labs (i e  albumin)  - Assess for incontinence   - Turn and reposition patient  - Assist with mobility/ambulation  - Relieve pressure over bony prominences  - Avoid friction and shearing  - Provide appropriate hygiene as needed including keeping skin clean and dry  - Evaluate need for skin moisturizer/barrier cream  - Collaborate with interdisciplinary team (i e  Nutrition, Rehabilitation, etc )   - Patient/family teaching  Outcome: Progressing  Goal: Incision(s), wounds(s) or drain site(s) healing without S/S of infection  Description  INTERVENTIONS  - Assess and document risk factors for skin impairment   - Assess and document dressing, incision, wound bed, drain sites and surrounding tissue  - Consider nutrition services referral as needed  - Oral mucous membranes remain intact  - Provide patient/ family education  Outcome: Progressing  Goal: Oral mucous membranes remain intact  Description  INTERVENTIONS  - Assess oral mucosa and hygiene practices  - Implement preventative oral hygiene regimen  - Implement oral medicated treatments as ordered  - Initiate Nutrition services referral as needed  Outcome: Progressing     Problem: Prexisting or High Potential for Compromised Skin Integrity  Goal: Skin integrity is maintained or improved  Description  INTERVENTIONS:  - Identify patients at risk for skin breakdown  - Assess and monitor skin integrity  - Assess and monitor nutrition and hydration status  - Monitor labs   - Assess for incontinence   - Turn and reposition patient  - Assist with mobility/ambulation  - Relieve pressure over bony prominences  - Avoid friction and shearing  - Provide appropriate hygiene as needed including keeping skin clean and dry  - Evaluate need for skin moisturizer/barrier cream  - Collaborate with interdisciplinary team   - Patient/family teaching  - Consider wound care consult   Outcome: Progressing     Problem: NEUROSENSORY - ADULT  Goal: Achieves stable or improved neurological status  Description  INTERVENTIONS  - Monitor and report changes in neurological status  - Monitor vital signs such as temperature, blood pressure, glucose, and any other labs ordered   - Initiate measures to prevent increased intracranial pressure  - Monitor for seizure activity and implement precautions if appropriate      Outcome: Progressing  Goal: Remains free of injury related to seizures activity  Description  INTERVENTIONS  - Maintain airway, patient safety  and administer oxygen as ordered  - Monitor patient for seizure activity, document and report duration and description of seizure to physician/advanced practitioner  - If seizure occurs,  ensure patient safety during seizure  - Reorient patient post seizure  - Seizure pads on all 4 side rails  - Instruct patient/family to notify RN of any seizure activity including if an aura is experienced  - Instruct patient/family to call for assistance with activity based on nursing assessment  - Administer anti-seizure medications if ordered    Outcome: Progressing  Goal: Achieves maximal functionality and self care  Description  INTERVENTIONS  - Monitor swallowing and airway patency with patient fatigue and changes in neurological status  - Encourage and assist patient to increase activity and self care     - Encourage visually impaired, hearing impaired and aphasic patients to use assistive/communication devices  Outcome: Progressing     Problem: CARDIOVASCULAR - ADULT  Goal: Maintains optimal cardiac output and hemodynamic stability  Description  INTERVENTIONS:  - Monitor I/O, vital signs and rhythm  - Monitor for S/S and trends of decreased cardiac output  - Administer and titrate ordered vasoactive medications to optimize hemodynamic stability  - Assess quality of pulses, skin color and temperature  - Assess for signs of decreased coronary artery perfusion  - Instruct patient to report change in severity of symptoms  Outcome: Progressing  Goal: Absence of cardiac dysrhythmias or at baseline rhythm  Description  INTERVENTIONS:  - Continuous cardiac monitoring, vital signs, obtain 12 lead EKG if ordered  - Administer antiarrhythmic and heart rate control medications as ordered  - Monitor electrolytes and administer replacement therapy as ordered  Outcome: Progressing     Problem: RESPIRATORY - ADULT  Goal: Achieves optimal ventilation and oxygenation  Description  INTERVENTIONS:  - Assess for changes in respiratory status  - Assess for changes in mentation and behavior  - Position to facilitate oxygenation and minimize respiratory effort  - Oxygen administered by appropriate delivery if ordered  - Initiate smoking cessation education as indicated  - Encourage broncho-pulmonary hygiene including cough, deep breathe, Incentive Spirometry  - Assess the need for suctioning and aspirate as needed  - Assess and instruct to report SOB or any respiratory difficulty  - Respiratory Therapy support as indicated  Outcome: Progressing     Problem: HEMATOLOGIC - ADULT  Goal: Maintains hematologic stability  Description  INTERVENTIONS  - Assess for signs and symptoms of bleeding or hemorrhage  - Monitor labs  - Administer supportive blood products/factors as ordered and appropriate  Outcome: Progressing     Problem: MUSCULOSKELETAL - ADULT  Goal: Maintain or return mobility to safest level of function  Description  INTERVENTIONS:  - Assess patient's ability to carry out ADLs; assess patient's baseline for ADL function and identify physical deficits which impact ability to perform ADLs (bathing, care of mouth/teeth, toileting, grooming, dressing, etc )  - Assess/evaluate cause of self-care deficits   - Assess range of motion  - Assess patient's mobility  - Assess patient's need for assistive devices and provide as appropriate  - Encourage maximum independence but intervene and supervise when necessary  - Involve family in performance of ADLs  - Assess for home care needs following discharge   - Consider OT consult to assist with ADL evaluation and planning for discharge  - Provide patient education as appropriate  Outcome: Progressing  Goal: Maintain proper alignment of affected body part  Description  INTERVENTIONS:  - Support, maintain and protect limb and body alignment  - Provide patient/ family with appropriate education  Outcome: Progressing     Problem: Nutrition/Hydration-ADULT  Goal: Nutrient/Hydration intake appropriate for improving, restoring or maintaining nutritional needs  Description  Monitor and assess patient's nutrition/hydration status for malnutrition  Collaborate with interdisciplinary team and initiate plan and interventions as ordered  Monitor patient's weight and dietary intake as ordered or per policy  Utilize nutrition screening tool and intervene as necessary  Determine patient's food preferences and provide high-protein, high-caloric foods as appropriate       INTERVENTIONS:  - Monitor oral intake, urinary output, labs, and treatment plans  - Assess nutrition and hydration status and recommend course of action  - Evaluate amount of meals eaten  - Assist patient with eating if necessary   - Allow adequate time for meals  - Recommend/ encourage appropriate diets, oral nutritional supplements, and vitamin/mineral supplements  - Order, calculate, and assess calorie counts as needed  - Recommend, monitor, and adjust tube feedings and TPN/PPN based on assessed needs  - Assess need for intravenous fluids  - Provide specific nutrition/hydration education as appropriate  - Include patient/family/caregiver in decisions related to nutrition  Outcome: Progressing

## 2019-10-10 NOTE — CONSULTS
Consult - Cardiology   Paty Guerra 82 y o  female MRN: [id number]  Unit/Bed#: ICU 02 Encounter: [id number]        Reason For Consult: Atrial fibrillation                ASSESSMENT:  1  New onset atrial fibrillation with RVR  2  Acute hypoxic respiratory failure due to aspiration pneumonia  3  Acute blood loss anemia (GI bleed, hematoma, recent  surgery)  4  Severe sepsis with shock  5  Gastritis  6  S/P exploratory lap, TLH, BSO, pelvic & periaortic lymphoidectomy POD#6  7  Diabetes mellitus   8  PEA a arrest with 2 rounds ACLS and subsequent ROSC     PLAN:     1  Paroxysmal Atrial fibrillation:   -rate presently trending  at the time of consultation   -increase Lopressor to 5 mg q 6h    -will add p r n  IV Cardizem for HR > 125   -echocardiogram performed with official read forthcoming   -if rate remains consistently high can start Cardizem drip   -close watch on BP   -suspect PAF driven by sepsis, anemia, ARDS    2  Acute hypoxic respiratory failure:    -presently on high-flow NC   -management of aspiration pneumonia per critical care     3  POD #6 exploratory lap, TLH, BSO:   -OBGYN following     Discussed with daughter at bedside in critical care staff    History Of Present Illness: This is an 82-year-old female without much of a cardiac past medical history  She has been seen in the past by Dr Khadijah Choe for hypertension and dyslipidemia  She was originally referred to him back in 2015 secondary to sustaining a syncopal episode  Since then she was only seen twice as an outpatient as there was no real need to follow with Cardiology regularly  Echocardiogram performed in 2015 was unremarkable  Patient herself is alert awake and oriented was having some difficulty speaking due to recent intubation  She is on high-flow nasal cannula  Much of the history is provided by daughter at bedside  Ms Nishi Naranjo is postoperative day 6 from total laparoscopic hysterectomy and exploratory laparotomy    Her postoperative course was complicated by acute respiratory failure on October 7, 2019  She was transferred ICU at this time  Unfortunately later on the evening of October 7, 2019 she was found to be aspirating and became acutely unresponsive  Code blue was initiated in after 2 rounds of ACLS she achieved ROSC  She was intubated at this time  Fortunately she was able to be extubated yesterday  She is presently oxygenating well on high-flow nasal cannula and is awake and oriented, though extremely tired appearing  For the last 24-48 hours she has been going in and out of atrial fibrillation, at times with RVR  This is been responsive to IV medications (Lopressor)  An echocardiogram was performed today with the official review forthcoming  At the time of my consultation she was initially in AFib with RVR with a heart rate around 130  By the time we finished speaking her heart rate was trending in the 90s        Past Medical History:        Past Medical History:   Diagnosis Date    Anxiety     Arthritis     Constipation     Diabetes mellitus ([address] )     IDDM    Frequent UTI     Glaucoma     Hearing loss     Hyperlipidemia     Hypertension     Hyperthyroidism     in past    Hyponatremia     Hypothyroid     Neuropathy     bles    Right tubo-ovarian mass     Wears glasses       Past Surgical History:   Procedure Laterality Date    APPENDECTOMY  1956    CATARACT EXTRACTION Bilateral     COLONOSCOPY  09/2014    Completed - Dr Paula Gonzalez, due in 5 years    HYSTERECTOMY N/A 10/4/2019    Procedure: LAP TOTAL HYSTERECTOMY, BSO;  Surgeon: Hernesto Hua MD;  Location: [facility];  Service: Gynecology Oncology    LAPAROTOMY N/A 10/4/2019    Procedure: EXPLORATORY LAPAROTOMY  EXLAP OMENTECTOMY  PELVIC AND DEANGELO-AORTIC LYMPH NODE DISSECTION  PERITONEAL BIOPSY TRANS ABDOMINUS BLOCK;  Surgeon: Hernesto Hua MD;  Location: [facility];  Service: Gynecology Oncology    SKIN LESION EXCISION      Ears Allergy:        Allergies   Allergen Reactions    Thiazide-Type Diuretics      Hyponatremia       Medications:       Prior to Admission medications    Medication Sig Start Date End Date Taking?  Authorizing Provider   amLODIPine (NORVASC) 5 mg tablet TAKE 1 TABLET DAILY AFTER SUPPER 4/29/19  Yes Amarilis Munoz MD   aspirin 81 MG tablet Take 1 tablet by mouth daily   Yes Historical Provider, MD   Calcium Polycarbophil (KONSYL FIBER PO) Take by mouth daily    Yes Historical Provider, MD   carboxymethylcellulose (REFRESH TEARS) 0 5 % SOLN Apply 1 drop to eye 4 (four) times a day   Yes Historical Provider, MD   cholecalciferol (VITAMIN D3) 1,000 units tablet Take 3,000 capsules by mouth daily  11/15/16  Yes Historical Provider, MD   dorzolamide-timolol (COSOPT) 22 3-6 8 MG/ML ophthalmic solution Administer 1 drop to both eyes 2 (two) times a day    Yes Historical Provider, MD   DULoxetine (CYMBALTA) 30 mg delayed release capsule Take 1 capsule (30 mg total) by mouth daily  Patient taking differently: Take 30 mg by mouth daily at bedtime  7/10/19  Yes Amarilis Munoz MD   fluticasone (FLONASE) 50 mcg/act nasal spray 2 sprays into each nostril daily as needed  5/10/17  Yes Historical Provider, MD   gabapentin (NEURONTIN) 300 mg capsule Take 1 capsule (300 mg total) by mouth 3 (three) times a day  Patient taking differently: Take 300 mg by mouth 2 (two) times a day  12/13/18  Yes Amarilis Munoz MD   insulin glargine (LANTUS) 100 units/mL subcutaneous injection Inject 12units under the skin nightly as directed  Patient taking differently: Inject 11 Units under the skin daily at bedtime  4/9/18  Yes Amarilis Munoz MD   insulin lispro (HUMALOG) 100 units/mL injection INJECT 4 UNITS AT BREAKFAST, 5 UNITS AT LUNCH, AND 7 UNITS AT CEDAR RIDGE TIME  Patient taking differently: Before meals acc to sliding scale 4/16/19  Yes Amarilis Munoz MD   irbesartan (AVAPRO) 150 mg tablet Take 1 tablet (150 mg total) by mouth daily  Patient taking differently: Take 75 mg by mouth daily  3/21/19  Yes Franco Grey MD   levothyroxine 50 mcg tablet Take 1 tablet (50 mcg total) by mouth daily 10/2/19  Yes Franco Grey MD   LORazepam (ATIVAN) 0 5 mg tablet Take 1 tablet (0 5 mg total) by mouth 3 (three) times a day  Patient taking differently: Take 0 5 mg by mouth 2 (two) times a day  3/21/19  Yes Franco Grey MD   Magnesium 500 MG TABS Take 1 tablet by mouth daily   Yes Historical Provider, MD   pravastatin (PRAVACHOL) 20 mg tablet Take 1 tablet (20 mg total) by mouth daily at bedtime 9/4/19  Yes Franco Grey MD   RHOPRESSA 0 02 % SOLN Administer 1 drop to the right eye daily at bedtime  8/23/19  Yes Historical Provider, MD   sodium chloride 1 g tablet Take 1 g by mouth daily   Yes Historical Provider, MD   torsemide (DEMADEX) 5 MG tablet Take 1 tablet (5 mg total) by mouth daily 8/6/19  Yes Bradley Dunn MD   travoprost (TRAVATAN Z) 0 004 % ophthalmic solution Administer 1 drop to both eyes daily at bedtime    Yes Historical Provider, MD   cyanocobalamin 1000 MCG tablet Take 1,000 mcg by mouth daily    Historical Provider, MD       Family History:     Family History   Problem Relation Age of Onset    Heart attack Mother     Diabetes type II Mother     Dementia Father     Stroke Father         CVA    Breast cancer Maternal Aunt     Stomach cancer Maternal Uncle     Diabetes Family     Stroke Family         Complications        Social History:       Social History     Socioeconomic History    Marital status:       Spouse name: None    Number of children: 2    Years of education: None    Highest education level: None   Occupational History    None   Social Needs    Financial resource strain: None    Food insecurity:     Worry: None     Inability: None    Transportation needs:     Medical: None     Non-medical: None   Tobacco Use    Smoking status: Never Smoker    Smokeless tobacco: Never Used   Substance and Sexual Activity    Alcohol use: Yes     Frequency: Monthly or less     Drinks per session: 1 or 2     Binge frequency: Never     Comment: wine    Drug use: No    Sexual activity: None   Lifestyle    Physical activity:     Days per week: None     Minutes per session: None    Stress: None   Relationships    Social connections:     Talks on phone: None     Gets together: None     Attends Hinduism service: None     Active member of club or organization: None     Attends meetings of clubs or organizations: None     Relationship status: None    Intimate partner violence:     Fear of current or ex partner: None     Emotionally abused: None     Physically abused: None     Forced sexual activity: None   Other Topics Concern    None   Social History Narrative    Caffeine use    No preference on Hinduism beliefs       ROS:  Symptoms per HPI  Remainder review of systems is negative    Exam:  General:  Alert awake  Extremely tired appearing  Speaking very quietly  Head: Normocephalic, atraumatic  Eyes:  EOMI  Pupils - equal, round, reactive to accomodation  No icterus  Normal Conjunctiva  Oropharynx: moist and normal-appearing mucosa  Neck:  No significant JVD  Heart:  Irregular, tachycardic, no murmurs   Respiratory effort / Chest Inspection:  Appears to be breathing comfortably on high-flow nasal cannula  Audible upper respiratory congestion  Lungs:  Coarse breath sounds bilaterally   Abdomen: flat, normal findings: bowel sounds normal and soft, non-tender  Lower Limbs:  No edema  Pulses[de-identified]  RLE - DP: present                  LLE - DP: present   Musculoskeletal: ROM grossly normal        DATA:      ECG:      Atrial fibrillation with RVR                 Telemetry: Atrial fibrillation  HR 9130          Echocardiogram:  Pending          Ischemic Testing:         Weights:     Wt Readings from Last 3 Encounters:   10/10/19 68 4 kg (150 lb 12 7 oz)   09/30/19 58 9 kg (129 lb 12 8 oz) 09/20/19 58 2 kg (128 lb 6 4 oz)   , Body mass index is 25 09 kg/m²           Lab Studies:    Results from last 7 days   Lab Units 10/07/19  2035   TROPONIN I ng/mL 1 91*          Results from last 7 days   Lab Units 10/10/19  1030 10/10/19  0451 10/09/19  0504   WBC Thousand/uL 15 13* 12 96* 8 29   HEMOGLOBIN g/dL 8 0* 7 7* 7 8*   HEMATOCRIT % 24 0* 23 6* 23 0*   PLATELETS Thousands/uL 177 161 147*   ,   Results from last 7 days   Lab Units 10/10/19  0451 10/09/19  0504 10/08/19  0439 10/07/19  1920   POTASSIUM mmol/L 3 1* 3 9 3 3* 4 6   CHLORIDE mmol/L 104 104 102 100   CO2 mmol/L 28 26 24 16*   BUN mg/dL 27* 25 32* 27*   CREATININE mg/dL 0 95 0 95 1 25 1 53*   CALCIUM mg/dL 7 1* 7 0* 7 2* 6 0*   ALK PHOS U/L  --   --  34* 63   ALT U/L  --   --  197* 116*   AST U/L  --   --  192* 108*

## 2019-10-10 NOTE — PROGRESS NOTES
Pastoral Care Progress Note    10/10/2019  Patient: Ana Helm : 1935  Admission Date & Time: 10/4/2019 0547  MRN: [id number] [facility]: [id number]                     Chaplaincy Interventions Utilized:   Empowerment: Clarified, confirmed, or reviewed information from treatment team , Encouraged assertiveness and Encouraged focus on present    Exploration: Explored spiritual needs & resources    Collaboration: Consulted with interdisciplinary team    Relationship Building: Cultivated a relationship of care and support, Listened empathically and Provided silent and supportive presence    Ritual: Provided prayer            Chaplaincy Outcomes Achieved:  Distress reduced and Expressed intermediate hope        Spiritual Coping Strategies Utilized:   Spiritual practices and Spiritual comfort       10/10/19 1500   Clinical Encounter Type   Visited With Patient and family together   Crisis Visit Critical Care   Yazdanism Encounters   Yazdanism Needs Prayer   Patient Spiritual Encounters   Spiritual Encounter Notes Patient continues to improve and was recently extubated   offered prayer

## 2019-10-10 NOTE — PROGRESS NOTES
Patient Name: Mahesh Roberson  Patient MRN: [id number]  Date: 10/10/19  Service: [facility]    Subjective   Patient is more awake and responding to some commands and no evidence of bleeding  NG tube is bilious without significant output  She is not tender on exam    Vitals  Blood pressure 135/57, pulse 86, temperature 98 8 °F (37 1 °C), temperature source Temporal, resp  rate 17, height 5' 5" (1 651 m), weight 68 4 kg (150 lb 12 7 oz), SpO2 100 %, not currently breastfeeding    HEENT intubated  Abdomen protuberant benign nontender  Neurologically lethargic but awakens on ventilator    Laboratory Studies  Results from last 7 days   Lab Units 10/10/19  1030 10/10/19  0451 10/09/19  0504 10/08/19  1617 10/08/19  0938 10/08/19  0740 10/07/19  2035 10/07/19  1920 10/07/19  1640   WBC Thousand/uL 15 13* 12 96* 8 29  --  3 32* 2 61*  --  11 34* 12 93*   HEMOGLOBIN g/dL 8 0* 7 7* 7 8* 7 8* 6 5* 6 3* 8 0* 7 2* 9 2*   HEMATOCRIT % 24 0* 23 6* 23 0*  --  19 3* 18 5*  --  23 0* 28 6*   PLATELETS Thousands/uL 177 161 147*  --  185 170  --  223 292   INR   --   --  1 57*  --   --   --   --  1 72*  --      Results from last 7 days   Lab Units 10/10/19  0451 10/09/19  0504 10/08/19  0439 10/07/19  1920 10/07/19  1640   POTASSIUM mmol/L 3 1* 3 9 3 3* 4 6 5 6*   CHLORIDE mmol/L 104 104 102 100 95*   CO2 mmol/L 28 26 24 16* 19*   BUN mg/dL 27* 25 32* 27* 26*   CREATININE mg/dL 0 95 0 95 1 25 1 53* 1 60*   CALCIUM mg/dL 7 1* 7 0* 7 2* 6 0* 7 1*   ALK PHOS U/L  --   --  34* 63  --    ALT U/L  --   --  197* 116*  --    AST U/L  --   --  192* 108*  --        Imaging and Other Studies      Inhouse Medications     Current Facility-Administered Medications:     artificial tear (LUBRIFRESH P M ) ophthalmic ointment, , Both Eyes, HS PRN    cefepime (MAXIPIME) 1,000 mg in dextrose 5 % 50 mL IVPB, 1,000 mg, Intravenous, Q24H, Stopped at 10/09/19 2201    chlorhexidine (PERIDEX) 0 12 % oral rinse 15 mL, 15 mL, Swish & Spit, Q12H [address], 15 mL at 10/10/19 0919    dorzolamide-timolol (COSOPT) 22 3-6 8 MG/ML ophthalmic solution 1 drop, 1 drop, Both Eyes, BID, 1 drop at 10/10/19 0919    fentaNYL (SUBLIMAZE) injection 50 mcg, 50 mcg, Intravenous, Q2H PRN, 50 mcg at 10/10/19 0433    heparin (porcine) 25,000 units in 250 mL infusion (premix), 3-20 Units/kg/hr (Order-Specific), Intravenous, Titrated    heparin (porcine) injection 1,950 Units, 1,950 Units, Intravenous, PRN    heparin (porcine) injection 3,900 Units, 3,900 Units, Intravenous, PRN    influenza vaccine, high-dose (FLUZONE HIGH-DOSE) IM injection ERIKA 0 5 mL, 0 5 mL, Intramuscular, Prior to discharge    insulin regular (HumuLIN R,NovoLIN R) 1 Units/mL in sodium chloride 0 9 % 100 mL infusion, 0 3-21 Units/hr, Intravenous, Titrated, 0 5 Units/hr at 10/10/19 1000    lactated ringers bolus 1,000 mL, 1,000 mL, Intravenous, Once    LORazepam (ATIVAN) 2 mg/mL injection 2 mg, 2 mg, Intravenous, Q4H PRN    metoprolol (LOPRESSOR) injection 2 5 mg, 2 5 mg, Intravenous, Q6H    metroNIDAZOLE (FLAGYL) IVPB (premix) 500 mg, 500 mg, Intravenous, Q8H, Stopped at 10/10/19 0601    Netarsudil Dimesylate 0 02 % SOLN 1 drop, 1 drop, Right Eye, HS, 1 drop at 10/09/19 2134    ondansetron (ZOFRAN) injection 4 mg, 4 mg, Intravenous, Q6H PRN, 4 mg at 10/07/19 0541    pantoprazole (PROTONIX) injection 40 mg, 40 mg, Intravenous, Q12H [address], 40 mg at 10/10/19 0919    potassium chloride 40 mEq IVPB (premix), 40 mEq, Intravenous, Once, 40 mEq at 10/10/19 1045    travoprost (TRAVATAN-Z) 0 004 % ophthalmic solution 1 drop, 1 drop, Both Eyes, HS, 1 drop at 10/09/19 2134      Assessment/Plan:  1  Probable element of stress gastritis status post aspiration ileus etc   No evidence of recurrent bleeding    Discussed with ICU staff  Will remain available  Would continue on PPI empirically for at least several weeks  Rectal tube to be removed    Call us if needed            Bekah Sanders MD

## 2019-10-10 NOTE — UTILIZATION REVIEW
Elective Surgical Continued Stay Review    Date: IP      POD#:      Current Patient Class:   INpatient  Current Level of Care:    ICU    Assessment/Plan: 82 y o  female, initial surgery date 10/4/2019       82 y o  POD# 6 from [facility], BSO, ex lap, pelvic and para-aortic lymphadenectomy, omentectomy, peritoneal biopsies, and ovarian cancer staging for right tubo-ovarian mass consistent with adenocarcinoma, s/p code 10/7  Remains  On  Vent  Cont  GETEA,  IV  Lasix  X1  Dose  10/10, monitor labs and cont insulin drip        Pertinent Labs/Diagnostic Results:   Ref Range & Units 10/10/19 1030    WBC 4 31 - 10 16 Thousand/uL 15  13High     RBC 3 81 - 5 12 Million/uL 2 47Low     Hemoglobin 11 5 - 15 4 g/dL 8 0Low     Hematocrit 34 8 - 46 1 % 24  0Low     MCV 82 - 98 fL 97    MCH 26 8 - 34 3 pg 32 4    MCHC 31 4 - 37 4 g/dL 33 3    RDW 11 6 - 15 1 % 14 5    Platelets 976 - 137 Thousands/uL 177            Vital Signs:   98 7 °F (37 1 °C)                     10/10/19 1040                  Simple mask   10/10/19 0741  98 8 °F (37 1 °C)                   10/10/19 0600    86  17  135/57  93      100 %     10/10/19 0530    86  16  112/52  77      100 %     10/10/19 0450  97 2 °F (36 2 °C)Abnormal                    10/10/19 0430    130Abnormal   17  127/65  94      99 %     10/10/19 0330    132Abnormal   16  114/64  74      99 %     10/10/19 0321                99 %     10/10/19 0230    108Abnormal   16  118/62  83      99 %     10/10/19 0130    106Abnormal   16  110/58  84      96 %     10/10/19 0100    132Abnormal   18  133/64  76      96 %     10/10/19 0030    88  17  123/45Abnormal   62      96 %             Medications:   Scheduled Meds:     Medications:  cefepime 1,000 mg Intravenous Q24H   dorzolamide-timolol 1 drop Both Eyes BID   lactated ringers 1,000 mL Intravenous Once   metoprolol 2 5 mg Intravenous Q6H   metroNIDAZOLE 500 mg Intravenous Q8H Netarsudil Dimesylate 1 drop Right Eye HS   pantoprazole 40 mg Intravenous Q12H [address]   potassium chloride 40 mEq Intravenous Once   travoprost 1 drop Both Eyes HS       artificial tear  Both Eyes HS PRN   heparin (porcine) 1,950 Units Intravenous PRN   heparin (porcine) 3,900 Units Intravenous PRN   influenza vaccine 0 5 mL Intramuscular Prior to discharge   ondansetron 4 mg Intravenous Q6H PRN       heparin (porcine) 3-20 Units/kg/hr (Order-Specific) Intravenous Titrated   insulin regular (HumuLIN R,NovoLIN R) infusion 0 3-21 Units/hr Intravenous Titrated         Discharge Plan:   D       Network Utilization Review Department  Phone: [phone]; Fax [phone]  [email or web address]  ATTENTION: Please call with any questions or concerns to [phone]  and carefully listen to the prompts so that you are directed to the right person  Send all requests for admission clinical reviews, approved or denied determinations and any other requests to fax [phone]   All voicemails are confidential

## 2019-10-10 NOTE — PROGRESS NOTES
Gyn Oncology Progress note   Omar Gardner 82 y o  female MRN: [id number]  Unit/Bed#: ICU 02 Encounter: [id number]    Assessment: 82 y o  POD# 6 from [facility], BSO, ex lap, pelvic and para-aortic lymphadenectomy, omentectomy, peritoneal biopsies, and ovarian cancer staging for right tubo-ovarian mass consistent with adenocarcinoma, s/p code 10/7    Plan:    1) Acute hypoxic respiratory failure  Management per critical care team  Currently intubated, off vasopressor support  Continue cefepime and flagyl, diflucan for budding yeast on sputum cultures    2) Severe metabolic acidosis  Lactic acidosis 6 8 --> code --> 7 8 --> 5 4, s/p bicarb infusion --> 3 2  CTA 10/7: no PE, bilateral pleural effusion, bilateral atelectasis, hiatal hernia with gastric and esophageal distension  CT head 10/7: no acute changes  CTAP 10/8: dense perihilar opacities, persistent bilateral nephrograms, dilated small bowel, gas in colon, possible hematoma in pelvis 7x5x7, possible lymphocele along left external iliac vessel  Management per critical care team    3) Acute kidney injury  Cr 0 72 --> 0 96 --> 1 6 --> 1 53 --> 0 95 --> 0 95 today, stable  Discontinue nephrotoxic agents, continue to monitor UO    4) Postoperative ileus vs SBO  NPO, OG tube in place - minimal output for past 24 hours  Start tube feeds today    5) Hyponatremia  Na 141 --> 126 --> 127 --> 130 --> 138 --> 141 today  Continue to trend    6) Anemia  Likely secondary to GI bleed  Per gastroenterology, no plan for endoscopy at this time  Will monitor patient closely   Continue protonix BID  Hgb 10 3 -->10 0 --> 9 2 --> 7 2 --> 8 0 --> 6 5 --> 2U pRBC --> 7 8 --> 7 7 today, stable, no indication for further transfusions at this time    7) Right tubo-ovarian mass, s/p surgery   Frozen consistent with adenocarcinoma, follow up final pathology  Continue routine postoperative care    8) Urinary retention  Ji in place   UO 2035cc over last 24 hours     9) T1DM  Transitioned from SSI algorithm 2 and lantus 12U at night to insulin drip for better control of her glucose on 10/7  Transitioned back to SSI on 10/8 and due to glucose > 200s, transitioned back to insulin drip 10/9  Home meds: humalog 4/5/7 with meals, lantus 12U at night    10) Analgesia  S/p TAPs block, s/p dilaudid   Tylenol [address], motrin prn, oxycodone 5-10mg for moderate-severe pain prn  Dilaudid prn    11) Dry eyes  Lubifresh prn, cosopt BID, travoprost qD    12) Depression  Cymbalta 30mg qHS - on hold    13) Hypertension  Metoprolol 2 5mg prn  Avapro 150mg daily    14) Hypothyroidism  Levothyroxine 50mcg daily - on hold    15) FEN: NPO    16) DVT ppx: SCDs, lovenox held -  Consider heparin 5000U TID starting today for anticoagulation      Subjective:    Jovan Polk is currently intubated but no longer sedated  She is able to nod yes or now and follow simple commands  /65   Pulse (!) 130   Temp (!) 97 2 °F (36 2 °C) (Temporal)   Resp 17   Ht 5' 5" (1 651 m)   Wt 68 4 kg (150 lb 12 7 oz)   SpO2 99%   Breastfeeding? No   BMI 25 09 kg/m²     I/O last 3 completed shifts: In: 2922 6 [I V :2072 6; Blood:300; IV Piggyback:550]  Out: 9635 [Urine:2590; Emesis/NG output:100]  I/O this shift:  In: 150 [IV Piggyback:150]  Out: 340 [Urine:340]    Lab Results   Component Value Date    WBC 12 96 (H) 10/10/2019    HGB 7 7 (L) 10/10/2019    HCT 23 6 (L) 10/10/2019    MCV 98 10/10/2019     10/10/2019       Lab Results   Component Value Date    GLUCOSE 75 01/08/2016    CALCIUM 7 1 (L) 10/10/2019     (L) 01/08/2016    K 3 1 (L) 10/10/2019    CO2 28 10/10/2019     10/10/2019    BUN 27 (H) 10/10/2019    CREATININE 0 95 10/10/2019         Physical Exam   Constitutional: She is oriented to person, place, and time  She appears well-developed and well-nourished  No distress  HENT:   OG tube in place   Cardiovascular: Normal rate, regular rhythm and normal heart sounds  Exam reveals no gallop and no friction rub  No murmur heard  Triple lumen and A line in place   Pulmonary/Chest: Effort normal and breath sounds normal  No stridor  No respiratory distress  She has no wheezes  She has no rales  Abdominal: Soft  Bowel sounds are normal  She exhibits no distension and no mass  There is no tenderness  There is no rebound and no guarding  No hernia  Vertical incision clean, dry, intact without signs of inflammation   Neurological: She is alert and oriented to person, place, and time  Skin: She is not diaphoretic  Vitals reviewed        Magalys Mcrae MD  10/10/2019  6:01 AM

## 2019-10-10 NOTE — PROGRESS NOTES
Progress Note - Critical Care   Perry Tena 82 y o  female MRN: [id number]  Unit/Bed#: ICU 02 Encounter: [id number]    Assessment/Plan:  1  Acute hypoxic respiratory failure secondary to ARDS in the setting of aspiration pneumonia  1  Continue Mechanical ventilation  Wean ventilator settings as tolerated  2  Continue Cefepime and Flagyl D#4  Add diflucan for budding yeast  2  Atrial fibrillation with rapid ventricular response (paroxysmal)  1  Transition lopressor prn to lopressor 2 5 q6h  2  Consider anticoagulation once hemoglobin remains stable for several days and no indication patient is continuing to bleed  3  Acute blood loss anemia multifactorial due to likely GI bleed, hematoma, and recent surgery  1  Awaiting morning hemoglobin  Hemoglobin yesterday was stable  2  Transfuse for less than 7  4  Severe Sepsis with shock secondary to aspiration pneumonia  1  Patient is no longer requiring vasopressor support  5  Upper GI bleed possibly gastritis versus stress ulcer versus ischemia in the setting of recent ileus  1  gastroenterology following  2  No indication for EGD at this time  3  Continue protonix BID  6  Status post exploratory laparotomy TL[facility], BSO, pelvic and periaortic lymphoidectomy, omental biopsies and ovarian staging for ovarian mass consistent with adenocarcinoma day#6  1  Management per gyn/onc  7  Encephalopathy likely toxic versus metabolic  1  Mental status slowlying improving  Patient is able to follow simple commands  8  Diabetes Mellitis  1  Start insulin infusion  Blood sugars remain consistently greater than 200      Critical Care Time:   Documented critical care time excludes any procedures documented elsewhere   It also excludes any family updates    _____________________________________________________________________    HPI/24hr events:   Patient able to open eyes to her name and follow 1 step commands    Medications:    Current Facility-Administered Medications:  artificial tear  Both Eyes HS PRN Braxton Rye, JIM    cefepime 1,000 mg Intravenous Q24H JIM Cheng Last Rate: Stopped (10/09/19 2201)   chlorhexidine 15 mL Swish & Spit Q12H [address] JIM Candelaria    dorzolamide-timolol 1 drop Both Eyes BID JIM Tellez    fentaNYL 50 mcg Intravenous Q2H PRN JIM Cheng    influenza vaccine 0 5 mL Intramuscular Prior to discharge Lazara Clemente, DO    insulin lispro 1-5 Units Subcutaneous Q6H [address] Berkley K JIM Haywood    lactated ringers 1,000 mL Intravenous Once JIM Tellez    LORazepam 2 mg Intravenous Q4H PRN Fernando Rocha, JIM    metoprolol 2 5 mg Intravenous Q6H PRN Fernando Rocha, JIM    metroNIDAZOLE 500 mg Intravenous Q8H JIM Tellez Last Rate: 500 mg (10/10/19 0420)   Netarsudil Dimesylate 1 drop Right Eye HS Ruthie Tobi, JIM    ondansetron 4 mg Intravenous Q6H PRN Braxton Jacob, JIM    pantoprazole 40 mg Intravenous Q12H [address] Berkley K Chastity, JIM    travoprost 1 drop Both Eyes HS JIM Tellez               Physical exam:  Vitals: Body mass index is 25 09 kg/m²  Blood pressure 127/65, pulse (!) 130, temperature (!) 97 2 °F (36 2 °C), temperature source Temporal, resp   rate 17, height 5' 5" (1 651 m), weight 68 4 kg (150 lb 12 7 oz), SpO2 99 %, not currently breastfeeding ,  Temp  Min: 96 8 °F (36 °C)  Max: 99 9 °F (37 7 °C)  IBW: 57 kg    SpO2: 99 %  SpO2 Activity: At Rest  O2 Device: None (Room air)      Intake/Output Summary (Last 24 hours) at 10/10/2019 0452  Last data filed at 10/10/2019 0420  Gross per 24 hour   Intake 379 43 ml   Output 2260 ml   Net -1880 57 ml       Invasive/non-invasive ventilation settings:   Respiratory    Lab Data (Last 4 hours)    None         O2/Vent Data (Last 4 hours)      10/10 0321           Vent Mode AC/VC       Resp Rate (BPM) (BPM) 16       Vt (mL) (mL) 420       FIO2 (%) (%) 50       PEEP (cmH2O) (cmH2O) 8       MV 7 1                 Invasive Devices     Central Venous Catheter Line CVC Central Lines 10/07/19 Triple 16cm 2 days          Drain            NG/OG/Enteral Tube Orogastric 16 Fr Left mouth 3 days    Urethral Catheter 16 Fr  3 days    Rectal Tube With balloon 1 day          Airway            ETT  Cuffed 8 mm 2 days    ETT  Cuffed; Hi-Lo 7 mm 2 days                  Physical Exam:  Gen: weak  HEENT: NC/AT PERRLA EOMI oropharynx moist  Neck: supple, No JVD, trachea midline, no adenopathy  Chest: Rhonchi  Cor: Clear S1 and S2  Abd: Soft NT +BS  Ext: anisarca  Neuro: weak but able to follow simple commands  Skin: W/D      Diagnostic Data:  Lab: I have personally reviewed pertinent lab results     CBC:   Results from last 7 days   Lab Units 10/09/19  0504 10/08/19  1617 10/08/19  0938 10/08/19  0740   WBC Thousand/uL 8 29  --  3 32* 2 61*   HEMOGLOBIN g/dL 7 8* 7 8* 6 5* 6 3*   HEMATOCRIT % 23 0*  --  19 3* 18 5*   PLATELETS Thousands/uL 147*  --  185 170       CMP:   Results from last 7 days   Lab Units 10/09/19  0504 10/08/19  0439 10/07/19  1920   SODIUM mmol/L 138 135* 130*   POTASSIUM mmol/L 3 9 3 3* 4 6   CHLORIDE mmol/L 104 102 100   CO2 mmol/L 26 24 16*   BUN mg/dL 25 32* 27*   CREATININE mg/dL 0 95 1 25 1 53*   CALCIUM mg/dL 7 0* 7 2* 6 0*   ALK PHOS U/L  --  34* 63   ALT U/L  --  197* 116*   AST U/L  --  192* 108*     PT/INR:   Lab Results   Component Value Date    INR 1 57 (H) 10/09/2019   ,   Magnesium:   Results from last 7 days   Lab Units 10/07/19  1920 10/05/19  0459   MAGNESIUM mg/dL 2 2 1 6     Phosphorous:   Results from last 7 days   Lab Units 10/07/19  1920   PHOSPHORUS mg/dL 4 2*       Microbiology:  Results from last 7 days   Lab Units 10/09/19  1107   GRAM STAIN RESULT  Rare Polys*  2+ Budding yeast*  1+ Gram positive cocci in clusters*       Imaging:  none    Cardiac lab/EKG/telemetry/ECHO:   Atrial fibrillation    VTE Prophylaxis: venodynes    Code Status: No Order    Brown Tonia, CRNP    Portions of the record may have been created with voice recognition software  Occasional wrong word or "sound a like" substitutions may have occurred due to the inherent limitations of voice recognition software  Read the chart carefully and recognize, using context, where substitutions have occurred

## 2019-10-11 ENCOUNTER — APPOINTMENT (INPATIENT)
Dept: RADIOLOGY | Facility: HOSPITAL | Age: 84
DRG: 736 | End: 2019-10-11
Payer: COMMERCIAL

## 2019-10-11 LAB
ANION GAP SERPL CALCULATED.3IONS-SCNC: 11 MMOL/L (ref 4–13)
APTT PPP: 73 SECONDS (ref 23–37)
APTT PPP: 77 SECONDS (ref 23–37)
BACTERIA SPT RESP CULT: ABNORMAL
BUN SERPL-MCNC: 21 MG/DL (ref 5–25)
CALCIUM SERPL-MCNC: 7.3 MG/DL (ref 8.3–10.1)
CHLORIDE SERPL-SCNC: 105 MMOL/L (ref 100–108)
CO2 SERPL-SCNC: 26 MMOL/L (ref 21–32)
CREAT SERPL-MCNC: 0.75 MG/DL (ref 0.6–1.3)
ERYTHROCYTE [DISTWIDTH] IN BLOOD BY AUTOMATED COUNT: 14.5 % (ref 11.6–15.1)
GFR SERPL CREATININE-BSD FRML MDRD: 74 ML/MIN/1.73SQ M
GLUCOSE SERPL-MCNC: 183 MG/DL (ref 65–140)
GLUCOSE SERPL-MCNC: 186 MG/DL (ref 65–140)
GLUCOSE SERPL-MCNC: 189 MG/DL (ref 65–140)
GLUCOSE SERPL-MCNC: 198 MG/DL (ref 65–140)
GLUCOSE SERPL-MCNC: 205 MG/DL (ref 65–140)
GLUCOSE SERPL-MCNC: 229 MG/DL (ref 65–140)
GLUCOSE SERPL-MCNC: 236 MG/DL (ref 65–140)
GLUCOSE SERPL-MCNC: 246 MG/DL (ref 65–140)
GRAM STN SPEC: ABNORMAL
HCT VFR BLD AUTO: 31 % (ref 34.8–46.1)
HGB BLD-MCNC: 10.1 G/DL (ref 11.5–15.4)
MCH RBC QN AUTO: 32.2 PG (ref 26.8–34.3)
MCHC RBC AUTO-ENTMCNC: 32.6 G/DL (ref 31.4–37.4)
MCV RBC AUTO: 99 FL (ref 82–98)
PLATELET # BLD AUTO: 220 THOUSANDS/UL (ref 149–390)
PMV BLD AUTO: 9.4 FL (ref 8.9–12.7)
POTASSIUM SERPL-SCNC: 3.1 MMOL/L (ref 3.5–5.3)
RBC # BLD AUTO: 3.14 MILLION/UL (ref 3.81–5.12)
SODIUM SERPL-SCNC: 142 MMOL/L (ref 136–145)
WBC # BLD AUTO: 19.04 THOUSAND/UL (ref 4.31–10.16)

## 2019-10-11 PROCEDURE — G8997 SWALLOW GOAL STATUS: HCPCS

## 2019-10-11 PROCEDURE — G8996 SWALLOW CURRENT STATUS: HCPCS

## 2019-10-11 PROCEDURE — C9113 INJ PANTOPRAZOLE SODIUM, VIA: HCPCS | Performed by: NURSE PRACTITIONER

## 2019-10-11 PROCEDURE — 99024 POSTOP FOLLOW-UP VISIT: CPT | Performed by: OBSTETRICS & GYNECOLOGY

## 2019-10-11 PROCEDURE — 71045 X-RAY EXAM CHEST 1 VIEW: CPT

## 2019-10-11 PROCEDURE — 82948 REAGENT STRIP/BLOOD GLUCOSE: CPT

## 2019-10-11 PROCEDURE — 85027 COMPLETE CBC AUTOMATED: CPT | Performed by: NURSE PRACTITIONER

## 2019-10-11 PROCEDURE — 92610 EVALUATE SWALLOWING FUNCTION: CPT

## 2019-10-11 PROCEDURE — 85730 THROMBOPLASTIN TIME PARTIAL: CPT | Performed by: NURSE PRACTITIONER

## 2019-10-11 PROCEDURE — 94660 CPAP INITIATION&MGMT: CPT

## 2019-10-11 PROCEDURE — 99232 SBSQ HOSP IP/OBS MODERATE 35: CPT

## 2019-10-11 PROCEDURE — 99291 CRITICAL CARE FIRST HOUR: CPT | Performed by: INTERNAL MEDICINE

## 2019-10-11 PROCEDURE — 80048 BASIC METABOLIC PNL TOTAL CA: CPT | Performed by: NURSE PRACTITIONER

## 2019-10-11 RX ORDER — INSULIN GLARGINE 100 [IU]/ML
11 INJECTION, SOLUTION SUBCUTANEOUS
Status: DISCONTINUED | OUTPATIENT
Start: 2019-10-11 | End: 2019-10-14

## 2019-10-11 RX ORDER — POTASSIUM CHLORIDE 14.9 MG/ML
20 INJECTION INTRAVENOUS
Status: COMPLETED | OUTPATIENT
Start: 2019-10-11 | End: 2019-10-11

## 2019-10-11 RX ORDER — PANTOPRAZOLE SODIUM 40 MG/1
40 INJECTION, POWDER, FOR SOLUTION INTRAVENOUS
Status: DISCONTINUED | OUTPATIENT
Start: 2019-10-12 | End: 2019-10-15

## 2019-10-11 RX ORDER — FUROSEMIDE 10 MG/ML
20 INJECTION INTRAMUSCULAR; INTRAVENOUS DAILY
Status: DISCONTINUED | OUTPATIENT
Start: 2019-10-11 | End: 2019-10-16

## 2019-10-11 RX ORDER — VECURONIUM BROMIDE 1 MG/ML
5 INJECTION, POWDER, LYOPHILIZED, FOR SOLUTION INTRAVENOUS ONCE
Status: DISCONTINUED | OUTPATIENT
Start: 2019-10-11 | End: 2019-10-11

## 2019-10-11 RX ORDER — FUROSEMIDE 10 MG/ML
20 INJECTION INTRAMUSCULAR; INTRAVENOUS ONCE
Status: DISCONTINUED | OUTPATIENT
Start: 2019-10-11 | End: 2019-10-11

## 2019-10-11 RX ORDER — ALPRAZOLAM 0.25 MG/1
0.25 TABLET ORAL 2 TIMES DAILY PRN
Status: DISCONTINUED | OUTPATIENT
Start: 2019-10-11 | End: 2019-10-12

## 2019-10-11 RX ADMIN — INSULIN LISPRO 2 UNITS: 100 INJECTION, SOLUTION INTRAVENOUS; SUBCUTANEOUS at 17:53

## 2019-10-11 RX ADMIN — DORZOLAMIDE HYDROCHLORIDE AND TIMOLOL MALEATE 1 DROP: 20; 5 SOLUTION/ DROPS OPHTHALMIC at 17:53

## 2019-10-11 RX ADMIN — ALPRAZOLAM 0.25 MG: 0.25 TABLET ORAL at 21:30

## 2019-10-11 RX ADMIN — METRONIDAZOLE 500 MG: 500 INJECTION, SOLUTION INTRAVENOUS at 04:50

## 2019-10-11 RX ADMIN — CEFEPIME HYDROCHLORIDE 1000 MG: 1 INJECTION, POWDER, FOR SOLUTION INTRAMUSCULAR; INTRAVENOUS at 11:03

## 2019-10-11 RX ADMIN — INSULIN LISPRO 1 UNITS: 100 INJECTION, SOLUTION INTRAVENOUS; SUBCUTANEOUS at 06:33

## 2019-10-11 RX ADMIN — INSULIN GLARGINE 11 UNITS: 100 INJECTION, SOLUTION SUBCUTANEOUS at 22:30

## 2019-10-11 RX ADMIN — HEPARIN SODIUM AND DEXTROSE 12 UNITS/KG/HR: 10000; 5 INJECTION INTRAVENOUS at 18:31

## 2019-10-11 RX ADMIN — TRAVOPROST 1 DROP: 0.04 SOLUTION/ DROPS OPHTHALMIC at 21:31

## 2019-10-11 RX ADMIN — METOPROLOL TARTRATE 5 MG: 5 INJECTION, SOLUTION INTRAVENOUS at 19:21

## 2019-10-11 RX ADMIN — INSULIN LISPRO 3 UNITS: 100 INJECTION, SOLUTION INTRAVENOUS; SUBCUTANEOUS at 21:32

## 2019-10-11 RX ADMIN — POTASSIUM CHLORIDE 20 MEQ: 14.9 INJECTION, SOLUTION INTRAVENOUS at 13:08

## 2019-10-11 RX ADMIN — PANTOPRAZOLE SODIUM 40 MG: 40 INJECTION, POWDER, FOR SOLUTION INTRAVENOUS at 08:41

## 2019-10-11 RX ADMIN — METOPROLOL TARTRATE 5 MG: 5 INJECTION, SOLUTION INTRAVENOUS at 06:19

## 2019-10-11 RX ADMIN — INSULIN LISPRO 2 UNITS: 100 INJECTION, SOLUTION INTRAVENOUS; SUBCUTANEOUS at 13:37

## 2019-10-11 RX ADMIN — METOPROLOL TARTRATE 5 MG: 5 INJECTION, SOLUTION INTRAVENOUS at 13:40

## 2019-10-11 RX ADMIN — POTASSIUM CHLORIDE 20 MEQ: 14.9 INJECTION, SOLUTION INTRAVENOUS at 07:23

## 2019-10-11 RX ADMIN — METRONIDAZOLE 500 MG: 500 INJECTION, SOLUTION INTRAVENOUS at 20:49

## 2019-10-11 RX ADMIN — CEFEPIME HYDROCHLORIDE 1000 MG: 1 INJECTION, POWDER, FOR SOLUTION INTRAMUSCULAR; INTRAVENOUS at 21:31

## 2019-10-11 RX ADMIN — ALPRAZOLAM 0.25 MG: 0.25 TABLET ORAL at 15:49

## 2019-10-11 RX ADMIN — POTASSIUM CHLORIDE 20 MEQ: 14.9 INJECTION, SOLUTION INTRAVENOUS at 09:52

## 2019-10-11 RX ADMIN — METRONIDAZOLE 500 MG: 500 INJECTION, SOLUTION INTRAVENOUS at 13:40

## 2019-10-11 RX ADMIN — FUROSEMIDE 20 MG: 10 INJECTION, SOLUTION INTRAMUSCULAR; INTRAVENOUS at 11:03

## 2019-10-11 RX ADMIN — DORZOLAMIDE HYDROCHLORIDE AND TIMOLOL MALEATE 1 DROP: 20; 5 SOLUTION/ DROPS OPHTHALMIC at 08:41

## 2019-10-11 NOTE — PROGRESS NOTES
Progress Note - Critical Care   Lawerence Cam 82 y o  female MRN: [id number]  Unit/Bed#: ICU 02 Encounter: [id number]    Assessment/Plan:  1  Acute hypoxic respiratory failure secondary to aspiration with possible progression to ARDS  · Improved  Patient was extubated to high-flow nasal cannula yesterday  She became more hypoxic overnight requiring transitioned to BiPAP which was likely secondary to poor inspirational effort, atelectasis and secretions  Wean oxygen as tolerated to maintain saturation greater than 90%  · If speech and swallow remains weak post extubation will need to place keofed to start tube feeds  · Continue aggressive pulmonary toileting  Chest PT  Encourage cough with deep breathing  OOB to the chair during the day  · PT/OT  2  Severe sepsis with shock secondary to aspiration pneumonia  · Sputum culture shows mixed kelsy on gluten Serratia  Today is day 5 of cefepime and Flagyl  · Shock component has resolved  Blood pressure is stable off of vasopressors   3  AFib with RVR  · Pt continues to go in and out of Afib with variable rate     Continue scheduled metoprolol and cardizem prn to keep HR <110  · Continue heparin gtt for anticoagulation  4  Status post exploratory laparotomy with TLH and BSO  · Management per Gyn/Onc service  5  Acute blood loss anemia, multifactorial secondary to possible GI bleed and surgical losses  · No obvious signs of further bleeding  Hgb improved today  Continue to trend and transfuse for Hgb <7  6  Possible GI bleed  · GI following  [place] probably secondary to stress gastritis  Continue protonix BID  · Monitor closely for any signs of bleeding in the setting of anticoagulation for Afib  7  DM type 2  · Blood sugar is controlled  Continue Accu-Cheks with SSI coverage  Goal to maintain -180       _____________________________________________________________________    HPI/24hr events:   Afebrile    Hypoxic on the HFNC overnight requiring transition to BiPAP  No acute events  Medications:    Current Facility-Administered Medications:  artificial tear  Both Eyes HS PRN Dondra Silvia, CRNP    cefepime 1,000 mg Intravenous Q24H JIM Degroot Last Rate: Stopped (10/10/19 2201)   diltiazem 120 mg Oral Daily Doni Alves DO    diltiazem 10 mg Intravenous Q2H PRN Blossom Pendleton PA-C    dorzolamide-timolol 1 drop Both Eyes BID Dondra Silvia, CRNP    heparin (porcine) 3-20 Units/kg/hr (Order-Specific) Intravenous Titrated JIM De Souza Last Rate: 12 Units/kg/hr (10/10/19 1150)   heparin (porcine) 1,950 Units Intravenous PRN Berkley K Bilofsky, CRNP    heparin (porcine) 3,900 Units Intravenous PRN Sayra Aramis Haywood, CRNP    influenza vaccine 0 5 mL Intramuscular Prior to discharge Lazara Clemente,     insulin lispro 1-5 Units Subcutaneous Q6H [address] Berkley K Bilofsky, CRNP    lactated ringers 1,000 mL Intravenous Once Dondra Silvia, CRNP    metoprolol 5 mg Intravenous Q6H Chuy Pendleton PA-C    metroNIDAZOLE 500 mg Intravenous Q8H Dondra Silvia, CRNP Last Rate: 500 mg (10/11/19 0450)   Netarsudil Dimesylate 1 drop Right Eye HS JIM Wallace    ondansetron 4 mg Intravenous Q6H PRN Dondra Silvia, CRNP    pantoprazole 40 mg Intravenous Q12H [address] Berkley K Bilofsky, CRNP    potassium chloride 20 mEq Intravenous Q2H Lyndall Angles Spatzer, JIM    travoprost 1 drop Both Eyes HS JIM Wallace          heparin (porcine) 3-20 Units/kg/hr (Order-Specific) Last Rate: 12 Units/kg/hr (10/10/19 1150)         Physical exam:  Vitals: Body mass index is 25 09 kg/m²  Blood pressure 153/67, pulse (!) 108, temperature 98 8 °F (37 1 °C), temperature source Temporal, resp   rate (!) 27, height 5' 5" (1 651 m), weight 68 4 kg (150 lb 12 7 oz), SpO2 97 %, not currently breastfeeding ,  Temp  Min: 96 8 °F (36 °C)  Max: 100 °F (37 8 °C)  IBW: 57 kg    SpO2: 97 %  SpO2 Activity: At Rest  O2 Device: High flow nasal cannula      Intake/Output Summary (Last 24 hours) at 10/11/2019 0626  Last data filed at 10/11/2019 0400  Gross per 24 hour   Intake 772 87 ml   Output 4080 ml   Net -3307 13 ml       Invasive/non-invasive ventilation settings:   Respiratory    Lab Data (Last 4 hours)    None         O2/Vent Data (Last 4 hours)      10/11 0320          Non-Invasive Ventilation Mode BiPAP                 Invasive Devices     Peripheral Intravenous Line            Peripheral IV 10/10/19 Left Antecubital less than 1 day    Peripheral IV 10/10/19 Left Wrist less than 1 day          Drain            NG/OG/Enteral Tube Orogastric 16 Fr Left mouth 4 days    Urethral Catheter 16 Fr  4 days    Rectal Tube With balloon 2 days          Airway            ETT  Cuffed 8 mm 3 days    ETT  Cuffed; Hi-Lo 7 mm 3 days                  Physical Exam:  Gen: Sleeping but arousable, slightly weak appearing, no acute distress  HEENT: atraumatic, normocephalic, EOMI, pupils 3mm equal and reactive, oropharynx dry  Neck: supple, trachea midline, no JVD, no lymphadenopathy  Chest:  Lungs coarse worse on the right single  Cor:  Single S1/S2 no murmurs, rubs, gallops, irregularly irregular  Abd:  Soft, nontender, nondistended, bowel sounds normoactive  Ext:  1+ bilateral lower extremity edema, 2+ bilateral upper extremity edema, no clubbing or cyanosis  Neuro:  Oriented x3, cranial nerves 2-12 grossly intact, no focal deficits  Skin:  Warm, dry      Diagnostic Data:  Lab: I have personally reviewed pertinent lab results     CBC:   Results from last 7 days   Lab Units 10/11/19  0500 10/10/19  1030 10/10/19  0451   WBC Thousand/uL 19 04* 15 13* 12 96*   HEMOGLOBIN g/dL 10 1* 8 0* 7 7*   HEMATOCRIT % 31 0* 24 0* 23 6*   PLATELETS Thousands/uL 220 177 161       CMP:   Results from last 7 days   Lab Units 10/11/19  0500 10/10/19  0451 10/09/19  0504 10/08/19  0439 10/07/19  1920   SODIUM mmol/L 142 141 138 135* 130*   POTASSIUM mmol/L 3 1* 3 1* 3 9 3 3* 4 6   CHLORIDE mmol/L 105 104 104 102 100   CO2 mmol/L 26 28 26 24 16*   BUN mg/dL 21 27* 25 32* 27*   CREATININE mg/dL 0 75 0 95 0 95 1 25 1 53*   CALCIUM mg/dL 7 3* 7 1* 7 0* 7 2* 6 0*   ALK PHOS U/L  --   --   --  34* 63   ALT U/L  --   --   --  197* 116*   AST U/L  --   --   --  192* 108*     PT/INR:   Lab Results   Component Value Date    INR 1 36 (H) 10/10/2019   ,   Magnesium:   Results from last 7 days   Lab Units 10/07/19  1920 10/05/19  0459   MAGNESIUM mg/dL 2 2 1 6     Phosphorous:   Results from last 7 days   Lab Units 10/07/19  1920   PHOSPHORUS mg/dL 4 2*       Microbiology:  Results from last 7 days   Lab Units 10/09/19  1107   SPUTUM CULTURE  2+ Growth of Serratia marcescens*  3+ Growth of Candida albicans*  2+ Growth of    GRAM STAIN RESULT  Rare Polys*  2+ Budding yeast*  1+ Gram positive cocci in clusters*       Imaging:  No new imaging    Cardiac lab/EKG/telemetry/ECHO:   Atrial fibrillation    VTE Prophylaxis: Heparin gtt, SCDs    Code Status: No Order    Luanne Horse Spatzer, CRNP    Portions of the record may have been created with voice recognition software  Occasional wrong word or "sound a like" substitutions may have occurred due to the inherent limitations of voice recognition software  Read the chart carefully and recognize, using context, where substitutions have occurred

## 2019-10-11 NOTE — PLAN OF CARE
Problem: Potential for Falls  Goal: Patient will remain free of falls  Description  INTERVENTIONS:  - Assess patient frequently for physical needs  -  Identify cognitive and physical deficits and behaviors that affect risk of falls    -  [place] fall precautions as indicated by assessment   - Educate patient/family on patient safety including physical limitations  - Instruct patient to call for assistance with activity based on assessment  - Modify environment to reduce risk of injury  - Consider OT/PT consult to assist with strengthening/mobility  Outcome: Progressing     Problem: GENITOURINARY - ADULT  Goal: Absence of urinary retention  Description  INTERVENTIONS:  - Assess patient's ability to void and empty bladder  - Monitor I/O  - Bladder scan as needed  - Discuss with physician/AP medications to alleviate retention as needed  - Discuss catheterization for long term situations as appropriate   Outcome: Progressing  Goal: Urinary catheter remains patent  Description  INTERVENTIONS:  - Assess patency of urinary catheter  - If patient has a chronic alexandre, consider changing catheter if non-functioning  - Follow guidelines for intermittent irrigation of non-functioning urinary catheter  Outcome: Progressing     Problem: METABOLIC, FLUID AND ELECTROLYTES - ADULT  Goal: Electrolytes maintained within normal limits  Description  INTERVENTIONS:  - Monitor labs and assess patient for signs and symptoms of electrolyte imbalances  - Administer electrolyte replacement as ordered  - Monitor response to electrolyte replacements, including repeat lab results as appropriate  - Instruct patient on fluid and nutrition as appropriate  Outcome: Progressing  Goal: Fluid balance maintained  Description  INTERVENTIONS:  - Monitor labs   - Monitor I/O and WT  - Instruct patient on fluid and nutrition as appropriate  - Assess for signs & symptoms of volume excess or deficit  Outcome: Progressing  Goal: Glucose maintained within target range  Description  INTERVENTIONS:  - Monitor Blood Glucose as ordered  - Assess for signs and symptoms of hyperglycemia and hypoglycemia  - Administer ordered medications to maintain glucose within target range  - Assess nutritional intake and initiate nutrition service referral as needed  Outcome: Progressing     Problem: SKIN/TISSUE INTEGRITY - ADULT  Goal: Skin integrity remains intact  Description  INTERVENTIONS  - Identify patients at risk for skin breakdown  - Assess and monitor skin integrity  - Assess and monitor nutrition and hydration status  - Monitor labs (i e  albumin)  - Assess for incontinence   - Turn and reposition patient  - Assist with mobility/ambulation  - Relieve pressure over bony prominences  - Avoid friction and shearing  - Provide appropriate hygiene as needed including keeping skin clean and dry  - Evaluate need for skin moisturizer/barrier cream  - Collaborate with interdisciplinary team (i e  Nutrition, Rehabilitation, etc )   - Patient/family teaching  Outcome: Progressing  Goal: Incision(s), wounds(s) or drain site(s) healing without S/S of infection  Description  INTERVENTIONS  - Assess and document risk factors for skin impairment   - Assess and document dressing, incision, wound bed, drain sites and surrounding tissue  - Consider nutrition services referral as needed  - Oral mucous membranes remain intact  - Provide patient/ family education  Outcome: Progressing  Goal: Oral mucous membranes remain intact  Description  INTERVENTIONS  - Assess oral mucosa and hygiene practices  - Implement preventative oral hygiene regimen  - Implement oral medicated treatments as ordered  - Initiate Nutrition services referral as needed  Outcome: Progressing     Problem: Prexisting or High Potential for Compromised Skin Integrity  Goal: Skin integrity is maintained or improved  Description  INTERVENTIONS:  - Identify patients at risk for skin breakdown  - Assess and monitor skin integrity  - Assess and monitor nutrition and hydration status  - Monitor labs   - Assess for incontinence   - Turn and reposition patient  - Assist with mobility/ambulation  - Relieve pressure over bony prominences  - Avoid friction and shearing  - Provide appropriate hygiene as needed including keeping skin clean and dry  - Evaluate need for skin moisturizer/barrier cream  - Collaborate with interdisciplinary team   - Patient/family teaching  - Consider wound care consult   Outcome: Progressing     Problem: NEUROSENSORY - ADULT  Goal: Achieves stable or improved neurological status  Description  INTERVENTIONS  - Monitor and report changes in neurological status  - Monitor vital signs such as temperature, blood pressure, glucose, and any other labs ordered   - Initiate measures to prevent increased intracranial pressure  - Monitor for seizure activity and implement precautions if appropriate      Outcome: Progressing  Goal: Remains free of injury related to seizures activity  Description  INTERVENTIONS  - Maintain airway, patient safety  and administer oxygen as ordered  - Monitor patient for seizure activity, document and report duration and description of seizure to physician/advanced practitioner  - If seizure occurs,  ensure patient safety during seizure  - Reorient patient post seizure  - Seizure pads on all 4 side rails  - Instruct patient/family to notify RN of any seizure activity including if an aura is experienced  - Instruct patient/family to call for assistance with activity based on nursing assessment  - Administer anti-seizure medications if ordered    Outcome: Progressing  Goal: Achieves maximal functionality and self care  Description  INTERVENTIONS  - Monitor swallowing and airway patency with patient fatigue and changes in neurological status  - Encourage and assist patient to increase activity and self care     - Encourage visually impaired, hearing impaired and aphasic patients to use assistive/communication devices  Outcome: Progressing     Problem: CARDIOVASCULAR - ADULT  Goal: Maintains optimal cardiac output and hemodynamic stability  Description  INTERVENTIONS:  - Monitor I/O, vital signs and rhythm  - Monitor for S/S and trends of decreased cardiac output  - Administer and titrate ordered vasoactive medications to optimize hemodynamic stability  - Assess quality of pulses, skin color and temperature  - Assess for signs of decreased coronary artery perfusion  - Instruct patient to report change in severity of symptoms  Outcome: Progressing  Goal: Absence of cardiac dysrhythmias or at baseline rhythm  Description  INTERVENTIONS:  - Continuous cardiac monitoring, vital signs, obtain 12 lead EKG if ordered  - Administer antiarrhythmic and heart rate control medications as ordered  - Monitor electrolytes and administer replacement therapy as ordered  Outcome: Progressing     Problem: RESPIRATORY - ADULT  Goal: Achieves optimal ventilation and oxygenation  Description  INTERVENTIONS:  - Assess for changes in respiratory status  - Assess for changes in mentation and behavior  - Position to facilitate oxygenation and minimize respiratory effort  - Oxygen administered by appropriate delivery if ordered  - Initiate smoking cessation education as indicated  - Encourage broncho-pulmonary hygiene including cough, deep breathe, Incentive Spirometry  - Assess the need for suctioning and aspirate as needed  - Assess and instruct to report SOB or any respiratory difficulty  - Respiratory Therapy support as indicated  Outcome: Progressing     Problem: HEMATOLOGIC - ADULT  Goal: Maintains hematologic stability  Description  INTERVENTIONS  - Assess for signs and symptoms of bleeding or hemorrhage  - Monitor labs  - Administer supportive blood products/factors as ordered and appropriate  Outcome: Progressing     Problem: MUSCULOSKELETAL - ADULT  Goal: Maintain or return mobility to safest level of function  Description  INTERVENTIONS:  - Assess patient's ability to carry out ADLs; assess patient's baseline for ADL function and identify physical deficits which impact ability to perform ADLs (bathing, care of mouth/teeth, toileting, grooming, dressing, etc )  - Assess/evaluate cause of self-care deficits   - Assess range of motion  - Assess patient's mobility  - Assess patient's need for assistive devices and provide as appropriate  - Encourage maximum independence but intervene and supervise when necessary  - Involve family in performance of ADLs  - Assess for home care needs following discharge   - Consider OT consult to assist with ADL evaluation and planning for discharge  - Provide patient education as appropriate  Outcome: Progressing  Goal: Maintain proper alignment of affected body part  Description  INTERVENTIONS:  - Support, maintain and protect limb and body alignment  - Provide patient/ family with appropriate education  Outcome: Progressing     Problem: Nutrition/Hydration-ADULT  Goal: Nutrient/Hydration intake appropriate for improving, restoring or maintaining nutritional needs  Description  Monitor and assess patient's nutrition/hydration status for malnutrition  Collaborate with interdisciplinary team and initiate plan and interventions as ordered  Monitor patient's weight and dietary intake as ordered or per policy  Utilize nutrition screening tool and intervene as necessary  Determine patient's food preferences and provide high-protein, high-caloric foods as appropriate       INTERVENTIONS:  - Monitor oral intake, urinary output, labs, and treatment plans  - Assess nutrition and hydration status and recommend course of action  - Evaluate amount of meals eaten  - Assist patient with eating if necessary   - Allow adequate time for meals  - Recommend/ encourage appropriate diets, oral nutritional supplements, and vitamin/mineral supplements  - Order, calculate, and assess calorie counts as needed  - Recommend, monitor, and adjust tube feedings and TPN/PPN based on assessed needs  - Assess need for intravenous fluids  - Provide specific nutrition/hydration education as appropriate  - Include patient/family/caregiver in decisions related to nutrition  Outcome: Progressing

## 2019-10-11 NOTE — DISCHARGE INSTR - DIET
10/18 Puree w/ honey thick liquids by tsp  Monitor RR and o2 sats  dobhoff or keofed to supplement if intake is low  stop feeding if fatiguing or showing s/s aspiration

## 2019-10-11 NOTE — PROGRESS NOTES
Gyn Oncology Progress note   Mahesh Roberson 82 y o  female MRN: [id number]  Unit/Bed#: ICU 02 Encounter: [id number]    Assessment: 82 y o  POD# 7 from [facility], BSO, ex lap, pelvic and para-aortic lymphadenectomy, omentectomy, peritoneal biopsies, and ovarian cancer staging for right tubo-ovarian mass consistent with adenocarcinoma, s/p code 10/7    Plan:    1) Acute hypoxic respiratory failure  Management per critical care team  Extubated and off pressors, currently on BiPap  Continue cefepime and flagyl, given one dose of diflucan 10/10 for budding yeast on sputum cultures    2) Severe metabolic acidosis  Lactic acidosis 6 8 --> code --> 7 8 --> 5 4, s/p bicarb infusion --> 3 2  CTA 10/7: no PE, bilateral pleural effusion, bilateral atelectasis, hiatal hernia with gastric and esophageal distension  CT head 10/7: no acute changes  CTAP 10/8: dense perihilar opacities, persistent bilateral nephrograms, dilated small bowel, gas in colon, possible hematoma in pelvis 7x5x7, possible lymphocele along left external iliac vessel  Management per critical care team    3) Acute kidney injury  Cr 0 72 --> 0 96 --> 1 6 --> 1 53 --> 0 95 --> 0 95 --> 0 75 today, stable  Discontinue nephrotoxic agents, continue to monitor UO    4) Postoperative ileus vs SBO  Start tube feeds today    5) Hyponatremia  Na 141 --> 126 --> 127 --> 130 --> 138 --> 141 --> 142 today  Continue to trend    6) Anemia  Continue protonix BID  Hgb 10 3 -->10 0 --> 9 2 --> 7 2 --> 8 0 --> 6 5 --> 2U pRBC --> 7 8 --> 7 7 --> 10 1, stable, no indication for further transfusions at this time    7) Right tubo-ovarian mass, s/p surgery   Frozen consistent with adenocarcinoma, follow up final pathology  Continue routine postoperative care    8) Urinary retention  Ji in place   UO 4305cc over last 24 hours     9) T1DM  Transitioned from SSI algorithm 2 and lantus 12U at night to insulin drip for better control of her glucose on 10/7   Transitioned back to SSI on 10/8 and due to glucose > 200s, transitioned back to insulin drip 10/9  Home meds: humalog 4/5/7 with meals, lantus 12U at night    10) Analgesia  S/p TAPs block, s/p dilaudid   Tylenol [address], motrin prn, oxycodone 5-10mg for moderate-severe pain prn  Dilaudid prn    11) Dry eyes  Lubifresh prn, cosopt BID, travoprost qD    12) Depression  Cymbalta 30mg qHS - on hold    13) Hypertension  Metoprolol 2 5mg prn  Avapro 150mg daily    14) Hypothyroidism  Levothyroxine 50mcg daily - on hold    15) FEN: tube feeds    16) DVT ppx: SCDs, on heparin drip       Subjective:    Tracey Peabody is currently intubated but no longer sedated  She is able to nod yes or now and follow simple commands  /78   Pulse 92   Temp 97 6 °F (36 4 °C) (Temporal)   Resp 21   Ht 5' 5" (1 651 m)   Wt 68 4 kg (150 lb 12 7 oz)   SpO2 96%   Breastfeeding? No   BMI 25 09 kg/m²     I/O last 3 completed shifts: In: 1029 3 [I V :129 3; IV Piggyback:900]  Out: 2902 [Urine:4685]  No intake/output data recorded  Lab Results   Component Value Date    WBC 19 04 (H) 10/11/2019    HGB 10 1 (L) 10/11/2019    HCT 31 0 (L) 10/11/2019    MCV 99 (H) 10/11/2019     10/11/2019       Lab Results   Component Value Date    GLUCOSE 75 01/08/2016    CALCIUM 7 3 (L) 10/11/2019     (L) 01/08/2016    K 3 1 (L) 10/11/2019    CO2 26 10/11/2019     10/11/2019    BUN 21 10/11/2019    CREATININE 0 75 10/11/2019         Physical Exam   Constitutional: She is oriented to person, place, and time  She appears well-developed and well-nourished  No distress  Cardiovascular: Normal rate, regular rhythm and normal heart sounds  Exam reveals no gallop and no friction rub  No murmur heard  Triple lumen and A line in place   Pulmonary/Chest: Effort normal and breath sounds normal  No stridor  No respiratory distress  She has no wheezes  She has no rales  Abdominal: Soft  Bowel sounds are normal  She exhibits no distension and no mass   There is no tenderness  There is no rebound and no guarding  No hernia  Vertical incision clean, dry, intact without signs of inflammation   Neurological: She is alert and oriented to person, place, and time  Skin: She is not diaphoretic  Vitals reviewed        Antonio Yuen MD  10/11/2019  8:11 AM

## 2019-10-11 NOTE — SPEECH THERAPY NOTE
Speech Language/Pathology  Speech-Language Pathology Bedside Swallow Evaluation        Patient Name: Jenny Jorgensen    MPKKS'I Date: 10/11/2019     Problem List  Principal Problem:    Ovarian cancer ([facility] )  Active Problems:    Hypertension    Retinopathy, diabetic, proliferative ([facility] )    Type 1 diabetes mellitus with diabetic neuropathy (HCC)    Diabetic neuropathy, painful ([facility] )    Hyponatremia    Type 1 diabetes mellitus with hyperglycemia ([place] [address] )         Past Medical History  Past Medical History:   Diagnosis Date    Anxiety     Arthritis     Constipation     Diabetes mellitus ([facility] )     IDDM    Frequent UTI     Glaucoma     Hearing loss     Hyperlipidemia     Hypertension     Hyperthyroidism     in past    Hyponatremia     Hypothyroid     Neuropathy     bles    Right tubo-ovarian mass     Wears glasses        Past Surgical History  Past Surgical History:   Procedure Laterality Date    APPENDECTOMY  1954    CATARACT EXTRACTION Bilateral     COLONOSCOPY  09/2014    Completed - Dr Elzbieta Stanley, due in 5 years    HYSTERECTOMY N/A 10/4/2019    Procedure: LAP TOTAL HYSTERECTOMY, BSO;  Surgeon: Sulema Alegria MD;  Location: [facility];  Service: Gynecology Oncology    LAPAROTOMY N/A 10/4/2019    Procedure: EXPLORATORY LAPAROTOMY  EXLAP OMENTECTOMY  PELVIC AND DEANGELO-AORTIC LYMPH NODE DISSECTION  PERITONEAL BIOPSY TRANS ABDOMINUS BLOCK;  Surgeon: Sulema Alegria MD;  Location: [facility];  Service: Gynecology Oncology    SKIN LESION EXCISION      Ears       Summary    Pt presents with at least mild oral and mod pharyngeal dysphagia, characterized by mildly prolonged bolus transfer, suspected delayed swallows, suspected reduced hyolaryngeal elevation, and possible pharyngeal residue, with suspected reduced airway protection  There were s/s of aspiration with NTL, but no s/s of aspiration with pureed or HTL  Pt is appropriate to begin conservative diet, with details below        Recommendations:   Diet: puree/level 1 diet and honey thick liquids by cup  Meds: crushed with puree   Full assist/feed  Aspiration precautions and compensatory swallowing strategies: upright posture, only feed when fully alert and slow rate of feeding  Other Recommendations/ considerations: ST to follow up for dysphagia tx 3-5x per week  Current Medical Status  Copied from chart:   Patient is an 82year old female with history of complex adnexal mass, without evidence of pelvic and para-aortic lymphadenopathy, ascites, omental caking admitted for total laparoscopic hysterectomy and bilateral salpingo-oophorectomy  Because 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, she had an exploratory laparotomy for omentectomy, peritoneal biopsies, and pelvic and para-aortic lymphadenectomy  During her postoperative course, her dilaudid pump was discontinued and she was transitioned to oral analgesia  She was continued on her depression, hypertension, and hypothyroidism medication  She failed her voiding trial twice and her alexandre was replaced  She was also put on sliding scale insulin algorithm 2 for her T1DM  Despite this, her glucose levels were not well controlled and she was transitioned to an insulin drip  On POD #3 (10/7), she developed a postoperative ileus  She then began complaining of dyspnea, tachypnea, and difficulty speaking in full sentences  A rapid response was called  An extensive laboratory workup and imaging was completed, remarkable for metabolic acidosis, acute kidney injury, bilateral pleural effusions, and a markedly distended stomach  She was transferred to the ICU  A few minutes after she was transferred, she had profuse coffee ground emesis, began aspirating and became unresponsive with no palpable pulse  A code blue was called  She was placed on ventilatory support and arterial line and central access were placed       Acute hypoxic respiratory failure  She was intubated and sedated immediately after code blue  She was started on cefepime and flagyl and subsequently fluconazole for budding yeast in sputum culture  She was taken off pressors and was extubated on 10/10  Chart reviewed and spoke with RN  Pt was intubated for surgery, and then emergently intubated on 10/4 until 10/10  Past medical history:   Please see H&P for details    Special Studies:  CXR-Lines and tubes as above  No pneumothorax  Patchy diffuse bilateral pulmonary airspace opacities appear stable or slightly improved with the exception of perhaps slight progression in the left upper lung zone  Social/Education/Vocational Hx:  Pt lives alone    Swallow Information   Current Risks for Dysphagia & Aspiration: recent intubation and pt appears quite weak  Current Symptoms/Concerns: pt has been kept NPO since extubation  Current Diet: NPO   Baseline Diet: regular diet and thin liquids    Baseline Assessment   Behavior/Cognition: awake but appears very fatigued  Speech/Language Status: able to participate in basic conversation and able to follow commands  Patient Positioning: upright in bed     Swallow Mechanism Exam   Facial: symmetrical  Labial: WFL  Lingual: decreased ROM and bilateral decreased strength  Velum: symmetrical  Mandible: adequate ROM  Dentition: adequate  Vocal quality:weak   Volitional Cough: weak   Respiratory: HFNC, sats in the 90s during eval    Consistencies Assessed and Performance   Consistencies Administered: ice chips, nectar thick, honey thick and puree  -4 oz applesauce, 3 oz HTL,  tsps of NTL, ice chips  More advanced solids were not assessed due to pt weakness and fatigue    Oral Stage: Adequate bolus retrieval from spoon and cup, good lip seal, prompt bolus manipulation, mildly slow bolus transfer, no oral residue observed  Pharyngeal Stage: Mildly delayed swallows suspected  Hyolaryngeal elevation was observed and palpated and judged to be reduced   Swallows were initially audible/"clunky", but as assessment progressed were no longer audible  Pt swallowed 2x per bite/sip  S/s of aspiration were observed with NTL, but not with any other consistency  Esophageal Concerns: none reported      Results Reviewed with: patient, RN, family and aspiration precautions posted   Dysphagia Goals: 1  Pt will tolerate pureed diet and HTL without s/s of aspiration across all meals and snacks  2  Pt will tolerate LRD without s/s of aspiration     Discharge recommendation: unsure at this time    Speech Therapy Prognosis   Prognosis: good    Prognosis Considerations: age, medical status, cognitive status and therapeutic potential

## 2019-10-11 NOTE — PROGRESS NOTES
Progress Note - Cardiology   Brent Nagy 82 y o  female MRN: [id number]  Unit/Bed#: ICU 02 Encounter: [id number]      Assessment/Recommendations/Discussion:   1  New onset atrial fibrillation with rapid ventricular response, now converted to sinus rhythm  2  Acute hypoxic respiratory failure  3  Aspiration pneumonitis  4  Acute blood loss anemia  5  Gastritis  6  Status post exploratory laparotomy, T LH, BSO, pelvic and periaortic lymph weight ectomy postop day 7   7  Diabetes mellitus  8  Status post PEA arrest and 2 rounds of CPR with subsequent return of spontaneous circulation       PLAN   Would recommend standing p o  Cardizem 120   P r n  IV metoprolol for elevated heart rates   Morphine for pain   May need some Xanax for feeling anxiety   Echo with preserved EF   AFib likely being driven by respiratory failure plus sepsis/anemia    Subjective:   HPI  Required BiPAP overnight  Is in and out of atrial fibrillation  Currently sinus rhythm in the 80s  Denies any chest pain  Still on high-flow nasal cannula      Review of Systems: As noted in HPI  Rest of ROS is negative  Vitals:   /74   Pulse 82   Temp 97 6 °F (36 4 °C) (Temporal)   Resp 20   Ht 5' 5" (1 651 m)   Wt 68 4 kg (150 lb 12 7 oz)   SpO2 95%   Breastfeeding? No   BMI 25 09 kg/m²   Vitals:    10/04/19 0621 10/10/19 0130   Weight: 58 5 kg (129 lb) 68 4 kg (150 lb 12 7 oz)       Intake/Output Summary (Last 24 hours) at 10/11/2019 1208  Last data filed at 10/11/2019 0601  Gross per 24 hour   Intake 466 9 ml   Output 3395 ml   Net -2928 1 ml       Physical Exam:  General appearance: alert and oriented, appears fatigued, on high-flow nasal cannula  Head: Normocephalic, without obvious abnormality, atraumatic  Eyes: conjunctivae/corneas clear  Anicteric    Neck: no adenopathy, no carotid bruit, no JVD  Lungs:  Decreased breath sounds bilaterally  Heart: regular rate and rhythm, S1, S2 normal, 2/6 systolic murmur, no click, rub or gallop  Abdomen: soft, non-tender; bowel sounds normal; no masses,  no organomegaly  Extremities: extremities normal, warm and well-perfused; no cyanosis, clubbing, or edema  Skin: Skin color, texture, turgor normal  No rashes or lesions     TELEMETRY:  Sinus rhythm now at 80 beats per minute  Paroxysmal AFib  Lab Results:  Results from last 7 days   Lab Units 10/11/19  0500   WBC Thousand/uL 19 04*   HEMOGLOBIN g/dL 10 1*   HEMATOCRIT % 31 0*   PLATELETS Thousands/uL 220     Results from last 7 days   Lab Units 10/11/19  0500  10/08/19  0439   POTASSIUM mmol/L 3 1*   < > 3 3*   CHLORIDE mmol/L 105   < > 102   CO2 mmol/L 26   < > 24   BUN mg/dL 21   < > 32*   CREATININE mg/dL 0 75   < > 1 25   CALCIUM mg/dL 7 3*   < > 7 2*   ALK PHOS U/L  --   --  34*   ALT U/L  --   --  197*   AST U/L  --   --  192*    < > = values in this interval not displayed       Results from last 7 days   Lab Units 10/11/19  0500   POTASSIUM mmol/L 3 1*   CHLORIDE mmol/L 105   CO2 mmol/L 26   BUN mg/dL 21   CREATININE mg/dL 0 75   CALCIUM mg/dL 7 3*           Medications:    Current Facility-Administered Medications:     artificial tear (LUBRIFRESH P M ) ophthalmic ointment, , Both Eyes, HS PRN, JIM Cooper    cefepime (MAXIPIME) 1,000 mg in dextrose 5 % 50 mL IVPB, 1,000 mg, Intravenous, Q12H, JIM Jean, Last Rate: 100 mL/hr at 10/11/19 1103, 1,000 mg at 10/11/19 1103    diltiazem (CARDIZEM) injection 10 mg, 10 mg, Intravenous, Q2H PRN, Angelina Pendleton PA-C, 10 mg at 10/10/19 2054    dorzolamide-timolol (COSOPT) 22 3-6 8 MG/ML ophthalmic solution 1 drop, 1 drop, Both Eyes, BID, JIM Wallace, 1 drop at 10/11/19 0841    furosemide (LASIX) injection 20 mg, 20 mg, Intravenous, Daily, JIM Jean, 20 mg at 10/11/19 1103    heparin (porcine) 25,000 units in 250 mL infusion (premix), 3-20 Units/kg/hr (Order-Specific), Intravenous, Titrated, JIM Kim, Last Rate: 7 8 mL/hr at 10/10/19 1150, 12 Units/kg/hr at 10/10/19 1150    heparin (porcine) injection 1,950 Units, 1,950 Units, Intravenous, PRN, JIM Tierney    heparin (porcine) injection 3,900 Units, 3,900 Units, Intravenous, PRN, JIM Tierney    influenza vaccine, high-dose (FLUZONE HIGH-DOSE) IM injection ERIKA 0 5 mL, 0 5 mL, Intramuscular, Prior to discharge, Lazara Clemente,     insulin lispro (HumaLOG) 100 units/mL subcutaneous injection 1-5 Units, 1-5 Units, Subcutaneous, Q6H [address], 1 Units at 10/11/19 1493 **AND** Fingerstick Glucose (POCT), , , Q6H, JIM Kim    lactated ringers bolus 1,000 mL, 1,000 mL, Intravenous, Once, JIM Monsalve    metoprolol (LOPRESSOR) injection 5 mg, 5 mg, Intravenous, Q6H, Chuy Pendleton PA-C, 5 mg at 10/11/19 4668    metroNIDAZOLE (FLAGYL) IVPB (premix) 500 mg, 500 mg, Intravenous, Q8H, JIM Wallace, Last Rate: 200 mL/hr at 10/11/19 0450, 500 mg at 10/11/19 0450    Netarsudil Dimesylate 0 02 % SOLN 1 drop, 1 drop, Right Eye, HS, JIM Wallace, 1 drop at 10/10/19 2129    ondansetron (ZOFRAN) injection 4 mg, 4 mg, Intravenous, Q6H PRN, JIM Monsalve, 4 mg at 10/07/19 0541    [START ON 10/12/2019] pantoprazole (PROTONIX) injection 40 mg, 40 mg, Intravenous, Q24H [address], Genette JIM Ag    potassium chloride 20 mEq IVPB (premix), 20 mEq, Intravenous, Q2H, Luanne Horse Spatzer, CRNP, Last Rate: 50 mL/hr at 10/11/19 0952, 20 mEq at 10/11/19 0952    travoprost (TRAVATAN-Z) 0 004 % ophthalmic solution 1 drop, 1 drop, Both Eyes, HS, Ruthie IJM Britton, 1 drop at 10/10/19 2128    This note was completed in part utilizing M-Modal Fluency Direct Software  Grammatical errors, random word insertions, spelling mistakes, and incomplete sentences may be an occasional consequence of this system secondary to software limitations, ambient noise, and hardware issues    If you have any questions or concerns about the content, text, or information contained within the body of this dictation, please contact the provider for clarification      Ledon Runner, DO  10/11/2019 12:08 PM

## 2019-10-11 NOTE — SOCIAL WORK
Met with the patients daughter to provide support and explain CM role  Discussed generally what they may be looking for as far as care is concerned for when the patient is stable to d/c  The daughter would like to try to get the patient over to [facility] for rehab and then potentially [facility]  Provided CM contact information  Explained role to the patient at bedside and will continue to follow

## 2019-10-12 ENCOUNTER — APPOINTMENT (INPATIENT)
Dept: RADIOLOGY | Facility: HOSPITAL | Age: 84
DRG: 736 | End: 2019-10-12
Payer: COMMERCIAL

## 2019-10-12 LAB
ANION GAP SERPL CALCULATED.3IONS-SCNC: 10 MMOL/L (ref 4–13)
APTT PPP: 115 SECONDS (ref 23–37)
APTT PPP: 43 SECONDS (ref 23–37)
APTT PPP: 50 SECONDS (ref 23–37)
ARTERIAL PATENCY WRIST A: YES
ARTERIAL PATENCY WRIST A: YES
BASE EXCESS BLDA CALC-SCNC: 5.4 MMOL/L
BASE EXCESS BLDA CALC-SCNC: 6 MMOL/L
BODY TEMPERATURE: 97.3 DEGREES FEHRENHEIT
BODY TEMPERATURE: 97.3 DEGREES FEHRENHEIT
BUN SERPL-MCNC: 25 MG/DL (ref 5–25)
CALCIUM SERPL-MCNC: 7.2 MG/DL (ref 8.3–10.1)
CHLORIDE SERPL-SCNC: 106 MMOL/L (ref 100–108)
CO2 SERPL-SCNC: 28 MMOL/L (ref 21–32)
CREAT SERPL-MCNC: 0.64 MG/DL (ref 0.6–1.3)
ERYTHROCYTE [DISTWIDTH] IN BLOOD BY AUTOMATED COUNT: 13.9 % (ref 11.6–15.1)
GFR SERPL CREATININE-BSD FRML MDRD: 83 ML/MIN/1.73SQ M
GLUCOSE SERPL-MCNC: 203 MG/DL (ref 65–140)
GLUCOSE SERPL-MCNC: 212 MG/DL (ref 65–140)
GLUCOSE SERPL-MCNC: 226 MG/DL (ref 65–140)
GLUCOSE SERPL-MCNC: 232 MG/DL (ref 65–140)
GLUCOSE SERPL-MCNC: 236 MG/DL (ref 65–140)
GLUCOSE SERPL-MCNC: 243 MG/DL (ref 65–140)
GLUCOSE SERPL-MCNC: 249 MG/DL (ref 65–140)
HCO3 BLDA-SCNC: 29 MMOL/L (ref 22–28)
HCO3 BLDA-SCNC: 30.5 MMOL/L (ref 22–28)
HCT VFR BLD AUTO: 31.5 % (ref 34.8–46.1)
HGB BLD-MCNC: 10.2 G/DL (ref 11.5–15.4)
MAGNESIUM SERPL-MCNC: 2.3 MG/DL (ref 1.6–2.6)
MCH RBC QN AUTO: 31.7 PG (ref 26.8–34.3)
MCHC RBC AUTO-ENTMCNC: 32.4 G/DL (ref 31.4–37.4)
MCV RBC AUTO: 98 FL (ref 82–98)
NON VENT TYPE HFNC: ABNORMAL
NON VENT TYPE HFNC: ABNORMAL
NT-PROBNP SERPL-MCNC: 3747 PG/ML
O2 CT BLDA-SCNC: 18.6 ML/DL (ref 16–23)
O2 CT BLDA-SCNC: 8.6 ML/DL (ref 16–23)
OXYHGB MFR BLDA: 89.5 % (ref 94–97)
OXYHGB MFR BLDA: 93.9 % (ref 94–97)
PCO2 BLDA: 38.1 MM HG (ref 36–44)
PCO2 BLDA: 43.1 MM HG (ref 36–44)
PCO2 TEMP ADJ BLDA: 37 MM HG (ref 36–44)
PCO2 TEMP ADJ BLDA: 41.8 MM HG (ref 36–44)
PH BLD: 7.48 [PH] (ref 7.35–7.45)
PH BLD: 7.51 [PH] (ref 7.35–7.45)
PH BLDA: 7.47 [PH] (ref 7.35–7.45)
PH BLDA: 7.5 [PH] (ref 7.35–7.45)
PLATELET # BLD AUTO: 289 THOUSANDS/UL (ref 149–390)
PMV BLD AUTO: 9.8 FL (ref 8.9–12.7)
PO2 BLD: 48.4 MM HG (ref 75–129)
PO2 BLD: 63.1 MM HG (ref 75–129)
PO2 BLDA: 50.8 MM HG (ref 75–129)
PO2 BLDA: 66.2 MM HG (ref 75–129)
POTASSIUM SERPL-SCNC: 3.4 MMOL/L (ref 3.5–5.3)
PROCALCITONIN SERPL-MCNC: 4.28 NG/ML
RBC # BLD AUTO: 3.22 MILLION/UL (ref 3.81–5.12)
SODIUM SERPL-SCNC: 144 MMOL/L (ref 136–145)
SPECIMEN SOURCE: ABNORMAL
SPECIMEN SOURCE: ABNORMAL
WBC # BLD AUTO: 20.66 THOUSAND/UL (ref 4.31–10.16)

## 2019-10-12 PROCEDURE — 84145 PROCALCITONIN (PCT): CPT | Performed by: NURSE PRACTITIONER

## 2019-10-12 PROCEDURE — 85730 THROMBOPLASTIN TIME PARTIAL: CPT | Performed by: INTERNAL MEDICINE

## 2019-10-12 PROCEDURE — 36600 WITHDRAWAL OF ARTERIAL BLOOD: CPT

## 2019-10-12 PROCEDURE — 83880 ASSAY OF NATRIURETIC PEPTIDE: CPT | Performed by: NURSE PRACTITIONER

## 2019-10-12 PROCEDURE — 80048 BASIC METABOLIC PNL TOTAL CA: CPT | Performed by: NURSE PRACTITIONER

## 2019-10-12 PROCEDURE — 94660 CPAP INITIATION&MGMT: CPT

## 2019-10-12 PROCEDURE — 94640 AIRWAY INHALATION TREATMENT: CPT

## 2019-10-12 PROCEDURE — 85730 THROMBOPLASTIN TIME PARTIAL: CPT

## 2019-10-12 PROCEDURE — 99291 CRITICAL CARE FIRST HOUR: CPT | Performed by: INTERNAL MEDICINE

## 2019-10-12 PROCEDURE — 99232 SBSQ HOSP IP/OBS MODERATE 35: CPT | Performed by: INTERNAL MEDICINE

## 2019-10-12 PROCEDURE — 71045 X-RAY EXAM CHEST 1 VIEW: CPT

## 2019-10-12 PROCEDURE — 85027 COMPLETE CBC AUTOMATED: CPT | Performed by: OBSTETRICS & GYNECOLOGY

## 2019-10-12 PROCEDURE — 93005 ELECTROCARDIOGRAM TRACING: CPT

## 2019-10-12 PROCEDURE — 82805 BLOOD GASES W/O2 SATURATION: CPT | Performed by: NURSE PRACTITIONER

## 2019-10-12 PROCEDURE — 83735 ASSAY OF MAGNESIUM: CPT | Performed by: NURSE PRACTITIONER

## 2019-10-12 PROCEDURE — C9113 INJ PANTOPRAZOLE SODIUM, VIA: HCPCS | Performed by: NURSE PRACTITIONER

## 2019-10-12 PROCEDURE — 82948 REAGENT STRIP/BLOOD GLUCOSE: CPT

## 2019-10-12 PROCEDURE — 99024 POSTOP FOLLOW-UP VISIT: CPT | Performed by: OBSTETRICS & GYNECOLOGY

## 2019-10-12 RX ORDER — POTASSIUM CHLORIDE 20 MEQ/1
40 TABLET, EXTENDED RELEASE ORAL ONCE
Status: COMPLETED | OUTPATIENT
Start: 2019-10-12 | End: 2019-10-12

## 2019-10-12 RX ORDER — LEVALBUTEROL 1.25 MG/.5ML
1.25 SOLUTION, CONCENTRATE RESPIRATORY (INHALATION) EVERY 8 HOURS PRN
Status: DISCONTINUED | OUTPATIENT
Start: 2019-10-12 | End: 2019-10-12

## 2019-10-12 RX ORDER — LORAZEPAM 2 MG/ML
0.5 INJECTION INTRAMUSCULAR EVERY 4 HOURS PRN
Status: DISCONTINUED | OUTPATIENT
Start: 2019-10-12 | End: 2019-10-14

## 2019-10-12 RX ORDER — LEVALBUTEROL 1.25 MG/.5ML
1.25 SOLUTION, CONCENTRATE RESPIRATORY (INHALATION)
Status: DISCONTINUED | OUTPATIENT
Start: 2019-10-12 | End: 2019-10-18 | Stop reason: HOSPADM

## 2019-10-12 RX ORDER — TRAMADOL HYDROCHLORIDE 50 MG/1
50 TABLET ORAL EVERY 6 HOURS PRN
Status: DISCONTINUED | OUTPATIENT
Start: 2019-10-12 | End: 2019-10-18 | Stop reason: HOSPADM

## 2019-10-12 RX ORDER — POTASSIUM CHLORIDE 14.9 MG/ML
20 INJECTION INTRAVENOUS ONCE
Status: COMPLETED | OUTPATIENT
Start: 2019-10-12 | End: 2019-10-13

## 2019-10-12 RX ADMIN — POTASSIUM CHLORIDE 40 MEQ: 1500 TABLET, EXTENDED RELEASE ORAL at 08:47

## 2019-10-12 RX ADMIN — METOPROLOL TARTRATE 5 MG: 5 INJECTION, SOLUTION INTRAVENOUS at 06:32

## 2019-10-12 RX ADMIN — HEPARIN SODIUM 1950 UNITS: 1000 INJECTION INTRAVENOUS; SUBCUTANEOUS at 23:53

## 2019-10-12 RX ADMIN — METOPROLOL TARTRATE 5 MG: 5 INJECTION, SOLUTION INTRAVENOUS at 00:44

## 2019-10-12 RX ADMIN — LORAZEPAM 0.5 MG: 2 INJECTION INTRAMUSCULAR; INTRAVENOUS at 21:51

## 2019-10-12 RX ADMIN — INSULIN GLARGINE 11 UNITS: 100 INJECTION, SOLUTION SUBCUTANEOUS at 22:01

## 2019-10-12 RX ADMIN — INSULIN LISPRO 3 UNITS: 100 INJECTION, SOLUTION INTRAVENOUS; SUBCUTANEOUS at 12:47

## 2019-10-12 RX ADMIN — ALPRAZOLAM 0.25 MG: 0.25 TABLET ORAL at 12:29

## 2019-10-12 RX ADMIN — TRAMADOL HYDROCHLORIDE 50 MG: 50 TABLET, FILM COATED ORAL at 12:28

## 2019-10-12 RX ADMIN — DORZOLAMIDE HYDROCHLORIDE AND TIMOLOL MALEATE 1 DROP: 20; 5 SOLUTION/ DROPS OPHTHALMIC at 20:11

## 2019-10-12 RX ADMIN — HEPARIN SODIUM 1950 UNITS: 1000 INJECTION INTRAVENOUS; SUBCUTANEOUS at 15:31

## 2019-10-12 RX ADMIN — INSULIN LISPRO 2 UNITS: 100 INJECTION, SOLUTION INTRAVENOUS; SUBCUTANEOUS at 17:09

## 2019-10-12 RX ADMIN — TRAVOPROST 1 DROP: 0.04 SOLUTION/ DROPS OPHTHALMIC at 22:23

## 2019-10-12 RX ADMIN — LEVALBUTEROL HYDROCHLORIDE 1.25 MG: 1.25 SOLUTION, CONCENTRATE RESPIRATORY (INHALATION) at 19:52

## 2019-10-12 RX ADMIN — PIPERACILLIN SODIUM AND TAZOBACTAM SODIUM 3.38 G: 36; 4.5 INJECTION, POWDER, FOR SOLUTION INTRAVENOUS at 23:56

## 2019-10-12 RX ADMIN — METOPROLOL TARTRATE 5 MG: 5 INJECTION, SOLUTION INTRAVENOUS at 17:56

## 2019-10-12 RX ADMIN — PIPERACILLIN SODIUM AND TAZOBACTAM SODIUM 3.38 G: 36; 4.5 INJECTION, POWDER, FOR SOLUTION INTRAVENOUS at 12:25

## 2019-10-12 RX ADMIN — INSULIN LISPRO 3 UNITS: 100 INJECTION, SOLUTION INTRAVENOUS; SUBCUTANEOUS at 06:32

## 2019-10-12 RX ADMIN — PIPERACILLIN SODIUM AND TAZOBACTAM SODIUM 3.38 G: 36; 4.5 INJECTION, POWDER, FOR SOLUTION INTRAVENOUS at 17:56

## 2019-10-12 RX ADMIN — METRONIDAZOLE 500 MG: 500 INJECTION, SOLUTION INTRAVENOUS at 05:06

## 2019-10-12 RX ADMIN — METOPROLOL TARTRATE 5 MG: 5 INJECTION, SOLUTION INTRAVENOUS at 12:29

## 2019-10-12 RX ADMIN — FUROSEMIDE 20 MG: 10 INJECTION, SOLUTION INTRAMUSCULAR; INTRAVENOUS at 08:48

## 2019-10-12 RX ADMIN — DORZOLAMIDE HYDROCHLORIDE AND TIMOLOL MALEATE 1 DROP: 20; 5 SOLUTION/ DROPS OPHTHALMIC at 08:48

## 2019-10-12 RX ADMIN — METOPROLOL TARTRATE 5 MG: 5 INJECTION, SOLUTION INTRAVENOUS at 23:56

## 2019-10-12 RX ADMIN — IPRATROPIUM BROMIDE 0.5 MG: 0.5 SOLUTION RESPIRATORY (INHALATION) at 19:52

## 2019-10-12 RX ADMIN — POTASSIUM CHLORIDE 20 MEQ: 14.9 INJECTION, SOLUTION INTRAVENOUS at 08:48

## 2019-10-12 RX ADMIN — HEPARIN SODIUM AND DEXTROSE 11 UNITS/KG/HR: 10000; 5 INJECTION INTRAVENOUS at 15:32

## 2019-10-12 RX ADMIN — PANTOPRAZOLE SODIUM 40 MG: 40 INJECTION, POWDER, FOR SOLUTION INTRAVENOUS at 08:48

## 2019-10-12 NOTE — PLAN OF CARE
Problem: Potential for Falls  Goal: Patient will remain free of falls  Description  INTERVENTIONS:  - Assess patient frequently for physical needs  -  Identify cognitive and physical deficits and behaviors that affect risk of falls    -  Gilliam fall precautions as indicated by assessment   - Educate patient/family on patient safety including physical limitations  - Instruct patient to call for assistance with activity based on assessment  - Modify environment to reduce risk of injury  - Consider OT/PT consult to assist with strengthening/mobility  Outcome: Progressing     Problem: GENITOURINARY - ADULT  Goal: Absence of urinary retention  Description  INTERVENTIONS:  - Assess patient's ability to void and empty bladder  - Monitor I/O  - Bladder scan as needed  - Discuss with physician/AP medications to alleviate retention as needed  - Discuss catheterization for long term situations as appropriate   Outcome: Progressing     Problem: METABOLIC, FLUID AND ELECTROLYTES - ADULT  Goal: Electrolytes maintained within normal limits  Description  INTERVENTIONS:  - Monitor labs and assess patient for signs and symptoms of electrolyte imbalances  - Administer electrolyte replacement as ordered  - Monitor response to electrolyte replacements, including repeat lab results as appropriate  - Instruct patient on fluid and nutrition as appropriate  Outcome: Progressing  Goal: Fluid balance maintained  Description  INTERVENTIONS:  - Monitor labs   - Monitor I/O and WT  - Instruct patient on fluid and nutrition as appropriate  - Assess for signs & symptoms of volume excess or deficit  Outcome: Progressing  Goal: Glucose maintained within target range  Description  INTERVENTIONS:  - Monitor Blood Glucose as ordered  - Assess for signs and symptoms of hyperglycemia and hypoglycemia  - Administer ordered medications to maintain glucose within target range  - Assess nutritional intake and initiate nutrition service referral as needed  Outcome: Progressing     Problem: SKIN/TISSUE INTEGRITY - ADULT  Goal: Skin integrity remains intact  Description  INTERVENTIONS  - Identify patients at risk for skin breakdown  - Assess and monitor skin integrity  - Assess and monitor nutrition and hydration status  - Monitor labs (i e  albumin)  - Assess for incontinence   - Turn and reposition patient  - Assist with mobility/ambulation  - Relieve pressure over bony prominences  - Avoid friction and shearing  - Provide appropriate hygiene as needed including keeping skin clean and dry  - Evaluate need for skin moisturizer/barrier cream  - Collaborate with interdisciplinary team (i e  Nutrition, Rehabilitation, etc )   - Patient/family teaching  Outcome: Progressing  Goal: Incision(s), wounds(s) or drain site(s) healing without S/S of infection  Description  INTERVENTIONS  - Assess and document risk factors for skin impairment   - Assess and document dressing, incision, wound bed, drain sites and surrounding tissue  - Consider nutrition services referral as needed  - Oral mucous membranes remain intact  - Provide patient/ family education  Outcome: Progressing  Goal: Oral mucous membranes remain intact  Description  INTERVENTIONS  - Assess oral mucosa and hygiene practices  - Implement preventative oral hygiene regimen  - Implement oral medicated treatments as ordered  - Initiate Nutrition services referral as needed  Outcome: Progressing     Problem: Prexisting or High Potential for Compromised Skin Integrity  Goal: Skin integrity is maintained or improved  Description  INTERVENTIONS:  - Identify patients at risk for skin breakdown  - Assess and monitor skin integrity  - Assess and monitor nutrition and hydration status  - Monitor labs   - Assess for incontinence   - Turn and reposition patient  - Assist with mobility/ambulation  - Relieve pressure over bony prominences  - Avoid friction and shearing  - Provide appropriate hygiene as needed including keeping skin clean and dry  - Evaluate need for skin moisturizer/barrier cream  - Collaborate with interdisciplinary team   - Patient/family teaching  - Consider wound care consult   Outcome: Progressing     Problem: NEUROSENSORY - ADULT  Goal: Achieves stable or improved neurological status  Description  INTERVENTIONS  - Monitor and report changes in neurological status  - Monitor vital signs such as temperature, blood pressure, glucose, and any other labs ordered   - Initiate measures to prevent increased intracranial pressure  - Monitor for seizure activity and implement precautions if appropriate      Outcome: Progressing  Goal: Remains free of injury related to seizures activity  Description  INTERVENTIONS  - Maintain airway, patient safety  and administer oxygen as ordered  - Monitor patient for seizure activity, document and report duration and description of seizure to physician/advanced practitioner  - If seizure occurs,  ensure patient safety during seizure  - Reorient patient post seizure  - Seizure pads on all 4 side rails  - Instruct patient/family to notify RN of any seizure activity including if an aura is experienced  - Instruct patient/family to call for assistance with activity based on nursing assessment  - Administer anti-seizure medications if ordered    Outcome: Progressing  Goal: Achieves maximal functionality and self care  Description  INTERVENTIONS  - Monitor swallowing and airway patency with patient fatigue and changes in neurological status  - Encourage and assist patient to increase activity and self care     - Encourage visually impaired, hearing impaired and aphasic patients to use assistive/communication devices  Outcome: Progressing     Problem: CARDIOVASCULAR - ADULT  Goal: Maintains optimal cardiac output and hemodynamic stability  Description  INTERVENTIONS:  - Monitor I/O, vital signs and rhythm  - Monitor for S/S and trends of decreased cardiac output  - Administer and titrate ordered vasoactive medications to optimize hemodynamic stability  - Assess quality of pulses, skin color and temperature  - Assess for signs of decreased coronary artery perfusion  - Instruct patient to report change in severity of symptoms  Outcome: Progressing  Goal: Absence of cardiac dysrhythmias or at baseline rhythm  Description  INTERVENTIONS:  - Continuous cardiac monitoring, vital signs, obtain 12 lead EKG if ordered  - Administer antiarrhythmic and heart rate control medications as ordered  - Monitor electrolytes and administer replacement therapy as ordered  Outcome: Progressing     Problem: RESPIRATORY - ADULT  Goal: Achieves optimal ventilation and oxygenation  Description  INTERVENTIONS:  - Assess for changes in respiratory status  - Assess for changes in mentation and behavior  - Position to facilitate oxygenation and minimize respiratory effort  - Oxygen administered by appropriate delivery if ordered  - Initiate smoking cessation education as indicated  - Encourage broncho-pulmonary hygiene including cough, deep breathe, Incentive Spirometry  - Assess the need for suctioning and aspirate as needed  - Assess and instruct to report SOB or any respiratory difficulty  - Respiratory Therapy support as indicated  Outcome: Progressing     Problem: HEMATOLOGIC - ADULT  Goal: Maintains hematologic stability  Description  INTERVENTIONS  - Assess for signs and symptoms of bleeding or hemorrhage  - Monitor labs  - Administer supportive blood products/factors as ordered and appropriate  Outcome: Progressing     Problem: MUSCULOSKELETAL - ADULT  Goal: Maintain or return mobility to safest level of function  Description  INTERVENTIONS:  - Assess patient's ability to carry out ADLs; assess patient's baseline for ADL function and identify physical deficits which impact ability to perform ADLs (bathing, care of mouth/teeth, toileting, grooming, dressing, etc )  - Assess/evaluate cause of self-care deficits   - Assess range of motion  - Assess patient's mobility  - Assess patient's need for assistive devices and provide as appropriate  - Encourage maximum independence but intervene and supervise when necessary  - Involve family in performance of ADLs  - Assess for home care needs following discharge   - Consider OT consult to assist with ADL evaluation and planning for discharge  - Provide patient education as appropriate  Outcome: Progressing  Goal: Maintain proper alignment of affected body part  Description  INTERVENTIONS:  - Support, maintain and protect limb and body alignment  - Provide patient/ family with appropriate education  Outcome: Progressing     Problem: Nutrition/Hydration-ADULT  Goal: Nutrient/Hydration intake appropriate for improving, restoring or maintaining nutritional needs  Description  Monitor and assess patient's nutrition/hydration status for malnutrition  Collaborate with interdisciplinary team and initiate plan and interventions as ordered  Monitor patient's weight and dietary intake as ordered or per policy  Utilize nutrition screening tool and intervene as necessary  Determine patient's food preferences and provide high-protein, high-caloric foods as appropriate       INTERVENTIONS:  - Monitor oral intake, urinary output, labs, and treatment plans  - Assess nutrition and hydration status and recommend course of action  - Evaluate amount of meals eaten  - Assist patient with eating if necessary   - Allow adequate time for meals  - Recommend/ encourage appropriate diets, oral nutritional supplements, and vitamin/mineral supplements  - Order, calculate, and assess calorie counts as needed  - Recommend, monitor, and adjust tube feedings and TPN/PPN based on assessed needs  - Assess need for intravenous fluids  - Provide specific nutrition/hydration education as appropriate  - Include patient/family/caregiver in decisions related to nutrition  Outcome: Progressing

## 2019-10-12 NOTE — PROGRESS NOTES
Progress Note - Cardiology   Wilfred Villarreal 82 y o  female MRN: [id number]  Unit/Bed#: ICU 02 Encounter: [id number]      Assessment/Recommendations/Discussion:   1  New onset atrial fibrillation with rapid ventricular response, now converted to sinus rhythm  2  Acute hypoxic respiratory failure  3  Aspiration pneumonitis  4  Acute blood loss anemia  5  Gastritis  6  Status post exploratory laparotomy, T LH, BSO, pelvic and periaortic lymph weight ectomy postop day 7   7  Diabetes mellitus  8  Status post PEA arrest and 2 rounds of CPR with subsequent return of spontaneous circulation       PLAN   Continue P r n  IV metoprolol for elevated heart rates   Can start oral diltiazem 30 mg q6h (BP will be able to tolerate) or metoprolol tartrate 25 mg bid    Echo with preserved EF   AFib likely being driven by respiratory failure plus sepsis/anemia   Switch to oral anticoagulation when able to swallow medications; Eliquis 5 mg bid     Subjective:   HPI  In sinus rhythm overnight  High flow nasal cannula  Had chest discomfort improved after Sats improved to 90s  Reports abdominal discomfort, feels "it is like gas"  Currently sinus rhythm in the 80s  Denies any chest pain  Still on high-flow nasal cannula      Review of Systems: As noted in HPI  Rest of ROS is negative  Vitals:   /97   Pulse 82   Temp (!) 97 3 °F (36 3 °C) (Temporal)   Resp (!) 41   Ht 5' 5" (1 651 m)   Wt 68 4 kg (150 lb 12 7 oz)   SpO2 90%   Breastfeeding? No   BMI 25 09 kg/m²   Vitals:    10/04/19 0621 10/10/19 0130   Weight: 58 5 kg (129 lb) 68 4 kg (150 lb 12 7 oz)       Intake/Output Summary (Last 24 hours) at 10/12/2019 1203  Last data filed at 10/12/2019 1001  Gross per 24 hour   Intake 376 46 ml   Output 980 ml   Net -603 54 ml       Objective:   /97   Pulse 82   Temp (!) 97 3 °F (36 3 °C) (Temporal)   Resp (!) 41   Ht 5' 5" (1 651 m)   Wt 68 4 kg (150 lb 12 7 oz)   SpO2 90%   Breastfeeding?  No   BMI 25 09 kg/m²    Physical Exam:  GEN: NAD, Alert and oriented; Breathing through mouth mostly   HEENT:Head, neck, ears, oral pharynx: Mucus membranes moist, oral pharynx clear, nares clear  External ears normal  EYES: Pupils equal, sclera anicteric  NECK: No JVD  CARDIOVASCULAR: RRR, No murmur, rub, gallops S1,S2  LUNGS: Labored breathing  Decreased BS bibasialr   ABDOMEN: Soft, nondistended  EXTREMITIES/VASCULAR: No edema  PSYCH: Normal Affect  NEURO: Grossly intact, moving all extremiteis equal, face symetric  HEME: No bleeding, bruising, petechia  SKIN: No significant rashes     TELEMETRY:  Sinus rhythm now at 80 beats per minute  Paroxysmal AFib  Lab Results:  Results from last 7 days   Lab Units 10/11/19  0500   WBC Thousand/uL 19 04*   HEMOGLOBIN g/dL 10 1*   HEMATOCRIT % 31 0*   PLATELETS Thousands/uL 220     Results from last 7 days   Lab Units 10/12/19  0602  10/08/19  0439   POTASSIUM mmol/L 3 4*   < > 3 3*   CHLORIDE mmol/L 106   < > 102   CO2 mmol/L 28   < > 24   BUN mg/dL 25   < > 32*   CREATININE mg/dL 0 64   < > 1 25   CALCIUM mg/dL 7 2*   < > 7 2*   ALK PHOS U/L  --   --  34*   ALT U/L  --   --  197*   AST U/L  --   --  192*    < > = values in this interval not displayed       Results from last 7 days   Lab Units 10/12/19  0602   POTASSIUM mmol/L 3 4*   CHLORIDE mmol/L 106   CO2 mmol/L 28   BUN mg/dL 25   CREATININE mg/dL 0 64   CALCIUM mg/dL 7 2*           Medications:    Current Facility-Administered Medications:     ALPRAZolam (XANAX) tablet 0 25 mg, 0 25 mg, Oral, BID PRN, JIM Palma, 0 25 mg at 10/11/19 2130    artificial tear (LUBRIFRESH P M ) ophthalmic ointment, , Both Eyes, HS PRN, JIM Erickson    diltiazem (CARDIZEM) injection 10 mg, 10 mg, Intravenous, Q2H PRN, Luc Pendleton PA-C, 10 mg at 10/10/19 2054    dorzolamide-timolol (COSOPT) 22 3-6 8 MG/ML ophthalmic solution 1 drop, 1 drop, Both Eyes, BID, JIM Wallace, 1 drop at 10/12/19 0848    furosemide (LASIX) injection 20 mg, 20 mg, Intravenous, Daily, JIM Rosado, 20 mg at 10/12/19 0848    heparin (porcine) 25,000 units in 250 mL infusion (premix), 3-20 Units/kg/hr (Order-Specific), Intravenous, Titrated, JIM Kim, Last Rate: 5 9 mL/hr at 10/12/19 0805, 9 Units/kg/hr at 10/12/19 0805    heparin (porcine) injection 1,950 Units, 1,950 Units, Intravenous, PRN, JIM Diana    heparin (porcine) injection 3,900 Units, 3,900 Units, Intravenous, PRN, JIM Daina    influenza vaccine, high-dose (FLUZONE HIGH-DOSE) IM injection ERIKA 0 5 mL, 0 5 mL, Intramuscular, Prior to discharge, Lazara Clemente,     insulin glargine (LANTUS) subcutaneous injection 11 Units 0 11 mL, 11 Units, Subcutaneous, HS, Debrah Rainwater Spatzer, CRNP, 11 Units at 10/11/19 2230    insulin lispro (HumaLOG) 100 units/mL subcutaneous injection 1-6 Units, 1-6 Units, Subcutaneous, TID AC, 3 Units at 10/12/19 3922 **AND** Fingerstick Glucose (POCT), , , TID AC, Debrah Rainwater Spatzer, CRNP    insulin lispro (HumaLOG) 100 units/mL subcutaneous injection 1-6 Units, 1-6 Units, Subcutaneous, HS, Debrah Rainwater Spatzer, CRNP, 3 Units at 10/11/19 2132    lactated ringers bolus 1,000 mL, 1,000 mL, Intravenous, Once, JIM Bermudez    metoprolol (LOPRESSOR) injection 5 mg, 5 mg, Intravenous, Q6H, Chuy Pendleton PA-C, 5 mg at 10/12/19 4256    Netarsudil Dimesylate 0 02 % SOLN 1 drop, 1 drop, Right Eye, HS, JIM Wallace, 1 drop at 10/11/19 2131    ondansetron (ZOFRAN) injection 4 mg, 4 mg, Intravenous, Q6H PRN, JIM Bermudez, 4 mg at 10/07/19 0541    pantoprazole (PROTONIX) injection 40 mg, 40 mg, Intravenous, Q24H [facility] JIM Ferguson, 40 mg at 10/12/19 0848    piperacillin-tazobactam (ZOSYN) 3 375 g in sodium chloride 0 9 % 50 mL IVPB, 3 375 g, Intravenous, Q6H, Annis Schilder, CRNP    travoprost (TRAVATAN-Z) 0 004 % ophthalmic solution 1 drop, 1 drop, Both Eyes, HS, JIM Wallace, 1 drop at 10/11/19 2131    This note was completed in part utilizing M-Modal Fluency Direct Software  Grammatical errors, random word insertions, spelling mistakes, and incomplete sentences may be an occasional consequence of this system secondary to software limitations, ambient noise, and hardware issues  If you have any questions or concerns about the content, text, or information contained within the body of this dictation, please contact the provider for clarification      Man Arriaga MD  10/12/2019 12:03 PM

## 2019-10-12 NOTE — PROGRESS NOTES
Gyn Oncology Progress note   Manfred Shafer 82 y o  female MRN: [id number]  Unit/Bed#: ICU 02 Encounter: [id number]    A/P: Manfred Shafer is a 82 y o  female POD#8 s/p TLH, BSO, ex lap, pelvic and para-aortic lymphadenectomy, omentectomy, peritoneal biopsies, and ovarian cancer staging for right tubo-obarian mass consistent with adenocarcinoma s/p code on 10/7    1  Acute hypoxic respiratory failure  Management per critical care  Extubated and off pressors, currently on BiPap still  Patient had one episode of desat last night but currently doing well on bipap  Will continue cefepime and flagyl  She had received one dose of diflucan on 10/10 for budding yeast    2  Severe metabolic acidosis  Lactic acidosis: 6 8 -> code -> 7 8 -> 5 4, s/p bicarb infusion -> 3 2  CTA 10/7: no PE, bilateral pleural effusion, bilateral atelectasis, hiatal hernia with gastric and esophageal distension  CT head 10/7: no acute changes  CTAP 10/8: dense perihilar opacities, persistent bilateral nephrograms, dilated small bowel, gas in colon, possible hematoma in pelvis 7x5x7, possible lymphocele along left external iliac vessel  Management per critical care team    3  Acute kidney injury  Currently stable  Avoid nephrotoxic agent  4  Postoperative ileus vs  SBO  Patient currently tolerating Puree diet    5  Hyponatremia - 144  Resolved    6  Anemia  S/p 2pRBC - last hgb 10 1, stable    7  Right tubo-ovarian mass  Frozen consistent with adenocarcinoma, follow up final pathology    8  Urinary retention  Failed two voiding trials  UO: 825 for last 12 hours    9  T1DM  Currently on insulin drip  Home meds: humalog 4/5/7 with meals and lantus at 12 units at night    10  Analgesia  S/p TAPs block, s/p dilaudid  Tylenol musa, motrin prn, oxycodone 5-10 mg for moderate -severe pain prn  Dilaudid prn    11  Dry eyes  lubifresh prn, cosopt BID, travoprost qdaily    12  Depression  cymbalta 30 mg qHS - currently on hold    13  Hypertension  Metoprolol 2 5 mg prn  Avapro 150 mg daily    14  Hypothyroidism  Levothyroxine 50 mcg daily - on hold    15  FEN  Puree diet    16  DVT ppx  SCDs, on heparin drip    Subjective:    Patient had one episode of o2 desaturation and is currently on bipap  Patient is able to respond yes/no to questions  Pain is well controlled  Denies fevers/chills, chest pain, pain in her legs  Review of Systems   Constitutional: Negative for chills and fever  Eyes: Negative for visual disturbance  Respiratory:        Currently on BiPAP   Cardiovascular: Negative for chest pain  Gastrointestinal: Negative for nausea and vomiting  Neurological: Negative for headaches  Objective:    /54   Pulse 78   Temp 97 7 °F (36 5 °C) (Temporal)   Resp (!) 23   Ht 5' 5" (1 651 m)   Wt 68 4 kg (150 lb 12 7 oz)   SpO2 93%   Breastfeeding? No   BMI 25 09 kg/m²     I/O last 3 completed shifts: In: 596 7 [I V :296 7; IV Piggyback:300]  Out: 7851 [Urine:2520]  I/O this shift: In: 7 9 [I V :7 9]  Out: -     Lab Results   Component Value Date    WBC 19 04 (H) 10/11/2019    HGB 10 1 (L) 10/11/2019    HCT 31 0 (L) 10/11/2019    MCV 99 (H) 10/11/2019     10/11/2019       Lab Results   Component Value Date    GLUCOSE 75 01/08/2016    CALCIUM 7 2 (L) 10/12/2019     (L) 01/08/2016    K 3 4 (L) 10/12/2019    CO2 28 10/12/2019     10/12/2019    BUN 25 10/12/2019    CREATININE 0 64 10/12/2019       Lab Results   Component Value Date/Time    POCGLU 243 (H) 10/12/2019 06:11 AM    POCGLU 249 (H) 10/12/2019 12:05 AM    POCGLU 236 (H) 10/11/2019 08:51 PM    POCGLU 246 (H) 10/11/2019 01:35 PM    POCGLU 198 (H) 10/11/2019 08:08 AM    POCGLU 164 02/15/2017 02:33 PM       Physical Exam   Constitutional: She is oriented to person, place, and time  She appears well-developed and well-nourished  Currently on bipap, responsive   Cardiovascular: Normal rate and regular rhythm     Pulmonary/Chest: Effort normal and breath sounds normal    Abdominal: Soft  Bowel sounds are normal  She exhibits no distension  There is no tenderness  There is no guarding  Vertical incision is c/d/i   Musculoskeletal: She exhibits no edema or deformity  SCDs placed bilaterally   Neurological: She is alert and oriented to person, place, and time  Skin: Skin is warm and dry  Psychiatric: She has a normal mood and affect   Her behavior is normal        Eleazar Baird MD   PGY-3 GYN/ONC  10/12/2019 7:42 AM

## 2019-10-12 NOTE — PROGRESS NOTES
Progress Note - Critical Care   Perry Tena 82 y o  female MRN: [id number]  Unit/Bed#: ICU 02 Encounter: [id number]    Assessment/Plan:  1  Acute hypoxic respiratory failure secondary to aspiration pneumonia  · Improving  Pt was transitioned from BiPAP to HFNC  Continue to wean as tolerated to maintain saturation >90%  · Continue aggressive pulmonary toileting  Encourage cough and deep breathing  Use incentive spirometer  Get OOB to the chair during the day  Increase activity  PT/OT  · Echo from this admission shows EF of 62-63% with normal diastolic function  2  Sepsis secondary to aspiration pneumonia  · Today is day 6 of cefepime and flagyl, continue antibiotics for 1 more day  · Hemodynamics are stable  3  AFib with RVR  · Converted back to sinus rhythm  Cardiology following  Would continue metoprolol for now and start cardizem po once reliably taking po  4  Status post ex lap with TLH and BSO  · Management per Gyn/Onc service  5  Acute blood loss anemia, multifactorial secondary to possible GI bleed and surgical losses  · Hgb has been fairly stable  Trend and transfuse for Hgb <7  6  Possible GI bleed  · Possible stress gastritis  No further signs of bleeding currently  Continue protonix  · Monitor for any signs of bleeding in the setting og anticoagulation for Afib  7  Diabetes mellitus type 1  · Sugar has slightly elevated  Continue Accu-Cheks  Increase SSI coverage goal to blood sugar 140-180  Home lantus restarted  8  Dysphagia post extubation  · Speech following  Modified diet per their recommendations    _____________________________________________________________________    HPI/24hr events:   Afebrile  No acute events overnight      Medications:    Current Facility-Administered Medications:  ALPRAZolam 0 25 mg Oral BID PRN JIM Adam    artificial tear  Both Eyes HS PRN JIM Alicia    cefepime 1,000 mg Intravenous Q12H JIM Adam Last Rate: 1,000 mg (10/11/19 2131)   diltiazem 10 mg Intravenous Q2H PRN Dimitrios Pendleton PA-C    dorzolamide-timolol 1 drop Both Eyes BID [place]omass JIM Brian    furosemide 20 mg Intravenous Daily [place] JIM Antonio    heparin (porcine) 3-20 Units/kg/hr (Order-Specific) Intravenous Titrated Sunday JIM Medina Last Rate: 12 Units/kg/hr (10/11/19 1831)   heparin (porcine) 1,950 Units Intravenous PRN JIM Kim    heparin (porcine) 3,900 Units Intravenous PRN JIM Arce    influenza vaccine 0 5 mL Intramuscular Prior to discharge Lazara Clemente,     insulin glargine 11 Units Subcutaneous HS Morey Catchings Spatzer, CRNP    insulin lispro 1-6 Units Subcutaneous TID AC Robert W Spatzer, CRNP    insulin lispro 1-6 Units Subcutaneous HS Morey Catchings Spatzer, CRNP    lactated ringers 1,000 mL Intravenous Once JIM Ulloa    metoprolol 5 mg Intravenous Q6H Chuy Pendleton PA-C    metroNIDAZOLE 500 mg Intravenous Q8H Lafrances JIM Brian Last Rate: 500 mg (10/12/19 0506)   Netarsudil Dimesylate 1 drop Right Eye HS JIM Wallace    ondansetron 4 mg Intravenous Q6H PRN Lafrances JIM    pantoprazole 40 mg Intravenous Q24H [facility] [place] JIM Antonio    travoprost 1 drop Both Eyes HS JIM Wallace          heparin (porcine) 3-20 Units/kg/hr (Order-Specific) Last Rate: 12 Units/kg/hr (10/11/19 1831)         Physical exam:  Vitals: Body mass index is 25 09 kg/m²  Blood pressure 140/50, pulse 74, temperature (!) 97 4 °F (36 3 °C), temperature source Temporal, resp   rate 22, height 5' 5" (1 651 m), weight 68 4 kg (150 lb 12 7 oz), SpO2 94 %, not currently breastfeeding ,  Temp  Min: 96 8 °F (36 °C)  Max: 100 °F (37 8 °C)  IBW: 57 kg    SpO2: 94 %  SpO2 Activity: At Rest  O2 Device: Other (comment)(bipap)      Intake/Output Summary (Last 24 hours) at 10/12/2019 0628  Last data filed at 10/12/2019 0301  Gross per 24 hour   Intake 345 ml   Output 525 ml   Net -180 ml       Invasive/non-invasive ventilation settings:   Respiratory    Lab Data (Last 4 hours)    None         O2/Vent Data (Last 4 hours)      10/12 0242          Non-Invasive Ventilation Mode BiPAP                 Invasive Devices     Peripheral Intravenous Line            Peripheral IV 10/10/19 Left Antecubital 1 day    Peripheral IV 10/10/19 Left Wrist 1 day          Drain            Rectal Tube With balloon 3 days    External Urinary Catheter Other (Comment) less than 1 day          Airway            ETT  Cuffed 8 mm 4 days    ETT  Cuffed; Hi-Lo 7 mm 4 days                  Physical Exam:  Gen:  Sleeping but easily arousable acute distress  HEENT:  Atraumatic, normocephalic, extraocular movements intact, pupils 3 equal and reactive is clear  Neck:  Supple, trachea midline, no JVD, no lymphadenopathy  Chest:  Lungs with some scattered rhonchi, no wheeze  Cor:  Single S1/S2, no murmurs, rubs, gallops, regular rate and rhythm  Abd:  Soft, non tender, nondistended, midline abdominal surgical incision is well approximated, bowel sounds are normoactive  Ext:  Trace nonpitting bilateral lower extremity edema, 1+ bilateral upper extremity edema no clubbing or cyanosis  Neuro:  Oriented x3, cranial nerves 2-12 grossly intact, focal deficits  Skin:  Warm, dry      Diagnostic Data:  Lab: I have personally reviewed pertinent lab results     CBC:   Results from last 7 days   Lab Units 10/11/19  0500 10/10/19  1030 10/10/19  0451   WBC Thousand/uL 19 04* 15 13* 12 96*   HEMOGLOBIN g/dL 10 1* 8 0* 7 7*   HEMATOCRIT % 31 0* 24 0* 23 6*   PLATELETS Thousands/uL 220 177 161       CMP:   Results from last 7 days   Lab Units 10/11/19  0500 10/10/19  0451 10/09/19  0504 10/08/19  0439 10/07/19  1920   SODIUM mmol/L 142 141 138 135* 130*   POTASSIUM mmol/L 3 1* 3 1* 3 9 3 3* 4 6   CHLORIDE mmol/L 105 104 104 102 100   CO2 mmol/L 26 28 26 24 16*   BUN mg/dL 21 27* 25 32* 27*   CREATININE mg/dL 0 75 0 95 0 95 1 25 1 53*   CALCIUM mg/dL 7 3* 7 1* 7 0* 7 2* 6 0*   ALK PHOS U/L  -- --   --  34* 63   ALT U/L  --   --   --  197* 116*   AST U/L  --   --   --  192* 108*     PT/INR:   No results found for: PT, INR,   Magnesium:   Results from last 7 days   Lab Units 10/07/19  1920   MAGNESIUM mg/dL 2 2     Phosphorous:   Results from last 7 days   Lab Units 10/07/19  1920   PHOSPHORUS mg/dL 4 2*       Microbiology:  Results from last 7 days   Lab Units 10/09/19  1107   SPUTUM CULTURE  2+ Growth of Serratia marcescens*  3+ Growth of Candida albicans*  2+ Growth of    GRAM STAIN RESULT  Rare Polys*  2+ Budding yeast*  1+ Gram positive cocci in clusters*       Imaging:  No new imaging    Cardiac lab/EKG/telemetry/ECHO:   Sinus rhythm    VTE Prophylaxis: Heparin anticoagulation, SCDs    Code Status: No Order    Luz Bottoms Spatzer, CRNP    Portions of the record may have been created with voice recognition software  Occasional wrong word or "sound a like" substitutions may have occurred due to the inherent limitations of voice recognition software  Read the chart carefully and recognize, using context, where substitutions have occurred

## 2019-10-13 ENCOUNTER — APPOINTMENT (INPATIENT)
Dept: RADIOLOGY | Facility: HOSPITAL | Age: 84
DRG: 736 | End: 2019-10-13
Payer: COMMERCIAL

## 2019-10-13 ENCOUNTER — APPOINTMENT (INPATIENT)
Dept: CT IMAGING | Facility: HOSPITAL | Age: 84
DRG: 736 | End: 2019-10-13
Payer: COMMERCIAL

## 2019-10-13 ENCOUNTER — TELEPHONE (OUTPATIENT)
Dept: OTHER | Facility: OTHER | Age: 84
End: 2019-10-13

## 2019-10-13 LAB
ANION GAP SERPL CALCULATED.3IONS-SCNC: 7 MMOL/L (ref 4–13)
APTT PPP: 113 SECONDS (ref 23–37)
APTT PPP: 68 SECONDS (ref 23–37)
APTT PPP: 80 SECONDS (ref 23–37)
ARTERIAL PATENCY WRIST A: YES
BASE EXCESS BLDA CALC-SCNC: 6.6 MMOL/L
BASOPHILS # BLD MANUAL: 0 THOUSAND/UL (ref 0–0.1)
BASOPHILS NFR MAR MANUAL: 0 % (ref 0–1)
BODY TEMPERATURE: 96.9 DEGREES FEHRENHEIT
BUN SERPL-MCNC: 21 MG/DL (ref 5–25)
CALCIUM SERPL-MCNC: 7.6 MG/DL (ref 8.3–10.1)
CHLORIDE SERPL-SCNC: 107 MMOL/L (ref 100–108)
CO2 SERPL-SCNC: 34 MMOL/L (ref 21–32)
CREAT SERPL-MCNC: 0.68 MG/DL (ref 0.6–1.3)
EOSINOPHIL # BLD MANUAL: 0 THOUSAND/UL (ref 0–0.4)
EOSINOPHIL NFR BLD MANUAL: 0 % (ref 0–6)
ERYTHROCYTE [DISTWIDTH] IN BLOOD BY AUTOMATED COUNT: 14.1 % (ref 11.6–15.1)
GFR SERPL CREATININE-BSD FRML MDRD: 82 ML/MIN/1.73SQ M
GLUCOSE SERPL-MCNC: 116 MG/DL (ref 65–140)
GLUCOSE SERPL-MCNC: 154 MG/DL (ref 65–140)
GLUCOSE SERPL-MCNC: 159 MG/DL (ref 65–140)
GLUCOSE SERPL-MCNC: 192 MG/DL (ref 65–140)
GLUCOSE SERPL-MCNC: 199 MG/DL (ref 65–140)
HCO3 BLDA-SCNC: 32.1 MMOL/L (ref 22–28)
HCT VFR BLD AUTO: 31.9 % (ref 34.8–46.1)
HGB BLD-MCNC: 10.2 G/DL (ref 11.5–15.4)
LYMPHOCYTES # BLD AUTO: 0.77 THOUSAND/UL (ref 0.6–4.47)
LYMPHOCYTES # BLD AUTO: 3 % (ref 14–44)
MCH RBC QN AUTO: 32.1 PG (ref 26.8–34.3)
MCHC RBC AUTO-ENTMCNC: 32 G/DL (ref 31.4–37.4)
MCV RBC AUTO: 100 FL (ref 82–98)
MONOCYTES # BLD AUTO: 0.26 THOUSAND/UL (ref 0–1.22)
MONOCYTES NFR BLD: 1 % (ref 4–12)
NEUTROPHILS # BLD MANUAL: 24.68 THOUSAND/UL (ref 1.85–7.62)
NEUTS BAND NFR BLD MANUAL: 14 % (ref 0–8)
NEUTS SEG NFR BLD AUTO: 82 % (ref 43–75)
NON VENT- BIPAP: ABNORMAL
NRBC BLD AUTO-RTO: 0 /100 WBCS
O2 CT BLDA-SCNC: 14.4 ML/DL (ref 16–23)
OXYHGB MFR BLDA: 97.3 % (ref 94–97)
PCO2 BLDA: 50.9 MM HG (ref 36–44)
PCO2 TEMP ADJ BLDA: 48.9 MM HG (ref 36–44)
PH BLD: 7.43 [PH] (ref 7.35–7.45)
PH BLDA: 7.42 [PH] (ref 7.35–7.45)
PLATELET # BLD AUTO: 337 THOUSANDS/UL (ref 149–390)
PLATELET BLD QL SMEAR: ADEQUATE
PMV BLD AUTO: 10 FL (ref 8.9–12.7)
PO2 BLD: 103.1 MM HG (ref 75–129)
PO2 BLDA: 108.5 MM HG (ref 75–129)
POTASSIUM SERPL-SCNC: 3.3 MMOL/L (ref 3.5–5.3)
PROCALCITONIN SERPL-MCNC: 2.58 NG/ML
RBC # BLD AUTO: 3.18 MILLION/UL (ref 3.81–5.12)
RBC MORPH BLD: NORMAL
SODIUM SERPL-SCNC: 148 MMOL/L (ref 136–145)
TOTAL CELLS COUNTED SPEC: 100
WBC # BLD AUTO: 25.71 THOUSAND/UL (ref 4.31–10.16)

## 2019-10-13 PROCEDURE — 94640 AIRWAY INHALATION TREATMENT: CPT

## 2019-10-13 PROCEDURE — 73206 CT ANGIO UPR EXTRM W/O&W/DYE: CPT

## 2019-10-13 PROCEDURE — 70450 CT HEAD/BRAIN W/O DYE: CPT

## 2019-10-13 PROCEDURE — 94660 CPAP INITIATION&MGMT: CPT

## 2019-10-13 PROCEDURE — 99024 POSTOP FOLLOW-UP VISIT: CPT | Performed by: OBSTETRICS & GYNECOLOGY

## 2019-10-13 PROCEDURE — 71045 X-RAY EXAM CHEST 1 VIEW: CPT

## 2019-10-13 PROCEDURE — 82948 REAGENT STRIP/BLOOD GLUCOSE: CPT

## 2019-10-13 PROCEDURE — C9113 INJ PANTOPRAZOLE SODIUM, VIA: HCPCS | Performed by: NURSE PRACTITIONER

## 2019-10-13 PROCEDURE — 99223 1ST HOSP IP/OBS HIGH 75: CPT | Performed by: INTERNAL MEDICINE

## 2019-10-13 PROCEDURE — 99231 SBSQ HOSP IP/OBS SF/LOW 25: CPT | Performed by: INTERNAL MEDICINE

## 2019-10-13 PROCEDURE — 70491 CT SOFT TISSUE NECK W/DYE: CPT

## 2019-10-13 PROCEDURE — 85730 THROMBOPLASTIN TIME PARTIAL: CPT | Performed by: INTERNAL MEDICINE

## 2019-10-13 PROCEDURE — 82805 BLOOD GASES W/O2 SATURATION: CPT | Performed by: NURSE PRACTITIONER

## 2019-10-13 PROCEDURE — 85007 BL SMEAR W/DIFF WBC COUNT: CPT | Performed by: NURSE PRACTITIONER

## 2019-10-13 PROCEDURE — 80048 BASIC METABOLIC PNL TOTAL CA: CPT | Performed by: NURSE PRACTITIONER

## 2019-10-13 PROCEDURE — 84145 PROCALCITONIN (PCT): CPT | Performed by: NURSE PRACTITIONER

## 2019-10-13 PROCEDURE — 36600 WITHDRAWAL OF ARTERIAL BLOOD: CPT

## 2019-10-13 PROCEDURE — 85027 COMPLETE CBC AUTOMATED: CPT | Performed by: NURSE PRACTITIONER

## 2019-10-13 PROCEDURE — 74177 CT ABD & PELVIS W/CONTRAST: CPT

## 2019-10-13 PROCEDURE — 71260 CT THORAX DX C+: CPT

## 2019-10-13 PROCEDURE — 99233 SBSQ HOSP IP/OBS HIGH 50: CPT | Performed by: INTERNAL MEDICINE

## 2019-10-13 RX ORDER — POTASSIUM CHLORIDE 14.9 MG/ML
20 INJECTION INTRAVENOUS ONCE
Status: COMPLETED | OUTPATIENT
Start: 2019-10-13 | End: 2019-10-13

## 2019-10-13 RX ORDER — LORAZEPAM 2 MG/ML
0.25 INJECTION INTRAMUSCULAR ONCE
Status: COMPLETED | OUTPATIENT
Start: 2019-10-13 | End: 2019-10-13

## 2019-10-13 RX ORDER — DILTIAZEM HYDROCHLORIDE 5 MG/ML
10 INJECTION INTRAVENOUS ONCE
Status: COMPLETED | OUTPATIENT
Start: 2019-10-13 | End: 2019-10-13

## 2019-10-13 RX ADMIN — LORAZEPAM 0.5 MG: 2 INJECTION INTRAMUSCULAR; INTRAVENOUS at 22:25

## 2019-10-13 RX ADMIN — METOPROLOL TARTRATE 5 MG: 5 INJECTION, SOLUTION INTRAVENOUS at 05:44

## 2019-10-13 RX ADMIN — FUROSEMIDE 20 MG: 10 INJECTION, SOLUTION INTRAMUSCULAR; INTRAVENOUS at 08:44

## 2019-10-13 RX ADMIN — LEVALBUTEROL HYDROCHLORIDE 1.25 MG: 1.25 SOLUTION, CONCENTRATE RESPIRATORY (INHALATION) at 19:17

## 2019-10-13 RX ADMIN — DILTIAZEM HYDROCHLORIDE 10 MG: 5 INJECTION INTRAVENOUS at 15:31

## 2019-10-13 RX ADMIN — POTASSIUM CHLORIDE 20 MEQ: 14.9 INJECTION, SOLUTION INTRAVENOUS at 17:21

## 2019-10-13 RX ADMIN — PIPERACILLIN SODIUM AND TAZOBACTAM SODIUM 3.38 G: 36; 4.5 INJECTION, POWDER, FOR SOLUTION INTRAVENOUS at 05:44

## 2019-10-13 RX ADMIN — TRAVOPROST 1 DROP: 0.04 SOLUTION/ DROPS OPHTHALMIC at 22:26

## 2019-10-13 RX ADMIN — POTASSIUM CHLORIDE 20 MEQ: 14.9 INJECTION, SOLUTION INTRAVENOUS at 15:38

## 2019-10-13 RX ADMIN — DORZOLAMIDE HYDROCHLORIDE AND TIMOLOL MALEATE 1 DROP: 20; 5 SOLUTION/ DROPS OPHTHALMIC at 17:42

## 2019-10-13 RX ADMIN — PIPERACILLIN SODIUM AND TAZOBACTAM SODIUM 3.38 G: 36; 4.5 INJECTION, POWDER, FOR SOLUTION INTRAVENOUS at 17:21

## 2019-10-13 RX ADMIN — LORAZEPAM 0.5 MG: 2 INJECTION INTRAMUSCULAR; INTRAVENOUS at 11:08

## 2019-10-13 RX ADMIN — IPRATROPIUM BROMIDE 0.5 MG: 0.5 SOLUTION RESPIRATORY (INHALATION) at 07:36

## 2019-10-13 RX ADMIN — LORAZEPAM 0.5 MG: 2 INJECTION INTRAMUSCULAR; INTRAVENOUS at 17:58

## 2019-10-13 RX ADMIN — IOHEXOL 100 ML: 350 INJECTION, SOLUTION INTRAVENOUS at 12:51

## 2019-10-13 RX ADMIN — DORZOLAMIDE HYDROCHLORIDE AND TIMOLOL MALEATE 1 DROP: 20; 5 SOLUTION/ DROPS OPHTHALMIC at 08:44

## 2019-10-13 RX ADMIN — LORAZEPAM 0.25 MG: 2 INJECTION INTRAMUSCULAR; INTRAVENOUS at 13:58

## 2019-10-13 RX ADMIN — IPRATROPIUM BROMIDE 0.5 MG: 0.5 SOLUTION RESPIRATORY (INHALATION) at 19:17

## 2019-10-13 RX ADMIN — METOPROLOL TARTRATE 5 MG: 5 INJECTION, SOLUTION INTRAVENOUS at 17:38

## 2019-10-13 RX ADMIN — LEVALBUTEROL HYDROCHLORIDE 1.25 MG: 1.25 SOLUTION, CONCENTRATE RESPIRATORY (INHALATION) at 13:23

## 2019-10-13 RX ADMIN — INSULIN LISPRO 1 UNITS: 100 INJECTION, SOLUTION INTRAVENOUS; SUBCUTANEOUS at 11:59

## 2019-10-13 RX ADMIN — LEVALBUTEROL HYDROCHLORIDE 1.25 MG: 1.25 SOLUTION, CONCENTRATE RESPIRATORY (INHALATION) at 07:36

## 2019-10-13 RX ADMIN — INSULIN GLARGINE 11 UNITS: 100 INJECTION, SOLUTION SUBCUTANEOUS at 22:25

## 2019-10-13 RX ADMIN — METOPROLOL TARTRATE 5 MG: 5 INJECTION, SOLUTION INTRAVENOUS at 11:35

## 2019-10-13 RX ADMIN — PIPERACILLIN SODIUM AND TAZOBACTAM SODIUM 3.38 G: 36; 4.5 INJECTION, POWDER, FOR SOLUTION INTRAVENOUS at 11:52

## 2019-10-13 RX ADMIN — PANTOPRAZOLE SODIUM 40 MG: 40 INJECTION, POWDER, FOR SOLUTION INTRAVENOUS at 08:44

## 2019-10-13 RX ADMIN — POTASSIUM CHLORIDE 20 MEQ: 14.9 INJECTION, SOLUTION INTRAVENOUS at 19:45

## 2019-10-13 RX ADMIN — INSULIN LISPRO 1 UNITS: 100 INJECTION, SOLUTION INTRAVENOUS; SUBCUTANEOUS at 00:05

## 2019-10-13 RX ADMIN — INSULIN LISPRO 1 UNITS: 100 INJECTION, SOLUTION INTRAVENOUS; SUBCUTANEOUS at 05:52

## 2019-10-13 RX ADMIN — IPRATROPIUM BROMIDE 0.5 MG: 0.5 SOLUTION RESPIRATORY (INHALATION) at 13:23

## 2019-10-13 NOTE — PROGRESS NOTES
Gyn Oncology Progress note   Tracey Peabody 82 y o  female MRN: [id number]  Unit/Bed#: ICU 02 Encounter: [id number]    A/P: Tracey Peabody is a 82 y o  female POD#9 s/p TLH, BSO, ex lap, pelvic and para-aortic lymphadenectomy, omentectomy, peritoneal biopsies, and ovarian cancer staging for right tubo-obarian mass consistent with adenocarcinoma s/p code on 10/7    1  Acute hypoxic respiratory failure  Management per critical care  Extubated and off pressors  Patient was weaned off of BiPap onto HFNC, but then started desat, so she was placed on Bipap throughout the night  CXR 10/13 showed worsening right infiltrate  She was switched from cefepime/flagyl to zosyn (day 2 today)  Managed per critical care team    2  Severe metabolic acidosis  Lactic acidosis: 6 8 -> code -> 7 8 -> 5 4, s/p bicarb infusion -> 3 2  CTA 10/7: no PE, bilateral pleural effusion, bilateral atelectasis, hiatal hernia with gastric and esophageal distension  CT head 10/7: no acute changes  CTAP 10/8: dense perihilar opacities, persistent bilateral nephrograms, dilated small bowel, gas in colon, possible hematoma in pelvis 7x5x7, possible lymphocele along left external iliac vessel  Management per critical care team    3  Afib with RVR  Per cardiology, will continue IV lopressor and transition to PO once patient tolerates a diet  4  Acute kidney injury  Cr pending; Avoid nephrotoxic agent  5  Postoperative ileus vs  SBO  Patient currently tolerating Puree diet per nutrition  6  Hyponatremia -   Na pending this morning      7  Anemia  S/p 2pRBC - last hgb 10 2 - CBC pending this morning, stable    8  Right tubo-ovarian mass  Frozen consistent with adenocarcinoma, follow up final pathology    9  Urinary retention  Failed two voiding trials  UO: 1405 for last 24 hours    10  T1DM  Currently on insulin drip  Home meds: humalog 4/5/7 with meals and lantus at 12 units at night    11   Analgesia  S/p TAPs block, s/p dilaudid  Tylenol musa, motrin prn, oxycodone 5-10 mg for moderate -severe pain prn  Dilaudid prn    12  Dry eyes  lubifresh prn, cosopt BID, travoprost qdaily    13  Depression  cymbalta 30 mg qHS - currently on hold    14  Hypertension  Metoprolol 2 5 mg prn  Avapro 150 mg daily    15  Hypothyroidism  Levothyroxine 50 mcg daily - on hold    16  FEN  NPO due to concern for aspiration with worsening CXR    17  DVT ppx  SCDs, on heparin drip    Subjective:    No acute events overnight  She is currently on bipap  Patient is able to respond yes/no to questions  Pain is well controlled  Denies fevers/chills, chest pain, pain in her legs  Review of Systems   Constitutional: Negative for chills and fever  Eyes: Negative for visual disturbance  Respiratory:        Currently on BiPAP   Cardiovascular: Negative for chest pain  Gastrointestinal: Negative for nausea and vomiting  Neurological: Negative for headaches  Objective:    /59   Pulse 74   Temp (!) 96 9 °F (36 1 °C) (Temporal)   Resp 22   Ht 5' 5" (1 651 m)   Wt 68 4 kg (150 lb 12 7 oz)   SpO2 96%   Breastfeeding? No   BMI 25 09 kg/m²     I/O last 3 completed shifts: In: 560 2 [I V :360 2; IV Piggyback:200]  Out: 2230 [Urine:2230]  No intake/output data recorded      Lab Results   Component Value Date    WBC 20 66 (H) 10/12/2019    HGB 10 2 (L) 10/12/2019    HCT 31 5 (L) 10/12/2019    MCV 98 10/12/2019     10/12/2019       Lab Results   Component Value Date    GLUCOSE 75 01/08/2016    CALCIUM 7 2 (L) 10/12/2019     (L) 01/08/2016    K 3 4 (L) 10/12/2019    CO2 28 10/12/2019     10/12/2019    BUN 25 10/12/2019    CREATININE 0 64 10/12/2019       Lab Results   Component Value Date/Time    POCGLU 154 (H) 10/13/2019 05:51 AM    POCGLU 192 (H) 10/13/2019 12:05 AM    POCGLU 203 (H) 10/12/2019 05:02 PM    POCGLU 236 (H) 10/12/2019 12:45 PM    POCGLU 226 (H) 10/12/2019 10:54 AM    POCGLU 164 02/15/2017 02:33 PM       Physical Exam   Constitutional: She is oriented to person, place, and time  She appears well-developed and well-nourished  Currently on bipap, responsive   Cardiovascular: Normal rate and regular rhythm  Pulmonary/Chest: Effort normal and breath sounds normal    Abdominal: Soft  Bowel sounds are normal  She exhibits no distension  There is no tenderness  There is no guarding  Vertical incision is c/d/i   Musculoskeletal: She exhibits no edema or deformity  SCDs placed bilaterally   Neurological: She is alert and oriented to person, place, and time  Skin: Skin is warm and dry  Psychiatric: She has a normal mood and affect   Her behavior is normal        Odean Kehr, MD   PGY-3 GYN/ONC  10/13/2019 7:51 AM

## 2019-10-13 NOTE — PROGRESS NOTES
Progress Note - Critical Care   Mio Merida 82 y o  female MRN: [id number]  Unit/Bed#: ICU 02 Encounter: [id number]    Assessment/Plan:  1  Acute hypoxic respiratory failure secondary to aspiration and pulmonary edema  ·  patient was more hypoxic yesterday with chest x-ray showing new/worsening right lower lobe infiltrate concerning for ongoing aspiration  She was transition from high-flow nasal cannula some BiPAP  Wean oxygen as tolerated to maintain saturation greater than 90%  · Aggressive pulmonary toileting  Encourage cough and deep breathing  Use incentive spirometer  Get OOB during the day  PT/OT  · Xopenex/atrovetn nebs TID  · Continue lasix for diuresis  Goal to maintain net negative daily fluid balance  2   Sepsis secondary to aspiration pneumonia  · Sputum culture positive for Serratia for which she had been treated with 5 days of cefepime and flagyl, however with concern for new aspiration and worsening CXR with rising WBC count she was switched to Zosyn, day 2, total antibiotic day 7   · Remains afebrile  · Hemodynamics are stable  3   AFib with RVR  · Cardiology following  Maintaining sinus rhythm  Continue scheduled IV lopressor for now  Will start po agent once tolerating oral intake  · Continue heparin anticoagulation for now  Start eliquis per cardiology recommendations once tolerating PO   4  Status post exploratory laparotomy with TLH/BSO  · Management per Gyn/Onc service  5  Acute blood loss anemia, multifactorial secondary to possible GI bleed and surgical losses  · Hgb has been stable  Continue to trend and transfuse for <7   · Continue daily protonix for possible stress gastritis  6   Diabetes mellitus type 1  · Blood sugar has been better controlled  Continue Lantus/SSI regimen  Goal to maintain -180   7   Dysphagia post extubation with concern for recurrent aspiration  ·  Keep NPO for now    Consider keofed placement to start tube feeds    _____________________________________________________________________    HPI/24hr events:   Hypoxic with poor inspiratory effort yesterday afternoon requiring transition to BiPAP which remained on overnight  CXR concerning for new?worsening RLL infiltrate  Afebrile  No acute events overnight      Medications:    Current Facility-Administered Medications:  artificial tear  Both Eyes HS PRN JIM Spivey    diltiazem 10 mg Intravenous Q2H PRN Fish Pendleton PA-C    dorzolamide-timolol 1 drop Both Eyes BID JIM Spivey    furosemide 20 mg Intravenous Daily SandraJIM Apodaca    heparin (porcine) 3-20 Units/kg/hr (Order-Specific) Intravenous Titrated JIM Dorantes Last Rate: 11 Units/kg/hr (10/12/19 1532)   heparin (porcine) 1,950 Units Intravenous PRN JIM Kim    heparin (porcine) 3,900 Units Intravenous PRN DemetriuserJIM Singh    influenza vaccine 0 5 mL Intramuscular Prior to discharge Lazara Clemente,     insulin glargine 11 Units Subcutaneous HS [place] Spatzer, JIM    insulin lispro 1-5 Units Subcutaneous Q6H [address] JIM Myrick    ipratropium 0 5 mg Nebulization TID SandraJIM Apodaca    lactated ringers 1,000 mL Intravenous Once JIM Spivey    levalbuterol 1 25 mg Nebulization TID Marsha Garay DO    LORazepam 0 5 mg Intravenous Q4H PRN SandraJIM Apodaca    metoprolol 5 mg Intravenous Q6H Chuy Pendleton PA-C    morphine injection 1 mg Intravenous Q4H PRN [facility] CarlineJIM    Netarsudil Dimesylate 1 drop Right Eye HS JIM Wallace    ondansetron 4 mg Intravenous Q6H PRN JIM Spivey    pantoprazole 40 mg Intravenous Q24H [address] [facility] CarlineJIM    piperacillin-tazobactam 3 375 g Intravenous Q6H JIM Melo Last Rate: 3 375 g (10/13/19 0544)   traMADol 50 mg Oral Q6H PRN JIM Bruno    travoprost 1 drop Both Eyes HS JIM Wallace          heparin (porcine) 3-20 Units/kg/hr (Order-Specific) Last Rate: 11 Units/kg/hr (10/12/19 1532)         Physical exam:  Vitals: Body mass index is 25 09 kg/m²  Blood pressure 135/58, pulse 74, temperature (!) 97 °F (36 1 °C), resp  rate 18, height 5' 5" (1 651 m), weight 68 4 kg (150 lb 12 7 oz), SpO2 99 %, not currently breastfeeding ,  Temp  Min: 96 8 °F (36 °C)  Max: 100 °F (37 8 °C)  IBW: 57 kg    SpO2: 99 %  SpO2 Activity: At Rest  O2 Device: High flow nasal cannula      Intake/Output Summary (Last 24 hours) at 10/13/2019 0644  Last data filed at 10/13/2019 0554  Gross per 24 hour   Intake 191 64 ml   Output 1405 ml   Net -1213 36 ml       Invasive/non-invasive ventilation settings:   Respiratory    Lab Data (Last 4 hours)    None         O2/Vent Data (Last 4 hours)      10/13 0305          Non-Invasive Ventilation Mode BiPAP                 Invasive Devices     Peripheral Intravenous Line            Peripheral IV 10/10/19 Left Antecubital 2 days    Peripheral IV 10/10/19 Left Wrist 2 days          Drain            Rectal Tube With balloon 4 days    External Urinary Catheter Other (Comment) 1 day          Airway            ETT  Cuffed 8 mm 5 days    ETT  Cuffed; Hi-Lo 7 mm 5 days                  Physical Exam:  Gen: Sleeping but arousable, weak appearing, no acute distress  HEENT:  Atraumatic, normocephalic, extraocular movements intact, pupils 3 mm equal and reactive, oropharynx clear  Neck:  Supple, trachea midline, no JVD, no lymphadenopathy  Chest:  Lungs are fairly clear anteriorly, diminished at the bases more pronounced in the right lower lobe, no wheeze  Cor:  Single S1/S2, no murmurs, rubs, gallops, regular rate and rhythm  Abd:  Soft, nontender, nondistended, midline surgical incision is clean dry and intact  Ext:  2+ bilateral upper extremity edema, 1+ bilateral lower extremity edema, no clubbing or cyanosis  Neuro:   Follows commands, moves all extremities, cranial nerves 2-12 grossly intact, no focal deficits  Skin:  Warm, dry      Diagnostic Data:  Lab: I have personally reviewed pertinent lab results  CBC:   Results from last 7 days   Lab Units 10/12/19  1323 10/11/19  0500 10/10/19  1030   WBC Thousand/uL 20 66* 19 04* 15 13*   HEMOGLOBIN g/dL 10 2* 10 1* 8 0*   HEMATOCRIT % 31 5* 31 0* 24 0*   PLATELETS Thousands/uL 289 220 177       CMP:   Results from last 7 days   Lab Units 10/12/19  0602 10/11/19  0500 10/10/19  0451  10/08/19  0439 10/07/19  1920   SODIUM mmol/L 144 142 141   < > 135* 130*   POTASSIUM mmol/L 3 4* 3 1* 3 1*   < > 3 3* 4 6   CHLORIDE mmol/L 106 105 104   < > 102 100   CO2 mmol/L 28 26 28   < > 24 16*   BUN mg/dL 25 21 27*   < > 32* 27*   CREATININE mg/dL 0 64 0 75 0 95   < > 1 25 1 53*   CALCIUM mg/dL 7 2* 7 3* 7 1*   < > 7 2* 6 0*   ALK PHOS U/L  --   --   --   --  34* 63   ALT U/L  --   --   --   --  197* 116*   AST U/L  --   --   --   --  192* 108*    < > = values in this interval not displayed  PT/INR:   No results found for: PT, INR,   Magnesium:   Results from last 7 days   Lab Units 10/12/19  0602 10/07/19  1920   MAGNESIUM mg/dL 2 3 2 2     Phosphorous:   Results from last 7 days   Lab Units 10/07/19  1920   PHOSPHORUS mg/dL 4 2*       Microbiology:  Results from last 7 days   Lab Units 10/09/19  1107   SPUTUM CULTURE  2+ Growth of Serratia marcescens*  3+ Growth of Candida albicans*  2+ Growth of    GRAM STAIN RESULT  Rare Polys*  2+ Budding yeast*  1+ Gram positive cocci in clusters*       Imaging:  No new imaging    Cardiac lab/EKG/telemetry/ECHO:   Sinus rhythm    VTE Prophylaxis: Heparin anticoagulation, SCDs    Code Status: Level 1 - Full Code    Baxter Olea Spatzer, CRNP    Portions of the record may have been created with voice recognition software  Occasional wrong word or "sound a like" substitutions may have occurred due to the inherent limitations of voice recognition software  Read the chart carefully and recognize, using context, where substitutions have occurred

## 2019-10-13 NOTE — PROGRESS NOTES
Progress Note - Cardiology   Miko Joel 82 y o  female MRN: [id number]  Unit/Bed#: ICU 02 Encounter: [id number]      Assessment/Recommendations/Discussion:   1  New onset atrial fibrillation with rapid ventricular response, now converted to sinus rhythm, maintaining SR  2  Acute hypoxic respiratory failure  3  Aspiration pneumonitis  4  Acute blood loss anemia  5  Gastritis  6  Status post exploratory laparotomy, T LH, BSO, pelvic and periaortic lymph weight ectomy postop day 7   7  Diabetes mellitus  8  Status post PEA arrest and 2 rounds of CPR with subsequent return of spontaneous circulation       PLAN   Continue P r n  IV metoprolol for elevated heart rates   Can start oral diltiazem 30 mg q6h (BP will be able to tolerate) or metoprolol tartrate 25 mg bid when tolerating diet   Echo with preserved EF   AFib likely being driven by respiratory failure plus sepsis/anemia   Switch to oral anticoagulation when able to swallow medications; Eliquis 5 mg bid; until then keep heparin gtt    Subjective:   HPI  In sinus rhythm overnight  High flow nasal cannula  Reports abdominal discomfort on the left side  Currently sinus rhythm in the 80s  Denies any chest pain  Review of Systems: As noted in HPI  Rest of ROS is negative  Vitals:   /52   Pulse 68   Temp 98 4 °F (36 9 °C) (Temporal)   Resp 18   Ht 5' 5" (1 651 m)   Wt 68 4 kg (150 lb 12 7 oz)   SpO2 95%   Breastfeeding? No   BMI 25 09 kg/m²   Vitals:    10/04/19 0621 10/10/19 0130   Weight: 58 5 kg (129 lb) 68 4 kg (150 lb 12 7 oz)       Intake/Output Summary (Last 24 hours) at 10/13/2019 1106  Last data filed at 10/13/2019 0554  Gross per 24 hour   Intake 183 71 ml   Output 1250 ml   Net -1066 29 ml       Objective:   /52   Pulse 68   Temp 98 4 °F (36 9 °C) (Temporal)   Resp 18   Ht 5' 5" (1 651 m)   Wt 68 4 kg (150 lb 12 7 oz)   SpO2 95%   Breastfeeding?  No   BMI 25 09 kg/m²    Physical Exam:  GEN: NAD, Alert and oriented; Breathing through mouth mostly   HEENT:Head, neck, ears, oral pharynx: Mucus membranes moist, oral pharynx clear, nares clear  External ears normal  EYES: Pupils equal, sclera anicteric  NECK: No JVD  CARDIOVASCULAR: RRR, No murmur, rub, gallops S1,S2  LUNGS: Labored breathing  Decreased BS bibasialr   ABDOMEN: Soft, nondistended  EXTREMITIES/VASCULAR: No edema  PSYCH: Normal Affect  NEURO: Grossly intact, moving all extremiteis equal, face symetric  HEME: No bleeding, bruising, petechia  SKIN: No significant rashes     TELEMETRY:  Sinus rhythm now at 80 beats per minute  Paroxysmal AFib  Lab Results:  Results from last 7 days   Lab Units 10/12/19  1323   WBC Thousand/uL 20 66*   HEMOGLOBIN g/dL 10 2*   HEMATOCRIT % 31 5*   PLATELETS Thousands/uL 289     Results from last 7 days   Lab Units 10/12/19  0602  10/08/19  0439   POTASSIUM mmol/L 3 4*   < > 3 3*   CHLORIDE mmol/L 106   < > 102   CO2 mmol/L 28   < > 24   BUN mg/dL 25   < > 32*   CREATININE mg/dL 0 64   < > 1 25   CALCIUM mg/dL 7 2*   < > 7 2*   ALK PHOS U/L  --   --  34*   ALT U/L  --   --  197*   AST U/L  --   --  192*    < > = values in this interval not displayed       Results from last 7 days   Lab Units 10/12/19  0602   POTASSIUM mmol/L 3 4*   CHLORIDE mmol/L 106   CO2 mmol/L 28   BUN mg/dL 25   CREATININE mg/dL 0 64   CALCIUM mg/dL 7 2*           Medications:    Current Facility-Administered Medications:     artificial tear (LUBRIFRESH P M ) ophthalmic ointment, , Both Eyes, HS PRN, JIM Steve    diltiazem (CARDIZEM) injection 10 mg, 10 mg, Intravenous, Q2H PRN, Harmeet Pendleton PA-C, 10 mg at 10/10/19 2054    dorzolamide-timolol (COSOPT) 22 3-6 8 MG/ML ophthalmic solution 1 drop, 1 drop, Both Eyes, BID, JIM Wallace, 1 drop at 10/13/19 0844    furosemide (LASIX) injection 20 mg, 20 mg, Intravenous, Daily, JIM Menendez, 20 mg at 10/13/19 0844    heparin (porcine) 25,000 units in 250 mL infusion (premix), 3-20 Units/kg/hr (Order-Specific), Intravenous, Titrated, JIM Blevins, Last Rate: 7 2 mL/hr at 10/12/19 1532, 11 Units/kg/hr at 10/12/19 1532    heparin (porcine) injection 1,950 Units, 1,950 Units, Intravenous, PRN, JIM Bernal, 1,950 Units at 10/12/19 2353    heparin (porcine) injection 3,900 Units, 3,900 Units, Intravenous, PRN, JIM Blevins    influenza vaccine, high-dose (FLUZONE HIGH-DOSE) IM injection ERIKA 0 5 mL, 0 5 mL, Intramuscular, Prior to discharge, Lazara Clemente DO    insulin glargine (LANTUS) subcutaneous injection 11 Units 0 11 mL, 11 Units, Subcutaneous, HS, Karma Chimes Spatzer, CRNP, 11 Units at 10/12/19 2201    insulin lispro (HumaLOG) 100 units/mL subcutaneous injection 1-5 Units, 1-5 Units, Subcutaneous, Q6H [address], 1 Units at 10/13/19 0552 **AND** Fingerstick Glucose (POCT), , , Q6H, JIM Myrick    ipratropium (ATROVENT) 0 02 % inhalation solution 0 5 mg, 0 5 mg, Nebulization, TID, JIM Olguin, 0 5 mg at 10/13/19 0736    lactated ringers bolus 1,000 mL, 1,000 mL, Intravenous, Once, JIM Bermudez    levalbuterol [facility]) inhalation solution 1 25 mg, 1 25 mg, Nebulization, TID, Sherly Vides DO, 1 25 mg at 10/13/19 0736    LORazepam (ATIVAN) 2 mg/mL injection 0 5 mg, 0 5 mg, Intravenous, Q4H PRN, JIM Olguin, 0 5 mg at 10/12/19 2151    metoprolol (LOPRESSOR) injection 5 mg, 5 mg, Intravenous, Q6H, Chuy Pendleton PA-C, 5 mg at 10/13/19 0544    morphine injection 1 mg, 1 mg, Intravenous, Q4H PRN, Brittnee Link, CRNP    Netarsudil Dimesylate 0 02 % SOLN 1 drop, 1 drop, Right Eye, HS, JIM Wallace, 1 drop at 10/12/19 2223    ondansetron (ZOFRAN) injection 4 mg, 4 mg, Intravenous, Q6H PRN, JIM Bermudez, 4 mg at 10/07/19 0541    pantoprazole (PROTONIX) injection 40 mg, 40 mg, Intravenous, Q24H [address], JIM Olguin, 40 mg at 10/13/19 0844    piperacillin-tazobactam (ZOSYN) 3 375 g in sodium chloride 0 9 % 50 mL IVPB, 3 375 g, Intravenous, Q6H, JIM Pavon, Last Rate: 100 mL/hr at 10/13/19 0544, 3 375 g at 10/13/19 0544    traMADol (ULTRAM) tablet 50 mg, 50 mg, Oral, Q6H PRN, JIM Quiñones, 50 mg at 10/12/19 1228    travoprost (TRAVATAN-Z) 0 004 % ophthalmic solution 1 drop, 1 drop, Both Eyes, HS, JIM Wallace, 1 drop at 10/12/19 2223    This note was completed in part utilizing [employer] Direct Software  Grammatical errors, random word insertions, spelling mistakes, and incomplete sentences may be an occasional consequence of this system secondary to software limitations, ambient noise, and hardware issues  If you have any questions or concerns about the content, text, or information contained within the body of this dictation, please contact the provider for clarification      Jonh Jarrell MD  10/13/2019 11:06 AM

## 2019-10-13 NOTE — PLAN OF CARE
Problem: Potential for Falls  Goal: Patient will remain free of falls  Description  INTERVENTIONS:  - Assess patient frequently for physical needs  -  Identify cognitive and physical deficits and behaviors that affect risk of falls    -  Bandy fall precautions as indicated by assessment   - Educate patient/family on patient safety including physical limitations  - Instruct patient to call for assistance with activity based on assessment  - Modify environment to reduce risk of injury  - Consider OT/PT consult to assist with strengthening/mobility  Outcome: Progressing     Problem: GENITOURINARY - ADULT  Goal: Absence of urinary retention  Description  INTERVENTIONS:  - Assess patient's ability to void and empty bladder  - Monitor I/O  - Bladder scan as needed  - Discuss with physician/AP medications to alleviate retention as needed  - Discuss catheterization for long term situations as appropriate   Outcome: Progressing     Problem: METABOLIC, FLUID AND ELECTROLYTES - ADULT  Goal: Electrolytes maintained within normal limits  Description  INTERVENTIONS:  - Monitor labs and assess patient for signs and symptoms of electrolyte imbalances  - Administer electrolyte replacement as ordered  - Monitor response to electrolyte replacements, including repeat lab results as appropriate  - Instruct patient on fluid and nutrition as appropriate  Outcome: Progressing  Goal: Fluid balance maintained  Description  INTERVENTIONS:  - Monitor labs   - Monitor I/O and WT  - Instruct patient on fluid and nutrition as appropriate  - Assess for signs & symptoms of volume excess or deficit  Outcome: Progressing  Goal: Glucose maintained within target range  Description  INTERVENTIONS:  - Monitor Blood Glucose as ordered  - Assess for signs and symptoms of hyperglycemia and hypoglycemia  - Administer ordered medications to maintain glucose within target range  - Assess nutritional intake and initiate nutrition service referral as needed  Outcome: Progressing     Problem: SKIN/TISSUE INTEGRITY - ADULT  Goal: Skin integrity remains intact  Description  INTERVENTIONS  - Identify patients at risk for skin breakdown  - Assess and monitor skin integrity  - Assess and monitor nutrition and hydration status  - Monitor labs (i e  albumin)  - Assess for incontinence   - Turn and reposition patient  - Assist with mobility/ambulation  - Relieve pressure over bony prominences  - Avoid friction and shearing  - Provide appropriate hygiene as needed including keeping skin clean and dry  - Evaluate need for skin moisturizer/barrier cream  - Collaborate with interdisciplinary team (i e  Nutrition, Rehabilitation, etc )   - Patient/family teaching  Outcome: Progressing  Goal: Incision(s), wounds(s) or drain site(s) healing without S/S of infection  Description  INTERVENTIONS  - Assess and document risk factors for skin impairment   - Assess and document dressing, incision, wound bed, drain sites and surrounding tissue  - Consider nutrition services referral as needed  - Oral mucous membranes remain intact  - Provide patient/ family education  Outcome: Progressing  Goal: Oral mucous membranes remain intact  Description  INTERVENTIONS  - Assess oral mucosa and hygiene practices  - Implement preventative oral hygiene regimen  - Implement oral medicated treatments as ordered  - Initiate Nutrition services referral as needed  Outcome: Progressing     Problem: Prexisting or High Potential for Compromised Skin Integrity  Goal: Skin integrity is maintained or improved  Description  INTERVENTIONS:  - Identify patients at risk for skin breakdown  - Assess and monitor skin integrity  - Assess and monitor nutrition and hydration status  - Monitor labs   - Assess for incontinence   - Turn and reposition patient  - Assist with mobility/ambulation  - Relieve pressure over bony prominences  - Avoid friction and shearing  - Provide appropriate hygiene as needed including keeping skin clean and dry  - Evaluate need for skin moisturizer/barrier cream  - Collaborate with interdisciplinary team   - Patient/family teaching  - Consider wound care consult   Outcome: Progressing     Problem: NEUROSENSORY - ADULT  Goal: Achieves stable or improved neurological status  Description  INTERVENTIONS  - Monitor and report changes in neurological status  - Monitor vital signs such as temperature, blood pressure, glucose, and any other labs ordered   - Initiate measures to prevent increased intracranial pressure  - Monitor for seizure activity and implement precautions if appropriate      Outcome: Progressing  Goal: Remains free of injury related to seizures activity  Description  INTERVENTIONS  - Maintain airway, patient safety  and administer oxygen as ordered  - Monitor patient for seizure activity, document and report duration and description of seizure to physician/advanced practitioner  - If seizure occurs,  ensure patient safety during seizure  - Reorient patient post seizure  - Seizure pads on all 4 side rails  - Instruct patient/family to notify RN of any seizure activity including if an aura is experienced  - Instruct patient/family to call for assistance with activity based on nursing assessment  - Administer anti-seizure medications if ordered    Outcome: Progressing  Goal: Achieves maximal functionality and self care  Description  INTERVENTIONS  - Monitor swallowing and airway patency with patient fatigue and changes in neurological status  - Encourage and assist patient to increase activity and self care     - Encourage visually impaired, hearing impaired and aphasic patients to use assistive/communication devices  Outcome: Progressing     Problem: CARDIOVASCULAR - ADULT  Goal: Maintains optimal cardiac output and hemodynamic stability  Description  INTERVENTIONS:  - Monitor I/O, vital signs and rhythm  - Monitor for S/S and trends of decreased cardiac output  - Administer and titrate ordered vasoactive medications to optimize hemodynamic stability  - Assess quality of pulses, skin color and temperature  - Assess for signs of decreased coronary artery perfusion  - Instruct patient to report change in severity of symptoms  Outcome: Progressing  Goal: Absence of cardiac dysrhythmias or at baseline rhythm  Description  INTERVENTIONS:  - Continuous cardiac monitoring, vital signs, obtain 12 lead EKG if ordered  - Administer antiarrhythmic and heart rate control medications as ordered  - Monitor electrolytes and administer replacement therapy as ordered  Outcome: Progressing     Problem: RESPIRATORY - ADULT  Goal: Achieves optimal ventilation and oxygenation  Description  INTERVENTIONS:  - Assess for changes in respiratory status  - Assess for changes in mentation and behavior  - Position to facilitate oxygenation and minimize respiratory effort  - Oxygen administered by appropriate delivery if ordered  - Initiate smoking cessation education as indicated  - Encourage broncho-pulmonary hygiene including cough, deep breathe, Incentive Spirometry  - Assess the need for suctioning and aspirate as needed  - Assess and instruct to report SOB or any respiratory difficulty  - Respiratory Therapy support as indicated  Outcome: Progressing     Problem: HEMATOLOGIC - ADULT  Goal: Maintains hematologic stability  Description  INTERVENTIONS  - Assess for signs and symptoms of bleeding or hemorrhage  - Monitor labs  - Administer supportive blood products/factors as ordered and appropriate  Outcome: Progressing     Problem: MUSCULOSKELETAL - ADULT  Goal: Maintain or return mobility to safest level of function  Description  INTERVENTIONS:  - Assess patient's ability to carry out ADLs; assess patient's baseline for ADL function and identify physical deficits which impact ability to perform ADLs (bathing, care of mouth/teeth, toileting, grooming, dressing, etc )  - Assess/evaluate cause of self-care deficits   - Assess range of motion  - Assess patient's mobility  - Assess patient's need for assistive devices and provide as appropriate  - Encourage maximum independence but intervene and supervise when necessary  - Involve family in performance of ADLs  - Assess for home care needs following discharge   - Consider OT consult to assist with ADL evaluation and planning for discharge  - Provide patient education as appropriate  Outcome: Progressing  Goal: Maintain proper alignment of affected body part  Description  INTERVENTIONS:  - Support, maintain and protect limb and body alignment  - Provide patient/ family with appropriate education  Outcome: Progressing

## 2019-10-13 NOTE — CONSULTS
Consultation - Infectious Disease   Ana Helm 82 y o  female MRN: [id number]  Unit/Bed#: ICU 02 Encounter: [id number]      IMPRESSION & RECOMMENDATIONS:   Impression/Recommendations:  1  Sepsis, evolving since admission  Tachycardia, tachypnea, leukocytosis  Likely secondary to aspiration pneumonia  No high fevers  No hemodynamic instability  Leukocytosis is persistent which I suspect is multifactorial in origin secondary to pneumonia, leukemoid reaction in the setting of hypoxia, possible developing limb ischemia, uncontrolled AFib  -antibiotic plan as below  -recheck CBC in a m   -monitor temperatures and hemodynamics  -recheck procalcitonin level in a m   -supportive care per critical care service    2  Aspiration pneumonia  In the setting of vomiting due to recent postoperative ileus  Sputum culture revealed Serratia and Candida albicans  Candida albicans is likely a colonizer and not the cause of pneumonia  Antibiotic changed to IV Zosyn yesterday due to persistent hypoxia, leukocytosis  Improved sputum production     -continue IV Zosyn for now  -recheck procalcitonin level in a m   -monitor respiratory symptoms  -aspiration precautions    3  Acute hypoxic respiratory failure  Likely multifactorial secondary to aspiration pneumonia, ARDS, volume overload  Patient continues to require supplemental oxygen and is now on BiPAP  She is tolerating IV diuresis  -antibiotic plan as above  -diuresis per critical care service  -wean off oxygen as able  -monitor respiratory symptoms    4  Right upper extremity edema/pallor  With bluish discoloration of digits  Concern for poor perfusion  CT neck did reveal thrombus at right common jugular vein, which may be contributing  Patient is currently on anticoagulation for management of atrial fibrillation     -anticoagulation per primary service  -follow-up CT upper extremity  -close serial arm exams    5   Ovarian adenocarcinoma status post total hysterectomy, bilateral salpingectomy, pelvic and periaortic lymphadenectomy, omentectomy on 10/04/2019  OBGYN follow-up ongoing  6  Diabetes mellitus type 1      7  Status post PA arrest     8  Atrial fibrillation, with RVR  Ongoing management per Cardiology  Antibiotics:  Zosyn 2  Antibiotic 7    I discussed above plan in detail with critical care service and with patient's daughter at bedside  Reviewed prior hospitalization records  Thank you for this consultation  We will follow along with you  HISTORY OF PRESENT ILLNESS:  Reason for Consult:  Leukocytosis, aspiration pneumonia    HPI: Manfred Shafer is a 82 y o  female who was initially admitted on 10/04/2019 for total hysterectomy, bilateral salpingectomy, ex lap, pelvic and periaortic lymphadenectomy, omentectomy and ovarian cancer staging for right tubo-ovarian mass consistent with adenocarcinoma  Postoperatively, patient developed increased tachypnea and respiratory distress  CT showed distended stomach with concern for gastric outlet obstruction and patient was transferred to ICU where she promptly had episode of vomiting and subsequent hypoxia  She suffered a PEA arrest with ROSC achieved  Repeat CT was concerning for perihilar opacities consistent with aspiration  Patient was started on cefepime and Flagyl  Sputum culture was checked on 10/09 which now reveals Serratia and Candida  Patient was ultimately extubated but continues to have hypoxia requiring BiPAP  WBC count yesterday trended up to 20 so antibiotic was changed to IV Zosyn  WBC count today remains elevated at 25  Infectious Diseases consulted for further antibiotic guidance  Patient is not having fevers  Blood pressures remain stable  Sputum production is improving  She was noted to have worsening right upper extremity swelling, pallor today  REVIEW OF SYSTEMS:  A complete system-based review of systems is otherwise negative      PAST MEDICAL HISTORY:  Past Medical History:   Diagnosis Date    Anxiety     Arthritis     Constipation     Diabetes mellitus (HCC)     IDDM    Frequent UTI     Glaucoma     Hearing loss     Hyperlipidemia     Hypertension     Hyperthyroidism     in past    Hyponatremia     Hypothyroid     Neuropathy     bles    Right tubo-ovarian mass     Wears glasses      Past Surgical History:   Procedure Laterality Date    APPENDECTOMY      CATARACT EXTRACTION Bilateral     COLONOSCOPY  2014    Completed - Dr Valerie Jack, due in 5 years    HYSTERECTOMY N/A 10/4/2019    Procedure: LAP TOTAL HYSTERECTOMY, BSO;  Surgeon: Erasmo Blake MD;  Location: [facility];  Service: Gynecology Oncology    LAPAROTOMY N/A 10/4/2019    Procedure: EXPLORATORY LAPAROTOMY  EXLAP OMENTECTOMY  PELVIC AND DEANGELO-AORTIC LYMPH NODE DISSECTION  PERITONEAL BIOPSY TRANS ABDOMINUS BLOCK;  Surgeon: Erasmo Blake MD;  Location[facility];  Service: Gynecology Oncology    SKIN LESION EXCISION      Ears       FAMILY HISTORY:  Non-contributory    SOCIAL HISTORY:  Social History     Substance and Sexual Activity   Alcohol Use Yes    Frequency: Monthly or less    Drinks per session: 1 or 2    Binge frequency: Never    Comment: wine     Social History     Substance and Sexual Activity   Drug Use No     Social History     Tobacco Use   Smoking Status Never Smoker   Smokeless Tobacco Never Used       ALLERGIES:  Allergies   Allergen Reactions    Thiazide-Type Diuretics      Hyponatremia       MEDICATIONS:  All current active medications have been reviewed      PHYSICAL EXAM:  Vitals:  Temp:  [96 9 °F (36 1 °C)-98 4 °F (36 9 °C)] 98 4 °F (36 9 °C)  HR:  [] 124  Resp:  [17-29] 28  BP: (114-161)/(41-72) 138/66  SpO2:  [89 %-100 %] 94 %  Temp (24hrs), Av 4 °F (36 3 °C), Min:96 9 °F (36 1 °C), Max:98 4 °F (36 9 °C)  Current: Temperature: 98 4 °F (36 9 °C)     Physical Exam:  General:  Arousable but lethargic, on BiPAP  Eyes:  Conjunctive clear with no hemorrhages or effusions  Oropharynx: On BiPAP  Neck:  Supple, no lymphadenopathy  Lungs:  Scattered coarse breath sounds bilaterally  Cardiac:  Regular rate and rhythm, no murmurs  Abdomen:  Soft, non-tender, non-distended  Extremities:  Mild symmetric edema, right upper extremity pallor with bluish discoloration of tips of digits  Skin:  No rashes, no ulcers  Neurological:  Moves all four extremities spontaneously      LABS, IMAGING, & OTHER STUDIES:  Lab Results:  I have personally reviewed pertinent labs  Results from last 7 days   Lab Units 10/13/19  1106 10/12/19  0602 10/11/19  0500  10/08/19  0439 10/07/19  1920   POTASSIUM mmol/L 3 3* 3 4* 3 1*   < > 3 3* 4 6   CHLORIDE mmol/L 107 106 105   < > 102 100   CO2 mmol/L 34* 28 26   < > 24 16*   BUN mg/dL 21 25 21   < > 32* 27*   CREATININE mg/dL 0 68 0 64 0 75   < > 1 25 1 53*   EGFR ml/min/1 73sq m 82 83 74   < > 40 31   CALCIUM mg/dL 7 6* 7 2* 7 3*   < > 7 2* 6 0*   AST U/L  --   --   --   --  192* 108*   ALT U/L  --   --   --   --  197* 116*   ALK PHOS U/L  --   --   --   --  34* 63    < > = values in this interval not displayed  Results from last 7 days   Lab Units 10/13/19  1106 10/12/19  1323 10/11/19  0500   WBC Thousand/uL 25 71* 20 66* 19 04*   HEMOGLOBIN g/dL 10 2* 10 2* 10 1*   PLATELETS Thousands/uL 337 289 220     Results from last 7 days   Lab Units 10/09/19  1107   SPUTUM CULTURE  2+ Growth of Serratia marcescens*  3+ Growth of Candida albicans*  2+ Growth of    GRAM STAIN RESULT  Rare Polys*  2+ Budding yeast*  1+ Gram positive cocci in clusters*       Imaging Studies:   I have personally reviewed pertinent imaging study reports and images in PACS  Initial CTA chest from 10/05 showed moderate bilateral pleural effusions and compressive bibasilar atelectasis  Scattered ground-glass nodules  Abdominal ascites  Gastric and esophageal distension  Chest x-ray from 10/08 shows multifocal opacities      CT chest/abdomen/pelvis from 10/08 showed interval development of dense perihilar opacities compatible with aspiration  Multiple dilated loops of small bowel with no definitive transition point  High density collection within the pelvis compatible with hematoma  Chest x-ray from 10/12/2019 shows worsening infiltrates on the right  Stable infiltrates on the left  CTA soft tissue neck shows nonocclusive thrombus in right common jugular vein  Bilateral mastoid effusions  CT head showed no acute intracranial findings  EKG, Pathology, and Other Studies:   I have personally reviewed pertinent reports

## 2019-10-14 ENCOUNTER — APPOINTMENT (INPATIENT)
Dept: RADIOLOGY | Facility: HOSPITAL | Age: 84
DRG: 736 | End: 2019-10-14
Payer: COMMERCIAL

## 2019-10-14 PROBLEM — I80.8 SUPERFICIAL THROMBOPHLEBITIS OF RIGHT UPPER EXTREMITY: Status: ACTIVE | Noted: 2019-10-14

## 2019-10-14 PROBLEM — I48.91 A-FIB (HCC): Status: ACTIVE | Noted: 2019-10-14

## 2019-10-14 PROBLEM — A41.9 SEPSIS (HCC): Status: ACTIVE | Noted: 2019-10-14

## 2019-10-14 PROBLEM — I82.C11 INTERNAL JUGULAR (IJ) VEIN THROMBOEMBOLISM, ACUTE, RIGHT (HCC): Status: ACTIVE | Noted: 2019-10-14

## 2019-10-14 LAB
ANION GAP SERPL CALCULATED.3IONS-SCNC: 7 MMOL/L (ref 4–13)
APTT PPP: 51 SECONDS (ref 23–37)
APTT PPP: 53 SECONDS (ref 23–37)
APTT PPP: 86 SECONDS (ref 23–37)
ARTERIAL PATENCY WRIST A: YES
ATRIAL RATE: 73 BPM
BASE EXCESS BLDA CALC-SCNC: 6.9 MMOL/L
BODY TEMPERATURE: 97.3 DEGREES FEHRENHEIT
BUN SERPL-MCNC: 29 MG/DL (ref 5–25)
CA-I BLD-SCNC: 0.9 MMOL/L (ref 1.12–1.32)
CALCIUM SERPL-MCNC: 7.2 MG/DL (ref 8.3–10.1)
CHLORIDE SERPL-SCNC: 109 MMOL/L (ref 100–108)
CO2 SERPL-SCNC: 32 MMOL/L (ref 21–32)
CREAT SERPL-MCNC: 0.84 MG/DL (ref 0.6–1.3)
ERYTHROCYTE [DISTWIDTH] IN BLOOD BY AUTOMATED COUNT: 14.5 % (ref 11.6–15.1)
GFR SERPL CREATININE-BSD FRML MDRD: 65 ML/MIN/1.73SQ M
GLUCOSE SERPL-MCNC: 123 MG/DL (ref 65–140)
GLUCOSE SERPL-MCNC: 128 MG/DL (ref 65–140)
GLUCOSE SERPL-MCNC: 215 MG/DL (ref 65–140)
GLUCOSE SERPL-MCNC: 225 MG/DL (ref 65–140)
GLUCOSE SERPL-MCNC: 244 MG/DL (ref 65–140)
GLUCOSE SERPL-MCNC: 262 MG/DL (ref 65–140)
GLUCOSE SERPL-MCNC: 294 MG/DL (ref 65–140)
GLUCOSE SERPL-MCNC: 317 MG/DL (ref 65–140)
GLUCOSE SERPL-MCNC: 329 MG/DL (ref 65–140)
HCO3 BLDA-SCNC: 30.8 MMOL/L (ref 22–28)
HCT VFR BLD AUTO: 29.5 % (ref 34.8–46.1)
HGB BLD-MCNC: 9.5 G/DL (ref 11.5–15.4)
MCH RBC QN AUTO: 32.4 PG (ref 26.8–34.3)
MCHC RBC AUTO-ENTMCNC: 32.2 G/DL (ref 31.4–37.4)
MCV RBC AUTO: 101 FL (ref 82–98)
NON VENT- BIPAP: ABNORMAL
O2 CT BLDA-SCNC: 12 ML/DL (ref 16–23)
OXYHGB MFR BLDA: 82.6 % (ref 94–97)
P AXIS: 51 DEGREES
PCO2 BLDA: 41.1 MM HG (ref 36–44)
PCO2 TEMP ADJ BLDA: 39.9 MM HG (ref 36–44)
PH BLD: 7.5 [PH] (ref 7.35–7.45)
PH BLDA: 7.49 [PH] (ref 7.35–7.45)
PLATELET # BLD AUTO: 404 THOUSANDS/UL (ref 149–390)
PMV BLD AUTO: 10.3 FL (ref 8.9–12.7)
PO2 BLD: 39.5 MM HG (ref 75–129)
PO2 BLDA: 41.5 MM HG (ref 75–129)
POTASSIUM SERPL-SCNC: 3.7 MMOL/L (ref 3.5–5.3)
PR INTERVAL: 138 MS
PROCALCITONIN SERPL-MCNC: 2.69 NG/ML
QRS AXIS: 51 DEGREES
QRSD INTERVAL: 79 MS
QT INTERVAL: 408 MS
QTC INTERVAL: 450 MS
RBC # BLD AUTO: 2.93 MILLION/UL (ref 3.81–5.12)
SODIUM SERPL-SCNC: 148 MMOL/L (ref 136–145)
SPECIMEN SOURCE: ABNORMAL
T WAVE AXIS: 39 DEGREES
VENTRICULAR RATE: 73 BPM
WBC # BLD AUTO: 24.34 THOUSAND/UL (ref 4.31–10.16)

## 2019-10-14 PROCEDURE — 84145 PROCALCITONIN (PCT): CPT | Performed by: INTERNAL MEDICINE

## 2019-10-14 PROCEDURE — 82330 ASSAY OF CALCIUM: CPT | Performed by: NURSE PRACTITIONER

## 2019-10-14 PROCEDURE — 82805 BLOOD GASES W/O2 SATURATION: CPT | Performed by: NURSE PRACTITIONER

## 2019-10-14 PROCEDURE — C1751 CATH, INF, PER/CENT/MIDLINE: HCPCS

## 2019-10-14 PROCEDURE — 94669 MECHANICAL CHEST WALL OSCILL: CPT

## 2019-10-14 PROCEDURE — 71045 X-RAY EXAM CHEST 1 VIEW: CPT

## 2019-10-14 PROCEDURE — 99024 POSTOP FOLLOW-UP VISIT: CPT | Performed by: OBSTETRICS & GYNECOLOGY

## 2019-10-14 PROCEDURE — C9113 INJ PANTOPRAZOLE SODIUM, VIA: HCPCS | Performed by: NURSE PRACTITIONER

## 2019-10-14 PROCEDURE — 85027 COMPLETE CBC AUTOMATED: CPT | Performed by: NURSE PRACTITIONER

## 2019-10-14 PROCEDURE — 99223 1ST HOSP IP/OBS HIGH 75: CPT | Performed by: NURSE PRACTITIONER

## 2019-10-14 PROCEDURE — 80048 BASIC METABOLIC PNL TOTAL CA: CPT | Performed by: NURSE PRACTITIONER

## 2019-10-14 PROCEDURE — 36569 INSJ PICC 5 YR+ W/O IMAGING: CPT

## 2019-10-14 PROCEDURE — 82948 REAGENT STRIP/BLOOD GLUCOSE: CPT

## 2019-10-14 PROCEDURE — 99233 SBSQ HOSP IP/OBS HIGH 50: CPT | Performed by: INTERNAL MEDICINE

## 2019-10-14 PROCEDURE — 05HY33Z INSERTION OF INFUSION DEVICE INTO UPPER VEIN, PERCUTANEOUS APPROACH: ICD-10-PCS | Performed by: OBSTETRICS & GYNECOLOGY

## 2019-10-14 PROCEDURE — 85730 THROMBOPLASTIN TIME PARTIAL: CPT | Performed by: INTERNAL MEDICINE

## 2019-10-14 PROCEDURE — 94640 AIRWAY INHALATION TREATMENT: CPT

## 2019-10-14 PROCEDURE — 36600 WITHDRAWAL OF ARTERIAL BLOOD: CPT

## 2019-10-14 PROCEDURE — 93010 ELECTROCARDIOGRAM REPORT: CPT

## 2019-10-14 PROCEDURE — 94660 CPAP INITIATION&MGMT: CPT

## 2019-10-14 PROCEDURE — 92526 ORAL FUNCTION THERAPY: CPT

## 2019-10-14 PROCEDURE — 99291 CRITICAL CARE FIRST HOUR: CPT | Performed by: INTERNAL MEDICINE

## 2019-10-14 RX ORDER — POLYETHYLENE GLYCOL 3350 17 G/17G
17 POWDER, FOR SOLUTION ORAL DAILY PRN
Status: DISCONTINUED | OUTPATIENT
Start: 2019-10-14 | End: 2019-10-18 | Stop reason: HOSPADM

## 2019-10-14 RX ORDER — DEXTROSE MONOHYDRATE 50 MG/ML
100 INJECTION, SOLUTION INTRAVENOUS CONTINUOUS
Status: DISCONTINUED | OUTPATIENT
Start: 2019-10-14 | End: 2019-10-15

## 2019-10-14 RX ORDER — LANOLIN ALCOHOL/MO/W.PET/CERES
6 CREAM (GRAM) TOPICAL
Status: DISCONTINUED | OUTPATIENT
Start: 2019-10-14 | End: 2019-10-15

## 2019-10-14 RX ADMIN — DORZOLAMIDE HYDROCHLORIDE AND TIMOLOL MALEATE 1 DROP: 20; 5 SOLUTION/ DROPS OPHTHALMIC at 18:04

## 2019-10-14 RX ADMIN — DEXTROSE 100 ML/HR: 5 SOLUTION INTRAVENOUS at 10:47

## 2019-10-14 RX ADMIN — PIPERACILLIN SODIUM AND TAZOBACTAM SODIUM 3.38 G: 36; 4.5 INJECTION, POWDER, FOR SOLUTION INTRAVENOUS at 18:00

## 2019-10-14 RX ADMIN — INSULIN LISPRO 2 UNITS: 100 INJECTION, SOLUTION INTRAVENOUS; SUBCUTANEOUS at 06:36

## 2019-10-14 RX ADMIN — HEPARIN SODIUM 1950 UNITS: 1000 INJECTION INTRAVENOUS; SUBCUTANEOUS at 17:30

## 2019-10-14 RX ADMIN — PIPERACILLIN SODIUM AND TAZOBACTAM SODIUM 3.38 G: 36; 4.5 INJECTION, POWDER, FOR SOLUTION INTRAVENOUS at 05:41

## 2019-10-14 RX ADMIN — LEVALBUTEROL HYDROCHLORIDE 1.25 MG: 1.25 SOLUTION, CONCENTRATE RESPIRATORY (INHALATION) at 07:30

## 2019-10-14 RX ADMIN — METOPROLOL TARTRATE 5 MG: 5 INJECTION, SOLUTION INTRAVENOUS at 00:54

## 2019-10-14 RX ADMIN — IPRATROPIUM BROMIDE 0.5 MG: 0.5 SOLUTION RESPIRATORY (INHALATION) at 13:31

## 2019-10-14 RX ADMIN — HEPARIN SODIUM AND DEXTROSE 10 UNITS/KG/HR: 10000; 5 INJECTION INTRAVENOUS at 02:30

## 2019-10-14 RX ADMIN — TRAMADOL HYDROCHLORIDE 50 MG: 50 TABLET, FILM COATED ORAL at 21:49

## 2019-10-14 RX ADMIN — MELATONIN 6 MG: 3 TAB ORAL at 21:49

## 2019-10-14 RX ADMIN — PIPERACILLIN SODIUM AND TAZOBACTAM SODIUM 3.38 G: 36; 4.5 INJECTION, POWDER, FOR SOLUTION INTRAVENOUS at 00:54

## 2019-10-14 RX ADMIN — METOPROLOL TARTRATE 5 MG: 5 INJECTION, SOLUTION INTRAVENOUS at 05:40

## 2019-10-14 RX ADMIN — TRAVOPROST 1 DROP: 0.04 SOLUTION/ DROPS OPHTHALMIC at 21:50

## 2019-10-14 RX ADMIN — FUROSEMIDE 20 MG: 10 INJECTION, SOLUTION INTRAMUSCULAR; INTRAVENOUS at 08:18

## 2019-10-14 RX ADMIN — INSULIN LISPRO 2 UNITS: 100 INJECTION, SOLUTION INTRAVENOUS; SUBCUTANEOUS at 00:57

## 2019-10-14 RX ADMIN — IPRATROPIUM BROMIDE 0.5 MG: 0.5 SOLUTION RESPIRATORY (INHALATION) at 20:00

## 2019-10-14 RX ADMIN — HEPARIN SODIUM 1950 UNITS: 1000 INJECTION INTRAVENOUS; SUBCUTANEOUS at 05:40

## 2019-10-14 RX ADMIN — LEVALBUTEROL HYDROCHLORIDE 1.25 MG: 1.25 SOLUTION, CONCENTRATE RESPIRATORY (INHALATION) at 19:59

## 2019-10-14 RX ADMIN — IPRATROPIUM BROMIDE 0.5 MG: 0.5 SOLUTION RESPIRATORY (INHALATION) at 07:30

## 2019-10-14 RX ADMIN — METOPROLOL TARTRATE 5 MG: 5 INJECTION, SOLUTION INTRAVENOUS at 18:00

## 2019-10-14 RX ADMIN — DEXTROSE 100 ML/HR: 5 SOLUTION INTRAVENOUS at 22:10

## 2019-10-14 RX ADMIN — METOPROLOL TARTRATE 5 MG: 5 INJECTION, SOLUTION INTRAVENOUS at 13:14

## 2019-10-14 RX ADMIN — LEVALBUTEROL HYDROCHLORIDE 1.25 MG: 1.25 SOLUTION, CONCENTRATE RESPIRATORY (INHALATION) at 13:31

## 2019-10-14 RX ADMIN — DORZOLAMIDE HYDROCHLORIDE AND TIMOLOL MALEATE 1 DROP: 20; 5 SOLUTION/ DROPS OPHTHALMIC at 08:19

## 2019-10-14 RX ADMIN — SODIUM CHLORIDE 6 UNITS/HR: 9 INJECTION, SOLUTION INTRAVENOUS at 10:51

## 2019-10-14 RX ADMIN — PIPERACILLIN SODIUM AND TAZOBACTAM SODIUM 3.38 G: 36; 4.5 INJECTION, POWDER, FOR SOLUTION INTRAVENOUS at 12:36

## 2019-10-14 RX ADMIN — SODIUM CHLORIDE 8 UNITS/HR: 9 INJECTION, SOLUTION INTRAVENOUS at 12:46

## 2019-10-14 RX ADMIN — PANTOPRAZOLE SODIUM 40 MG: 40 INJECTION, POWDER, FOR SOLUTION INTRAVENOUS at 08:18

## 2019-10-14 NOTE — SPEECH THERAPY NOTE
Speech Language/Pathology    Speech/Language Pathology Progress Note    Patient Name: Chelly Castillo  Today's Date: 10/14/2019     Problem List  Principal Problem:    Ovarian cancer ([facility] [address] )  Active Problems:    Hypertension    Retinopathy, diabetic, proliferative ([facility] [address] )    Type 1 diabetes mellitus with diabetic neuropathy (HCC)    Diabetic neuropathy, painful (HCC)    Hyponatremia    Type 1 diabetes mellitus with hyperglycemia (HCC)    Internal jugular (IJ) vein thromboembolism, acute, right (HCC)    Superficial thrombophlebitis of right upper extremity    Sepsis ([facility] [address] )    A[facility]fib [facility] )       Past Medical History  Past Medical History:   Diagnosis Date    Anxiety     Arthritis     Constipation     Diabetes mellitus (HCC)     IDDM    Frequent UTI     Glaucoma     Hearing loss     Hyperlipidemia     Hypertension     Hyperthyroidism     in past    Hyponatremia     Hypothyroid     Neuropathy     bles    Right tubo-ovarian mass     Wears glasses         Past Surgical History  Past Surgical History:   Procedure Laterality Date    APPENDECTOMY  1956    CATARACT EXTRACTION Bilateral     COLONOSCOPY  09/2014    Completed - Dr Leigh Claude, due in 5 years    HYSTERECTOMY N/A 10/4/2019    Procedure: LAP TOTAL HYSTERECTOMY, BSO;  Surgeon: Samreen Soto MD;  [facility];  Service: Gynecology Oncology    LAPAROTOMY N/A 10/4/2019    Procedure: EXPLORATORY LAPAROTOMY  EXLAP OMENTECTOMY  PELVIC AND DEANGELO-AORTIC LYMPH NODE DISSECTION  PERITONEAL BIOPSY TRANS ABDOMINUS BLOCK;  Surgeon: Samreen Soto MD;  [facility];  Service: Gynecology Oncology    SKIN LESION EXCISION      Ears         Subjective:  lethargic  Objective:  Pt has NGT in, on hfnc, very lethargic, attempts to verbalize but articulates minimally and w/out voicing  Min movement w/ head nod  Assessment:  Not appropriate for PO at this time  Lethargic  Plan/Recommendations:  NPO, ngt  Frequent oral care   Will place on hold for now and d/c order  Please reconsult when/if appropriate

## 2019-10-14 NOTE — ASSESSMENT & PLAN NOTE
81yo F currently hospitalized at [place] [address] for ovarian ca s/p hysterectomy/oophrectomy on 10/4/19 c/b acute hypoxic respiratory failure, aspiration PNA, PEA arrest, sepsis, new onset Afib with RVR now on heparin gtt with additional PMH of DM I, HTN, HLD, now with right upper extremity swelling, cyanosis, edema  Vascular surgery consulted for evaluation and recommendations  Right upper extremity with +2-3 pitting edema to the lower arm and hand  Doppler signals are biphasic to triphasic to radial, ulnar, brachial arteries  Palpable ulnar pulse  Motor and sensory remain intact and extremity is warm  Cap refill 2-3 seconds  Diagnostics:  -10/10/2019 right upper extremity venous duplex:  Acute superficial thrombophlebitis cephalic, antecubital fossa, basilic veins  No DVT noted in the subclavian, axillary, brachial veins at that time  IJ, innominate and proximal subclavian veins were obscured by a central line   -10/13/2019 CT soft tissue neck with contrast shows a small peripheral, nonocclusive thrombus in the right IJ   -10/13/2019 CTA RUE pending results    Recommendations:  -case discussed with ICU team; Likely proximal DVT 2/2 to central line, sepsis/inflammatory response, hx of malignancy  There does not appear to be acute arterial insufficiency as motor/sensory remain intact  No need for vascular intervention at this time  Central line has been d/c; no PIV in the right arm    Patient remains at increased risk for DVT given history of malignancy/prothrombotic state   -will review CTA RUE with attending to r/o arterial thrombus/insufficiency  -Continue with Heparin gtt and full anticoagulation;   -recommend warfarin in the setting of malignancy but will defer to primary team  -warm compress to the right arm for superficial thrombophlebitis  -consider NSAIDs if patient has symptoms such as pain to the arm if not contraindicated  -elevate the arm on 2-3 pillows for edema control  -wrap the arm in ACE wrap for compression; change daily  -Will discuss with Dr Melvi Corona; thank you for allowing us to participate in the care of this patient  If we can be of further assistance, please call us

## 2019-10-14 NOTE — PROGRESS NOTES
Recommend d/c D5 as pt meets TF goal rate  Recommend daily K/P/Mg readings to monitor for refeeding syndrome

## 2019-10-14 NOTE — ASSESSMENT & PLAN NOTE
Lab Results   Component Value Date    HGBA1C 6 7 (H) 09/25/2019       Recent Labs     10/13/19  0551 10/13/19  1159 10/13/19  1744 10/14/19  0056   POCGLU 154* 199* 116 225*       Blood Sugar Average: Last 72 hrs:  (P) 383 [id number]     -continue with optimal BS control per ICU team and primary team

## 2019-10-14 NOTE — PROCEDURES
Insert PICC line  Date/Time: 10/14/2019 12:36 PM  Performed by: Barry Reynolds RN  Authorized by: JIM Jean     Patient location:  Bedside  Consent:     Consent obtained:  Written    Consent given by:  Guardian (pt's daughter Breana Rousseau)    Procedural risks discussed: consent obtained by physician  [place] protocol:     Procedure explained and questions answered to patient or proxy's satisfaction: yes      Relevant documents present and verified: yes      Test results available and properly labeled: yes      Radiology Images displayed and confirmed  If images not available, report reviewed: yes      Required blood products, implants, devices, and special equipment available: yes      Site/side marked: yes      Immediately prior to procedure, a time out was called: yes      Patient identity confirmed:  Hospital-assigned identification number, arm band and verbally with patient  Pre-procedure details:     Hand hygiene: Hand hygiene performed prior to insertion      Sterile barrier technique: All elements of maximal sterile technique followed      Skin preparation:  ChloraPrep    Skin preparation agent: Skin preparation agent completely dried prior to procedure    Indications:     PICC line indications: long term antibiotics    Anesthesia (see MAR for exact dosages):      Anesthesia method:  Local infiltration (3ml)    Local anesthetic:  Lidocaine 1% w/o epi  Procedure details:     Location:  Basilic    Vessel type: vein      Laterality:  Left    Site selection rationale:  RUE limb alert    Approach: percutaneous technique used      Patient position:  Flat    Procedural supplies:  Double lumen    Catheter size:  5 Fr    Landmarks identified: yes      Ultrasound guidance: yes      Sterile ultrasound techniques: Sterile gel and sterile probe covers were used      Number of attempts:  1    Successful placement: yes      Vessel of catheter tip end:  Sherlock 3CG confirmed (sherlock 3cg procedure record confirmed placement sent to medical records)    Total catheter length (cm):  39    Catheter out on skin (cm):  0    Max flow rate:  999ml/hr    Arm circumference:  32cm  Post-procedure details:     Post-procedure:  Dressing applied and securement device placed    Assessment:  Blood return through all ports and free fluid flow (sherlock 3cg confirmed placement)    Post-procedure complications: none      Patient tolerance of procedure:   Tolerated well, no immediate complications  Comments:      Lot #[id number] 2020-07-31

## 2019-10-14 NOTE — ASSESSMENT & PLAN NOTE
Evolving sepsis since admission  -ID following  -ABX per ID  -continue medical management and supportive care

## 2019-10-14 NOTE — PROGRESS NOTES
Progress Note - Infectious Disease   Kasey Kendrick 82 y o  female MRN: [id number]  Unit/Bed#: ICU 02 Encounter: [id number]      Impression/Plan:  1  Sepsis, evolving since admission  Tachycardia, tachypnea, leukocytosis  Likely secondary to aspiration pneumonia  Also consider a possible leukemoid reaction in the setting of hypoxia and possible developing limb ischemia  Today her WBC count remains elevated but it is trending down  Procalcitonin is trending down  Fortunately she has been afebrile and hemodynamically stable    -antibiotic as below  -check CBC and BMP tomorrow  -trend procalcitonin tomorrow am  -monitor vitals  -supportive care     2  Aspiration pneumonia  In the setting of vomiting due to recent postoperative ileus  Sputum culture revealed Serratia and Candida albicans  Candida albicans is likely a colonizer and not the cause of pneumonia  Patient was transitioned from high flow O2 to BiPAP and her O2 saturation remains stable at this time  She continues to receive IV Zosyn and appears to be tolerating without difficulty  -continue IV Zosyn  -check CBC and BMP tomorrow  -monitor vitals  -monitor respiratory status  -wean BiPAP support per critical care team     3  Acute hypoxic respiratory failure  Likely multifactorial secondary to aspiration pneumonia, ARDS, volume overload  Patient continues to require supplemental oxygen and is now on BiPAP  She has tolerated IV diuresis  -antibiotic plan as above  -monitor vitals  -monitor respiratory symptoms  -wean BiPAP support per critical care team     4  Right upper extremity edema/pallor  With bluish discoloration of digits  Concern for poor perfusion  CT neck did reveal thrombus at right common jugular vein, which may be contributing  Patient is currently on anticoagulation for management of atrial fibrillation  A warmer has been placed on the R arm and pulse is detectable with doppler   Fingers appear less dusky today   -anticoagulation per primary service  -close serial arm exams     5  Ovarian adenocarcinoma status post total hysterectomy, bilateral salpingectomy, pelvic and periaortic lymphadenectomy, omentectomy on 10/04/2019     -continue ongoing follow up with OBGYN     6  Diabetes mellitus type 1    -blood glucose management per critical care medicine team     7  Status post PA arrest      8  Atrial fibrillation, with RVR  Ongoing management per Cardiology  Above plan was discussed in detail with patient and her daughter at the bedside  Antibiotics:  Zosyn 3  Antibiotics 8    Subjective:  Unable to perform ROS, patient is lethargic on BiPAP  Objective:  Vitals:  Temp:  [97 3 °F (36 3 °C)-98 4 °F (36 9 °C)] 97 6 °F (36 4 °C)  HR:  [] 82  Resp:  [20-29] 22  BP: (101-141)/(45-67) 119/52  SpO2:  [89 %-98 %] 96 %  Temp (24hrs), Av 7 °F (36 5 °C), Min:97 3 °F (36 3 °C), Max:98 4 °F (36 9 °C)  Current: Temperature: 97 6 °F (36 4 °C)    Physical Exam:   General Appearance:  Lethargic, on BiPAP, patient does squeeze hands and nod on command   Lungs:   Coarse in the upper lobes to auscultation bilaterally  Somewhat decreased in the bilateral bases; no wheezing; respirations unlabored but tachypenic; patient is on BiPAP   Heart:  RRR; no murmur, rub or gallop   Abdomen:   Soft, non-tender, non-distended, positive bowel sounds  Extremities: No clubbing or cyanosis, mild lower extremity edema; R distal arm and hand remain swollen with generalized pallor, mild duskiness to fingertips   Skin: No new rashes, lesions, or draining wounds noted on exposed skin       Labs, Imaging, & Other studies:   All pertinent labs and imaging studies were personally reviewed  Results from last 7 days   Lab Units 10/14/19  0549 10/13/19  1106 10/12/19  1323   WBC Thousand/uL 24 34* 25 71* 20 66*   HEMOGLOBIN g/dL 9 5* 10 2* 10 2*   PLATELETS Thousands/uL 404* 337 289     Results from last 7 days   Lab Units 10/14/19  0549  10/08/19  0439 10/07/19  1920 POTASSIUM mmol/L 3 7   < > 3 3* 4 6   CHLORIDE mmol/L 109*   < > 102 100   CO2 mmol/L 32   < > 24 16*   BUN mg/dL 29*   < > 32* 27*   CREATININE mg/dL 0 84   < > 1 25 1 53*   EGFR ml/min/1 73sq m 65   < > 40 31   CALCIUM mg/dL 7 2*   < > 7 2* 6 0*   AST U/L  --   --  192* 108*   ALT U/L  --   --  197* 116*   ALK PHOS U/L  --   --  34* 63    < > = values in this interval not displayed       Results from last 7 days   Lab Units 10/09/19  1107   SPUTUM CULTURE  2+ Growth of Serratia marcescens*  3+ Growth of Candida albicans*  2+ Growth of    GRAM STAIN RESULT  Rare Polys*  2+ Budding yeast*  1+ Gram positive cocci in clusters*

## 2019-10-14 NOTE — PROGRESS NOTES
Progress Note - Critical Care   Perry Tena 82 y o  female MRN: [id number]  Unit/Bed#: ICU 02 Encounter: [id number]    Assessment/Plan:  1  Acute Hypoxic respiratory failure secondary to aspiration pneumonitis and pulmonary edema  · Transition to HFNC wean O2 as able, Bipap prn and at hs  · Continue Lasix 20 mg daily  · Encourage cough, deep breathing, and incentive spirometer  · Encourage OOB, PT/OT  2  Sepsis secondary to aspiration pneumonia  · Continue Zosyn, currently day 3  · Procalcitonin trending down 2 58 from 4 28  3  Paroxysmal Atrial fibrillation with rapid ventricular response  · Currently in NSR  · Management per Cardiology  · Change Lopressor to PO, Cardizem prn  · Continue Heparin infusion for anticoagulation  4  Status post exploratory laparotomy with TLH/BSO  · management per GYN/ONC  5  Acute blood loss anemia likely multifactorial secondary to recent surgical procedure and possible GI bleed  · Will continue with daily protonix  6  Diabetes Mellitus type 1  · D/C Lantus and SSI, restart insulin infusion with goal glucose 140-180  7  Severe protein malnutrition secondary to Dysphagia with likely recurrent aspiration  · NG tube placed for tube feeding administration  · Will need formal speech eval once neuro and respiratory status improve  · Continue TF through NG tube, consider prosource for additional protein  · Nutrition recommendations appreciated  9  Suspected non-occlusive DVT of right upper extremity  · Awaiting formal read of CTA of RUE  · Vascular surgery consulted, no surgical intervention at this time, recommend continuing antiocoagulation  10  Hypernatremia   · Increase free water, D5W @ 100hr for 24 hours  11   Severe generalized weakness likely multifactorial secondary to polyneuropathy and prolonged hospital stay  · OOB, PT/OT  · Continue nutritional supplements  · D/C Ativan and avoid sedative medications        Critical Care Time:   Documented critical care time excludes any procedures documented elsewhere  It also excludes any family updates    _____________________________________________________________________    HPI/24hr events:   Remained on Bipap yesterday and through the night      Medications:    Current Facility-Administered Medications:  artificial tear  Both Eyes HS PRN JIM Steve    diltiazem 10 mg Intravenous Q2H PRN Harmeet Pendleton PA-C    dorzolamide-timolol 1 drop Both Eyes BID JIM Steve    furosemide 20 mg Intravenous Daily JIM Menendez    heparin (porcine) 3-20 Units/kg/hr (Order-Specific) Intravenous Titrated Claudell Payment, CRNP Last Rate: 12 Units/kg/hr (10/14/19 0331)   heparin (porcine) 1,950 Units Intravenous PRN JIM Hanson    heparin (porcine) 3,900 Units Intravenous PRN JIM Hanson    influenza vaccine 0 5 mL Intramuscular Prior to discharge Lazara Clemente,     insulin glargine 11 Units Subcutaneous HS Nyra Messing Spatzer, CRNP    insulin lispro 1-5 Units Subcutaneous Q6H [address] JIM Myrick    ipratropium 0 5 mg Nebulization TID JIM Menendez    lactated ringers 1,000 mL Intravenous Once JIM Steve    levalbuterol 1 25 mg Nebulization TID Tova Wagner,     LORazepam 0 5 mg Intravenous Q4H PRN JIM Menendez    metoprolol 5 mg Intravenous Q6H Chuy Pendleton PA-C    morphine injection 1 mg Intravenous Q4H PRN JIM Menendez    Netarsudil Dimesylate 1 drop Right Eye HS JIM Wallace    ondansetron 4 mg Intravenous Q6H PRN JIM Steve    pantoprazole 40 mg Intravenous Q24H [address] JIM Menendez    piperacillin-tazobactam 3 375 g Intravenous Q6H JIM Chaves Last Rate: Stopped (10/14/19 0800)   polyethylene glycol 17 g Oral Daily PRN JIM Steve    traMADol 50 mg Oral Q6H PRN JIM Menendez    travoprost 1 drop Both Eyes HS JIM Wallace          heparin (porcine) 3-20 Units/kg/hr (Order-Specific) Last Rate: 12 Units/kg/hr (10/14/19 7657)         Physical exam:  Vitals: Body mass index is 25 09 kg/m²  Blood pressure 119/52, pulse 82, temperature 97 6 °F (36 4 °C), temperature source Temporal, resp  rate 22, height 5' 5" (1 651 m), weight 68 4 kg (150 lb 12 7 oz), SpO2 96 %, not currently breastfeeding ,  Temp  Min: 96 8 °F (36 °C)  Max: 100 °F (37 8 °C)  IBW: 57 kg    SpO2: 96 %  SpO2 Activity: At Rest  O2 Device: High flow nasal cannula      Intake/Output Summary (Last 24 hours) at 10/14/2019 0907  Last data filed at 10/14/2019 0800  Gross per 24 hour   Intake 1207 47 ml   Output 1900 ml   Net -692 53 ml       Invasive/non-invasive ventilation settings:   Respiratory    Lab Data (Last 4 hours)      10/14 0715            pH, Arterial       7 492           pCO2, Arterial       41 1           pO2, Arterial       41 5           HCO3, Arterial       30 8           Base Excess, Arterial       6 9                O2/Vent Data       10/14 0702   Most Recent        Non-Invasive Ventilation Mode BiPAP  BiPAP                Invasive Devices     Peripheral Intravenous Line            Peripheral IV 10/10/19 Left Antecubital 3 days    Peripheral IV 10/10/19 Left Wrist 3 days          Drain            Rectal Tube With balloon 5 days    External Urinary Catheter Other (Comment) 3 days    NG/OG/Enteral Tube Nasogastric 14 Fr Right nares less than 1 day          Airway            ETT  Cuffed 8 mm 6 days    ETT  Cuffed; Hi-Lo 7 mm 6 days                  Physical Exam:  Gen: well developed, weak and ill in appearance, no acute distress  HEENT: atraumatic, normocephalic, oropharynx dry  Neck: supple, no JVD, no lymphadenopathy  Chest: lungs clear but diminished at bases, no wheeze, poor effort  Cor: S1/S2, no murmur, gallop, or rub, regular rate and rhythm  Abd: soft, nontender, midline incision dry and intact, hypoactive and distant bowel sounds  Ext: doppler pulse RUE, +2 b/l upper extremity edema, +1 b/l lower extremity edema, no cyanosis or clubbing  Neuro: lethargic, follows commands, generalized weakness +2 strength throughout, no focal deficits  Skin: pale, warm, intact      Diagnostic Data:  Lab: I have personally reviewed pertinent lab results  CBC:   Results from last 7 days   Lab Units 10/14/19  0549 10/13/19  1106 10/12/19  1323   WBC Thousand/uL 24 34* 25 71* 20 66*   HEMOGLOBIN g/dL 9 5* 10 2* 10 2*   HEMATOCRIT % 29 5* 31 9* 31 5*   PLATELETS Thousands/uL 404* 337 289       CMP:   Results from last 7 days   Lab Units 10/14/19  0549 10/13/19  1106 10/12/19  0602  10/08/19  0439 10/07/19  1920   SODIUM mmol/L 148* 148* 144   < > 135* 130*   POTASSIUM mmol/L 3 7 3 3* 3 4*   < > 3 3* 4 6   CHLORIDE mmol/L 109* 107 106   < > 102 100   CO2 mmol/L 32 34* 28   < > 24 16*   BUN mg/dL 29* 21 25   < > 32* 27*   CREATININE mg/dL 0 84 0 68 0 64   < > 1 25 1 53*   CALCIUM mg/dL 7 2* 7 6* 7 2*   < > 7 2* 6 0*   ALK PHOS U/L  --   --   --   --  34* 63   ALT U/L  --   --   --   --  197* 116*   AST U/L  --   --   --   --  192* 108*    < > = values in this interval not displayed  PT/INR:   No results found for: PT, INR,   Magnesium:   Results from last 7 days   Lab Units 10/12/19  0602 10/07/19  1920   MAGNESIUM mg/dL 2 3 2 2     Phosphorous:   Results from last 7 days   Lab Units 10/07/19  1920   PHOSPHORUS mg/dL 4 2*       Microbiology:  Results from last 7 days   Lab Units 10/09/19  1107   SPUTUM CULTURE  2+ Growth of Serratia marcescens*  3+ Growth of Candida albicans*  2+ Growth of    GRAM STAIN RESULT  Rare Polys*  2+ Budding yeast*  1+ Gram positive cocci in clusters*       Imaging:  CXR - Feeding tube coursing below left hemidiaphragm with tip not visualized  Haziness of the lungs bilaterally appears overall mildly improved since recent CT allowing for differences in technique    CT head - Bilateral mastoid effusions  No findings of erosive mastoiditis  Correlate for retroauricular pain   No acute intracranial findings  CT chest, abd, pelvis - 1   Increasing central predominant groundglass opacities could represent edema or infection  Lack of intubation speaks against ARDS  2   Extensive mucous plugging in the posterior and medial basilar segmental bronchi of the right lower lobe with resultant postobstructive atelectasis and pneumonia     3   Unchanged moderate-sized bilateral pleural effusions, mild ascites, and anasarca    4   Postsurgical blood products again demonstrated within the pelvis  No significant change from prior or evidence for active extravasation  CT neck - 1  Small, peripheral, nonocclusive thrombus in the right common jugular vein  Previously seen right IJ catheter is no longer present    2   Bilateral mastoid effusions without evidence for erosive mastoiditis  Correlate for retroauricular pain    3   Ground glass opacities at the lung apices could represent pulmonary edema or atypical infection  CTA RUE - pending    Cardiac lab/EKG/telemetry/ECHO:   NSR, occasional A-fib with RVR    VTE Prophylaxis: Heparin infusion    Code Status: Level 1 - Full Code    JIM Fish    Portions of the record may have been created with voice recognition software  Occasional wrong word or "sound a like" substitutions may have occurred due to the inherent limitations of voice recognition software  Read the chart carefully and recognize, using context, where substitutions have occurred

## 2019-10-14 NOTE — UTILIZATION REVIEW
Continued Stay Review    Date: 10/14/2019                          Current Patient Class: IP Current Level of Care: CRITICAL CARE    HPI:84 y o  female initially admitted on 10/04 WITH ACUTE HYPOXIC RESPIRATORY FAILURE SEC TP ASPIRATION PNA    Assessment/Plan: EXTUBATED BUT REMAINS ON BIPAP 18/10, FIO2 40%, IV ABX CHANGED FROM CEFEPIME/FLAGYL TO ZOSYN D/T SPUTUM CXS REVEALING SERRATIA AND CANDIDA-LIKE COLONIZER  CONTINUE IV LOPRESSOR, TRANSITION TO PO ONCE ABLE TO TAKE PO  CONTINUE HEP GTT FOR DVT RUE, ELEVATE LUE, WRAP ARM IN ACE FOR COMPRESSION  PICC LINE PLACED   CONTINUE DIALLO CATH, RECTAL TUBE, I/O'S, NPO WITH TUBE FEEDINGS, SPEECH EVAL, B/L VENODYNES    Pertinent Labs/Diagnostic Results:   Results from last 7 days   Lab Units 10/14/19  0549 10/13/19  1106 10/12/19  1323 10/11/19  0500 10/10/19  1030   WBC Thousand/uL 24 34* 25 71* 20 66* 19 04* 15 13*   HEMOGLOBIN g/dL 9 5* 10 2* 10 2* 10 1* 8 0*   HEMATOCRIT % 29 5* 31 9* 31 5* 31 0* 24 0*   PLATELETS Thousands/uL 404* 337 289 220 177   BANDS PCT %  --  14*  --   --   --      Results from last 7 days   Lab Units 10/14/19  0549 10/13/19  1106 10/12/19  0602 10/11/19  0500 10/10/19  0451 10/07/19  1920   SODIUM mmol/L 148* 148* 144 142 141 130*   POTASSIUM mmol/L 3 7 3 3* 3 4* 3 1* 3 1* 4 6   CHLORIDE mmol/L 109* 107 106 105 104 100   CO2 mmol/L 32 34* 28 26 28 16*   ANION GAP mmol/L 7 7 10 11 9 14*   BUN mg/dL 29* 21 25 21 27* 27*   CREATININE mg/dL 0 84 0 68 0 64 0 75 0 95 1 53*   EGFR ml/min/1 73sq m 65 82 83 74 56 31   CALCIUM mg/dL 7 2* 7 6* 7 2* 7 3* 7 1* 6 0*   CALCIUM, IONIZED mmol/L 0 90*  --   --   --   --  0 89*   MAGNESIUM mg/dL  --   --  2 3  --   --  2 2   PHOSPHORUS mg/dL  --   --   --   --   --  4 2*     Results from last 7 days   Lab Units 10/08/19  0439 10/07/19  1920   AST U/L 192* 108*   ALT U/L 197* 116*   ALK PHOS U/L 34* 63   TOTAL PROTEIN g/dL 3 9* 4 1*   ALBUMIN g/dL 2 0* 1 6*   TOTAL BILIRUBIN mg/dL 0 50 0 39     Results from last 7 days   Lab Units 10/14/19  1404 10/14/19  1233 10/14/19  1044 10/14/19  0056 10/13/19  1744 10/13/19  1159 10/13/19  0551 10/13/19  0005 10/12/19  1702 10/12/19  1245   POC GLUCOSE mg/dl 294* 317* 329* 225* 116 199* 154* 192* 203* 236*     Results from last 7 days   Lab Units 10/14/19  0549 10/13/19  1106 10/12/19  0602 10/11/19  0500 10/10/19  0451 10/09/19  0504 10/08/19  0439 10/07/19  1920 10/07/19  1640   GLUCOSE RANDOM mg/dL 244* 159* 232* 205* 199* 206* 234* 371* 394*     BETA-HYDROXYBUTYRATE   Date Value Ref Range Status   10/07/2019 0 2 <0 6 mmol/L Final      Results from last 7 days   Lab Units 10/14/19  0715 10/13/19  0722 10/12/19  1426 10/12/19  1235   PH ART  7 492* 7 418 7 467* 7 500*   PCO2 ART mm Hg 41 1 50 9* 43 1 38 1   PO2 ART mm Hg 41 5* 108 5 66 2* 50 8*   HCO3 ART mmol/L 30 8* 32 1* 30 5* 29 0*   BASE EXC ART mmol/L 6 9 6 6 6 0 5 4   O2 CONTENT ART mL/dL 12 0* 14 4* 18 6 8 6*   O2 HGB, ARTERIAL % 82 6* 97 3* 93 9* 89 5*   ABG SOURCE  Radial, Left  --  Brachial, Right Brachial, Right   NON VENT TYPE HFNC   --   --  HFNC Flow HFNC Flow     Results from last 7 days   Lab Units 10/07/19  2035   TROPONIN I ng/mL 1 91*     Results from last 7 days   Lab Units 10/14/19  0924 10/14/19  0300 10/13/19  2023 10/10/19  1030 10/09/19  0504 10/07/19  1920   PROTIME seconds  --   --   --  17 0* 19 0* 20 5*   INR   --   --   --  1 36* 1 57* 1 72*   PTT seconds 86* 53* 68* 33  --   --      Results from last 7 days   Lab Units 10/14/19  0549 10/13/19  1349 10/12/19  1323   PROCALCITONIN ng/ml 2 69* 2 58* 4 28*     Results from last 7 days   Lab Units 10/08/19  1141 10/08/19  0101 10/07/19  2035 10/07/19  1746   LACTIC ACID mmol/L 3 2* 5 4* 7 8* 6 8*     Results from last 7 days   Lab Units 10/12/19  1323   NT-PRO BNP pg/mL 3,747*     Results from last 7 days   Lab Units 10/09/19  1107   SPUTUM CULTURE  2+ Growth of Serratia marcescens*  3+ Growth of Candida albicans*  2+ Growth of    GRAM STAIN RESULT  Rare Polys*  2+ Budding yeast*  1+ Gram positive cocci in clusters*     Results from last 7 days   Lab Units 10/13/19  1106   TOTAL COUNTED  100     Vital Signs:   Date/Time  Temp  Pulse  Resp  BP  MAP (mmHg)  SpO2  O2 Device   10/14/19 1436  99 2 °F (37 3 °C)               10/14/19 1424    98  28Abnormal   125/54  77  97 %     10/14/19 1331              High flow nasal cannula   10/14/19 1330    86  30Abnormal   112/58  72  89 %Abnormal      10/14/19 1315    112Abnormal   33Abnormal   102/57  73       10/14/19 1244  100 2 °F (37 9 °C)               10/14/19 1130    104  27Abnormal   119/59  85  92 %     10/14/19 1030    98  23Abnormal   125/49Abnormal   68  91 %     10/14/19 0830    94  25Abnormal   120/47Abnormal   60  97 %     10/14/19 0730  97 6 °F (36 4 °C)  82  22  119/52  62  96 %  High flow nasal cannul         Medications:   Medications:  dorzolamide-timolol 1 drop Both Eyes BID   furosemide 20 mg Intravenous Daily   insulin glargine 11 Units Subcutaneous HS   insulin lispro 1-5 Units Subcutaneous Q6H GENNARO   ipratropium 0 5 mg Nebulization TID   lactated ringers 1,000 mL Intravenous Once   levalbuterol 1 25 mg Nebulization TID   metoprolol 5 mg Intravenous Q6H   Netarsudil Dimesylate 1 drop Right Eye HS   pantoprazole 40 mg Intravenous Q24H GENNARO   piperacillin-tazobactam 3 375 g Intravenous Q6H   travoprost 1 drop Both Eyes HS       dextrose 100 mL/hr Intravenous Continuous   heparin (porcine) 3-20 Units/kg/hr (Order-Specific) Intravenous Titrated   insulin regular (HumuLIN R,NovoLIN R) infusion 0 3-21 Units/hr Intravenous Titrated       artificial tear  Both Eyes HS PRN   diltiazem 10 mg Intravenous Q2H PRN   heparin (porcine) 1,950 Units Intravenous PRN   heparin (porcine) 3,900 Units Intravenous PRN   influenza vaccine 0 5 mL Intramuscular Prior to discharge   ondansetron 4 mg Intravenous Q6H PRN   polyethylene glycol 17 g Oral Daily PRN   traMADol 50 mg Oral Q6H PRN Discharge Plan: TBD    Network Utilization Review Department  Phone: [phone]; Fax [phone]  [email or web address]  ATTENTION: Please call with any questions or concerns to [phone]  and carefully listen to the prompts so that you are directed to the right person  Send all requests for admission clinical reviews, approved or denied determinations and any other requests to fax [phone]   All voicemails are confidential

## 2019-10-14 NOTE — ASSESSMENT & PLAN NOTE
New onset Afib with RVR now SR  -medical management per Cards  -ECHO with preserved EF  -on heparin gtt  -continue A/C well-nourished/obese/well-developed/well-groomed

## 2019-10-14 NOTE — CONSULTS
Consult- Ana Helm 1935, 82 y o  female MRN: [id number]    Unit/Bed#: ICU 02 Encounter: [id number]    Primary Care Provider: Omid Vega MD   Date and time admitted to hospital: 10/4/2019  5:47 AM      Inpatient consult to Vascular Surgery  Consult performed by: JIM Ca  Consult ordered by: JIM Epstein          Internal jugular (IJ) vein thromboembolism, acute, right [facility])  Assessment & Plan  81yo F currently hospitalized at [facility] for ovarian ca s/p hysterectomy/oophrectomy on 10/4/19 c/b acute hypoxic respiratory failure, aspiration PNA, PEA arrest, sepsis, new onset Afib with RVR now on heparin gtt with additional PMH of DM I, HTN, HLD, now with right upper extremity swelling, cyanosis, edema  Vascular surgery consulted for evaluation and recommendations  Right upper extremity with +2-3 pitting edema to the lower arm and hand  Doppler signals are biphasic to triphasic to radial, ulnar, brachial arteries  Palpable ulnar pulse  Motor and sensory remain intact and extremity is warm  Cap refill 2-3 seconds  Diagnostics:  -10/10/2019 right upper extremity venous duplex:  Acute superficial thrombophlebitis cephalic, antecubital fossa, basilic veins  No DVT noted in the subclavian, axillary, brachial veins at that time  IJ, innominate and proximal subclavian veins were obscured by a central line   -10/13/2019 CT soft tissue neck with contrast shows a small peripheral, nonocclusive thrombus in the right IJ   -10/13/2019 CTA RUE pending results    Recommendations:  -case discussed with ICU team; Likely proximal DVT 2/2 to central line, sepsis/inflammatory response, hx of malignancy  There does not appear to be acute arterial insufficiency as motor/sensory remain intact  No need for vascular intervention at this time  Central line has been d/c; no PIV in the right arm    Patient remains at increased risk for DVT given history of malignancy/prothrombotic state   -will review CTA RUE with attending to r/o arterial thrombus/insufficiency  -Continue with Heparin gtt and full anticoagulation;   -recommend warfarin in the setting of malignancy but will defer to primary team  -warm compress to the right arm for superficial thrombophlebitis  -consider NSAIDs if patient has symptoms such as pain to the arm if not contraindicated  -elevate the arm on 2-3 pillows for edema control  -wrap the arm in ACE wrap for compression; change daily  -Will discuss with Dr Amada Dwyer; thank you for allowing us to participate in the care of this patient  If we can be of further assistance, please call us          Superficial thrombophlebitis of right upper extremity  Assessment & Plan  See plan above    A-fib [facility])  Assessment & Plan  New onset Afib with RVR now SR  -medical management per Cards  -ECHO with preserved EF  -on heparin gtt  -continue A/C      Sepsis ([facility] [address] )  Assessment & Plan  Evolving sepsis since admission  -ID following  -ABX per ID  -continue medical management and supportive care     Type 1 diabetes mellitus with hyperglycemia [facility])  Assessment & Plan  Lab Results   Component Value Date    HGBA1C 6 7 (H) 09/25/2019       Recent Labs     10/13/19  0551 10/13/19  1159 10/13/19  1744 10/14/19  0056   POCGLU 154* 199* 116 225*       Blood Sugar Average: Last 72 hrs:  (P) 486 [id number]     -continue with optimal BS control per ICU team and primary team    Hypertension  Assessment & Plan  HTN  -medical management per primary team  -cards following      * Ovarian cancer [facility])  Assessment & Plan  S/p QASIM/SBO 10/4/19  -management per GYN Onc        Consult Note - Vascular Surgery     Consulting Service: Critical Care    Chief Complaint: RUE cyanosis, edema    HPI: Erika Luna is a 82 y o  female currently hospitalized at [place] [address] for ovarian ca s/p hysterectomy/oophrectomy on 10/4/19 c/b acute hypoxic respiratory failure, aspiration PNA, PEA arrest, sepsis, new onset Afib with RVR now on heparin gtt with additional PMH of DM I, HTN, HLD, now with right upper extremity swelling, cyanosis, edema  Vascular surgery consulted for evaluation and recommendations  Patient obtunded during assessment and interview but able to participate minimally in examination  History gleaned from chart and discussion with ICU team   Patient was admitted to [facility]  for gyn surgery for malignancy  She has no previous history of DVT/PE, SVT or clotting d/o  She had no previous hx of Afib prior to admission, no PAD hx   Patient had acute resp failure likely 2/2 aspiration PNA, then c/b PEA arrest and sepsis  She had R IJ central line as well as a right radial arterial line and required vasopressors s/p PEA arrest   She had RUE swelling, cyanotic digits to the right hand, and some reported pain/discomfort to the right hand/arm  She remained motor and sensory intact per ICU discussion and never lost any radial/ulnar signals to the RUE, though the pulses have been difficult to palpate  She underwent diagnotic testing including RUE venous duplex and CT soft tissue of right neck which indicated superficial thrombophlebitis and non-occlusive DVT in the R IJ        On assessment - extremity warm, +2-3 edema, 2-3 sec cap refill, motor/sensory intact    Review of Systems:  General: positive for  - fatigue  Cardiovascular: no chest pain or dyspnea on exertion  Respiratory: no cough, shortness of breath, or wheezing  Gastrointestinal: positive for - abdominal pain  Genitourinary ROS: no dysuria, trouble voiding, or hematuria  Musculoskeletal ROS: positive for - swelling in arm - right and hand - right  Neurological ROS: no TIA or stroke symptoms  Hematological and Lymphatic ROS: positive for - blood clots  Dermatological ROS: negative  Psychological ROS: negative  Ophthalmic ROS: negative  ENT ROS: negative    Past Medical History:  Past Medical History:   Diagnosis Date    Anxiety     Arthritis     Constipation     Diabetes mellitus (HCC)     IDDM    Frequent UTI     Glaucoma     Hearing loss     Hyperlipidemia     Hypertension     Hyperthyroidism     in past    Hyponatremia     Hypothyroid     Neuropathy     bles    Right tubo-ovarian mass     Wears glasses        Past Surgical History:  Past Surgical History:   Procedure Laterality Date    APPENDECTOMY  1954    CATARACT EXTRACTION Bilateral     COLONOSCOPY  09/2014    Completed - Dr Leeann Ortiz, due in 5 years    HYSTERECTOMY N/A 10/4/2019    Procedure: LAP TOTAL HYSTERECTOMY, BSO;  Surgeon: Artis Woods MD;  Location: [facility];  Service: Gynecology Oncology    LAPAROTOMY N/A 10/4/2019    Procedure: EXPLORATORY LAPAROTOMY  EXLAP OMENTECTOMY  PELVIC AND DEANGELO-AORTIC LYMPH NODE DISSECTION  PERITONEAL BIOPSY TRANS ABDOMINUS BLOCK;  Surgeon: Artis Woods MD;  Location: [facility];  Service: Gynecology Oncology    SKIN LESION EXCISION      Ears       Social History:  Social History     Substance and Sexual Activity   Alcohol Use Yes    Frequency: Monthly or less    Drinks per session: 1 or 2    Binge frequency: Never    Comment: wine     Social History     Substance and Sexual Activity   Drug Use No     Social History     Tobacco Use   Smoking Status Never Smoker   Smokeless Tobacco Never Used       Family History:  Family History   Problem Relation Age of Onset    Heart attack Mother     Diabetes type II Mother     Dementia Father     Stroke Father         CVA    Breast cancer Maternal Aunt     Stomach cancer Maternal Uncle     Diabetes Family     Stroke Family         Complications       Allergies:   Allergies   Allergen Reactions    Thiazide-Type Diuretics      Hyponatremia       Medications:  Current Facility-Administered Medications   Medication Dose Route Frequency    artificial tear (LUBRIFRESH P M ) ophthalmic ointment   Both Eyes HS PRN    dextrose 5 % infusion  100 mL/hr Intravenous Continuous    diltiazem (CARDIZEM) injection 10 mg  10 mg Intravenous Q2H PRN    dorzolamide-timolol (COSOPT) 22 3-6 8 MG/ML ophthalmic solution 1 drop  1 drop Both Eyes BID    furosemide (LASIX) injection 20 mg  20 mg Intravenous Daily    heparin (porcine) 25,000 units in 250 mL infusion (premix)  3-20 Units/kg/hr (Order-Specific) Intravenous Titrated    heparin (porcine) injection 1,950 Units  1,950 Units Intravenous PRN    heparin (porcine) injection 3,900 Units  3,900 Units Intravenous PRN    influenza vaccine, high-dose (FLUZONE HIGH-DOSE) IM injection ERIKA 0 5 mL  0 5 mL Intramuscular Prior to discharge    insulin glargine (LANTUS) subcutaneous injection 11 Units 0 11 mL  11 Units Subcutaneous HS    insulin lispro (HumaLOG) 100 units/mL subcutaneous injection 1-5 Units  1-5 Units Subcutaneous Q6H [address]    insulin regular (HumuLIN R,NovoLIN R) 1 Units/mL in sodium chloride 0 9 % 100 mL infusion  0 3-21 Units/hr Intravenous Titrated    ipratropium (ATROVENT) 0 02 % inhalation solution 0 5 mg  0 5 mg Nebulization TID    lactated ringers bolus 1,000 mL  1,000 mL Intravenous Once    levalbuterol (XOPENEX) inhalation solution 1 25 mg  1 25 mg Nebulization TID    metoprolol (LOPRESSOR) injection 5 mg  5 mg Intravenous Q6H    Netarsudil Dimesylate 0 02 % SOLN 1 drop  1 drop Right Eye HS    ondansetron (ZOFRAN) injection 4 mg  4 mg Intravenous Q6H PRN    pantoprazole (PROTONIX) injection 40 mg  40 mg Intravenous Q24H GENNARO    piperacillin-tazobactam (ZOSYN) 3 375 g in sodium chloride 0 9 % 50 mL IVPB  3 375 g Intravenous Q6H    polyethylene glycol (MIRALAX) packet 17 g  17 g Oral Daily PRN    traMADol (ULTRAM) tablet 50 mg  50 mg Oral Q6H PRN    travoprost (TRAVATAN-Z) 0 004 % ophthalmic solution 1 drop  1 drop Both Eyes HS       Vitals:  Vitals:    10/14/19 0300 10/14/19 0400 10/14/19 0545 10/14/19 0730   BP:   115/59 119/52   BP Location:    Left arm   Pulse:   90 82   Resp:   22 22   Temp:  (!) 97 3 °F (36 3 °C) 97 6 °F (36 4 °C)   TempSrc:    Temporal   SpO2: 96%  95% 96%   Weight:       Height:           I/Os:  I/O last 3 completed shifts:   In: 1376 2 [I V :307 2; NG/GT:300; IV Piggyback:250; Feedings:519]  Out: 2350 [Urine:2350]  I/O this shift:  In: 15 [I V :15]  Out: -     Lab Results and Cultures:   CBC with diff:   Lab Results   Component Value Date    WBC 24 34 (H) 10/14/2019    HGB 9 5 (L) 10/14/2019    HCT 29 5 (L) 10/14/2019     (H) 10/14/2019     (H) 10/14/2019    MCH 32 4 10/14/2019    MCHC 32 2 10/14/2019    RDW 14 5 10/14/2019    MPV 10 3 10/14/2019    NRBC 0 10/13/2019   ,   BMP/CMP:  Lab Results   Component Value Date     (L) 01/08/2016    K 3 7 10/14/2019    K 4 8 01/08/2016     (H) 10/14/2019    CL 99 (L) 01/08/2016    CO2 32 10/14/2019    CO2 28 01/08/2016    ANIONGAP 6 01/08/2016    BUN 29 (H) 10/14/2019    BUN 25 01/08/2016    CREATININE 0 84 10/14/2019    CREATININE 0 94 01/08/2016    GLUCOSE 75 01/08/2016    CALCIUM 7 2 (L) 10/14/2019    CALCIUM 9 3 01/08/2016     (H) 10/08/2019    AST 16 01/08/2016     (H) 10/08/2019    ALT 28 01/08/2016    ALKPHOS 34 (L) 10/08/2019    ALKPHOS 84 01/08/2016    PROT 7 7 01/08/2016    BILITOT 0 83 01/08/2016    EGFR 65 10/14/2019   ,   Lipid Panel:   Lab Results   Component Value Date    CHOL 183 01/08/2016   ,   Coags:   Lab Results   Component Value Date    PTT 86 (H) 10/14/2019    INR 1 36 (H) 10/10/2019   ,     Blood Culture: No results found for: BLOODCX,   Urinalysis:   Lab Results   Component Value Date    COLORU pale 08/13/2019    COLORU Yellow 08/07/2019    COLORU Yellow 06/30/2017    CLARITYU clear 08/13/2019    CLARITYU Clear 08/07/2019    CLARITYU Transparent 06/30/2017    SPECGRAV 1 016 08/07/2019    SPECGRAV 1 000 06/30/2017    PHUR 7 0 08/07/2019    PHUR 7 5 06/30/2017    PHUR 7 0 06/30/2017    LEUKOCYTESUR - 08/13/2019    LEUKOCYTESUR Small (A) 08/07/2019    LEUKOCYTESUR 70+ 06/30/2017    NITRITE - 08/13/2019 NITRITE Negative 08/07/2019    NITRITE - 06/30/2017    PROTEINUA - 06/30/2017    GLUCOSEU - 08/13/2019    GLUCOSEU 100 (1/10%) (A) 08/07/2019    GLUCOSEU Negative 08/25/2015    KETONESU - 08/13/2019    KETONESU Negative 08/07/2019    KETONESU - 06/30/2017    BILIRUBINUR - 08/13/2019    BILIRUBINUR Negative 08/07/2019    BILIRUBINUR - 06/30/2017    BLOODU 5-10 08/13/2019    BLOODU Small (A) 08/07/2019    BLOODU - 06/30/2017   ,   Urine Culture:   Lab Results   Component Value Date    URINECX 40,000-49,000 cfu/ml Enterococcus faecalis (A) 08/13/2019    URINECX <10,000 cfu/ml - X2 Gram Negative Kwame (A) 08/13/2019   ,   Wound Culure: No results found for: WOUNDCULT    Imaging:  10/10/19 RUE VENOUS:  CONCLUSION:  Impression        RIGHT UPPER LIMB:  There is acute superficial thrombophlebitis in the cephalic vein in the forearm and antecubital fossa  There is acute superficial thrombophlebitis in the basilic vein from the antecubital fossa to the proximal upper arm  No evidence of acute or chronic deep vein thrombosis in the visualized portion of the subclavian vein, axillary vein and brachial veins  The internal jugular vein, innominate vein and proximal subclavian vein were obscured by central line with bandaging in the right neck  Doppler evaluation shows a normal response to augmentation maneuvers  LEFT UPPER LIMB LIMITED:  Evaluation shows no evidence of thrombus in the internal jugular vein and subclavian vein,       10/13/19 CT R NECK/SOFT TISSUE with contrast  IMPRESSION:     1  Small, peripheral, nonocclusive thrombus in the right common jugular vein  Previously seen right IJ catheter is no longer present      2   Bilateral mastoid effusions without evidence for erosive mastoiditis   Correlate for retroauricular pain      3   Ground glass opacities at the lung apices could represent pulmonary edema or atypical infection        10/13/19 CTA RUE pending full report    Physical Exam:    General appearance: fatigued and no distress  Head: Normocephalic, without obvious abnormality, atraumatic  Lungs: rhonchi and wheezes  Chest wall: no tenderness; RIJ with dry dressing, no edema noted  Heart: regular rate and rhythm, S1, S2 normal, no murmur, click, rub or gallop  Abdomen: soft, non-tender; bowel sounds normal; no masses,  no organomegaly NGT intact  Extremities: B/L LE warm/dry and well perfused; easily palpable DP pulses to both feet  RUE with +2-3 pitting edema to the forearm and hand  Finger tips warm with 2-3 sec cap refill on the right  palpable ulnar pulse to the right, unable to palpate the radial pulse  Biphasic/Triphasic Radial/Ulnar, and Brachial signals to the right arm  ecchmosis to the right lateral forearm    Skin: Skin color, texture, turgor normal  No rashes or lesions  Neurologic: Grossly normal; sedate/obtunded; followed simple commands - able to move fingers hand on right      Pulse exam:  Radial: Right: doppler signal Left[de-identified] 2+  Ulnar: Right: 2+ Left[de-identified] 2+  DP: Right: 2+ Left: 2+    Biphasic/Triphasic signals to the right Brachial, Radial, Bradly JIM Vang  10/14/2019  [facility]  [phone]

## 2019-10-14 NOTE — PROGRESS NOTES
Gyn Oncology Progress note   Wilfred Villarreal 82 y o  female MRN: [id number]  Unit/Bed#: ICU 02 Encounter: [id number]    A/P: Wilfred Villarreal is a 82 y o  female POD#10 s/p TLH, BSO, ex lap, pelvic and para-aortic lymphadenectomy, omentectomy, peritoneal biopsies, and ovarian cancer staging for right tubo-obarian mass consistent with adenocarcinoma s/p code on 10/7    1  Acute hypoxic respiratory failure, likely secondary to aspiration pneumonia  Management per critical care  Extubated and off pressors  Patient remains on BiPap, with inability to wean  CXR 10/13 showed worsening right infiltrate  She was switched from cefepime/flagyl to zosyn (day 3 today), as sputum cultures revealed Serratia and Candida-likely a colonizer  WBC from today, improved from 25 to 24, however, still elevated  F/u procalcitonin this am  F/u CXR for today  ID is on board  Managed per critical care team    2  Severe metabolic acidosis: improving    3  Afib with RVR  Per cardiology, will continue IV lopressor and transition to PO once patient tolerates a diet  4  Acute kidney injury: resolving as Cr has normalized  Avoid nephrotoxic agent  Patient tolerating diuresis    5  Postoperative ileus: improving  Patient currently tolerating tube feeds    6  Anemia  S/p 2pRBC - hb stable    7  Right tubo-ovarian mass  Frozen consistent with adenocarcinoma, follow up final pathology    8  Urinary retention  Failed two voiding trials  Ji currently in place, with adequate urine ouput    9  T1DM  Currently on Lantus and SSI    10  Analgesia  S/p TAPs block, s/p dilaudid  Dilaudid prn    11  Dry eyes  lubifresh prn, cosopt BID, travoprost qdaily    12  Depression  cymbalta 30 mg qHS - currently on hold    13  Hypertension  Metoprolol 2 5 mg prn  Avapro 150 mg daily    14  Hypothyroidism  Levothyroxine 50 mcg daily - on hold    FEN  NPO with tube feeds    DVT ppx  SCDs, on heparin drip    Subjective:    No acute events overnight    She is currently on bipap  Patient is less arousable this am      Review of Systems : patient unable to answer questions this am    Objective:    /59   Pulse 90   Temp (!) 97 3 °F (36 3 °C)   Resp 22   Ht 5' 5" (1 651 m)   Wt 68 4 kg (150 lb 12 7 oz)   SpO2 96%   Breastfeeding? No   BMI 25 09 kg/m²     I/O last 3 completed shifts: In: 1376 2 [I V :307 2; NG/GT:300; IV Piggyback:250; Feedings:519]  Out: 2350 [Urine:2350]  No intake/output data recorded  Lab Results   Component Value Date    WBC 24 34 (H) 10/14/2019    HGB 9 5 (L) 10/14/2019    HCT 29 5 (L) 10/14/2019     (H) 10/14/2019     (H) 10/14/2019       Lab Results   Component Value Date    GLUCOSE 75 01/08/2016    CALCIUM 7 2 (L) 10/14/2019     (L) 01/08/2016    K 3 7 10/14/2019    CO2 32 10/14/2019     (H) 10/14/2019    BUN 29 (H) 10/14/2019    CREATININE 0 84 10/14/2019       Lab Results   Component Value Date/Time    POCGLU 225 (H) 10/14/2019 12:56 AM    POCGLU 116 10/13/2019 05:44 PM    POCGLU 199 (H) 10/13/2019 11:59 AM    POCGLU 154 (H) 10/13/2019 05:51 AM    POCGLU 192 (H) 10/13/2019 12:05 AM    POCGLU 164 02/15/2017 02:33 PM       Physical Exam   Constitutional: She is resting comfortably   Currently on bipap, difficult to arouse   Cardiovascular: Normal rate and regular rhythm  Pulmonary/Chest: coarse breath sounds  Abdominal: Soft  Bowel sounds are normal  She exhibits no distension  There is no tenderness  There is no guarding  Vertical incision is c/d/i    SCDs placed bilaterally   Neurological: She is alert and oriented to person, place, and time  Skin: Skin is warm and dry   Right arm and hand are swollen, but are not discolored and are warm to the touch         Micaela Baig MD     10/14/2019 7:34 AM

## 2019-10-15 LAB
ANION GAP SERPL CALCULATED.3IONS-SCNC: 4 MMOL/L (ref 4–13)
APTT PPP: 71 SECONDS (ref 23–37)
APTT PPP: 82 SECONDS (ref 23–37)
BASOPHILS # BLD AUTO: 0.11 THOUSANDS/ΜL (ref 0–0.1)
BASOPHILS NFR BLD AUTO: 1 % (ref 0–1)
BUN SERPL-MCNC: 25 MG/DL (ref 5–25)
CALCIUM SERPL-MCNC: 6.9 MG/DL (ref 8.3–10.1)
CHLORIDE SERPL-SCNC: 107 MMOL/L (ref 100–108)
CO2 SERPL-SCNC: 34 MMOL/L (ref 21–32)
CREAT SERPL-MCNC: 0.75 MG/DL (ref 0.6–1.3)
EOSINOPHIL # BLD AUTO: 0.08 THOUSAND/ΜL (ref 0–0.61)
EOSINOPHIL NFR BLD AUTO: 0 % (ref 0–6)
ERYTHROCYTE [DISTWIDTH] IN BLOOD BY AUTOMATED COUNT: 14.6 % (ref 11.6–15.1)
GFR SERPL CREATININE-BSD FRML MDRD: 74 ML/MIN/1.73SQ M
GLUCOSE SERPL-MCNC: 112 MG/DL (ref 65–140)
GLUCOSE SERPL-MCNC: 137 MG/DL (ref 65–140)
GLUCOSE SERPL-MCNC: 145 MG/DL (ref 65–140)
GLUCOSE SERPL-MCNC: 149 MG/DL (ref 65–140)
GLUCOSE SERPL-MCNC: 149 MG/DL (ref 65–140)
GLUCOSE SERPL-MCNC: 153 MG/DL (ref 65–140)
GLUCOSE SERPL-MCNC: 158 MG/DL (ref 65–140)
GLUCOSE SERPL-MCNC: 165 MG/DL (ref 65–140)
GLUCOSE SERPL-MCNC: 170 MG/DL (ref 65–140)
GLUCOSE SERPL-MCNC: 192 MG/DL (ref 65–140)
GLUCOSE SERPL-MCNC: 196 MG/DL (ref 65–140)
GLUCOSE SERPL-MCNC: 209 MG/DL (ref 65–140)
GLUCOSE SERPL-MCNC: 76 MG/DL (ref 65–140)
GLUCOSE SERPL-MCNC: 82 MG/DL (ref 65–140)
HCT VFR BLD AUTO: 29.5 % (ref 34.8–46.1)
HGB BLD-MCNC: 9.2 G/DL (ref 11.5–15.4)
IMM GRANULOCYTES # BLD AUTO: >0.5 THOUSAND/UL (ref 0–0.2)
IMM GRANULOCYTES NFR BLD AUTO: 3 % (ref 0–2)
LYMPHOCYTES # BLD AUTO: 1.35 THOUSANDS/ΜL (ref 0.6–4.47)
LYMPHOCYTES NFR BLD AUTO: 6 % (ref 14–44)
MCH RBC QN AUTO: 31.7 PG (ref 26.8–34.3)
MCHC RBC AUTO-ENTMCNC: 31.2 G/DL (ref 31.4–37.4)
MCV RBC AUTO: 102 FL (ref 82–98)
MONOCYTES # BLD AUTO: 0.45 THOUSAND/ΜL (ref 0.17–1.22)
MONOCYTES NFR BLD AUTO: 2 % (ref 4–12)
NEUTROPHILS # BLD AUTO: 20.3 THOUSANDS/ΜL (ref 1.85–7.62)
NEUTS SEG NFR BLD AUTO: 88 % (ref 43–75)
NRBC BLD AUTO-RTO: 0 /100 WBCS
PLATELET # BLD AUTO: 425 THOUSANDS/UL (ref 149–390)
PMV BLD AUTO: 10 FL (ref 8.9–12.7)
POTASSIUM SERPL-SCNC: 2.7 MMOL/L (ref 3.5–5.3)
PROCALCITONIN SERPL-MCNC: 1.42 NG/ML
RBC # BLD AUTO: 2.9 MILLION/UL (ref 3.81–5.12)
SODIUM SERPL-SCNC: 145 MMOL/L (ref 136–145)
WBC # BLD AUTO: 22.91 THOUSAND/UL (ref 4.31–10.16)

## 2019-10-15 PROCEDURE — 94640 AIRWAY INHALATION TREATMENT: CPT

## 2019-10-15 PROCEDURE — 85730 THROMBOPLASTIN TIME PARTIAL: CPT | Performed by: INTERNAL MEDICINE

## 2019-10-15 PROCEDURE — 82948 REAGENT STRIP/BLOOD GLUCOSE: CPT

## 2019-10-15 PROCEDURE — 80048 BASIC METABOLIC PNL TOTAL CA: CPT | Performed by: NURSE PRACTITIONER

## 2019-10-15 PROCEDURE — 99233 SBSQ HOSP IP/OBS HIGH 50: CPT | Performed by: INTERNAL MEDICINE

## 2019-10-15 PROCEDURE — 99024 POSTOP FOLLOW-UP VISIT: CPT | Performed by: OBSTETRICS & GYNECOLOGY

## 2019-10-15 PROCEDURE — G8988 SELF CARE GOAL STATUS: HCPCS

## 2019-10-15 PROCEDURE — G8987 SELF CARE CURRENT STATUS: HCPCS

## 2019-10-15 PROCEDURE — 99232 SBSQ HOSP IP/OBS MODERATE 35: CPT | Performed by: INTERNAL MEDICINE

## 2019-10-15 PROCEDURE — 84145 PROCALCITONIN (PCT): CPT | Performed by: NURSE PRACTITIONER

## 2019-10-15 PROCEDURE — 97167 OT EVAL HIGH COMPLEX 60 MIN: CPT

## 2019-10-15 PROCEDURE — 85025 COMPLETE CBC W/AUTO DIFF WBC: CPT | Performed by: NURSE PRACTITIONER

## 2019-10-15 PROCEDURE — 99291 CRITICAL CARE FIRST HOUR: CPT | Performed by: INTERNAL MEDICINE

## 2019-10-15 PROCEDURE — C9113 INJ PANTOPRAZOLE SODIUM, VIA: HCPCS | Performed by: NURSE PRACTITIONER

## 2019-10-15 PROCEDURE — 94660 CPAP INITIATION&MGMT: CPT

## 2019-10-15 PROCEDURE — 94669 MECHANICAL CHEST WALL OSCILL: CPT

## 2019-10-15 RX ORDER — POTASSIUM CHLORIDE 14.9 MG/ML
20 INJECTION INTRAVENOUS ONCE
Status: COMPLETED | OUTPATIENT
Start: 2019-10-15 | End: 2019-10-15

## 2019-10-15 RX ORDER — POLYETHYLENE GLYCOL 3350 17 G/17G
17 POWDER, FOR SOLUTION ORAL ONCE
Status: COMPLETED | OUTPATIENT
Start: 2019-10-15 | End: 2019-10-15

## 2019-10-15 RX ORDER — FUROSEMIDE 10 MG/ML
40 INJECTION INTRAMUSCULAR; INTRAVENOUS ONCE
Status: COMPLETED | OUTPATIENT
Start: 2019-10-15 | End: 2019-10-15

## 2019-10-15 RX ORDER — POTASSIUM CHLORIDE 20MEQ/15ML
40 LIQUID (ML) ORAL 2 TIMES DAILY
Status: COMPLETED | OUTPATIENT
Start: 2019-10-15 | End: 2019-10-15

## 2019-10-15 RX ORDER — SODIUM CHLORIDE FOR INHALATION 0.9 %
3 VIAL, NEBULIZER (ML) INHALATION
Status: DISCONTINUED | OUTPATIENT
Start: 2019-10-15 | End: 2019-10-18 | Stop reason: HOSPADM

## 2019-10-15 RX ORDER — ACETAMINOPHEN 160 MG/5ML
650 SUSPENSION, ORAL (FINAL DOSE FORM) ORAL ONCE
Status: COMPLETED | OUTPATIENT
Start: 2019-10-15 | End: 2019-10-15

## 2019-10-15 RX ADMIN — POLYETHYLENE GLYCOL 3350 17 G: 17 POWDER, FOR SOLUTION ORAL at 17:48

## 2019-10-15 RX ADMIN — TRAMADOL HYDROCHLORIDE 50 MG: 50 TABLET, FILM COATED ORAL at 05:21

## 2019-10-15 RX ADMIN — POTASSIUM CHLORIDE 20 MEQ: 14.9 INJECTION, SOLUTION INTRAVENOUS at 07:43

## 2019-10-15 RX ADMIN — TRAVOPROST 1 DROP: 0.04 SOLUTION/ DROPS OPHTHALMIC at 22:27

## 2019-10-15 RX ADMIN — PANTOPRAZOLE SODIUM 40 MG: 40 INJECTION, POWDER, FOR SOLUTION INTRAVENOUS at 08:18

## 2019-10-15 RX ADMIN — POTASSIUM CHLORIDE 40 MEQ: 20 SOLUTION ORAL at 17:38

## 2019-10-15 RX ADMIN — APIXABAN 5 MG: 5 TABLET, FILM COATED ORAL at 17:41

## 2019-10-15 RX ADMIN — ISODIUM CHLORIDE 3 ML: 0.03 SOLUTION RESPIRATORY (INHALATION) at 19:14

## 2019-10-15 RX ADMIN — LEVALBUTEROL HYDROCHLORIDE 1.25 MG: 1.25 SOLUTION, CONCENTRATE RESPIRATORY (INHALATION) at 19:13

## 2019-10-15 RX ADMIN — PIPERACILLIN SODIUM AND TAZOBACTAM SODIUM 3.38 G: 36; 4.5 INJECTION, POWDER, FOR SOLUTION INTRAVENOUS at 00:04

## 2019-10-15 RX ADMIN — DORZOLAMIDE HYDROCHLORIDE AND TIMOLOL MALEATE 1 DROP: 20; 5 SOLUTION/ DROPS OPHTHALMIC at 17:39

## 2019-10-15 RX ADMIN — PIPERACILLIN SODIUM AND TAZOBACTAM SODIUM 3.38 G: 36; 4.5 INJECTION, POWDER, FOR SOLUTION INTRAVENOUS at 05:21

## 2019-10-15 RX ADMIN — FUROSEMIDE 20 MG: 10 INJECTION, SOLUTION INTRAMUSCULAR; INTRAVENOUS at 08:18

## 2019-10-15 RX ADMIN — IPRATROPIUM BROMIDE 0.5 MG: 0.5 SOLUTION RESPIRATORY (INHALATION) at 07:27

## 2019-10-15 RX ADMIN — APIXABAN 5 MG: 5 TABLET, FILM COATED ORAL at 09:12

## 2019-10-15 RX ADMIN — METOPROLOL TARTRATE 5 MG: 5 INJECTION, SOLUTION INTRAVENOUS at 05:21

## 2019-10-15 RX ADMIN — ISODIUM CHLORIDE 3 ML: 0.03 SOLUTION RESPIRATORY (INHALATION) at 13:39

## 2019-10-15 RX ADMIN — METOPROLOL TARTRATE 25 MG: 25 TABLET, FILM COATED ORAL at 22:21

## 2019-10-15 RX ADMIN — ACETAMINOPHEN 650 MG: 160 SUSPENSION ORAL at 17:55

## 2019-10-15 RX ADMIN — DORZOLAMIDE HYDROCHLORIDE AND TIMOLOL MALEATE 1 DROP: 20; 5 SOLUTION/ DROPS OPHTHALMIC at 08:19

## 2019-10-15 RX ADMIN — POTASSIUM CHLORIDE 40 MEQ: 20 SOLUTION ORAL at 08:18

## 2019-10-15 RX ADMIN — FUROSEMIDE 40 MG: 10 INJECTION, SOLUTION INTRAMUSCULAR; INTRAVENOUS at 15:35

## 2019-10-15 RX ADMIN — METOPROLOL TARTRATE 5 MG: 5 INJECTION, SOLUTION INTRAVENOUS at 00:04

## 2019-10-15 RX ADMIN — LEVALBUTEROL HYDROCHLORIDE 1.25 MG: 1.25 SOLUTION, CONCENTRATE RESPIRATORY (INHALATION) at 07:27

## 2019-10-15 RX ADMIN — POLYETHYLENE GLYCOL 3350 17 G: 17 POWDER, FOR SOLUTION ORAL at 08:18

## 2019-10-15 RX ADMIN — SODIUM CHLORIDE 4 UNITS/HR: 9 INJECTION, SOLUTION INTRAVENOUS at 10:31

## 2019-10-15 RX ADMIN — POLYETHYLENE GLYCOL 3350 17 G: 17 POWDER, FOR SOLUTION ORAL at 17:41

## 2019-10-15 RX ADMIN — METOPROLOL TARTRATE 25 MG: 25 TABLET, FILM COATED ORAL at 12:43

## 2019-10-15 RX ADMIN — LEVALBUTEROL HYDROCHLORIDE 1.25 MG: 1.25 SOLUTION, CONCENTRATE RESPIRATORY (INHALATION) at 13:38

## 2019-10-15 RX ADMIN — TRAMADOL HYDROCHLORIDE 50 MG: 50 TABLET, FILM COATED ORAL at 17:41

## 2019-10-15 NOTE — SOCIAL WORK
Discussed [facility]-LTAC with the patients daughter today as attempts to take her off of the HFNC have not yet been successful  Provided information regarding GS-LTAC to her and answered all questions  She is agreeable that this is likely the best level of care for her mother at this time  Referral sent and the patient was accepted  Plan for potential discharge to GS-LTAC later in the week

## 2019-10-15 NOTE — PROGRESS NOTES
Progress Note - Infectious Disease   Lenoard Rosemarie 82 y o  female MRN: [id number]  Unit/Bed#: ICU 02 Encounter: [id number]      Impression/Plan:  1  Sepsis, evolving since admission   Tachycardia, tachypnea, leukocytosis   Likely secondary to aspiration pneumonia  Also consider a possible leukemoid reaction in the setting of hypoxia and possible developing limb ischemia  Today her WBC count continues trending down  Morning procalcitonin is pending  Fortunately she has been afebrile and hemodynamically stable    -antibiotic as below  -check CBC and BMP tomorrow  -trend procalcitonin tomorrow am  -monitor vitals  -supportive care     2  Aspiration pneumonia   In the setting of vomiting due to recent postoperative ileus   Sputum culture revealed Serratia and Candida albicans   Candida albicans is likely a colonizer and not the cause of pneumonia  Patient was transitioned back to high-flow O2 and her O2 saturation remains stable at this time  She continues to receive IV Zosyn and appears to be tolerating without difficulty  -continue IV Zosyn  -check CBC and BMP tomorrow  -trend procalcitonin tomorrow am  -monitor vitals  -monitor respiratory status  -wean high-flow O2 support per critical care team     3  Acute hypoxic respiratory failure   Likely multifactorial secondary to aspiration pneumonia, ARDS, volume overload   Patient continues to require supplemental oxygen and is now stable on high-flow O2   She has tolerated diuresis  -antibiotic plan as above  -monitor vitals  -monitor respiratory symptoms  -wean high-flow O2 support per critical care team     4  Right upper extremity edema/pallor   With bluish discoloration of digits   Concern for poor perfusion   CT neck did reveal thrombus at right common jugular vein, which may be contributing   Patient is currently on anticoagulation for management of atrial fibrillation  Her arm is less swollen today and she has light Ace compression on    Fingers appear less dusky on exam   -anticoagulation per primary service  -close serial arm exams     5  Ovarian adenocarcinoma status post total hysterectomy, bilateral salpingectomy, pelvic and periaortic lymphadenectomy, omentectomy on 10/04/2019     -continue ongoing follow up with OBGYN     6  Diabetes mellitus type 1    -blood glucose management per critical care medicine team     7  Status post PA arrest      8  Atrial fibrillation, with RVR   Ongoing management per Cardiology  Above plan was discussed in detail with patient at the bedside     Patient requested I contact her daughter, Edyta Andre, to given update  I left her a voicemail and will attempt to connect with her at the bedside later today  Antibiotics:  Zosyn 4  Antibiotics 9    Subjective:  Patient much more awake today  She tells me that it is hard to talk but she is very interactive  She tells me that she has mild pain in her right arm but it is better than it was yesterday  She tells me she is still slightly short of breath but the high-flow nasal cannula is easier to tolerate the face mask was  She does not know if she has had any fevers but she denies chills, sweats, and shakes  No nausea, vomiting, or abdominal pain other than some pain immediately around her incision site which she feels when she moves around  She reports she still coughs at times but has not gotten out any phlegm, states she feels better after she gets suctioning, reports her chest becomes painful from coughing  Objective:  Vitals:  Temp:  [98 1 °F (36 7 °C)-100 2 °F (37 9 °C)] 99 8 °F (37 7 °C)  HR:  [] 80  Resp:  [19-36] 21  BP: (102-155)/(48-61) 142/61  SpO2:  [89 %-97 %] 94 %  Temp (24hrs), Av 2 °F (37 3 °C), Min:98 1 °F (36 7 °C), Max:100 2 °F (37 9 °C)  Current: Temperature: 99 8 °F (37 7 °C)    Physical Exam:   General Appearance:  Alert, interactive, in no acute distress  Throat: Oropharynx dry without lesions      Lungs:   Coarse throughout to auscultation bilaterally but improved since yesterday; no wheezing; respirations unlabored but tachypneic on high-flow O2   Heart:  RRR; no murmur, rub or gallop   Abdomen:   Soft, mildly in tender around the incision site, non-distended, positive bowel sounds  Extremities: No clubbing or cyanosis; right distal arm and hand now wrapped with light Ace compression, appears less swollen, fingers less dusky; trace lower extremity edema   Skin: No new rashes, lesions, or draining wounds noted on exposed skin    Abdominal incision is clean and dry, no leakage or dehiscence noted, no spreading erythema from the site     Labs, Imaging, & Other studies:   All pertinent labs and imaging studies were personally reviewed  Results from last 7 days   Lab Units 10/15/19  0536 10/14/19  0549 10/13/19  1106   WBC Thousand/uL 22 91* 24 34* 25 71*   HEMOGLOBIN g/dL 9 2* 9 5* 10 2*   PLATELETS Thousands/uL 425* 404* 337     Results from last 7 days   Lab Units 10/15/19  0536   POTASSIUM mmol/L 2 7*   CHLORIDE mmol/L 107   CO2 mmol/L 34*   BUN mg/dL 25   CREATININE mg/dL 0 75   EGFR ml/min/1 73sq m 74   CALCIUM mg/dL 6 9*     Results from last 7 days   Lab Units 10/09/19  1107   SPUTUM CULTURE  2+ Growth of Serratia marcescens*  3+ Growth of Candida albicans*  2+ Growth of    GRAM STAIN RESULT  Rare Polys*  2+ Budding yeast*  1+ Gram positive cocci in clusters*

## 2019-10-15 NOTE — PROGRESS NOTES
Progress Note - Cardiology   Margaret Chirinos 82 y o  female MRN: [id number]  Unit/Bed#: ICU 02 Encounter: [id number]      Assessment:     1  Acute hypoxic respiratory failure secondary to aspiration pneumonia/pneumonitis and possibly some component of pulmonary edema  2  Sepsis  3  Paroxysmal atrial fibrillation with rapid ventricular response  4  Diabetes  5  Suspected nonocclusive DVT of the right upper extremity  6  Apparent adenocarcinoma status post total hysterectomy, bilateral salpingectomy and periaortic lymphadenopathy comment ectomy on October 4th 2019  7  Status post PEA arrest    Discussion/Recommendations:    Continue IV beta-blocker  Appears to be tolerating heparin    Will give an extra dose of Lasix today  Subjective:  Nose trouble breathing and chest discomfort in the center chest from coughing      Physical Exam:  GEN:  NAD  HEENT:  MMM, NCAT, pink conjunctiva, EOMI, nonicteric sclera  CV:  NO JVD/HJR, RR, NO M/R/G, +S1/S2, NO PARASTERNAL HEAVE/THRILL, NO LE EDEMA, NO HEPATIC SYSTOLIC PULSATION, WARM EXTREMITIES  RESP:  CTAB/L  ABD:  SOFT, NT, NO GROSS ORGANOMEGALY        Vitals:   /61   Pulse 80   Temp 99 8 °F (37 7 °C) (Tympanic)   Resp 21   Ht 5' 5" (1 651 m)   Wt 68 4 kg (150 lb 12 7 oz)   SpO2 94%   Breastfeeding?  No   BMI 25 09 kg/m²   Vitals:    10/04/19 0621 10/10/19 0130   Weight: 58 5 kg (129 lb) 68 4 kg (150 lb 12 7 oz)       Intake/Output Summary (Last 24 hours) at 10/15/2019 0731  Last data filed at 10/15/2019 7601  Gross per 24 hour   Intake 2884 48 ml   Output 1026 ml   Net 1858 48 ml         TELEMETRY:  No events  Lab Results:  Results from last 7 days   Lab Units 10/15/19  0536   WBC Thousand/uL 22 91*   HEMOGLOBIN g/dL 9 2*   HEMATOCRIT % 29 5*   PLATELETS Thousands/uL 425*     Results from last 7 days   Lab Units 10/15/19  0536   POTASSIUM mmol/L 2 7*   CHLORIDE mmol/L 107   CO2 mmol/L 34*   BUN mg/dL 25   CREATININE mg/dL 0 75   CALCIUM mg/dL 6 9*     Results from last 7 days   Lab Units 10/15/19  0536   POTASSIUM mmol/L 2 7*   CHLORIDE mmol/L 107   CO2 mmol/L 34*   BUN mg/dL 25   CREATININE mg/dL 0 75   CALCIUM mg/dL 6 9*             Medications:    Current Facility-Administered Medications:     artificial tear (LUBRIFRESH P M ) ophthalmic ointment, , Both Eyes, HS PRN, JIM Abreu    dextrose 5 % infusion, 100 mL/hr, Intravenous, Continuous, JIM Wallace, Last Rate: 100 mL/hr at 10/14/19 2210, 100 mL/hr at 10/14/19 2210    diltiazem (CARDIZEM) injection 10 mg, 10 mg, Intravenous, Q2H PRN, Ismael Pendleton PA-C, 10 mg at 10/10/19 2054    dorzolamide-timolol (COSOPT) 22 3-6 8 MG/ML ophthalmic solution 1 drop, 1 drop, Both Eyes, BID, JIM Wallace, 1 drop at 10/14/19 1804    furosemide (LASIX) injection 20 mg, 20 mg, Intravenous, Daily, JIM Andrew, 20 mg at 10/14/19 0818    heparin (porcine) 25,000 units in 250 mL infusion (premix), 3-20 Units/kg/hr (Order-Specific), Intravenous, Titrated, JIM Kim, Last Rate: 9 1 mL/hr at 10/14/19 1730, 14 Units/kg/hr at 10/14/19 1730    heparin (porcine) injection 1,950 Units, 1,950 Units, Intravenous, PRN, Devaughn Gottron, CRNP, 1,950 Units at 10/14/19 1730    heparin (porcine) injection 3,900 Units, 3,900 Units, Intravenous, PRN, JIM Arellano    influenza vaccine, high-dose (FLUZONE HIGH-DOSE) IM injection ERIKA 0 5 mL, 0 5 mL, Intramuscular, Prior to discharge, Lazara Clemente DO    insulin regular (HumuLIN R,NovoLIN R) 1 Units/mL in sodium chloride 0 9 % 100 mL infusion, 0 3-21 Units/hr, Intravenous, Titrated, JIM Wallace, Last Rate: 4 mL/hr at 10/15/19 0000, 4 Units/hr at 10/15/19 0000    ipratropium (ATROVENT) 0 02 % inhalation solution 0 5 mg, 0 5 mg, Nebulization, TID, JIM Andrew, 0 5 mg at 10/15/19 0727    lactated ringers bolus 1,000 mL, 1,000 mL, Intravenous, Once, JIM Abreu    levalbuterol [facility]) inhalation solution 1 25 mg, 1 25 mg, Nebulization, TID, Mary Leon, DO, 1 25 mg at 10/15/19 0727    melatonin tablet 6 mg, 6 mg, Oral, HS, JIM Boudreaux, 6 mg at 10/14/19 2149    metoprolol (LOPRESSOR) injection 5 mg, 5 mg, Intravenous, Q6H, Chuy Pendlteon PA-C, 5 mg at 10/15/19 0521    Netarsudil Dimesylate 0 02 % SOLN 1 drop, 1 drop, Right Eye, HS, REANNA WallaceNP, 1 drop at 10/14/19 2150    ondansetron (ZOFRAN) injection 4 mg, 4 mg, Intravenous, Q6H PRN, JIM Noriega, 4 mg at 10/07/19 0541    pantoprazole (PROTONIX) injection 40 mg, 40 mg, Intravenous, Q24H [address], JIM Miles, 40 mg at 10/14/19 0818    piperacillin-tazobactam (ZOSYN) 3 375 g in sodium chloride 0 9 % 50 mL IVPB, 3 375 g, Intravenous, Q6H, JIM Mc, Last Rate: 100 mL/hr at 10/15/19 0521, 3 375 g at 10/15/19 0521    polyethylene glycol (MIRALAX) packet 17 g, 17 g, Oral, Daily PRN, JIM Noriega    potassium chloride 10 % oral solution 40 mEq, 40 mEq, Oral, BID, Berkley K Bilofleann, CRNP    potassium chloride 20 mEq IVPB (premix), 20 mEq, Intravenous, Once, Berkley K Bilofsky, CRNP    traMADol (ULTRAM) tablet 50 mg, 50 mg, Oral, Q6H PRN, JIM Miles, 50 mg at 10/15/19 0521    travoprost (TRAVATAN-Z) 0 004 % ophthalmic solution 1 drop, 1 drop, Both Eyes, HS, JIM Wallace, 1 drop at 10/14/19 2150    Portions of the record may have been created with voice recognition software  Occasional wrong word or "sound a like" substitutions may have occurred due to the inherent limitations of voice recognition software  Read the chart carefully and recognize, using context, where substitutions have occurred

## 2019-10-15 NOTE — PROGRESS NOTES
Progress Note - Critical Care   Ruby Mott 82 y o  female MRN: [id number]  Unit/Bed#: ICU 02 Encounter: [id number]    Assessment/Plan:  1  Acute hypoxic respiratory failure secondary to aspiration pneumonia/pneumonitis and pulmonary edema  · We will continue to wean her FiO2 as able with a goal to maintain her saturations greater than 90%  · Would encourage out of bed, cough, deep breathing and incentive spirometer use  · Daily PT/OT  2  Sepsis secondary to 1 -improved  · Currently on Zosyn, day 4  · Duration of antibiotics per Infectious Disease recommendations  3  Paroxysmal atrial fibrillation with rapid ventricular response-currently in sinus rhythm  · We will continue her on Lopressor  · Will continue her on the heparin infusion for now  4  Diabetes mellitus with persistent hyperglycemia  · We will continue her on an insulin infusion for now  5  Suspected non occlusive DVT of the right upper extremity  · Appreciate vascular surgery's assistance  · Will continue her on the heparin infusion for now  · Continue to elevate the arm on pillows and the use compression Ace to reduce swelling  6  Hypernatremia  · We will continue with D5W and increasing her free water flushes for now  7  Generalized deconditioning secondary to prolonged hospitalization  8  Status post exploratory laparotomy with TLH/BS though for ovarian cancer  · Management per gyn oncology is recommendations    Critical Care Time:   Documented critical care time excludes any procedures documented elsewhere  It also excludes any family updates    _____________________________________________________________________    HPI/24hr events:   No events overnight    She feels better this morning however she does have persistent incisional pain    Medications:    Current Facility-Administered Medications:  artificial tear  Both Eyes HS PRN Jono Sour, CRNP    dextrose 100 mL/hr Intravenous Continuous Jono Sour, CRNP Last Rate: 100 mL/hr (10/14/19 2210)   diltiazem 10 mg Intravenous Q2H PRN Joel Pendleton PA-C    dorzolamide-timolol 1 drop Both Eyes BID JIM Alicia    furosemide 20 mg Intravenous Daily JIM Adam    heparin (porcine) 3-20 Units/kg/hr (Order-Specific) Intravenous Titrated JIM Wilhelm Last Rate: 14 Units/kg/hr (10/14/19 1730)   heparin (porcine) 1,950 Units Intravenous PRN Berkley K JIM Haywood    heparin (porcine) 3,900 Units Intravenous PRN Camilla Sanchez JIM Haywood    influenza vaccine 0 5 mL Intramuscular Prior to discharge Lazara Clemente DO    insulin regular (HumuLIN R,NovoLIN R) infusion 0 3-21 Units/hr Intravenous Titrated JIM Alicia Last Rate: 4 Units/hr (10/15/19 0000)   ipratropium 0 5 mg Nebulization TID JIM Adam    lactated ringers 1,000 mL Intravenous Once JIM Alicia    levalbuterol 1 25 mg Nebulization TID [place] Gunderson DO    melatonin 6 mg Oral HS JIM Felipe    metoprolol 5 mg Intravenous Q6H Chuy Pendleton PA-C    Netarsudil Dimesylate 1 drop Right Eye HS JIM Wallace    ondansetron 4 mg Intravenous Q6H PRN JIM Alicia    pantoprazole 40 mg Intravenous Q24H [facility] JIM Adam    piperacillin-tazobactam 3 375 g Intravenous Q6H JIM Sommer Last Rate: 3 375 g (10/15/19 0521)   polyethylene glycol 17 g Oral Daily PRN JIM Alicia    traMADol 50 mg Oral Q6H PRN JIM Adam    travoprost 1 drop Both Eyes HS JIM Wallace          dextrose 100 mL/hr Last Rate: 100 mL/hr (10/14/19 2210)   heparin (porcine) 3-20 Units/kg/hr (Order-Specific) Last Rate: 14 Units/kg/hr (10/14/19 1730)   insulin regular (HumuLIN R,NovoLIN R) infusion 0 3-21 Units/hr Last Rate: 4 Units/hr (10/15/19 0000)         Physical exam:  Vitals: Body mass index is 25 09 kg/m²  Blood pressure 142/61, pulse 80, temperature 99 °F (37 2 °C), temperature source Temporal, resp   rate 21, height 5' 5" (1 651 m), weight 68 4 kg (150 lb 12 7 oz), SpO2 93 %, not currently breastfeeding ,  Temp  Min: 96 8 °F (36 °C)  Max: 100 2 °F (37 9 °C)  IBW: 57 kg    SpO2: 93 %  SpO2 Activity: At Rest  O2 Device: High flow nasal cannula      Intake/Output Summary (Last 24 hours) at 10/15/2019 0658  Last data filed at 10/15/2019 8604  Gross per 24 hour   Intake 2884 48 ml   Output 1026 ml   Net 1858 48 ml       Invasive/non-invasive ventilation settings:   Respiratory    Lab Data (Last 4 hours)    None         O2/Vent Data (Last 4 hours)      10/15 0335          Non-Invasive Ventilation Mode HFNC                 Invasive Devices     Peripherally Inserted Central Catheter Line            PICC Line 98/32/29 Left Basilic less than 1 day          Peripheral Intravenous Line            Peripheral IV 10/10/19 Left Antecubital 4 days    Peripheral IV 10/10/19 Left Wrist 4 days          Drain            Rectal Tube With balloon 6 days    External Urinary Catheter Other (Comment) 3 days    NG/OG/Enteral Tube Nasogastric 14 Fr Right nares 1 day                  Physical Exam:  Gen:  Awake and alert  HEENT:  Pupils are equal round reactive to light  The oropharynx is dry but otherwise clear  Neck:  Supple negative for lymphadenopathy  Chest:  Essentially clear to auscultation bilaterally  Cor:  Regular rate and rhythm  Abd:  Distended but soft there is incisional tenderness  Ext:  There is no significant edema clubbing or cyanosis  The swelling in the right upper extremity is improved with minimal discoloration of the middle finger  Neuro:  Awake and alert, able to move her extremities but weak  Skin:  Warm and dry      Diagnostic Data:  Lab: I have personally reviewed pertinent lab results     CBC:   Results from last 7 days   Lab Units 10/15/19  0536 10/14/19  0549 10/13/19  1106   WBC Thousand/uL 22 91* 24 34* 25 71*   HEMOGLOBIN g/dL 9 2* 9 5* 10 2*   HEMATOCRIT % 29 5* 29 5* 31 9*   PLATELETS Thousands/uL 425* 404* 337       CMP:   Results from last 7 days   Lab Units 10/14/19  0549 10/13/19  1106 10/12/19  0602   SODIUM mmol/L 148* 148* 144   POTASSIUM mmol/L 3 7 3 3* 3 4*   CHLORIDE mmol/L 109* 107 106   CO2 mmol/L 32 34* 28   BUN mg/dL 29* 21 25   CREATININE mg/dL 0 84 0 68 0 64   CALCIUM mg/dL 7 2* 7 6* 7 2*     PT/INR:   No results found for: PT, INR,   Magnesium:   Results from last 7 days   Lab Units 10/12/19  0602   MAGNESIUM mg/dL 2 3     Phosphorous:       Microbiology:  Results from last 7 days   Lab Units 10/09/19  1107   SPUTUM CULTURE  2+ Growth of Serratia marcescens*  3+ Growth of Candida albicans*  2+ Growth of    GRAM STAIN RESULT  Rare Polys*  2+ Budding yeast*  1+ Gram positive cocci in clusters*       Imaging:      Cardiac lab/EKG/telemetry/ECHO:       VTE Prophylaxis:  SCDs    Code Status: Level 1 - Full Code    Dominic Ground, CRNP    Portions of the record may have been created with voice recognition software  Occasional wrong word or "sound a like" substitutions may have occurred due to the inherent limitations of voice recognition software  Read the chart carefully and recognize, using context, where substitutions have occurred

## 2019-10-15 NOTE — PLAN OF CARE
Problem: OCCUPATIONAL THERAPY ADULT  Goal: Performs self-care activities at highest level of function for planned discharge setting  See evaluation for individualized goals  Description  Treatment Interventions: ADL retraining, Functional transfer training, UE strengthening/ROM, Endurance training, Cognitive reorientation, Patient/family training, Equipment evaluation/education, Compensatory technique education          See flowsheet documentation for full assessment, interventions and recommendations  Note:   Limitation: Decreased ADL status, Decreased UE strength, Decreased Safe judgement during ADL, Decreased cognition, Decreased endurance, Decreased high-level ADLs  Prognosis: Fair  Assessment: Pt is a 85y/o female admitted to the hospital for an elected s/p lap QASIM, BSO/omentectomy, periaortic lymph dissection, bx(10/4) 2* complex adnexal mass/ovarian Ca  During her hospitalization, pt developed acute hypoxic respiratory failure 2* aspiration PNA; pt noted with sepsis, A-fib with RVR  PTA pt states independence with all aspects of her ADLs, transfers, ambulation--w/o device; +, neg falls  During initial eval, pt demonstrated deficits with her functional balance, functional mobility, ADL status, activity tolerance(currently fair=15-20mins), transfer safety, and cognition(i e memory, orientation)  Pt would benefit from continued OT tx for the above deficits  3-5xwk/1-2wks        OT Discharge Recommendation: Short Term Rehab

## 2019-10-15 NOTE — PROGRESS NOTES
Progress Note - Gynecologic Oncology   Ruby Mott 82 y o  female MRN: [id number]  Unit/Bed#: ICU 02 Encounter: [id number]    Assessment / Plan:    1  Acute hypoxic respiratory failure secondary to aspiration pneumonia-still requires BiPAP  adequate oxygenation  Ongoing intensive care unit monitoring and care  Antibiotics per infectious disease specialists  A m  Procalcitonin pending  CT scan with evidence of plugging  Consider postobstructive atelectasis and pneumonia  Discussed with ICU consideration for bronchoscopy  2  Stage IB ovarian cancer-good prognosis  Results discussed with patient's daughter on Friday  3  Severe protein calorie malnutrition as evidence by no oral intake over more than 7 days  She has been started on tube feeds yesterday and is currently at goal with low residual   Await return of bowel function  4  Atrial fibrillation with rapid ventricular response-currently rate controlled  [address] cardiology consultation  She is therapeutically anticoagulated with heparin  5  Right upper extremity edema / phlegmasia- therapeutically anticoagulated  No acute arterial insufficiency on CT angiogram   Appreciate vascular surgery recommendations  Subjective/Objective       Subjective:  Somewhat dyspneic, no acute distress  Denies pain  Tolerating tube feeds  Objective:     Blood pressure 142/61, pulse 80, temperature 99 8 °F (37 7 °C), temperature source Tympanic, resp  rate 21, height 5' 5" (1 651 m), weight 68 4 kg (150 lb 12 7 oz), SpO2 93 %, not currently breastfeeding  ,Body mass index is 25 09 kg/m²        Intake/Output Summary (Last 24 hours) at 10/15/2019 0722  Last data filed at 10/15/2019 0111  Gross per 24 hour   Intake 2884 48 ml   Output 1026 ml   Net 1858 48 ml       Invasive Devices     Peripherally Inserted Central Catheter Line            PICC Line 14/35/16 Left Basilic less than 1 day          Peripheral Intravenous Line            Peripheral IV 10/10/19 Left Antecubital 4 days    Peripheral IV 10/10/19 Left Wrist 4 days          Drain            Rectal Tube With balloon 6 days    External Urinary Catheter Other (Comment) 3 days    NG/OG/Enteral Tube Nasogastric 14 Fr Right nares 1 day                Physical Exam: /61   Pulse 80   Temp 99 8 °F (37 7 °C) (Tympanic)   Resp 21   Ht 5' 5" (1 651 m)   Wt 68 4 kg (150 lb 12 7 oz)   SpO2 93%   Breastfeeding? No   BMI 25 09 kg/m²     General Appearance:  Critically ill, no acute distress  Back:     Symmetric, no curvature, ROM normal, no CVA tenderness, no ulcers   Lungs:     Diffuse rales and crackles    Heart:    Tachycardic but rate controlled  Abdomen:     Minimal distension, bowel sounds active all four quadrants,     no masses, no organomegaly  Incision clean, dry and intact  Extremities:   Right upper extremity with edema which is improving, minimal mottling at fingertips also seems improved per nursing staff         Recent Results (from the past 24 hour(s))   APTT    Collection Time: 10/14/19  9:24 AM   Result Value Ref Range    PTT 86 (H) 23 - 37 seconds   Fingerstick Glucose (POCT)    Collection Time: 10/14/19 10:44 AM   Result Value Ref Range    POC Glucose 329 (H) 65 - 140 mg/dl   Fingerstick Glucose (POCT)    Collection Time: 10/14/19 12:33 PM   Result Value Ref Range    POC Glucose 317 (H) 65 - 140 mg/dl   Fingerstick Glucose (POCT)    Collection Time: 10/14/19  2:04 PM   Result Value Ref Range    POC Glucose 294 (H) 65 - 140 mg/dl   APTT    Collection Time: 10/14/19  3:51 PM   Result Value Ref Range    PTT 51 (H) 23 - 37 seconds   Fingerstick Glucose (POCT)    Collection Time: 10/14/19  4:14 PM   Result Value Ref Range    POC Glucose 262 (H) 65 - 140 mg/dl   Fingerstick Glucose (POCT)    Collection Time: 10/14/19  6:08 PM   Result Value Ref Range    POC Glucose 215 (H) 65 - 140 mg/dl   Fingerstick Glucose (POCT)    Collection Time: 10/14/19  8:45 PM   Result Value Ref Range POC Glucose 128 65 - 140 mg/dl   Fingerstick Glucose (POCT)    Collection Time: 10/14/19  9:48 PM   Result Value Ref Range    POC Glucose 123 65 - 140 mg/dl   Fingerstick Glucose (POCT)    Collection Time: 10/14/19 11:57 PM   Result Value Ref Range    POC Glucose 153 (H) 65 - 140 mg/dl   APTT    Collection Time: 10/15/19 12:09 AM   Result Value Ref Range    PTT 82 (H) 23 - 37 seconds   Fingerstick Glucose (POCT)    Collection Time: 10/15/19  1:55 AM   Result Value Ref Range    POC Glucose 170 (H) 65 - 140 mg/dl   CBC and differential    Collection Time: 10/15/19  5:36 AM   Result Value Ref Range    WBC 22 91 (H) 4 31 - 10 16 Thousand/uL    RBC 2 90 (L) 3 81 - 5 12 Million/uL    Hemoglobin 9 2 (L) 11 5 - 15 4 g/dL    Hematocrit 29 5 (L) 34 8 - 46 1 %     (H) 82 - 98 fL    MCH 31 7 26 8 - 34 3 pg    MCHC 31 2 (L) 31 4 - 37 4 g/dL    RDW 14 6 11 6 - 15 1 %    MPV 10 0 8 9 - 12 7 fL    Platelets 846 (H) 809 - 390 Thousands/uL    nRBC 0 /100 WBCs    Neutrophils Relative 88 (H) 43 - 75 %    Immat GRANS % 3 (H) 0 - 2 %    Lymphocytes Relative 6 (L) 14 - 44 %    Monocytes Relative 2 (L) 4 - 12 %    Eosinophils Relative 0 0 - 6 %    Basophils Relative 1 0 - 1 %    Neutrophils Absolute 20 30 (H) 1 85 - 7 62 Thousands/µL    Immature Grans Absolute >0 50 (H) 0 00 - 0 20 Thousand/uL    Lymphocytes Absolute 1 35 0 60 - 4 47 Thousands/µL    Monocytes Absolute 0 45 0 17 - 1 22 Thousand/µL    Eosinophils Absolute 0 08 0 00 - 0 61 Thousand/µL    Basophils Absolute 0 11 (H) 0 00 - 0 10 Thousands/µL   Basic metabolic panel    Collection Time: 10/15/19  5:36 AM   Result Value Ref Range    Sodium 145 136 - 145 mmol/L    Potassium 2 7 (LL) 3 5 - 5 3 mmol/L    Chloride 107 100 - 108 mmol/L    CO2 34 (H) 21 - 32 mmol/L    ANION GAP 4 4 - 13 mmol/L    BUN 25 5 - 25 mg/dL    Creatinine 0 75 0 60 - 1 30 mg/dL    Glucose 149 (H) 65 - 140 mg/dL    Calcium 6 9 (L) 8 3 - 10 1 mg/dL    eGFR 74 ml/min/1 73sq m   APTT    Collection Time: 10/15/19  5:36 AM   Result Value Ref Range    PTT 71 (H) 23 - 37 seconds   Fingerstick Glucose (POCT)    Collection Time: 10/15/19  6:28 AM   Result Value Ref Range    POC Glucose 158 (H) 65 - 140 mg/dl       Lab, Imaging and other studies:I have personally reviewed pertinent lab results      VTE Pharmacologic Prophylaxis: Heparin drip  VTE Mechanical Prophylaxis: sequential compression device    Sam Alston MD, [place], [facility]  10/15/2019  7:33 AM

## 2019-10-16 ENCOUNTER — APPOINTMENT (INPATIENT)
Dept: GASTROENTEROLOGY | Facility: HOSPITAL | Age: 84
DRG: 736 | End: 2019-10-16
Attending: INTERNAL MEDICINE
Payer: COMMERCIAL

## 2019-10-16 ENCOUNTER — APPOINTMENT (INPATIENT)
Dept: RADIOLOGY | Facility: HOSPITAL | Age: 84
DRG: 736 | End: 2019-10-16
Payer: COMMERCIAL

## 2019-10-16 LAB
ANION GAP SERPL CALCULATED.3IONS-SCNC: 4 MMOL/L (ref 4–13)
BASOPHILS # BLD AUTO: 0.14 THOUSANDS/ΜL (ref 0–0.1)
BASOPHILS NFR BLD AUTO: 1 % (ref 0–1)
BUN SERPL-MCNC: 26 MG/DL (ref 5–25)
CALCIUM SERPL-MCNC: 6.6 MG/DL (ref 8.3–10.1)
CHLORIDE SERPL-SCNC: 106 MMOL/L (ref 100–108)
CO2 SERPL-SCNC: 35 MMOL/L (ref 21–32)
CREAT SERPL-MCNC: 0.72 MG/DL (ref 0.6–1.3)
EOSINOPHIL # BLD AUTO: 0.1 THOUSAND/ΜL (ref 0–0.61)
EOSINOPHIL NFR BLD AUTO: 0 % (ref 0–6)
ERYTHROCYTE [DISTWIDTH] IN BLOOD BY AUTOMATED COUNT: 14.5 % (ref 11.6–15.1)
GFR SERPL CREATININE-BSD FRML MDRD: 78 ML/MIN/1.73SQ M
GLUCOSE SERPL-MCNC: 107 MG/DL (ref 65–140)
GLUCOSE SERPL-MCNC: 109 MG/DL (ref 65–140)
GLUCOSE SERPL-MCNC: 145 MG/DL (ref 65–140)
GLUCOSE SERPL-MCNC: 145 MG/DL (ref 65–140)
GLUCOSE SERPL-MCNC: 156 MG/DL (ref 65–140)
GLUCOSE SERPL-MCNC: 159 MG/DL (ref 65–140)
GLUCOSE SERPL-MCNC: 165 MG/DL (ref 65–140)
GLUCOSE SERPL-MCNC: 181 MG/DL (ref 65–140)
GLUCOSE SERPL-MCNC: 181 MG/DL (ref 65–140)
GLUCOSE SERPL-MCNC: 207 MG/DL (ref 65–140)
GLUCOSE SERPL-MCNC: 220 MG/DL (ref 65–140)
GLUCOSE SERPL-MCNC: 230 MG/DL (ref 65–140)
GLUCOSE SERPL-MCNC: 274 MG/DL (ref 65–140)
GLUCOSE SERPL-MCNC: 278 MG/DL (ref 65–140)
HCT VFR BLD AUTO: 29.3 % (ref 34.8–46.1)
HGB BLD-MCNC: 8.7 G/DL (ref 11.5–15.4)
IMM GRANULOCYTES # BLD AUTO: >0.5 THOUSAND/UL (ref 0–0.2)
IMM GRANULOCYTES NFR BLD AUTO: 3 % (ref 0–2)
LYMPHOCYTES # BLD AUTO: 1.1 THOUSANDS/ΜL (ref 0.6–4.47)
LYMPHOCYTES NFR BLD AUTO: 5 % (ref 14–44)
MCH RBC QN AUTO: 30.9 PG (ref 26.8–34.3)
MCHC RBC AUTO-ENTMCNC: 29.7 G/DL (ref 31.4–37.4)
MCV RBC AUTO: 104 FL (ref 82–98)
MONOCYTES # BLD AUTO: 0.51 THOUSAND/ΜL (ref 0.17–1.22)
MONOCYTES NFR BLD AUTO: 2 % (ref 4–12)
NEUTROPHILS # BLD AUTO: 21.06 THOUSANDS/ΜL (ref 1.85–7.62)
NEUTS SEG NFR BLD AUTO: 89 % (ref 43–75)
NRBC BLD AUTO-RTO: 0 /100 WBCS
PLATELET # BLD AUTO: 437 THOUSANDS/UL (ref 149–390)
PMV BLD AUTO: 10.2 FL (ref 8.9–12.7)
POTASSIUM SERPL-SCNC: 4.2 MMOL/L (ref 3.5–5.3)
PROCALCITONIN SERPL-MCNC: 0.97 NG/ML
RBC # BLD AUTO: 2.82 MILLION/UL (ref 3.81–5.12)
SODIUM SERPL-SCNC: 145 MMOL/L (ref 136–145)
WBC # BLD AUTO: 23.52 THOUSAND/UL (ref 4.31–10.16)

## 2019-10-16 PROCEDURE — 94660 CPAP INITIATION&MGMT: CPT

## 2019-10-16 PROCEDURE — 99232 SBSQ HOSP IP/OBS MODERATE 35: CPT | Performed by: INTERNAL MEDICINE

## 2019-10-16 PROCEDURE — 87077 CULTURE AEROBIC IDENTIFY: CPT | Performed by: INTERNAL MEDICINE

## 2019-10-16 PROCEDURE — 31645 BRNCHSC W/THER ASPIR 1ST: CPT | Performed by: INTERNAL MEDICINE

## 2019-10-16 PROCEDURE — 84145 PROCALCITONIN (PCT): CPT | Performed by: NURSE PRACTITIONER

## 2019-10-16 PROCEDURE — 82948 REAGENT STRIP/BLOOD GLUCOSE: CPT

## 2019-10-16 PROCEDURE — 94669 MECHANICAL CHEST WALL OSCILL: CPT

## 2019-10-16 PROCEDURE — 87186 SC STD MICRODIL/AGAR DIL: CPT | Performed by: INTERNAL MEDICINE

## 2019-10-16 PROCEDURE — 0B968ZZ DRAINAGE OF RIGHT LOWER LOBE BRONCHUS, VIA NATURAL OR ARTIFICIAL OPENING ENDOSCOPIC: ICD-10-PCS | Performed by: INTERNAL MEDICINE

## 2019-10-16 PROCEDURE — 80048 BASIC METABOLIC PNL TOTAL CA: CPT | Performed by: NURSE PRACTITIONER

## 2019-10-16 PROCEDURE — 85025 COMPLETE CBC W/AUTO DIFF WBC: CPT | Performed by: NURSE PRACTITIONER

## 2019-10-16 PROCEDURE — NC001 PR NO CHARGE: Performed by: INTERNAL MEDICINE

## 2019-10-16 PROCEDURE — 71045 X-RAY EXAM CHEST 1 VIEW: CPT

## 2019-10-16 PROCEDURE — 99024 POSTOP FOLLOW-UP VISIT: CPT | Performed by: OBSTETRICS & GYNECOLOGY

## 2019-10-16 PROCEDURE — 94640 AIRWAY INHALATION TREATMENT: CPT

## 2019-10-16 PROCEDURE — 87070 CULTURE OTHR SPECIMN AEROBIC: CPT | Performed by: INTERNAL MEDICINE

## 2019-10-16 PROCEDURE — 99291 CRITICAL CARE FIRST HOUR: CPT | Performed by: INTERNAL MEDICINE

## 2019-10-16 PROCEDURE — 0B978ZZ DRAINAGE OF LEFT MAIN BRONCHUS, VIA NATURAL OR ARTIFICIAL OPENING ENDOSCOPIC: ICD-10-PCS | Performed by: INTERNAL MEDICINE

## 2019-10-16 RX ORDER — LIDOCAINE HYDROCHLORIDE 20 MG/ML
20 INJECTION, SOLUTION EPIDURAL; INFILTRATION; INTRACAUDAL; PERINEURAL ONCE
Status: CANCELLED | OUTPATIENT
Start: 2019-10-16 | End: 2019-10-16

## 2019-10-16 RX ORDER — MIDAZOLAM HYDROCHLORIDE 1 MG/ML
5 INJECTION INTRAMUSCULAR; INTRAVENOUS ONCE
Status: COMPLETED | OUTPATIENT
Start: 2019-10-16 | End: 2019-10-16

## 2019-10-16 RX ORDER — LIDOCAINE HYDROCHLORIDE 20 MG/ML
1 JELLY TOPICAL ONCE
Status: CANCELLED | OUTPATIENT
Start: 2019-10-16 | End: 2019-10-16

## 2019-10-16 RX ORDER — LIDOCAINE HYDROCHLORIDE 20 MG/ML
JELLY TOPICAL
Status: COMPLETED
Start: 2019-10-16 | End: 2019-10-16

## 2019-10-16 RX ORDER — LIDOCAINE HYDROCHLORIDE 10 MG/ML
INJECTION, SOLUTION EPIDURAL; INFILTRATION; INTRACAUDAL; PERINEURAL
Status: COMPLETED
Start: 2019-10-16 | End: 2019-10-16

## 2019-10-16 RX ORDER — FUROSEMIDE 10 MG/ML
40 INJECTION INTRAMUSCULAR; INTRAVENOUS
Status: COMPLETED | OUTPATIENT
Start: 2019-10-16 | End: 2019-10-16

## 2019-10-16 RX ORDER — FENTANYL CITRATE 50 UG/ML
100 INJECTION, SOLUTION INTRAMUSCULAR; INTRAVENOUS ONCE
Status: COMPLETED | OUTPATIENT
Start: 2019-10-16 | End: 2019-10-16

## 2019-10-16 RX ORDER — LANOLIN ALCOHOL/MO/W.PET/CERES
3 CREAM (GRAM) TOPICAL
Status: DISCONTINUED | OUTPATIENT
Start: 2019-10-16 | End: 2019-10-17

## 2019-10-16 RX ADMIN — LIDOCAINE HYDROCHLORIDE 200 MG: 10 INJECTION, SOLUTION EPIDURAL; INFILTRATION; INTRACAUDAL; PERINEURAL at 15:56

## 2019-10-16 RX ADMIN — TRAMADOL HYDROCHLORIDE 50 MG: 50 TABLET, FILM COATED ORAL at 00:39

## 2019-10-16 RX ADMIN — FENTANYL CITRATE 50 MCG: 50 INJECTION INTRAMUSCULAR; INTRAVENOUS at 15:45

## 2019-10-16 RX ADMIN — MIDAZOLAM 1 MG: 1 INJECTION INTRAMUSCULAR; INTRAVENOUS at 15:45

## 2019-10-16 RX ADMIN — TRAMADOL HYDROCHLORIDE 50 MG: 50 TABLET, FILM COATED ORAL at 17:18

## 2019-10-16 RX ADMIN — ISODIUM CHLORIDE 3 ML: 0.03 SOLUTION RESPIRATORY (INHALATION) at 07:36

## 2019-10-16 RX ADMIN — LEVALBUTEROL HYDROCHLORIDE 1.25 MG: 1.25 SOLUTION, CONCENTRATE RESPIRATORY (INHALATION) at 07:36

## 2019-10-16 RX ADMIN — DORZOLAMIDE HYDROCHLORIDE AND TIMOLOL MALEATE 1 DROP: 20; 5 SOLUTION/ DROPS OPHTHALMIC at 10:23

## 2019-10-16 RX ADMIN — TOPICAL ANESTHETIC 2 SPRAY: 200 SPRAY DENTAL; PERIODONTAL at 15:58

## 2019-10-16 RX ADMIN — ISODIUM CHLORIDE 3 ML: 0.03 SOLUTION RESPIRATORY (INHALATION) at 19:12

## 2019-10-16 RX ADMIN — METOPROLOL TARTRATE 25 MG: 25 TABLET, FILM COATED ORAL at 10:23

## 2019-10-16 RX ADMIN — Medication 20 MG: at 10:25

## 2019-10-16 RX ADMIN — FUROSEMIDE 40 MG: 10 INJECTION, SOLUTION INTRAMUSCULAR; INTRAVENOUS at 10:22

## 2019-10-16 RX ADMIN — ISODIUM CHLORIDE 3 ML: 0.03 SOLUTION RESPIRATORY (INHALATION) at 13:02

## 2019-10-16 RX ADMIN — METOPROLOL TARTRATE 25 MG: 25 TABLET, FILM COATED ORAL at 21:14

## 2019-10-16 RX ADMIN — LIDOCAINE HYDROCHLORIDE 5 APPLICATION: 20 JELLY TOPICAL at 15:58

## 2019-10-16 RX ADMIN — FUROSEMIDE 40 MG: 10 INJECTION, SOLUTION INTRAMUSCULAR; INTRAVENOUS at 16:10

## 2019-10-16 RX ADMIN — LEVALBUTEROL HYDROCHLORIDE 1.25 MG: 1.25 SOLUTION, CONCENTRATE RESPIRATORY (INHALATION) at 19:12

## 2019-10-16 RX ADMIN — TRAVOPROST 1 DROP: 0.04 SOLUTION/ DROPS OPHTHALMIC at 21:18

## 2019-10-16 RX ADMIN — MELATONIN 3 MG: 3 TAB ORAL at 23:11

## 2019-10-16 RX ADMIN — SODIUM CHLORIDE 3 UNITS/HR: 9 INJECTION, SOLUTION INTRAVENOUS at 23:14

## 2019-10-16 RX ADMIN — LEVALBUTEROL HYDROCHLORIDE 1.25 MG: 1.25 SOLUTION, CONCENTRATE RESPIRATORY (INHALATION) at 13:02

## 2019-10-16 RX ADMIN — APIXABAN 5 MG: 5 TABLET, FILM COATED ORAL at 17:18

## 2019-10-16 RX ADMIN — DORZOLAMIDE HYDROCHLORIDE AND TIMOLOL MALEATE 1 DROP: 20; 5 SOLUTION/ DROPS OPHTHALMIC at 18:01

## 2019-10-16 NOTE — PLAN OF CARE
Problem: Potential for Falls  Goal: Patient will remain free of falls  Description  INTERVENTIONS:  - Assess patient frequently for physical needs  -  Identify cognitive and physical deficits and behaviors that affect risk of falls    -  [place] fall precautions as indicated by assessment   - Educate patient/family on patient safety including physical limitations  - Instruct patient to call for assistance with activity based on assessment  - Modify environment to reduce risk of injury  - Consider OT/PT consult to assist with strengthening/mobility  Outcome: Progressing     Problem: GENITOURINARY - ADULT  Goal: Absence of urinary retention  Description  INTERVENTIONS:  - Assess patient's ability to void and empty bladder  - Monitor I/O  - Bladder scan as needed  - Discuss with physician/AP medications to alleviate retention as needed  - Discuss catheterization for long term situations as appropriate   Outcome: Progressing     Problem: METABOLIC, FLUID AND ELECTROLYTES - ADULT  Goal: Electrolytes maintained within normal limits  Description  INTERVENTIONS:  - Monitor labs and assess patient for signs and symptoms of electrolyte imbalances  - Administer electrolyte replacement as ordered  - Monitor response to electrolyte replacements, including repeat lab results as appropriate  - Instruct patient on fluid and nutrition as appropriate  Outcome: Progressing  Goal: Fluid balance maintained  Description  INTERVENTIONS:  - Monitor labs   - Monitor I/O and WT  - Instruct patient on fluid and nutrition as appropriate  - Assess for signs & symptoms of volume excess or deficit  Outcome: Progressing  Goal: Glucose maintained within target range  Description  INTERVENTIONS:  - Monitor Blood Glucose as ordered  - Assess for signs and symptoms of hyperglycemia and hypoglycemia  - Administer ordered medications to maintain glucose within target range  - Assess nutritional intake and initiate nutrition service referral as needed  Outcome: Progressing     Problem: SKIN/TISSUE INTEGRITY - ADULT  Goal: Skin integrity remains intact  Description  INTERVENTIONS  - Identify patients at risk for skin breakdown  - Assess and monitor skin integrity  - Assess and monitor nutrition and hydration status  - Monitor labs (i e  albumin)  - Assess for incontinence   - Turn and reposition patient  - Assist with mobility/ambulation  - Relieve pressure over bony prominences  - Avoid friction and shearing  - Provide appropriate hygiene as needed including keeping skin clean and dry  - Evaluate need for skin moisturizer/barrier cream  - Collaborate with interdisciplinary team (i e  Nutrition, Rehabilitation, etc )   - Patient/family teaching  Outcome: Progressing  Goal: Incision(s), wounds(s) or drain site(s) healing without S/S of infection  Description  INTERVENTIONS  - Assess and document risk factors for skin impairment   - Assess and document dressing, incision, wound bed, drain sites and surrounding tissue  - Consider nutrition services referral as needed  - Oral mucous membranes remain intact  - Provide patient/ family education  Outcome: Progressing  Goal: Oral mucous membranes remain intact  Description  INTERVENTIONS  - Assess oral mucosa and hygiene practices  - Implement preventative oral hygiene regimen  - Implement oral medicated treatments as ordered  - Initiate Nutrition services referral as needed  Outcome: Progressing     Problem: Prexisting or High Potential for Compromised Skin Integrity  Goal: Skin integrity is maintained or improved  Description  INTERVENTIONS:  - Identify patients at risk for skin breakdown  - Assess and monitor skin integrity  - Assess and monitor nutrition and hydration status  - Monitor labs   - Assess for incontinence   - Turn and reposition patient  - Assist with mobility/ambulation  - Relieve pressure over bony prominences  - Avoid friction and shearing  - Provide appropriate hygiene as needed including keeping skin clean and dry  - Evaluate need for skin moisturizer/barrier cream  - Collaborate with interdisciplinary team   - Patient/family teaching  - Consider wound care consult   Outcome: Progressing     Problem: NEUROSENSORY - ADULT  Goal: Achieves stable or improved neurological status  Description  INTERVENTIONS  - Monitor and report changes in neurological status  - Monitor vital signs such as temperature, blood pressure, glucose, and any other labs ordered   - Initiate measures to prevent increased intracranial pressure  - Monitor for seizure activity and implement precautions if appropriate      Outcome: Progressing  Goal: Remains free of injury related to seizures activity  Description  INTERVENTIONS  - Maintain airway, patient safety  and administer oxygen as ordered  - Monitor patient for seizure activity, document and report duration and description of seizure to physician/advanced practitioner  - If seizure occurs,  ensure patient safety during seizure  - Reorient patient post seizure  - Seizure pads on all 4 side rails  - Instruct patient/family to notify RN of any seizure activity including if an aura is experienced  - Instruct patient/family to call for assistance with activity based on nursing assessment  - Administer anti-seizure medications if ordered    Outcome: Progressing  Goal: Achieves maximal functionality and self care  Description  INTERVENTIONS  - Monitor swallowing and airway patency with patient fatigue and changes in neurological status  - Encourage and assist patient to increase activity and self care     - Encourage visually impaired, hearing impaired and aphasic patients to use assistive/communication devices  Outcome: Progressing     Problem: CARDIOVASCULAR - ADULT  Goal: Maintains optimal cardiac output and hemodynamic stability  Description  INTERVENTIONS:  - Monitor I/O, vital signs and rhythm  - Monitor for S/S and trends of decreased cardiac output  - Administer and titrate ordered vasoactive medications to optimize hemodynamic stability  - Assess quality of pulses, skin color and temperature  - Assess for signs of decreased coronary artery perfusion  - Instruct patient to report change in severity of symptoms  Outcome: Progressing  Goal: Absence of cardiac dysrhythmias or at baseline rhythm  Description  INTERVENTIONS:  - Continuous cardiac monitoring, vital signs, obtain 12 lead EKG if ordered  - Administer antiarrhythmic and heart rate control medications as ordered  - Monitor electrolytes and administer replacement therapy as ordered  Outcome: Progressing     Problem: RESPIRATORY - ADULT  Goal: Achieves optimal ventilation and oxygenation  Description  INTERVENTIONS:  - Assess for changes in respiratory status  - Assess for changes in mentation and behavior  - Position to facilitate oxygenation and minimize respiratory effort  - Oxygen administered by appropriate delivery if ordered  - Initiate smoking cessation education as indicated  - Encourage broncho-pulmonary hygiene including cough, deep breathe, Incentive Spirometry  - Assess the need for suctioning and aspirate as needed  - Assess and instruct to report SOB or any respiratory difficulty  - Respiratory Therapy support as indicated  Outcome: Progressing     Problem: HEMATOLOGIC - ADULT  Goal: Maintains hematologic stability  Description  INTERVENTIONS  - Assess for signs and symptoms of bleeding or hemorrhage  - Monitor labs  - Administer supportive blood products/factors as ordered and appropriate  Outcome: Progressing     Problem: MUSCULOSKELETAL - ADULT  Goal: Maintain or return mobility to safest level of function  Description  INTERVENTIONS:  - Assess patient's ability to carry out ADLs; assess patient's baseline for ADL function and identify physical deficits which impact ability to perform ADLs (bathing, care of mouth/teeth, toileting, grooming, dressing, etc )  - Assess/evaluate cause of self-care deficits   - Assess range of motion  - Assess patient's mobility  - Assess patient's need for assistive devices and provide as appropriate  - Encourage maximum independence but intervene and supervise when necessary  - Involve family in performance of ADLs  - Assess for home care needs following discharge   - Consider OT consult to assist with ADL evaluation and planning for discharge  - Provide patient education as appropriate  Outcome: Progressing  Goal: Maintain proper alignment of affected body part  Description  INTERVENTIONS:  - Support, maintain and protect limb and body alignment  - Provide patient/ family with appropriate education  Outcome: Progressing     Problem: Nutrition/Hydration-ADULT  Goal: Nutrient/Hydration intake appropriate for improving, restoring or maintaining nutritional needs  Description  Monitor and assess patient's nutrition/hydration status for malnutrition  Collaborate with interdisciplinary team and initiate plan and interventions as ordered  Monitor patient's weight and dietary intake as ordered or per policy  Utilize nutrition screening tool and intervene as necessary  Determine patient's food preferences and provide high-protein, high-caloric foods as appropriate       INTERVENTIONS:  - Monitor oral intake, urinary output, labs, and treatment plans  - Assess nutrition and hydration status and recommend course of action  - Evaluate amount of meals eaten  - Assist patient with eating if necessary   - Allow adequate time for meals  - Recommend/ encourage appropriate diets, oral nutritional supplements, and vitamin/mineral supplements  - Order, calculate, and assess calorie counts as needed  - Recommend, monitor, and adjust tube feedings and TPN/PPN based on assessed needs  - Assess need for intravenous fluids  - Provide specific nutrition/hydration education as appropriate  - Include patient/family/caregiver in decisions related to nutrition  Outcome: Progressing

## 2019-10-16 NOTE — SOCIAL WORK
[facility] started prior auth for potential discharge tomorrow  Broncoscopy to be completed today  Updated the patients daughter, Drew Nava

## 2019-10-16 NOTE — PROGRESS NOTES
Progress Note - Cardiology   Lenoard Rosemarie 82 y o  female MRN: [id number]  Unit/Bed#: ICU 02 Encounter: [id number]      Assessment:     1  Acute hypoxic respiratory failure secondary to aspiration pneumonia/pneumonitis and possibly some component of pulmonary edema  2  Sepsis  3  Paroxysmal atrial fibrillation with rapid ventricular response  4  Diabetes  5  Reported IJ DVT  6  Apparent adenocarcinoma status post total hysterectomy, bilateral salpingectomy and periaortic lymphadenopathy comment ectomy on October 4th 2019  7  Status post PEA arrest    Discussion/Recommendations:    Remains in sinus  On Eliquis for DVT and afib  Hemoglobin has trended down  Eliquis may need to be held  On po beta blocker now  Has had very positive fluid balance for a few days from D5W and free water flushes  Would give 2 doses of Lasix 40 today and reassess tomorrow  Difficult to evaluate volume status - respiratory status is very tenuous today  Subjective: Denies shortness of breath      Physical Exam:  GEN:  Tachypnea  HEENT:  MMM, NCAT, pink conjunctiva, EOMI, nonicteric sclera  CV:  NO JVD/HJR, RR, NO M/R/G, +S1/S2, NO PARASTERNAL HEAVE/THRILL, NO LE EDEMA, NO HEPATIC SYSTOLIC PULSATION, WARM EXTREMITIES  RESP:  Bilateral rhonchi   ABD:  SOFT, NT, NO GROSS ORGANOMEGALY        Vitals:   /72   Pulse 76   Temp 98 8 °F (37 1 °C) (Temporal)   Resp 22   Ht 5' 5" (1 651 m)   Wt 68 1 kg (150 lb 2 1 oz)   SpO2 95%   Breastfeeding?  No   BMI 24 98 kg/m²   Vitals:    10/10/19 0130 10/16/19 0643   Weight: 68 4 kg (150 lb 12 7 oz) 68 1 kg (150 lb 2 1 oz)       Intake/Output Summary (Last 24 hours) at 10/16/2019 0728  Last data filed at 10/16/2019 0600  Gross per 24 hour   Intake 3875 26 ml   Output 1593 ml   Net 2282 26 ml         TELEMETRY: no afib   Lab Results:  Results from last 7 days   Lab Units 10/16/19  0431   WBC Thousand/uL 23 52*   HEMOGLOBIN g/dL 8 7*   HEMATOCRIT % 29 3*   PLATELETS Thousands/uL 437* Results from last 7 days   Lab Units 10/16/19  0431   POTASSIUM mmol/L 4 2   CHLORIDE mmol/L 106   CO2 mmol/L 35*   BUN mg/dL 26*   CREATININE mg/dL 0 72   CALCIUM mg/dL 6 6*     Results from last 7 days   Lab Units 10/16/19  0431   POTASSIUM mmol/L 4 2   CHLORIDE mmol/L 106   CO2 mmol/L 35*   BUN mg/dL 26*   CREATININE mg/dL 0 72   CALCIUM mg/dL 6 6*             Medications:    Current Facility-Administered Medications:     apixaban (ELIQUIS) tablet 5 mg, 5 mg, Oral, BID, JIM Kim, 5 mg at 10/15/19 1741    artificial tear (LUBRIFRESH P M ) ophthalmic ointment, , Both Eyes, HS PRN, JIM Alicai    dorzolamide-timolol (COSOPT) 22 3-6 8 MG/ML ophthalmic solution 1 drop, 1 drop, Both Eyes, BID, JIM Wallace, 1 drop at 10/15/19 1739    furosemide (LASIX) injection 20 mg, 20 mg, Intravenous, Daily, JIM Adam, 20 mg at 10/15/19 0818    influenza vaccine, high-dose (FLUZONE HIGH-DOSE) IM injection ERIKA 0 5 mL, 0 5 mL, Intramuscular, Prior to discharge, Laazra Clemente DO    insulin regular (HumuLIN R,NovoLIN R) 1 Units/mL in sodium chloride 0 9 % 100 mL infusion, 0 3-21 Units/hr, Intravenous, Titrated, JIM Wallace, Last Rate: 3 mL/hr at 10/16/19 0615, 3 Units/hr at 10/16/19 0615    lactated ringers bolus 1,000 mL, 1,000 mL, Intravenous, Once, JIM Alicia    levalbuterol [facility]) inhalation solution 1 25 mg, 1 25 mg, Nebulization, TID, Boston Gunderson DO, 1 25 mg at 10/15/19 1913    metoprolol tartrate (LOPRESSOR) tablet 25 mg, 25 mg, Oral, Q12H [address], JIM Kim, 25 mg at 10/15/19 2221    Netarsudil Dimesylate 0 02 % SOLN 1 drop, 1 drop, Right Eye, HS, JIM Wallace, 1 drop at 10/15/19 2221    omeprazole (PRILOSEC) suspension 2 mg/mL, 20 mg, Oral, Daily, JIM Kim    ondansetron (ZOFRAN) injection 4 mg, 4 mg, Intravenous, Q6H PRN, JIM Alicia, 4 mg at 10/07/19 0541    polyethylene glycol (MIRALAX) packet 17 g, 17 g, Oral, Daily PRN, JIM Bermudez, 17 g at 10/15/19 1748    sodium chloride 0 9 % inhalation solution 3 mL, 3 mL, Nebulization, TID, Lazara Diaztal, DO, 3 mL at 10/15/19 1914    traMADol (ULTRAM) tablet 50 mg, 50 mg, Oral, Q6H PRN, JIM Quiñones, 50 mg at 10/16/19 0039    travoprost (TRAVATAN-Z) 0 004 % ophthalmic solution 1 drop, 1 drop, Both Eyes, HS, JIM Wallace, 1 drop at 10/15/19 2227    Portions of the record may have been created with voice recognition software  Occasional wrong word or "sound a like" substitutions may have occurred due to the inherent limitations of voice recognition software  Read the chart carefully and recognize, using context, where substitutions have occurred

## 2019-10-16 NOTE — PROGRESS NOTES
Progress Note - Infectious Disease   Ruthellen Prudent 82 y o  female MRN: [id number]  Unit/Bed#: ICU 02 Encounter: [id number]      Impression/Plan:  1  Sepsis, evolving since admission   Tachycardia, tachypnea, leukocytosis   Likely secondary to aspiration pneumonia  Also consider a possible leukemoid reaction in the setting of hypoxia and possible developing limb ischemia  Procalcitonin has trended down, morning level is pending  Fortunately she has been afebrile and hemodynamically stable  She completed antibiotic treatment yesterday   -monitor patient off antibiotics  -check CBC and BMP tomorrow  -monitor vitals  -supportive care      2  Aspiration pneumonia   In the setting of vomiting due to recent postoperative ileus   Sputum culture revealed Serratia and Candida albicans   Candida albicans is likely a colonizer and not the cause of pneumonia  Patient was transitioned back to high-flow O2 and her O2 saturation remains stable at this time  She completed antibiotic treatment yesterday  No indication for ongoing antibiotic treatment at this time   -monitor patient off antibiotics  -check CBC and BMP tomorrow  -monitor vitals  -monitor respiratory status  -wean high-flow O2 support per critical care team     3  Acute hypoxic respiratory failure   Likely multifactorial secondary to aspiration pneumonia, ARDS, volume overload   Patient continues to require supplemental oxygen and is now stable on high-flow O2   She has tolerated diuresis and per Cardiology will she will receive two more doses of Lasix today   -monitor vitals  -monitor respiratory symptoms  -wean high-flow O2 support per critical care team  -diuresis per Cardiology     4  Right upper extremity edema/pallor   With bluish discoloration of digits   Concern for poor perfusion   CT neck did reveal thrombus at right common jugular vein, which may be contributing   Patient is currently on anticoagulation for management of atrial fibrillation   Her arm is less swollen today and she has light Ace compression on  Fingers do not appear dusky on exam   -anticoagulation per primary service  -close serial arm exams     5  Ovarian adenocarcinoma status post total hysterectomy, bilateral salpingectomy, pelvic and periaortic lymphadenectomy, omentectomy on 10/04/2019     -continue ongoing follow up with OBGYN     6  Diabetes mellitus type 1    -blood glucose management per critical care medicine team     7  Status post PA arrest      8  Atrial fibrillation, with RVR   Ongoing management per Cardiology  Above plan was discussed in detail with patient at the bedside  Antibiotics:  Zosyn 5  Antibiotics 10    Subjective:  Patient reports she is feeling more tired today  Tells me it is difficult to speak because she is weak  I asked her to save her voice and to nod to yes/no questions  She denies fever, chills, sweats, shakes; no nausea, vomiting, diarrhea; she is coughing and tells me she is bringing out phlegm but often needs help with suctioning from the nurse  She is tender around her abdominal incision site  No new symptoms  Objective:  Vitals:  Temp:  [98 2 °F (36 8 °C)-99 2 °F (37 3 °C)] 98 8 °F (37 1 °C)  HR:  [72-96] 84  Resp:  [19-35] 27  BP: (113-165)/(48-75) 148/68  SpO2:  [91 %-98 %] 93 %  Temp (24hrs), Av 6 °F (37 °C), Min:98 2 °F (36 8 °C), Max:99 2 °F (37 3 °C)  Current: Temperature: 98 8 °F (37 1 °C)    Physical Exam:   General Appearance:  Tired but interactive, nontoxic, no acute distress  Appears weak  Throat: Oropharynx moist without lesions      Lungs:   Mildly coarse in the upper lobes to auscultation bilaterally, mildly coarse in the bases to auscultation bilaterally; no wheezing; respirations slightly labored with use of upper chest accessory muscles, respirations are tachypneic on high-flow nasal cannula O2   Heart:  RRR; no murmur, rub or gallop   Abdomen:   Soft, mildly tender to abdominal incision site, non-distended, positive bowel sounds  Extremities: No clubbing or cyanosis, mild bilateral lower extremity edema; right arm with light Ace compression, hand appears less swollen and fingers are not dusky, she has full ROM of her right wrist and fingers   Skin: No new rashes, lesions, or draining wounds noted on exposed skin    Abdominal incision is clean and dry without leakage or dehiscence noted, no spreading erythema from the incision site     Labs, Imaging, & Other studies:   All pertinent labs and imaging studies were personally reviewed  Results from last 7 days   Lab Units 10/16/19  0431 10/15/19  0536 10/14/19  0549   WBC Thousand/uL 23 52* 22 91* 24 34*   HEMOGLOBIN g/dL 8 7* 9 2* 9 5*   PLATELETS Thousands/uL 437* 425* 404*     Results from last 7 days   Lab Units 10/16/19  0431   POTASSIUM mmol/L 4 2   CHLORIDE mmol/L 106   CO2 mmol/L 35*   BUN mg/dL 26*   CREATININE mg/dL 0 72   EGFR ml/min/1 73sq m 78   CALCIUM mg/dL 6 6*     Results from last 7 days   Lab Units 10/09/19  1107   SPUTUM CULTURE  2+ Growth of Serratia marcescens*  3+ Growth of Candida albicans*  2+ Growth of    GRAM STAIN RESULT  Rare Polys*  2+ Budding yeast*  1+ Gram positive cocci in clusters*     Results from last 7 days   Lab Units 10/15/19  0536 10/14/19  0549 10/13/19  1349 10/12/19  1323   PROCALCITONIN ng/ml 1 42* 2 69* 2 58* 4 28*

## 2019-10-16 NOTE — PROGRESS NOTES
Progress Note - Critical Care   Manfred Shafer 82 y o  female MRN: [id number]  Unit/Bed#: ICU 02 Encounter: [id number]    Assessment/Plan:  1  Acute hypoxic respiratory failure secondary to aspiration pneumonia/pneumonitis and pulmonary edema  · We will continue with high-flow nasal cannula for now with a goal to maintain her saturations greater than 90%  · On encourage cough, deep breathing and incentive spirometer use  · Encourage out of bed and increasing activity  2  Sepsis secondary to 1    · Duration of antibiotics per Infectious Disease recommendations, currently on Zosyn day 5  3  Paroxysmal atrial fibrillation with rapid ventricular response-currently in sinus rhythm  · We will continue with Lopressor for now  · Will continue with Eliquis  4  Diabetes mellitus type 2  · We will continue her on the insulin infusion for now with a goal to maintain her blood glucose between 110 and 140 given her recent surgical procedure  5  Suspected nonocclusive DVT of the right upper extremity-currently on anticoagulation  6  Status post exploratory laparotomy with TLH/BSO for ovarian cancer stage IB  · Management per gyn oncology is recommendations  7  Hypernatremia  · Will continue tube feeds with free water flushes    Critical Care Time:   Documented critical care time excludes any procedures documented elsewhere   It also excludes any family updates    _____________________________________________________________________    HPI/24hr events:   She denies pain    Medications:    Current Facility-Administered Medications:  apixaban 5 mg Oral BID Gretta Bile Bilofsky, CRNP    artificial tear  Both Eyes HS PRN Jacobo Breeze, CRNP    dorzolamide-timolol 1 drop Both Eyes BID Jacobo Breeze, CRNP    furosemide 20 mg Intravenous Daily Clydia Bloodgood, CRNP    influenza vaccine 0 5 mL Intramuscular Prior to discharge Lazara Clemente DO    insulin regular (HumuLIN R,NovoLIN R) infusion 0 3-21 Units/hr Intravenous Titrated Jacobo Breeze, CRNP Last Rate: 3 Units/hr (10/16/19 0430)   lactated ringers 1,000 mL Intravenous Once Lockwood Dills, CRNP    levalbuterol 1 25 mg Nebulization TID Carlos Mylar, DO    metoprolol tartrate 25 mg Oral Q12H [address] Berkley K Abimaelofsky, CRNP    Netarsudil Dimesylate 1 drop Right Eye HS Ruthie Britton, CRNP    omeprazole (PRILOSEC) suspension 2 mg/mL 20 mg Oral Daily Berkley K Bilofsky, CRNP    ondansetron 4 mg Intravenous Q6H PRN Lockwood Dills, CRNP    polyethylene glycol 17 g Oral Daily PRN Lockwood Dills, CRNP    sodium chloride 3 mL Nebulization TID Lazara PEGGY Dostal, DO    traMADol 50 mg Oral Q6H PRN Pia Wainwright, CRNP    travoprost 1 drop Both Eyes HS Ruthie Britton, CRNP          insulin regular (HumuLIN R,NovoLIN R) infusion 0 3-21 Units/hr Last Rate: 3 Units/hr (10/16/19 0430)         Physical exam:  Vitals: Body mass index is 25 09 kg/m²  Blood pressure 132/59, pulse 78, temperature 98 2 °F (36 8 °C), temperature source Temporal, resp   rate 20, height 5' 5" (1 651 m), weight 68 4 kg (150 lb 12 7 oz), SpO2 92 %, not currently breastfeeding ,  Temp  Min: 96 8 °F (36 °C)  Max: 100 2 °F (37 9 °C)  IBW: 57 kg    SpO2: 92 %  SpO2 Activity: At Rest  O2 Device: High flow nasal cannula      Intake/Output Summary (Last 24 hours) at 10/16/2019 0547  Last data filed at 10/15/2019 2200  Gross per 24 hour   Intake 3296 03 ml   Output 1593 ml   Net 1703 03 ml       Invasive/non-invasive ventilation settings:   Respiratory    Lab Data (Last 4 hours)    None         O2/Vent Data (Last 4 hours)      10/16 0230          Non-Invasive Ventilation Mode HFNC                 Invasive Devices     Peripherally Inserted Central Catheter Line            PICC Line 45/93/74 Left Basilic 1 day          Drain            Rectal Tube With balloon 7 days    External Urinary Catheter Other (Comment) 4 days    NG/OG/Enteral Tube Nasogastric 14 Fr Right nares 2 days                  Physical Exam:  Gen:  Sleepy but arousable  HEENT:  Pupils are equal round reactive to light  The oropharynx is without erythema and is dry  Neck:  Supple negative for lymphadenopathy  Chest:  Coarse throughout the lung fields bilateral   She does have evidence of paradoxical chest movement in the sternal area which is likely related to her CPR and has been present but not fully appreciated previously  Cor:  Regular rate and rhythm  Abd:  Soft with some incisional tenderness  Ext:  There is no significant edema clubbing or cyanosis  The right upper extremity has some cyanosis of the tips and pads of the middle fingers  Neuro:  Sleepy but arousable, weak but able to move her extremities  Skin:  Warm and dry      Diagnostic Data:  Lab: I have personally reviewed pertinent lab results  CBC:   Results from last 7 days   Lab Units 10/16/19  0431 10/15/19  0536 10/14/19  0549   WBC Thousand/uL 23 52* 22 91* 24 34*   HEMOGLOBIN g/dL 8 7* 9 2* 9 5*   HEMATOCRIT % 29 3* 29 5* 29 5*   PLATELETS Thousands/uL 437* 425* 404*       CMP:   Results from last 7 days   Lab Units 10/16/19  0431 10/15/19  0536 10/14/19  0549   SODIUM mmol/L 145 145 148*   POTASSIUM mmol/L 4 2 2 7* 3 7   CHLORIDE mmol/L 106 107 109*   CO2 mmol/L 35* 34* 32   BUN mg/dL 26* 25 29*   CREATININE mg/dL 0 72 0 75 0 84   CALCIUM mg/dL 6 6* 6 9* 7 2*     PT/INR:   No results found for: PT, INR,   Magnesium:   Results from last 7 days   Lab Units 10/12/19  0602   MAGNESIUM mg/dL 2 3     Phosphorous:       Microbiology:  Results from last 7 days   Lab Units 10/09/19  1107   SPUTUM CULTURE  2+ Growth of Serratia marcescens*  3+ Growth of Candida albicans*  2+ Growth of    GRAM STAIN RESULT  Rare Polys*  2+ Budding yeast*  1+ Gram positive cocci in clusters*       Imaging:      Cardiac lab/EKG/telemetry/ECHO:       VTE Prophylaxis:  Eliquis    Code Status: Level 1 - Full Code    Jeniffer Range, CRNP    Portions of the record may have been created with voice recognition software   Occasional wrong word or "sound a like" substitutions may have occurred due to the inherent limitations of voice recognition software  Read the chart carefully and recognize, using context, where substitutions have occurred

## 2019-10-16 NOTE — PROGRESS NOTES
Gyn Oncology Progress note   Paty Guerra 82 y o  female MRN: [id number]  Unit/Bed#: ICU 02 Encounter: [id number]    Assessment: 82 y o  POD# 12 from [facility], BSO, ex lap, pelvic and para-aortic lymphadenectomy, omentectomy, peritoneal biopsies, and ovarian cancer staging for right tubo-ovarian mass consistent with adenocarcinoma, s/p code 10/7    Plan:    1) Acute hypoxic respiratory failure  Management per critical care team  Extubated, off pressors  Currently on high flow NC  Transitioned from cefepime/flagyl to zosyn (day 5 today) due to sputum cultures revealing Serratia and Candida (likely colonizer)  WBC at 24 today, remains elevated  Follow up procalcitonin this AM  Levalbuterol TID   Management per crit care and ID    2) Severe metabolic acidosis, resolved    3) Afib with RPR  Lopressor 25mg Q 12 hr   Management per cardiology    4) Acute kidney injury  Cr has normalized, Cr 0 72 today   Avoid nephrotoxic agents    5) Postoperative ileus   Patient currently tolerating tube feeds, at goal    6) Anemia  S/p 2U pRBC  Hgb 8 7g/dL today, stable    7) Right tubo-ovarian mass, s/p surgery   Frozen consistent with adenocarcinoma, follow up final pathology  Continue routine postoperative care    8) Urinary retention  Adequate urinary output    9) T1DM  Currently on SSI    10) Analgesia  S/p TAPs block, s/p dilaudid   Tylenol [address], motrin prn, oxycodone 5-10mg for moderate-severe pain prn  Dilaudid prn    11) Dry eyes  Lubifresh prn, cosopt BID, travoprost qD    12) Depression  Cymbalta 30mg qHS - on hold    13) Hypertension  Metoprolol 2 5mg prn  Avapro 150mg daily    14) Hypothyroidism  Levothyroxine 50mcg daily - on hold    15) FEN: tube feeds, meeting goal    16) DVT ppx: SCDs, transitioned to eliquis 10/15      Subjective:    No significant events overnight  /59   Pulse 78   Temp 98 2 °F (36 8 °C) (Temporal)   Resp 20   Ht 5' 5" (1 651 m)   Wt 68 4 kg (150 lb 12 7 oz)   SpO2 92%   Breastfeeding?  No BMI 25 09 kg/m²     I/O last 3 completed shifts: In: 5778 5 [I V :2753 5; NG/GT:1500; IV Piggyback:200; Feedings:1325]  Out: 4516 [Urine:2319]  I/O this shift:  In: 209 8 [I V :9 8; NG/GT:200]  Out: 300 [Urine:300]    Lab Results   Component Value Date    WBC 23 52 (H) 10/16/2019    HGB 8 7 (L) 10/16/2019    HCT 29 3 (L) 10/16/2019     (H) 10/16/2019     (H) 10/16/2019       Lab Results   Component Value Date    GLUCOSE 75 01/08/2016    CALCIUM 6 6 (L) 10/16/2019     (L) 01/08/2016    K 4 2 10/16/2019    CO2 35 (H) 10/16/2019     10/16/2019    BUN 26 (H) 10/16/2019    CREATININE 0 72 10/16/2019         Physical Exam   Constitutional: She is oriented to person, place, and time  She appears well-developed and well-nourished  No distress  HENT:   NG tube in place   Cardiovascular: Normal rate, regular rhythm and normal heart sounds  Exam reveals no gallop and no friction rub  No murmur heard  Pulmonary/Chest: Effort normal and breath sounds normal  No stridor  No respiratory distress  She has no wheezes  She has no rales  On high flow nasal cannula   Abdominal: Soft  Bowel sounds are normal  She exhibits no distension and no mass  There is no tenderness  There is no rebound and no guarding  No hernia  Vertical incision clean, dry, intact without signs of inflammation   Neurological: She is alert and oriented to person, place, and time  Skin: She is not diaphoretic  Vitals reviewed        Antonio Yuen MD  10/16/2019  5:59 AM

## 2019-10-16 NOTE — PROCEDURES
Bronchoscopy  Date/Time: 10/16/2019 5:50 PM  Performed by: Allie Silver MD  Authorized by: Allie Silver MD     Patient location:  Bedside  Consent:     Consent obtained:  Written    Consent given by:  Guardian  Procedure details:     Description:  Bronchoscopy was done due to the patient hypoxia requiring high FiO2, the patient monitored throughout the entire procedure with ICU style of monitoring  Consent obtained from the daughter who is the healthcare proxy  Risk and benefits explained and agreed to the procedure  The patient received lidocaine and topical benzocaine as well  Using a bite block, the patient was placed on oxygen, O2 saturation remains above than 90%, the bronchoscope using Ambu bronch through the oral cavity, throughout the bite block, and the findings were as follows:    1  The patient received totally of 25 mics of fentanyl and 1 mg of Versed for the procedure  2  The vocal cords are mobile  Sluggish on the left  3  Tortuous trachea noted  4  Large amount of secretions occluding the takeoff of the left main bronchus suctioned to clear  5  Large amount of thick secretions closing the takeoff of the right lower lobe bronchus as well as its branches  Suctioned all to clear  6  Specimen was sent for culture and Gram stain  7  The bronchoscope after that was removed no bronchial washes were used  8  No evidence of bleeding or endobronchial lesions  9  The bronchoscope removed entirely from the airway  No immediate complication, the patient tolerated the procedure very well  The daughter present throughout the entire procedure  Thank you for the assistance of the nursing staff O confirmed the time-out, respiratory therapy to assist with the procedure

## 2019-10-17 LAB
ANION GAP SERPL CALCULATED.3IONS-SCNC: 3 MMOL/L (ref 4–13)
BASOPHILS # BLD AUTO: 0.07 THOUSANDS/ΜL (ref 0–0.1)
BASOPHILS NFR BLD AUTO: 0 % (ref 0–1)
BUN SERPL-MCNC: 25 MG/DL (ref 5–25)
CALCIUM SERPL-MCNC: 6.7 MG/DL (ref 8.3–10.1)
CHLORIDE SERPL-SCNC: 102 MMOL/L (ref 100–108)
CO2 SERPL-SCNC: 36 MMOL/L (ref 21–32)
CREAT SERPL-MCNC: 0.72 MG/DL (ref 0.6–1.3)
EOSINOPHIL # BLD AUTO: 0.04 THOUSAND/ΜL (ref 0–0.61)
EOSINOPHIL NFR BLD AUTO: 0 % (ref 0–6)
ERYTHROCYTE [DISTWIDTH] IN BLOOD BY AUTOMATED COUNT: 14 % (ref 11.6–15.1)
GFR SERPL CREATININE-BSD FRML MDRD: 78 ML/MIN/1.73SQ M
GLUCOSE SERPL-MCNC: 106 MG/DL (ref 65–140)
GLUCOSE SERPL-MCNC: 134 MG/DL (ref 65–140)
GLUCOSE SERPL-MCNC: 138 MG/DL (ref 65–140)
GLUCOSE SERPL-MCNC: 146 MG/DL (ref 65–140)
GLUCOSE SERPL-MCNC: 148 MG/DL (ref 65–140)
GLUCOSE SERPL-MCNC: 149 MG/DL (ref 65–140)
GLUCOSE SERPL-MCNC: 169 MG/DL (ref 65–140)
GLUCOSE SERPL-MCNC: 174 MG/DL (ref 65–140)
GLUCOSE SERPL-MCNC: 188 MG/DL (ref 65–140)
GLUCOSE SERPL-MCNC: 191 MG/DL (ref 65–140)
GLUCOSE SERPL-MCNC: 197 MG/DL (ref 65–140)
GLUCOSE SERPL-MCNC: 210 MG/DL (ref 65–140)
GLUCOSE SERPL-MCNC: 255 MG/DL (ref 65–140)
HCT VFR BLD AUTO: 30 % (ref 34.8–46.1)
HGB BLD-MCNC: 9 G/DL (ref 11.5–15.4)
IMM GRANULOCYTES # BLD AUTO: >0.5 THOUSAND/UL (ref 0–0.2)
IMM GRANULOCYTES NFR BLD AUTO: 2 % (ref 0–2)
LYMPHOCYTES # BLD AUTO: 0.7 THOUSANDS/ΜL (ref 0.6–4.47)
LYMPHOCYTES NFR BLD AUTO: 3 % (ref 14–44)
MCH RBC QN AUTO: 31.3 PG (ref 26.8–34.3)
MCHC RBC AUTO-ENTMCNC: 30 G/DL (ref 31.4–37.4)
MCV RBC AUTO: 104 FL (ref 82–98)
MONOCYTES # BLD AUTO: 0.64 THOUSAND/ΜL (ref 0.17–1.22)
MONOCYTES NFR BLD AUTO: 3 % (ref 4–12)
NEUTROPHILS # BLD AUTO: 22.64 THOUSANDS/ΜL (ref 1.85–7.62)
NEUTS SEG NFR BLD AUTO: 92 % (ref 43–75)
NRBC BLD AUTO-RTO: 0 /100 WBCS
PLATELET # BLD AUTO: 487 THOUSANDS/UL (ref 149–390)
PMV BLD AUTO: 10.4 FL (ref 8.9–12.7)
POTASSIUM SERPL-SCNC: 3.9 MMOL/L (ref 3.5–5.3)
RBC # BLD AUTO: 2.88 MILLION/UL (ref 3.81–5.12)
SODIUM SERPL-SCNC: 141 MMOL/L (ref 136–145)
WBC # BLD AUTO: 24.61 THOUSAND/UL (ref 4.31–10.16)

## 2019-10-17 PROCEDURE — 99024 POSTOP FOLLOW-UP VISIT: CPT | Performed by: OBSTETRICS & GYNECOLOGY

## 2019-10-17 PROCEDURE — 82948 REAGENT STRIP/BLOOD GLUCOSE: CPT

## 2019-10-17 PROCEDURE — 94640 AIRWAY INHALATION TREATMENT: CPT

## 2019-10-17 PROCEDURE — 80048 BASIC METABOLIC PNL TOTAL CA: CPT | Performed by: NURSE PRACTITIONER

## 2019-10-17 PROCEDURE — G8982 BODY POS GOAL STATUS: HCPCS

## 2019-10-17 PROCEDURE — 94660 CPAP INITIATION&MGMT: CPT

## 2019-10-17 PROCEDURE — 99291 CRITICAL CARE FIRST HOUR: CPT | Performed by: INTERNAL MEDICINE

## 2019-10-17 PROCEDURE — 85025 COMPLETE CBC W/AUTO DIFF WBC: CPT | Performed by: NURSE PRACTITIONER

## 2019-10-17 PROCEDURE — 97163 PT EVAL HIGH COMPLEX 45 MIN: CPT

## 2019-10-17 PROCEDURE — 99232 SBSQ HOSP IP/OBS MODERATE 35: CPT | Performed by: INTERNAL MEDICINE

## 2019-10-17 PROCEDURE — G8981 BODY POS CURRENT STATUS: HCPCS

## 2019-10-17 PROCEDURE — 94669 MECHANICAL CHEST WALL OSCILL: CPT

## 2019-10-17 RX ORDER — FUROSEMIDE 10 MG/ML
40 INJECTION INTRAMUSCULAR; INTRAVENOUS
Status: COMPLETED | OUTPATIENT
Start: 2019-10-17 | End: 2019-10-17

## 2019-10-17 RX ORDER — INSULIN GLARGINE 100 [IU]/ML
16 INJECTION, SOLUTION SUBCUTANEOUS
Status: DISCONTINUED | OUTPATIENT
Start: 2019-10-17 | End: 2019-10-18

## 2019-10-17 RX ORDER — TEMAZEPAM 15 MG/1
15 CAPSULE ORAL
Status: DISCONTINUED | OUTPATIENT
Start: 2019-10-17 | End: 2019-10-18

## 2019-10-17 RX ORDER — MAGNESIUM HYDROXIDE/ALUMINUM HYDROXICE/SIMETHICONE 120; 1200; 1200 MG/30ML; MG/30ML; MG/30ML
30 SUSPENSION ORAL EVERY 4 HOURS PRN
Status: DISCONTINUED | OUTPATIENT
Start: 2019-10-17 | End: 2019-10-18 | Stop reason: HOSPADM

## 2019-10-17 RX ORDER — ACETAMINOPHEN 325 MG/1
650 TABLET ORAL EVERY 6 HOURS PRN
Status: DISCONTINUED | OUTPATIENT
Start: 2019-10-17 | End: 2019-10-18 | Stop reason: HOSPADM

## 2019-10-17 RX ADMIN — TRAVOPROST 1 DROP: 0.04 SOLUTION/ DROPS OPHTHALMIC at 21:20

## 2019-10-17 RX ADMIN — ISODIUM CHLORIDE 3 ML: 0.03 SOLUTION RESPIRATORY (INHALATION) at 19:21

## 2019-10-17 RX ADMIN — DORZOLAMIDE HYDROCHLORIDE AND TIMOLOL MALEATE 1 DROP: 20; 5 SOLUTION/ DROPS OPHTHALMIC at 18:17

## 2019-10-17 RX ADMIN — TRAMADOL HYDROCHLORIDE 50 MG: 50 TABLET, FILM COATED ORAL at 08:09

## 2019-10-17 RX ADMIN — INSULIN GLARGINE 16 UNITS: 100 INJECTION, SOLUTION SUBCUTANEOUS at 21:15

## 2019-10-17 RX ADMIN — ISODIUM CHLORIDE 3 ML: 0.03 SOLUTION RESPIRATORY (INHALATION) at 07:14

## 2019-10-17 RX ADMIN — LEVALBUTEROL HYDROCHLORIDE 1.25 MG: 1.25 SOLUTION, CONCENTRATE RESPIRATORY (INHALATION) at 19:21

## 2019-10-17 RX ADMIN — LEVALBUTEROL HYDROCHLORIDE 1.25 MG: 1.25 SOLUTION, CONCENTRATE RESPIRATORY (INHALATION) at 13:08

## 2019-10-17 RX ADMIN — TRAMADOL HYDROCHLORIDE 50 MG: 50 TABLET, FILM COATED ORAL at 20:13

## 2019-10-17 RX ADMIN — TEMAZEPAM 15 MG: 15 CAPSULE ORAL at 22:25

## 2019-10-17 RX ADMIN — Medication 20 MG: at 08:09

## 2019-10-17 RX ADMIN — APIXABAN 5 MG: 5 TABLET, FILM COATED ORAL at 08:10

## 2019-10-17 RX ADMIN — ISODIUM CHLORIDE 3 ML: 0.03 SOLUTION RESPIRATORY (INHALATION) at 13:08

## 2019-10-17 RX ADMIN — FUROSEMIDE 40 MG: 10 INJECTION, SOLUTION INTRAMUSCULAR; INTRAVENOUS at 16:42

## 2019-10-17 RX ADMIN — ALUMINUM HYDROXIDE, MAGNESIUM HYDROXIDE, AND SIMETHICONE 30 ML: 200; 200; 20 SUSPENSION ORAL at 11:32

## 2019-10-17 RX ADMIN — LEVALBUTEROL HYDROCHLORIDE 1.25 MG: 1.25 SOLUTION, CONCENTRATE RESPIRATORY (INHALATION) at 07:14

## 2019-10-17 RX ADMIN — METOPROLOL TARTRATE 25 MG: 25 TABLET, FILM COATED ORAL at 21:15

## 2019-10-17 RX ADMIN — METOPROLOL TARTRATE 25 MG: 25 TABLET, FILM COATED ORAL at 08:10

## 2019-10-17 RX ADMIN — DORZOLAMIDE HYDROCHLORIDE AND TIMOLOL MALEATE 1 DROP: 20; 5 SOLUTION/ DROPS OPHTHALMIC at 08:10

## 2019-10-17 RX ADMIN — APIXABAN 5 MG: 5 TABLET, FILM COATED ORAL at 18:22

## 2019-10-17 RX ADMIN — ACETAMINOPHEN 650 MG: 325 TABLET ORAL at 11:31

## 2019-10-17 RX ADMIN — FUROSEMIDE 40 MG: 10 INJECTION, SOLUTION INTRAMUSCULAR; INTRAVENOUS at 12:10

## 2019-10-17 NOTE — PROGRESS NOTES
Progress Note - Critical Care   Margaret Chirinos 82 y o  female MRN: [id number]  Unit/Bed#: ICU 02 Encounter: [id number]    Assessment/Plan:  1  Acute Hypoxic Respiratory Failure likely secondary to pulmonary edema, atelectasis, and aspiration PNA  · S/p 9 day abx course - continue to monitor off abx per ID  · S/p Bronchoscopy yesterday with suctioning of large amount of b/l lower lobe secretions  · Continue IV diureses for goal negative fluid balance  · Continue to HFNC and PRN bipap, wean for SpO2>90  · Encourage cough, deep breathing, IS  · Encourage OOB  2  Sepsis secondary to aspiration PNA  · Has remained afebrile, HD stable, with stable WBC  · Procalcitonin trending down   · S/p 9 day course of abx  · Continue to monitor off abx per ID  · S/p bronchoscopy yesterday - follow up on cultures  · Continue NPO with TF to minimize risk of further incidence of aspiration  3  Paroxysmal AFib with RVR, now in NSR  · Continue Lopressor  · Continue Eliquis for AC  4  POD 13 Ex Lap with TLH and BSO for R tubo-Ovarian Mass  · Management per Gyn-Oncology  5  Type 1 Diabetes Mellitus  · Continue Q6H glucose checks and Insulin gtt   6  Hypernatremia in the setting of free water deficit  · Improving, continue to trend sodium on BMP  · Continue TF with 200 FWF Q4H    Critical Care Time: 32 minutes  Documented critical care time excludes any procedures documented elsewhere  It also excludes any family updates    _____________________________________________________________________    HPI/24hr events:   CXR obtained yesterday for increased work of breathing  This showed increase in b/l pulmonary opacities consistent with pulmonary edema  She received 40mg IV lasix x2 doses yesterday with good response  She also underwent bronchoscopy with clearance of secretions in bilateral lower lobes       Medications:    Current Facility-Administered Medications:  apixaban 5 mg Oral BID Debria Surendra Bilofleann, CRNP    artificial tear  Both Eyes HS PRN JIM Moreno    dorzolamide-timolol 1 drop Both Eyes BID JIM Moreno    influenza vaccine 0 5 mL Intramuscular Prior to discharge Lazara Clemente,     insulin regular (HumuLIN R,NovoLIN R) infusion 0 3-21 Units/hr Intravenous Titrated JIM Moreno Last Rate: 1 Units/hr (10/17/19 0015)   lactated ringers 1,000 mL Intravenous Once JIM Moreno    levalbuterol 1 25 mg Nebulization TID Vaibhav Asa, DO    melatonin 3 mg Oral HS Charu CallesJIM pearce    metoprolol tartrate 25 mg Oral Q12H [address] Berkley K BilofskJIM mooney    Netarsudil Dimesylate 1 drop Right Eye HS JIM Wallace    omeprazole (PRILOSEC) suspension 2 mg/mL 20 mg Oral Daily Berkleynavi VargheseofJIM noriega    ondansetron 4 mg Intravenous Q6H PRN Portia JIM Hidalgo    polyethylene glycol 17 g Oral Daily PRN Portia JIM Hidalgo    sodium chloride 3 mL Nebulization TID Lazara Clemente, DO    traMADol 50 mg Oral Q6H PRN Marivn Beverage, CRNP    travoprost 1 drop Both Eyes HS JIM Wallace          insulin regular (HumuLIN R,NovoLIN R) infusion 0 3-21 Units/hr Last Rate: 1 Units/hr (10/17/19 0015)         Physical exam:  Vitals: Body mass index is 24 98 kg/m²  Blood pressure 134/57, pulse 68, temperature (!) 96 2 °F (35 7 °C), temperature source Temporal, resp   rate 16, height 5' 5" (1 651 m), weight 68 1 kg (150 lb 2 1 oz), SpO2 98 %, not currently breastfeeding ,  Temp  Min: 96 2 °F (35 7 °C)  Max: 100 2 °F (37 9 °C)  IBW: 57 kg    SpO2: 98 %  SpO2 Activity: At Rest  O2 Device: High flow nasal cannula      Intake/Output Summary (Last 24 hours) at 10/17/2019 0113  Last data filed at 10/16/2019 2200  Gross per 24 hour   Intake 2171 57 ml   Output 1503 ml   Net 668 57 ml       Invasive/non-invasive ventilation settings:   Respiratory    Lab Data (Last 4 hours)    None         O2/Vent Data (Last 4 hours)      10/16 3989          Non-Invasive Ventilation Mode HFNC                 Invasive Devices     Peripherally Inserted Central Catheter Line            PICC Line 17/71/74 Left Basilic 2 days          Drain            Rectal Tube With balloon 8 days    External Urinary Catheter Other (Comment) 5 days    NG/OG/Enteral Tube Nasogastric 14 Fr Right nares 3 days                  Physical Exam:  Gen: Lethargic, Opens eyes to voice, No acute distress  HEENT: Normocephalic, Atraumatic, PERRL, Oropharynx clear, Nasogastric tube present  Neck: Supple, Trachea Midline  Chest: Lungs diminished bilaterally  Cor: S1/S2  NSR rate 80  No murmur, rub, gallop  Abd: soft, abdomen nontender and nondistended  Ext: +2 pulses, +1 pitting edema in b/l upper and lower extremities  Neuro: Alert, Oriented, Weak, but follows commands with equal strength in all extremities  Skin: clean, dry, intact      Diagnostic Data:  Lab: I have personally reviewed pertinent lab results  CBC:   Results from last 7 days   Lab Units 10/16/19  0431 10/15/19  0536 10/14/19  0549   WBC Thousand/uL 23 52* 22 91* 24 34*   HEMOGLOBIN g/dL 8 7* 9 2* 9 5*   HEMATOCRIT % 29 3* 29 5* 29 5*   PLATELETS Thousands/uL 437* 425* 404*       CMP:   Results from last 7 days   Lab Units 10/16/19  0431 10/15/19  0536 10/14/19  0549   SODIUM mmol/L 145 145 148*   POTASSIUM mmol/L 4 2 2 7* 3 7   CHLORIDE mmol/L 106 107 109*   CO2 mmol/L 35* 34* 32   BUN mg/dL 26* 25 29*   CREATININE mg/dL 0 72 0 75 0 84   CALCIUM mg/dL 6 6* 6 9* 7 2*     PT/INR:   No results found for: PT, INR,   Magnesium:   Results from last 7 days   Lab Units 10/12/19  0602   MAGNESIUM mg/dL 2 3     Phosphorous:       Microbiology:        Imaging:  CXR shows b/l pulmonary opacities consistent with pulmonary edema    Cardiac lab/EKG/telemetry/ECHO:   NSR rate 81    VTE Prophylaxis: Eliquis    Code Status: Level 1 - Full Code    Glenroy Fuchs, CRNP    Portions of the record may have been created with voice recognition software   Occasional wrong word or "sound a like" substitutions may have occurred due to the inherent limitations of voice recognition software  Read the chart carefully and recognize, using context, where substitutions have occurred

## 2019-10-17 NOTE — UTILIZATION REVIEW
Continued Stay Review    Date:    10/17/2019        Current Patient Class:   INpatient  Current Level of Care:    ICU    Assessment/Plan: 82 y o  female,   initially admitted on 10/04 WITH ACUTE HYPOXIC RESPIRATORY FAILURE SEC TP ASPIRATION PNA    Cont  ICU  LOC D/T   IV  Diuretics,  HFNC and  BiPap  PRN  Bronchoscopy  10/16,  F/U  Cultures  Cont  TF   Q 4 hrs  Monitor  Labs,,  Currently  Off  GEETA  Remains  NPO  LTAC  Placement  When  Stable  Pertinent Labs/Diagnostic Results:   Ref Range & Units 10/17/19 0547    WBC 4 31 - 10 16 Thousand/uL 24  61High     RBC 3 81 - 5 12 Million/uL 2 88Low     Hemoglobin 11 5 - 15 4 g/dL 9 0Low     Hematocrit 34 8 - 46 1 % 30  0Low     MCV 82 - 98 fL 104High     MCH 26 8 - 34 3 pg 31 3    MCHC 31 4 - 37 4 g/dL 30 0Low     RDW 11 6 - 15 1 % 14 0    MPV 8 9 - 12 7 fL 10 4    Platelets 134 - 606 Thousands/uL 487High       Ref Range & Units 10/17/19 0546    Sodium 136 - 145 mmol/L 141    Potassium 3 5 - 5 3 mmol/L 3 9    Chloride 100 - 108 mmol/L 102    CO2 21 - 32 mmol/L 36High     ANION GAP 4 - 13 mmol/L 3Low     BUN 5 - 25 mg/dL 25    Creatinine 0 60 - 1 30 mg/dL 0 72    Comment: Standardized to IDMS reference method   Glucose 65 - 140 mg/dL 191High           Vital Signs:   10/17/19 1118  98 2 °F (36 8 °C)                 10/17/19 1111    76  33Abnormal   120/57  87  91 %       10/17/19 1011    72  28Abnormal   131/64  92  93 %       10/17/19 0911    76  31Abnormal   141/62  93  94 %       10/17/19 0811    90  28Abnormal   148/62  82  100 %       10/17/19 0711    84  29Abnormal   147/65  93  97 %       10/17/19 0659  98 5 °F (36 9 °C)                       Medications:   Scheduled Meds:     Medications:  apixaban 5 mg Oral BID   dorzolamide-timolol 1 drop Both Eyes BID   furosemide 40 mg Intravenous BID (diuretic)   insulin glargine 16 Units Subcutaneous HS   lactated ringers 1,000 mL Intravenous Once   levalbuterol 1 25 mg Nebulization TID metoprolol tartrate 25 mg Oral Q12H [facility]   Netarsudil Dimesylate 1 drop Right Eye HS   omeprazole (PRILOSEC) suspension 2 mg/mL 20 mg Oral Daily   sodium chloride 3 mL Nebulization TID   travoprost 1 drop Both Eyes HS       acetaminophen 650 mg Oral Q6H PRN   aluminum-magnesium hydroxide-simethicone 30 mL Oral Q4H PRN   artificial tear  Both Eyes HS PRN   influenza vaccine 0 5 mL Intramuscular Prior to discharge   ondansetron 4 mg Intravenous Q6H PRN   polyethylene glycol 17 g Oral Daily PRN   traMADol 50 mg Oral Q6H PRN       insulin regular (HumuLIN R,NovoLIN R) infusion 0 3-21 Units/hr Intravenous Titrated         Discharge Plan:    [address] Utilization Review Department  Phone: [phone]; Fax [phone]  [email or web address]  ATTENTION: Please call with any questions or concerns to [phone]  and carefully listen to the prompts so that you are directed to the right person  Send all requests for admission clinical reviews, approved or denied determinations and any other requests to fax [phone]   All voicemails are confidential

## 2019-10-17 NOTE — PROGRESS NOTES
Progress Note - Gynecologic Oncology   Jovan Polk 82 y o  female MRN: [id number]  Unit/Bed#: ICU 02 Encounter: [id number]    Assessment / Plan:    1  Acute hypoxic respiratory failure secondary to aspiration pneumonia- Required short term BiPAP yesterday  Otherwise doing well on high flow NC  S/p bronch yesterday  Plan for LTAC transfer once bed available  Considered stable now by ICU team      2  Stage IB ovarian cancer- Good prognosis      3  Severe protein calorie malnutrition as evidence by no oral intake over more than 7 days   On TFs  Tolerates well  Consider change NGT to Dobhoff      4  Atrial fibrillation with rapid ventricular response- Now rate controlled  On Metoprolol and Apixaban  5  Right upper extremity edema / phlegmasia- No acute arterial insufficiency on CT angiogram   Appreciate vascular surgery recommendations  Markedly improved  Subjective/Objective       Subjective: Alert, awake and communicative  C/o throat soreness from NGT  No abdominal pain  Reports BM  Objective:     Blood pressure 134/57, pulse 68, temperature (!) 96 2 °F (35 7 °C), temperature source Temporal, resp  rate 16, height 5' 5" (1 651 m), weight 63 6 kg (140 lb 3 4 oz), SpO2 98 %, not currently breastfeeding  ,Body mass index is 23 33 kg/m²  Intake/Output Summary (Last 24 hours) at 10/17/2019 0558  Last data filed at 10/17/2019 0400  Gross per 24 hour   Intake 2167 32 ml   Output 1503 ml   Net 664 32 ml       Invasive Devices     Peripherally Inserted Central Catheter Line            PICC Line 97/45/40 Left Basilic 2 days          Drain            Rectal Tube With balloon 8 days    External Urinary Catheter Other (Comment) 5 days    NG/OG/Enteral Tube Nasogastric 14 Fr Right nares 3 days                Physical Exam: /57   Pulse 68   Temp (!) 96 2 °F (35 7 °C) (Temporal)   Resp 16   Ht 5' 5" (1 651 m)   Wt 63 6 kg (140 lb 3 4 oz)   SpO2 98%   Breastfeeding?  No   BMI 23 33 kg/m²     General Appearance:    Alert, cooperative, no distress, appears stated age   Abdomen:     Soft, non-tender, bowel sounds active all four quadrants,     no masses, no organomegaly  Incision C/D/I  Extremities: LUE edema and ecchymoses at fingertips improved     Skin:   Skin color, texture, turgor normal, no rashes or lesions       Recent Results (from the past 24 hour(s))   Fingerstick Glucose (POCT)    Collection Time: 10/16/19  6:26 AM   Result Value Ref Range    POC Glucose 159 (H) 65 - 140 mg/dl   Fingerstick Glucose (POCT)    Collection Time: 10/16/19  8:06 AM   Result Value Ref Range    POC Glucose 181 (H) 65 - 140 mg/dl   Fingerstick Glucose (POCT)    Collection Time: 10/16/19 10:16 AM   Result Value Ref Range    POC Glucose 156 (H) 65 - 140 mg/dl   Fingerstick Glucose (POCT)    Collection Time: 10/16/19 12:12 PM   Result Value Ref Range    POC Glucose 274 (H) 65 - 140 mg/dl   Fingerstick Glucose (POCT)    Collection Time: 10/16/19 12:15 PM   Result Value Ref Range    POC Glucose 278 (H) 65 - 140 mg/dl   Fingerstick Glucose (POCT)    Collection Time: 10/16/19  1:58 PM   Result Value Ref Range    POC Glucose 207 (H) 65 - 140 mg/dl   Fingerstick Glucose (POCT)    Collection Time: 10/16/19  4:15 PM   Result Value Ref Range    POC Glucose 109 65 - 140 mg/dl   Fingerstick Glucose (POCT)    Collection Time: 10/16/19  6:00 PM   Result Value Ref Range    POC Glucose 145 (H) 65 - 140 mg/dl   Fingerstick Glucose (POCT)    Collection Time: 10/16/19  7:54 PM   Result Value Ref Range    POC Glucose 220 (H) 65 - 140 mg/dl   Fingerstick Glucose (POCT)    Collection Time: 10/16/19  9:54 PM   Result Value Ref Range    POC Glucose 230 (H) 65 - 140 mg/dl   Fingerstick Glucose (POCT)    Collection Time: 10/17/19 12:08 AM   Result Value Ref Range    POC Glucose 148 (H) 65 - 140 mg/dl   Fingerstick Glucose (POCT)    Collection Time: 10/17/19  2:23 AM   Result Value Ref Range    POC Glucose 146 (H) 65 - 140 mg/dl   Fingerstick Glucose (POCT)    Collection Time: 10/17/19  4:10 AM   Result Value Ref Range    POC Glucose 169 (H) 65 - 140 mg/dl   CBC and differential    Collection Time: 10/17/19  5:47 AM   Result Value Ref Range    WBC 24 61 (H) 4 31 - 10 16 Thousand/uL    RBC 2 88 (L) 3 81 - 5 12 Million/uL    Hemoglobin 9 0 (L) 11 5 - 15 4 g/dL    Hematocrit 30 0 (L) 34 8 - 46 1 %     (H) 82 - 98 fL    MCH 31 3 26 8 - 34 3 pg    MCHC 30 0 (L) 31 4 - 37 4 g/dL    RDW 14 0 11 6 - 15 1 %    MPV 10 4 8 9 - 12 7 fL    Platelets 401 (H) 792 - 390 Thousands/uL   ]    Lab, Imaging and other studies:I have personally reviewed pertinent lab results  VTE Pharmacologic Prophylaxis: Apixaban Rx for A Fib    VTE Mechanical Prophylaxis: sequential compression device     Serenity Maldonado MD, [place], [place]  10/17/2019  6:03 AM

## 2019-10-17 NOTE — PROGRESS NOTES
Progress Note - Cardiology   Moira Payment 82 y o  female MRN: [id number]  Unit/Bed#: ICU 02 Encounter: [id number]      Assessment/Recommendations/Discussion:   1  Acute hypoxic respiratory failure secondary to aspiration pneumonia/pneumonitis  2  Sepsis syndrome  3  Paroxysmal atrial fibrillation  4  Status post total hysterectomy, bilateral salpingectomy 10/04/2019, postoperative aspiration and PA arrest    · Continue metoprolol 25 mg twice daily  Rhythm remained sinus  · Apixaban anticoagulation for stroke prevention  · Weight significantly above baseline with positive fluid status  Would continue furosemide 40 mg IV b i d  Around the clock  Follow renal function, potassium, blood pressure        Subjective:  Patient seen and examined, awake and alert, denies pain          Physical Exam:  GEN:  NAD  HEENT:  MMM, NCAT, pink conjunctiva, EOMI, nonicteric sclera  CV:  NO JVD/HJR, RR, NO M/R/G, +S1/S2, NO PARASTERNAL HEAVE/THRILL, ++LE and upper extremity EDEMA, NO HEPATIC SYSTOLIC PULSATION, WARM EXTREMITIES  RESP:  Mildly coarse bilaterally anteriorly  ABD:  SOFT, NT, NO GROSS ORGANOMEGALY        Vitals:   /65   Pulse 84   Temp 98 5 °F (36 9 °C) (Temporal)   Resp (!) 29   Ht 5' 5" (1 651 m)   Wt 63 6 kg (140 lb 3 4 oz)   SpO2 97%   Breastfeeding?  No   BMI 23 33 kg/m²   Vitals:    10/16/19 0643 10/17/19 0547   Weight: 68 1 kg (150 lb 2 1 oz) 63 6 kg (140 lb 3 4 oz)       Intake/Output Summary (Last 24 hours) at 10/17/2019 0916  Last data filed at 10/17/2019 0800  Gross per 24 hour   Intake 2888 43 ml   Output 1953 ml   Net 935 43 ml       TELEMETRY:  Sinus rhythm paroxysmal atrial fibrillation noted  Lab Results:  Results from last 7 days   Lab Units 10/17/19  0547   WBC Thousand/uL 24 61*   HEMOGLOBIN g/dL 9 0*   HEMATOCRIT % 30 0*   PLATELETS Thousands/uL 487*     Results from last 7 days   Lab Units 10/17/19  0546   POTASSIUM mmol/L 3 9   CHLORIDE mmol/L 102   CO2 mmol/L 36*   BUN mg/dL 25 CREATININE mg/dL 0 72   CALCIUM mg/dL 6 7*     Results from last 7 days   Lab Units 10/17/19  0546   POTASSIUM mmol/L 3 9   CHLORIDE mmol/L 102   CO2 mmol/L 36*   BUN mg/dL 25   CREATININE mg/dL 0 72   CALCIUM mg/dL 6 7*           Medications:    Current Facility-Administered Medications:     apixaban (ELIQUIS) tablet 5 mg, 5 mg, Oral, BID, JIM Kim, 5 mg at 10/17/19 0810    artificial tear (LUBRIFRESH P M ) ophthalmic ointment, , Both Eyes, HS PRN, JIM Avelar    dorzolamide-timolol (COSOPT) 22 3-6 8 MG/ML ophthalmic solution 1 drop, 1 drop, Both Eyes, BID, JIM Wallace, 1 drop at 10/17/19 0810    influenza vaccine, high-dose (FLUZONE HIGH-DOSE) IM injection ERIKA 0 5 mL, 0 5 mL, Intramuscular, Prior to discharge, Lazara Clemente DO    insulin glargine (LANTUS) subcutaneous injection 16 Units 0 16 mL, 16 Units, Subcutaneous, HS, Rosio Quinones MD    insulin regular (HumuLIN R,NovoLIN R) 1 Units/mL in sodium chloride 0 9 % 100 mL infusion, 0 3-21 Units/hr, Intravenous, Titrated, JIM Wallace, Last Rate: 6 mL/hr at 10/17/19 0802, 6 Units/hr at 10/17/19 0802    lactated ringers bolus 1,000 mL, 1,000 mL, Intravenous, Once, JIM Avelar    levalbuterol [facility]) inhalation solution 1 25 mg, 1 25 mg, Nebulization, TID, Kip Fragmin, DO, 1 25 mg at 10/17/19 0714    metoprolol tartrate (LOPRESSOR) tablet 25 mg, 25 mg, Oral, Q12H [address], JIM Kim, 25 mg at 10/17/19 0810    Netarsudil Dimesylate 0 02 % SOLN 1 drop, 1 drop, Right Eye, HS, JIM Wallace, 1 drop at 10/16/19 2117    omeprazole (PRILOSEC) suspension 2 mg/mL, 20 mg, Oral, Daily, Melissa LapJIM Prescott, 20 mg at 10/17/19 0809    ondansetron (ZOFRAN) injection 4 mg, 4 mg, Intravenous, Q6H PRN, JIM Avelar, 4 mg at 10/07/19 0541    polyethylene glycol (MIRALAX) packet 17 g, 17 g, Oral, Daily PRN, JIM Avelar, 17 g at 10/15/19 1748    sodium chloride 0 9 % inhalation solution 3 mL, 3 mL, Nebulization, TID, Lazara WOODS Dostal, DO, 3 mL at 10/17/19 0714    traMADol (ULTRAM) tablet 50 mg, 50 mg, Oral, Q6H PRN, JIM Rodrigez, 50 mg at 10/17/19 0809    travoprost (TRAVATAN-Z) 0 004 % ophthalmic solution 1 drop, 1 drop, Both Eyes, HS, JIM Wallace, 1 drop at 10/16/19 4612    This note was completed in part utilizing M-Modal Fluency Direct Software  Grammatical errors, random word insertions, spelling mistakes, and incomplete sentences may be an occasional consequence of this system secondary to software limitations, ambient noise, and hardware issues  If you have any questions or concerns about the content, text, or information contained within the body of this dictation, please contact the provider for clarification

## 2019-10-17 NOTE — PLAN OF CARE
Problem: PHYSICAL THERAPY ADULT  Goal: Performs mobility at highest level of function for planned discharge setting  See evaluation for individualized goals  Description  Treatment/Interventions: LE strengthening/ROM, Therapeutic exercise, Endurance training, Patient/family training, Bed mobility, Continued evaluation, Spoke to nursing, OT, Family  Equipment Recommended: Wheelchair, Other (Comment)(jody lift)       See flowsheet documentation for full assessment, interventions and recommendations  Note:   Prognosis: Guarded  Problem List: Decreased strength, Decreased endurance, Impaired balance, Decreased mobility, Pain  Assessment: Pt  84 y  o female admitted for Ovarian cancer [facility]) w/ Right tubo-ovarian mass N83 8  S/pelective total hysterectomy, bilateral salpingectomy on 10/04/2019  Hospital course complicated by post-op aspiration, cardiac arrest requiring CPR, acute hypoxic respiratory failure requiring intubation, sepsis syndrome & PAF  Pt successfully extubated on 10/11/19  Pt referred to PT for mobility assessment & D/C planning  PTA, pt reports being I w/o AD  Pt lives alone in a 1 story house w/ 3STE  On eval, pt demonstrate significant dec mobility, balance, endurance & amb  (+) BUE & BLE weakness & poor trunk control in static sitting  Pt OOB in recliner upon my arrival  Nsg staff have been using the jody lift to transfer pt OOB<>recliner  Offered to attempt to stand but pt declined  RN reported that they tried to stand pt this morning & pt unable to tolerate & was sliding OOB instead but did not fall  (+) poor trunk control, pt able to sit up from reclined position w/ modAx1 & able to maintain position approx  30secs at a time  Pt tolerated thera  ex well, AROM except hip flexion AAROM  Pt easily fatigues hence require frequent rest periods t/o session  No dizziness reported t/o session   Nsg staff most recent vital signs as follows: /57   Pulse 76   Temp 98 2 °F (36 8 °C) (Temporal) Resp (!) 33   Ht 5' 5" (1 651 m)   Wt 63 6 kg (140 lb 3 4 oz)   SpO2 91%   Breastfeeding? No   BMI 23 33 kg/m²   At end of session, pt remain OOB in chair w/o issues, call bell & phone in reach  Fall precautions reinforced w/ good understanding  Pt functioning below baseline hence will continue skilled PT to improve function & safety  PT to see when pt appropriate for functional transfers & amb  Will continue to recommend jody lift from safe transfers OOB<>recliner  At this time, due to above mentioned deficits, home alone, advanced age & high risk for falls, pt will benefit from inpt rehab at D/C  CM to follow  Nsg staff to continue to mobilized pt as tolerated to prevent further decline in function  Nsg notified  Barriers to Discharge: Decreased caregiver support  Barriers to Discharge Comments: home alone  Recommendation: Short-term skilled PT     PT - OK to Discharge: Yes(to inpt rehab when medically cleared)    See flowsheet documentation for full assessment

## 2019-10-17 NOTE — PHYSICAL THERAPY NOTE
PT EVALUATION    Pt  Name: Dave Duty  Pt  Age: 82 y o    MRN: [id number]  LENGTH OF STAY: 13    Patient Active Problem List   Diagnosis    Adult onset hypothyroidism    ANAYA (generalized anxiety disorder)    Hyperlipidemia    Hypertension    Retinopathy, diabetic, proliferative ([facility] )    Type 1 diabetes mellitus with diabetic neuropathy (HCC)    Diabetic neuropathy, painful (HCC)    Age-related osteoporosis without current pathological fracture    Hyponatremia    Ovarian cancer (HCC)    Type 1 diabetes mellitus with hyperglycemia (HCC)    Internal jugular (IJ) vein thromboembolism, acute, right (HCC)    Superficial thrombophlebitis of right upper extremity    Sepsis ([facility] )    A-fib ([place] [address] )       Admitting Diagnoses:   Right tubo-ovarian mass [N83 8]    Past Medical History:   Diagnosis Date    Anxiety     Arthritis     Constipation     Diabetes mellitus (HCC)     IDDM    Frequent UTI     Glaucoma     Hearing loss     Hyperlipidemia     Hypertension     Hyperthyroidism     in past    Hyponatremia     Hypothyroid     Neuropathy     bles    Right tubo-ovarian mass     Wears glasses        Past Surgical History:   Procedure Laterality Date    APPENDECTOMY  1956    CATARACT EXTRACTION Bilateral     COLONOSCOPY  09/2014    Completed - Dr Mallory Claudio, due in 5 years    HYSTERECTOMY N/A 10/4/2019    Procedure: LAP TOTAL HYSTERECTOMY, BSO;  Surgeon: Christian Nunes MD;  [facility];  Service: Gynecology Oncology    LAPAROTOMY N/A 10/4/2019    Procedure: EXPLORATORY LAPAROTOMY  EXLAP OMENTECTOMY  PELVIC AND DEANGELO-AORTIC LYMPH NODE DISSECTION  PERITONEAL BIOPSY TRANS ABDOMINUS BLOCK;  Surgeon: Christian Nunes MD;  [facility];  Service: Gynecology Oncology    SKIN LESION EXCISION      Ears       Imaging Studies:  XR chest portable   Final Result by Ulysses Burns MD (10/17 1424)      Stable appearance of diffuse infiltrates and line and tube Workstation performed: [id number]         XR chest portable   Final Result by Jimi Davenport MD (10/16 1124)      Interval increase in bilateral pulmonary opacities likely representing pulmonary edema  Workstation performed: [id number]         XR chest portable   Final Result by Taco Veliz MD (10/14 1020)      Feeding tube coursing below left hemidiaphragm with tip not visualized  Haziness of the lungs bilaterally appears overall mildly improved since recent CT allowing for differences in technique  Workstation performed: [id number]         CTA upper extremity right w wo contrast   Final Result by Rhonda Ramos MD (10/15 1724)      Slow flow into the right upper extremity  No filling of the arteries in the distal forearm or hand  This can be seen with multiple conditions  This would include simply when the extremities are cold  This could also be seen there is diffuse    abnormalities of the inflow, such as occlusion of all 3 forearm arteries  Certainly there appears to be occlusion of the radial artery  This can also be seen if there is abnormality of the venous outflow, including venous access attempts on the dorsal    and ventral hand  It would probably be good to get a Doppler of the forearm arteries  This could be used to confirm that there is slow flow in the arteries, not total occlusion  Pneumonitis and right lower lobe collapse with endobronchial process      Tiny amount of venous clot of doubtful significance      Obstructed right lower lobe bronchus      The study was marked in EPIC for immediate notification  Workstation performed: [id number]         CT soft tissue neck w contrast   Final Result by Brigitte Torres MD (10/13 1330)      1  Small, peripheral, nonocclusive thrombus in the right common jugular vein  Previously seen right IJ catheter is no longer present  2   Bilateral mastoid effusions without evidence for erosive mastoiditis  Correlate for retroauricular pain  3   Ground glass opacities at the lung apices could represent pulmonary edema or atypical infection  The study was marked in EPIC for significant notification  Workstation performed: [id number]         CT chest abdomen pelvis w contrast   Final Result by Aga Mcrae MD (10/13 1419)      1  Increasing central predominant groundglass opacities could represent edema or infection  Lack of intubation speaks against ARDS  2   Extensive mucous plugging in the posterior and medial basilar segmental bronchi of the right lower lobe with resultant postobstructive atelectasis and pneumonia  3   Unchanged moderate-sized bilateral pleural effusions, mild ascites, and anasarca  4   Postsurgical blood products again demonstrated within the pelvis  No significant change from prior or evidence for active extravasation  Workstation performed: [id number]         CT head wo contrast   Final Result by Aga Mcrae MD (10/13 1320)      Bilateral mastoid effusions  No findings of erosive mastoiditis  Correlate for retroauricular pain  No acute intracranial findings  Workstation performed: [id number]         XR chest portable   Final Result by Serena Luna MD (10/13 1918)   Right greater than left patchy airspace opacities remain stable and are again concerning for infiltrates  Workstation performed: [id number]         XR chest portable   Final Result by Anand Vines MD (10/12 1933)      Worsening infiltrates on the right  Stable infiltrates on the left  The study was marked in [facility] for immediate notification  Workstation performed: [id number]         XR chest portable   Final Result by Nicole Harden DO (10/11 0842)      Lines and tubes as above  No pneumothorax        Patchy diffuse bilateral pulmonary airspace opacities appear stable or slightly improved with the exception of perhaps slight progression in the left upper lung zone  Workstation performed: [id number]         VAS upper limb venous duplex scan, unilateral/limited   Final Result by Nicole Chong MD (10/10 1626)      XR chest portable   Final Result by Hugo No MD (10/09 1039)      Endotracheal tube tip 1 8 cm above the aaron      Continued multifocal airspace opacities, without significant change since 10/7/2019            Workstation performed: [id number]         CT chest abdomen pelvis wo contrast   Final Result by  (10/17 1173)   Addendum 1 of 1 by Massiel Douglass MD (10/08 4973)   ADDENDUM:      Dense perihilar opacities may be due to aspiration and/or ARDS  Final      XR chest portable   Final Result by Eneida Austin MD (10/08 6503)         1  Lines and tubes stable  2   Persistent bilateral airspace opacities more consolidative in the left lower lobe, likely infectious versus atypical edema  Workstation performed: [id number]         CT head wo contrast   Final Result by Mona Ferrer MD (10/07 2022)      No acute intracranial hemorrhage, mass effect or edema  Workstation performed: [id number]         XR chest portable ICU   Final Result by Mona Ferrer MD (10/08 2008)      1  Multifocal parenchymal opacities may represent multifocal pneumonia versus ARDS versus pulmonary alveolar edema  2   Subcutaneous emphysema  Workstation performed: [id number]         XR abdomen 1 view kub   Final Result by Maikel Cardenas MD (10/08 9354)      Marked gastric distention  Workstation performed: [id number]         CTA chest pe study   Final Result by Melvin Wong MD (10/07 1727)         1  No evidence of pulmonary embolism  Chest wall emphysema, of uncertain etiology  Recommend correlation with clinical history  2   Moderate bilateral pleural effusions and mild compressive bibasilar atelectasis with mosaic attenuation, correlate for congestive heart failure     3   Scattered 5-6mmgroundglass nodules and calcified granulomas  These findings are likely post infectious/inflammatory  Follow-up CT chest evaluation in 3 months is recommended  4   Hiatal hernia with marked gastric and esophageal distention which appears to be debris filled  Gastric outlet obstruction not excluded  5   Nodular liver surface and abdominal ascites  Given the patient's history of a complex right adnexal mass, peritoneal carcinomatosis is not excluded  Consider endogastric placement and additional imaging of the abdomen and pelvis  I personally discussed this study with Ms  Brianda Murdock, the covering nurse in the ICU, on 10/7/2019 at 5:26 PM                             Workstation performed: [id number]              10/17/19 1133   Note Type   Note type Eval/Treat   Pain Assessment   Pain Score Worst Possible Pain   Pain Type Acute pain   Pain Location Chest   Pain Orientation Anterior   Hospital Pain Intervention(s) Medication (See MAR); Repositioned; Ambulation/increased activity; Emotional support; Rest   Home Living   Type of [address] One level;Stairs to enter with rails  (3STE)   Bathroom Shower/Tub Walk-in shower   Bathroom Equipment Grab bars in shower; Shower chair   Home Equipment Walker;Cane   Prior Function   Level of [place] Independent with ADLs and functional mobility   Lives With Alone   Receives Help From Family   ADL Assistance Independent   Falls in the last 6 months 0   Restrictions/Precautions   Weight Bearing Precautions Per Order No   Other Precautions Multiple lines;Telemetry;O2;Fall Risk;Pain  (NGT; hi-flow O2)   General   Family/Caregiver Present Yes  (daughter)   Cognition   Arousal/Participation Alert   Attention Attends with cues to redirect   Orientation Level Oriented to person;Oriented to place   Following Commands Follows one step commands without difficulty   RUE Assessment   RUE Assessment WFL  (3/5 grossly)   LUE Assessment   LUE Assessment WFL  (3/5 grossly) RLE Assessment   RLE Assessment X   Strength RLE   R Hip Flexion 2+/5   R Knee Extension 3+/5   R Ankle Dorsiflexion 3-/5   R Ankle Plantar Flexion 3-/5   R Hip ABduction 3-/5   LLE Assessment   LLE Assessment X   Strength LLE   L Hip Flexion 2/5   L Knee Extension 3+/5   L Hip ABduction 3-/5   L Ankle Dorsiflexion 3-/5   L Ankle Plantar Flexion 3-/5   Bed Mobility   Supine to Sit Unable to assess  (pt OOB in recliner pre & post session)   Additional Comments Pt able to sit up from reclined position w/ modAx1 approx  30sec   Transfers   Sit to Stand Unable to assess  (pt declined standing at this time; poor trunk control )   Additional Comments continue jody lift from transfers   Ambulation/Elevation   Gait pattern Not appropriate   Balance   Static Sitting Poor +   Dynamic Sitting Poor   Static Standing Zero   Activity Tolerance   Activity Tolerance Patient limited by fatigue;Patient limited by pain   Nurse Made Aware CASSIDY Judd   Assessment   Prognosis Guarded   Problem List Decreased strength;Decreased endurance; Impaired balance;Decreased mobility;Pain   Assessment Pt  82 y  o female admitted for Ovarian cancer [facility]) w/ Right tubo-ovarian mass N83 8  S/pelective total hysterectomy, bilateral salpingectomy on 10/04/2019  Hospital course complicated by post-op aspiration, cardiac arrest requiring CPR, acute hypoxic respiratory failure requiring intubation, sepsis syndrome & PAF  Pt successfully extubated on 10/11/19  Pt referred to PT for mobility assessment & D/C planning  PTA, pt reports being I w/o AD  Pt lives alone in a 1 story house w/ 3STE  On eval, pt demonstrate significant dec mobility, balance, endurance & amb  (+) BUE & BLE weakness & poor trunk control in static sitting  Pt OOB in recliner upon my arrival  [facility] [place] using the jody lift to transfer pt OOB<>recliner  Offered to attempt to stand but pt declined   RN reported that they tried to stand pt this morning & pt unable to tolerate & was sliding OOB instead but did not fall  (+) poor trunk control, pt able to sit up from reclined position w/ modAx1 & able to maintain position approx  30secs at a time  Pt tolerated thera  ex well, AROM except hip flexion AAROM  Pt easily fatigues hence require frequent rest periods t/o session  No dizziness reported t/o session  Nsg staff most recent vital signs as follows: /57   Pulse 76   Temp 98 2 °F (36 8 °C) (Temporal)   Resp (!) 33   Ht 5' 5" (1 651 m)   Wt 63 6 kg (140 lb 3 4 oz)   SpO2 91%   Breastfeeding? No   BMI 23 33 kg/m²   At end of session, pt remain OOB in chair w/o issues, call bell & phone in reach  Fall precautions reinforced w/ good understanding  Pt functioning below baseline hence will continue skilled PT to improve function & safety  PT to see when pt appropriate for functional transfers & amb  Will continue to recommend jody lift from safe transfers OOB<>recliner  At this time, due to above mentioned deficits, home alone, advanced age & high risk for falls, pt will benefit from inpt rehab at D/C   to follow  Nsg staff to continue to mobilized pt as tolerated to prevent further decline in function  Nsg notified  Barriers to Discharge Decreased caregiver support   Barriers to Discharge Comments home alone   Goals   Patient Goals to get stronger   STG Expiration Date 10/31/19   Short Term Goal #1 Goals to be met in 14 days; pt will be able to: 1) inc strength & sitting balance by 1/2 grade to improve overall functional mobility & dec fall risk; 2) PT to see when pt appropriate for functional transfers & amb; 3) PT to revise goals as appropriate; 4) inc barthel score to 30 to decrease overall risk for falls; 5) inc participation in therapy x 45mins; 6) pt/caregiver ed   PT Treatment Day 0   Plan   Treatment/Interventions LE strengthening/ROM; Therapeutic exercise; Endurance training;Patient/family training;Bed mobility;Continued evaluation;Spoke to nursing;OT;Family   PT Frequency Other (Comment)  (3-5x/wk)   Recommendation   Recommendation Short-term skilled PT   Equipment Recommended Wheelchair; Other (Comment)  (jody lift)   PT - OK to Discharge Yes  (to inpt rehab when medically cleared)   Barthel Index   Feeding 5   Bathing 0   Grooming Score 0   Dressing Score 0   Bladder Score 5   Bowels Score 5   Toilet Use Score 5   Transfers (Bed/Chair) Score 0   Mobility (Level Surface) Score 0   Stairs Score 0   Barthel Index Score 20   Hx/personal factors: co-morbidities, inaccessible home, home alone, advanced age, mutliple lines, telemetry, use of AD, dec cognition, pain, fall risk, assist w/ ADL's, O2 and NGT  Examination: dec mobility, dec balance, dec endurance, dec amb, high fall risk, pain  Clinical: unpredictable (ongoing medical status, abnormal lab values, high fall risk and pain mgt)  Complexity: high     Marrie Ket, PT

## 2019-10-17 NOTE — OCCUPATIONAL THERAPY NOTE
[address] Evaluation     Patient Name: Mahesh Roberson  Today's Date: 10/17/2019  Problem List  Principal Problem:    Ovarian cancer ([facility] )  Active Problems:    Hypertension    Retinopathy, diabetic, proliferative (HCC)    Type 1 diabetes mellitus with diabetic neuropathy (HCC)    Diabetic neuropathy, painful (HCC)    Hyponatremia    Type 1 diabetes mellitus with hyperglycemia (HCC)    Internal jugular (IJ) vein thromboembolism, acute, right (HCC)    Superficial thrombophlebitis of right upper extremity    Sepsis ([facility] )    A-fib ([facility] )    Past Medical History  Past Medical History:   Diagnosis Date    Anxiety     Arthritis     Constipation     Diabetes mellitus (HCC)     IDDM    Frequent UTI     Glaucoma     Hearing loss     Hyperlipidemia     Hypertension     Hyperthyroidism     in past    Hyponatremia     Hypothyroid     Neuropathy     bles    Right tubo-ovarian mass     Wears glasses      Past Surgical History  Past Surgical History:   Procedure Laterality Date    APPENDECTOMY  1956    CATARACT EXTRACTION Bilateral     COLONOSCOPY  09/2014    Completed - Dr Juan Gaspar, due in 5 years    HYSTERECTOMY N/A 10/4/2019    Procedure: LAP TOTAL HYSTERECTOMY, BSO;  Surgeon: Alonso Davis MD;  [facility];  Service: Gynecology Oncology    LAPAROTOMY N/A 10/4/2019    Procedure: EXPLORATORY LAPAROTOMY  EXLAP OMENTECTOMY  PELVIC AND DEANGELO-AORTIC LYMPH NODE DISSECTION  PERITONEAL BIOPSY TRANS ABDOMINUS BLOCK;  Surgeon: Alonso Davis MD;  [facility];  Service: Gynecology Oncology    SKIN LESION EXCISION      Ears             10/15/19 1500   Note Type   Note type Eval only   Restrictions/Precautions   Weight Bearing Precautions Per Order No   Other Precautions Fall Risk;Pain;O2;Telemetry;Multiple lines;Cognitive; Chair Alarm; Bed Alarm   Pain Assessment   Pain Assessment 0-10   Pain Score 6   Pain Type Acute pain   Pain Location Generalized   Home Living   Type of Home House Home Layout One level  (3ste with railing)   Bathroom Shower/Tub Walk-in shower   Bathroom Equipment Grab bars in shower; Shower chair   Home Equipment Walker;Cane   Prior Function   Lives With [facility] in the last 6 months 0   Lifestyle   Autonomy PTA pt states independence with all aspects of her ADLs, transfers, ambulation--w/o device; +, neg falls   Reciprocal Relationships 6 children   Service to Others worked as a    Intrinsic Gratification reading   Laura [address] ([address]) X   Patient Behaviors/Mood Flat affect; Cooperative   Subjective   Subjective "I'm just so tired "   ADL   Where Assessed Edge of bed   Eating Assistance 3  Moderate Assistance   Grooming Assistance 3  Moderate Assistance   UB Bathing Assistance 3  Moderate Assistance   LB Bathing Assistance 1  Total Assistance   UB Dressing Assistance 3  Moderate Assistance   LB Dressing Assistance 2  Maximal Assistance   Transfers   Sit to Stand 1  Dependent  (limited trunk control; declining standing )   Functional Mobility   Functional Mobility   (recommend hoyerlift for OOB)   Balance   Static Sitting Fair -   Dynamic Sitting Poor +   Activity Tolerance   Activity Tolerance Patient limited by fatigue;Patient limited by pain   Medical Staff Made Aware nsg   RUE Assessment   RUE Assessment WFL   RUE Strength   RUE Overall Strength Within Functional Limits - able to perform ADL tasks with strength  (3/5 throughout)   LUE Assessment   LUE Assessment WFL   LUE Strength   LUE Overall Strength Within Functional Limits - able to perform ADL tasks with strength  (3/5 throughout)   Hand Function   Gross Motor Coordination Functional   Fine Motor Coordination Functional   Sensation   Light Touch No apparent deficits   Proprioception   Proprioception No apparent deficits   Vision-Basic Assessment   Current Vision   (glasses)   Vision - Complex Assessment   Acuity Able to read clock/calendar on wall without difficulty   Perception   Inattention/Neglect Appears intact   Cognition   Overall Cognitive Status Impaired   Arousal/Participation Alert   Attention Attends with cues to redirect   Orientation Level Oriented to person;Oriented to place   Memory Decreased short term memory;Decreased recall of precautions   Following Commands Follows one step commands with increased time or repetition   Assessment   Limitation Decreased ADL status; Decreased UE strength;Decreased Safe judgement during ADL;Decreased cognition;Decreased endurance;Decreased high-level ADLs   Prognosis Fair   Assessment Pt is a 83y/o female admitted to the hospital for an elected s/p lap QASIM, BSO/omentectomy, periaortic lymph dissection, bx(10/4) 2* complex adnexal mass/ovarian Ca  During her hospitalization, pt developed acute hypoxic respiratory failure 2* aspiration PNA; pt noted with sepsis, A-fib with RVR  PTA pt states independence with all aspects of her ADLs, transfers, ambulation--w/o device; +, neg falls  During initial eval, pt demonstrated deficits with her functional balance, functional mobility, ADL status, activity tolerance(currently fair=15-20mins), transfer safety, and cognition(i e memory, orientation)  Pt would benefit from continued OT tx for the above deficits  3-5xwk/1-2wks  Goals   Patient Goals "to get back to normal"   STG Time Frame   (1-7 days)   Short Term Goal #1 Pt will tolerate continued functional mobility assessment and appropriate goals will be established  Short Term Goal #2 Pt will demonstrate improved activity tolerance to good(20-30mins) and standing tolerance to 1-3mins to assist with ADLs  Short Term Goal  Pt will independently verbalize 2-3 personal cognitive deficits and their appropriate compensation techniques  LTG Time Frame   (7-14 days)   Long Term Goal #1 Pt will demonstrate improved functional balance by 1 grade to assist with ADLs/transfers      Long Term Goal #2 Pt will demonstrate improved b/l UE strength by 1/2 MM grade to assist with ADLs/transfers  Long Term Goal Pt will demonstrate Deng with her UE and mod A with her LE bathing/dressing  Plan   Treatment Interventions ADL retraining;Functional transfer training;UE strengthening/ROM; Endurance training;Cognitive reorientation;Patient/family training;Equipment evaluation/education; Compensatory technique education   Goal Expiration Date 10/29/19   OT Treatment Day 0   OT Frequency 3-5x/wk   Recommendation   OT Discharge Recommendation Short Term Rehab   Barthel Index   Feeding 5   Bathing 0   Grooming Score 0   Dressing Score 0   Bladder Score 5   Bowels Score 5   Toilet Use Score 5   Transfers (Bed/Chair) Score 5   Mobility (Level Surface) Score 0   Stairs Score 0   Barthel Index Score 25   Jeffry Bear, OT

## 2019-10-17 NOTE — PROGRESS NOTES
Progress Note - Infectious Disease   Mahesh Roberson 82 y o  female MRN: [id number]  Unit/Bed#: ICU 02 Encounter: [id number]      Impression/Plan:  1  Sepsis, evolving since admission   Tachycardia, tachypnea, leukocytosis   Likely secondary to aspiration pneumonia  Also consider a possible leukemoid reaction in the setting of hypoxia and possible developing limb ischemia  Procalcitonin has trended down from 4 28 to 0 97  Fortunately she has been afebrile and hemodynamically stable  She completed a nine day antibiotic treatment course and currently remains stable off additional antibiotics  She received a bronchoscopy yesterday afternoon and reports improvement in her breathing since then  WBC count has increased slightly today which I suspect may be secondary to bronchoscopy  -monitor patient off antibiotics  -check CBC and monitor BMP tomorrow  -follow-up bronchoscopy culture  -monitor vitals  -supportive care      2  Aspiration pneumonia   In the setting of vomiting due to recent postoperative ileus   Sputum culture revealed Serratia and Candida albicans   Candida albicans is likely a colonizer and not the cause of pneumonia   Her O2 saturation remains stable on high-flow nasal cannula  She received a bronchoscopy yesterday afternoon, new cultures pending  She completed nine days of antibiotics  No indication for ongoing antibiotic treatment at this time  Will follow up with bronchoscopy culture     -monitor patient off antibiotics  -check CBC and monitor BMP tomorrow  -follow up bronchoscopy culture  -monitor vitals  -monitor respiratory status  -wean high-flow O2 support per critical care team     3  Acute hypoxic respiratory failure   Likely multifactorial secondary to aspiration pneumonia, ARDS, volume overload   Patient continues to require supplemental oxygen and is now stable on high-flow O2   She is tolerating diuresis  Diuresis was increased yesterday    She also received a bronchoscopy yesterday afternoon, a new cultures pending  She reports her breathing feels improved since then   -follow-up bronchoscopy culture  -monitor vitals  -monitor respiratory symptoms  -wean high-flow O2 support per critical care team  -diuresis per Cardiology     4  Right upper extremity edema/pallor   With bluish discoloration of digits   Concern for poor perfusion   CT neck did reveal thrombus at right common jugular vein, which may be contributing   Patient is currently on anticoagulation for management of atrial fibrillation  Much less edema noted in the right arm today  Fingers do not appear dusky on exam   -anticoagulation per primary service  -close serial arm exams     5  Ovarian adenocarcinoma status post total hysterectomy, bilateral salpingectomy, pelvic and periaortic lymphadenectomy, omentectomy on 10/04/2019     -continue ongoing follow up with OBGYN     6  Diabetes mellitus type 1    -blood glucose management per critical care medicine team     7  Status post PA arrest      8  Atrial fibrillation, with RVR   Ongoing management per Cardiology  Above plan was discussed in detail with patient at the bedside  Antibiotics:  No current antibiotic use    Subjective:  Patient reports she feels a bit better today  RN tells me she had a slight decrease in her oxygen saturation during chest PT vest but has otherwise been very stable  Patient feels that her voice is stronger  She also tells me that she is coughing less  She has no fever, chills, sweats, shakes; no nausea, vomiting, abdominal pain; very mild pain at her incision site today; No new symptoms      Objective:  Vitals:  Temp:  [96 2 °F (35 7 °C)-98 5 °F (36 9 °C)] 98 5 °F (36 9 °C)  HR:  [68-86] 84  Resp:  [16-35] 29  BP: (114-152)/(49-73) 147/65  SpO2:  [91 %-98 %] 97 %  Temp (24hrs), Av 5 °F (36 4 °C), Min:96 2 °F (35 7 °C), Max:98 5 °F (36 9 °C)  Current: Temperature: 98 5 °F (36 9 °C)    Physical Exam:   General Appearance:  Alert, interactive, nontoxic, no acute distress  Still appears weak  Throat: Oropharynx moist without lesions  Lungs:   Mildly coarse in the upper lobes to auscultation bilaterally, decreased in the lower lobes to auscultation bilaterally; no wheezing; respirations unlabored but tachypneic on high-flow nasal cannula O2   Heart:  RRR; no murmur, rub or gallop   Abdomen:   Soft, slightly tender with palpation along the incision site, non-distended, positive bowel sounds  Extremities: No clubbing or cyanosis, mild edema noted in hands and feet; right forearm appears less swollen, right fingers are not dusky, she demonstrates full ROM of her right wrist and fingers   Skin: No new rashes, lesions, or draining wounds noted on exposed skin    Abdominal incision is well approximated, no leakage or dehiscence noted, no spreading erythema from the site     Labs, Imaging, & Other studies:   All pertinent labs and imaging studies were personally reviewed  Results from last 7 days   Lab Units 10/17/19  0547 10/16/19  0431 10/15/19  0536   WBC Thousand/uL 24 61* 23 52* 22 91*   HEMOGLOBIN g/dL 9 0* 8 7* 9 2*   PLATELETS Thousands/uL 487* 437* 425*     Results from last 7 days   Lab Units 10/17/19  0546   POTASSIUM mmol/L 3 9   CHLORIDE mmol/L 102   CO2 mmol/L 36*   BUN mg/dL 25   CREATININE mg/dL 0 72   EGFR ml/min/1 73sq m 78   CALCIUM mg/dL 6 7*         Results from last 7 days   Lab Units 10/16/19  0431 10/15/19  0536 10/14/19  0549 10/13/19  1349 10/12/19  1323   PROCALCITONIN ng/ml 0 97* 1 42* 2 69* 2 58* 4 28*

## 2019-10-18 ENCOUNTER — DOCUMENTATION (OUTPATIENT)
Dept: GYNECOLOGIC ONCOLOGY | Facility: CLINIC | Age: 84
End: 2019-10-18

## 2019-10-18 ENCOUNTER — APPOINTMENT (INPATIENT)
Dept: RADIOLOGY | Facility: HOSPITAL | Age: 84
DRG: 736 | End: 2019-10-18
Payer: COMMERCIAL

## 2019-10-18 VITALS
WEIGHT: 138.67 LBS | DIASTOLIC BLOOD PRESSURE: 71 MMHG | HEIGHT: 65 IN | HEART RATE: 84 BPM | BODY MASS INDEX: 23.1 KG/M2 | SYSTOLIC BLOOD PRESSURE: 167 MMHG | RESPIRATION RATE: 27 BRPM | OXYGEN SATURATION: 96 % | TEMPERATURE: 99.1 F

## 2019-10-18 PROBLEM — A41.9 SEPSIS (HCC): Status: RESOLVED | Noted: 2019-10-14 | Resolved: 2019-10-18

## 2019-10-18 LAB
ANION GAP SERPL CALCULATED.3IONS-SCNC: 4 MMOL/L (ref 4–13)
BACTERIA BRONCH AEROBE CULT: ABNORMAL
BACTERIA BRONCH AEROBE CULT: ABNORMAL
BASOPHILS # BLD AUTO: 0.14 THOUSANDS/ΜL (ref 0–0.1)
BASOPHILS NFR BLD AUTO: 1 % (ref 0–1)
BUN SERPL-MCNC: 27 MG/DL (ref 5–25)
CALCIUM SERPL-MCNC: 6.7 MG/DL (ref 8.3–10.1)
CHLORIDE SERPL-SCNC: 99 MMOL/L (ref 100–108)
CO2 SERPL-SCNC: 37 MMOL/L (ref 21–32)
CREAT SERPL-MCNC: 0.68 MG/DL (ref 0.6–1.3)
EOSINOPHIL # BLD AUTO: 0.06 THOUSAND/ΜL (ref 0–0.61)
EOSINOPHIL NFR BLD AUTO: 0 % (ref 0–6)
ERYTHROCYTE [DISTWIDTH] IN BLOOD BY AUTOMATED COUNT: 14 % (ref 11.6–15.1)
GFR SERPL CREATININE-BSD FRML MDRD: 82 ML/MIN/1.73SQ M
GLUCOSE SERPL-MCNC: 103 MG/DL (ref 65–140)
GLUCOSE SERPL-MCNC: 123 MG/DL (ref 65–140)
GLUCOSE SERPL-MCNC: 141 MG/DL (ref 65–140)
GLUCOSE SERPL-MCNC: 159 MG/DL (ref 65–140)
GLUCOSE SERPL-MCNC: 180 MG/DL (ref 65–140)
GLUCOSE SERPL-MCNC: 188 MG/DL (ref 65–140)
GLUCOSE SERPL-MCNC: 189 MG/DL (ref 65–140)
GRAM STN SPEC: ABNORMAL
HCT VFR BLD AUTO: 29.6 % (ref 34.8–46.1)
HGB BLD-MCNC: 9.1 G/DL (ref 11.5–15.4)
IMM GRANULOCYTES # BLD AUTO: 0.45 THOUSAND/UL (ref 0–0.2)
IMM GRANULOCYTES NFR BLD AUTO: 2 % (ref 0–2)
LYMPHOCYTES # BLD AUTO: 0.79 THOUSANDS/ΜL (ref 0.6–4.47)
LYMPHOCYTES NFR BLD AUTO: 4 % (ref 14–44)
MCH RBC QN AUTO: 31.8 PG (ref 26.8–34.3)
MCHC RBC AUTO-ENTMCNC: 30.7 G/DL (ref 31.4–37.4)
MCV RBC AUTO: 104 FL (ref 82–98)
MONOCYTES # BLD AUTO: 0.86 THOUSAND/ΜL (ref 0.17–1.22)
MONOCYTES NFR BLD AUTO: 4 % (ref 4–12)
NEUTROPHILS # BLD AUTO: 20.23 THOUSANDS/ΜL (ref 1.85–7.62)
NEUTS SEG NFR BLD AUTO: 89 % (ref 43–75)
NRBC BLD AUTO-RTO: 0 /100 WBCS
PLATELET # BLD AUTO: 531 THOUSANDS/UL (ref 149–390)
PMV BLD AUTO: 10.2 FL (ref 8.9–12.7)
POTASSIUM SERPL-SCNC: 3.4 MMOL/L (ref 3.5–5.3)
RBC # BLD AUTO: 2.86 MILLION/UL (ref 3.81–5.12)
SODIUM SERPL-SCNC: 140 MMOL/L (ref 136–145)
WBC # BLD AUTO: 22.53 THOUSAND/UL (ref 4.31–10.16)

## 2019-10-18 PROCEDURE — 92610 EVALUATE SWALLOWING FUNCTION: CPT

## 2019-10-18 PROCEDURE — 90662 IIV NO PRSV INCREASED AG IM: CPT | Performed by: INTERNAL MEDICINE

## 2019-10-18 PROCEDURE — 71045 X-RAY EXAM CHEST 1 VIEW: CPT

## 2019-10-18 PROCEDURE — 99024 POSTOP FOLLOW-UP VISIT: CPT | Performed by: OBSTETRICS & GYNECOLOGY

## 2019-10-18 PROCEDURE — 94640 AIRWAY INHALATION TREATMENT: CPT

## 2019-10-18 PROCEDURE — NC001 PR NO CHARGE: Performed by: NURSE PRACTITIONER

## 2019-10-18 PROCEDURE — G8997 SWALLOW GOAL STATUS: HCPCS

## 2019-10-18 PROCEDURE — G0008 ADMIN INFLUENZA VIRUS VAC: HCPCS | Performed by: INTERNAL MEDICINE

## 2019-10-18 PROCEDURE — G8996 SWALLOW CURRENT STATUS: HCPCS

## 2019-10-18 PROCEDURE — 99291 CRITICAL CARE FIRST HOUR: CPT | Performed by: INTERNAL MEDICINE

## 2019-10-18 PROCEDURE — 85025 COMPLETE CBC W/AUTO DIFF WBC: CPT | Performed by: NURSE PRACTITIONER

## 2019-10-18 PROCEDURE — 82948 REAGENT STRIP/BLOOD GLUCOSE: CPT

## 2019-10-18 PROCEDURE — 99232 SBSQ HOSP IP/OBS MODERATE 35: CPT | Performed by: INTERNAL MEDICINE

## 2019-10-18 PROCEDURE — 92526 ORAL FUNCTION THERAPY: CPT

## 2019-10-18 PROCEDURE — 80048 BASIC METABOLIC PNL TOTAL CA: CPT | Performed by: NURSE PRACTITIONER

## 2019-10-18 PROCEDURE — 94660 CPAP INITIATION&MGMT: CPT

## 2019-10-18 RX ORDER — SODIUM CHLORIDE FOR INHALATION 0.9 %
3 VIAL, NEBULIZER (ML) INHALATION
Qty: 1 ML | Refills: 0 | Status: SHIPPED | OUTPATIENT
Start: 2019-10-18

## 2019-10-18 RX ORDER — LEVALBUTEROL 1.25 MG/.5ML
1.25 SOLUTION, CONCENTRATE RESPIRATORY (INHALATION)
Qty: 1 VIAL | Refills: 0 | Status: SHIPPED | OUTPATIENT
Start: 2019-10-18

## 2019-10-18 RX ORDER — ONDANSETRON 2 MG/ML
4 INJECTION INTRAMUSCULAR; INTRAVENOUS EVERY 6 HOURS PRN
Qty: 1 ML | Refills: 0 | Status: SHIPPED | OUTPATIENT
Start: 2019-10-18 | End: 2019-11-01 | Stop reason: HOSPADM

## 2019-10-18 RX ORDER — INSULIN GLARGINE 100 [IU]/ML
20 INJECTION, SOLUTION SUBCUTANEOUS EVERY 12 HOURS SCHEDULED
Status: DISCONTINUED | OUTPATIENT
Start: 2019-10-18 | End: 2019-10-18 | Stop reason: HOSPADM

## 2019-10-18 RX ORDER — MAGNESIUM HYDROXIDE/ALUMINUM HYDROXICE/SIMETHICONE 120; 1200; 1200 MG/30ML; MG/30ML; MG/30ML
30 SUSPENSION ORAL EVERY 4 HOURS PRN
Qty: 355 ML | Refills: 0 | Status: SHIPPED | OUTPATIENT
Start: 2019-10-18

## 2019-10-18 RX ORDER — TRAZODONE HYDROCHLORIDE 50 MG/1
50 TABLET ORAL
Status: DISCONTINUED | OUTPATIENT
Start: 2019-10-18 | End: 2019-10-18 | Stop reason: HOSPADM

## 2019-10-18 RX ORDER — POLYETHYLENE GLYCOL 3350 17 G/17G
17 POWDER, FOR SOLUTION ORAL DAILY
Qty: 14 EACH | Refills: 0 | Status: SHIPPED | OUTPATIENT
Start: 2019-10-18

## 2019-10-18 RX ORDER — TRAMADOL HYDROCHLORIDE 50 MG/1
50 TABLET ORAL EVERY 6 HOURS PRN
Qty: 30 TABLET | Refills: 0 | Status: SHIPPED | OUTPATIENT
Start: 2019-10-18 | End: 2019-11-01 | Stop reason: HOSPADM

## 2019-10-18 RX ORDER — TRAZODONE HYDROCHLORIDE 50 MG/1
50 TABLET ORAL
Qty: 1 TABLET | Refills: 0 | Status: SHIPPED | OUTPATIENT
Start: 2019-10-18

## 2019-10-18 RX ORDER — MINERAL OIL AND PETROLATUM 150; 830 MG/G; MG/G
OINTMENT OPHTHALMIC
Qty: 1 TUBE | Refills: 0 | Status: SHIPPED | OUTPATIENT
Start: 2019-10-18

## 2019-10-18 RX ORDER — ACETAMINOPHEN 325 MG/1
650 TABLET ORAL EVERY 6 HOURS PRN
Qty: 30 TABLET | Refills: 0 | Status: SHIPPED | OUTPATIENT
Start: 2019-10-18

## 2019-10-18 RX ORDER — LORAZEPAM 0.5 MG/1
0.5 TABLET ORAL ONCE
Status: COMPLETED | OUTPATIENT
Start: 2019-10-18 | End: 2019-10-18

## 2019-10-18 RX ORDER — FUROSEMIDE 10 MG/ML
20 INJECTION INTRAMUSCULAR; INTRAVENOUS DAILY
Qty: 4 ML | Refills: 0 | Status: SHIPPED | OUTPATIENT
Start: 2019-10-18 | End: 2019-10-21

## 2019-10-18 RX ORDER — INSULIN GLARGINE 100 [IU]/ML
20 INJECTION, SOLUTION SUBCUTANEOUS EVERY 12 HOURS SCHEDULED
Status: DISCONTINUED | OUTPATIENT
Start: 2019-10-18 | End: 2019-10-18

## 2019-10-18 RX ORDER — INSULIN GLARGINE 100 [IU]/ML
20 INJECTION, SOLUTION SUBCUTANEOUS EVERY 12 HOURS SCHEDULED
Qty: 1 UNITS | Refills: 0 | Status: SHIPPED | OUTPATIENT
Start: 2019-10-18 | End: 2019-11-01 | Stop reason: HOSPADM

## 2019-10-18 RX ORDER — POTASSIUM CHLORIDE 20MEQ/15ML
40 LIQUID (ML) ORAL ONCE
Status: COMPLETED | OUTPATIENT
Start: 2019-10-18 | End: 2019-10-18

## 2019-10-18 RX ORDER — FUROSEMIDE 10 MG/ML
20 INJECTION INTRAMUSCULAR; INTRAVENOUS DAILY
Status: DISCONTINUED | OUTPATIENT
Start: 2019-10-18 | End: 2019-10-18 | Stop reason: HOSPADM

## 2019-10-18 RX ADMIN — INSULIN GLARGINE 20 UNITS: 100 INJECTION, SOLUTION SUBCUTANEOUS at 09:50

## 2019-10-18 RX ADMIN — ISODIUM CHLORIDE 3 ML: 0.03 SOLUTION RESPIRATORY (INHALATION) at 07:18

## 2019-10-18 RX ADMIN — DORZOLAMIDE HYDROCHLORIDE AND TIMOLOL MALEATE 1 DROP: 20; 5 SOLUTION/ DROPS OPHTHALMIC at 09:14

## 2019-10-18 RX ADMIN — LEVALBUTEROL HYDROCHLORIDE 1.25 MG: 1.25 SOLUTION, CONCENTRATE RESPIRATORY (INHALATION) at 13:09

## 2019-10-18 RX ADMIN — LORAZEPAM 0.5 MG: 0.5 TABLET ORAL at 14:49

## 2019-10-18 RX ADMIN — APIXABAN 5 MG: 5 TABLET, FILM COATED ORAL at 09:13

## 2019-10-18 RX ADMIN — LEVALBUTEROL HYDROCHLORIDE 1.25 MG: 1.25 SOLUTION, CONCENTRATE RESPIRATORY (INHALATION) at 07:18

## 2019-10-18 RX ADMIN — INFLUENZA A VIRUS A/MICHIGAN/45/2015 X-275 (H1N1) ANTIGEN (FORMALDEHYDE INACTIVATED), INFLUENZA A VIRUS A/SINGAPORE/INFIMH-16-0019/2016 IVR-186 (H3N2) ANTIGEN (FORMALDEHYDE INACTIVATED), AND INFLUENZA B VIRUS B/MARYLAND/15/2016 BX-69A (A B/COLORADO/6/2017-LIKE VIRUS) ANTIGEN (FORMALDEHYDE INACTIVATED) 0.5 ML: 60; 60; 60 INJECTION, SUSPENSION INTRAMUSCULAR at 14:49

## 2019-10-18 RX ADMIN — FUROSEMIDE 20 MG: 10 INJECTION, SOLUTION INTRAMUSCULAR; INTRAVENOUS at 14:15

## 2019-10-18 RX ADMIN — ISODIUM CHLORIDE 3 ML: 0.03 SOLUTION RESPIRATORY (INHALATION) at 13:09

## 2019-10-18 RX ADMIN — METOPROLOL TARTRATE 25 MG: 25 TABLET, FILM COATED ORAL at 09:14

## 2019-10-18 RX ADMIN — POTASSIUM CHLORIDE 40 MEQ: 20 SOLUTION ORAL at 09:13

## 2019-10-18 RX ADMIN — Medication 20 MG: at 09:13

## 2019-10-18 NOTE — PROGRESS NOTES
Progress Note - Infectious Disease   Tracey Peabody 82 y o  female MRN: [id number]  Unit/Bed#: ICU 02 Encounter: [id number]      Impression/Plan:  1  Sepsis, evolving since admission   Tachycardia, tachypnea, leukocytosis   Likely secondary to aspiration pneumonia  Also consider a possible leukemoid reaction in the setting of hypoxia and possible developing limb ischemia  Procalcitonin has trended down from 4 28 to 0 97  Fortunately she has been afebrile and hemodynamically stable  She completed a nine day antibiotic treatment course and currently remains stable off additional antibiotics  She has received a bronchoscopy and additional diuresis and reports improvement in her breathing since then  Bronchoscopy culture is pending but unfortunately has +2 epithelial cells  Today she is afebrile and her WBC count is trending down   -monitor patient off antibiotics  -monitor CBC and BMP  -follow-up bronchoscopy culture  -monitor vitals  -supportive care      2  Aspiration pneumonia   In the setting of vomiting due to recent postoperative ileus   Sputum culture revealed Serratia and Candida albicans   Candida albicans is likely a colonizer and not the cause of pneumonia   Her O2 saturation remains stable on high-flow nasal cannula  A repeat bronchoscopy has been performed, a new cultures pending  She completed nine days of antibiotics  No indication for ongoing antibiotic treatment at this time   -monitor patient off antibiotics  -monitor CBC and BMP  -follow up bronchoscopy culture  -monitor vitals  -monitor respiratory status  -wean high-flow O2 support per critical care team     3  Acute hypoxic respiratory failure   Likely multifactorial secondary to aspiration pneumonia, ARDS, volume overload   Patient continues to require supplemental oxygen and remains stable on high-flow O2   She is tolerating increased diuresis     She also received a bronchoscopy, culture is pending but shows epithelial contamination  -follow-up bronchoscopy culture  -monitor vitals  -monitor respiratory symptoms  -wean high-flow O2 support per critical care team  -diuresis per Cardiology     4  Right upper extremity edema/pallor   With bluish discoloration of digits   Concern for poor perfusion   CT neck did reveal thrombus at right common jugular vein, which may be contributing   Patient is currently on anticoagulation for management of atrial fibrillation  Much less edema noted in the right arm today  Fingers do not appear dusky on exam   -anticoagulation per primary service  -close serial arm exams     5  Ovarian adenocarcinoma status post total hysterectomy, bilateral salpingectomy, pelvic and periaortic lymphadenectomy, omentectomy on 10/04/2019     -continue ongoing follow up with OBGYN     6  Diabetes mellitus type 1    -blood glucose management per critical care medicine team     7  Status post PA arrest      8  Atrial fibrillation, with RVR   Ongoing management per Cardiology  9  Depression to mood  Patient admits to feeling very depressed  She is requesting mental health medication for depression as well as anxiety  States at home she has been taking Ativan  Bedside RN reports that last week patient experienced desaturation after having the Ativan so it has been held  Above plan was discussed in detail with patient at the bedside  We will continue to follow along with the patient  If she remains inpatient she will be formally reassessed by the Infectious Disease team on Monday, 10/21/2019  Please call sooner with questions  Antibiotics:  No current antibiotic use    Subjective:  Patient reports she's very depressed  States she has a hard time imagining how long she is going to be able to make it when she feels so terrible  She feels her breathing is about the same as yesterday   She has no fever, chills, sweats, shakes; no nausea, vomiting, abdominal pain, abdominal incisional pain; no cough, shortness of breath, or chest pain  No new symptoms  Objective:  Vitals:  Temp:  [96 5 °F (35 8 °C)-98 4 °F (36 9 °C)] 96 5 °F (35 8 °C)  HR:  [72-90] 86  Resp:  [17-36] 28  BP: (120-151)/(50-69) 142/69  SpO2:  [90 %-97 %] 90 %  Temp (24hrs), Av 8 °F (36 6 °C), Min:96 5 °F (35 8 °C), Max:98 4 °F (36 9 °C)  Current: Temperature: (!) 96 5 °F (35 8 °C)    Physical Exam:   General Appearance:  Alert, interactive, flat, nontoxic, no acute distress  Still appears weak  Throat: Oropharynx moist without lesions  Lungs:   Mildly coarse to auscultation bilaterally with mild wheezing noted on expiration in RUL; respirations unlabored but tachypneic; patient is wearing high flow nasal cannula O2   Heart:  RRR; no murmur, rub or gallop   Abdomen:   Soft, non-tender, non-distended, positive bowel sounds  Extremities: No clubbing or cyanosis, mild bilateral pedal edema; R arm/hand less edematous, with light ace compression, fingers are not dusky   Skin: No new rashes, lesions, or draining wounds noted on exposed skin   Abdominal incision is well approximated, no leakage, no spreading erythema from site     Labs, Imaging, & Other studies:   All pertinent labs and imaging studies were personally reviewed  Results from last 7 days   Lab Units 10/18/19  0430 10/17/19  0547 10/16/19  0431   WBC Thousand/uL 22 53* 24 61* 23 52*   HEMOGLOBIN g/dL 9 1* 9 0* 8 7*   PLATELETS Thousands/uL 531* 487* 437*     Results from last 7 days   Lab Units 10/18/19  0430   POTASSIUM mmol/L 3 4*   CHLORIDE mmol/L 99*   CO2 mmol/L 37*   BUN mg/dL 27*   CREATININE mg/dL 0 68   EGFR ml/min/1 73sq m 82   CALCIUM mg/dL 6 7*     Results from last 7 days   Lab Units 10/16/19  1607   GRAM STAIN RESULT  2+ Epithelial cells per low power field*  1+ Disintegrating polys*  Rare Gram positive cocci in pairs*  Rare Gram negative rods*     Results from last 7 days   Lab Units 10/16/19  0431 10/15/19  0536 10/14/19  0549 10/13/19  1349 10/12/19  1325 PROCALCITONIN ng/ml 0 97* 1 42* 2 69* 2 58* 4 28*

## 2019-10-18 NOTE — DISCHARGE SUMMARY
Discharge Summary   Mihai Dowell 82 y o  female MRN: [id number]  Unit/Bed#: ICU 02 Encounter: [id number]    Admission Date: 10/4/2019     Discharge Date: 10/18/19    Admitting Diagnoses:   1  Right ovarian mass  2  Type 1 diabetes  3  Diabetic neuropathy    Discharge Diagnoses:  1  Acute hypoxic respiratory failure  2  Sepsis  3  Cardiac arrest  4  New onset atrial fibrillation  5  Diabetes with diabetic neuropathy  6  Generalized weakness  7  Ovarian cancer stage IB  8  Status post hysterectomy oophorectomy  9  Dysphagia      Procedures Performed:   Orders Placed This Encounter   Procedures   [facility] Course:   Admitted secondary to incidental finding of ovarian mass  Underwent laparoscopic hysterectomy and oophorectomy on 4 October 2019  Did well postoperatively and was returned to the medical-surgical unit    On 7 October 2019 a rapid response was called secondary to tachypnea, dysphagia and uncontrolled diabetes mellitus  At that time the patient was stable on room air and remained in her room  A short time after the rapid response, the patient suffered a cardiac arrest and was a code blue  She was intubated and was subsequently moved to the intensive care unit, and placed on critical care service  Patient was treated for ARDS  She underwent placement of central line for pressor therapy  A arterial line was placed to monitor blood pressure  At this time the patient developed new onset of atrial fibrillation with a rapid ventricular response, this was treated with beta-blockade  She was not anticoagulated at this time secondary to suspicion for a GI bleed and recent surgery  On 10 October 2019 the patient was removed from pressors  GI was consulted  EGD was not indicated at this time  Patient was extubated on the 10th October 2019 and placed on high-flow cardiology was consulted secondary to the new onset of atrial fibrillation    Patient continued on high-flow nasal cannula during the day and BiPAP at night  Chest x-ray demonstrated new right lower lobe infiltrate and there was suspicion for silent aspiration  Patient was maintained NPO and placed on antibiotics  Patient slowly improved moving from high-flow nasal cannula to nasal cannula  , use incentive spirometry, and cough and deep breathe  Patient continue to improve over the week and today Friday 18 October 2019 the patient was deemed medically fit for transfer to [facility]  The patient was stable at discharge  Significant Findings, Care, Treatment and Services Provided:   ARDS, atrial fibrillation, aspiration pneumonia, cardiology consult, gyn oncology consult, vascular surgery consult infectious disease consult, GI consult, Interventional Radiology, geriatric Medicine consult    Complications:   Cardiac arrest code blue postoperatively, GI bleed, edema of the right upper extremity, aspiration pneumonia    Condition at Discharge: fair     Discharge instructions/Information to patient and family:   See after visit summary for information provided to patient and family  Provisions for Follow-Up Care:  See after visit summary for information related to follow-up care and any pertinent home health orders  Disposition: LTAC    Discharge Statement   I spent 45 minutes discharging the patient  This time was spent on the day of discharge  I had direct contact with the patient on the day of discharge  Additional documentation is required if more than 30 minutes were spent on discharge  Discharge Medications:  See after visit summary for reconciled discharge medications provided to patient and family

## 2019-10-18 NOTE — PLAN OF CARE
Problem: Potential for Falls  Goal: Patient will remain free of falls  Description  INTERVENTIONS:  - Assess patient frequently for physical needs  -  Identify cognitive and physical deficits and behaviors that affect risk of falls    -  [place] fall precautions as indicated by assessment   - Educate patient/family on patient safety including physical limitations  - Instruct patient to call for assistance with activity based on assessment  - Modify environment to reduce risk of injury  - Consider OT/PT consult to assist with strengthening/mobility  Outcome: Progressing     Problem: GENITOURINARY - ADULT  Goal: Absence of urinary retention  Description  INTERVENTIONS:  - Assess patient's ability to void and empty bladder  - Monitor I/O  - Bladder scan as needed  - Discuss with physician/AP medications to alleviate retention as needed  - Discuss catheterization for long term situations as appropriate   Outcome: Progressing     Problem: METABOLIC, FLUID AND ELECTROLYTES - ADULT  Goal: Electrolytes maintained within normal limits  Description  INTERVENTIONS:  - Monitor labs and assess patient for signs and symptoms of electrolyte imbalances  - Administer electrolyte replacement as ordered  - Monitor response to electrolyte replacements, including repeat lab results as appropriate  - Instruct patient on fluid and nutrition as appropriate  Outcome: Progressing  Goal: Fluid balance maintained  Description  INTERVENTIONS:  - Monitor labs   - Monitor I/O and WT  - Instruct patient on fluid and nutrition as appropriate  - Assess for signs & symptoms of volume excess or deficit  Outcome: Progressing  Goal: Glucose maintained within target range  Description  INTERVENTIONS:  - Monitor Blood Glucose as ordered  - Assess for signs and symptoms of hyperglycemia and hypoglycemia  - Administer ordered medications to maintain glucose within target range  - Assess nutritional intake and initiate nutrition service referral as needed  Outcome: Progressing     Problem: SKIN/TISSUE INTEGRITY - ADULT  Goal: Skin integrity remains intact  Description  INTERVENTIONS  - Identify patients at risk for skin breakdown  - Assess and monitor skin integrity  - Assess and monitor nutrition and hydration status  - Monitor labs (i e  albumin)  - Assess for incontinence   - Turn and reposition patient  - Assist with mobility/ambulation  - Relieve pressure over bony prominences  - Avoid friction and shearing  - Provide appropriate hygiene as needed including keeping skin clean and dry  - Evaluate need for skin moisturizer/barrier cream  - Collaborate with interdisciplinary team (i e  Nutrition, Rehabilitation, etc )   - Patient/family teaching  Outcome: Progressing  Goal: Incision(s), wounds(s) or drain site(s) healing without S/S of infection  Description  INTERVENTIONS  - Assess and document risk factors for skin impairment   - Assess and document dressing, incision, wound bed, drain sites and surrounding tissue  - Consider nutrition services referral as needed  - Oral mucous membranes remain intact  - Provide patient/ family education  Outcome: Progressing  Goal: Oral mucous membranes remain intact  Description  INTERVENTIONS  - Assess oral mucosa and hygiene practices  - Implement preventative oral hygiene regimen  - Implement oral medicated treatments as ordered  - Initiate Nutrition services referral as needed  Outcome: Progressing     Problem: Prexisting or High Potential for Compromised Skin Integrity  Goal: Skin integrity is maintained or improved  Description  INTERVENTIONS:  - Identify patients at risk for skin breakdown  - Assess and monitor skin integrity  - Assess and monitor nutrition and hydration status  - Monitor labs   - Assess for incontinence   - Turn and reposition patient  - Assist with mobility/ambulation  - Relieve pressure over bony prominences  - Avoid friction and shearing  - Provide appropriate hygiene as needed including keeping skin clean and dry  - Evaluate need for skin moisturizer/barrier cream  - Collaborate with interdisciplinary team   - Patient/family teaching  - Consider wound care consult   Outcome: Progressing     Problem: NEUROSENSORY - ADULT  Goal: Achieves stable or improved neurological status  Description  INTERVENTIONS  - Monitor and report changes in neurological status  - Monitor vital signs such as temperature, blood pressure, glucose, and any other labs ordered   - Initiate measures to prevent increased intracranial pressure  - Monitor for seizure activity and implement precautions if appropriate      Outcome: Progressing  Goal: Remains free of injury related to seizures activity  Description  INTERVENTIONS  - Maintain airway, patient safety  and administer oxygen as ordered  - Monitor patient for seizure activity, document and report duration and description of seizure to physician/advanced practitioner  - If seizure occurs,  ensure patient safety during seizure  - Reorient patient post seizure  - Seizure pads on all 4 side rails  - Instruct patient/family to notify RN of any seizure activity including if an aura is experienced  - Instruct patient/family to call for assistance with activity based on nursing assessment  - Administer anti-seizure medications if ordered    Outcome: Progressing  Goal: Achieves maximal functionality and self care  Description  INTERVENTIONS  - Monitor swallowing and airway patency with patient fatigue and changes in neurological status  - Encourage and assist patient to increase activity and self care     - Encourage visually impaired, hearing impaired and aphasic patients to use assistive/communication devices  Outcome: Progressing     Problem: CARDIOVASCULAR - ADULT  Goal: Maintains optimal cardiac output and hemodynamic stability  Description  INTERVENTIONS:  - Monitor I/O, vital signs and rhythm  - Monitor for S/S and trends of decreased cardiac output  - Administer and titrate ordered vasoactive medications to optimize hemodynamic stability  - Assess quality of pulses, skin color and temperature  - Assess for signs of decreased coronary artery perfusion  - Instruct patient to report change in severity of symptoms  Outcome: Progressing  Goal: Absence of cardiac dysrhythmias or at baseline rhythm  Description  INTERVENTIONS:  - Continuous cardiac monitoring, vital signs, obtain 12 lead EKG if ordered  - Administer antiarrhythmic and heart rate control medications as ordered  - Monitor electrolytes and administer replacement therapy as ordered  Outcome: Progressing     Problem: RESPIRATORY - ADULT  Goal: Achieves optimal ventilation and oxygenation  Description  INTERVENTIONS:  - Assess for changes in respiratory status  - Assess for changes in mentation and behavior  - Position to facilitate oxygenation and minimize respiratory effort  - Oxygen administered by appropriate delivery if ordered  - Initiate smoking cessation education as indicated  - Encourage broncho-pulmonary hygiene including cough, deep breathe, Incentive Spirometry  - Assess the need for suctioning and aspirate as needed  - Assess and instruct to report SOB or any respiratory difficulty  - Respiratory Therapy support as indicated  Outcome: Progressing     Problem: HEMATOLOGIC - ADULT  Goal: Maintains hematologic stability  Description  INTERVENTIONS  - Assess for signs and symptoms of bleeding or hemorrhage  - Monitor labs  - Administer supportive blood products/factors as ordered and appropriate  Outcome: Progressing     Problem: MUSCULOSKELETAL - ADULT  Goal: Maintain or return mobility to safest level of function  Description  INTERVENTIONS:  - Assess patient's ability to carry out ADLs; assess patient's baseline for ADL function and identify physical deficits which impact ability to perform ADLs (bathing, care of mouth/teeth, toileting, grooming, dressing, etc )  - Assess/evaluate cause of self-care deficits   - Assess range of motion  - Assess patient's mobility  - Assess patient's need for assistive devices and provide as appropriate  - Encourage maximum independence but intervene and supervise when necessary  - Involve family in performance of ADLs  - Assess for home care needs following discharge   - Consider OT consult to assist with ADL evaluation and planning for discharge  - Provide patient education as appropriate  Outcome: Progressing  Goal: Maintain proper alignment of affected body part  Description  INTERVENTIONS:  - Support, maintain and protect limb and body alignment  - Provide patient/ family with appropriate education  Outcome: Progressing     Problem: Nutrition/Hydration-ADULT  Goal: Nutrient/Hydration intake appropriate for improving, restoring or maintaining nutritional needs  Description  Monitor and assess patient's nutrition/hydration status for malnutrition  Collaborate with interdisciplinary team and initiate plan and interventions as ordered  Monitor patient's weight and dietary intake as ordered or per policy  Utilize nutrition screening tool and intervene as necessary  Determine patient's food preferences and provide high-protein, high-caloric foods as appropriate       INTERVENTIONS:  - Monitor oral intake, urinary output, labs, and treatment plans  - Assess nutrition and hydration status and recommend course of action  - Evaluate amount of meals eaten  - Assist patient with eating if necessary   - Allow adequate time for meals  - Recommend/ encourage appropriate diets, oral nutritional supplements, and vitamin/mineral supplements  - Order, calculate, and assess calorie counts as needed  - Recommend, monitor, and adjust tube feedings and TPN/PPN based on assessed needs  - Assess need for intravenous fluids  - Provide specific nutrition/hydration education as appropriate  - Include patient/family/caregiver in decisions related to nutrition  Outcome: Progressing

## 2019-10-18 NOTE — SOCIAL WORK
The patient is stable for transfer to [facility] today, and they have a bed for her in room 3017  [facility] Nurse Truck to  today at 3pm for trasnport to [facility]      Doc to Doc report to Dr Chavez Person [phone]    Report: [phone]  Fax: [phone]

## 2019-10-18 NOTE — PROGRESS NOTES
Gyn Oncology Progress note   Brent Nagy 82 y o  female MRN: [id number]  Unit/Bed#: ICU 02 Encounter: [id number]    Assessment and Plan:    1  Acute hypoxic respiratory failure secondary to pulmonary edema, atelectasis and aspiration pneumonia:   - s/p 9 days of antibiotics per ID  - currently on HFNC  Attempted NC yesterday, but failed  - s/p Bronchoscopy 10/16/19  F/u broncial culture  - Managed per ICU team  - Plan to transfer to [place] [id number], most-likely Monday    2  Stage IB Ovarian cancer  - POD #14 s/p TLH, BSO, exploratory laparotomy, pelvic and para-aortic LN dissection, omentectomy, peritoneal biopsies, ovarian cancer staging  - Good prognosis    3  Severe protein calorie malnutrition  - Currently on TFs and tolerating well  -Speech to evaluate patient for oral intake  - Considering changing NGT to [facility] if to remain NPO    4  Paroxysmal Afib with RVR  - Controlled with Lopressor   - Continue Eliquis    5  Type 1 Diabetes Mellitus  - Plan to transition to SQ insulin from insulin gtt per ICU team  - Continue Lantus qHS    6  Right upper extremity edema/phlegmasia:   - No acute arterial insufficiency on CT angiogram  - Improving    Brent Nagy has no current complaints  Reports some difficulty with breathing  Per nursing, they attempted to wean to NC, however, she required replacement of HFNC  Pt most-likely going to [address]  on Monday  Patient is currently passing flatus and believes she has had bowel movement  She is still on tube feeds  Speech to evaluate patient for PO toleration  She denies nausea or vomitting  Patient denies fever, chills, chest pain, or calf tenderness  /50   Pulse 78   Temp 98 °F (36 7 °C) (Temporal)   Resp 19   Ht 5' 5" (1 651 m)   Wt 62 9 kg (138 lb 10 7 oz)   SpO2 94%   Breastfeeding? No   BMI 23 08 kg/m²     I/O last 3 completed shifts: In: 3995 1 [P O :1000; I V :88 1; AG/RV:2270;  Feedings:1613]  Out: 3422 [QCSHE:6364]  I/O this shift:  In: 1061 2 [I V :12 2; NG/GT:400]  Out: 500 [Urine:500]    Lab Results   Component Value Date    WBC 22 53 (H) 10/18/2019    HGB 9 1 (L) 10/18/2019    HCT 29 6 (L) 10/18/2019     (H) 10/18/2019     (H) 10/18/2019       Lab Results   Component Value Date    GLUCOSE 75 01/08/2016    CALCIUM 6 7 (L) 10/18/2019     (L) 01/08/2016    K 3 4 (L) 10/18/2019    CO2 37 (H) 10/18/2019    CL 99 (L) 10/18/2019    BUN 27 (H) 10/18/2019    CREATININE 0 68 10/18/2019       Lab Results   Component Value Date/Time    POCGLU 189 (H) 10/18/2019 03:53 AM    POCGLU 141 (H) 10/18/2019 02:16 AM    POCGLU 103 10/18/2019 12:16 AM    POCGLU 134 10/17/2019 10:31 PM    POCGLU 149 (H) 10/17/2019 07:54 PM    POCGLU 164 02/15/2017 02:33 PM       Physical Exam  Gen: AAOx3, NAD, laying supine in bed on HFNC  CVS: S1S2+, RRR, no murmurs, rubs, gallops  Lungs: CTA b/l, shallow breathing, no W/R/R  Abdomen: soft, non tender, positive bowel sounds,incisions C/D/I  Extremities: SCDs on and on, non tender    Juani Brewer MD  OBGYN, PGY-3  10/18/2019 6:17 AM

## 2019-10-18 NOTE — SPEECH THERAPY NOTE
Speech Language/Pathology  Speech/Language Pathology  Assessment    Patient Name: Lydia Lopez  Today's Date: 10/18/2019     Problem List  Principal Problem:    Ovarian cancer ([facility] [place] [address] )  Active Problems:    Hypertension    Retinopathy, diabetic, proliferative (HCC)    Type 1 diabetes mellitus with diabetic neuropathy (HCC)    Diabetic neuropathy, painful (HCC)    Hyponatremia    Type 1 diabetes mellitus with hyperglycemia (HCC)    Internal jugular (IJ) vein thromboembolism, acute, right (HCC)    Superficial thrombophlebitis of right upper extremity    Sepsis ([facility] [place] [address] )    A-fib ([facility] [place] [address] )    Past Medical History  Past Medical History:   Diagnosis Date    Anxiety     Arthritis     Constipation     Diabetes mellitus (HCC)     IDDM    Frequent UTI     Glaucoma     Hearing loss     Hyperlipidemia     Hypertension     Hyperthyroidism     in past    Hyponatremia     Hypothyroid     Neuropathy     bles    Right tubo-ovarian mass     Wears glasses      Past Surgical History  Past Surgical History:   Procedure Laterality Date    APPENDECTOMY  1956    CATARACT EXTRACTION Bilateral     COLONOSCOPY  09/2014    Completed - Dr Valerie Jack, due in 5 years    HYSTERECTOMY N/A 10/4/2019    Procedure: LAP TOTAL HYSTERECTOMY, BSO;  Surgeon: Erasmo Blake MD;  [facility]: [facility];  Service: Gynecology Oncology    LAPAROTOMY N/A 10/4/2019    Procedure: EXPLORATORY LAPAROTOMY  EXLAP OMENTECTOMY  PELVIC AND DEANGELO-AORTIC LYMPH NODE DISSECTION  PERITONEAL BIOPSY TRANS ABDOMINUS BLOCK;  Surgeon: Erasmo Blake MD;  [facility]: [facility];  Service: Gynecology Oncology    SKIN LESION EXCISION      Ears     Current Medical Status  Copied from chart:   Patient is an 82year old female with history of complex adnexal mass, without evidence of pelvic and para-aortic lymphadenopathy, ascites, omental caking admitted for total laparoscopic hysterectomy and bilateral salpingo-oophorectomy   Because frozen pathology was reported as adenocarcinoma of ovary, she had an exploratory laparotomy for omentectomy, peritoneal biopsies, and pelvic and para-aortic lymphadenectomy         During her postoperative course, her dilaudid pump was discontinued and she was transitioned to oral analgesia  She was continued on her depression, hypertension, and hypothyroidism medication  She failed her voiding trial twice and her alexandre was replaced  She was also put on sliding scale insulin algorithm 2 for her T1DM  Despite this, her glucose levels were not well controlled and she was transitioned to an insulin drip  On POD #3 (10/7), she developed a postoperative ileus  She then began complaining of dyspnea, tachypnea, and difficulty speaking in full sentences  A rapid response was called  An extensive laboratory workup and imaging was completed, remarkable for metabolic acidosis, acute kidney injury, bilateral pleural effusions, and a markedly distended stomach  She was transferred to the ICU  A few minutes after she was transferred, she had profuse coffee ground emesis, began aspirating and became unresponsive with no palpable pulse  A code blue was called  She was placed on ventilatory support and arterial line and central access were placed  Acute hypoxic respiratory failure  She was intubated and sedated immediately after code blue  She was started on cefepime and flagyl and subsequently fluconazole for budding yeast in sputum culture  She was taken off pressors and was extubated on 10/10    Chart reviewed and spoke with RN  Pt was intubated for surgery, and then emergently intubated on 10/4 until 10/10  Past medical history:   Please see H&P for details  Special Studies:  CXR-Lines and tubes as above   No pneumothorax    Patchy diffuse bilateral pulmonary airspace opacities appear stable or slightly improved with the exception of perhaps slight progression in the left upper lung zone      JIM Winston   Nurse Practitioner   Critical Care/ICU Progress Notes   Cosign Needed   Date of Service:  10/18/2019 12:05 AM               Cosign Needed             Show:Clear all  [x]Manual[x]Template[]Copied    Added by:  [x]Charu Olguin    []Aparna for details  Progress Note - Critical Care   Terese Half 82 y o  female MRN: [id number]  Unit/Bed#: ICU 02 Encounter: [id number]     Assessment/Plan:  1  Acute Hypoxic Respiratory Failure likely secondary to pulmonary edema, atelectasis, and aspiration PNA  · S/p 9 day abx course - no further abx needed at this time per ID  · S/p Bronchoscopy on 10/16 with clearance of large amount of secretions from b/l lower lobes  · Continue IV diureses for goal negative fluid balance  · Wean supplemental O2 for goal SpO2>90  · Encourage cough, deep breathing, IS  · Encourage OOB and ambulation  2  Sepsis secondary to Aspiration PNA  · Has remained afebrile, HD stable, WBC stable  · S/p 9 day course of abx, continue to monitor off abx per ID  · Follow up on bronchial cultures from bronchoscopy on 10/16  · Awaiting speech eval to determine if patient is able to eat, will continue TF for now - if she must remain NPO then consider replacing NGT with small bore NGT (Keofed)  3  Paroxysmal AFib with RVR, now in NSR  · Continue Lopressor  · Continue Eliquis  4  POD 14 Ex Lap with TLH and BSO for R Tubo-Ovarian Mass  · Management per Gyn-Oncology  5  Type 1 Diabetes Mellitus  ? Transition to SQ insulin from insulin gtt today  ? Continue Lantus HS  6  Hypernatremia - resolved, continue TF with FWF                 Bedside Swallow Evaluation:    Summary:  Pt presents w/ generalized weakness, hypophonic but no dysphonia  Weal swallow, cough w/ small ice chips (better w/ one small ice chips at a time), audible swallows w/ 3 tsps applesauce, 2 tsp nectar thick  Pt then refused any more  NGT likely contributing to swallow status, however the pt is weak in general and has a reduced appetite and poor endurance   Noted RT coming in to nasally suction pt and change back to nc  Recommendations:  Diet: one ice chip at a time only to keep mouth moist and swallow muscles moving  Meds:tube/iv  Supervision: yes  Positioning:Upright  Oral care  Aspiration precautions  Reflux precautions    Therapy Prognosis: guarded at this time, tho pt seems to be slowly improving  Frequency: 3-5x/week tho she may be leaving for [facility] today    Patient's goal: wanted the fan on her    Consider consult w/:  Nutrition    Reason for consult:  R/o aspiration  Determine safest and least restrictive diet  respiratory compromise  Current diet:  Npo/ngt  Premorbid diet[de-identified]  Regular (was placed on puree and honey thick liquid diet her prior)  Previous VBS:  None known  O2 requirement:  Currently on nc  Voice/Speech:  Hypophonic but no dysphonia  [place]  Social:  Lives w/ family  Follows commands:  basic                       Cognitive Status:  alert  Oral mech exam:  Dentition:  Labial strength and ROM:generalized weakness  Lingual strength and ROM:generalized weakness  Mandibular strength and ROM:generalized weakness  Secretion management: oral cavity appears wnl however the pt has been requiring deep nasal suction  Volitional cough:mild/mod weak    Items administered:  Puree, nectar by tsp, ice chips, single ice chips    Oral stage: Mod  Lip closure:wfl  Mastication:na  Bolus formation:fair  Bolus control:fair  Transfer:fair  Oral residue:none visible w/ min amount  Pocketing:none    Pharyngeal stage: At least moderate at this time, also fatigues easily  Swallow promptness:mild delay  Laryngeal rise:fair  Wet voice:no  Throat clear:no  Cough:no  Secondary swallows:none  Audible swallows:yes    Esophageal stage:  No s/s reported    Results d/w:  Pt, nursing, family, crnp    Goal(s):  Pt will tolerate po trials--->least restrictive diet w/out s/s aspiration or oral/pharyngeal difficulties

## 2019-10-18 NOTE — SPEECH THERAPY NOTE
Speech Language/Pathology    Speech/Language Pathology Progress Note    Patient Name: Mahesh Roberson  Today's Date: 10/18/2019     Problem List  Principal Problem:    Ovarian cancer ([facility] [address] )  Active Problems:    Hypertension    Retinopathy, diabetic, proliferative (HCC)    Type 1 diabetes mellitus with diabetic neuropathy (HCC)    Diabetic neuropathy, painful (HCC)    Hyponatremia    Type 1 diabetes mellitus with hyperglycemia (HCC)    Internal jugular (IJ) vein thromboembolism, acute, right (HCC)    Superficial thrombophlebitis of right upper extremity    A-fib ([facility] [address] )       Past Medical History  Past Medical History:   Diagnosis Date    Anxiety     Arthritis     Constipation     Diabetes mellitus (HCC)     IDDM    Frequent UTI     Glaucoma     Hearing loss     Hyperlipidemia     Hypertension     Hyperthyroidism     in past    Hyponatremia     Hypothyroid     Neuropathy     bles    Right tubo-ovarian mass     Wears glasses         Past Surgical History  Past Surgical History:   Procedure Laterality Date    APPENDECTOMY  1956    CATARACT EXTRACTION Bilateral     COLONOSCOPY  09/2014    Completed - Dr Juan Gaspar, due in 5 years    HYSTERECTOMY N/A 10/4/2019    Procedure: LAP TOTAL HYSTERECTOMY, BSO;  Surgeon: Alonso Davis MD;  [facility];  Service: Gynecology Oncology    LAPAROTOMY N/A 10/4/2019    Procedure: EXPLORATORY LAPAROTOMY  EXLAP OMENTECTOMY  PELVIC AND DEANGELO-AORTIC LYMPH NODE DISSECTION  PERITONEAL BIOPSY TRANS ABDOMINUS BLOCK;  Surgeon: Alonso Davis MD;  [facility];  Service: Gynecology Oncology    SKIN LESION EXCISION      Ears         Subjective:  Asked to re-assess pt after NGT was taken out  She had been nasally suctioned after I left but remains on hfnc  D/c to [facility] may be on hold  Objective:  Pt seen w/ ice chips and 4 ounces each puree and honey thick liquids by tsp  Pt coughed when given 3 small ice chips at a time to begin  Initial tsps of puree were audible and pt swallowed each half tsp 2-3 x's  After 3 tsps she asked "is that enough?", then asked same after the 4th tsp  I told her I would like her to try to finish all of it  Eventually she was able to finish the puree and then the honey thick liquids  Oral transfer and swallow were mildly delayed  Swallows were no longer audible  Pt was given rest breaks in between due to RR increasing to high 30's at times  She then stated she wanted to  Go faster to get it over with  No wet voice or cough  Cued cough was weak  o2 sats down by 1  Assessment:  Improvement in ease of swallow w/ ngt out, however I still have concerns re amount of intake and endurance  Plan/Recommendations:  Puree w/ honey thick liquids by tsp  Monitor o2sats and rr while feeding  Suspect she may need a dobhoff or keofed to maintain adequate nutritional intake for now  D/c feeding of pt becomes too fatigued or s/s aspiration/cough arise

## 2019-10-18 NOTE — PROGRESS NOTES
Her case was presented at the multidisciplinary Gynecologic Tumor Conference on 10/18/2019 and the consensus recommendation is: chemotherapy for 3 cycles with carbo/taxol

## 2019-10-18 NOTE — SOCIAL WORK
Possible transfer to [facility] [address]  today, [facility] updating the insurance, will let CM know dispo when available

## 2019-10-18 NOTE — PROGRESS NOTES
Progress Note - Cardiology   Bear Jenkins 82 y o  female MRN: [id number]  Unit/Bed#: ICU 02 Encounter: [id number]      Assessment/Recommendations/Discussion:   1  Acute hypoxic respiratory failure secondary to aspiration pneumonia/pneumonitis  2  Sepsis syndrome  3  Paroxysmal atrial fibrillation  4  Status post total hysterectomy, bilateral salpingectomy 10/04/2019, postoperative aspiration and PA arrest    · Continue metoprolol--remaining in sinus rhythm  · Continue furosemide 40 IV BID--will reorder around the clock--watch renal fx, BP, lytes  Replete K  If BUN continues to rise, may lower to 20 BID tomorrow  · Apixaban anticoagulation for stroke prevention  · Will see again Monday  Please call if any questions over weekend or change in cardiac condition      Subjective: Pt seen/examined  Denies pain, breathing is about the same          Physical Exam:  GEN:  NAD  HEENT:  MMM, NCAT, pink conjunctiva, EOMI, nonicteric sclera  CV:  +JVD/HJR, RR, NO M/R/G, +S1/S2, NO PARASTERNAL HEAVE/THRILL, +LE EDEMA POSTERIOR THIGH AND SACRUM, NO HEPATIC SYSTOLIC PULSATION, WARM EXTREMITIES  RESP:  MILDLY COARSE B/L ANTERIORLY  ABD:  SOFT        Vitals:   /79   Pulse 92   Temp (!) 96 5 °F (35 8 °C) (Temporal)   Resp (!) 33   Ht 5' 5" (1 651 m)   Wt 62 9 kg (138 lb 10 7 oz)   SpO2 (!) 87%   Breastfeeding?  No   BMI 23 08 kg/m²   Vitals:    10/17/19 0547 10/18/19 0607   Weight: 63 6 kg (140 lb 3 4 oz) 62 9 kg (138 lb 10 7 oz)       Intake/Output Summary (Last 24 hours) at 10/18/2019 0919  Last data filed at 10/18/2019 0910  Gross per 24 hour   Intake 2492 35 ml   Output 1869 ml   Net 623 35 ml       TELEMETRY: SR  Lab Results:  Results from last 7 days   Lab Units 10/18/19  0430   WBC Thousand/uL 22 53*   HEMOGLOBIN g/dL 9 1*   HEMATOCRIT % 29 6*   PLATELETS Thousands/uL 531*     Results from last 7 days   Lab Units 10/18/19  0430   POTASSIUM mmol/L 3 4*   CHLORIDE mmol/L 99*   CO2 mmol/L 37*   BUN mg/dL 27* CREATININE mg/dL 0 68   CALCIUM mg/dL 6 7*     Results from last 7 days   Lab Units 10/18/19  0430   POTASSIUM mmol/L 3 4*   CHLORIDE mmol/L 99*   CO2 mmol/L 37*   BUN mg/dL 27*   CREATININE mg/dL 0 68   CALCIUM mg/dL 6 7*           Medications:    Current Facility-Administered Medications:     acetaminophen (TYLENOL) tablet 650 mg, 650 mg, Oral, Q6H PRN, JIM Blevins, 650 mg at 10/17/19 1131    aluminum-magnesium hydroxide-simethicone (MYLANTA) 200-200-20 mg/5 mL oral suspension 30 mL, 30 mL, Oral, Q4H PRN, JIM Blevins, 30 mL at 10/17/19 1132    apixaban (ELIQUIS) tablet 5 mg, 5 mg, Oral, BID, JIM Kim, 5 mg at 10/18/19 0913    artificial tear (LUBRIFRESH P M ) ophthalmic ointment, , Both Eyes, HS PRN, JIM Macario    dorzolamide-timolol (COSOPT) 22 3-6 8 MG/ML ophthalmic solution 1 drop, 1 drop, Both Eyes, BID, JIM Wallace, 1 drop at 10/18/19 0914    influenza vaccine, high-dose (FLUZONE HIGH-DOSE) IM injection ERIKA 0 5 mL, 0 5 mL, Intramuscular, Prior to discharge, Lazara Clemente DO    insulin glargine (LANTUS) subcutaneous injection 20 Units 0 2 mL, 20 Units, Subcutaneous, Q12H [facility], JIM Olguin    insulin lispro (HumaLOG) 100 units/mL subcutaneous injection 2-12 Units, 2-12 Units, Subcutaneous, Q6H [facility] **AND** Fingerstick Glucose (POCT), , , Q6H, JIM Olguin    lactated ringers bolus 1,000 mL, 1,000 mL, Intravenous, Once, JIM Macario    levalbuterol [facility]) inhalation solution 1 25 mg, 1 25 mg, Nebulization, TID, Sherly Vides DO, 1 25 mg at 10/18/19 0718    metoprolol tartrate (LOPRESSOR) tablet 25 mg, 25 mg, Oral, Q12H [facility], JIM Kim, 25 mg at 10/18/19 0914    Netarsudil Dimesylate 0 02 % SOLN 1 drop, 1 drop, Right Eye, HS, JIM Wallace, 1 drop at 10/17/19 2120    omeprazole (PRILOSEC) suspension 2 mg/mL, 20 mg, Oral, Daily, JIM Blevins, 20 mg at 10/18/19 0913    ondansetron [facility]) injection 4 mg, 4 mg, Intravenous, Q6H PRN, JIM Allen, 4 mg at 10/07/19 0541    polyethylene glycol (MIRALAX) packet 17 g, 17 g, Oral, Daily PRN, JIM Allen, 17 g at 10/15/19 1748    sodium chloride 0 9 % inhalation solution 3 mL, 3 mL, Nebulization, TID, Lazara Diaztal, DO, 3 mL at 10/18/19 0718    traMADol (ULTRAM) tablet 50 mg, 50 mg, Oral, Q6H PRN, JIM Quiñones, 50 mg at 10/17/19 2013    travoprost (TRAVATAN-Z) 0 004 % ophthalmic solution 1 drop, 1 drop, Both Eyes, HS, JIM Wallace, 1 drop at 10/17/19 2120    traZODone (DESYREL) tablet 50 mg, 50 mg, Oral, HS, Tamiko Dai MD    This note was completed in part utilizing M-Modal Fluency Direct Software  Grammatical errors, random word insertions, spelling mistakes, and incomplete sentences may be an occasional consequence of this system secondary to software limitations, ambient noise, and hardware issues  If you have any questions or concerns about the content, text, or information contained within the body of this dictation, please contact the provider for clarification

## 2019-10-18 NOTE — TRANSPORTATION MEDICAL NECESSITY
Section I - General Information    Name of Patient: Dave Duty                 : 1935    Medicare #: [id number]  Transport Date: 10/18/19 (PCS is valid for round trips on this date and for all repetitive trips in the 60-day range as noted below )  Origin: [address] [place] [address] [facility]   Is the pt's stay covered under Medicare Part A (PPS/DRG)   [x]     Closest appropriate facility? If no, why is transport to more distant facility required? Yes  If hospice pt, is this transport related to pt's terminal illness? No       Section II - Medical Necessity Questionnaire  Ambulance transportation is medically necessary only if other means of transport are contraindicated or would be potentially harmful to the patient  To meet this requirement, the patient must either be "bed confined" or suffer from a condition such that transport by means other than ambulance is contraindicated by the patient's condition  The following questions must be answered by the medical professional signing below for this form to be valid:    1)  Describe the MEDICAL CONDITION (physical and/or mental) of this patient AT [address] [place] [address] that requires the patient to be transported in an ambulance and why transport by other means is contraindicated by the patient's condition: high flow oxygen, bed bound    2) Is the patient "bed confined" as defined below? Yes  To be "be confined" the patient must satisfy all three of the following conditions: (1) unable to get up from bed without Assistance; AND (2) unable to ambulate; AND (3) unable to sit in a chair or wheelchair  3) Can this patient safely be transported by car or wheelchair van (i e , seated during transport without a medical attendant or monitoring)?    No    4) In addition to completing questions 1-3 above, please check any of the following conditions that apply*:   *Note: supporting documentation for any boxes checked must be maintained in the patient's medical records  If hosp-hosp transfer, describe services needed at 2nd facility not available at 1st facility? Moderate/severe pain on movement   Medical attendant required   Requires oxygen-unable to self administer  Unable to tolerate seated position for time needed to transport       Section III - Signature of Physician or Healthcare Professional  I certify that the above information is true and correct based on my evaluation of this patient, and represent that the patient requires transport by ambulance and that other forms of transport are contraindicated  I understand that this information will be used by the Centers for Medicare and Medicaid Services (CMS) to support the determination of medical necessity for ambulance services, and I represent that I have personal knowledge of the patient's condition at time of transport  []  If this box is checked, I also certify that the patient is physically or mentally incapable of signing the ambulance service's claim and that the institution with which I am affiliated has furnished care, services, or assistance to the patient  My signature below is made on behalf of the patient pursuant to 42 CFR §424 36(b)(4)  In accordance with 42 CFR §424 37, the specific reason(s) that the patient is physically or mentally incapable of signing the claim form is as follows:  Bharti Flores  Signature of Physician* or Abdoul Rodgers MSW< LSW  Signature Date 10/18/19 (For scheduled repetitive transports, this form is not valid for transports performed more than 60 days after this date)    Printed Name & Credentials of Physician or Healthcare Professional (MD, DO, RN, etc ) Bharti ADKINS LSW  *Form must be signed by patient's attending physician for scheduled, repetitive transports   For non-repetitive, unscheduled ambulance transports, if unable to obtain the signature of the attending physician, any of the following may sign (choose appropriate option below)  [] Physician Assistant []  Clinical Nurse Specialist []  Registered Nurse  []  Nurse Practitioner  [x] Discharge Planner

## 2019-10-18 NOTE — PROGRESS NOTES
Progress Note - Critical Care   Terese Half 82 y o  female MRN: [id number]  Unit/Bed#: ICU 02 Encounter: [id number]    Assessment/Plan:  1  Acute Hypoxic Respiratory Failure likely secondary to pulmonary edema, atelectasis, and aspiration PNA  · S/p 9 day abx course - no further abx needed at this time per ID  · S/p Bronchoscopy on 10/16 with clearance of large amount of secretions from b/l lower lobes  · Continue IV diureses for goal negative fluid balance  · Wean supplemental O2 for goal SpO2>90  · Encourage cough, deep breathing, IS  · Encourage OOB and ambulation  2  Sepsis secondary to Aspiration PNA  · Has remained afebrile, HD stable, WBC stable  · S/p 9 day course of abx, continue to monitor off abx per ID  · Follow up on bronchial cultures from bronchoscopy on 10/16  · Awaiting speech eval to determine if patient is able to eat, will continue TF for now - if she must remain NPO then consider replacing NGT with small bore NGT (Keofed)  3  Paroxysmal AFib with RVR, now in NSR  · Continue Lopressor  · Continue Eliquis  4  POD 14 Ex Lap with TLH and BSO for R Tubo-Ovarian Mass  · Management per Gyn-Oncology  5  Type 1 Diabetes Mellitus  · Transition to SQ insulin from insulin gtt today  · Continue Lantus HS  6  Hypernatremia - resolved, continue TF with FWF    Critical Care Time: 31 minutes  Documented critical care time excludes any procedures documented elsewhere  It also excludes any family updates    _____________________________________________________________________    HPI/24hr events: The patient was transitioned to NC from HFNC  She remained afebrile and HD stable overnight       Medications:    Current Facility-Administered Medications:  acetaminophen 650 mg Oral Q6H PRN JIM Busby    aluminum-magnesium hydroxide-simethicone 30 mL Oral Q4H PRN JIM Busby    apixaban 5 mg Oral BID JIM Busby    artificial tear  Both Eyes HS PRN JIM Smith dorzolamide-timolol 1 drop Both Eyes BID JIM Whitney    influenza vaccine 0 5 mL Intramuscular Prior to discharge Lazara Clemente,     insulin glargine 16 Units Subcutaneous HS Rosemarie Avitia MD    insulin regular (HumuLIN R,NovoLIN R) infusion 0 3-21 Units/hr Intravenous Titrated JIM Whitney Last Rate: 2 Units/hr (10/18/19 0420)   lactated ringers 1,000 mL Intravenous Once JIM Whitney    levalbuterol 1 25 mg Nebulization TID Tk Schumacher,     metoprolol tartrate 25 mg Oral Q12H [address] Berkleynavi VargheseofJIM noriega    Netarsudil Dimesylate 1 drop Right Eye HS JIM Walalce    omeprazole (PRILOSEC) suspension 2 mg/mL 20 mg Oral Daily JIM Kim    ondansetron 4 mg Intravenous Q6H PRN JIM Whitney    polyethylene glycol 17 g Oral Daily PRN JIM Whitney    sodium chloride 3 mL Nebulization TID Lazara Clemente,     temazepam 15 mg Oral HS PRN JIM Irwin    traMADol 50 mg Oral Q6H PRN JIM Montaño    travoprost 1 drop Both Eyes HS JIM Wallace          insulin regular (HumuLIN R,NovoLIN R) infusion 0 3-21 Units/hr Last Rate: 2 Units/hr (10/18/19 0420)         Physical exam:  Vitals: Body mass index is 23 33 kg/m²  Blood pressure 150/50, pulse 74, temperature 98 °F (36 7 °C), temperature source Temporal, resp   rate 20, height 5' 5" (1 651 m), weight 63 6 kg (140 lb 3 4 oz), SpO2 95 %, not currently breastfeeding ,  Temp  Min: 96 2 °F (35 7 °C)  Max: 100 2 °F (37 9 °C)  IBW: 57 kg    SpO2: 95 %  SpO2 Activity: At Rest  O2 Device: High flow nasal cannula      Intake/Output Summary (Last 24 hours) at 10/18/2019 0556  Last data filed at 10/18/2019 0000  Gross per 24 hour   Intake 2588 71 ml   Output 2219 ml   Net 369 71 ml       Invasive/non-invasive ventilation settings:   Respiratory    Lab Data (Last 4 hours)    None         O2/Vent Data (Last 4 hours)      10/18 0318          Non-Invasive Ventilation Mode HFNC Invasive Devices     Peripherally Inserted Central Catheter Line            PICC Line 03/51/10 Left Basilic 3 days          Drain            Rectal Tube With balloon 9 days    External Urinary Catheter Other (Comment) 6 days    NG/OG/Enteral Tube Nasogastric 14 Fr Right nares 4 days                  Physical Exam:  Gen: Alert, No acute distress, offers no complaints  HEENT: Normocephalic, atraumatic, PERRL, Oropharynx clear  Neck: Supple, trachea midline  Chest: Lungs clear bilaterally, no wheezes, rhonchi, rales  Cor: S1/S2 NSR, no murmur, rub, gallop  Abd: soft, nontender, nondistended  Ext: +2 pulses, mild non pitting generalized edema  Neuro: Alert and oriented x3, equal strength in all extremities   Skin: warm, dry, intact      Diagnostic Data:  Lab: I have personally reviewed pertinent lab results     CBC:   Results from last 7 days   Lab Units 10/18/19  0430 10/17/19  0547 10/16/19  0431   WBC Thousand/uL 22 53* 24 61* 23 52*   HEMOGLOBIN g/dL 9 1* 9 0* 8 7*   HEMATOCRIT % 29 6* 30 0* 29 3*   PLATELETS Thousands/uL 531* 487* 437*       CMP:   Results from last 7 days   Lab Units 10/18/19  0430 10/17/19  0546 10/16/19  0431   SODIUM mmol/L 140 141 145   POTASSIUM mmol/L 3 4* 3 9 4 2   CHLORIDE mmol/L 99* 102 106   CO2 mmol/L 37* 36* 35*   BUN mg/dL 27* 25 26*   CREATININE mg/dL 0 68 0 72 0 72   CALCIUM mg/dL 6 7* 6 7* 6 6*     PT/INR:   No results found for: PT, INR,   Magnesium:   Results from last 7 days   Lab Units 10/12/19  0602   MAGNESIUM mg/dL 2 3     Phosphorous:       Microbiology:  Results from last 7 days   Lab Units 10/16/19  1607   GRAM STAIN RESULT  2+ Epithelial cells per low power field*  1+ Disintegrating polys*  Rare Gram positive cocci in pairs*  Rare Gram negative rods*       Imaging:  CXR reviewed from yesterday AM showing increasing pulmonary edema    Cardiac lab/EKG/telemetry/ECHO:   NSR rate 75    VTE Prophylaxis: Eliquis    Code Status: Level 1 - Full Code    Angel Hooper CRNP    Portions of the record may have been created with voice recognition software  Occasional wrong word or "sound a like" substitutions may have occurred due to the inherent limitations of voice recognition software  Read the chart carefully and recognize, using context, where substitutions have occurred

## 2019-10-18 NOTE — PLAN OF CARE
Problem: Potential for Falls  Goal: Patient will remain free of falls  Description  INTERVENTIONS:  - Assess patient frequently for physical needs  -  Identify cognitive and physical deficits and behaviors that affect risk of falls    -  [place] fall precautions as indicated by assessment   - Educate patient/family on patient safety including physical limitations  - Instruct patient to call for assistance with activity based on assessment  - Modify environment to reduce risk of injury  - Consider OT/PT consult to assist with strengthening/mobility  Outcome: Progressing     Problem: GENITOURINARY - ADULT  Goal: Absence of urinary retention  Description  INTERVENTIONS:  - Assess patient's ability to void and empty bladder  - Monitor I/O  - Bladder scan as needed  - Discuss with physician/AP medications to alleviate retention as needed  - Discuss catheterization for long term situations as appropriate   Outcome: Progressing     Problem: METABOLIC, FLUID AND ELECTROLYTES - ADULT  Goal: Electrolytes maintained within normal limits  Description  INTERVENTIONS:  - Monitor labs and assess patient for signs and symptoms of electrolyte imbalances  - Administer electrolyte replacement as ordered  - Monitor response to electrolyte replacements, including repeat lab results as appropriate  - Instruct patient on fluid and nutrition as appropriate  Outcome: Progressing  Goal: Fluid balance maintained  Description  INTERVENTIONS:  - Monitor labs   - Monitor I/O and WT  - Instruct patient on fluid and nutrition as appropriate  - Assess for signs & symptoms of volume excess or deficit  Outcome: Progressing  Goal: Glucose maintained within target range  Description  INTERVENTIONS:  - Monitor Blood Glucose as ordered  - Assess for signs and symptoms of hyperglycemia and hypoglycemia  - Administer ordered medications to maintain glucose within target range  - Assess nutritional intake and initiate nutrition service referral as needed  Outcome: Progressing     Problem: SKIN/TISSUE INTEGRITY - ADULT  Goal: Skin integrity remains intact  Description  INTERVENTIONS  - Identify patients at risk for skin breakdown  - Assess and monitor skin integrity  - Assess and monitor nutrition and hydration status  - Monitor labs (i e  albumin)  - Assess for incontinence   - Turn and reposition patient  - Assist with mobility/ambulation  - Relieve pressure over bony prominences  - Avoid friction and shearing  - Provide appropriate hygiene as needed including keeping skin clean and dry  - Evaluate need for skin moisturizer/barrier cream  - Collaborate with interdisciplinary team (i e  Nutrition, Rehabilitation, etc )   - Patient/family teaching  Outcome: Progressing  Goal: Incision(s), wounds(s) or drain site(s) healing without S/S of infection  Description  INTERVENTIONS  - Assess and document risk factors for skin impairment   - Assess and document dressing, incision, wound bed, drain sites and surrounding tissue  - Consider nutrition services referral as needed  - Oral mucous membranes remain intact  - Provide patient/ family education  Outcome: Progressing  Goal: Oral mucous membranes remain intact  Description  INTERVENTIONS  - Assess oral mucosa and hygiene practices  - Implement preventative oral hygiene regimen  - Implement oral medicated treatments as ordered  - Initiate Nutrition services referral as needed  Outcome: Progressing     Problem: Prexisting or High Potential for Compromised Skin Integrity  Goal: Skin integrity is maintained or improved  Description  INTERVENTIONS:  - Identify patients at risk for skin breakdown  - Assess and monitor skin integrity  - Assess and monitor nutrition and hydration status  - Monitor labs   - Assess for incontinence   - Turn and reposition patient  - Assist with mobility/ambulation  - Relieve pressure over bony prominences  - Avoid friction and shearing  - Provide appropriate hygiene as needed including keeping skin clean and dry  - Evaluate need for skin moisturizer/barrier cream  - Collaborate with interdisciplinary team   - Patient/family teaching  - Consider wound care consult   Outcome: Progressing     Problem: NEUROSENSORY - ADULT  Goal: Achieves stable or improved neurological status  Description  INTERVENTIONS  - Monitor and report changes in neurological status  - Monitor vital signs such as temperature, blood pressure, glucose, and any other labs ordered   - Initiate measures to prevent increased intracranial pressure  - Monitor for seizure activity and implement precautions if appropriate      Outcome: Progressing  Goal: Remains free of injury related to seizures activity  Description  INTERVENTIONS  - Maintain airway, patient safety  and administer oxygen as ordered  - Monitor patient for seizure activity, document and report duration and description of seizure to physician/advanced practitioner  - If seizure occurs,  ensure patient safety during seizure  - Reorient patient post seizure  - Seizure pads on all 4 side rails  - Instruct patient/family to notify RN of any seizure activity including if an aura is experienced  - Instruct patient/family to call for assistance with activity based on nursing assessment  - Administer anti-seizure medications if ordered    Outcome: Progressing  Goal: Achieves maximal functionality and self care  Description  INTERVENTIONS  - Monitor swallowing and airway patency with patient fatigue and changes in neurological status  - Encourage and assist patient to increase activity and self care     - Encourage visually impaired, hearing impaired and aphasic patients to use assistive/communication devices  Outcome: Progressing     Problem: CARDIOVASCULAR - ADULT  Goal: Maintains optimal cardiac output and hemodynamic stability  Description  INTERVENTIONS:  - Monitor I/O, vital signs and rhythm  - Monitor for S/S and trends of decreased cardiac output  - Administer and titrate ordered vasoactive medications to optimize hemodynamic stability  - Assess quality of pulses, skin color and temperature  - Assess for signs of decreased coronary artery perfusion  - Instruct patient to report change in severity of symptoms  Outcome: Progressing  Goal: Absence of cardiac dysrhythmias or at baseline rhythm  Description  INTERVENTIONS:  - Continuous cardiac monitoring, vital signs, obtain 12 lead EKG if ordered  - Administer antiarrhythmic and heart rate control medications as ordered  - Monitor electrolytes and administer replacement therapy as ordered  Outcome: Progressing     Problem: RESPIRATORY - ADULT  Goal: Achieves optimal ventilation and oxygenation  Description  INTERVENTIONS:  - Assess for changes in respiratory status  - Assess for changes in mentation and behavior  - Position to facilitate oxygenation and minimize respiratory effort  - Oxygen administered by appropriate delivery if ordered  - Initiate smoking cessation education as indicated  - Encourage broncho-pulmonary hygiene including cough, deep breathe, Incentive Spirometry  - Assess the need for suctioning and aspirate as needed  - Assess and instruct to report SOB or any respiratory difficulty  - Respiratory Therapy support as indicated  Outcome: Progressing     Problem: HEMATOLOGIC - ADULT  Goal: Maintains hematologic stability  Description  INTERVENTIONS  - Assess for signs and symptoms of bleeding or hemorrhage  - Monitor labs  - Administer supportive blood products/factors as ordered and appropriate  Outcome: Progressing     Problem: MUSCULOSKELETAL - ADULT  Goal: Maintain or return mobility to safest level of function  Description  INTERVENTIONS:  - Assess patient's ability to carry out ADLs; assess patient's baseline for ADL function and identify physical deficits which impact ability to perform ADLs (bathing, care of mouth/teeth, toileting, grooming, dressing, etc )  - Assess/evaluate cause of self-care deficits   - Assess range of motion  - Assess patient's mobility  - Assess patient's need for assistive devices and provide as appropriate  - Encourage maximum independence but intervene and supervise when necessary  - Involve family in performance of ADLs  - Assess for home care needs following discharge   - Consider OT consult to assist with ADL evaluation and planning for discharge  - Provide patient education as appropriate  Outcome: Progressing  Goal: Maintain proper alignment of affected body part  Description  INTERVENTIONS:  - Support, maintain and protect limb and body alignment  - Provide patient/ family with appropriate education  Outcome: Progressing     Problem: Nutrition/Hydration-ADULT  Goal: Nutrient/Hydration intake appropriate for improving, restoring or maintaining nutritional needs  Description  Monitor and assess patient's nutrition/hydration status for malnutrition  Collaborate with interdisciplinary team and initiate plan and interventions as ordered  Monitor patient's weight and dietary intake as ordered or per policy  Utilize nutrition screening tool and intervene as necessary  Determine patient's food preferences and provide high-protein, high-caloric foods as appropriate       INTERVENTIONS:  - Monitor oral intake, urinary output, labs, and treatment plans  - Assess nutrition and hydration status and recommend course of action  - Evaluate amount of meals eaten  - Assist patient with eating if necessary   - Allow adequate time for meals  - Recommend/ encourage appropriate diets, oral nutritional supplements, and vitamin/mineral supplements  - Order, calculate, and assess calorie counts as needed  - Recommend, monitor, and adjust tube feedings and TPN/PPN based on assessed needs  - Assess need for intravenous fluids  - Provide specific nutrition/hydration education as appropriate  - Include patient/family/caregiver in decisions related to nutrition  Outcome: Progressing

## 2019-10-21 ENCOUNTER — TELEPHONE (OUTPATIENT)
Dept: GYNECOLOGIC ONCOLOGY | Facility: CLINIC | Age: 84
End: 2019-10-21

## 2019-10-21 ENCOUNTER — APPOINTMENT (EMERGENCY)
Dept: RADIOLOGY | Facility: HOSPITAL | Age: 84
DRG: 189 | End: 2019-10-21
Payer: COMMERCIAL

## 2019-10-21 ENCOUNTER — HOSPITAL ENCOUNTER (INPATIENT)
Facility: HOSPITAL | Age: 84
LOS: 10 days | DRG: 189 | End: 2019-11-01
Attending: EMERGENCY MEDICINE | Admitting: INTERNAL MEDICINE
Payer: COMMERCIAL

## 2019-10-21 DIAGNOSIS — Z86.74 HISTORY OF CARDIAC ARREST: ICD-10-CM

## 2019-10-21 DIAGNOSIS — J96.91 RESPIRATORY FAILURE WITH HYPOXIA, UNSPECIFIED CHRONICITY (HCC): ICD-10-CM

## 2019-10-21 DIAGNOSIS — C56.9 MALIGNANT NEOPLASM OF OVARY, UNSPECIFIED LATERALITY (HCC): ICD-10-CM

## 2019-10-21 DIAGNOSIS — R23.0 CYANOSIS: ICD-10-CM

## 2019-10-21 DIAGNOSIS — I82.C11 INTERNAL JUGULAR (IJ) VEIN THROMBOEMBOLISM, ACUTE, RIGHT (HCC): Primary | ICD-10-CM

## 2019-10-21 DIAGNOSIS — K62.5 RECTAL BLEEDING: ICD-10-CM

## 2019-10-21 DIAGNOSIS — G72.81 CRITICAL ILLNESS MYOPATHY: ICD-10-CM

## 2019-10-21 DIAGNOSIS — R23.0 CYANOSIS OF FINGERTIP: ICD-10-CM

## 2019-10-21 DIAGNOSIS — I48.91 A-FIB (HCC): ICD-10-CM

## 2019-10-21 DIAGNOSIS — R52 PAIN: ICD-10-CM

## 2019-10-21 DIAGNOSIS — J96.02 ACUTE RESPIRATORY FAILURE WITH HYPERCAPNIA (HCC): ICD-10-CM

## 2019-10-21 DIAGNOSIS — Z87.09 HISTORY OF ARDS: ICD-10-CM

## 2019-10-21 DIAGNOSIS — H91.90 HEARING LOSS: ICD-10-CM

## 2019-10-21 DIAGNOSIS — I80.8 SUPERFICIAL THROMBOPHLEBITIS OF RIGHT UPPER EXTREMITY: ICD-10-CM

## 2019-10-21 DIAGNOSIS — R09.89 ABSENT PULSE: ICD-10-CM

## 2019-10-21 DIAGNOSIS — E87.2 RESPIRATORY ACIDOSIS: ICD-10-CM

## 2019-10-21 DIAGNOSIS — R33.9 URINARY RETENTION: ICD-10-CM

## 2019-10-21 DIAGNOSIS — R53.83 LETHARGY: ICD-10-CM

## 2019-10-21 DIAGNOSIS — R09.02 HYPOXIA: ICD-10-CM

## 2019-10-21 DIAGNOSIS — R40.4 TRANSIENT ALTERATION OF AWARENESS: Primary | ICD-10-CM

## 2019-10-21 DIAGNOSIS — J18.9 PNEUMONIA: ICD-10-CM

## 2019-10-21 DIAGNOSIS — J90 PLEURAL EFFUSION, LEFT: ICD-10-CM

## 2019-10-21 LAB
ALBUMIN SERPL BCP-MCNC: 2 G/DL (ref 3.5–5)
ALP SERPL-CCNC: 100 U/L (ref 46–116)
ALT SERPL W P-5'-P-CCNC: 34 U/L (ref 12–78)
ANION GAP SERPL CALCULATED.3IONS-SCNC: 5 MMOL/L (ref 4–13)
ANISOCYTOSIS BLD QL SMEAR: PRESENT
APTT PPP: 27 SECONDS (ref 23–37)
AST SERPL W P-5'-P-CCNC: 27 U/L (ref 5–45)
BASOPHILS # BLD MANUAL: 0 THOUSAND/UL (ref 0–0.1)
BASOPHILS NFR MAR MANUAL: 0 % (ref 0–1)
BILIRUB SERPL-MCNC: 0.54 MG/DL (ref 0.2–1)
BUN SERPL-MCNC: 26 MG/DL (ref 5–25)
CALCIUM SERPL-MCNC: 7.1 MG/DL (ref 8.3–10.1)
CHLORIDE SERPL-SCNC: 105 MMOL/L (ref 100–108)
CO2 SERPL-SCNC: 36 MMOL/L (ref 21–32)
CREAT SERPL-MCNC: 0.61 MG/DL (ref 0.6–1.3)
EOSINOPHIL # BLD MANUAL: 0 THOUSAND/UL (ref 0–0.4)
EOSINOPHIL NFR BLD MANUAL: 0 % (ref 0–6)
ERYTHROCYTE [DISTWIDTH] IN BLOOD BY AUTOMATED COUNT: 14.6 % (ref 11.6–15.1)
GFR SERPL CREATININE-BSD FRML MDRD: 85 ML/MIN/1.73SQ M
GLUCOSE SERPL-MCNC: 216 MG/DL (ref 65–140)
GLUCOSE SERPL-MCNC: 249 MG/DL (ref 65–140)
HCT VFR BLD AUTO: 29.9 % (ref 34.8–46.1)
HGB BLD-MCNC: 9 G/DL (ref 11.5–15.4)
INR PPP: 1.55 (ref 0.84–1.19)
LACTATE SERPL-SCNC: 1.3 MMOL/L (ref 0.5–2)
LYMPHOCYTES # BLD AUTO: 0.14 THOUSAND/UL (ref 0.6–4.47)
LYMPHOCYTES # BLD AUTO: 1 % (ref 14–44)
MACROCYTES BLD QL AUTO: PRESENT
MCH RBC QN AUTO: 31.5 PG (ref 26.8–34.3)
MCHC RBC AUTO-ENTMCNC: 30.1 G/DL (ref 31.4–37.4)
MCV RBC AUTO: 105 FL (ref 82–98)
MONOCYTES # BLD AUTO: 0.27 THOUSAND/UL (ref 0–1.22)
MONOCYTES NFR BLD: 2 % (ref 4–12)
NEUTROPHILS # BLD MANUAL: 13.28 THOUSAND/UL (ref 1.85–7.62)
NEUTS BAND NFR BLD MANUAL: 1 % (ref 0–8)
NEUTS SEG NFR BLD AUTO: 96 % (ref 43–75)
NRBC BLD AUTO-RTO: 0 /100 WBCS
PLATELET # BLD AUTO: 635 THOUSANDS/UL (ref 149–390)
PLATELET BLD QL SMEAR: ABNORMAL
PMV BLD AUTO: 9.9 FL (ref 8.9–12.7)
POLYCHROMASIA BLD QL SMEAR: PRESENT
POTASSIUM SERPL-SCNC: 3.4 MMOL/L (ref 3.5–5.3)
PROT SERPL-MCNC: 5.9 G/DL (ref 6.4–8.2)
PROTHROMBIN TIME: 18.1 SECONDS (ref 11.6–14.5)
RBC # BLD AUTO: 2.86 MILLION/UL (ref 3.81–5.12)
RBC MORPH BLD: PRESENT
SODIUM SERPL-SCNC: 146 MMOL/L (ref 136–145)
TROPONIN I SERPL-MCNC: 0.03 NG/ML
WBC # BLD AUTO: 13.69 THOUSAND/UL (ref 4.31–10.16)

## 2019-10-21 PROCEDURE — 82948 REAGENT STRIP/BLOOD GLUCOSE: CPT

## 2019-10-21 PROCEDURE — 96361 HYDRATE IV INFUSION ADD-ON: CPT

## 2019-10-21 PROCEDURE — 99285 EMERGENCY DEPT VISIT HI MDM: CPT | Performed by: EMERGENCY MEDICINE

## 2019-10-21 PROCEDURE — 85610 PROTHROMBIN TIME: CPT

## 2019-10-21 PROCEDURE — 85730 THROMBOPLASTIN TIME PARTIAL: CPT

## 2019-10-21 PROCEDURE — 99285 EMERGENCY DEPT VISIT HI MDM: CPT

## 2019-10-21 PROCEDURE — 70450 CT HEAD/BRAIN W/O DYE: CPT

## 2019-10-21 PROCEDURE — 71045 X-RAY EXAM CHEST 1 VIEW: CPT

## 2019-10-21 PROCEDURE — 85007 BL SMEAR W/DIFF WBC COUNT: CPT

## 2019-10-21 PROCEDURE — 96360 HYDRATION IV INFUSION INIT: CPT

## 2019-10-21 PROCEDURE — 80053 COMPREHEN METABOLIC PANEL: CPT

## 2019-10-21 PROCEDURE — 36415 COLL VENOUS BLD VENIPUNCTURE: CPT

## 2019-10-21 PROCEDURE — 87040 BLOOD CULTURE FOR BACTERIA: CPT

## 2019-10-21 PROCEDURE — 83605 ASSAY OF LACTIC ACID: CPT

## 2019-10-21 PROCEDURE — 85027 COMPLETE CBC AUTOMATED: CPT

## 2019-10-21 PROCEDURE — 93005 ELECTROCARDIOGRAM TRACING: CPT

## 2019-10-21 PROCEDURE — 84484 ASSAY OF TROPONIN QUANT: CPT

## 2019-10-21 RX ORDER — LATANOPROST 50 UG/ML
1 SOLUTION/ DROPS OPHTHALMIC
COMMUNITY

## 2019-10-21 RX ORDER — PANTOPRAZOLE SODIUM 40 MG/1
40 TABLET, DELAYED RELEASE ORAL DAILY
COMMUNITY
End: 2019-11-01 | Stop reason: HOSPADM

## 2019-10-21 RX ORDER — FUROSEMIDE 40 MG/1
40 TABLET ORAL DAILY
COMMUNITY
End: 2019-11-01 | Stop reason: HOSPADM

## 2019-10-21 RX ORDER — OMEPRAZOLE 20 MG/1
20 CAPSULE, DELAYED RELEASE ORAL DAILY
COMMUNITY

## 2019-10-21 RX ADMIN — SODIUM CHLORIDE 1000 ML: 0.9 INJECTION, SOLUTION INTRAVENOUS at 21:49

## 2019-10-21 NOTE — UTILIZATION REVIEW
Notification of Discharge  This is a Notification of Discharge from our facility [address]  Please be advised that this patient has been discharge from our facility  Below you will find the admission and discharge date and time including the patients disposition  PRESENTATION DATE: 10/4/2019  5:47 AM    IP ADMISSION DATE: 10/4/19 1203   DISCHARGE DATE: 10/18/2019  4:45 PM  DISPOSITION: [facility]   Admission Orders listed below:  Admission Orders (From admission, onward)     Ordered        10/04/19 1203  Inpatient Admission  Once                   Please contact the UR Department if additional information is required to close this patient's authorization/case  [facility] Utilization Review Department  Phone: [phone]; Fax [phone]  [email or web address]  ATTENTION: Please call with any questions or concerns to [phone]  and carefully listen to the prompts so that you are directed to the right person  Send all requests for admission clinical reviews, approved or denied determinations and any other requests to fax [phone]   All voicemails are confidential

## 2019-10-21 NOTE — TELEPHONE ENCOUNTER
Left message for patient to give our office a call if she had any questions or concerns with her recovery

## 2019-10-22 ENCOUNTER — APPOINTMENT (INPATIENT)
Dept: RADIOLOGY | Facility: HOSPITAL | Age: 84
DRG: 189 | End: 2019-10-22
Payer: COMMERCIAL

## 2019-10-22 ENCOUNTER — APPOINTMENT (INPATIENT)
Dept: NON INVASIVE DIAGNOSTICS | Facility: HOSPITAL | Age: 84
DRG: 189 | End: 2019-10-22
Payer: COMMERCIAL

## 2019-10-22 LAB
ANION GAP SERPL CALCULATED.3IONS-SCNC: 2 MMOL/L (ref 4–13)
APTT PPP: 132 SECONDS (ref 23–37)
APTT PPP: 26 SECONDS (ref 23–37)
ARTERIAL PATENCY WRIST A: YES
BACTERIA UR QL AUTO: ABNORMAL /HPF
BASE EXCESS BLDA CALC-SCNC: 8 MMOL/L
BASE EXCESS BLDA CALC-SCNC: 9.3 MMOL/L
BASOPHILS # BLD AUTO: 0.02 THOUSANDS/ΜL (ref 0–0.1)
BASOPHILS NFR BLD AUTO: 0 % (ref 0–1)
BILIRUB UR QL STRIP: NEGATIVE
BUN SERPL-MCNC: 26 MG/DL (ref 5–25)
CALCIUM SERPL-MCNC: 6.8 MG/DL (ref 8.3–10.1)
CHLORIDE SERPL-SCNC: 109 MMOL/L (ref 100–108)
CLARITY UR: ABNORMAL
CO2 SERPL-SCNC: 36 MMOL/L (ref 21–32)
COLOR UR: YELLOW
CREAT SERPL-MCNC: 0.57 MG/DL (ref 0.6–1.3)
EOSINOPHIL # BLD AUTO: 0 THOUSAND/ΜL (ref 0–0.61)
EOSINOPHIL NFR BLD AUTO: 0 % (ref 0–6)
ERYTHROCYTE [DISTWIDTH] IN BLOOD BY AUTOMATED COUNT: 14.9 % (ref 11.6–15.1)
ERYTHROCYTE [DISTWIDTH] IN BLOOD BY AUTOMATED COUNT: 14.9 % (ref 11.6–15.1)
EST. AVERAGE GLUCOSE BLD GHB EST-MCNC: 137 MG/DL
GFR SERPL CREATININE-BSD FRML MDRD: 87 ML/MIN/1.73SQ M
GLUCOSE SERPL-MCNC: 102 MG/DL (ref 65–140)
GLUCOSE SERPL-MCNC: 108 MG/DL (ref 65–140)
GLUCOSE SERPL-MCNC: 111 MG/DL (ref 65–140)
GLUCOSE SERPL-MCNC: 112 MG/DL (ref 65–140)
GLUCOSE SERPL-MCNC: 124 MG/DL (ref 65–140)
GLUCOSE SERPL-MCNC: 137 MG/DL (ref 65–140)
GLUCOSE SERPL-MCNC: 198 MG/DL (ref 65–140)
GLUCOSE SERPL-MCNC: 242 MG/DL (ref 65–140)
GLUCOSE SERPL-MCNC: 77 MG/DL (ref 65–140)
GLUCOSE UR STRIP-MCNC: ABNORMAL MG/DL
HBA1C MFR BLD: 6.4 % (ref 4.2–6.3)
HCO3 BLDA-SCNC: 34.8 MMOL/L (ref 22–28)
HCO3 BLDA-SCNC: 37.3 MMOL/L (ref 22–28)
HCT VFR BLD AUTO: 28 % (ref 34.8–46.1)
HCT VFR BLD AUTO: 28.1 % (ref 34.8–46.1)
HCT VFR BLD AUTO: 28.5 % (ref 34.8–46.1)
HGB BLD-MCNC: 8.2 G/DL (ref 11.5–15.4)
HGB BLD-MCNC: 8.4 G/DL (ref 11.5–15.4)
HGB BLD-MCNC: 8.5 G/DL (ref 11.5–15.4)
HGB UR QL STRIP.AUTO: ABNORMAL
HYALINE CASTS #/AREA URNS LPF: ABNORMAL /LPF
IMM GRANULOCYTES # BLD AUTO: 0.13 THOUSAND/UL (ref 0–0.2)
IMM GRANULOCYTES NFR BLD AUTO: 1 % (ref 0–2)
INR PPP: 1.29 (ref 0.84–1.19)
KETONES UR STRIP-MCNC: NEGATIVE MG/DL
LACTATE SERPL-SCNC: 0.7 MMOL/L (ref 0.5–2)
LEUKOCYTE ESTERASE UR QL STRIP: ABNORMAL
LYMPHOCYTES # BLD AUTO: 0.45 THOUSANDS/ΜL (ref 0.6–4.47)
LYMPHOCYTES NFR BLD AUTO: 3 % (ref 14–44)
MAGNESIUM SERPL-MCNC: 2.3 MG/DL (ref 1.6–2.6)
MCH RBC QN AUTO: 31.7 PG (ref 26.8–34.3)
MCH RBC QN AUTO: 32.2 PG (ref 26.8–34.3)
MCHC RBC AUTO-ENTMCNC: 29.2 G/DL (ref 31.4–37.4)
MCHC RBC AUTO-ENTMCNC: 29.8 G/DL (ref 31.4–37.4)
MCV RBC AUTO: 108 FL (ref 82–98)
MCV RBC AUTO: 109 FL (ref 82–98)
MONOCYTES # BLD AUTO: 0.86 THOUSAND/ΜL (ref 0.17–1.22)
MONOCYTES NFR BLD AUTO: 6 % (ref 4–12)
NASAL CANNULA: 6
NASAL CANNULA: 6
NEUTROPHILS # BLD AUTO: 13.37 THOUSANDS/ΜL (ref 1.85–7.62)
NEUTS SEG NFR BLD AUTO: 90 % (ref 43–75)
NITRITE UR QL STRIP: NEGATIVE
NON-SQ EPI CELLS URNS QL MICRO: ABNORMAL /HPF
NRBC BLD AUTO-RTO: 0 /100 WBCS
O2 CT BLDA-SCNC: 12.3 ML/DL (ref 16–23)
O2 CT BLDA-SCNC: 12.3 ML/DL (ref 16–23)
OXYHGB MFR BLDA: 94.1 % (ref 94–97)
OXYHGB MFR BLDA: 97.4 % (ref 94–97)
PCO2 BLDA: 63.2 MM HG (ref 36–44)
PCO2 BLDA: 76.5 MM HG (ref 36–44)
PH BLDA: 7.31 [PH] (ref 7.35–7.45)
PH BLDA: 7.36 [PH] (ref 7.35–7.45)
PH UR STRIP.AUTO: 5 [PH]
PHOSPHATE SERPL-MCNC: 2.7 MG/DL (ref 2.3–4.1)
PLATELET # BLD AUTO: 584 THOUSANDS/UL (ref 149–390)
PLATELET # BLD AUTO: 613 THOUSANDS/UL (ref 149–390)
PMV BLD AUTO: 9.5 FL (ref 8.9–12.7)
PMV BLD AUTO: 9.7 FL (ref 8.9–12.7)
PO2 BLDA: 133 MM HG (ref 75–129)
PO2 BLDA: 78.5 MM HG (ref 75–129)
POTASSIUM SERPL-SCNC: 3.9 MMOL/L (ref 3.5–5.3)
PREALB SERPL-MCNC: 10.3 MG/DL (ref 18–40)
PROCALCITONIN SERPL-MCNC: 0.16 NG/ML
PROT UR STRIP-MCNC: NEGATIVE MG/DL
PROTHROMBIN TIME: 15.7 SECONDS (ref 11.6–14.5)
RBC # BLD AUTO: 2.59 MILLION/UL (ref 3.81–5.12)
RBC # BLD AUTO: 2.64 MILLION/UL (ref 3.81–5.12)
RBC #/AREA URNS AUTO: ABNORMAL /HPF
SODIUM SERPL-SCNC: 147 MMOL/L (ref 136–145)
SP GR UR STRIP.AUTO: 1.01 (ref 1–1.03)
SPECIMEN SOURCE: ABNORMAL
SPECIMEN SOURCE: ABNORMAL
TSH SERPL DL<=0.05 MIU/L-ACNC: 4.12 UIU/ML (ref 0.36–3.74)
UROBILINOGEN UR QL STRIP.AUTO: 0.2 E.U./DL
WBC # BLD AUTO: 14.83 THOUSAND/UL (ref 4.31–10.16)
WBC # BLD AUTO: 15.06 THOUSAND/UL (ref 4.31–10.16)
WBC #/AREA URNS AUTO: ABNORMAL /HPF

## 2019-10-22 PROCEDURE — 82948 REAGENT STRIP/BLOOD GLUCOSE: CPT

## 2019-10-22 PROCEDURE — 92610 EVALUATE SWALLOWING FUNCTION: CPT

## 2019-10-22 PROCEDURE — 93930 UPPER EXTREMITY STUDY: CPT

## 2019-10-22 PROCEDURE — 84100 ASSAY OF PHOSPHORUS: CPT | Performed by: INTERNAL MEDICINE

## 2019-10-22 PROCEDURE — 99223 1ST HOSP IP/OBS HIGH 75: CPT | Performed by: PSYCHIATRY & NEUROLOGY

## 2019-10-22 PROCEDURE — 85610 PROTHROMBIN TIME: CPT | Performed by: PHYSICIAN ASSISTANT

## 2019-10-22 PROCEDURE — 81001 URINALYSIS AUTO W/SCOPE: CPT | Performed by: INTERNAL MEDICINE

## 2019-10-22 PROCEDURE — 93930 UPPER EXTREMITY STUDY: CPT | Performed by: SURGERY

## 2019-10-22 PROCEDURE — 83036 HEMOGLOBIN GLYCOSYLATED A1C: CPT | Performed by: INTERNAL MEDICINE

## 2019-10-22 PROCEDURE — 84443 ASSAY THYROID STIM HORMONE: CPT | Performed by: INTERNAL MEDICINE

## 2019-10-22 PROCEDURE — 85730 THROMBOPLASTIN TIME PARTIAL: CPT | Performed by: PHYSICIAN ASSISTANT

## 2019-10-22 PROCEDURE — 85018 HEMOGLOBIN: CPT | Performed by: PHYSICIAN ASSISTANT

## 2019-10-22 PROCEDURE — 99223 1ST HOSP IP/OBS HIGH 75: CPT | Performed by: INTERNAL MEDICINE

## 2019-10-22 PROCEDURE — 80048 BASIC METABOLIC PNL TOTAL CA: CPT | Performed by: INTERNAL MEDICINE

## 2019-10-22 PROCEDURE — G8996 SWALLOW CURRENT STATUS: HCPCS

## 2019-10-22 PROCEDURE — 83605 ASSAY OF LACTIC ACID: CPT | Performed by: FAMILY MEDICINE

## 2019-10-22 PROCEDURE — 85025 COMPLETE CBC W/AUTO DIFF WBC: CPT | Performed by: INTERNAL MEDICINE

## 2019-10-22 PROCEDURE — 87081 CULTURE SCREEN ONLY: CPT | Performed by: FAMILY MEDICINE

## 2019-10-22 PROCEDURE — 82805 BLOOD GASES W/O2 SATURATION: CPT | Performed by: FAMILY MEDICINE

## 2019-10-22 PROCEDURE — 94760 N-INVAS EAR/PLS OXIMETRY 1: CPT

## 2019-10-22 PROCEDURE — 85730 THROMBOPLASTIN TIME PARTIAL: CPT | Performed by: INTERNAL MEDICINE

## 2019-10-22 PROCEDURE — 84145 PROCALCITONIN (PCT): CPT | Performed by: FAMILY MEDICINE

## 2019-10-22 PROCEDURE — 36600 WITHDRAWAL OF ARTERIAL BLOOD: CPT

## 2019-10-22 PROCEDURE — 83735 ASSAY OF MAGNESIUM: CPT | Performed by: INTERNAL MEDICINE

## 2019-10-22 PROCEDURE — 85027 COMPLETE CBC AUTOMATED: CPT | Performed by: PHYSICIAN ASSISTANT

## 2019-10-22 PROCEDURE — 99253 IP/OBS CNSLTJ NEW/EST LOW 45: CPT | Performed by: OBSTETRICS & GYNECOLOGY

## 2019-10-22 PROCEDURE — 84134 ASSAY OF PREALBUMIN: CPT | Performed by: INTERNAL MEDICINE

## 2019-10-22 PROCEDURE — 82805 BLOOD GASES W/O2 SATURATION: CPT | Performed by: PHYSICIAN ASSISTANT

## 2019-10-22 PROCEDURE — 74018 RADEX ABDOMEN 1 VIEW: CPT

## 2019-10-22 PROCEDURE — 85014 HEMATOCRIT: CPT | Performed by: PHYSICIAN ASSISTANT

## 2019-10-22 PROCEDURE — 36415 COLL VENOUS BLD VENIPUNCTURE: CPT | Performed by: INTERNAL MEDICINE

## 2019-10-22 PROCEDURE — 82140 ASSAY OF AMMONIA: CPT | Performed by: PHYSICIAN ASSISTANT

## 2019-10-22 PROCEDURE — 99255 IP/OBS CONSLTJ NEW/EST HI 80: CPT | Performed by: SURGERY

## 2019-10-22 PROCEDURE — G8997 SWALLOW GOAL STATUS: HCPCS

## 2019-10-22 RX ORDER — HEPARIN SODIUM 1000 [USP'U]/ML
4800 INJECTION, SOLUTION INTRAVENOUS; SUBCUTANEOUS AS NEEDED
Status: DISCONTINUED | OUTPATIENT
Start: 2019-10-22 | End: 2019-11-01

## 2019-10-22 RX ORDER — HEPARIN SODIUM 1000 [USP'U]/ML
2400 INJECTION, SOLUTION INTRAVENOUS; SUBCUTANEOUS AS NEEDED
Status: DISCONTINUED | OUTPATIENT
Start: 2019-10-22 | End: 2019-11-01

## 2019-10-22 RX ORDER — ACETAMINOPHEN 325 MG/1
650 TABLET ORAL EVERY 6 HOURS PRN
Status: DISCONTINUED | OUTPATIENT
Start: 2019-10-22 | End: 2019-10-24

## 2019-10-22 RX ORDER — PANTOPRAZOLE SODIUM 40 MG/1
40 INJECTION, POWDER, FOR SOLUTION INTRAVENOUS
Status: DISCONTINUED | OUTPATIENT
Start: 2019-10-23 | End: 2019-11-01

## 2019-10-22 RX ORDER — LEVOTHYROXINE SODIUM 0.05 MG/1
50 TABLET ORAL
Status: DISCONTINUED | OUTPATIENT
Start: 2019-10-22 | End: 2019-11-01

## 2019-10-22 RX ORDER — POLYETHYLENE GLYCOL 3350 17 G/17G
17 POWDER, FOR SOLUTION ORAL DAILY
Status: DISCONTINUED | OUTPATIENT
Start: 2019-10-22 | End: 2019-10-22

## 2019-10-22 RX ORDER — DEXTROSE AND SODIUM CHLORIDE 5; .45 G/100ML; G/100ML
50 INJECTION, SOLUTION INTRAVENOUS CONTINUOUS
Status: DISCONTINUED | OUTPATIENT
Start: 2019-10-22 | End: 2019-10-24

## 2019-10-22 RX ORDER — TRAVOPROST OPHTHALMIC SOLUTION 0.04 MG/ML
1 SOLUTION OPHTHALMIC
Status: DISCONTINUED | OUTPATIENT
Start: 2019-10-22 | End: 2019-11-01

## 2019-10-22 RX ORDER — HEPARIN SODIUM 10000 [USP'U]/100ML
3-30 INJECTION, SOLUTION INTRAVENOUS
Status: DISCONTINUED | OUTPATIENT
Start: 2019-10-22 | End: 2019-11-01

## 2019-10-22 RX ORDER — MAGNESIUM HYDROXIDE/ALUMINUM HYDROXICE/SIMETHICONE 120; 1200; 1200 MG/30ML; MG/30ML; MG/30ML
15 SUSPENSION ORAL EVERY 6 HOURS PRN
Status: DISCONTINUED | OUTPATIENT
Start: 2019-10-22 | End: 2019-11-01

## 2019-10-22 RX ORDER — FUROSEMIDE 40 MG/1
40 TABLET ORAL DAILY
Status: DISCONTINUED | OUTPATIENT
Start: 2019-10-22 | End: 2019-10-22

## 2019-10-22 RX ORDER — POTASSIUM CHLORIDE AND SODIUM CHLORIDE 450; 150 MG/100ML; MG/100ML
75 INJECTION, SOLUTION INTRAVENOUS CONTINUOUS
Status: DISCONTINUED | OUTPATIENT
Start: 2019-10-22 | End: 2019-10-22

## 2019-10-22 RX ORDER — LATANOPROST 50 UG/ML
1 SOLUTION/ DROPS OPHTHALMIC
Status: DISCONTINUED | OUTPATIENT
Start: 2019-10-22 | End: 2019-10-22 | Stop reason: ALTCHOICE

## 2019-10-22 RX ORDER — ONDANSETRON 2 MG/ML
4 INJECTION INTRAMUSCULAR; INTRAVENOUS EVERY 6 HOURS PRN
Status: DISCONTINUED | OUTPATIENT
Start: 2019-10-22 | End: 2019-11-01 | Stop reason: HOSPADM

## 2019-10-22 RX ORDER — MINERAL OIL AND PETROLATUM 150; 830 MG/G; MG/G
OINTMENT OPHTHALMIC
Status: DISCONTINUED | OUTPATIENT
Start: 2019-10-22 | End: 2019-11-01 | Stop reason: HOSPADM

## 2019-10-22 RX ORDER — SENNOSIDES 8.8 MG/5ML
8.8 LIQUID ORAL DAILY PRN
Status: DISCONTINUED | OUTPATIENT
Start: 2019-10-22 | End: 2019-11-01

## 2019-10-22 RX ORDER — DORZOLAMIDE HYDROCHLORIDE AND TIMOLOL MALEATE 20; 5 MG/ML; MG/ML
1 SOLUTION/ DROPS OPHTHALMIC 2 TIMES DAILY
Status: DISCONTINUED | OUTPATIENT
Start: 2019-10-22 | End: 2019-11-01 | Stop reason: HOSPADM

## 2019-10-22 RX ORDER — INSULIN GLARGINE 100 [IU]/ML
20 INJECTION, SOLUTION SUBCUTANEOUS
Status: DISCONTINUED | OUTPATIENT
Start: 2019-10-22 | End: 2019-10-22

## 2019-10-22 RX ORDER — METOPROLOL TARTRATE 5 MG/5ML
2.5 INJECTION INTRAVENOUS EVERY 6 HOURS
Status: DISCONTINUED | OUTPATIENT
Start: 2019-10-22 | End: 2019-10-23

## 2019-10-22 RX ORDER — ALBUMIN, HUMAN INJ 5% 5 %
SOLUTION INTRAVENOUS
Status: COMPLETED
Start: 2019-10-22 | End: 2019-10-22

## 2019-10-22 RX ORDER — HEPARIN SODIUM 1000 [USP'U]/ML
4800 INJECTION, SOLUTION INTRAVENOUS; SUBCUTANEOUS ONCE
Status: COMPLETED | OUTPATIENT
Start: 2019-10-22 | End: 2019-10-22

## 2019-10-22 RX ORDER — TRAMADOL HYDROCHLORIDE 50 MG/1
50 TABLET ORAL EVERY 6 HOURS PRN
Status: DISCONTINUED | OUTPATIENT
Start: 2019-10-22 | End: 2019-10-23

## 2019-10-22 RX ORDER — SODIUM CHLORIDE FOR INHALATION 0.9 %
3 VIAL, NEBULIZER (ML) INHALATION
Status: DISCONTINUED | OUTPATIENT
Start: 2019-10-22 | End: 2019-10-22

## 2019-10-22 RX ORDER — ALBUTEROL SULFATE 2.5 MG/3ML
2.5 SOLUTION RESPIRATORY (INHALATION) EVERY 4 HOURS PRN
Status: DISCONTINUED | OUTPATIENT
Start: 2019-10-22 | End: 2019-11-01

## 2019-10-22 RX ORDER — LORAZEPAM 2 MG/ML
0.25 INJECTION INTRAMUSCULAR EVERY 6 HOURS PRN
Status: DISCONTINUED | OUTPATIENT
Start: 2019-10-22 | End: 2019-10-22

## 2019-10-22 RX ORDER — LEVALBUTEROL 1.25 MG/.5ML
1.25 SOLUTION, CONCENTRATE RESPIRATORY (INHALATION)
Status: DISCONTINUED | OUTPATIENT
Start: 2019-10-22 | End: 2019-10-22

## 2019-10-22 RX ORDER — ALBUMIN, HUMAN INJ 5% 5 %
12.5 SOLUTION INTRAVENOUS ONCE
Status: COMPLETED | OUTPATIENT
Start: 2019-10-22 | End: 2019-10-23

## 2019-10-22 RX ADMIN — ALBUMIN, HUMAN INJ 5% 12.5 G: 5 SOLUTION at 23:40

## 2019-10-22 RX ADMIN — POLYETHYLENE GLYCOL 3350 17 G: 17 POWDER, FOR SOLUTION ORAL at 11:18

## 2019-10-22 RX ADMIN — ISODIUM CHLORIDE 3 ML: 0.03 SOLUTION RESPIRATORY (INHALATION) at 13:02

## 2019-10-22 RX ADMIN — OMEPRAZOLE MAGNESIUM 20 MG: 20 CAPSULE, DELAYED RELEASE ORAL at 11:18

## 2019-10-22 RX ADMIN — HEPARIN SODIUM AND DEXTROSE 18 UNITS/KG/HR: 10000; 5 INJECTION INTRAVENOUS at 09:54

## 2019-10-22 RX ADMIN — TRAVOPROST 1 DROP: 0.04 SOLUTION/ DROPS OPHTHALMIC at 02:08

## 2019-10-22 RX ADMIN — INSULIN GLARGINE 20 UNITS: 100 INJECTION, SOLUTION SUBCUTANEOUS at 02:01

## 2019-10-22 RX ADMIN — CEFEPIME HYDROCHLORIDE 2000 MG: 2 INJECTION, POWDER, FOR SOLUTION INTRAVENOUS at 14:38

## 2019-10-22 RX ADMIN — METOPROLOL TARTRATE 2.5 MG: 5 INJECTION INTRAVENOUS at 15:47

## 2019-10-22 RX ADMIN — DEXTROSE AND SODIUM CHLORIDE 100 ML/HR: 5; .45 INJECTION, SOLUTION INTRAVENOUS at 16:08

## 2019-10-22 RX ADMIN — HEPARIN SODIUM 4800 UNITS: 1000 INJECTION, SOLUTION INTRAVENOUS; SUBCUTANEOUS at 09:54

## 2019-10-22 RX ADMIN — INSULIN LISPRO 2 UNITS: 100 INJECTION, SOLUTION INTRAVENOUS; SUBCUTANEOUS at 02:00

## 2019-10-22 RX ADMIN — LORAZEPAM 0.25 MG: 2 INJECTION INTRAMUSCULAR; INTRAVENOUS at 16:23

## 2019-10-22 RX ADMIN — FUROSEMIDE 40 MG: 40 TABLET ORAL at 11:18

## 2019-10-22 RX ADMIN — DORZOLAMIDE HYDROCHLORIDE AND TIMOLOL MALEATE 1 DROP: 20; 5 SOLUTION/ DROPS OPHTHALMIC at 23:01

## 2019-10-22 RX ADMIN — DORZOLAMIDE HYDROCHLORIDE AND TIMOLOL MALEATE 1 DROP: 20; 5 SOLUTION/ DROPS OPHTHALMIC at 11:17

## 2019-10-22 RX ADMIN — SODIUM CHLORIDE 0.5 UNITS/HR: 9 INJECTION, SOLUTION INTRAVENOUS at 18:12

## 2019-10-22 RX ADMIN — TRAVOPROST 1 DROP: 0.04 SOLUTION/ DROPS OPHTHALMIC at 21:50

## 2019-10-22 RX ADMIN — METOPROLOL TARTRATE 25 MG: 25 TABLET ORAL at 02:09

## 2019-10-22 RX ADMIN — LEVALBUTEROL HYDROCHLORIDE 1.25 MG: 1.25 SOLUTION, CONCENTRATE RESPIRATORY (INHALATION) at 13:02

## 2019-10-22 RX ADMIN — ALBUMIN (HUMAN) 12.5 G: 12.5 INJECTION, SOLUTION INTRAVENOUS at 23:40

## 2019-10-22 RX ADMIN — POTASSIUM CHLORIDE AND SODIUM CHLORIDE 75 ML/HR: 450; 150 INJECTION, SOLUTION INTRAVENOUS at 02:04

## 2019-10-22 RX ADMIN — LEVOTHYROXINE SODIUM 50 MCG: 50 TABLET ORAL at 06:06

## 2019-10-22 RX ADMIN — METOPROLOL TARTRATE 25 MG: 25 TABLET ORAL at 11:21

## 2019-10-22 RX ADMIN — INSULIN LISPRO 1 UNITS: 100 INJECTION, SOLUTION INTRAVENOUS; SUBCUTANEOUS at 06:44

## 2019-10-22 NOTE — CONSULTS
Consultation - Neurology   Francisco Javier Conception 82 y o  female MRN: [id number]  Unit/Bed#: [facility] 530-01 Encounter: [id number]      Assessment/Plan   Assessment:    Altered Mental Status  Lethargy  Generalized Anxiety Disorder  Hypothyroidism  Diabetic Neuropathy, Painful  PEA arrest  Acute hypercarbic respiratory failure    Plan:    # AMS possible 2/2 cardiogenic vs endocrinologic vs neurologic cause vs pharmacologic/metabolic  · Pt presents with transient but sudden onset AMS and noted HR in the 30s and cyanotic with acute hypercarbic and hypoxic respiratory failure, difficult to arouse with sternal rub, and preexisting cardiac comorbidiites which could indicate cardiogenic etiology, would recommend cardiology consult  · Recent diagnosis of RUE thrombophlebitis, already seen by vascular surgery, good perfusion to extremities  · Pt has history of hypothyroidism, also presented with bradycardia, hypothermia and lethargy which could suggest myxedema coma or other metabolic insufficiency, pt has been started on levothyroxine, would recommend further endocrinological workup and lab follow up for B12 due to protein calorie malnutrition and poor oral intake since ICU admission  · Pt had CT head on admission which showed no acute intracranial abnormalities, and the patient has transient period of lethargy without focal neurological deficits   · Pt had been taking tramadol for back pain since last week, this has been discontinued in case it had any correlation with lethargy and periods of AMS  · Blood cultures pending, lactic and procal within normal limits, on cefepime for aspiration PNA  · Will continue to follow, appreciate the consult    # Lethargy   · likely 2/2 above    # Generalized Anxiety Disorder  · Pt states that she is anxious, particularly with recent ICU hospitalization  · Per primary team, she was started on PRN ativan    # Diabetic neuropathy  · Takes gabapentin at home  · Continued in hospital    History of Present Illness     Reason for Consult / Principal Problem: AMS/encephalopathy  Hx and PE limited by: pt has decreased hearing  HPI: Dave Ching is a 82 y o   female who presents with altered mental status  The pt has a past medical history pertinent for stage 1B ovarian cancer s/p staging surgery, cardiac arrest with PEA, RUE thrombophlebitis, atrial fibrillation  She was recently in the ICU at [address] from 10/4 through 10/18 for cardiac arrest during surgery for ovarian cancer  According to her family, she did well on discharge from ICU, her mental state was near her previous baseline, however yesterday, she was found by nursing staff at her LTAC to be altered and was difficult to arouse even with sternal rub  She was noted at that time to be bradycardic in the 30s and with cyanotic extremities  Additionally, per family her hearing appears to have rapidly declined from her ICU admission to now, she evidently had no hearing issues prior to her ICU admission and her voice is notably soft and raspy  On presentation to the ED, she was noted to be near or at her baseline, with resolve of cyanosis however extremities were still cold  CT head and CXR were unremarkable, she had developed aspiration PNA during ICU stay and CXR shows improvement of infiltrates  Vascular surgery was consulted for cyanosis and started her on heparin gtt  Pt was also notably still lethargic and minimally communicative this AM, after being stated at near her baseline, although sleepy, while in the ED  Family confirms this state appears to be transient  Additionally, the pt was diagnosed with aspiration PNA in the ICU, however per family, she has a new cough starting yesterday and appears to have thick secretions and transient periods of decreased SPO2 particularly when she has difficulty clearing the secretions  She has been started on cefepime today      The pt had been receiving tramadol for back pain starting during her last week in the ICU, this was discontinued today in case there is any correlation with her lethargy  On examination at bedside, the pt is at her baseline mental status according to family, although she appears very anxious and when asked about whether she is afraid, she became tearful and said "yes"  Consults    Review of Systems   Constitutional: Positive for activity change and fatigue  Eyes: Negative for visual disturbance  Respiratory: Positive for cough  Negative for chest tightness, shortness of breath and wheezing  Cardiovascular: Negative for chest pain, palpitations and leg swelling  Gastrointestinal: Negative for abdominal distention, abdominal pain and anal bleeding  Endocrine: Positive for cold intolerance  Musculoskeletal: Positive for back pain  Neurological: Positive for weakness  Negative for dizziness, tremors, seizures, syncope, facial asymmetry, speech difficulty, light-headedness, numbness and headaches  Psychiatric/Behavioral: The patient is nervous/anxious  All other systems reviewed and are negative        Historical Information   Past Medical History:   Diagnosis Date    Anxiety     Arthritis     Constipation     Diabetes mellitus ([facility] [address] )     IDDM    Frequent UTI     Glaucoma     Hearing loss     Hyperlipidemia     Hypertension     Hyperthyroidism     in past    Hyponatremia     Hypothyroid     Neuropathy     bles    Right tubo-ovarian mass     Wears glasses      Past Surgical History:   Procedure Laterality Date    APPENDECTOMY  1954    CATARACT EXTRACTION Bilateral     COLONOSCOPY  09/2014    Completed - Dr Lea Roth, due in 5 years    HYSTERECTOMY N/A 10/4/2019    Procedure: LAP TOTAL HYSTERECTOMY, BSO;  Surgeon: Williams Lopez MD;  Location: [facility];  Service: Gynecology Oncology    LAPAROTOMY N/A 10/4/2019    Procedure: EXPLORATORY LAPAROTOMY  EXLAP OMENTECTOMY  PELVIC AND DEANGELO-AORTIC LYMPH NODE DISSECTION  PERITONEAL BIOPSY TRANS ABDOMINUS BLOCK;  Surgeon: Vimal Ahn MD;  Location: [facility];  Service: Gynecology Oncology    SKIN LESION EXCISION      Ears     Social History   Social History     Substance and Sexual Activity   Alcohol Use Yes    Frequency: Monthly or less    Drinks per session: 1 or 2    Binge frequency: Never    Comment: wine     Social History     Substance and Sexual Activity   Drug Use No     Social History     Tobacco Use   Smoking Status Never Smoker   Smokeless Tobacco Never Used     Family History:   Family History   Problem Relation Age of Onset    Heart attack Mother     Diabetes type II Mother     Dementia Father     Stroke Father         CVA    Breast cancer Maternal Aunt     Stomach cancer Maternal Uncle     Diabetes Family     Stroke Family         Complications       Review of previous medical records was completed       Meds/Allergies   current meds:   Current Facility-Administered Medications   Medication Dose Route Frequency    acetaminophen (TYLENOL) tablet 650 mg  650 mg Per NG Tube Q6H PRN    aluminum-magnesium hydroxide-simethicone (MYLANTA) 200-200-20 mg/5 mL oral suspension 15 mL  15 mL Per NG Tube Q6H PRN    artificial tear (LUBRIFRESH P M ) ophthalmic ointment   Both Eyes HS PRN    cefepime (MAXIPIME) 2 g/50 mL dextrose IVPB  2,000 mg Intravenous Q12H    dextrose 5 % and sodium chloride 0 45 % infusion  100 mL/hr Intravenous Continuous    dorzolamide-timolol (COSOPT) 22 3-6 8 MG/ML ophthalmic solution 1 drop  1 drop Both Eyes BID    heparin (porcine) 25,000 units in 250 mL infusion (premix)  3-30 Units/kg/hr (Order-Specific) Intravenous Titrated    heparin (porcine) injection 2,400 Units  2,400 Units Intravenous PRN    heparin (porcine) injection 4,800 Units  4,800 Units Intravenous PRN    insulin regular (HumuLIN R,NovoLIN R) 1 Units/mL in sodium chloride 0 9 % 100 mL infusion  0 3-21 Units/hr Intravenous Titrated    levalbuterol (XOPENEX) inhalation solution 1 25 mg  1 25 mg Nebulization TID    levothyroxine tablet 50 mcg  50 mcg Per NG Tube Early Morning    LORazepam (ATIVAN) 2 mg/mL injection 0 25 mg  0 25 mg Intravenous Q6H PRN    metoprolol (LOPRESSOR) injection 2 5 mg  2 5 mg Intravenous Q6H    Netarsudil Dimesylate 0 02 % SOLN 1 drop  1 drop Ophthalmic Daily    Netarsudil Dimesylate 0 02 % SOLN 1 drop  1 drop Right Eye HS    ondansetron (ZOFRAN) injection 4 mg  4 mg Intravenous Q6H PRN    [START ON 10/23/2019] pantoprazole (PROTONIX) injection 40 mg  40 mg Intravenous Q24H GENNARO    senna 8 8 mg/5 mL oral syrup 8 8 mg  8 8 mg Per NG Tube Daily PRN    sodium chloride 0 9 % inhalation solution 3 mL  3 mL Nebulization TID    traMADol (ULTRAM) tablet 50 mg  50 mg Per NG Tube Q6H PRN    travoprost (TRAVATAN-Z) 0 004 % ophthalmic solution 1 drop  1 drop Both Eyes HS    and PTA meds:   Prior to Admission Medications   Prescriptions Last Dose Informant Patient Reported? Taking?    ERROR: CANNOT USE RATIO BASED PRESCRIPTION MIXTURE NAMING FOR A NON-MIXTURE   No No   Sig: Take 10 mL (20 mg total) by mouth daily   LORazepam (ATIVAN) 0 5 mg tablet  Self No Yes   Sig: Take 1 tablet (0 5 mg total) by mouth 3 (three) times a day   Patient taking differently: Take 0 5 mg by mouth 2 (two) times a day    Netarsudil Dimesylate (RHOPRESSA) 0 02 % SOLN   Yes Yes   Sig: Apply to eye   RHOPRESSA 0 02 % SOLN   Yes No   Sig: Administer 1 drop to the right eye daily at bedtime    acetaminophen (TYLENOL) 325 mg tablet   No No   Sig: Take 2 tablets (650 mg total) by mouth every 6 (six) hours as needed for mild pain   aluminum-magnesium hydroxide-simethicone (MYLANTA) 200-200-20 mg/5 mL suspension   No No   Sig: Take 30 mL by mouth every 4 (four) hours as needed for indigestion or heartburn   apixaban (ELIQUIS) 5 mg   No Yes   Sig: Take 1 tablet (5 mg total) by mouth 2 (two) times a day   artificial tear (LUBRIFRESH P M ) 83-15 % ophthalmic ointment   No No   Sig: Administer to both eyes daily at bedtime as needed (dry eyes)   dorzolamide-timolol (COSOPT) 22 3-6 8 MG/ML ophthalmic solution  Self Yes Yes   Sig: Administer 1 drop to both eyes 2 (two) times a day    furosemide (LASIX) 40 mg tablet   Yes Yes   Sig: Take 40 mg by mouth daily   insulin glargine (LANTUS) 100 units/mL subcutaneous injection Not Taking at Unknown time  No No   Sig: Inject 20 Units under the skin every 12 (twelve) hours   Patient not taking: Reported on 10/21/2019   insulin lispro (HumaLOG) 100 units/mL injection   No No   Sig: Inject 2-12 Units under the skin every 6 (six) hours   latanoprost (XALATAN) 0 005 % ophthalmic solution   Yes Yes   Si drop daily at bedtime   levalbuterol (XOPENEX) 1 25 mg/0 5 mL nebulizer solution   No Yes   Sig: Take 0 5 mL (1 25 mg total) by nebulization 3 (three) times a day   levothyroxine 50 mcg tablet   No Yes   Sig: Take 1 tablet (50 mcg total) by mouth daily   metoprolol tartrate (LOPRESSOR) 25 mg tablet   No Yes   Sig: Take 1 tablet (25 mg total) by mouth every 12 (twelve) hours   omeprazole (PriLOSEC) 20 mg delayed release capsule Not Taking at Unknown time  Yes No   Sig: Take 20 mg by mouth daily   ondansetron (ZOFRAN) 4 mg/2 mL injection   No No   Sig: Infuse 2 mL (4 mg total) into a venous catheter every 6 (six) hours as needed for nausea   pantoprazole (PROTONIX) 40 mg tablet   Yes Yes   Sig: Take 40 mg by mouth daily   polyethylene glycol (MIRALAX) 17 g packet   No No   Sig: Take 17 g by mouth daily   sodium chloride 0 9 % nebulizer solution   No No   Sig: Take 3 mL by nebulization 3 (three) times a day   traMADol (ULTRAM) 50 mg tablet   No No   Sig: Take 1 tablet (50 mg total) by mouth every 6 (six) hours as needed for moderate pain for up to 10 days   traZODone (DESYREL) 50 mg tablet   No Yes   Sig: Take 1 tablet (50 mg total) by mouth daily at bedtime   travoprost (TRAVATAN Z) 0 004 % ophthalmic solution Not Taking at Unknown time Self Yes No   Sig: Administer 1 drop to both eyes daily at bedtime Facility-Administered Medications: None       Allergies   Allergen Reactions    Thiazide-Type Diuretics      Hyponatremia       Objective   Vitals:Blood pressure 146/62, pulse 76, temperature (!) 97 2 °F (36 2 °C), temperature source Oral, resp  rate 18, height 5' 5" (1 651 m), weight 62 9 kg (138 lb 10 7 oz), SpO2 98 %, not currently breastfeeding  ,Body mass index is 23 08 kg/m²  Intake/Output Summary (Last 24 hours) at 10/22/2019 1622  Last data filed at 10/22/2019 1109  Gross per 24 hour   Intake 1000 ml   Output 500 ml   Net 500 ml       Invasive Devices: Invasive Devices     Peripherally Inserted Central Catheter Line            PICC Line 89/03/50 Left Basilic 8 days          Drain            Rectal Tube With balloon 14 days    External Urinary Catheter Other (Comment) 11 days    NG/OG/Enteral Tube Nasogastric Left nares 1 day                Physical Exam   Constitutional: She is oriented to person, place, and time  She appears distressed  HENT:   Head: Normocephalic and atraumatic  Nose: Nose normal    Eyes: Pupils are equal, round, and reactive to light  Conjunctivae and EOM are normal    Neck: Normal range of motion  Cardiovascular: Normal rate, regular rhythm, normal heart sounds and intact distal pulses  Exam reveals no gallop and no friction rub  No murmur heard  Pulmonary/Chest: Effort normal  She has rales  Neurological: She is alert and oriented to person, place, and time  She has normal strength  No cranial nerve deficit  She has a normal Finger-Nose-Finger Test    Skin: Skin is warm and dry  Capillary refill takes less than 2 seconds  Psychiatric: Her speech is normal    Nursing note and vitals reviewed  Neurologic Exam     Mental Status   Oriented to person, place, and time  Attention: normal    Speech: speech is normal   Level of consciousness: alert    Cranial Nerves   Cranial nerves II through XII intact       CN III, IV, VI   Pupils are equal, round, and reactive to light   Extraocular motions are normal      Motor Exam   Muscle bulk: decreased  Overall muscle tone: decreased    Strength   Strength 5/5 throughout  Sensory Exam   Light touch normal    Right arm pinprick: normal  Left arm pinprick: normal  Right leg pinprick: decreased from ankle  Left leg pinprick: decreased from ankle    Gait, Coordination, and Reflexes     Coordination   Finger to nose coordination: normal    Tremor   Resting tremor: absent  Intention tremor: absent  Action tremor: absent      Lab Results:   I have personally reviewed pertinent reports  , CBC:   Results from last 7 days   Lab Units 10/22/19  0950 10/22/19  0601 10/21/19  2030   WBC Thousand/uL 15 06* 14 83* 13 69*   RBC Million/uL 2 59* 2 64* 2 86*   HEMOGLOBIN g/dL 8 2* 8 5* 9 0*   HEMATOCRIT % 28 1* 28 5* 29 9*   MCV fL 109* 108* 105*   PLATELETS Thousands/uL 584* 613* 635*   , BMP/CMP:   Results from last 7 days   Lab Units 10/22/19  0601 10/21/19  2030 10/18/19  0430   SODIUM mmol/L 147* 146* 140   POTASSIUM mmol/L 3 9 3 4* 3 4*   CHLORIDE mmol/L 109* 105 99*   CO2 mmol/L 36* 36* 37*   BUN mg/dL 26* 26* 27*   CREATININE mg/dL 0 57* 0 61 0 68   CALCIUM mg/dL 6 8* 7 1* 6 7*   AST U/L  --  27  --    ALT U/L  --  34  --    ALK PHOS U/L  --  100  --    EGFR ml/min/1 73sq m 87 85 82   , Vitamin B12:   , HgBA1C:   Results from last 7 days   Lab Units 10/22/19  0158   HEMOGLOBIN A1C % 6 4*   , TSH:   Results from last 7 days   Lab Units 10/22/19  0601   TSH 3RD GENERATON uIU/mL 4 120*   , Coagulation:   Results from last 7 days   Lab Units 10/22/19  0950   INR  1 29*   , Lipid Profile:   , Ammonia:   , Urinalysis:   Results from last 7 days   Lab Units 10/22/19  1110   COLOR UA  Yellow   CLARITY UA  Cloudy   SPEC GRAV UA  1 013   PH UA  5 0   LEUKOCYTES UA  Elevated glucose may cause decreased leukocyte values   See urine microscopic for [facility] result/*   NITRITE UA  Negative   GLUCOSE UA mg/dl >=1000 (1%)*   KETONES UA mg/dl Negative BILIRUBIN UA  Negative   BLOOD UA  Moderate*   , Drug Screen:   , Medication Drug Levels:       Invalid input(s): CARBAMAZEPINE,  PHENOBARB, LACOSAMIDE, OXCARBAZEPINE  Imaging Studies: I have personally reviewed pertinent reports  and I have personally reviewed pertinent films in PACS  EKG, Pathology, and Other Studies: I have personally reviewed pertinent reports  and I have personally reviewed pertinent films in PACS  VTE Prophylaxis: Sequential compression device (Venodyne)  and Heparin    Code Status: Level 1 - Full Code  Advance Directive and Living Will:      Power of :    POLST:      Counseling / Coordination of Care  Total Critical Care time spent 38 minutes excluding procedures, teaching and family updates

## 2019-10-22 NOTE — ASSESSMENT & PLAN NOTE
According to history, she was quite lethargic this evening with note of dysphonia  Hence, currently with NGT  Noted that patient is much more alert and awake instantaneously when he she was put on a high back rest and encouraged to take deep breathing  This is a dramatic change  Most likely, the patient may be overmedicated from somnolent medications  Will put on hold any medications that can cause lethargy; high back rest to encourage deep breathing  Meanwhile will continue NGT as she has reports of being dysphonia with dysphagia  Will put all medications by NG for now  Swallowing evaluation  For feedings, will place NPO, however will put on closer now (diabetic diet) q i d   Nutrition consult

## 2019-10-22 NOTE — ASSESSMENT & PLAN NOTE
Lab Results   Component Value Date    HGBA1C 6 7 (H) 09/25/2019    Will continue with Lantus 20 units being given twice daily however due to the reports that she is not eating as much; will put on a half dose  Continue insulin sliding scale and Accu-Cheks  Hemoglobin A1c      Recent Labs     10/21/19  2019   POCGLU 249*       Blood Sugar Average: Last 72 hrs:  (P) 249

## 2019-10-22 NOTE — ASSESSMENT & PLAN NOTE
Already on Eliquis  Patient also has problems with thrombophlebitis the right upper arm  Will re-consult vascular with the patient seeing as out patient

## 2019-10-22 NOTE — CONSULTS
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 on 10/4/2019  Her postoperative course was complicated by cardiac arrest, aspiration pneumonia, metabolic acidosis, severe protein calorie malnutrition due to lack of oral intake, and proximal atrial fibrillation with rapid ventricular response  She was discharged to [facility] on POD #14  Review of Systems   Constitutional: Negative  HENT: Negative  Eyes: Negative  Respiratory: Negative  Cardiovascular: Negative  Gastrointestinal: Negative  Endocrine: Negative  Genitourinary: Negative  Musculoskeletal: Negative  Allergic/Immunologic: Negative  Neurological: Negative  Hematological: Negative  Psychiatric/Behavioral: Negative          Historical Information   Past Medical History:   Diagnosis Date    Anxiety     Arthritis     Constipation     Diabetes mellitus ([address] )     IDDM    Frequent UTI     Glaucoma     Hearing loss     Hyperlipidemia     Hypertension     Hyperthyroidism     in past    Hyponatremia     Hypothyroid     Neuropathy     bles    Right tubo-ovarian mass     Wears glasses      Past Surgical History:   Procedure Laterality Date    APPENDECTOMY  1956    CATARACT EXTRACTION Bilateral     COLONOSCOPY  09/2014    Completed - Dr Valerie Jack, mario in 5 years    HYSTERECTOMY N/A 10/4/2019    Procedure: LAP TOTAL HYSTERECTOMY, BSO;  Surgeon: Erasmo Blake MD;  Location: [facility]; Service: Gynecology Oncology    LAPAROTOMY N/A 10/4/2019    Procedure: EXPLORATORY LAPAROTOMY  EXLAP OMENTECTOMY  PELVIC AND DEANGELO-AORTIC LYMPH NODE DISSECTION  PERITONEAL BIOPSY TRANS ABDOMINUS BLOCK;  Surgeon: Keshia Valencia MD;  Location: [facility];  Service: Gynecology Oncology    SKIN LESION EXCISION      Ears     Family History   Problem Relation Age of Onset    Heart attack Mother     Diabetes type II Mother     Dementia Father     Stroke Father         CVA    Breast cancer Maternal Aunt     Stomach cancer Maternal Uncle     Diabetes Family     Stroke Family         Complications     Social History   Social History     Substance and Sexual Activity   Alcohol Use Yes    Frequency: Monthly or less    Drinks per session: 1 or 2    Binge frequency: Never    Comment: wine     Social History     Substance and Sexual Activity   Drug Use No     Social History     Tobacco Use   Smoking Status Never Smoker   Smokeless Tobacco Never Used       Meds/Allergies     (Not in a hospital admission)  Allergies   Allergen Reactions    Thiazide-Type Diuretics      Hyponatremia       Objective   /58 (BP Location: Left arm)   Pulse 68   Temp (!) 97 2 °F (36 2 °C) (Oral)   Resp 14   Ht 5' 5" (1 651 m)   Wt 62 9 kg (138 lb 10 7 oz)   SpO2 100%   BMI 23 08 kg/m²       Intake/Output Summary (Last 24 hours) at 10/22/2019 0430  Last data filed at 10/22/2019 0000  Gross per 24 hour   Intake 1000 ml   Output    Net 1000 ml       Physical Exam   Constitutional: She is oriented to person, place, and time  She appears well-developed and well-nourished  No distress  Cardiovascular: Normal rate, regular rhythm and normal heart sounds  Exam reveals no gallop and no friction rub  No murmur heard  Pulmonary/Chest: Effort normal and breath sounds normal  No stridor  No respiratory distress  She has no wheezes  She has no rales  Abdominal: Soft  Bowel sounds are normal  She exhibits no distension and no mass  There is no tenderness  There is no guarding  Neurological: She is alert and oriented to person, place, and time  Skin: She is not diaphoretic         Lab Results:   Admission on 10/21/2019   Component Date Value    POC Glucose 10/21/2019 249*    PTT 10/21/2019 27     Protime 10/21/2019 18 1*    INR 10/21/2019 1 55*    WBC 10/21/2019 13 69*    RBC 10/21/2019 2 86*    Hemoglobin 10/21/2019 9 0*    Hematocrit 10/21/2019 29 9*    MCV 10/21/2019 105*    MCH 10/21/2019 31 5     MCHC 10/21/2019 30 1*    RDW 10/21/2019 14 6     MPV 10/21/2019 9 9     Platelets 09/94/0148 635*    nRBC 10/21/2019 0     Sodium 10/21/2019 146*    Potassium 10/21/2019 3 4*    Chloride 10/21/2019 105     CO2 10/21/2019 36*    ANION GAP 10/21/2019 5     BUN 10/21/2019 26*    Creatinine 10/21/2019 0 61     Glucose 10/21/2019 216*    Calcium 10/21/2019 7 1*    AST 10/21/2019 27     ALT 10/21/2019 34     Alkaline Phosphatase 10/21/2019 100     Total Protein 10/21/2019 5 9*    Albumin 10/21/2019 2 0*    Total Bilirubin 10/21/2019 0 54     eGFR 10/21/2019 85     LACTIC ACID 10/21/2019 1 3     Troponin I 10/21/2019 0 03     Segmented % 10/21/2019 96*    Bands % 10/21/2019 1     Lymphocytes % 10/21/2019 1*    Monocytes % 10/21/2019 2*    Eosinophils, % 10/21/2019 0     Basophils % 10/21/2019 0     Absolute Neutrophils 10/21/2019 13 28*    Lymphocytes Absolute 10/21/2019 0 14*    Monocytes Absolute 10/21/2019 0 27     Eosinophils Absolute 10/21/2019 0 00     Basophils Absolute 10/21/2019 0 00     RBC Morphology 10/21/2019 Present     Anisocytosis 10/21/2019 Present     Macrocytes 10/21/2019 Present     Polychromasia 10/21/2019 Present     Platelet Estimate 33/41/7349 Decreased*    Hemoglobin A1C 10/22/2019 6 4*    EAG 10/22/2019 137     POC Glucose 10/22/2019 242*        Assessment/Plan     A/P: 83yo female s/p total laparoscopic hysterectomy with conversion to exploratory laparotomy, pelvic and para-aortic lymph node dissections, omentectomy, peritoneal biopsies for 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 re-admitted for episode of altered mental status, bradycardia, and upper extremity cyanosis  Altered mental status, resolved  On admission, lactic acid 1 3  Procal and cultures pending  CT head negative for intracranial abnormalities  CXR results pending  Continue to monitor  Serous ovarian cancer  S/p surgery on 10/4/2019  Pathology of right ovary showed high grade serous adenocarcinoma confined to ovary with no serosal involvement  Tumor measured 8 3cm in greatest dimension  Pathology of left ovary showed several microscopic foci of high grade serous adenocarcinoma with no serosal involvement  Tumor measured 0 9cm in greatest dimension  Will follow up outpatient  Superficial thrombophlebitis of right upper extremity and IJ vein thromboembolism  Currently on eliquis  Vascular consult in place  T1DM  HbA1C 6 7 9/25, 6 4 10/22  Glucose 249, 242 since admission  Manage per primary team (lantus 20U twice daily with insulin sliding scale)  Hypertension  BP 110s-120s/50s-60s  Continue lopressor and lasix per primary team    Hypothyroidism  Follow up TSH, continue levothyroxine when not NPO    Atrial fibrillation  On eliquis   Continue metoprolol 25mg q 12 hr per primary team    Code Status: Level 1 - Full Code    Irene Leon MD  10/22/2019  4:30 AM

## 2019-10-22 NOTE — RESPIRATORY THERAPY NOTE
RT Protocol Note  Francisco Javier Conception 82 y o  female MRN: [id number]  Unit/Bed#: [facility] 530-01 Encounter: [id number]    Assessment    Principal Problem:    Lethargy  Active Problems:    Adult onset hypothyroidism    ANAYA (generalized anxiety disorder)    Hypertension    Type 1 diabetes mellitus with diabetic neuropathy (HCC)    Diabetic neuropathy, painful (HCC)    Ovarian cancer ([facility] [address] )    Internal jugular (IJ) vein thromboembolism, acute, right (HCC)    Superficial thrombophlebitis of right upper extremity    A-fib (HCC)    Acute respiratory failure with hypercapnia (HCC)      Home Pulmonary Medications:  none       Past Medical History:   Diagnosis Date    Anxiety     Arthritis     Constipation     Diabetes mellitus (HCC)     IDDM    Frequent UTI     Glaucoma     Hearing loss     Hyperlipidemia     Hypertension     Hyperthyroidism     in past    Hyponatremia     Hypothyroid     Neuropathy     bles    Right tubo-ovarian mass     Wears glasses      Social History     Socioeconomic History    Marital status:       Spouse name: None    Number of children: 2    Years of education: None    Highest education level: None   Occupational History    None   Social Needs    Financial resource strain: None    Food insecurity:     Worry: None     Inability: None    Transportation needs:     Medical: None     Non-medical: None   Tobacco Use    Smoking status: Never Smoker    Smokeless tobacco: Never Used   Substance and Sexual Activity    Alcohol use: Never     Frequency: Monthly or less     Drinks per session: 1 or 2     Binge frequency: Never     Comment: wine    Drug use: No    Sexual activity: None   Lifestyle    Physical activity:     Days per week: None     Minutes per session: None    Stress: None   Relationships    Social connections:     Talks on phone: None     Gets together: None     Attends [employer] service: None     Active member of club or organization: None     Attends meetings of clubs or organizations: None     Relationship status: None    Intimate partner violence:     Fear of current or ex partner: None     Emotionally abused: None     Physically abused: None     Forced sexual activity: None   Other Topics Concern    None   Social History Narrative    Caffeine use    No preference on Yazidism beliefs    Lives at home alone prior to hysterectomy - now at [address] [place] [address] temporarily       Subjective         Objective    Physical Exam:   Assessment Type: (P) Assess only  General Appearance: (P) Alert, Awake  Respiratory Pattern: (P) Normal  Chest Assessment: (P) Chest expansion symmetrical  Bilateral Breath Sounds: (P) Clear  R Breath Sounds: (P) Clear  L Breath Sounds: (P) Clear  Cough: (P) Non-productive  O2 Device: Oxymizer pendant    Vitals:  Blood pressure 146/62, pulse 78, temperature (!) 97 2 °F (36 2 °C), temperature source Oral, resp  rate 18, height 5' 5" (1 651 m), weight 62 9 kg (138 lb 10 7 oz), SpO2 97 %, not currently breastfeeding  Results from last 7 days   Lab Units 10/22/19  0958   PH ART  7 359   PCO2 ART mm Hg 63 2*   PO2 ART mm Hg 78 5   HCO3 ART mmol/L 34 8*   BASE EXC ART mmol/L 8 0   O2 CONTENT ART mL/dL 12 3*   O2 HGB, ARTERIAL % 94 1   ABG SOURCE  Radial, Left   IDANIA TEST  Yes       Imaging and other studies: I have personally reviewed pertinent reports  O2 Device: Oxymizer pendant     Plan    Respiratory Plan: (P) No distress/Pulmonary history        Resp Comments: (P) Pt evaluated per resp protocol  Pt doesnt have a pulmonary history and is not in resp distress at this time  Will d/c TID bronchodilators and will order albuterol Q4prn udn per resp protocol

## 2019-10-22 NOTE — CONSULTS
Consult Note - Pulmonary   Lea Dwyer 82 y o  female MRN: [id number]  Unit/Bed#: [facility] 530-01 Encounter: [id number]      Reason for consultation: acute hypoxic resp failure    Requesting physician: Angelika Carroll MD    Acute hypoxic and hypercapnic respiratory failure- Unclear reason for hypercapnea, may be related to prior aspiration vs medication if patient is still receiving benzos/opiods  Continue with xopenex nebulizer TID  atrovent was d/dante during last hospitalization for urinary retention  Plan:  - Continue xopenex  - F/up MRSA nares culture  - F/up sputum culture  - Barium video swallow to assess for aspiration  - On cefepime with leukocytosis  Can continue for now  - Recheck procal tomorrow  - Repeat CXR tomorrow  - Continue with O2 NC, wean as tolerated    Abnormal CXR- likely due to recent aspiration event  Treated with 9 days of abx for aspiration pneumonia  Previous sputum culture with serratia  Candida likely colonizer  ID previously evaluated  Plan:  - Repeat CXR tomorrow  - Trend WBC  - Continue cefepime for now  - Check procal tomorrow    Moderate pulm HTN- with tricuspid regurg  Still has significant peripheral edema  Recommend diuresis and monitor Is/Os closely    Lethargy- unclear etiology  No evidence of UTI  Check for urinary retention  ABG with mild hypercarbia but not signficant to explant level of lethargy  Patient is awake and follows all commands but is minimally verbal  CT head negative  Neurology consulted  Minimize ativan    Case was and to be discussed with Dr Ronald Cohen  History of Present Illness   HPI:  Lea Dwyer is a 82 y o  female with a PMH fo insulin dependent DM, HTN, hypothyroidism that was brought from LTAC due to lethargy  The patient initially presented back on 10/4 in order to undergo an elective total hysterectomy and BSO with paraortic LN dissection, omentectomy and peritoneal biopsy for an ovarian mass that was noted previously   Her post op stay was complicated by hyperglycemia, hyponatremia, urinary retention  She was initially on a PCA pump with dilaudid and then transitioned to PO oxycodone and later tramadol and xanax  She also experienced N/V and retching and on 10/7 a rapid response was called for difficulty speaking and acute onset dyspnea however she was saturating 100% at that time  A CTA showed possible aspiration pneumonitis  She was subsequently intubated, taken to the ICU and an NG tube was placed which drained 400cc of coffee ground material  During that event, a code blue was then called for PEA arrest and 2 rounds of CPR for 6 min and resulted in ROSC  The patient was started on a PPI drip for presumed GI bleed along with 2 units PRBC transfusion for a Hb of 6 3  The patient also developed new onset Afib with RVR and was placed on cardizem and initially anticoagulated with IV heparin and later transitioned to eliquis  On 10/10 sputum culture resulted in yeast and serratia  The patient was initially on cefepime and flagyl and then transitioned to zosyn  ID was consulted and the patient completed 9 days of antibiotics completely  In addition, she had notable RUE swelling and a duplex showed acute superficial thrombophlebitis invovling the anticubital, basilar and cephalic vein  She was successfully extubated on 10/10 and a CT of the neck on 10/13 showed a RT IJ thrombus at the same location of the central line placement  Vascular surgery was consulted at that time  The patient was also on tube feeds and did reach goal and did have post op ileus  On 10/16 the patient underwent a bronchoscopy and was found to have a large amount of secretions  She was transferred to [facility] on 10/18/2019  While in [facility], she was in her normal state of health on Saturday  On Sunday she was noted to be lethargic and had a blood glucose in the 20s   Yesterday she was still persistently lethargic and was minimally responsive to sternal rub and therefore she was brought to the ED for further evaluation  In the ED patient was sating at 96% on nasalcannula  She was afebrile, hemodynamically stable  Labs showed a normal lactate of 1 3, normal trop, CMP with boderline hypernatremia, remainder of electrolytes were normal, boderline leukocytosis of 13K which increased to 15K, macrocytic anemia with thrombocytosis  ABG showed resp acidosis with metabolic compensation  UA was bland except for significant glucosuria  Procal was negative   Sputum culture pending    Review of Systems   Unable to perform ROS: Other   Patient minimally verbal    Historical Information   Past Medical History:   Diagnosis Date    Anxiety     Arthritis     Constipation     Diabetes mellitus ([address] )     IDDM    Frequent UTI     Glaucoma     Hearing loss     Hyperlipidemia     Hypertension     Hyperthyroidism     in past    Hyponatremia     Hypothyroid     Neuropathy     bles    Right tubo-ovarian mass     Wears glasses      Past Surgical History:   Procedure Laterality Date    APPENDECTOMY  1954    CATARACT EXTRACTION Bilateral     COLONOSCOPY  09/2014    Completed - Dr Julia Severe, due in 5 years    HYSTERECTOMY N/A 10/4/2019    Procedure: LAP TOTAL HYSTERECTOMY, BSO;  Surgeon: Gwen Murdock MD;  Location: [facility];  Service: Gynecology Oncology    LAPAROTOMY N/A 10/4/2019    Procedure: EXPLORATORY LAPAROTOMY  EXLAP OMENTECTOMY  PELVIC AND DEANGELO-AORTIC LYMPH NODE DISSECTION  PERITONEAL BIOPSY TRANS ABDOMINUS BLOCK;  Surgeon: Gwen Murdock MD;  Location: [facility];  Service: Gynecology Oncology    SKIN LESION EXCISION      Ears     Family History   Problem Relation Age of Onset    Heart attack Mother     Diabetes type II Mother     Dementia Father     Stroke Father         CVA    Breast cancer Maternal Aunt     Stomach cancer Maternal Uncle     Diabetes Family     Stroke Family         Complications       Occupational History: unable to obtain    Social History: per records, lifelong nonsmoker, no drugs/alcohol    Meds/Allergies   Current Facility-Administered Medications   Medication Dose Route Frequency    acetaminophen (TYLENOL) tablet 650 mg  650 mg Per NG Tube Q6H PRN    aluminum-magnesium hydroxide-simethicone (MYLANTA) 200-200-20 mg/5 mL oral suspension 15 mL  15 mL Per NG Tube Q6H PRN    artificial tear (LUBRIFRESH P M ) ophthalmic ointment   Both Eyes HS PRN    cefepime (MAXIPIME) 2 g/50 mL dextrose IVPB  2,000 mg Intravenous Q12H    dextrose 5 % and sodium chloride 0 45 % infusion  100 mL/hr Intravenous Continuous    dorzolamide-timolol (COSOPT) 22 3-6 8 MG/ML ophthalmic solution 1 drop  1 drop Both Eyes BID    heparin (porcine) 25,000 units in 250 mL infusion (premix)  3-30 Units/kg/hr (Order-Specific) Intravenous Titrated    heparin (porcine) injection 2,400 Units  2,400 Units Intravenous PRN    heparin (porcine) injection 4,800 Units  4,800 Units Intravenous PRN    insulin regular (HumuLIN R,NovoLIN R) 1 Units/mL in sodium chloride 0 9 % 100 mL infusion  0 3-21 Units/hr Intravenous Titrated    levalbuterol (XOPENEX) inhalation solution 1 25 mg  1 25 mg Nebulization TID    levothyroxine tablet 50 mcg  50 mcg Per NG Tube Early Morning    LORazepam (ATIVAN) 2 mg/mL injection 0 25 mg  0 25 mg Intravenous Q6H PRN    metoprolol (LOPRESSOR) injection 2 5 mg  2 5 mg Intravenous Q6H    Netarsudil Dimesylate 0 02 % SOLN 1 drop  1 drop Ophthalmic Daily    Netarsudil Dimesylate 0 02 % SOLN 1 drop  1 drop Right Eye HS    ondansetron (ZOFRAN) injection 4 mg  4 mg Intravenous Q6H PRN    [START ON 10/23/2019] pantoprazole (PROTONIX) injection 40 mg  40 mg Intravenous Q24H GENNARO    senna 8 8 mg/5 mL oral syrup 8 8 mg  8 8 mg Per NG Tube Daily PRN    sodium chloride 0 9 % inhalation solution 3 mL  3 mL Nebulization TID    traMADol (ULTRAM) tablet 50 mg  50 mg Per NG Tube Q6H PRN    travoprost (TRAVATAN-Z) 0 004 % ophthalmic solution 1 drop  1 drop Both Eyes HS Medications Prior to Admission   Medication    apixaban (ELIQUIS) 5 mg    dorzolamide-timolol (COSOPT) 22 3-6 8 MG/ML ophthalmic solution    furosemide (LASIX) 40 mg tablet    latanoprost (XALATAN) 0 005 % ophthalmic solution    levalbuterol (XOPENEX) 1 25 mg/0 5 mL nebulizer solution    levothyroxine 50 mcg tablet    LORazepam (ATIVAN) 0 5 mg tablet    metoprolol tartrate (LOPRESSOR) 25 mg tablet    Netarsudil Dimesylate (RHOPRESSA) 0 02 % SOLN    pantoprazole (PROTONIX) 40 mg tablet    traZODone (DESYREL) 50 mg tablet    acetaminophen (TYLENOL) 325 mg tablet    aluminum-magnesium hydroxide-simethicone (MYLANTA) 200-200-20 mg/5 mL suspension    artificial tear (LUBRIFRESH P M ) 83-15 % ophthalmic ointment    ERROR: CANNOT USE RATIO BASED PRESCRIPTION MIXTURE NAMING FOR A NON-MIXTURE    insulin glargine (LANTUS) 100 units/mL subcutaneous injection    insulin lispro (HumaLOG) 100 units/mL injection    omeprazole (PriLOSEC) 20 mg delayed release capsule    ondansetron (ZOFRAN) 4 mg/2 mL injection    polyethylene glycol (MIRALAX) 17 g packet    RHOPRESSA 0 02 % SOLN    sodium chloride 0 9 % nebulizer solution    traMADol (ULTRAM) 50 mg tablet    travoprost (TRAVATAN Z) 0 004 % ophthalmic solution     Allergies   Allergen Reactions    Thiazide-Type Diuretics      Hyponatremia       Vitals:    10/22/19 1120 10/22/19 1300 10/22/19 1408 10/22/19 1521   BP: 129/57 111/54 121/56 146/62   BP Location:  Right arm Right arm    Pulse: 78 60 73 76   Resp: 20 20 20 18   Temp:       TempSrc:       SpO2: 94% 99%  98%   Weight:       Height:           Physical Exam   Constitutional: She is oriented to person, place, and time  No distress  HENT:   Head: Normocephalic and atraumatic  Nose: Nose normal    Mouth/Throat: Oropharynx is clear and moist  No oropharyngeal exudate  Eyes: Pupils are equal, round, and reactive to light  Conjunctivae and EOM are normal  No scleral icterus     Neck: Normal range of motion  Neck supple  No JVD present  No tracheal deviation present  No thyromegaly present  Cardiovascular: Normal rate, regular rhythm, normal heart sounds and intact distal pulses  Exam reveals no gallop and no friction rub  No murmur heard  Good capillary refill   Pulmonary/Chest: Effort normal and breath sounds normal  No stridor  No respiratory distress  She has no wheezes  She has no rales  On 4L nasal cannula   Abdominal: Soft  Bowel sounds are normal  She exhibits no distension  There is no tenderness  There is no rebound and no guarding  NG tube noted   Musculoskeletal: Normal range of motion  She exhibits no edema or deformity  Lymphadenopathy:     She has no cervical adenopathy  Neurological: She is alert and oriented to person, place, and time  No cranial nerve deficit or sensory deficit  Skin: Capillary refill takes less than 2 seconds  No rash noted  She is not diaphoretic  No erythema  RT distal digits with cyanosis, cold as well  Left upper extremity with 2+ pitting edema   Psychiatric:   Minimally vocal       Labs: I have personally reviewed pertinent lab results  , ABG:   Lab Results   Component Value Date    PHART 7 359 10/22/2019    PXQ9EHF 63 2 (HH) 10/22/2019    PO2ART 78 5 10/22/2019    [id number] 34 8 (H) 10/22/2019    BEART 8 0 10/22/2019    SOURCE Radial, Left 10/22/2019   , BNP: No results found for: BNP, CBC:   Lab Results   Component Value Date    WBC 15 06 (H) 10/22/2019    HGB 8 2 (L) 10/22/2019    HCT 28 1 (L) 10/22/2019     (H) 10/22/2019     (H) 10/22/2019    MCH 31 7 10/22/2019    MCHC 29 2 (L) 10/22/2019    RDW 14 9 10/22/2019    MPV 9 5 10/22/2019    NRBC 0 10/22/2019   , CMP:   Lab Results   Component Value Date    SODIUM 147 (H) 10/22/2019    K 3 9 10/22/2019     (H) 10/22/2019    CO2 36 (H) 10/22/2019    BUN 26 (H) 10/22/2019    CREATININE 0 57 (L) 10/22/2019    CALCIUM 6 8 (L) 10/22/2019    AST 27 10/21/2019    ALT 34 10/21/2019 ALKPHOS 100 10/21/2019    EGFR 87 10/22/2019   , PT/INR:   Lab Results   Component Value Date    INR 1 29 (H) 10/22/2019   , Troponin:   Lab Results   Component Value Date    TROPONINI 0 03 10/21/2019     Results from last 7 days   Lab Units 10/22/19  0950 10/22/19  0601 10/21/19  2030 10/18/19  0430 10/17/19  0547   WBC Thousand/uL 15 06* 14 83* 13 69* 22 53* 24 61*   HEMOGLOBIN g/dL 8 2* 8 5* 9 0* 9 1* 9 0*   HEMATOCRIT % 28 1* 28 5* 29 9* 29 6* 30 0*   PLATELETS Thousands/uL 584* 613* 635* 531* 487*   NEUTROS PCT %  --  90*  --  89* 92*   MONOS PCT %  --  6  --  4 3*   MONO PCT %  --   --  2*  --   --       Results from last 7 days   Lab Units 10/22/19  0601 10/21/19  2030 10/18/19  0430   POTASSIUM mmol/L 3 9 3 4* 3 4*   CHLORIDE mmol/L 109* 105 99*   CO2 mmol/L 36* 36* 37*   BUN mg/dL 26* 26* 27*   CREATININE mg/dL 0 57* 0 61 0 68   CALCIUM mg/dL 6 8* 7 1* 6 7*   ALK PHOS U/L  --  100  --    ALT U/L  --  34  --    AST U/L  --  27  --      Results from last 7 days   Lab Units 10/22/19  0601   MAGNESIUM mg/dL 2 3     Results from last 7 days   Lab Units 10/22/19  0601   PHOSPHORUS mg/dL 2 7      Results from last 7 days   Lab Units 10/22/19  0950 10/21/19  2030   INR  1 29* 1 55*   PTT seconds 26 27     Results from last 7 days   Lab Units 10/22/19  0950   LACTIC ACID mmol/L 0 7     0   Lab Value Date/Time    TROPONINI 0 03 10/21/2019 2030    TROPONINI 1 91 (H) 10/07/2019 2035       Imaging and other studies: I have personally reviewed pertinent reports  and I have personally reviewed pertinent films in PACS  CXR 10/21/2019- minimal improvement in bilateral airspace opacities  Small pleural effusions    Pulmonary function testing: none    EKG, Pathology, and Other Studies: I have personally reviewed pertinent reports      Echo 10/10- EF 63%, normal LV, mild mitral regurg, mod tricuspid regurg, moderate pulm HTN PASP 52mmHg    Code Status: Level 1 - Full Code      Von Velasquez MD  Pulmonary & Critical Care Fellow, [facility]

## 2019-10-22 NOTE — UTILIZATION REVIEW
Initial Clinical Review    Admission: Date/Time/Statement: Inpatient Admission Orders (From admission, onward)     Ordered        10/22/19 0018  Inpatient Admission  Once                   Orders Placed This Encounter   Procedures    Inpatient Admission     Standing Status:   Standing     Number of Occurrences:   1     Order Specific Question:   Admitting Physician     Answer:   Mark Machado [[id number]]     Order Specific Question:   Level of Care     Answer:   Level 2 Stepdown / HOT [14]     Order Specific Question:   Estimated length of stay     Answer:   More than 2 Midnights     Order Specific Question:   Certification     Answer:   I certify that inpatient services are medically necessary for this patient for a duration of greater than two midnights  See H&P and MD Progress Notes for additional information about the patient's course of treatment  ED Arrival Information     Expected Arrival Acuity Means of Arrival Escorted By Service Admission Type    10/21/2019  10/21/2019 19:42 Emergent Ambulance [facility] Emergency    Arrival Complaint    Internal jugular (IJ) vein thromboembolism, acute, right [facility])        Chief Complaint   Patient presents with    Cyanosis     Cyanosis of RUE - found a clot with US in R internal jugular  Also cyanotic on bottom of bilateral feet  Had a period of altered mental status today as per daughter at bedside and period of low heart rate  Had recent hospitalization in ICU r/t surgical complications of hysterectomy  Had an episode of cardiac arrest as per daughter at bedside,  Assessment/Plan:       82year old female presents to ed from [facility] via ems for evaluation and treatment of  Altered mental status  In which she became difficult to arouse even with sternal rub  She had a heart rate at the time in the 30s, with  cyanosis of the right hand and bilateral lower extremities      PMHX of  DM and Ovarian cancer s/p hysterectomy 98-0-15  complicated by acute hypoxic respiratory failure and ARDS with aspiration pneumonia  She also developed thrombophlebitis from central line  And was started on Eliquis  After extubated discharged to [facility] [address]   10-18  On arrival  INR 1/55,  Platelets  931  WBC 05 84  Potassium 3 4  And  Sodium 146  Bilateral lowe extremities and right upper extremity are cool to touch with pulses present  Treated in ed with iv fluids and iv heparin gtt  NGT inserted  Oxygen requirement in creased from 3L to 6 L to maintain O2 sats  100%  Admit to  Inpatient medical surgical for lethargy of unknown origin and right upper extremity ischemia possibly embolic and  Hypoxemia  On 6 L O2 nc with sat 86 to 92 %  Plant to consult Oncology and Vascular, initiate heparin gtt, monitor with neuro vascular checks       Gynecologic Oncology   Assessment and plan   1  Stage IB ovarian cancer-status post staging surgery 10/4/2019  Good prognosis  2  Right upper extremity ischemia-possibly embolic  Appreciate vascular surgery consultation  Plan to start therapeutic anticoagulation with heparin  3  Hypoxemia on 6 L of high-flow nasal cannula, sat 86-92%  Would recommend pulmonary and Respiratory therapy consultation for evaluation and treatment  She has thick secretions and mucus plugging and may benefit from repeat bronchoscopy  4  Severe protein calorie malnutrition as evidence by minimal oral intake since surgery  She was started on tube feeds on postoperative day 10  She has had minimal oral intake since discharge to LTAC-would recommend abdominal x-ray and changing NG tube to feeding tube  Would not recommend oral feeding well NG tube is placed  5  Delirium as evidence by waxing and waning consciousness with diffuse weakness-recommend neurology consultation for evaluation        Vascular surgery   Assessment:  Subacute right upper extremity ischemia  Right IJ DVT  Right cephalic/basilic vein SVT     Diagnostics:  10/13 CTA RUE-slow flow to right upper extremity, no filling of arteries distal forearm or hand  10/13 CT neck-small peripheral, nonocclusive thrombus right common jugular vein  10/10 Upper extremity Venous duplex- right IJ central line, cephalic/basilic SVT  The patient has been maintained on Eliquis since the time of discharge from hospitals however it is unclear if she has received all her doses secondary to her dysphagia and NG tube placement  Vascular surgery has been consulted due to recent diagnosis of right IJ DVT and right upper extremity SVT as well as right hand cyanosis  Upon review of the patient's prior imaging studies from her admission hospitals CTA of the right upper extremity was performed on 10/13/19 and revealed slow flow to the right upper extremity with no filling of the arteries distal forearm or hand  On exam the patient has cyanosis of fingertips one through five, the palm and remainder of the arm remain pink and warm  Doppler evaluation reveals radial, ulnar and palmar arch signals present bilaterally, brachial arteries have 2+ pulses bilaterally  The patient is unable to participate with neurological evaluation of motor and sensation as she is unresponsive to commands at this time    She is also unable to relate whether she is experiencing pain      Plan:  Start heparin drip, hold Eliquis  Obtain upper extremity arterial duplex  Neurovascular monitoring      Addendum     Arterial duplex shows right radial artery occlusion   No vascular surgery   Continue heparin gtt      ED Triage Vitals   10/21/19 1951 10/21/19 1951 10/21/19 1951 10/21/19 1951 10/21/19 2000   (!) 97 2 °F (36 2 °C) 80 20 126/62 96 %      Oral          No Pain       10/21/19 62 9 kg (138 lb 10 7 oz)     Additional Vital Signs:     Date/Time  Temp  Pulse  Resp  BP  SpO2  O2 Device   10/22/19 1120    78  20  129/57  94 %  Nasal cannula   10/22/19 1100    80  22  103/52  93 %  Nasal cannula   10/22/19 1015    80  28Abnormal   95 %     10/22/19 0830    78  13  108/55  97 %     10/22/19 0700    70  14  112/54  99 %     10/22/19 0630    64  16  119/58  97 %  Nasal cannula   10/22/19 0530    68  16  124/60  100 %  Nasal cannula   10/22/19 0430    84  16  119/55  100 %  Nasal cannula   10/22/19 0330    68  14  126/58  100 %  Nasal cannula   10/22/19 0230    72  14  123/59  100 %  Nasal cannula   10/22/19 0209    85    122/56       10/22/19 0130    84  20  125/56  98 %  Nasal cannula   10/22/19 0000    86  14  120/61  96 %  Nasal cannula   10/21/19 2300    85   14   123/57   100 %   Nasal cannula    10/21/19 2200    86  24Abnormal   122/56  100 %  None (Room air)   10/21/19 2100    84  24Abnormal   109/57  98 %  None (Room air)   10/21/19 2030    80  22  124/58  98 %     10/21/19 2013          100 %  Nasal cannula     Respiratory       10/22 0958   pH, Arterial 7 359       pCO2, Arterial 63  2High Panic         pO2, Arterial 78 5       HCO3, Arterial 34  8High              10/22 0630  Most Recent   O2 Device Nasal c  Nasal c  O2 Therapy   Oxygen   O2 Flow Rate (L/min) (L/min) 3  6       Date and Time Eye Opening Best Verbal Response Best Motor Response Ekron Coma Scale Score   10/22/19 0240 4 5 6 15   10/21/19 1956 4 5 6 15       Date and Time R Radial Pulse L Radial Pulse R Pedal Pulse L Pedal Pulse   10/23/19 0400 Doppler +1 +1 +1   10/23/19 0000 Doppler +1 +1 +1   10/22/19 2000 Doppler +1 +1 +1   10/22/19 1749 +1 +1 +2 +2   10/22/19 0244 +1 +1 -- --   10/21/19 1956 +1 +1 -- --           Pertinent Labs/Diagnostic Test Results:     Arterial doppler 10-22-19    CONCLUSION:     Impression  RIGHT UPPER LIMB:  ABNORMAL:  This resting evaluation shows an occluded radial artery from the mid forearm to  the wrist   All five digits show flat line Doppler waveforms       Abdomen kub  10-22-19    There is a large amount of stool in the ascending colon and rectum      Physical examination   The bilateral lower extremities are cool to touch but with palpable dorsalis pedis pulses and normal strength and sensation  The right upper extremity is likewise cool but with dopplerable radial pulse and normal strength and sensation   Well-healed abdominal incision  2+ pitting edema in lower extremities bilaterally     Results from last 7 days   Lab Units 10/22/19  0950 10/22/19  0601 10/21/19  2030 10/18/19  0430 10/17/19  0547   WBC Thousand/uL 15 06* 14 83* 13 69* 22 53* 24 61*   HEMOGLOBIN g/dL 8 2* 8 5* 9 0* 9 1* 9 0*   HEMATOCRIT % 28 1* 28 5* 29 9* 29 6* 30 0*   PLATELETS Thousands/uL 584* 613* 635* 531* 487*   NEUTROS ABS Thousands/µL  --  13 37*  --  20 23* 22 64*   BANDS PCT %  --   --  1  --   --          Results from last 7 days   Lab Units 10/22/19  0601 10/21/19  2030 10/18/19  0430 10/17/19  0546 10/16/19  0431   SODIUM mmol/L 147* 146* 140 141 145   POTASSIUM mmol/L 3 9 3 4* 3 4* 3 9 4 2   CHLORIDE mmol/L 109* 105 99* 102 106   CO2 mmol/L 36* 36* 37* 36* 35*   ANION GAP mmol/L 2* 5 4 3* 4   BUN mg/dL 26* 26* 27* 25 26*   CREATININE mg/dL 0 57* 0 61 0 68 0 72 0 72   EGFR ml/min/1 73sq m 87 85 82 78 78   CALCIUM mg/dL 6 8* 7 1* 6 7* 6 7* 6 6*   MAGNESIUM mg/dL 2 3  --   --   --   --    PHOSPHORUS mg/dL 2 7  --   --   --   --      Results from last 7 days   Lab Units 10/21/19  2030   AST U/L 27   ALT U/L 34   ALK PHOS U/L 100   TOTAL PROTEIN g/dL 5 9*   ALBUMIN g/dL 2 0*   TOTAL BILIRUBIN mg/dL 0 54     Results from last 7 days   Lab Units 10/22/19  0942 10/22/19  1556 10/22/19  0136 10/21/19  2019 10/18/19  1208 10/18/19  0718 10/18/19  0605 10/18/19  0353 10/18/19  0216 10/18/19  0016   POC GLUCOSE mg/dl 102 198* 242* 249* 123 159* 188* 189* 141* 103     Results from last 7 days   Lab Units 10/22/19  0601 10/21/19  2030 10/18/19  0430 10/17/19  0546 10/16/19  0431   GLUCOSE RANDOM mg/dL 137 216* 180* 191* 145*         Results from last 7 days   Lab Units 10/22/19  0158   HEMOGLOBIN A1C % 6 4*   EAG mg/dl 137 BETA-HYDROXYBUTYRATE   Date Value Ref Range Status   10/07/2019 0 2 <0 6 mmol/L Final      Results from last 7 days   Lab Units 10/22/19  0958   PH ART  7 359   PCO2 ART mm Hg 63 2*   PO2 ART mm Hg 78 5   HCO3 ART mmol/L 34 8*   BASE EXC ART mmol/L 8 0   O2 CONTENT ART mL/dL 12 3*   O2 HGB, ARTERIAL % 94 1   ABG SOURCE  Radial, Left     Results from last 7 days   Lab Units 10/21/19  2030   TROPONIN I ng/mL 0 03         Results from last 7 days   Lab Units 10/22/19  0950 10/21/19  2030   PROTIME seconds 15 7* 18 1*   INR  1 29* 1 55*   PTT seconds 26 27     Results from last 7 days   Lab Units 10/22/19  0601   TSH 3RD GENERATON uIU/mL 4 120*     Results from last 7 days   Lab Units 10/16/19  0431   PROCALCITONIN ng/ml 0 97*     Results from last 7 days   Lab Units 10/22/19  0950 10/21/19  2030   LACTIC ACID mmol/L 0 7 1 3     Results from last 7 days   Lab Units 10/22/19  1110   CLARITY UA  Cloudy   COLOR UA  Yellow   SPEC GRAV UA  1 013   PH UA  5 0   GLUCOSE UA mg/dl >=1000 (1%)*   KETONES UA mg/dl Negative   BLOOD UA  Moderate*   PROTEIN UA mg/dl Negative   NITRITE UA  Negative   BILIRUBIN UA  Negative   UROBILINOGEN UA E U /dl 0 2   LEUKOCYTES UA  Elevated glucose may cause decreased leukocyte values   See urine microscopic for [facility] result/*   WBC UA /hpf 2-4*   RBC UA /hpf None Seen   BACTERIA UA /hpf Occasional   EPITHELIAL CELLS WET PREP /hpf None Seen       Results from last 7 days   Lab Units 10/16/19  1607   GRAM STAIN RESULT  2+ Epithelial cells per low power field*  1+ Disintegrating polys*  Rare Gram positive cocci in pairs*  Rare Gram negative rods*     ED Treatment:   Medication Administration from 10/21/2019 1841 to 10/22/2019 1203       Date/Time Order Dose Route Action     10/21/2019 2149 sodium chloride 0 9 % bolus 1,000 mL 1,000 mL Intravenous New Bag     10/22/2019 1117 dorzolamide-timolol (COSOPT) 22 3-6 8 MG/ML ophthalmic solution 1 drop 1 drop Both Eyes Given     10/22/2019 1118 furosemide (LASIX) tablet 40 mg 40 mg Per NG Tube Given     10/22/2019 0201 insulin glargine (LANTUS) subcutaneous injection 20 Units 0 2 mL 20 Units Subcutaneous Given     10/22/2019 0606 levothyroxine tablet 50 mcg 50 mcg Per NG Tube Given     10/22/2019 1121 metoprolol tartrate (LOPRESSOR) tablet 25 mg 25 mg Per NG Tube Given     10/22/2019 0209 metoprolol tartrate (LOPRESSOR) tablet 25 mg 25 mg Per NG Tube Given     10/22/2019 1118 omeprazole (PRILOSEC) suspension 2 mg/mL 20 mg Per NG Tube Given     10/22/2019 1118 polyethylene glycol (MIRALAX) packet 17 g 17 g Per NG Tube Given     10/22/2019 0208 travoprost (TRAVATAN-Z) 0 004 % ophthalmic solution 1 drop 1 drop Both Eyes Given     10/22/2019 0204 sodium chloride 0 45 % with KCl 20 mEq/L infusion (premix) 75 mL/hr Intravenous New Bag     10/22/2019 0644 insulin lispro (HumaLOG) 100 units/mL subcutaneous injection 1-5 Units 1 Units Subcutaneous Given     10/22/2019 0200 insulin lispro (HumaLOG) 100 units/mL subcutaneous injection 1-5 Units 2 Units Subcutaneous Given     10/22/2019 0954 heparin (porcine) injection 4,800 Units 4,800 Units Intravenous Given     10/22/2019 0954 heparin (porcine) 25,000 units in 250 mL infusion (premix) 18 Units/kg/hr Intravenous New Bag        Past Medical History:   Diagnosis    Anxiety    Arthritis    Constipation    [facility]    IDDM    Frequent UTI    Glaucoma    Hearing loss    Hyperlipidemia    Hypertension    Hyperthyroidism    in past    Hyponatremia    Hypothyroid    Neuropathy    bles    Right tubo-ovarian mass    Wears glasses     Present on Admission:   Type 1 diabetes mellitus with diabetic neuropathy (HCC)   ANAYA (generalized anxiety disorder)   Hypertension   Adult onset hypothyroidism   [facility]   Diabetic neuropathy, painful ([facility] )   Acute respiratory failure with hypercapnia ([facility] )   A-fib ([facility] )   Superficial thrombophlebitis of right upper extremity   Internal jugular (IJ) vein thromboembolism, acute, right (HCC)      Admitting Diagnosis: Cyanosis [R23 0]  Age/Sex: 82 y o  female  Admission Orders:    Medications:  cefepime 2,000 mg Intravenous Q12H   dorzolamide-timolol 1 drop Both Eyes BID   furosemide 40 mg Per NG Tube Daily   insulin glargine 20 Units Subcutaneous HS   insulin lispro 1-5 Units Subcutaneous TID AC   insulin lispro 1-5 Units Subcutaneous HS   levalbuterol 1 25 mg Nebulization TID   levothyroxine 50 mcg Per NG Tube Early Morning   metoprolol tartrate 25 mg Per NG Tube Q12H [address]   Netarsudil Dimesylate 1 drop Ophthalmic Daily   Netarsudil Dimesylate 1 drop Right Eye HS   omeprazole (PRILOSEC) suspension 2 mg/mL 20 mg Per NG Tube Daily   polyethylene glycol 17 g Per NG Tube Daily   sodium chloride 3 mL Nebulization TID   travoprost 1 drop Both Eyes HS       heparin (porcine) 3-30 Units/kg/hr (Order-Specific) Intravenous Titrated   sodium chloride 0 45 % with KCl 20 mEq/L 75 mL/hr Intravenous Continuous       acetaminophen 650 mg Per NG Tube Q6H PRN   aluminum-magnesium hydroxide-simethicone 15 mL Per NG Tube Q6H PRN   artificial tear  Both Eyes HS PRN   heparin (porcine) 2,400 Units Intravenous PRN   heparin (porcine) 4,800 Units Intravenous PRN   ondansetron 4 mg Intravenous Q6H PRN   senna 8 8 mg Per NG Tube Daily PRN   traMADol 50 mg Per NG Tube Q6H PRN       IP CONSULT TO NUTRITION SERVICES  IP CONSULT TO VASCULAR SURGERY  IP CONSULT TO GYNECOLOGIC ONCOLOGY    Network Utilization Review Department  [email or web address]  ATTENTION: Please call with any questions or concerns to [phone] and carefully listen to the prompts so that you are directed to the right person  All voicemails are confidential   Marie Eldridge all requests for admission clinical reviews, approved or denied determinations and any other requests to dedicated fax number below belonging to the campus where the patient is receiving treatment    FACILITY NAME UR FAX NUMBER ADMISSION DENIALS (Administrative/Medical Necessity) [id number] [facility] (Maternity/NICU/Pediatrics) Mayankchristina [phone]   Vasiliy Baltazar [phone]   Abraham Grover [phone]   [facility] [address] [facility] [phone]   [facility] [address] [facility] [address] [place] [address] [phone]

## 2019-10-22 NOTE — ED PROVIDER NOTES
History  Chief Complaint   Patient presents with    Cyanosis     Cyanosis of RUE - found a clot with US in R internal jugular  Also cyanotic on bottom of bilateral feet  Had a period of altered mental status today as per daughter at bedside and period of low heart rate  Had recent hospitalization in ICU r/t surgical complications of hysterectomy  Had an episode of cardiac arrest as per daughter at bedside,  HPI   This is an 14-year-old woman who presents to the emergency department after an episode of altered mental status earlier today  Patient has recent admission for hysterectomy and oophorectomy after diagnosis of ovarian cancer  This was complicated by a difficult postoperative course  Patient was a rapid response for hypoxic respiratory failure that deteriorated into PEA cardiac arrest   She was intubated and transferred to the ICU, where she underwent treatment for ARDS and aspiration pneumonia  Patient had central line placement in the ICU, which was complicated by R IJ thrombosis / RUE thrombophlebitis for which she was started Eliquis  She was ultimately extubated and discharged to [facility] 10/18/2019  Patient was doing well since discharge until earlier today, when she had episode of alteration in mental status during which time she was difficult to arouse even with sternal rub  Patient was apparently bradycardic around this time to the 30s and became hypoxic requiring oxygen by NC per LTAC staff  She was also noted to have cyanosis of her right hand and bilateral lower extremities (daughter has picture on phone), although this is now significantly improved from earlier today  Patient is chronically ill but does seem to be around her mental status baseline currently, although is more drowsy than usual   No fever, cough, chest pain, shortness of breath, abdominal pain, diarrhea, lower extremity pain/swelling      Prior to Admission Medications   Prescriptions Last Dose Informant Patient Reported? Taking?    ERROR: CANNOT USE RATIO BASED PRESCRIPTION MIXTURE NAMING FOR A NON-MIXTURE   No No   Sig: Take 10 mL (20 mg total) by mouth daily   LORazepam (ATIVAN) 0 5 mg tablet  Self No Yes   Sig: Take 1 tablet (0 5 mg total) by mouth 3 (three) times a day   Patient taking differently: Take 0 5 mg by mouth 2 (two) times a day    Netarsudil Dimesylate (RHOPRESSA) 0 02 % SOLN   Yes Yes   Sig: Apply to eye   RHOPRESSA 0 02 % SOLN   Yes No   Sig: Administer 1 drop to the right eye daily at bedtime    acetaminophen (TYLENOL) 325 mg tablet   No No   Sig: Take 2 tablets (650 mg total) by mouth every 6 (six) hours as needed for mild pain   aluminum-magnesium hydroxide-simethicone (MYLANTA) 200-200-20 mg/5 mL suspension   No No   Sig: Take 30 mL by mouth every 4 (four) hours as needed for indigestion or heartburn   apixaban (ELIQUIS) 5 mg   No Yes   Sig: Take 1 tablet (5 mg total) by mouth 2 (two) times a day   artificial tear (LUBRIFRESH P M ) 83-15 % ophthalmic ointment   No No   Sig: Administer to both eyes daily at bedtime as needed (dry eyes)   dorzolamide-timolol (COSOPT) 22 3-6 8 MG/ML ophthalmic solution  Self Yes Yes   Sig: Administer 1 drop to both eyes 2 (two) times a day    furosemide (LASIX) 40 mg tablet   Yes Yes   Sig: Take 40 mg by mouth daily   insulin glargine (LANTUS) 100 units/mL subcutaneous injection Not Taking at Unknown time  No No   Sig: Inject 20 Units under the skin every 12 (twelve) hours   Patient not taking: Reported on 10/21/2019   insulin lispro (HumaLOG) 100 units/mL injection   No No   Sig: Inject 2-12 Units under the skin every 6 (six) hours   latanoprost (XALATAN) 0 005 % ophthalmic solution   Yes Yes   Si drop daily at bedtime   levalbuterol (XOPENEX) 1 25 mg/0 5 mL nebulizer solution   No Yes   Sig: Take 0 5 mL (1 25 mg total) by nebulization 3 (three) times a day   levothyroxine 50 mcg tablet   No Yes   Sig: Take 1 tablet (50 mcg total) by mouth daily   metoprolol tartrate (LOPRESSOR) 25 mg tablet   No Yes   Sig: Take 1 tablet (25 mg total) by mouth every 12 (twelve) hours   omeprazole (PriLOSEC) 20 mg delayed release capsule Not Taking at Unknown time  Yes No   Sig: Take 20 mg by mouth daily   ondansetron (ZOFRAN) 4 mg/2 mL injection   No No   Sig: Infuse 2 mL (4 mg total) into a venous catheter every 6 (six) hours as needed for nausea   pantoprazole (PROTONIX) 40 mg tablet   Yes Yes   Sig: Take 40 mg by mouth daily   polyethylene glycol (MIRALAX) 17 g packet   No No   Sig: Take 17 g by mouth daily   sodium chloride 0 9 % nebulizer solution   No No   Sig: Take 3 mL by nebulization 3 (three) times a day   traMADol (ULTRAM) 50 mg tablet   No No   Sig: Take 1 tablet (50 mg total) by mouth every 6 (six) hours as needed for moderate pain for up to 10 days   traZODone (DESYREL) 50 mg tablet   No Yes   Sig: Take 1 tablet (50 mg total) by mouth daily at bedtime   travoprost (TRAVATAN Z) 0 004 % ophthalmic solution Not Taking at Unknown time Self Yes No   Sig: Administer 1 drop to both eyes daily at bedtime       Facility-Administered Medications: None       Past Medical History:   Diagnosis Date    Anxiety     Arthritis     Constipation     Diabetes mellitus (HCC)     IDDM    Frequent UTI     Glaucoma     Hearing loss     Hyperlipidemia     Hypertension     Hyperthyroidism     in past    Hyponatremia     Hypothyroid     Neuropathy     bles    Right tubo-ovarian mass     Wears glasses        Past Surgical History:   Procedure Laterality Date    APPENDECTOMY  1956    CATARACT EXTRACTION Bilateral     COLONOSCOPY  09/2014    Completed - Dr Milli Munoz, due in 5 years    HYSTERECTOMY N/A 10/4/2019    Procedure: LAP TOTAL HYSTERECTOMY, BSO;  Surgeon: Carlos Peres MD;  Location: [facility];  Service: Gynecology Oncology    LAPAROTOMY N/A 10/4/2019    Procedure: EXPLORATORY LAPAROTOMY  EXLAP OMENTECTOMY  PELVIC AND DEANGELO-AORTIC LYMPH NODE DISSECTION  PERITONEAL BIOPSY TRANS ABDOMINUS BLOCK;  Surgeon: Moisés Lipscomb MD;  Location: [facility];  Service: Gynecology Oncology    SKIN LESION EXCISION      Ears       Family History   Problem Relation Age of Onset    Heart attack Mother     Diabetes type II Mother     Dementia Father     Stroke Father         CVA    Breast cancer Maternal Aunt     Stomach cancer Maternal Uncle     Diabetes Family     Stroke Family         Complications     I have reviewed and agree with the history as documented  Social History     Tobacco Use    Smoking status: Never Smoker    Smokeless tobacco: Never Used   Substance Use Topics    Alcohol use: Yes     Frequency: Monthly or less     Drinks per session: 1 or 2     Binge frequency: Never     Comment: wine    Drug use: No        Review of Systems   Constitutional: Positive for fatigue  Negative for chills and fever  Respiratory: Negative for cough and shortness of breath  Cardiovascular: Negative for chest pain and leg swelling  Gastrointestinal: Negative for abdominal pain, nausea and vomiting  Skin: Positive for color change  Cyanosis     Neurological: Negative for dizziness, syncope, weakness, light-headedness and headaches  All other systems reviewed and are negative  Physical Exam  ED Triage Vitals   Temperature Pulse Respirations Blood Pressure SpO2   10/21/19 1951 10/21/19 1951 10/21/19 1951 10/21/19 1951 10/21/19 2000   (!) 97 2 °F (36 2 °C) 80 20 126/62 96 %      Temp Source Heart Rate Source Patient Position - Orthostatic VS BP Location FiO2 (%)   10/21/19 1951 10/21/19 1951 10/21/19 1951 10/21/19 1951 --   Oral Monitor Lying Right arm       Pain Score       10/21/19 2300       No Pain             Orthostatic Vital Signs  Vitals:    10/22/19 1100 10/22/19 1120 10/22/19 1300 10/22/19 1408   BP: 103/52 129/57 111/54 121/56   Pulse: 80 78 60 73   Patient Position - Orthostatic VS:   Lying Lying       Physical Exam   Constitutional: No distress     She is frail, appears chronically ill  HENT:   Head: Normocephalic and atraumatic  NG tube  Eyes: Pupils are equal, round, and reactive to light  Conjunctivae and EOM are normal  No scleral icterus  Neck: Normal range of motion  Neck supple  Cardiovascular: Normal rate and regular rhythm  Exam reveals no gallop and no friction rub  No murmur heard  The bilateral lower extremities are cool to touch but with palpable dorsalis pedis pulses and normal strength and sensation  The right upper extremity is likewise cool but with dopplerable radial pulse and normal strength and sensation  Pulmonary/Chest: Breath sounds normal  She has no wheezes  She has no rales  Breathing comfortably on nasal cannula  Abdominal: Soft  She exhibits no distension  There is no tenderness  There is no rebound and no guarding  Well-healed abdominal incision  Musculoskeletal: Normal range of motion  She exhibits edema  She exhibits no tenderness  2+ pitting edema in lower extremities bilaterally   Lymphadenopathy:     She has no cervical adenopathy  Neurological: She is alert  No cranial nerve deficit or sensory deficit  She exhibits normal muscle tone  Skin: Skin is warm and dry  She is not diaphoretic  No erythema  No pallor  Psychiatric: She has a normal mood and affect  Her behavior is normal    Nursing note and vitals reviewed        ED Medications  Medications   acetaminophen (TYLENOL) tablet 650 mg (has no administration in time range)   artificial tear (LUBRIFRESH P M ) ophthalmic ointment (has no administration in time range)   dorzolamide-timolol (COSOPT) 22 3-6 8 MG/ML ophthalmic solution 1 drop (1 drop Both Eyes Given 10/22/19 1117)   levalbuterol (XOPENEX) inhalation solution 1 25 mg (1 25 mg Nebulization Given 10/22/19 1302)   levothyroxine tablet 50 mcg (50 mcg Per NG Tube Given 10/22/19 0606)   Netarsudil Dimesylate 0 02 % SOLN 1 drop (1 drop Ophthalmic Not Given 10/22/19 1439)   Netarsudil Dimesylate 0 02 % SOLN 1 drop (1 drop Right Eye Not Given 10/22/19 0209)   sodium chloride 0 9 % inhalation solution 3 mL (3 mL Nebulization Given 10/22/19 1302)   traMADol (ULTRAM) tablet 50 mg (has no administration in time range)   travoprost (TRAVATAN-Z) 0 004 % ophthalmic solution 1 drop (1 drop Both Eyes Given 10/22/19 0208)   sodium chloride 0 45 % with KCl 20 mEq/L infusion (premix) (75 mL/hr Intravenous New Bag 10/22/19 0204)   ondansetron (ZOFRAN) injection 4 mg (has no administration in time range)   senna 8 8 mg/5 mL oral syrup 8 8 mg (has no administration in time range)   aluminum-magnesium hydroxide-simethicone (MYLANTA) 200-200-20 mg/5 mL oral suspension 15 mL (has no administration in time range)   heparin (porcine) 25,000 units in 250 mL infusion (premix) (18 Units/kg/hr × 60 kg (Order-Specific) Intravenous New Bag 10/22/19 0954)   heparin (porcine) injection 4,800 Units (has no administration in time range)   heparin (porcine) injection 2,400 Units (has no administration in time range)   cefepime (MAXIPIME) 2 g/50 mL dextrose IVPB (2,000 mg Intravenous New Bag 10/22/19 1438)   metoprolol (LOPRESSOR) injection 2 5 mg (has no administration in time range)   insulin regular (HumuLIN R,NovoLIN R) 1 Units/mL in sodium chloride 0 9 % 100 mL infusion (has no administration in time range)   sodium chloride 0 9 % bolus 1,000 mL (0 mL Intravenous Stopped 10/22/19 0000)   heparin (porcine) injection 4,800 Units (4,800 Units Intravenous Given 10/22/19 0954)       Diagnostic Studies  Results Reviewed     Procedure Component Value Units Date/Time    Procalcitonin [[id number]]  (Normal) Collected:  10/22/19 1218    Lab Status:  Final result Specimen:  Blood from Central Venous Line Updated:  10/22/19 1312     Procalcitonin 0 16 ng/ml     MRSA culture [[id number]]     Lab Status:  No result Specimen:  Nares     Sputum culture and Gram stain [[id number]]     Lab Status:  No result Specimen:  Sputum     Urine Microscopic [[id number]] (Abnormal) Collected:  10/22/19 1110    Lab Status:  Final result Specimen:  Urine, Straight Cath Updated:  10/22/19 1154     RBC, UA None Seen /hpf      WBC, UA 2-4 /hpf      Epithelial Cells None Seen /hpf      Bacteria, UA Occasional /hpf      Hyaline Casts, UA None Seen /lpf     UA w Reflex to Microscopic w Reflex to Culture [[id number]]  (Abnormal) Collected:  10/22/19 1110    Lab Status:  Final result Specimen:  Urine, Straight Cath Updated:  10/22/19 1150     Color, UA Yellow     Clarity, UA Cloudy     Specific Gravity, UA 1 013     pH, UA 5 0     Leukocytes, UA Elevated glucose may cause decreased leukocyte values  See urine microscopic for [facility] result/     Nitrite, UA Negative     Protein, UA Negative mg/dl      Glucose, UA >=1000 (1%) mg/dl      Ketones, UA Negative mg/dl      Urobilinogen, UA 0 2 E U /dl      Bilirubin, UA Negative     Blood, UA Moderate    CBC and differential [[id number]]  (Abnormal) Collected:  10/22/19 0601    Lab Status:  Final result Specimen:  Blood from Arm, Left Updated:  10/22/19 1140     WBC 14 83 Thousand/uL      RBC 2 64 Million/uL      Hemoglobin 8 5 g/dL      Hematocrit 28 5 %       fL      MCH 32 2 pg      MCHC 29 8 g/dL      RDW 14 9 %      MPV 9 7 fL      Platelets 248 Thousands/uL      nRBC 0 /100 WBCs      Neutrophils Relative 90 %      Immat GRANS % 1 %      Lymphocytes Relative 3 %      Monocytes Relative 6 %      Eosinophils Relative 0 %      Basophils Relative 0 %      Neutrophils Absolute 13 37 Thousands/µL      Immature Grans Absolute 0 13 Thousand/uL      Lymphocytes Absolute 0 45 Thousands/µL      Monocytes Absolute 0 86 Thousand/µL      Eosinophils Absolute 0 00 Thousand/µL      Basophils Absolute 0 02 Thousands/µL     Narrative:        See manual diff don within 3 days  This is an appended report  These results have been appended to a previously verified report      Lactic acid, plasma [[id number]]  (Normal) Collected:  10/22/19 0950    Lab Status:  Final result Specimen:  Blood from Central Venous Line Updated:  10/22/19 1024     LACTIC ACID 0 7 mmol/L     Narrative:       Result may be elevated if tourniquet was used during collection      APTT [[id number]]  (Normal) Collected:  10/22/19 0950    Lab Status:  Final result Specimen:  Blood from Central Venous Line Updated:  10/22/19 1022     PTT 26 seconds     Protime-INR [[id number]]  (Abnormal) Collected:  10/22/19 0950    Lab Status:  Final result Specimen:  Blood from Central Venous Line Updated:  10/22/19 1022     Protime 15 7 seconds      INR 1 29    Blood gas, arterial [[id number]]  (Abnormal) Collected:  10/22/19 0958    Lab Status:  Final result Specimen:  Blood, Arterial from Radial, Left Updated:  10/22/19 1009     pH, Arterial 7 359     pCO2, Arterial 63 2 mm Hg      pO2, Arterial 78 5 mm Hg      HCO3, Arterial 34 8 mmol/L      Base Excess, Arterial 8 0 mmol/L      O2 Content, Arterial 12 3 mL/dL      O2 HGB,Arterial  94 1 %      SOURCE Radial, Left     IDANIA TEST Yes     Nasal Cannula 6    CBC [[id number]]  (Abnormal) Collected:  10/22/19 0950    Lab Status:  Final result Specimen:  Blood from Central Venous Line Updated:  10/22/19 0957     WBC 15 06 Thousand/uL      RBC 2 59 Million/uL      Hemoglobin 8 2 g/dL      Hematocrit 28 1 %       fL      MCH 31 7 pg      MCHC 29 2 g/dL      RDW 14 9 %      Platelets 773 Thousands/uL      MPV 9 5 fL     Fingerstick Glucose (POCT) [[id number]]  (Normal) Collected:  10/22/19 0942    Lab Status:  Final result Updated:  10/22/19 0943     POC Glucose 102 mg/dl     Fingerstick Glucose (POCT) [[id number]]  (Abnormal) Collected:  10/22/19 0638    Lab Status:  Final result Updated:  10/22/19 0639     POC Glucose 198 mg/dl     TSH, 3rd generation [[id number]]  (Abnormal) Collected:  10/22/19 0601    Lab Status:  Final result Specimen:  Blood from Arm, Left Updated:  10/22/19 0637     TSH 3RD GENERATON 4 120 uIU/mL     Narrative:       Patients undergoing fluorescein dye angiography may retain small amounts of fluorescein in the body for 48-72 hours post procedure  Samples containing fluorescein can produce falsely depressed TSH values  If the patient had this procedure,a specimen should be resubmitted post fluorescein clearance        Basic metabolic panel [[id number]]  (Abnormal) Collected:  10/22/19 0601    Lab Status:  Final result Specimen:  Blood from Arm, Left Updated:  10/22/19 5180     Sodium 147 mmol/L      Potassium 3 9 mmol/L      Chloride 109 mmol/L      CO2 36 mmol/L      ANION GAP 2 mmol/L      BUN 26 mg/dL      Creatinine 0 57 mg/dL      Glucose 137 mg/dL      Calcium 6 8 mg/dL      eGFR 87 ml/min/1 73sq m     Narrative:       [place] guidelines for Chronic Kidney Disease (CKD):     Stage 1 with normal or high GFR (GFR > 90 mL/min/1 73 square meters)    Stage 2 Mild CKD (GFR = 60-89 mL/min/1 73 square meters)    Stage 3A Moderate CKD (GFR = 45-59 mL/min/1 73 square meters)    Stage 3B Moderate CKD (GFR = 30-44 mL/min/1 73 square meters)    Stage 4 Severe CKD (GFR = 15-29 mL/min/1 73 square meters)    Stage 5 End Stage CKD (GFR <15 mL/min/1 73 square meters)  Note: GFR calculation is accurate only with a steady state creatinine    Magnesium [[id number]]  (Normal) Collected:  10/22/19 0601    Lab Status:  Final result Specimen:  Blood from Arm, Left Updated:  10/22/19 0637     Magnesium 2 3 mg/dL     Phosphorus [[id number]]  (Normal) Collected:  10/22/19 0601    Lab Status:  Final result Specimen:  Blood from Arm, Left Updated:  10/22/19 0637     Phosphorus 2 7 mg/dL     Prealbumin [[id number]]  (Abnormal) Collected:  10/22/19 0601    Lab Status:  Final result Specimen:  Blood from Arm, Left Updated:  10/22/19 0637     Prealbumin 10 3 mg/dL     Hemoglobin A1c w/EAG Estimation (Orders if not completed within the last 90 days) [[id number]]  (Abnormal) Collected:  10/22/19 0158    Lab Status:  Final result Specimen:  Blood from Arm, Left Updated:  10/22/19 0244     Hemoglobin A1C 6 4 %       mg/dl     Fingerstick Glucose (POCT) [[id number]]  (Abnormal) Collected:  10/22/19 0136    Lab Status:  Final result Updated:  10/22/19 0137     POC Glucose 242 mg/dl     Blood culture #2 [[id number]] Collected:  10/21/19 2149    Lab Status: In process Specimen:  Blood from Arm, Left Updated:  10/21/19 2159    Comprehensive metabolic panel [[id number]]  (Abnormal) Collected:  10/21/19 2030    Lab Status:  Final result Specimen:  Blood from Arm, Left Updated:  10/21/19 2122     Sodium 146 mmol/L      Potassium 3 4 mmol/L      Chloride 105 mmol/L      CO2 36 mmol/L      ANION GAP 5 mmol/L      BUN 26 mg/dL      Creatinine 0 61 mg/dL      Glucose 216 mg/dL      Calcium 7 1 mg/dL      AST 27 U/L      ALT 34 U/L      Alkaline Phosphatase 100 U/L      Total Protein 5 9 g/dL      Albumin 2 0 g/dL      Total Bilirubin 0 54 mg/dL      eGFR 85 ml/min/1 73sq m     Narrative:       [place] guidelines for Chronic Kidney Disease (CKD):     Stage 1 with normal or high GFR (GFR > 90 mL/min/1 73 square meters)    Stage 2 Mild CKD (GFR = 60-89 mL/min/1 73 square meters)    Stage 3A Moderate CKD (GFR = 45-59 mL/min/1 73 square meters)    Stage 3B Moderate CKD (GFR = 30-44 mL/min/1 73 square meters)    Stage 4 Severe CKD (GFR = 15-29 mL/min/1 73 square meters)    Stage 5 End Stage CKD (GFR <15 mL/min/1 73 square meters)  Note: GFR calculation is accurate only with a steady state creatinine    CBC and differential [[id number]]  (Abnormal) Collected:  10/21/19 2030    Lab Status:  Final result Specimen:  Blood from Arm, Left Updated:  10/21/19 2114     WBC 13 69 Thousand/uL      RBC 2 86 Million/uL      Hemoglobin 9 0 g/dL      Hematocrit 29 9 %       fL      MCH 31 5 pg      MCHC 30 1 g/dL      RDW 14 6 %      MPV 9 9 fL      Platelets 566 Thousands/uL      nRBC 0 /100 WBCs     Narrative: This is an appended report    These results have been appended to a previously verified report  Lactic acid x2 [[id number]]  (Normal) Collected:  10/21/19 2030    Lab Status:  Final result Specimen:  Blood from Arm, Left Updated:  10/21/19 2112     LACTIC ACID 1 3 mmol/L     Narrative:       Result may be elevated if tourniquet was used during collection  APTT [[id number]]  (Normal) Collected:  10/21/19 2030    Lab Status:  Final result Specimen:  Blood from Arm, Left Updated:  10/21/19 2107     PTT 27 seconds     Protime-INR [[id number]]  (Abnormal) Collected:  10/21/19 2030    Lab Status:  Final result Specimen:  Blood from Arm, Left Updated:  10/21/19 2107     Protime 18 1 seconds      INR 1 55    Troponin I [[id number]]  (Normal) Collected:  10/21/19 2030    Lab Status:  Final result Specimen:  Blood from Arm, Left Updated:  10/21/19 2105     Troponin I 0 03 ng/mL     Blood culture #1 [[id number]] Collected:  10/21/19 2030    Lab Status: In process Specimen:  Blood from Arm, Left Updated:  10/21/19 2044    Procalcitonin [[id number]] Collected:  10/21/19 2030    Lab Status:  No result Specimen:  Blood from Arm, Left     Fingerstick Glucose (POCT) [[id number]]  (Abnormal) Collected:  10/21/19 2019    Lab Status:  Final result Updated:  10/21/19 2022     POC Glucose 249 mg/dl                  XR chest 1 view portable   ED Interpretation by James Ferrer MD (10/21 2342)   ED wet read: Infiltrates improved from prior  Final Result by Juan Rush MD (98/30 1624)      Minimal improvement in bilateral airspace opacities  Small pleural effusions  Workstation performed: [id number]         CT head without contrast   Final Result by Fortunato Puckett MD (10/21 2138)      No acute intracranial abnormality                    Workstation performed: [id number]         XR abdomen 1 view kub    (Results Pending)   VAS upper limb arterial duplex, complete bilateral, graft    (Results Pending)         Procedures  ECG 12 Lead Documentation Only  Date/Time: 10/21/2019 8:27 PM  Performed by: Aurelio Mcclellan MD  Authorized by: Aurelio Mcclellan MD     Indications / Diagnosis:  Bradycardia  ECG reviewed by me, the ED Provider: yes    Patient location:  ED  Previous ECG:     Previous ECG:  Compared to current    Comparison ECG info:  NSR    Similarity:  No change    Comparison to cardiac monitor: Yes    Interpretation:     Interpretation: normal    Rate:     ECG rate:  84    ECG rate assessment: normal    Rhythm:     Rhythm: sinus rhythm    Ectopy:     Ectopy: none    QRS:     QRS axis:  Normal    QRS intervals:  Normal  Conduction:     Conduction: normal    ST segments:     ST segments:  Normal  T waves:     T waves: non-specific              ED Course  ED Course as of Oct 22 1512   Mon Oct 21, 2019   2120 WBC(!): 13 69           Identification of Seniors at Risk      Most Recent Value   (ISAR) Identification of Seniors at Risk   Before the illness or injury that brought you to the Emergency, did you need someone to help you on a regular basis? 0 Filed at: 10/21/2019 1958   In the last 24 hours, have you needed more help than usual?  1 Filed at: 10/21/2019 1958   Have you been hospitalized for one or more nights during the past 6 months? 1 Filed at: 10/21/2019 1958   In general, do you see well?  0 Filed at: 10/21/2019 1958   In general, do you have serious problems with your memory? 0 Filed at: 10/21/2019 1958   Do you take more than three different medications every day?   1 Filed at: 10/21/2019 1958   ISAR Score  3 Filed at: 10/21/2019 1958          MDM  Number of Diagnoses or Management Options  Cyanosis: new and requires workup  History of ARDS: established and improving  History of cardiac arrest: established and improving  Hypoxia: new and requires workup  Lethargy: new and requires workup  Superficial thrombophlebitis of right upper extremity: established and improving  Transient alteration of awareness: new and requires workup     Amount and/or Complexity of Data Reviewed  Clinical lab tests: ordered and reviewed  Tests in the radiology section of CPT®: ordered and reviewed  Tests in the medicine section of CPT®: ordered and reviewed  Decide to obtain previous medical records or to obtain history from someone other than the patient: yes  Obtain history from someone other than the patient: yes  Review and summarize past medical records: yes  Discuss the patient with other providers: yes  Independent visualization of images, tracings, or specimens: yes    Patient Progress  Patient progress: stable     73-year-old woman presenting after episode of transient alteration in mental status, possible episode of bradycardia, new oxygen requirement  Patient currently essentially at baseline mental status per family  She is frail and chronically ill appearing  Her vital signs are within normal range  She denies any pain  Her chest x-ray shows bilateral lung infiltrates that are improved from last x-ray taken during ICU admission  CT head shows no bleed or other acute pathology  Suspect patient's multiple medications may be contributing to her fatigue and/or delirium from recent hospitalization and now transfer to [place] [address]  She was discussed with the Gynecologic Oncology resident Dr Rebecca Costa, who evaluated patient in the emergency department  Patient will be admitted to Internal Medicine service for further evaluation with Gynecologic Oncology following      Disposition  Final diagnoses:   Superficial thrombophlebitis of right upper extremity   Lethargy   Transient alteration of awareness   Hypoxia   History of ARDS   History of cardiac arrest   Cyanosis     Time reflects when diagnosis was documented in both MDM as applicable and the Disposition within this note     Time User Action Codes Description Comment    10/22/2019 12:11 AM Maria Antonia RUSH Add [I80 8] Superficial thrombophlebitis of right upper extremity     10/22/2019 12:11 AM Marcelino Hickey Modify [I80 8] Superficial thrombophlebitis of right upper extremity     10/22/2019  4:28 AM Alex Daria Add [C56 9] Malignant neoplasm of ovary, unspecified laterality ([facility] [address] )     10/22/2019 10:48 AM Fabi ESCALANTE Add [R09 89] Absent pulse     10/22/2019 10:54 AM Fabi ESCALANTE Add [R23 0] Cyanosis of fingertip     10/22/2019  2:13 PM [facility] Add [R53 83] Lethargy     10/22/2019  3:05 PM Manny Downer Add [R40 4] Transient alteration of awareness     10/22/2019  3:05 PM Manny Downer Modify [I80 8] Superficial thrombophlebitis of right upper extremity     10/22/2019  3:05 PM Manny Downer Modify [R40 4] Transient alteration of awareness     10/22/2019  3:05 PM Manny Downer Modify [R53 83] Lethargy     10/22/2019  3:05 PM Manny Downer Add [R09 02] Hypoxia     10/22/2019  3:06 PM Manny Downer Add [Z87 09] History of ARDS     10/22/2019  3:06 PM Manny Downer Add [Z86 74] History of cardiac arrest     10/22/2019  3:06 PM Manny Downer Modify [I80 8] Superficial thrombophlebitis of right upper extremity     10/22/2019  3:06 PM Manny Downer Add [R23 0] Cyanosis       ED Disposition     ED Disposition Condition Date/Time Comment    Admit Fermin raven Oct 22, 2019  3:04 PM Case was discussed with [facility], and the patient's admission status was agreed to be Admission Status: inpatient status to the service of Dr Melissa Alejandra          Follow-up Information    None         Current Discharge Medication List      CONTINUE these medications which have NOT CHANGED    Details   apixaban (ELIQUIS) 5 mg Take 1 tablet (5 mg total) by mouth 2 (two) times a day  Qty: 1 tablet, Refills: 0    Associated Diagnoses: A-fib (HCC)      dorzolamide-timolol (COSOPT) 22 3-6 8 MG/ML ophthalmic solution Administer 1 drop to both eyes 2 (two) times a day       furosemide (LASIX) 40 mg tablet Take 40 mg by mouth daily      latanoprost (XALATAN) 0 005 % ophthalmic solution 1 drop daily at bedtime      levalbuterol (XOPENEX) 1 25 mg/0 5 mL nebulizer solution Take 0 5 mL (1 25 mg total) by nebulization 3 (three) times a day  Qty: 1 vial, Refills: 0    Associated Diagnoses: Pneumonia      levothyroxine 50 mcg tablet Take 1 tablet (50 mcg total) by mouth daily  Qty: 90 tablet, Refills: 1    Associated Diagnoses: Hypothyroidism, unspecified type      LORazepam (ATIVAN) 0 5 mg tablet Take 1 tablet (0 5 mg total) by mouth 3 (three) times a day  Qty: 90 tablet, Refills: 5    Associated Diagnoses: ANAYA (generalized anxiety disorder)      metoprolol tartrate (LOPRESSOR) 25 mg tablet Take 1 tablet (25 mg total) by mouth every 12 (twelve) hours  Qty: 1 tablet, Refills: 0    Associated Diagnoses: [facility]      ! ! Netarsudil Dimesylate (RHOPRESSA) 0 02 % SOLN Apply to eye      pantoprazole (PROTONIX) 40 mg tablet Take 40 mg by mouth daily      traZODone (DESYREL) 50 mg tablet Take 1 tablet (50 mg total) by mouth daily at bedtime  Qty: 1 tablet, Refills: 0    Associated Diagnoses: Hypertension      acetaminophen (TYLENOL) 325 mg tablet Take 2 tablets (650 mg total) by mouth every 6 (six) hours as needed for mild pain  Qty: 30 tablet, Refills: 0    Associated Diagnoses: Sepsis ([facility] )      aluminum-magnesium hydroxide-simethicone (MYLANTA) 200-200-20 mg/5 mL suspension Take 30 mL by mouth every 4 (four) hours as needed for indigestion or heartburn  Qty: 355 mL, Refills: 0    Associated Diagnoses: GI bleed      artificial tear (LUBRIFRESH P M ) 83-15 % ophthalmic ointment Administer to both eyes daily at bedtime as needed (dry eyes)  Qty: 1 Tube, Refills: 0    Associated Diagnoses: Retinopathy, diabetic, proliferative (HCC)      ERROR: CANNOT USE RATIO BASED PRESCRIPTION MIXTURE NAMING FOR A NON-MIXTURE Take 10 mL (20 mg total) by mouth daily  Qty: 50 mL, Refills: 0    Associated Diagnoses: Hypertension      insulin glargine (LANTUS) 100 units/mL subcutaneous injection Inject 20 Units under the skin every 12 (twelve) hours  Qty: 1 Units, Refills: 0    Associated Diagnoses: Type 1 diabetes mellitus with diabetic neuropathy (HCC)      insulin lispro (HumaLOG) 100 units/mL injection Inject 2-12 Units under the skin every 6 (six) hours  Qty: 1 mL, Refills: 0    Associated Diagnoses: Type 1 diabetes mellitus with diabetic neuropathy (HCC)      omeprazole (PriLOSEC) 20 mg delayed release capsule Take 20 mg by mouth daily      ondansetron (ZOFRAN) 4 mg/2 mL injection Infuse 2 mL (4 mg total) into a venous catheter every 6 (six) hours as needed for nausea  Qty: 1 mL, Refills: 0    Associated Diagnoses: GI bleed      polyethylene glycol (MIRALAX) 17 g packet Take 17 g by mouth daily  Qty: 14 each, Refills: 0    Associated Diagnoses: GI bleed      !! RHOPRESSA 0 02 % SOLN Administer 1 drop to the right eye daily at bedtime       sodium chloride 0 9 % nebulizer solution Take 3 mL by nebulization 3 (three) times a day  Qty: 1 mL, Refills: 0    Associated Diagnoses: GI bleed      traMADol (ULTRAM) 50 mg tablet Take 1 tablet (50 mg total) by mouth every 6 (six) hours as needed for moderate pain for up to 10 days  Qty: 30 tablet, Refills: 0    Associated Diagnoses: Hypertension      travoprost (TRAVATAN Z) 0 004 % ophthalmic solution Administer 1 drop to both eyes daily at bedtime        !! - Potential duplicate medications found  Please discuss with provider  No discharge procedures on file  ED Provider  Attending physically available and evaluated Alyssa Peñaloza  I managed the patient along with the ED Attending      Electronically Signed by         Aurelio Mcclellan MD  10/22/19 [id number]

## 2019-10-22 NOTE — ASSESSMENT & PLAN NOTE
Lab Results   Component Value Date    HGBA1C 6 7 (H) 09/25/2019    Currently on Lantus 20 units Q 12  Because there were some reports of poor p o  Intake; will cut Lantus in half at 20 units at night  Continue insulin sliding scale with Accu-Cheks  Hemoglobin A1c      Recent Labs     10/21/19  2019   POCGLU 249*       Blood Sugar Average: Last 72 hrs:  (P) 249

## 2019-10-22 NOTE — ASSESSMENT & PLAN NOTE
Patient has history of recent pneumonia and unfortunately ARDS  Noted that patient was seen with a low back rest; will encourage deep breathing and place patient on high back rest of approximately at least 45°  This will encourage ventilation

## 2019-10-22 NOTE — H&P
H&P- Nick García 1935, 82 y o  female MRN: [id number]    Unit/Bed#: ED 15 Encounter: [id number]    Primary Care Provider: Ying Jolly MD   Date and time admitted to hospital: 10/21/2019  7:43 PM        * Lethargy  Assessment & Plan  According to history, she was quite lethargic this evening with note of dysphonia  Hence, currently with NGT  Noted that patient is much more alert and awake instantaneously when he she was put on a high back rest and encouraged to take deep breathing  This is a dramatic change  Most likely, the patient may be overmedicated from somnolent medications  Will put on hold any medications that can cause lethargy; high back rest to encourage deep breathing  Meanwhile will continue NGT as she has reports of being dysphonia with dysphagia  Will put all medications by NG for now  Swallowing evaluation  For feedings, will place NPO, however will put on closer now (diabetic diet) q i d   Nutrition consult  Superficial thrombophlebitis of right upper extremity  Assessment & Plan  Awaiting vascular opinion  Internal jugular (IJ) vein thromboembolism, acute, right ([address] )  Assessment & Plan  Already on Eliquis  Patient also has problems with thrombophlebitis the right upper arm  Will re-consult vascular with the patient seeing as out patient  Ovarian cancer [facility])  Assessment & Plan  This is status post hysterectomy  Diabetic neuropathy, painful [facility])  Assessment & Plan  Lab Results   Component Value Date    HGBA1C 6 7 (H) 09/25/2019    Will continue with Lantus 20 units being given twice daily however due to the reports that she is not eating as much; will put on a half dose  Continue insulin sliding scale and Accu-Cheks  Hemoglobin A1c      Recent Labs     10/21/19  2019   POCGLU 249*       Blood Sugar Average: Last 72 hrs:  (P) 249    Type 1 diabetes mellitus with diabetic neuropathy [facility])  Assessment & Plan  Lab Results   Component Value Date    HGBA1C 6 7 (H) 09/25/2019    Currently on Lantus 20 units Q 12  Because there were some reports of poor p o  Intake; will cut Lantus in half at 20 units at night  Continue insulin sliding scale with Accu-Cheks  Hemoglobin A1c  Recent Labs     10/21/19  2019   POCGLU 249*       Blood Sugar Average: Last 72 hrs:  (P) 249    Hypertension  Assessment & Plan  Will continue Lopressor and Lasix  ANAYA (generalized anxiety disorder)  Assessment & Plan  As patient was seen lethargic; will put on hold trazodone and lorazepam     Adult onset hypothyroidism  Assessment & Plan  Will continue with levothyroxine 50 mcg  Check TSH tomorrow  Acute respiratory failure with hypercapnia [facility])  Assessment & Plan  Patient has history of recent pneumonia and unfortunately ARDS  Noted that patient was seen with a low back rest; will encourage deep breathing and place patient on high back rest of approximately at least 45°  This will encourage ventilation  A-fib [facility])  Assessment & Plan  On Eliquis  Likewise will continue metoprolol tartrate 25 mg every 12 hours  VTE Prophylaxis: Apixaban (Eliquis)  / sequential compression device   Code Status: Level 1 - Full Code as discussed with daughter who is also POA  POLST: There is no POLST form on file for this patient (pre-hospital)    Anticipated Length of Stay:  Patient will be admitted on an Inpatient basis with an anticipated length of stay of  greater than 2 midnights  Justification for Hospital Stay: Please see detailed plans noted above  Chief Complaint:     Lethargy and dysphagia  History of Present Illness:  Izabel Garcia is a 82 y o  female who was admitted in [facility] previously and was just recently discharged to [facility]  Essentially, the patient was and admitted for an incidental ovarian mass and therefore underwent hysterectomy  However the patient then developed acute no with dysphagia and uncontrolled diabetes mellitus    She was in distress and subsequently coded; went to the ICU  She was then diagnosed to have pneumonia with ARDS  Initially initially, the patient had an NG tube due to dysphonia and dysphagia  But slowly, the NG tube was then removed after she was trained to swallow initially with small ice chips  She was therefore discharged to [facility] for further rehabilitation  She was doing quite well until approximately within the past 24 hours when she was noted to be more lethargic than usual   She was also seen to have problems with swallowing and therefore an NG tube was placed this afternoon  No fever  However she was noted to have extreme lethargy and sometimes unresponsive even with deep sternal rub; with that concern, she was then sent to [facility] for further monitoring and workup  According to the daughter who is in the room, she was noted not to be eating much for the past 24 hours  Also while in rehabilitation, she was seen to have an episode of bradycardia at 30  Currently, patient is initially lethargic and somewhat mildly or barely responsive to loud name calling and noxious stimulus  However when the patient was placed upright immediately, she became responsive with eyes open and essentially communicative albeit difficult to speak due to the presence of NG tube      Review of Systems:  Review of systems at this point is difficult to obtain  Constitutional:  Denies fever or chills   Eyes:  Denies change in visual acuity   HENT:  Denies nasal congestion or sore throat   Respiratory:  Denies cough or shortness of breath   Cardiovascular:  Denies chest pain or edema   GI:  Denies abdominal pain, nausea, vomiting, bloody stools or diarrhea   :  Denies dysuria   Musculoskeletal:  Denies back pain or joint pain   Integument:  Denies rash   Neurologic:  Denies headache, focal weakness or sensory changes   Endocrine:  Denies polyuria or polydipsia   Lymphatic:  Denies swollen glands   Psychiatric:  Denies depression or anxiety     Past Medical and Surgical History:   Past Medical History:   Diagnosis Date    Anxiety     Arthritis     Constipation     Diabetes mellitus ([address] )     IDDM    Frequent UTI     Glaucoma     Hearing loss     Hyperlipidemia     Hypertension     Hyperthyroidism     in past    Hyponatremia     Hypothyroid     Neuropathy     bles    Right tubo-ovarian mass     Wears glasses      Past Surgical History:   Procedure Laterality Date    APPENDECTOMY  1956    CATARACT EXTRACTION Bilateral     COLONOSCOPY  09/2014    Completed - Dr Lupe Nolasco, due in 5 years    HYSTERECTOMY N/A 10/4/2019    Procedure: LAP TOTAL HYSTERECTOMY, BSO;  Surgeon: Mauri Mayfield MD;  Location: [facility];  Service: Gynecology Oncology    LAPAROTOMY N/A 10/4/2019    Procedure: EXPLORATORY LAPAROTOMY  EXLAP OMENTECTOMY  PELVIC AND DEANGELO-AORTIC LYMPH NODE DISSECTION  PERITONEAL BIOPSY TRANS ABDOMINUS BLOCK;  Surgeon: Mauri Mayfield MD;  Location: [facility];  Service: Gynecology Oncology    SKIN LESION EXCISION      Ears       Meds/Allergies:    (Not in a hospital admission)  apixaban (ELIQUIS) 5 mg Take 1 tablet (5 mg total) by mouth 2 (two) times a day Sarah Castro MD Reordered   Ordered as: apixaban (ELIQUIS) tablet 5 mg - 5 mg, Per NG Tube, 2 times daily, First dose on Tue 10/22/19 at 0900 High alert medication Indication: non-valvular atrial fibrillation   dorzolamide-timolol (COSOPT) 22 3-6 8 MG/ML ophthalmic solution Administer 1 drop to both eyes 2 (two) times a day  Sarah Castro MD Reordered   Ordered as: dorzolamide-timolol (COSOPT) 22 3-6 8 MG/ML ophthalmic solution 1 drop - 1 drop, Both Eyes, 2 times daily, First dose on Tue 10/22/19 at 0900   furosemide (LASIX) 40 mg tablet Take 40 mg by mouth daily Sarah Castro MD Reordered   Ordered as: furosemide (LASIX) tablet 40 mg - 40 mg, Per NG Tube, Daily, First dose on Tue 10/22/19 at [address] [place] [address] for systolic blood pressure less than (mmHg): 110   latanoprost (XALATAN) 0 005 % ophthalmic solution 1 drop daily at bedtime Gaurav Swift MD Not Ordered   Ordered as: latanoprost (XALATAN) 0 005 % ophthalmic solution 1 drop - 1 drop, Both Eyes, Daily at bedtime, First dose on Tue 10/22/19 at 0045 (Discontinued)   levalbuterol (XOPENEX) 1 25 mg/0 5 mL nebulizer solution Take 0 5 mL (1 25 mg total) by nebulization 3 (three) times a day Gaurav Swift MD Reordered   Ordered as: levalbuterol Kat Natali) inhalation solution 1 25 mg - 1 25 mg, Nebulization, 3 times daily (RESP), First dose on Tue 10/22/19 at 0800   levothyroxine 50 mcg tablet Take 1 tablet (50 mcg total) by mouth daily Gaurav Swift MD Reordered   Ordered as: levothyroxine tablet 50 mcg - 50 mcg, Per NG Tube, Daily (early morning), First dose on Tue 10/22/19 at 0600 Administer in the morning on an empty stomach, at least 30 to 60 minutes before food  Tablets may be crushed and suspended in 5-10mls of water for administration  LOOK ALIKE SOUND ALIKE MED  LORazepam (ATIVAN) 0 5 mg tablet Take 1 tablet (0 5 mg total) by mouth 3 (three) times a day Gaurav Swift MD Not Ordered    Patient taking differently: Take 0 5 mg by mouth 2 (two) times a day      metoprolol tartrate (LOPRESSOR) 25 mg tablet Take 1 tablet (25 mg total) by mouth every 12 (twelve) hours Gaurav Swift MD Reordered   Ordered as: metoprolol tartrate (LOPRESSOR) tablet 25 mg - 25 mg, Per NG Tube, Every 12 hours scheduled, First dose on Tue 10/22/19 at [facility] for heart rate less than 50 beats per minute   LOOK ALIKE SOUND ALIKE MED Hold for systolic blood pressure less than (mmHg): 110   Netarsudil Dimesylate (RHOPRESSA) 0 02 % SOLN Apply to eye Gaurav Swift MD Reordered   Ordered as: Netarsudil Dimesylate 0 02 % SOLN 1 drop - 1 drop, Ophthalmic, Daily, First dose on Tue 10/22/19 at 0900   pantoprazole (PROTONIX) 40 mg tablet Take 40 mg by mouth daily Gaurav Swift MD Not Ordered   traZODone (DESYREL) 50 mg tablet Take 1 tablet (50 mg total) by mouth daily at bedtime Marcel Marti MD Not Ordered   acetaminophen (TYLENOL) 325 mg tablet Take 2 tablets (650 mg total) by mouth every 6 (six) hours as needed for mild pain Marcel Marti MD Reordered   Ordered as: acetaminophen (TYLENOL) tablet 650 mg - 650 mg, Per NG Tube, Every 6 hours PRN, mild pain, Starting Tue 10/22/19 at 0035   aluminum-magnesium hydroxide-simethicone (MYLANTA) 200-200-20 mg/5 mL suspension Take 30 mL by mouth every 4 (four) hours as needed for indigestion or heartburn Marcel Marti MD Not Ordered   artificial tear (LUBRIFRESH P M ) 83-15 % ophthalmic ointment Administer to both eyes daily at bedtime as needed (dry eyes) Marcel Marti MD Reordered   Ordered as: artificial tear (LUBRIFRESH P M ) ophthalmic ointment - Both Eyes, Daily at bedtime PRN, dry eyes, Starting Tue 10/22/19 at [facility] down the lower lid of the affected eye (s)  Apply a small amount (1/4 inch) of ointment to the inside of eyelid  ERROR: CANNOT USE RATIO BASED PRESCRIPTION MIXTURE NAMING FOR A NON-MIXTURE Take 10 mL (20 mg total) by mouth daily Marcel Marti MD Not Ordered   insulin glargine (LANTUS) 100 units/mL subcutaneous injection Inject 20 Units under the skin every 12 (twelve) hours Marcel Marti MD Reordered    Patient not taking: Reported on 10/21/2019     Ordered as: insulin glargine (LANTUS) subcutaneous injection 20 Units 0 2 mL - 20 Units, Subcutaneous, Daily at bedtime, First dose on Tue 10/22/19 at [facility] Half dose for now  HIGH ALERT MEDICATION  Do not dilute/mix insulin glargine with any other insulin formulation/solution  **DISPOSE IN 8 GALLON BLACK CONTAINER** Do not hold medication without a physician order      insulin lispro (HumaLOG) 100 units/mL injection Inject 2-12 Units under the skin every 6 (six) hours Marcel Marti MD Not Ordered   omeprazole (PriLOSEC) 20 mg delayed release capsule Take 20 mg by mouth daily Evelyn Milian MD Replaced   Ordered as: omeprazole (PRILOSEC) suspension 2 mg/mL - 20 mg, Per NG Tube, Daily, First dose on Tue 10/22/19 at 0900 Hold tube feeding 60 minutes before and 60 minutes after dose  LOOK ALIKE SOUND ALIKE MED    ondansetron (ZOFRAN) 4 mg/2 mL injection Infuse 2 mL (4 mg total) into a venous catheter every 6 (six) hours as needed for nausea Evelyn Milian MD Not Ordered   polyethylene glycol (MIRALAX) 17 g packet Take 17 g by mouth daily Evelyn Milian MD Reordered   Ordered as: polyethylene glycol (MIRALAX) packet 17 g - 17 g, Per NG Tube, Daily, First dose on Tue 10/22/19 at 0900 Stir powder in 4-8 ounces of water, juice, soda, coffee or tea until dissolved and drink  LOOK ALIKE SOUND ALIKE MED    RHOPRESSA 0 02 % SOLN Administer 1 drop to the right eye daily at bedtime  Evelyn Milian MD Reordered   Ordered as: Netarsudil Dimesylate 0 02 % SOLN 1 drop - 1 drop, Right Eye, Daily at bedtime, First dose on Tue 10/22/19 at 0045   sodium chloride 0 9 % nebulizer solution Take 3 mL by nebulization 3 (three) times a day Evelyn Milian MD Reordered   Ordered as: sodium chloride 0 9 % inhalation solution 3 mL - 3 mL, Nebulization, 3 times daily (RESP), First dose on Tue 10/22/19 at 0800   traMADol (ULTRAM) 50 mg tablet Take 1 tablet (50 mg total) by mouth every 6 (six) hours as needed for moderate pain for up to 10 days Evelyn Milian MD Reordered   Ordered as: traMADol Adrienne Fleet) tablet 50 mg - 50 mg, Per NG Tube, Every 6 hours PRN, moderate pain, Starting Tue 10/22/19 at 0035, For 6 days High Alert Medication  LOOK ALIKE SOUND ALIKE MED    travoprost (TRAVATAN Z) 0 004 % ophthalmic solution Administer 1 drop to both eyes daily at bedtime  Evelyn Milian MD Reordered   Ordered as: travoprost (TRAVATAN-Z) 0 004 % ophthalmic solution 1 drop - 1 drop, Both Eyes, Daily at bedtime, First dose on Tue 10/22/19 at [place] [address] For ophthalmic use only  Allergies:    Allergies   Allergen Reactions    Thiazide-Type Diuretics      Hyponatremia     History:  Marital Status:    Occupation: retired housewife  Patient Pre-hospital Living Situation: LTAC  Patient Pre-hospital Level of Mobility:  Normally ambulatory but after the prolonged hospitalization, she has ambulatory dysfunction  Patient Pre-hospital Diet Restrictions: According to daughter eats by mouth but was found to have dysphonia with the decrease in mental status; currently on NG tube  Substance Use History:   Social History     Substance and Sexual Activity   Alcohol Use Yes    Frequency: Monthly or less    Drinks per session: 1 or 2    Binge frequency: Never    Comment: wine     Social History     Tobacco Use   Smoking Status Never Smoker   Smokeless Tobacco Never Used     Social History     Substance and Sexual Activity   Drug Use No       Family History:  Family History   Problem Relation Age of Onset    Heart attack Mother     Diabetes type II Mother     Dementia Father     Stroke Father         CVA    Breast cancer Maternal Aunt     Stomach cancer Maternal Uncle     Diabetes Family     Stroke Family         Complications       Physical Exam:     Vitals:   Blood Pressure: 120/61 (10/22/19 0000)  Pulse: 86 (10/22/19 0000)  Temperature: (!) 97 2 °F (36 2 °C) (10/21/19 1951)  Temp Source: Oral (10/21/19 1951)  Respirations: 14 (10/22/19 0000)  Height: 5' 5" (165 1 cm) (10/21/19 1951)  Weight - Scale: 62 9 kg (138 lb 10 7 oz) (10/21/19 1951)  SpO2: 96 % (10/22/19 0000)    Constitutional:  Well developed, well nourished, no acute distress, non-toxic appearance with note of abdominal breathing;  Eyes:  PERRL, conjunctiva normal   HENT:  Atraumatic, external ears normal, nose normal, oropharynx dry, no pharyngeal exudates   Neck- normal range of motion, no tenderness, supple   Respiratory:  No respiratory distress, bronchial breath sounds, no crackles,  Cardiovascular:  Normal rate, normal rhythm, no murmurs, no gallops, no rubs   GI:  Soft, nondistended, fair bowel sounds, no rebound  Has staples on her abdomen which currently is erythematous however not seemingly infected looking  :  No costovertebral angle tenderness   Musculoskeletal:  Mild edema of bilateral lower extremities and upper extremities  no tenderness, no deformities  Back- no tenderness  Integument:  Well hydrated, no rash ; cyanotic right hand finger tips  Lymphatic:  No lymphadenopathy noted   Neurologic:  Initially barely responsive to name calling and sternal rub, with the high back rest, the patient is responsive  No preferential movement  No facial weakness  Psychiatric:  Speech and behavior scant response      Lab Results: I have personally reviewed pertinent reports  Results from last 7 days   Lab Units 10/21/19  2030 10/18/19  0430   WBC Thousand/uL 13 69* 22 53*   HEMOGLOBIN g/dL 9 0* 9 1*   HEMATOCRIT % 29 9* 29 6*   PLATELETS Thousands/uL 635* 531*   NEUTROS PCT %  --  89*   LYMPHS PCT %  --  4*   LYMPHO PCT % 1*  --    MONOS PCT %  --  4   MONO PCT % 2*  --    EOS PCT % 0 0     Results from last 7 days   Lab Units 10/21/19  2030   POTASSIUM mmol/L 3 4*   CHLORIDE mmol/L 105   CO2 mmol/L 36*   BUN mg/dL 26*   CREATININE mg/dL 0 61   CALCIUM mg/dL 7 1*   ALK PHOS U/L 100   ALT U/L 34   AST U/L 27     Results from last 7 days   Lab Units 10/21/19  2030   INR  1 55*           Imaging: I have personally reviewed pertinent reports  Ct Chest Abdomen Pelvis Wo Contrast    Addendum Date: 10/8/2019 Addendum:   ADDENDUM: Dense perihilar opacities may be due to aspiration and/or ARDS  Result Date: 10/8/2019  Narrative: CT CHEST, ABDOMEN AND PELVIS WITHOUT IV CONTRAST INDICATION:   Sepsis  COMPARISON:  CT of the chest on 10/7/2019  CT of abdomen pelvis on 8/30/2019   TECHNIQUE: CT examination of the chest, abdomen and pelvis was performed without intravenous contrast   Axial, sagittal, and coronal 2D reformatted images were created from the source data and submitted for interpretation  Radiation dose length product (DLP) for this visit:  607 mGy-cm   This examination, like all CT scans performed in the [facility], was performed utilizing techniques to minimize radiation dose exposure, including the use of iterative reconstruction and automated exposure control  Enteric contrast was not administered  FINDINGS: CHEST LUNGS:  There is endotracheal tube which terminates above the aaron  There is interval development of dense perihilar opacities more severe within the left greater than right lower lobes  The central airways are patent  PLEURA:  Bilateral pleural effusions similar prior examination  HEART/GREAT VESSELS:  Coronary artery calcifications  No pericardial effusion  Right-sided central venous catheter terminating within the superior vena cava  MEDIASTINUM AND MYRNA:  Unremarkable  CHEST WALL AND LOWER NECK:   Persistent anterior chest wall subcutaneous emphysema which may be postsurgical  ABDOMEN LIVER/BILIARY TREE:  Unremarkable  GALLBLADDER:  Hyperdensity within the gallbladder may be due to vicarious excretion of contrast material  SPLEEN:  Unremarkable  PANCREAS:  Unremarkable  ADRENAL GLANDS:  Unremarkable  KIDNEYS/URETERS:  Persistent bilateral nephrograms may be seen in setting of acute tubular necrosis  No hydronephrosis  STOMACH AND BOWEL: Evaluation limited due to lack of oral and enteric contrast   Enteric tube is coiled within the stomach with its tip in the distal stomach  There are multiple dilated loops of small bowel measuring up to 4 cm in diameter with scattered areas of long segment fecalization  No definite abrupt transition point is seen  Gas is seen within the colon  APPENDIX:  No findings to suggest appendicitis  ABDOMINOPELVIC CAVITY:  Near resolution of previously seen ascites    There is an high density collection within the pelvis measuring 7 6 x 5 1 x 7 4 cm (series 2, image 94) compatible with hematoma  3 4 x 1 6 cm low-density collection along the left external iliac vessels may reflect a small lymphocele  VESSELS:  Mild atherosclerotic calcifications  PELVIS REPRODUCTIVE ORGANS:  Status post hysterectomy  URINARY BLADDER:  Bladder is collapsed about a Ji catheter and contains a small amount of excreted contrast  ABDOMINAL WALL/INGUINAL REGIONS:  Diffuse subcutaneous edema  Scattered subcutaneous emphysema is seen which may be postsurgical  OSSEOUS STRUCTURES:  No acute fracture or destructive osseous lesion  Degenerative changes of the osseous structures  Impression: 1  Interval development of dense perihilar opacities most severe within the left greater than right lower lobes most compatible with aspiration in the setting of intubation  Bilateral pleural effusions similar prior examination  2   Persistent bilateral nephrograms which may be seen in the setting of acute tubular necrosis  3   Multiple dilated loops of small bowel measuring up to 4 cm in diameter with scattered areas of long segment fecalization  No definite abrupt transition point is seen however evaluation is limited due to lack of oral and enteric contrast   Gas is seen within the colon  Findings may be due to severe ileus versus partial bowel obstruction  4   High density collection within the pelvis measuring 7 6 x 5 1 x 7 4 cm compatible with hematoma  5   3 4 x 1 6 cm low density collection along the left external iliac vessels may reflect a small lymphocele  The study was marked in [facility] for immediate notification  Workstation performed: [id number]     Xr Chest Portable    Result Date: 10/17/2019  Narrative: CHEST INDICATION:   post bronch  COMPARISON:  10/16/2019 (901) EXAM PERFORMED/VIEWS:  XR CHEST PORTABLE FINDINGS:  Endogastric tube and left-sided central catheter appear stable from prior  Cardiomediastinal silhouette is stable as well  There is some fullness of the right hilum   Moderately extensive bilateral pulmonary consolidations are seen  Localized irregular dense opacity in the right mid to upper lung field is stable  Right paramediastinal opacity in the upper chest is stable  Diffuse hazy infiltrate throughout much of the left lung is stable  No gross evidence of pleural fluid or pneumothorax or mediastinal shift  Osseous structures appear within normal limits for patient age  Impression: Stable appearance of diffuse infiltrates and line and tube Workstation performed: [id number]     Xr Chest Portable    Result Date: 10/16/2019  Narrative: CHEST INDICATION:   pulm edema  COMPARISON:  10/14/2019 EXAM PERFORMED/VIEWS:  XR CHEST PORTABLE FINDINGS:  Left PICC line tip overlies the SVC  Nasogastric tube tip overlies the stomach  Cardiomediastinal silhouette appears unremarkable  Increasing bilateral pulmonary opacities noted likely representing pulmonary edema noted  No pneumothorax or pleural effusion  Osseous structures appear within normal limits for patient age  Impression: Interval increase in bilateral pulmonary opacities likely representing pulmonary edema  Workstation performed: [id number]     Xr Chest Portable    Result Date: 10/14/2019  Narrative: CHEST INDICATION:   hypoxia  COMPARISON:  CT from the day before  EXAM PERFORMED/VIEWS:  XR CHEST PORTABLE FINDINGS:  The feeding tube is coursing below left hemidiaphragm with its tip not visualized  Cardiomediastinal silhouette appears unremarkable  Patchy airspace disease bilaterally, overall mildly improved since recent  radiograph allowing for different technique  Layering effusions are present  Osseous structures appear within normal limits for patient age  Impression: Feeding tube coursing below left hemidiaphragm with tip not visualized  Haziness of the lungs bilaterally appears overall mildly improved since recent CT allowing for differences in technique   Workstation performed: [id number]     Xr Chest Portable    Result Date: 10/13/2019  Narrative: CHEST INDICATION:   RESP FAILURE  COMPARISON:  10/12/2019 EXAM PERFORMED/VIEWS:  XR CHEST PORTABLE FINDINGS: Cardiomediastinal silhouette appears unremarkable  Persistent patchy airspace opacities bilaterally, right greater than left  Osseous structures appear within normal limits for patient age  Impression: Right greater than left patchy airspace opacities remain stable and are again concerning for infiltrates  Workstation performed: [id number]     Xr Chest Portable    Result Date: 10/12/2019  Narrative: CHEST INDICATION:   pneumonia  COMPARISON:  10/11/2019 EXAM PERFORMED/VIEWS:  XR CHEST PORTABLE FINDINGS: Cardiomediastinal silhouette appears unremarkable  Diffuse mid to lower lung zone interstitial and airspace infiltrates in the left lung appears stable  Moderate interval worsening of airspace consolidation in the right parahilar region with infiltrates also increasing in the right upper lobe and lung base  No pneumothorax  No pleural effusion  Osseous structures appear within normal limits for patient age  Impression: Worsening infiltrates on the right  Stable infiltrates on the left  The study was marked in [facility] for immediate notification  Workstation performed: [id number]     Xr Chest Portable    Result Date: 10/11/2019  Narrative: CHEST INDICATION:  Follow-up infiltrates, extubation  COMPARISON:  10/9/2019, CT chest, abdomen and pelvis 10/8/2019 EXAM PERFORMED/VIEWS:  XR CHEST PORTABLE FINDINGS:  Life support lines and tubes have been removed (including ET and enteric tubes as well as right IJ central line)  No pneumothorax  Cardiomediastinal silhouette appears stable  Patchy diffuse bilateral pulmonary airspace opacities appear stable or slightly improved with the exception of perhaps slight progression in the left upper lung zone  Suspected small pleural effusions  Mild apical pleural thickening, left greater than right   Osseous structures appear within normal limits for patient age  Impression: Lines and tubes as above  No pneumothorax  Patchy diffuse bilateral pulmonary airspace opacities appear stable or slightly improved with the exception of perhaps slight progression in the left upper lung zone  Workstation performed: [id number]     Xr Chest Portable    Result Date: 10/9/2019  Narrative: CHEST INDICATION:   Ventilator dependent respiratory failure  COMPARISON:  10/7/2019 EXAM PERFORMED/VIEWS:  XR CHEST PORTABLE FINDINGS:  Endotracheal tube tip is approximately 1 8 cm above the aaron, central line tip located within the region of the cavoatrial junction and with nasogastric tube extending below left hemidiaphragm  The cardiac silhouette is without change from the prior study  Multifocal airspace opacities are once again identified, without a significant interval change  No pneumothorax  Osseous structures appear within normal limits for patient age  Impression: Endotracheal tube tip 1 8 cm above the aaron Continued multifocal airspace opacities, without significant change since 10/7/2019 Workstation performed: [id number]     Xr Chest Portable    Result Date: 10/8/2019  Narrative: CHEST INDICATION:   hypoxia  COMPARISON:  10/7/2019 EXAM PERFORMED/VIEWS:  XR CHEST PORTABLE  AP semierect Images: 1 FINDINGS:  Endotracheal tube is present, in satisfactory position with its tip above the level of the aaron  Enteric tube is present with its tip extending below the left hemidiaphragm  Right IJ catheter terminates at the caval atrial junction  No pneumothorax  Cardiomediastinal silhouette appears unremarkable  Persistent bilateral airspace opacities which appears more consolidative in the left lower lobe concerning for aspiration  No large pleural effusions or pneumothorax  Osseous structures appear within normal limits for patient age  Impression: 1  Lines and tubes stable   2   Persistent bilateral airspace opacities more consolidative in the left lower lobe, likely infectious versus atypical edema  Workstation performed: [id number]     Xr Abdomen 1 View Kub    Result Date: 10/8/2019  Narrative: OBSTRUCTION SERIES INDICATION:   abdominal distention  COMPARISON:  CT scan 8/30/2019  EXAM PERFORMED/VIEWS:  XR ABDOMEN 1 VIEW KUB FINDINGS: There is marked distention of the stomach  Bowel gas pattern is otherwise unremarkable  No free air beneath the hemidiaphragms  There is bilateral renal contrast excretion related to CT PE study performed earlier same day  Osseous structures are unremarkable  Examination of the chest reveals a normal cardiomediastinal silhouette  Lungs are clear  Impression: Marked gastric distention  Workstation performed: [id number]     Ct Head Without Contrast    Result Date: 10/21/2019  Narrative: CT BRAIN - WITHOUT CONTRAST INDICATION:   ams  COMPARISON:  CT head 10/13/2019  TECHNIQUE:  CT examination of the brain was performed  In addition to axial images, coronal 2D reformatted images were created and submitted for interpretation  Radiation dose length product (DLP) for this visit:  946 6 mGy-cm   This examination, like all CT scans performed in the [facility], was performed utilizing techniques to minimize radiation dose exposure, including the use of iterative reconstruction and automated exposure control  IMAGE QUALITY:  Diagnostic  FINDINGS: PARENCHYMA:  No intracranial mass, mass effect or midline shift  No CT signs of acute infarction  No acute parenchymal hemorrhage  VENTRICLES AND EXTRA-AXIAL SPACES:  Normal for the patient's age  VISUALIZED ORBITS AND PARANASAL SINUSES:  Unremarkable  CALVARIUM AND EXTRACRANIAL SOFT TISSUES:  Stable bilateral mastoid effusions  No evidence of erosive mastoiditis  Impression: No acute intracranial abnormality  Workstation performed: [id number]     Ct Head Wo Contrast    Result Date: 10/13/2019  Narrative: CT BRAIN - WITHOUT CONTRAST INDICATION:   Altered mental status  COMPARISON:  10/7/2019 and 8/30/2019 TECHNIQUE:  CT examination of the brain was performed  In addition to axial images, coronal 2D reformatted images were created and submitted for interpretation  Radiation dose length product (DLP) for this visit:  864 mGy-cm   This examination, like all CT scans performed in the [facility], was performed utilizing techniques to minimize radiation dose exposure, including the use of iterative reconstruction and automated exposure control  IMAGE QUALITY:  Diagnostic  FINDINGS: PARENCHYMA:  Mild to moderate patchy periventricular and subcortical white matter hypodensities consistent with chronic microangiopathic changes  Parenchyma appears unchanged from prior  No acute infarct  No parenchymal hemorrhage  No mass  VENTRICLES AND EXTRA-AXIAL SPACES:  Normal for the patient's age  VISUALIZED ORBITS AND PARANASAL SINUSES:  Bilateral mastoid effusions  Paranasal sinuses are clear  Prior cataract surgeries without other abnormalities of the orbits  CALVARIUM AND EXTRACRANIAL SOFT TISSUES:  Normal      Impression: Bilateral mastoid effusions  No findings of erosive mastoiditis  Correlate for retroauricular pain  No acute intracranial findings  Workstation performed: [id number]     Ct Head Wo Contrast    Result Date: 10/7/2019  Narrative: CT BRAIN - WITHOUT CONTRAST INDICATION:   Cerebral edema  Status post code  Status post IVC filter placement earlier today  COMPARISON:  None  TECHNIQUE:  CT examination of the brain was performed  In addition to axial images, coronal 2D reformatted images were created and submitted for interpretation  Radiation dose length product (DLP) for this visit:  921 mGy-cm   This examination, like all CT scans performed in the [facility], was performed utilizing techniques to minimize radiation dose exposure, including the use of iterative reconstruction and automated exposure control  IMAGE QUALITY:  Diagnostic  FINDINGS: PARENCHYMA:  No intracranial mass, mass effect or midline shift  No CT signs of acute infarction  No acute parenchymal hemorrhage  Atherosclerotic calcifications noted  VENTRICLES AND EXTRA-AXIAL SPACES:  Normal for the patient's age  VISUALIZED ORBITS AND PARANASAL SINUSES:  Small air-fluid level in left maxillary sinus noted  Bilateral lens implants noted  Endotracheal tube noted on the  view  CALVARIUM AND EXTRACRANIAL SOFT TISSUES:  Normal      Impression: No acute intracranial hemorrhage, mass effect or edema  Workstation performed: [id number]     Ct Soft Tissue Neck W Contrast    Result Date: 10/13/2019  Narrative: CT NECK WITH CONTRAST INDICATION:   No clinical information provided  Technologist reports history of hypoxia and possible aspiration  COMPARISON:  CT of the chest, abdomen, and pelvis dated 10/18/2019  TECHNIQUE:  Axial, sagittal, and coronal 2D reformatted images were created from the axial source data and submitted for interpretation  Radiation dose length product (DLP) for this visit:  181 mGy-cm   This examination, like all CT scans performed in the [facility], was performed utilizing techniques to minimize radiation dose exposure, including the use of iterative reconstruction and automated exposure control  IV Contrast:  100 mL of iohexol (OMNIPAQUE) IMAGE QUALITY:  Diagnostic  FINDINGS: VISUALIZED BRAIN PARENCHYMA:  No acute intracranial pathology of the visualized brain parenchyma  VISUALIZED PARANASAL SINUSES:  Visualized paranasal sinuses are clear  Bilateral mastoid effusions without osseous erosion  NASAL CAVITY AND NASOPHARYNX:  Normal  SUPRAHYOID NECK:  Normal oral cavity, tongue base, tonsillar fossa and epiglottis  INFRAHYOID NECK:  Aryepiglottic folds and piriform sinuses are normal   Normal glottis and subglottic airway  THYROID GLAND:  Unremarkable  PAROTID AND SUBMANDIBULAR GLANDS:  Normal  LYMPH NODES:  No pathologic or enlarged adenopathy  VASCULAR STRUCTURES:  Minimal carotid atherosclerotic disease at the left bulb without significant stenoses  Peripheral filling defect within the right common jugular vein (series 15, image 48)  THORACIC INLET:  Patchy groundglass opacities at the lung apices  BONY STRUCTURES: Osteopenia  No acute fracture or destructive osseous lesion  Impression: 1  Small, peripheral, nonocclusive thrombus in the right common jugular vein  Previously seen right IJ catheter is no longer present  2   Bilateral mastoid effusions without evidence for erosive mastoiditis  Correlate for retroauricular pain  3   Ground glass opacities at the lung apices could represent pulmonary edema or atypical infection  The study was marked in EPIC for significant notification  Workstation performed: [id number]     Cta Upper Extremity Right W Wo Contrast    Result Date: 10/15/2019  Narrative: CT ANGIOGRAM OF THE RIGHT UPPER EXTREMITY WITH AND WITHOUT IV CONTRAST INDICATION:  Right upper extremity edema/pallor  Bluish discoloration of digits  Thrombus of jugular vein  On anticoagulation for atrial fibrillation  COMPARISON: None  TECHNIQUE:  CT angiogram examination of the abdomen was performed according to standard protocol  This examination, like all CT scans performed in the [facility], was performed utilizing techniques to minimize radiation dose exposure, including the use of iterative reconstruction and automated exposure control  Contrast as well as noncontrast images were obtained  Rad dose 3959 mGy-cm IV Contrast:  100 mL of iohexol (OMNIPAQUE)  FINDINGS: VASCULAR STRUCTURES: Arteries of the right upper extremity, from the aortic valve, to the ascending aorta, innominate, subclavian, axillary, and brachial are patent  On delayed images, the ulnar artery opacifies, and is seen to the level of the mid forearm  The interosseous is seen at the level of the mid forearm as well    The radial artery really does not opacify  Even on delayed images, I do not see any flow to the distal half of the forearm  Otherwise, there is a tiny focus of thrombus in the right internal jugular  This is not very impressive  There is also an even smaller focus of thrombus in the vertebral vein on the right  The lungs show patchy pneumonitis consistent with the history of aspiration pneumonia  There is a trace right pleural effusion  The right lower lobe is almost entirely collapsed  The bronchus to the right lower lobe appears to be occluded  This is best seen on axial image 10-67  This may represent a mucous plug  Other endobronchial processes are not excluded  There does appear to be edema of the subcutaneous fat of the right arm  This appears to extend from the elbow throughout the hand  No venous filling is identified over the forearm OTHER FINDINGS ABDOMEN: LOWER CHEST: Pneumonitis and pleural effusion  Obstruction of right lower lobe bronchus, with associated right lower lobe collapse  LIVER/BILIARY TREE: Vicarious excretion of contrast into the gallbladder GALLBLADDER: Vicarious excretion of contrast into the gallbladder SPLEEN: Not seen PANCREAS: Not seen ADRENAL GLANDS: Right adrenal gland appears normal KIDNEYS/URETERS: Right kidney appears normal ADDITIONAL ABDOMINAL  STRUCTURES STOMACH AND VISUALIZED BOWEL: Not seen to good advantage ABDOMINAL CAVITY:  No pathologically enlarged mesenteric or retroperitoneal lymph nodes  A small volume of ascites is visible in the abdomen  Only the right side of the abdomen is imaged ABDOMINAL WALL/INGUINAL REGIONS:  Unremarkable  OSSEOUS STRUCTURES: No acute fracture or destructive osseous lesion  Impression: Slow flow into the right upper extremity  No filling of the arteries in the distal forearm or hand  This can be seen with multiple conditions  This would include simply when the extremities are cold    This could also be seen there is diffuse abnormalities of the inflow, such as occlusion of all 3 forearm arteries  Certainly there appears to be occlusion of the radial artery  This can also be seen if there is abnormality of the venous outflow, including venous access attempts on the dorsal and ventral hand  It would probably be good to get a Doppler of the forearm arteries  This could be used to confirm that there is slow flow in the arteries, not total occlusion  Pneumonitis and right lower lobe collapse with endobronchial process Tiny amount of venous clot of doubtful significance Obstructed right lower lobe bronchus The study was marked in EPIC for immediate notification  Workstation performed: [id number]     Ct Chest Abdomen Pelvis W Contrast    Result Date: 10/13/2019  Narrative: CT CHEST, ABDOMEN AND PELVIS WITH IV CONTRAST INDICATION:   Provided history of "ovarian mass "  Review of chart shows that the patient had total hysterectomy and bilateral salpingo-oophorectomy on 10/4/2019  COMPARISON:  Chest CT dated 10/7/2019  TECHNIQUE: CT examination of the chest, abdomen and pelvis was performed  Axial, sagittal, and coronal 2D reformatted images were created from the source data and submitted for interpretation  Radiation dose length product (DLP) for this visit:  499 mGy-cm   This examination, like all CT scans performed in the [facility], was performed utilizing techniques to minimize radiation dose exposure, including the use of iterative reconstruction and automated exposure control  IV Contrast:  100 mL of iohexol (OMNIPAQUE) Enteric Contrast: Enteric contrast was administered  FINDINGS: CHEST LUNGS:  Increasing central-predominant groundglass opacities in both lungs  No associated interlobular septal thickening  Dense consolidation within the posterior medial basilar right lower lobe with low density tubular endobronchial mucous plugging  Milder subsegmental and compressive atelectasis in the left lower lobe  No suspicious pulmonary masses   PLEURA: Moderate layering bilateral pleural effusions, similar to prior  No loculated components or pneumothorax  HEART/GREAT VESSELS:  Heart size is normal   Aortic valvular and coronary calcifications  No pericardial thickening or effusion  Thoracic aorta is normal for age  MEDIASTINUM AND MYRNA:  Unremarkable  CHEST WALL AND LOWER NECK:   Mild anasarca  ABDOMEN LIVER/BILIARY TREE:  One or more subcentimeter sharply circumscribed low-density hepatic lesion(s) are noted, too small to accurately characterize, but statistically most likely to represent subcentimeter hepatic cysts  No suspicious solid hepatic lesion is identified  Hepatic contours are normal   No biliary dilatation  GALLBLADDER:  Hyperdensity in the gallbladder is probably previously administered of IV contrast  No pericholecystic inflammation  SPLEEN:  Unremarkable  PANCREAS:  Atrophic  No pancreatic masses or pancreatic ductal dilatation  ADRENAL GLANDS:  Unremarkable  KIDNEYS/URETERS:  One or more sharply circumscribed subcentimeter renal hypodensities are noted  These lesions are too small to accurately characterize, but are statistically most likely to represent benign cortical renal cyst(s)  According to the guidelines published in the [facility] Paper of the ACR Incidental Findings Committee (Radiology 2010), no further workup of these lesions is recommended  No suspicious parenchymal masses, perinephric collections, urolithiasis, or hydronephrosis  STOMACH AND BOWEL:  Unremarkable  APPENDIX:  Not well demonstrated  No evidence for acute appendicitis  ABDOMINOPELVIC CAVITY:  Mild ascites  Blood products again demonstrated anterior to the rectum related to prior surgery  No contrast extravasation into the collection  VESSELS:  Unremarkable for patient's age  PELVIS REPRODUCTIVE ORGANS:  Patient is status post hysterectomy  URINARY BLADDER:  Unremarkable  ABDOMINAL WALL/INGUINAL REGIONS:  Mild anasarca   OSSEOUS STRUCTURES:  No acute fracture or destructive osseous lesion  Impression: 1  Increasing central predominant groundglass opacities could represent edema or infection  Lack of intubation speaks against ARDS  2   Extensive mucous plugging in the posterior and medial basilar segmental bronchi of the right lower lobe with resultant postobstructive atelectasis and pneumonia  3   Unchanged moderate-sized bilateral pleural effusions, mild ascites, and anasarca  4   Postsurgical blood products again demonstrated within the pelvis  No significant change from prior or evidence for active extravasation  Workstation performed: [id number]     Vas Upper Limb Venous Duplex Scan, Unilateral/limited    Result Date: 10/10/2019  Narrative:  [facility] REPORT CLINICAL: Indications:  Right Swelling [[id number]]  The patient is s/p cardiac arrest, currently in acute hypoxic respiratory failure, and noted to have right arm swelling  Operative History: oophorectomy Risk Factors The patient has history of HTN and Diabetes (Yes)  CONCLUSION: Impression   RIGHT UPPER LIMB: There is acute superficial thrombophlebitis in the cephalic vein in the forearm and antecubital fossa  There is acute superficial thrombophlebitis in the basilic vein from the antecubital fossa to the proximal upper arm  No evidence of acute or chronic deep vein thrombosis in the visualized portion of the subclavian vein, axillary vein and brachial veins  The internal jugular vein, innominate vein and proximal subclavian vein were obscured by central line with bandaging in the right neck  Doppler evaluation shows a normal response to augmentation maneuvers  LEFT UPPER LIMB LIMITED: Evaluation shows no evidence of thrombus in the internal jugular vein and subclavian vein,  Tech Note:  Technical findings given to "Chetan Prado" the patient's nurse    SIGNATURE: Electronically Signed by: Antonio Patel MD on 2019-10-10 11:14:02 PM    Xr Chest Portable Icu    Result Date: 10/18/2019  Narrative: CHEST INDICATION:   hypoxia  COMPARISON:  October 16, 2019 EXAM PERFORMED/VIEWS:  XR CHEST PORTABLE ICU Single image FINDINGS:  Lungs adequately aerated  Similar patchy infiltrates in both lungs, slightly worse in the right lower lung fields compared to previous exam  Cardiac size within normal limits  Prominent right hilum and right superior mediastinum  Atherosclerotic aorta  Osseous structures appear within normal limits for patient age  Left side PICC line catheter tip in the upper SVC  Nasogastric tube in the stomach, tip is not seen  EKG leads in place  No free air below the diaphragm  Gas-filled bowel beneath left diaphragm     Impression: Extensive diffuse infiltrates in both lungs, slightly worse in the right lower lung compared to October 16  Workstation performed: [id number]     Xr Chest Portable Icu    Result Date: 10/8/2019  Narrative: CHEST INDICATION:   et tube  COMPARISON:  8/15/2019; 10/8/2019 EXAM PERFORMED/VIEWS:  XR CHEST PORTABLE ICU FINDINGS:  Endotracheal tube is present, in satisfactory position with its tip above the level of the aaron  Enteric tube is present with its tip extending below the left hemidiaphragm  Right IJ catheter noted tip in the mid SVC  Cardiomediastinal silhouette appears unremarkable  Multifocal strandy densities noted  Subcutaneous gas noted within the right arm and left chest/abdominal wall  Osseous structures appear within normal limits for patient age  Impression: 1  Multifocal parenchymal opacities may represent multifocal pneumonia versus ARDS versus pulmonary alveolar edema  2   Subcutaneous emphysema  Workstation performed: [id number]     Cta Chest Pe Study    Result Date: 10/7/2019  Narrative: CTA - CHEST WITH IV CONTRAST - PULMONARY ANGIOGRAM INDICATION:   Shortness of breath  COMPARISON: CT abdomen and pelvis study of August 30, 2019   TECHNIQUE: CTA examination of the chest was performed using angiographic technique according to a protocol specifically tailored to evaluate for pulmonary embolism  Axial, sagittal, and coronal 2D reformatted images were created from the source data and  submitted for interpretation  In addition, coronal 3D MIP postprocessing was performed on the acquisition scanner  Radiation dose length product (DLP) for this visit:  196 mGy-cm   This examination, like all CT scans performed in the [facility], was performed utilizing techniques to minimize radiation dose exposure, including the use of iterative reconstruction and automated exposure control  IV Contrast:  85 mL of iodixanol (VISIPAQUE)  FINDINGS: PULMONARY ARTERIAL TREE:  No pulmonary embolus is seen  LUNGS:  Mosaic attenuation  Groundglass nodule in the left upper lobe on image 51 of series 3, measuring 4 mm  2 mm granuloma along the right major fissure on image 55 of series 3   5 to 6 mm groundglass nodule in the right middle lobe on image 57 of series 3  Atelectasis in the right lower lobe with a perifissural 2 mm nodular density in adjacent calcified granuloma on image 68 of series 3  This is likely inflammatory  Bibasilar compressive  PLEURA:  Moderate size bilateral pleural effusions  HEART/GREAT VESSELS:  Unremarkable for patient's age  MEDIASTINUM AND MYRNA:  Dilated debris-filled esophagus with hiatal hernia and markedly dilated stomach  Punctate calcification within the right thyroid lobe CHEST WALL AND LOWER NECK:   Chest wall emphysema noted subcutaneously as well as in the subpectoral regions and left more than right axilla  VISUALIZED STRUCTURES IN THE UPPER ABDOMEN:  Markedly dilated debris-filled stomach  Gastric outlet obstruction not excluded  Cirrhotic appearance to the liver with ascites  OSSEOUS STRUCTURES:  No acute fracture or destructive osseous lesion  Schmorl's node deformity in the superior endplate of G89  Impression: 1  No evidence of pulmonary embolism  Chest wall emphysema, of uncertain etiology    Recommend correlation with clinical history  2   Moderate bilateral pleural effusions and mild compressive bibasilar atelectasis with mosaic attenuation, correlate for congestive heart failure  3   Scattered 5-6mmgroundglass nodules and calcified granulomas  These findings are likely post infectious/inflammatory  Follow-up CT chest evaluation in 3 months is recommended  4   Hiatal hernia with marked gastric and esophageal distention which appears to be debris filled  Gastric outlet obstruction not excluded  5   Nodular liver surface and abdominal ascites  Given the patient's history of a complex right adnexal mass, peritoneal carcinomatosis is not excluded  Consider endogastric placement and additional imaging of the abdomen and pelvis  I personally discussed this study with Ms Fei Eugene, the covering nurse in the ICU, on 10/7/2019 at 5:26 PM   Workstation performed: [id number]         ** Please Note: Dragon 360 Dictation voice to text software was used in the creation of this document   **

## 2019-10-22 NOTE — QUICK NOTE
Patient was admitted a earlier today  Patient was evaluated by me multiple times today  Patient was very lethargic when I 1st evaluated the patient  Improved with the IV fluid hydration  Currently patient is more awake alert answering questions and following commands  Pulmonology and vascular surgery consultation appreciated  Patient is currently on heparin drip for the right upper extremity ischemia  Patient status post staging surgery for stage IB ovarian cancer on 10/4/2019 and postprocedure patient had  PEA cardiac arrest and was intubated and moved to the intensive care unit  Patient was treated for ARDS suspected aspiration pneumonia and she finished 9 days of antibiotics course  There was a concern for right upper extremity T ischemia per imaging studies and patient was on heparin for right IJ thrombus  Patient was eventually discharged to [facility] and was doing than on Friday and Saturday  Started with more lethargy on Sunday  Yesterday patient was more lethargic and had a feeding tube placed since she failed video barium swallow done as an outpatient in [address] [place] [address]  Patient was no started to feed through the feeding tube  Will be so the patient the 2nd time and daughter was in the room who is very much interested in removing the feeding tube  X-ray of the abdomen noted that his no evidence of ileus  Found to have last amount of stool in the bowel patient denies any pain  Patient is dysphonic/with decreased hearing on the left side and was able to follow commands bilaterally  Patient's daughter is requesting a geriatric consultation  Patient is on metoprolol as an outpatient which we will transition to IV metoprolol while NPO  Discussed with the daughter about the keofed  versus PEG tube placement for nutrition if she fails speech and swallow  Patient is type 1 diabetic-family reported that patient was hypoglycemic in [facility]    Will start the patient on insulin infusion with D5 half-normal saline  Monitor sodium level  Monitor leukocytosis  Hyponatremia is unlikely the cause of her acute encephalopathy  Likely her encephalopathy is metabolic given multiple metabolic derangements  Pulmonology evaluation appreciated    Patient is currently on C level 1 step-down  Gyn Oncology vascular surgery evaluation appreciated    Discussed with pulmonology vascular surgery gyn Oncology and Neurology  Family updated in the room

## 2019-10-22 NOTE — CONSULTS
Consult Note - Vascular Surgery   Margaret Chirinos 82 y o  female MRN: [id number]    Unit/Bed#: ED 15 Encounter: [id number]    Assessment:  Subacute right upper extremity ischemia  Right IJ DVT  Right cephalic/basilic vein SVT    Diagnostics:  10/13 CTA RUE-slow flow to right upper extremity, no filling of arteries distal forearm or hand  10/13 CT neck-small peripheral, nonocclusive thrombus right common jugular vein  10/10 Upper extremity Venous duplex- right IJ central line, cephalic/basilic SVT  Plan:  Start heparin drip, hold Eliquis  Obtain upper extremity arterial duplex  Neurovascular monitoring    Consulting Service: [facility]    Chief Complaint: Right hand cyanosis    HPI: Margaret Chirinos is a 82 y o  female with recent admission to [facility] for treatment of ovarian cancer  On 10/4 she underwent staging surgery with total laparoscopic hysterectomy converted to exploratory laparotomy, pelvic/paraaortic lymph node dissection , omentectomy , peritoneal biopsies pathology is consistent with stage IB high-grade serous ovarian cancer with good oncologic prognosis  Her hospital course was complicated by aspiration pneumonia, cardiac arrest, ARDS, dysphagia, paroxysmal atrial fibrillation with RVR, severe protein calorie malnutrition, right IJ DVT and right arm SVT  She was ultimately discharged to [facility] on 10/14  She presents overnight with increasing lethargy and dysphagia  At the time of exam, the patient is lethargic and unresponsive to communication she is unable to follow commands  CT of the head was performed and is negative, chest x-ray reveals bilateral airspace opacities and pleural effusions  NG tube is in place  The patient has been maintained on Eliquis since the time of discharge from [facility] however it is unclear if she has received all her doses secondary to her dysphagia and NG tube placement    Vascular surgery has been consulted due to recent diagnosis of right IJ DVT and right upper extremity SVT as well as right hand cyanosis  Upon review of the patient's prior imaging studies from her admission [place] CTA of the right upper extremity was performed on 10/13/19 and revealed slow flow to the right upper extremity with no filling of the arteries distal forearm or hand  On exam the patient has cyanosis of fingertips one through five, the palm and remainder of the arm remain pink and warm  Doppler evaluation reveals radial, ulnar and palmar arch signals present bilaterally, brachial arteries have 2+ pulses bilaterally  The patient is unable to participate with neurological evaluation of motor and sensation as she is unresponsive to commands at this time  She is also unable to relate whether she is experiencing pain      Review of Systems:  Patient lethargic with decreased responsiveness and inability to provide review of systems    Past Medical History:  Past Medical History:   Diagnosis Date    Anxiety     Arthritis     Constipation     Diabetes mellitus ([address] )     IDDM    Frequent UTI     Glaucoma     Hearing loss     Hyperlipidemia     Hypertension     Hyperthyroidism     in past    Hyponatremia     Hypothyroid     Neuropathy     bles    Right tubo-ovarian mass     Wears glasses        Past Surgical History:  Past Surgical History:   Procedure Laterality Date    APPENDECTOMY  1954    CATARACT EXTRACTION Bilateral     COLONOSCOPY  09/2014    Completed - Dr Leigh Claude, due in 5 years    HYSTERECTOMY N/A 10/4/2019    Procedure: LAP TOTAL HYSTERECTOMY, BSO;  Surgeon: Samreen Soto MD;  Location: [facility];  Service: Gynecology Oncology    LAPAROTOMY N/A 10/4/2019    Procedure: EXPLORATORY LAPAROTOMY  EXLAP OMENTECTOMY  PELVIC AND DEANGELO-AORTIC LYMPH NODE DISSECTION  PERITONEAL BIOPSY TRANS ABDOMINUS BLOCK;  Surgeon: Samreen Soto MD;  Location: [facility];  Service: Gynecology Oncology    SKIN LESION EXCISION      Ears       Social History:  Social History     Substance and Sexual Activity   Alcohol Use Yes    Frequency: Monthly or less    Drinks per session: 1 or 2    Binge frequency: Never    Comment: wine     Social History     Substance and Sexual Activity   Drug Use No     Social History     Tobacco Use   Smoking Status Never Smoker   Smokeless Tobacco Never Used       Family History:  Family History   Problem Relation Age of Onset    Heart attack Mother     Diabetes type II Mother     Dementia Father     Stroke Father         CVA    Breast cancer Maternal Aunt     Stomach cancer Maternal Uncle     Diabetes Family     Stroke Family         Complications       Allergies:   Allergies   Allergen Reactions    Thiazide-Type Diuretics      Hyponatremia       Medications:  Current Facility-Administered Medications   Medication Dose Route Frequency    acetaminophen (TYLENOL) tablet 650 mg  650 mg Per NG Tube Q6H PRN    aluminum-magnesium hydroxide-simethicone (MYLANTA) 200-200-20 mg/5 mL oral suspension 15 mL  15 mL Per NG Tube Q6H PRN    artificial tear (LUBRIFRESH P M ) ophthalmic ointment   Both Eyes HS PRN    dorzolamide-timolol (COSOPT) 22 3-6 8 MG/ML ophthalmic solution 1 drop  1 drop Both Eyes BID    furosemide (LASIX) tablet 40 mg  40 mg Per NG Tube Daily    heparin (porcine) 25,000 units in 250 mL infusion (premix)  3-30 Units/kg/hr (Order-Specific) Intravenous Titrated    heparin (porcine) injection 2,400 Units  2,400 Units Intravenous PRN    heparin (porcine) injection 4,800 Units  4,800 Units Intravenous Once    heparin (porcine) injection 4,800 Units  4,800 Units Intravenous PRN    insulin glargine (LANTUS) subcutaneous injection 20 Units 0 2 mL  20 Units Subcutaneous HS    insulin lispro (HumaLOG) 100 units/mL subcutaneous injection 1-5 Units  1-5 Units Subcutaneous TID AC    insulin lispro (HumaLOG) 100 units/mL subcutaneous injection 1-5 Units  1-5 Units Subcutaneous HS    levalbuterol (XOPENEX) inhalation solution 1 25 mg 1 25 mg Nebulization TID    levothyroxine tablet 50 mcg  50 mcg Per NG Tube Early Morning    metoprolol tartrate (LOPRESSOR) tablet 25 mg  25 mg Per NG Tube Q12H [address]    Netarsudil Dimesylate 0 02 % SOLN 1 drop  1 drop Ophthalmic Daily    Netarsudil Dimesylate 0 02 % SOLN 1 drop  1 drop Right Eye HS    omeprazole (PRILOSEC) suspension 2 mg/mL  20 mg Per NG Tube Daily    ondansetron (ZOFRAN) injection 4 mg  4 mg Intravenous Q6H PRN    polyethylene glycol (MIRALAX) packet 17 g  17 g Per NG Tube Daily    senna 8 8 mg/5 mL oral syrup 8 8 mg  8 8 mg Per NG Tube Daily PRN    sodium chloride 0 45 % with KCl 20 mEq/L infusion (premix)  75 mL/hr Intravenous Continuous    sodium chloride 0 9 % inhalation solution 3 mL  3 mL Nebulization TID    traMADol (ULTRAM) tablet 50 mg  50 mg Per NG Tube Q6H PRN    travoprost (TRAVATAN-Z) 0 004 % ophthalmic solution 1 drop  1 drop Both Eyes HS       Vitals:  /55   Pulse 78   Temp (!) 97 2 °F (36 2 °C) (Oral)   Resp 13   Ht 5' 5" (1 651 m)   Wt 62 9 kg (138 lb 10 7 oz)   SpO2 97%   BMI 23 08 kg/m²     I/Os:  I/O last 24 hours:   In: 1000 [IV Piggyback:1000]  Out: -     Lab Results and Cultures:   Lab Results   Component Value Date    WBC 14 83 (H) 10/22/2019    HGB 8 5 (L) 10/22/2019    HCT 28 5 (L) 10/22/2019     (H) 10/22/2019     (H) 10/22/2019     Lab Results   Component Value Date    GLUCOSE 75 01/08/2016    CALCIUM 6 8 (L) 10/22/2019     (L) 01/08/2016    K 3 9 10/22/2019    CO2 36 (H) 10/22/2019     (H) 10/22/2019    BUN 26 (H) 10/22/2019    CREATININE 0 57 (L) 10/22/2019     Lab Results   Component Value Date    INR 1 55 (H) 10/21/2019    INR 1 36 (H) 10/10/2019    INR 1 57 (H) 10/09/2019    PROTIME 18 1 (H) 10/21/2019    PROTIME 17 0 (H) 10/10/2019    PROTIME 19 0 (H) 10/09/2019       Lipid Panel:   Lab Results   Component Value Date    CHOL 183 01/08/2016   ,     Blood Culture: No results found for: BLOODCX,   Urinalysis:   Lab Results   Component Value Date    COLORU pale 08/13/2019    COLORU Yellow 08/07/2019    COLORU Yellow 06/30/2017    CLARITYU clear 08/13/2019    CLARITYU Clear 08/07/2019    CLARITYU Transparent 06/30/2017    SPECGRAV 1 016 08/07/2019    SPECGRAV 1 000 06/30/2017    PHUR 7 0 08/07/2019    PHUR 7 5 06/30/2017    PHUR 7 0 06/30/2017    LEUKOCYTESUR - 08/13/2019    LEUKOCYTESUR Small (A) 08/07/2019    LEUKOCYTESUR 70+ 06/30/2017    NITRITE - 08/13/2019    NITRITE Negative 08/07/2019    NITRITE - 06/30/2017    PROTEINUA - 06/30/2017    GLUCOSEU - 08/13/2019    GLUCOSEU 100 (1/10%) (A) 08/07/2019    GLUCOSEU Negative 08/25/2015    KETONESU - 08/13/2019    KETONESU Negative 08/07/2019    KETONESU - 06/30/2017    BILIRUBINUR - 08/13/2019    BILIRUBINUR Negative 08/07/2019    BILIRUBINUR - 06/30/2017    BLOODU 5-10 08/13/2019    BLOODU Small (A) 08/07/2019    BLOODU - 06/30/2017   ,   Urine Culture:   Lab Results   Component Value Date    URINECX 40,000-49,000 cfu/ml Enterococcus faecalis (A) 08/13/2019    URINECX <10,000 cfu/ml - X2 Gram Negative Kwame (A) 08/13/2019   ,   Wound Culure: No results found for: WOUNDCULT    Imaging:  10/21-CT Head- negative  10/21- CXR- Minimal improvement in bilateral airspace opacities  Small pleural effusions  Physical Exam:    General appearance: fatigued, slowed mentation and Unable to communicate  Skin: Right hand fingers one through five with cyanosis at tips, otherwise skin is warm and dry without lesions  Neurologic:  Unable to fully assess patient's neurologic condition due to unresponsiveness to commands  Head: Normocephalic, without obvious abnormality, atraumatic  Eyes: Conjunctiva, cornea is clear, pupils equal  Throat: lips, mucosa, and tongue normal; teeth and gums normal  Neck: no adenopathy, no carotid bruit, no JVD, supple, symmetrical, trachea midline and thyroid not enlarged, symmetric, no tenderness/mass/nodules  Back: symmetric, no curvature   ROM normal  No CVA tenderness  Lungs: diminished breath sounds and crackles  Chest wall: no tenderness  Heart: regular rate and rhythm, S1, S2 normal, no murmur, click, rub or gallop  Abdomen: Soft, mildly distended, no rebound or guarding, midline incision healing well, no erythema or drainage  Extremities: Right hand finger tips with cyanosis and cool, palm and upper extremity warm and pink  BLE pink and warm, no cyanosis or tissue loss       Pulse exam:  Brachial:  Right:  2+ Left[de-identified]  2+  Radial: Right: doppler signal Left[de-identified] doppler signal  Ulnar: Right: doppler signal Left[de-identified] doppler signal  DP: Right: 2+ Left: 2+        Joseph Contreras PA-C  10/22/2019

## 2019-10-22 NOTE — SPEECH THERAPY NOTE
Bedside Swallow Evaluation:    Summary:  No PO trials completed at bedside today  Patient transferred from [facility] with lethargy and change in RR  She had a video swallow study completed yesterday at [facility] due to ongoing concerns for aspiration with signs at bedside and changes on CXR  Swallow study results indicate a moderate-severe pharyngeal dysphagia with penetration on all textures and eventual silent aspiration on puree secondary to pharyngeal residue  Patient was unable to cough on command to clear any aspirated material      Dysphagia consult is received  Patient is awake and alert, but appears weak and deconditioned  She listens intently to SLP, and shakes/nods head to answer yes and no questions  After discussion with family and medical team members, it was determined that the patient will be kept NPO for tonight with IV fluids and that a repeat video swallow study will be completed tomorrow to reasses swallow function  From H&P: Janine Eldridge is a 82 y o  female who was admitted in [facility] previously and was just recently discharged to [facility]  Essentially, the patient was and admitted for an incidental ovarian mass and therefore underwent hysterectomy  However the patient then developed acute no with dysphagia and uncontrolled diabetes mellitus  She was in distress and subsequently coded; went to the ICU  She was then diagnosed to have pneumonia with ARDS  Initially initially, the patient had an NG tube due to dysphonia and dysphagia  But slowly, the NG tube was then removed after she was trained to swallow initially with small ice chips  She was therefore discharged to [facility] for further rehabilitation    She was doing quite well until approximately within the past 24 hours when she was noted to be more lethargic than usual   She was also seen to have problems with swallowing and therefore an NG tube was placed this afternoon  No fever    However she was noted to have extreme lethargy and sometimes unresponsive even with deep sternal rub; with that concern, she was then sent to [facility] for further monitoring and workup    According to the daughter who is in the room, she was noted not to be eating much for the past 24 hours  Also while in rehabilitation, she was seen to have an episode of bradycardia at 30     Currently, patient is initially lethargic and somewhat mildly or barely responsive to loud name calling and noxious stimulus  However when the patient was placed upright immediately, she became responsive with eyes open and essentially communicative albeit difficult to speak due to the presence of NG tube        Recommendations:  Diet: NPO  Meds: Non oral   Oral care: Frequent  Family is providing oral comfort via oral swabs  Aspiration precautions    Therapy Prognosis: Fair  Prognosis considerations: Overall deconditioning and recent VBS results   Frequency: 3-5x week    Consider consult w/:  Rehab  GI  ENT  Nutrition      Reason for consult:  R/o aspiration  Determine safest and least restrictive diet  respiratory compromise  poor intake  h/o dysphagia     Precautions:  None    Current diet:  NPO  Premorbid diet[de-identified]  Puree and honey thick   Previous VBS:  Results as above from 10/21/19  O2 requirement:  NC  Voice/Speech:  No attempts to speak   Follows commands:  WFL                        Cognitive Status:  Awake and alert  Oral [facility] exam:  Not formally assessed at this time    Oral care is provided via oral swab  The patient is able to swallow small amounts of water via tsp  Laryngeal rise is observed and appears adequate  Swallow is audible  Patient fatigues quickly and is observed with a slight cough on 4th swallow  Items administered:  No PO trials administered     Results d/w:  Pt, nursing, family, physician    Goal(s):  Pt will tolerate least restrictive diet w/out s/s aspiration or oral/pharyngeal difficulties

## 2019-10-23 ENCOUNTER — APPOINTMENT (INPATIENT)
Dept: RADIOLOGY | Facility: HOSPITAL | Age: 84
DRG: 189 | End: 2019-10-23
Payer: COMMERCIAL

## 2019-10-23 ENCOUNTER — APPOINTMENT (INPATIENT)
Dept: RADIOLOGY | Facility: HOSPITAL | Age: 84
DRG: 189 | End: 2019-10-23
Attending: FAMILY MEDICINE
Payer: COMMERCIAL

## 2019-10-23 PROBLEM — K62.5 RECTAL BLEEDING: Status: ACTIVE | Noted: 2019-10-23

## 2019-10-23 PROBLEM — H91.90 HEARING LOSS: Status: ACTIVE | Noted: 2019-10-23

## 2019-10-23 LAB
ABO GROUP BLD BPU: NORMAL
ABO GROUP BLD: NORMAL
AMMONIA PLAS-SCNC: 64 UMOL/L (ref 11–35)
ANION GAP SERPL CALCULATED.3IONS-SCNC: 3 MMOL/L (ref 4–13)
APTT PPP: 72 SECONDS (ref 23–37)
APTT PPP: 83 SECONDS (ref 23–37)
ARTERIAL PATENCY WRIST A: YES
ATRIAL RATE: 84 BPM
BASE EXCESS BLDA CALC-SCNC: 8.5 MMOL/L
BASE EXCESS BLDA CALC-SCNC: 8.9 MMOL/L
BASE EXCESS BLDA CALC-SCNC: 9 MMOL/L
BASOPHILS # BLD AUTO: 0.01 THOUSANDS/ΜL (ref 0–0.1)
BASOPHILS NFR BLD AUTO: 0 % (ref 0–1)
BLD GP AB SCN SERPL QL: NEGATIVE
BPU ID: NORMAL
BUN SERPL-MCNC: 21 MG/DL (ref 5–25)
CALCIUM SERPL-MCNC: 6.3 MG/DL (ref 8.3–10.1)
CHLORIDE SERPL-SCNC: 106 MMOL/L (ref 100–108)
CO2 SERPL-SCNC: 34 MMOL/L (ref 21–32)
CREAT SERPL-MCNC: 0.45 MG/DL (ref 0.6–1.3)
CROSSMATCH: NORMAL
EOSINOPHIL # BLD AUTO: 0.03 THOUSAND/ΜL (ref 0–0.61)
EOSINOPHIL NFR BLD AUTO: 0 % (ref 0–6)
ERYTHROCYTE [DISTWIDTH] IN BLOOD BY AUTOMATED COUNT: 16.1 % (ref 11.6–15.1)
GFR SERPL CREATININE-BSD FRML MDRD: 94 ML/MIN/1.73SQ M
GLUCOSE SERPL-MCNC: 113 MG/DL (ref 65–140)
GLUCOSE SERPL-MCNC: 115 MG/DL (ref 65–140)
GLUCOSE SERPL-MCNC: 125 MG/DL (ref 65–140)
GLUCOSE SERPL-MCNC: 129 MG/DL (ref 65–140)
GLUCOSE SERPL-MCNC: 135 MG/DL (ref 65–140)
GLUCOSE SERPL-MCNC: 135 MG/DL (ref 65–140)
GLUCOSE SERPL-MCNC: 151 MG/DL (ref 65–140)
GLUCOSE SERPL-MCNC: 152 MG/DL (ref 65–140)
GLUCOSE SERPL-MCNC: 161 MG/DL (ref 65–140)
GLUCOSE SERPL-MCNC: 167 MG/DL (ref 65–140)
GLUCOSE SERPL-MCNC: 175 MG/DL (ref 65–140)
GLUCOSE SERPL-MCNC: 481 MG/DL (ref 65–140)
GLUCOSE SERPL-MCNC: 71 MG/DL (ref 65–140)
GLUCOSE SERPL-MCNC: 81 MG/DL (ref 65–140)
GLUCOSE SERPL-MCNC: 84 MG/DL (ref 65–140)
HCO3 BLDA-SCNC: 35 MMOL/L (ref 22–28)
HCO3 BLDA-SCNC: 36.4 MMOL/L (ref 22–28)
HCO3 BLDA-SCNC: 36.4 MMOL/L (ref 22–28)
HCT VFR BLD AUTO: 29.5 % (ref 34.8–46.1)
HCT VFR BLD AUTO: 29.7 % (ref 34.8–46.1)
HCT VFR BLD AUTO: 31.6 % (ref 34.8–46.1)
HGB BLD-MCNC: 9.1 G/DL (ref 11.5–15.4)
HGB BLD-MCNC: 9.2 G/DL (ref 11.5–15.4)
HGB BLD-MCNC: 9.4 G/DL (ref 11.5–15.4)
IMM GRANULOCYTES # BLD AUTO: 0.12 THOUSAND/UL (ref 0–0.2)
IMM GRANULOCYTES NFR BLD AUTO: 1 % (ref 0–2)
LYMPHOCYTES # BLD AUTO: 0.61 THOUSANDS/ΜL (ref 0.6–4.47)
LYMPHOCYTES NFR BLD AUTO: 4 % (ref 14–44)
MCH RBC QN AUTO: 30.9 PG (ref 26.8–34.3)
MCHC RBC AUTO-ENTMCNC: 29.7 G/DL (ref 31.4–37.4)
MCV RBC AUTO: 104 FL (ref 82–98)
MONOCYTES # BLD AUTO: 0.88 THOUSAND/ΜL (ref 0.17–1.22)
MONOCYTES NFR BLD AUTO: 6 % (ref 4–12)
NASAL CANNULA: ABNORMAL
NEUTROPHILS # BLD AUTO: 13.48 THOUSANDS/ΜL (ref 1.85–7.62)
NEUTS SEG NFR BLD AUTO: 89 % (ref 43–75)
NRBC BLD AUTO-RTO: 0 /100 WBCS
O2 CT BLDA-SCNC: 13.6 ML/DL (ref 16–23)
O2 CT BLDA-SCNC: 14 ML/DL (ref 16–23)
O2 CT BLDA-SCNC: 14.1 ML/DL (ref 16–23)
OXYHGB MFR BLDA: 88.7 % (ref 94–97)
OXYHGB MFR BLDA: 95.7 % (ref 94–97)
OXYHGB MFR BLDA: 96 % (ref 94–97)
P AXIS: 63 DEGREES
PCO2 BLDA: 56 MM HG (ref 36–44)
PCO2 BLDA: 67.3 MM HG (ref 36–44)
PCO2 BLDA: 71 MM HG (ref 36–44)
PH BLDA: 7.33 [PH] (ref 7.35–7.45)
PH BLDA: 7.35 [PH] (ref 7.35–7.45)
PH BLDA: 7.41 [PH] (ref 7.35–7.45)
PLATELET # BLD AUTO: 515 THOUSANDS/UL (ref 149–390)
PMV BLD AUTO: 9.6 FL (ref 8.9–12.7)
PO2 BLDA: 55.9 MM HG (ref 75–129)
PO2 BLDA: 92.2 MM HG (ref 75–129)
PO2 BLDA: 93.2 MM HG (ref 75–129)
POTASSIUM SERPL-SCNC: 3.2 MMOL/L (ref 3.5–5.3)
PR INTERVAL: 140 MS
PROCALCITONIN SERPL-MCNC: 0.19 NG/ML
QRS AXIS: 59 DEGREES
QRSD INTERVAL: 70 MS
QT INTERVAL: 386 MS
QTC INTERVAL: 456 MS
RBC # BLD AUTO: 3.04 MILLION/UL (ref 3.81–5.12)
RH BLD: POSITIVE
SODIUM SERPL-SCNC: 143 MMOL/L (ref 136–145)
SPECIMEN EXPIRATION DATE: NORMAL
SPECIMEN SOURCE: ABNORMAL
T WAVE AXIS: 50 DEGREES
UNIT DISPENSE STATUS: NORMAL
UNIT PRODUCT CODE: NORMAL
UNIT RH: NORMAL
VENTRICULAR RATE: 84 BPM
WBC # BLD AUTO: 15.13 THOUSAND/UL (ref 4.31–10.16)

## 2019-10-23 PROCEDURE — 86920 COMPATIBILITY TEST SPIN: CPT

## 2019-10-23 PROCEDURE — 30233N1 TRANSFUSION OF NONAUTOLOGOUS RED BLOOD CELLS INTO PERIPHERAL VEIN, PERCUTANEOUS APPROACH: ICD-10-PCS | Performed by: HOSPITALIST

## 2019-10-23 PROCEDURE — 86850 RBC ANTIBODY SCREEN: CPT | Performed by: PHYSICIAN ASSISTANT

## 2019-10-23 PROCEDURE — 85014 HEMATOCRIT: CPT | Performed by: PHYSICIAN ASSISTANT

## 2019-10-23 PROCEDURE — 85018 HEMOGLOBIN: CPT | Performed by: FAMILY MEDICINE

## 2019-10-23 PROCEDURE — 85018 HEMOGLOBIN: CPT | Performed by: PHYSICIAN ASSISTANT

## 2019-10-23 PROCEDURE — 99223 1ST HOSP IP/OBS HIGH 75: CPT | Performed by: INTERNAL MEDICINE

## 2019-10-23 PROCEDURE — 94660 CPAP INITIATION&MGMT: CPT

## 2019-10-23 PROCEDURE — 94664 DEMO&/EVAL PT USE INHALER: CPT

## 2019-10-23 PROCEDURE — C9113 INJ PANTOPRAZOLE SODIUM, VIA: HCPCS | Performed by: FAMILY MEDICINE

## 2019-10-23 PROCEDURE — 92526 ORAL FUNCTION THERAPY: CPT

## 2019-10-23 PROCEDURE — 94760 N-INVAS EAR/PLS OXIMETRY 1: CPT

## 2019-10-23 PROCEDURE — 99232 SBSQ HOSP IP/OBS MODERATE 35: CPT | Performed by: SURGERY

## 2019-10-23 PROCEDURE — 82948 REAGENT STRIP/BLOOD GLUCOSE: CPT

## 2019-10-23 PROCEDURE — 82805 BLOOD GASES W/O2 SATURATION: CPT | Performed by: PHYSICIAN ASSISTANT

## 2019-10-23 PROCEDURE — 85730 THROMBOPLASTIN TIME PARTIAL: CPT | Performed by: INTERNAL MEDICINE

## 2019-10-23 PROCEDURE — 71045 X-RAY EXAM CHEST 1 VIEW: CPT

## 2019-10-23 PROCEDURE — 85025 COMPLETE CBC W/AUTO DIFF WBC: CPT | Performed by: FAMILY MEDICINE

## 2019-10-23 PROCEDURE — 99232 SBSQ HOSP IP/OBS MODERATE 35: CPT | Performed by: PSYCHIATRY & NEUROLOGY

## 2019-10-23 PROCEDURE — 86900 BLOOD TYPING SEROLOGIC ABO: CPT | Performed by: PHYSICIAN ASSISTANT

## 2019-10-23 PROCEDURE — 99233 SBSQ HOSP IP/OBS HIGH 50: CPT | Performed by: INTERNAL MEDICINE

## 2019-10-23 PROCEDURE — 93010 ELECTROCARDIOGRAM REPORT: CPT | Performed by: INTERNAL MEDICINE

## 2019-10-23 PROCEDURE — 94640 AIRWAY INHALATION TREATMENT: CPT

## 2019-10-23 PROCEDURE — 99222 1ST HOSP IP/OBS MODERATE 55: CPT | Performed by: INTERNAL MEDICINE

## 2019-10-23 PROCEDURE — 99233 SBSQ HOSP IP/OBS HIGH 50: CPT | Performed by: OBSTETRICS & GYNECOLOGY

## 2019-10-23 PROCEDURE — 36600 WITHDRAWAL OF ARTERIAL BLOOD: CPT

## 2019-10-23 PROCEDURE — 82805 BLOOD GASES W/O2 SATURATION: CPT | Performed by: ANESTHESIOLOGY

## 2019-10-23 PROCEDURE — 80048 BASIC METABOLIC PNL TOTAL CA: CPT | Performed by: FAMILY MEDICINE

## 2019-10-23 PROCEDURE — 84145 PROCALCITONIN (PCT): CPT | Performed by: FAMILY MEDICINE

## 2019-10-23 PROCEDURE — 85014 HEMATOCRIT: CPT | Performed by: FAMILY MEDICINE

## 2019-10-23 PROCEDURE — 74018 RADEX ABDOMEN 1 VIEW: CPT

## 2019-10-23 PROCEDURE — 99233 SBSQ HOSP IP/OBS HIGH 50: CPT | Performed by: FAMILY MEDICINE

## 2019-10-23 PROCEDURE — 86901 BLOOD TYPING SEROLOGIC RH(D): CPT | Performed by: PHYSICIAN ASSISTANT

## 2019-10-23 PROCEDURE — P9016 RBC LEUKOCYTES REDUCED: HCPCS

## 2019-10-23 RX ORDER — SERTRALINE HYDROCHLORIDE 20 MG/ML
25 SOLUTION ORAL
Status: DISCONTINUED | OUTPATIENT
Start: 2019-10-23 | End: 2019-11-01

## 2019-10-23 RX ORDER — FUROSEMIDE 10 MG/ML
40 INJECTION INTRAMUSCULAR; INTRAVENOUS ONCE
Status: COMPLETED | OUTPATIENT
Start: 2019-10-23 | End: 2019-10-23

## 2019-10-23 RX ORDER — ALBUMIN (HUMAN) 12.5 G/50ML
25 SOLUTION INTRAVENOUS ONCE
Status: COMPLETED | OUTPATIENT
Start: 2019-10-23 | End: 2019-10-23

## 2019-10-23 RX ORDER — ACETAMINOPHEN 650 MG/1
650 SUPPOSITORY RECTAL EVERY 6 HOURS PRN
Status: DISCONTINUED | OUTPATIENT
Start: 2019-10-23 | End: 2019-10-24

## 2019-10-23 RX ORDER — FUROSEMIDE 10 MG/ML
20 INJECTION INTRAMUSCULAR; INTRAVENOUS ONCE
Status: COMPLETED | OUTPATIENT
Start: 2019-10-23 | End: 2019-10-23

## 2019-10-23 RX ORDER — LIDOCAINE 50 MG/G
1 PATCH TOPICAL DAILY
Status: DISPENSED | OUTPATIENT
Start: 2019-10-23 | End: 2019-10-24

## 2019-10-23 RX ORDER — POTASSIUM CHLORIDE 14.9 MG/ML
20 INJECTION INTRAVENOUS ONCE
Status: COMPLETED | OUTPATIENT
Start: 2019-10-23 | End: 2019-10-23

## 2019-10-23 RX ORDER — DEXTROSE MONOHYDRATE 25 G/50ML
25 INJECTION, SOLUTION INTRAVENOUS ONCE
Status: COMPLETED | OUTPATIENT
Start: 2019-10-23 | End: 2019-10-23

## 2019-10-23 RX ORDER — BISACODYL 10 MG
10 SUPPOSITORY, RECTAL RECTAL DAILY PRN
Status: DISCONTINUED | OUTPATIENT
Start: 2019-10-23 | End: 2019-11-01

## 2019-10-23 RX ADMIN — DEXTROSE AND SODIUM CHLORIDE 50 ML/HR: 5; .45 INJECTION, SOLUTION INTRAVENOUS at 17:29

## 2019-10-23 RX ADMIN — PANTOPRAZOLE SODIUM 40 MG: 40 INJECTION, POWDER, FOR SOLUTION INTRAVENOUS at 08:02

## 2019-10-23 RX ADMIN — HEPARIN SODIUM AND DEXTROSE 15 UNITS/KG/HR: 10000; 5 INJECTION INTRAVENOUS at 12:24

## 2019-10-23 RX ADMIN — DORZOLAMIDE HYDROCHLORIDE AND TIMOLOL MALEATE 1 DROP: 20; 5 SOLUTION/ DROPS OPHTHALMIC at 08:23

## 2019-10-23 RX ADMIN — TRAVOPROST 1 DROP: 0.04 SOLUTION/ DROPS OPHTHALMIC at 22:20

## 2019-10-23 RX ADMIN — SERTRALINE HYDROCHLORIDE 25 MG: 20 SOLUTION ORAL at 22:20

## 2019-10-23 RX ADMIN — FUROSEMIDE 40 MG: 10 INJECTION, SOLUTION INTRAMUSCULAR; INTRAVENOUS at 01:01

## 2019-10-23 RX ADMIN — ACETAMINOPHEN 650 MG: 650 SUPPOSITORY RECTAL at 22:20

## 2019-10-23 RX ADMIN — CEFEPIME HYDROCHLORIDE 2000 MG: 2 INJECTION, POWDER, FOR SOLUTION INTRAVENOUS at 12:24

## 2019-10-23 RX ADMIN — POTASSIUM CHLORIDE 20 MEQ: 14.9 INJECTION, SOLUTION INTRAVENOUS at 08:03

## 2019-10-23 RX ADMIN — DORZOLAMIDE HYDROCHLORIDE AND TIMOLOL MALEATE 1 DROP: 20; 5 SOLUTION/ DROPS OPHTHALMIC at 18:31

## 2019-10-23 RX ADMIN — CEFEPIME HYDROCHLORIDE 2000 MG: 2 INJECTION, POWDER, FOR SOLUTION INTRAVENOUS at 00:25

## 2019-10-23 RX ADMIN — DEXTROSE 50 % IN WATER (D50W) INTRAVENOUS SYRINGE 25 ML: at 04:19

## 2019-10-23 RX ADMIN — FUROSEMIDE 20 MG: 10 INJECTION, SOLUTION INTRAMUSCULAR; INTRAVENOUS at 04:58

## 2019-10-23 RX ADMIN — ALBUMIN (HUMAN) 25 G: 0.25 INJECTION, SOLUTION INTRAVENOUS at 10:56

## 2019-10-23 RX ADMIN — ALBUTEROL SULFATE 2.5 MG: 2.5 SOLUTION RESPIRATORY (INHALATION) at 08:16

## 2019-10-23 RX ADMIN — DEXTROSE AND SODIUM CHLORIDE 50 ML/HR: 5; .45 INJECTION, SOLUTION INTRAVENOUS at 05:54

## 2019-10-23 RX ADMIN — SODIUM CHLORIDE 1 UNITS/HR: 9 INJECTION, SOLUTION INTRAVENOUS at 18:25

## 2019-10-23 NOTE — NURSING NOTE
Patient repositioned and rectal area/purwick had maninder red blood after heparin gtt was started/ most recent PTT had been 132  Enrique Baires with [facility] and OBGYN notified and came to bedside  H&H sent/stable  Serial H&H and occult stool added as well as consult to gastroenterology  Enrique Baires also advised to continue with hepairn gtt  VSS stable will continue to monitor   Patient is also still very lethargic

## 2019-10-23 NOTE — ASSESSMENT & PLAN NOTE
As patient was seen lethargic; will put on hold trazodone and lorazepam   No further sedating medication

## 2019-10-23 NOTE — ASSESSMENT & PLAN NOTE
Lab Results   Component Value Date    HGBA1C 6 4 (H) 10/22/2019    Currently on Lantus 20 units Q 12  Because there were some reports of poor p o  Intake; will cut Lantus in half at 20 units at night  Continue insulin sliding scale with Accu-Cheks  Hemoglobin A1c  Recent Labs     10/23/19  1016 10/23/19  1255 10/23/19  1417 10/23/19  1603   POCGLU 129 135 125 135       Blood Sugar Average: Last 72 hrs:  (P) 154 45   Patient is type 1 type diabetes mellitus, continue with the insulin drip  Monitor  Continue with the D5 since the patient with poor p o   Intake

## 2019-10-23 NOTE — CONSULTS
Consultation - Geriatrics   Dave Duty 82 y o  female MRN: [id number]  Unit/Bed#: [facility] 530-01 Encounter: [id number]      Assessment/Plan  1  Lethargy  Waxing and waning, pt open eyes for brief periods of time  Alert and oriented x3  At baseline patient is alert and oriented x3  Ammonia level 64, lactulose on hold secondary to concern for rectal bleed  Provide frequent redirection, reorientation, distraction techniques  Avoid deliriogenic medications such as tramadol, benzodiazepines, anticholinergics,  Benadryl  Treat pain, See geriatric pain protocol  Monitor for constipation and urinary retention  Encourage early and frequent moblization, OOB  Encourage Hydration/ Nutrition  Implement sleep hygiene, limit night time interuptions, group activities      2  Anxiety  Pt was taking ativan prn for anxiety since hospitalization  Do not recommend secondary to number 1  Agree with hold of trazadone  Continue supportive care      3  Acute pain   Tramadol d/c secondary to number 1  Recommend geriatric pain protocol  Will order tylenol  lidoderm patch to back (pt co back pain since previous hospitalization)    4  Deconditioning  Albumin 2 0  PT/OT  Speech consult  Nutrition consult    5  Hearing impairment  Decrease hearing to left ear  New since previous hospitalization  CT head 10/21/19- stable bilateral mastoid effusion    6  Ambulatory dysfunction  No history of falls or use of assistive device  Mobility limited secondary to peripheral neuropathy and vertigo  PT/OT  Rehab post hospitalization    7  Insomnia  Agree with hold of trazadone if medication needed  Recommend melatonin 6mg po QHS      8  Hypothyroidism  TSH slightly elevated 4 120  Continue Synthroid 50 mcg p o  Daily      9  Diabetes mellitus type 1  Hemoglobin A1c 6 4  Patient tracks blood sugars 6 times a day at home    10  Respiratory failure  Nasal canula  Pulmonary on consult    11  Right upper extremity ischemia  Vascular surgery consult    12   Rectal bleed  sm amout last night reported by nursing  Pt given 1 u prbc by primary team  GI consult    13  Stabe 1B ovarian Ca  Post op    14  Dysphagia  Speech eval pending        History of Present Illness   Physician Requesting Consult: Kiah Mcleod MD  Reason for Consult / Principal Problem: lethargy  Hx and PE limited by: lethargy  HPI: Lawerence Raymond is a 82y o  year old female who presents with lethargy  Patient was recently hospitalized at [facility] [place], [place] for elective total laparoscopic hysterectomy  BSO, exploratory lap, pelvic and para-aortic lymphadenectomy for right tubo-ovarian mass  Postoperatively she became distressed, subsequently coded and went to ICU  She was diagnosed with pneumonia  She had an NG-tube placed secondary to dysphonia and dysphagia  NG tube was removed and she worked with speech therapy to regain swallowing  She was discharged to [facility] on 10/18/19  Per her daughter [facility] had difficulty controlling her sugars with hypoglycemia and a 20  Sunday she had decreased mentation decrease in appetite  She has a past medical history of anxiety, arthritis, diabetes mellitus type 1, hearing loss, hypertension, recurring UTIs, hyponatremia, hypothyroidism  Prior to arrival patient lives at home  She still drives  She is independent with IADLs and ADLs  She does not use assistive device, she has no history of falls  Her mobility is limited secondary to peripheral neuropathy  At baseline she has no difficulty hearing  Per her daughter since her previous hospitalization she has had a decreased hearing in her left ear  She has good dentition, no weight loss prior to hospitalization  She is socially active  She goes out for coffee every day with a friend  She has Sunday dinner with family  She like to read books  Upon exam pt is lying in bed  She awakens eyes for brief periods of time  Her voice is soft  She is alert and oriented x3  She co feeling tired and being scared   Her daughter is at her bedside  Inpatient consult to Gerontology  Consult performed by: JIM Jung  Consult ordered by: Angelika Carroll MD          Review of Systems   Constitutional: Negative for unexpected weight change  HENT: Positive for hearing loss and trouble swallowing  Negative for dental problem  Left ear   Eyes: Negative for visual disturbance  Respiratory: Negative for shortness of breath  Gastrointestinal: Positive for anal bleeding  Last night   Genitourinary: Negative for difficulty urinating  Musculoskeletal: Positive for gait problem  Numbness in feet   Neurological: Negative for dizziness  Psychiatric/Behavioral: Negative for sleep disturbance         Historical Information   Past Medical History:   Diagnosis Date    Anxiety     Arthritis     Constipation     Diabetes mellitus (HCC)     IDDM    Frequent UTI     Glaucoma     Hearing loss     Hyperlipidemia     Hypertension     Hyperthyroidism     in past    Hyponatremia     Hypothyroid     Neuropathy     bles    Right tubo-ovarian mass     Wears glasses      Past Surgical History:   Procedure Laterality Date    APPENDECTOMY  1956    CATARACT EXTRACTION Bilateral     COLONOSCOPY  09/2014    Completed - Dr Anel Henao, due in 5 years    HYSTERECTOMY N/A 10/4/2019    Procedure: LAP TOTAL HYSTERECTOMY, BSO;  Surgeon: Donald Santiago MD;  Location: [facility];  Service: Gynecology Oncology    LAPAROTOMY N/A 10/4/2019    Procedure: EXPLORATORY LAPAROTOMY  EXLAP OMENTECTOMY  PELVIC AND DEANGELO-AORTIC LYMPH NODE DISSECTION  PERITONEAL BIOPSY TRANS ABDOMINUS BLOCK;  Surgeon: Donald Santiago MD;  Location: [facility];  Service: Gynecology Oncology    SKIN LESION EXCISION      Ears     Social History   Social History     Substance and Sexual Activity   Alcohol Use Never    Frequency: Monthly or less    Drinks per session: 1 or 2    Binge frequency: Never    Comment: wine     Social History     Substance and Sexual Activity   Drug Use No     Social History     Tobacco Use   Smoking Status Never Smoker   Smokeless Tobacco Never Used         Family History:   Family History   Problem Relation Age of Onset    Heart attack Mother     Diabetes type II Mother     Dementia Father     Stroke Father         CVA    Breast cancer Maternal Aunt     Stomach cancer Maternal Uncle     Diabetes Family     Stroke Family         Complications       Meds/Allergies   Current meds:   Current Facility-Administered Medications   Medication Dose Route Frequency    acetaminophen (TYLENOL) tablet 650 mg  650 mg Per NG Tube Q6H PRN    albuterol inhalation solution 2 5 mg  2 5 mg Nebulization Q4H PRN    aluminum-magnesium hydroxide-simethicone (MYLANTA) 200-200-20 mg/5 mL oral suspension 15 mL  15 mL Per NG Tube Q6H PRN    artificial tear (LUBRIFRESH P M ) ophthalmic ointment   Both Eyes HS PRN    cefepime (MAXIPIME) 2 g/50 mL dextrose IVPB  2,000 mg Intravenous Q12H    dextrose 5 % and sodium chloride 0 45 % infusion  50 mL/hr Intravenous Continuous    dorzolamide-timolol (COSOPT) 22 3-6 8 MG/ML ophthalmic solution 1 drop  1 drop Both Eyes BID    heparin (porcine) 25,000 units in 250 mL infusion (premix)  3-30 Units/kg/hr (Order-Specific) Intravenous Titrated    heparin (porcine) injection 2,400 Units  2,400 Units Intravenous PRN    heparin (porcine) injection 4,800 Units  4,800 Units Intravenous PRN    insulin regular (HumuLIN R,NovoLIN R) 1 Units/mL in sodium chloride 0 9 % 100 mL infusion  0 3-21 Units/hr Intravenous Titrated    levothyroxine tablet 50 mcg  50 mcg Per NG Tube Early Morning    metoprolol (LOPRESSOR) injection 2 5 mg  2 5 mg Intravenous Q6H    Netarsudil Dimesylate 0 02 % SOLN 1 drop  1 drop Ophthalmic Daily    Netarsudil Dimesylate 0 02 % SOLN 1 drop  1 drop Right Eye HS    ondansetron (ZOFRAN) injection 4 mg  4 mg Intravenous Q6H PRN    pantoprazole (PROTONIX) injection 40 mg 40 mg Intravenous Q24H [address]    potassium chloride 20 mEq IVPB (premix)  20 mEq Intravenous Once    senna 8 8 mg/5 mL oral syrup 8 8 mg  8 8 mg Per NG Tube Daily PRN    traMADol (ULTRAM) tablet 50 mg  50 mg Per NG Tube Q6H PRN    travoprost (TRAVATAN-Z) 0 004 % ophthalmic solution 1 drop  1 drop Both Eyes HS      Current PTA meds:  Medications Prior to Admission   Medication    apixaban (ELIQUIS) 5 mg    dorzolamide-timolol (COSOPT) 22 3-6 8 MG/ML ophthalmic solution    furosemide (LASIX) 40 mg tablet    latanoprost (XALATAN) 0 005 % ophthalmic solution    levalbuterol (XOPENEX) 1 25 mg/0 5 mL nebulizer solution    levothyroxine 50 mcg tablet    LORazepam (ATIVAN) 0 5 mg tablet    metoprolol tartrate (LOPRESSOR) 25 mg tablet    Netarsudil Dimesylate (RHOPRESSA) 0 02 % SOLN    pantoprazole (PROTONIX) 40 mg tablet    traZODone (DESYREL) 50 mg tablet    acetaminophen (TYLENOL) 325 mg tablet    aluminum-magnesium hydroxide-simethicone (MYLANTA) 200-200-20 mg/5 mL suspension    artificial tear (LUBRIFRESH P M ) 83-15 % ophthalmic ointment    ERROR: CANNOT USE RATIO BASED PRESCRIPTION MIXTURE NAMING FOR A NON-MIXTURE    insulin glargine (LANTUS) 100 units/mL subcutaneous injection    insulin lispro (HumaLOG) 100 units/mL injection    omeprazole (PriLOSEC) 20 mg delayed release capsule    ondansetron (ZOFRAN) 4 mg/2 mL injection    polyethylene glycol (MIRALAX) 17 g packet    RHOPRESSA 0 02 % SOLN    sodium chloride 0 9 % nebulizer solution    traMADol (ULTRAM) 50 mg tablet    travoprost (TRAVATAN Z) 0 004 % ophthalmic solution        Allergies   Allergen Reactions    Thiazide-Type Diuretics      Hyponatremia       Objective   Vitals: Blood pressure 95/56, pulse 90, temperature 97 9 °F (36 6 °C), temperature source Axillary, resp  rate 22, height 5' 5" (1 651 m), weight 66 7 kg (147 lb 0 8 oz), SpO2 90 %, not currently breastfeeding  ,Body mass index is 24 47 kg/m²        Physical Exam Constitutional: She is oriented to person, place, and time  She appears well-developed and well-nourished  No distress  HENT:   Head: Normocephalic  Eyes: Right eye exhibits no discharge  Left eye exhibits no discharge  Neck: Normal range of motion  Cardiovascular: Normal rate, regular rhythm and normal heart sounds  Exam reveals no gallop and no friction rub  No murmur heard  Pulmonary/Chest:   Decreased, slight tacchypnea   Abdominal: Soft  Bowel sounds are normal  She exhibits no distension  There is no tenderness  There is no guarding  Musculoskeletal: Normal range of motion  Trace BUE and BLE edema   Neurological: She is alert and oriented to person, place, and time  Skin: Skin is warm and dry  She is not diaphoretic  Psychiatric:   Quiet, anxious   Nursing note and vitals reviewed  Lab Results:   Results from last 7 days   Lab Units 10/23/19  0450   WBC Thousand/uL 15 13*   HEMOGLOBIN g/dL 9 4*   HEMATOCRIT % 31 6*   PLATELETS Thousands/uL 515*        Results from last 7 days   Lab Units 10/23/19  0450  10/21/19  2030   POTASSIUM mmol/L 3 2*   < > 3 4*   CHLORIDE mmol/L 106   < > 105   CO2 mmol/L 34*   < > 36*   BUN mg/dL 21   < > 26*   CREATININE mg/dL 0 45*   < > 0 61   CALCIUM mg/dL 6 3*   < > 7 1*   ALK PHOS U/L  --   --  100   ALT U/L  --   --  34   AST U/L  --   --  27    < > = values in this interval not displayed  Imaging Studies: I have personally reviewed pertinent reports  EKG, Pathology, and Other Studies: I have personally reviewed pertinent reports      VTE Prophylaxis: Sequential compression device (Venodyne)     Code Status: Level 1 - Full Code

## 2019-10-23 NOTE — PROGRESS NOTES
Progress Note - Vascular Surgery   Omar Gardner 82 y o  female MRN: [id number]  Unit/Bed#: [facility] 530-01 Encounter: [id number]    Assessment:  81 y/o F w/ Stage IB ovarian cancer, p/w subacute RUE ischemia, RIJ DVT, R cephalic/basilic vein superficial venous thrombosis  --RUE duplex showing occluded R radial artery, flattened waveforms in all digits    Plan:  --Continue Heparin gtt  --Serial neurovascular exams to RUE  --No acute surgical intervention at this time    Subjective/Objective      Subjective:     Pt had multiple episodes of rectal bleeding overnight per nursing staff  This morning, pt lethargic, difficult to arouse  Denies any RUE pain  Objective:     Blood pressure 106/52, pulse 80, temperature (!) 97 4 °F (36 3 °C), resp  rate (!) 24, height 5' 5" (1 651 m), weight 62 9 kg (138 lb 10 7 oz), SpO2 91 %, not currently breastfeeding  ,Body mass index is 23 08 kg/m²  Intake/Output Summary (Last 24 hours) at 10/23/2019 0531  Last data filed at 10/23/2019 0440  Gross per 24 hour   Intake 1522 46 ml   Output 1708 ml   Net -185 54 ml       Invasive Devices     Peripherally Inserted Central Catheter Line            PICC Line 26/31/25 Left Basilic 8 days          Peripheral Intravenous Line            Peripheral IV 10/23/19 Left Antecubital less than 1 day          Drain            Rectal Tube With balloon 14 days    NG/OG/Enteral Tube Nasogastric Left nares 1 day                Physical Exam:     GEN: NAD  HEENT: MMM  CV: RRR  Lung: normal effort  Ab: Soft, NT/ND  Extrem: No CCE  Neuro:  A+Ox3, motor and sensation grossly intact  Pulse exam: R doppl radial/ulnar/palmar arch    Lab, Imaging and other studies:  CBC:   Lab Results   Component Value Date    WBC 15 13 (H) 10/23/2019    HGB 9 4 (L) 10/23/2019    HCT 31 6 (L) 10/23/2019     (H) 10/23/2019     (H) 10/23/2019    MCH 30 9 10/23/2019    MCHC 29 7 (L) 10/23/2019    RDW 16 1 (H) 10/23/2019    MPV 9 6 10/23/2019    NRBC 0 10/23/2019   , CMP: Lab Results   Component Value Date    SODIUM 147 (H) 10/22/2019    K 3 9 10/22/2019     (H) 10/22/2019    CO2 36 (H) 10/22/2019    BUN 26 (H) 10/22/2019    CREATININE 0 57 (L) 10/22/2019    CALCIUM 6 8 (L) 10/22/2019    EGFR 87 10/22/2019   , Coagulation:   Lab Results   Component Value Date    INR 1 29 (H) 10/22/2019   , Urinalysis:   Lab Results   Component Value Date    COLORU Yellow 10/22/2019    CLARITYU Cloudy 10/22/2019    SPECGRAV 1 013 10/22/2019    PHUR 5 0 10/22/2019    LEUKOCYTESUR (A) 10/22/2019     Elevated glucose may cause decreased leukocyte values   See urine microscopic for [facility] result/    NITRITE Negative 10/22/2019    GLUCOSEU >=1000 (1%) (A) 10/22/2019    KETONESU Negative 10/22/2019    BILIRUBINUR Negative 10/22/2019    BLOODU Moderate (A) 10/22/2019   , Amylase: No results found for: AMYLASE, Lipase: No results found for: LIPASE  VTE Pharmacologic Prophylaxis: Heparin  VTE Mechanical Prophylaxis: sequential compression device

## 2019-10-23 NOTE — ASSESSMENT & PLAN NOTE
· Patient with 1 episode of rectal bleeding  Was evaluated by GI no evidence of further bleeding  Status post 1 unit of packed RBCs    GI evaluation appreciated  · No plan for any colonoscopy or EGD  · Okay for tube feeds

## 2019-10-23 NOTE — CONSULTS
Consultation - Cardiology Team One  Izabel Garcia 82 y o  female MRN: [id number]  Unit/Bed#: [facility] 530-01 Encounter: [id number]    Inpatient consult to Cardiology  Consult performed by: Onel Sapp DO  Consult ordered by: Radha Burns MD          Inpatient consult to Cardiology     Performed by  Onel Sapp DO     Authorized by Radha Burns MD            Physician Requesting Consult: Radha Burns MD  Reason for Consult / Principal Problem: Bradycardia     Assessment/ Plan    1  Paroxymal atrial fibrillation/bradycardia-   Reviewed telemetry showing NSR with episodes of bradycardia yesterday   Patient on metoprolol 2 5 mg IV q 6 hours but held since yesterday afternoon due to bradycardia, will D/C metoprolol at this time most likely cause of bradycardia, timing coincides   On heparin gtt for anticoagulation   Echocardiogram 10/10/19 showed EF 63%, mild MR, moderate TR  TSH 4 12    2  Hypokalemia- 3 2  Replete, optimize electrolytes     3  Lethargy- neurology following   Altered mental status secondary to multifactorial toxic, endocrinologic and metabolic processes  4  IJ vein thromboembolism- followed by vascular surgery   On heparin gtt   No surgical intervention per vascular surgery     5  Rectal bleeding- followed by GI   S/p 1 unit PRBCs  H/H stable     6  Hypertension- 102/54 average BP, continue to monitor    7  Iatrogenic volume overload-   Albumin 2 0    History of Present Illness   HPI: Izabel Garcia is a 82y o  year old female who has a history of paroxysmal atrial fibrillation, hypertension, hyperlipidemia, stage IB ovarian cancer s/p staging 10/4/19, PEA arrest 10/7/19  She follows with cardiologist Dr Cardoza  Patient was originally admitted [facility] 10/4/19 for total laparoscopic hysterectomy and exploratory laparotomy due to incidental finding of ovarian mass  Her postoperative course was complicated by acute respiratory failure on October 7, 2019   She was transferred ICU at this time  Later that day she was found to be aspirating and became acutely unresponsive  Code blue was initiated and after 2 rounds of ACLS she achieved ROSC  She was intubated at this time and extubated the following day  Cardiology was consulted for new onset of atrial fibrillation in the setting of respiratory failure  She converted to NSR after initiation of cardizem CD  She was also started on eliquis for [facility]  She was started on IV diuretics for iatrogenic volume overload  She was discharged to [facility] 10/18/19 in stable condition  She was readmitted 10/22/19 from LTAC due to lethargy and unresponsiveness  CT of the head was performed and is negative, chest x-ray reveals bilateral airspace opacities and pleural effusions  Vascular surgery  has been consulted due to recent diagnosis of right IJ DVT and right upper extremity SVT as well as right hand cyanosis  CTA showed no significant flow in radial or ulnar arteries and good flow seen through the subclavian, axillary and brachial artery down to the antecubital fossa  No indication for intervention at this time per vascular surgery and started on heparin gtt  Pulmonary consulted for acute hypoxic and hypercapnic respiratory failure  Patient on IV antibiotic for now  GI consulted for concern for possible GI bleed  She was transfused 1 unit of PRBC  H/H has been stable  Neurology consulted from [facility] believed to be secondary to multifactorial toxic, endocrinologic and metabolic processes  Cardiology consulted this afternoon for bradycardia  Yesterday patient received metoprolol 2 5 mg IV before having bradycardic event down into the mid 30s  Heart rate did improve          Telemetry reviewed personally: Normal sinus rhythm rate between 60-80 on telemetry    ROS:  Unable to obtain due to patient's unresponsiveness/cooperation    Historical Information   Past Medical History:   Diagnosis Date    Anxiety     Arthritis     Constipation     Diabetes mellitus ([facility] [address] )     IDDM    Frequent UTI     Glaucoma     Hearing loss     Hyperlipidemia     Hypertension     Hyperthyroidism     in past    Hyponatremia     Hypothyroid     Neuropathy     bles    Right tubo-ovarian mass     Wears glasses      Past Surgical History:   Procedure Laterality Date    APPENDECTOMY  1956    CATARACT EXTRACTION Bilateral     COLONOSCOPY  09/2014    Completed - Dr Joi Haq, due in 5 years    HYSTERECTOMY N/A 10/4/2019    Procedure: LAP TOTAL HYSTERECTOMY, BSO;  Surgeon: Dano Brown MD;  Location: [facility];  Service: Gynecology Oncology    LAPAROTOMY N/A 10/4/2019    Procedure: EXPLORATORY LAPAROTOMY  EXLAP OMENTECTOMY  PELVIC AND DEANGELO-AORTIC LYMPH NODE DISSECTION  PERITONEAL BIOPSY TRANS ABDOMINUS BLOCK;  Surgeon: Dano Brown MD;  Location: [facility];  Service: Gynecology Oncology    SKIN LESION EXCISION      Ears     Social History     Substance and Sexual Activity   Alcohol Use Never    Frequency: Monthly or less    Drinks per session: 1 or 2    Binge frequency: Never    Comment: wine     Social History     Substance and Sexual Activity   Drug Use No     Social History     Tobacco Use   Smoking Status Never Smoker   Smokeless Tobacco Never Used     Family History: non-contributory    Meds/Allergies   all current active meds have been reviewed    dextrose 5 % and sodium chloride 0 45 % 50 mL/hr Last Rate: 50 mL/hr (10/23/19 0554)   heparin (porcine) 3-30 Units/kg/hr (Order-Specific) Last Rate: 15 Units/kg/hr (10/23/19 1224)   insulin regular (HumuLIN R,NovoLIN R) infusion 0 3-21 Units/hr Last Rate: 0 5 Units/hr (10/23/19 1017)       Allergies   Allergen Reactions    Thiazide-Type Diuretics      Hyponatremia       Objective   Vitals: Blood pressure 95/56, pulse 82, temperature 97 9 °F (36 6 °C), temperature source Axillary, resp   rate 22, height 5' 5" (1 651 m), weight 66 7 kg (147 lb 0 8 oz), SpO2 92 %, not currently breastfeeding ,     Body mass index is 24 47 kg/m²  ,     Systolic (56FYN), ABT:268 , Min:88 , HANSEL:483     Diastolic (34WKF), NZP:04, Min:46, Max:68            Intake/Output Summary (Last 24 hours) at 10/23/2019 1546  Last data filed at 10/23/2019 1003  Gross per 24 hour   Intake 1935 36 ml   Output 2122 ml   Net -184 64 ml     Weight (last 2 days)     Date/Time   Weight    10/23/19 0544   66 7 (147 05)    10/21/19 1951   62 9 (138 67)            Invasive Devices     Peripherally Inserted Central Catheter Line            PICC Line 74/94/47 Left Basilic 9 days          Peripheral Intravenous Line            Peripheral IV 10/23/19 Left Antecubital less than 1 day          Drain            Rectal Tube With balloon 15 days    External Urinary Catheter less than 1 day    NG/OG/Enteral Tube Nasogastric 8 Fr Right nares less than 1 day                  Physical Exam   Constitutional: No distress  HENT:   Head: Normocephalic and atraumatic  Feeding tube in place   Eyes: Pupils are equal, round, and reactive to light  EOM are normal  No scleral icterus  Neck: Normal range of motion  Neck supple  Cardiovascular: Normal rate, regular rhythm, normal heart sounds and intact distal pulses  Exam reveals no gallop and no friction rub  No murmur heard  Pulmonary/Chest: Effort normal    Course breath sounds bilaterally   Abdominal: Soft  Bowel sounds are normal  She exhibits no distension  Musculoskeletal:   Anasarca extremities   Skin: Skin is warm  Capillary refill takes less than 2 seconds           LABORATORY RESULTS:  Results from last 7 days   Lab Units 10/21/19  2030   TROPONIN I ng/mL 0 03     CBC with diff: Results from last 7 days   Lab Units 10/23/19  1341 10/23/19  0450 10/22/19  2122 10/22/19  0950 10/22/19  0601 10/21/19  2030 10/18/19  0430 10/17/19  0547   WBC Thousand/uL  --  15 13*  --  15 06* 14 83* 13 69* 22 53* 24 61*   HEMOGLOBIN g/dL 9 1* 9 4* 8 4* 8 2* 8 5* 9 0* 9 1* 9 0*   HEMATOCRIT % 29 5* 31 6* 28 0* 28 1* 28 5* 29 9* 29 6* 30 0*   MCV fL  --  104*  --  109* 108* 105* 104* 104*   PLATELETS Thousands/uL  --  515*  --  584* 613* 635* 531* 487*   MCH pg  --  30 9  --  31 7 32 2 31 5 31 8 31 3   MCHC g/dL  --  29 7*  --  29 2* 29 8* 30 1* 30 7* 30 0*   RDW %  --  16 1*  --  14 9 14 9 14 6 14 0 14 0   MPV fL  --  9 6  --  9 5 9 7 9 9 10 2 10 4   NRBC AUTO /100 WBCs  --  0  --   --  0 0 0 0       CMP:  Results from last 7 days   Lab Units 10/23/19  0450 10/22/19  0601 10/21/19  2030 10/18/19  0430 10/17/19  0546   POTASSIUM mmol/L 3 2* 3 9 3 4* 3 4* 3 9   CHLORIDE mmol/L 106 109* 105 99* 102   CO2 mmol/L 34* 36* 36* 37* 36*   BUN mg/dL 21 26* 26* 27* 25   CREATININE mg/dL 0 45* 0 57* 0 61 0 68 0 72   CALCIUM mg/dL 6 3* 6 8* 7 1* 6 7* 6 7*   AST U/L  --   --  27  --   --    ALT U/L  --   --  34  --   --    ALK PHOS U/L  --   --  100  --   --    EGFR ml/min/1 73sq m 94 87 85 82 78       BMP:  Results from last 7 days   Lab Units 10/23/19  0450 10/22/19  0601 10/21/19  2030 10/18/19  0430 10/17/19  0546   POTASSIUM mmol/L 3 2* 3 9 3 4* 3 4* 3 9   CHLORIDE mmol/L 106 109* 105 99* 102   CO2 mmol/L 34* 36* 36* 37* 36*   BUN mg/dL 21 26* 26* 27* 25   CREATININE mg/dL 0 45* 0 57* 0 61 0 68 0 72   CALCIUM mg/dL 6 3* 6 8* 7 1* 6 7* 6 7*          Lab Results   Component Value Date    NTBN 3,747 (H) 10/12/2019            Results from last 7 days   Lab Units 10/22/19  0601   MAGNESIUM mg/dL 2 3          Results from last 7 days   Lab Units 10/22/19  0158   HEMOGLOBIN A1C % 6 4*        Results from last 7 days   Lab Units 10/22/19  0601   TSH 3RD GENERATON uIU/mL 4 120*       Results from last 7 days   Lab Units 10/22/19  0950 10/21/19  2030   INR  1 29* 1 55*     Lipid Profile:   Lab Results   Component Value Date    CHOL 183 01/08/2016    CHOL 155 08/14/2015     Lab Results   Component Value Date    HDL 93 (H) 09/25/2019    HDL 84 (H) 06/21/2019    HDL 94 (H) 03/12/2019     Lab Results   Component Value Date    LDLCALC 87 09/25/2019    LDLCALC 82 2019    LDLCALC 91 2019     Lab Results   Component Value Date    TRIG 52 2019    TRIG 41 2019    TRIG 69 2019         Cardiac testing:   Results for orders placed during the hospital encounter of 10/04/19   Echo complete with contrast if indicated    Narrative [address]  [place], [address]  [phone]    Transthoracic Echocardiogram  2D, M-mode, Doppler, and Color Doppler    Study date:  10-Oct-2019    Patient: Karsten Jay  MR number: [id number]  Account number: [de-identified]  : 1935  Age: 82 years  Gender: Female  Status: Inpatient  Location: Bedside  Height: 65 in  Weight: 149 6 lb  BP: 135/ 57 mmHg    Indications: Atrial fibrillation    Diagnoses: I48 0 - Atrial fibrillation    Sonographer:  RONALDO Arce  Primary Physician:  Racquel Dumont MD  Referring Physician:  [place]:  [facility]  Interpreting Physician:  Dottie Richter MD    IMPRESSIONS:  Technical quality: Good  Cardiac rhythm: Sinus tachycardia    1  Normal left ventricular size, prominent basal interventricular septum, normal left ventricular systolic function with hyperdynamic wall motion  Normal diastolic function  EF around 63%  2  No other significant chamber hypertrophy or enlargement  3  Aortic valve sclerosis, no aortic valve stenosis or regurgitation  5  Mild mitral valve regurgitation  5  Moderate tricuspid valve regurgitation  6  Suspected moderate pulmonary hypertension  Estimated RVSP/PASP is 52 mmHg  7  No pericardial effusion  Compared to report of previous echocardiogram from 2015 pulmonary hypertension and tricuspid valve regurgitation are new, otherwise no significant change  SUMMARY    LEFT VENTRICLE:  Normal left ventricular cavity size, normal wall thickness, normal left ventricular systolic function with hyperdynamic wall motion   Ejection fraction is estimated as around 62%  Normal diastolic function  RIGHT VENTRICLE:  Normal right ventricular size and systolic function  Estimated right ventricular systolic pressure moderately increased, 52 mmHg  Moderate pulmonary hypertension  LEFT ATRIUM:  Normal left atrial cavity size  Intact interatrial septum  RIGHT ATRIUM:  Normal right atrial cavity size  MITRAL VALVE:  Mild mitral valve leaflet sclerosis, adequate leaflet mobility  Mild mitral valve regurgitation with posteriorly directed jet  AORTIC VALVE:  Tricuspid aortic valve with sclerosis, adequate cuspal separation  No aortic valve stenosis or regurgitation  TRICUSPID VALVE:  Moderate tricuspid valve regurgitation  PULMONIC VALVE:  No significant pulmonic valve regurgitation  AORTA:  Aortic root and proximal ascending aorta are normal in size on 2D imaging  IVC, HEPATIC VEINS:  Inferior vena cava is normal in size and demonstrates appropriate respiratory phasic changes in diameter  PERICARDIUM:  No pericardial effusion  Suspected moderate to large pleural effusion with organized material in pleural cavity  HISTORY: PRIOR HISTORY: Risk factors: hypertension, diabetes, and hypercholesterolemia  PROCEDURE: The procedure was performed at the bedside  This was a routine study  The transthoracic approach was used  The study included complete 2D imaging, M-mode, complete spectral Doppler, and color Doppler  The heart rate was 97 bpm,  at the start of the study  Images were obtained from the parasternal, apical, subcostal, and suprasternal notch acoustic windows  Image quality was adequate  LEFT VENTRICLE: Normal left ventricular cavity size, normal wall thickness, normal left ventricular systolic function with hyperdynamic wall motion  Ejection fraction is estimated as around 62%  Normal diastolic function  RIGHT VENTRICLE: Normal right ventricular size and systolic function   Estimated right ventricular systolic pressure moderately increased, 52 mmHg  Moderate pulmonary hypertension  LEFT ATRIUM: Normal left atrial cavity size  Intact interatrial septum  RIGHT ATRIUM: Normal right atrial cavity size  MITRAL VALVE: Mild mitral valve leaflet sclerosis, adequate leaflet mobility  Mild mitral valve regurgitation with posteriorly directed jet  AORTIC VALVE: Tricuspid aortic valve with sclerosis, adequate cuspal separation  No aortic valve stenosis or regurgitation  TRICUSPID VALVE: Moderate tricuspid valve regurgitation  PULMONIC VALVE: No significant pulmonic valve regurgitation  PERICARDIUM: No pericardial effusion  Suspected moderate to large pleural effusion with organized material in pleural cavity  AORTA: Aortic root and proximal ascending aorta are normal in size on 2D imaging  SYSTEMIC VEINS: IVC: Inferior vena cava is normal in size and demonstrates appropriate respiratory phasic changes in diameter      SYSTEM MEASUREMENT TABLES    2D  %FS: 33 3 %  AVC: 300 9 ms  Ao Diam: 2 9 cm  EDV(Teich): 58 2 ml  EF(Teich): 62 9 %  ESV(Teich): 21 6 ml  HR_2Ch_Q: 101 4 BPM  HR_4Ch_Q: 99 4 BPM  IVSd: 0 8 cm  LA Diam: 3 4 cm  LAAs A4C: 15 4 cm2  LAESV A-L A4C: 44 7 ml  LAESV MOD A4C: 41 2 ml  LALs A4C: 4 5 cm  LVCO_2Ch_Q: 4 5 L/min  LVCO_4Ch_Q: 4 7 L/min  LVCO_BiP_Q: 4 6 L/min  LVEDV MOD A4C: 62 3 ml  LVEF MOD A4C: 67 1 %  LVEF_2Ch_Q: 53 2 %  LVEF_4Ch_Q: 66 %  LVEF_BiP_Q: 61 5 %  LVESV MOD A4C: 20 5 ml  LVIDd: 3 7 cm  LVIDs: 2 5 cm  LVLd A4C: 6 7 cm  LVLd_2Ch_Q: 6 7 cm  LVLd_4Ch_Q: 7 2 cm  LVLs A4C: 5 7 cm  LVLs_2Ch_Q: 5 7 cm  LVLs_4Ch_Q: 5 8 cm  LVPWd: 0 8 cm  LVSV_2Ch_Q: 44 4 ml  LVSV_4Ch_Q: 47 1 ml  LVSV_BiP_Q: 49 9 ml  LVVED_2Ch_Q: 83 3 ml  LVVED_4Ch_Q: 71 3 ml  LVVED_BiP_Q: 81 1 ml  LVVES_2Ch_Q: 39 ml  LVVES_4Ch_Q: 24 3 ml  LVVES_BiP_Q: 31 2 ml  RA Area: 11 7 cm2  RVIDd: 2 9 cm  RWT: 0 4  SV MOD A4C: 41 8 ml  SV(Teich): 36 6 ml    CW  TR Vmax: 3 2 m/s  TR maxP 6 mmHg    MM  TAPSE: 2 5 cm    PW  E' Av 1 m/s  E' Lat: 0 1 m/s  E' Sept: 0 1 m/s  E/E' Av 6  E/E' Lat: 6 5  E/E' Sept: 6 8  MV A Robin: 0 9 m/s  MV E Robin: 0 7 m/s  MV E/A Ratio: 0 7    [facility] Accredited Echocardiography Laboratory    Prepared and electronically signed by    Amaya Ribera MD  Signed 10-Oct-2019 15:19:26       No results found for this or any previous visit  No procedure found  No results found for this or any previous visit  Imaging: I have personally reviewed pertinent reports  Ct Chest Abdomen Pelvis Wo Contrast    Addendum Date: 10/8/2019 Addendum:   ADDENDUM: Dense perihilar opacities may be due to aspiration and/or ARDS  Result Date: 10/8/2019  Narrative: CT CHEST, ABDOMEN AND PELVIS WITHOUT IV CONTRAST INDICATION:   Sepsis  COMPARISON:  CT of the chest on 10/7/2019  CT of abdomen pelvis on 2019  TECHNIQUE: CT examination of the chest, abdomen and pelvis was performed without intravenous contrast   Axial, sagittal, and coronal 2D reformatted images were created from the source data and submitted for interpretation  Radiation dose length product (DLP) for this visit:  607 mGy-cm   This examination, like all CT scans performed in the [place], was performed utilizing techniques to minimize radiation dose exposure, including the use of iterative reconstruction and automated exposure control  Enteric contrast was not administered  FINDINGS: CHEST LUNGS:  There is endotracheal tube which terminates above the aaron  There is interval development of dense perihilar opacities more severe within the left greater than right lower lobes  The central airways are patent  PLEURA:  Bilateral pleural effusions similar prior examination  HEART/GREAT VESSELS:  Coronary artery calcifications  No pericardial effusion  Right-sided central venous catheter terminating within the superior vena cava  MEDIASTINUM AND MYRNA:  Unremarkable   CHEST WALL AND LOWER NECK:   Persistent anterior chest wall subcutaneous emphysema which may be postsurgical  ABDOMEN LIVER/BILIARY TREE:  Unremarkable  GALLBLADDER:  Hyperdensity within the gallbladder may be due to vicarious excretion of contrast material  SPLEEN:  Unremarkable  PANCREAS:  Unremarkable  ADRENAL GLANDS:  Unremarkable  KIDNEYS/URETERS:  Persistent bilateral nephrograms may be seen in setting of acute tubular necrosis  No hydronephrosis  STOMACH AND BOWEL: Evaluation limited due to lack of oral and enteric contrast   Enteric tube is coiled within the stomach with its tip in the distal stomach  There are multiple dilated loops of small bowel measuring up to 4 cm in diameter with scattered areas of long segment fecalization  No definite abrupt transition point is seen  Gas is seen within the colon  APPENDIX:  No findings to suggest appendicitis  ABDOMINOPELVIC CAVITY:  Near resolution of previously seen ascites  There is an high density collection within the pelvis measuring 7 6 x 5 1 x 7 4 cm (series 2, image 94) compatible with hematoma  3 4 x 1 6 cm low-density collection along the left external iliac vessels may reflect a small lymphocele  VESSELS:  Mild atherosclerotic calcifications  PELVIS REPRODUCTIVE ORGANS:  Status post hysterectomy  URINARY BLADDER:  Bladder is collapsed about a Ji catheter and contains a small amount of excreted contrast  ABDOMINAL WALL/INGUINAL REGIONS:  Diffuse subcutaneous edema  Scattered subcutaneous emphysema is seen which may be postsurgical  OSSEOUS STRUCTURES:  No acute fracture or destructive osseous lesion  Degenerative changes of the osseous structures  Impression: 1  Interval development of dense perihilar opacities most severe within the left greater than right lower lobes most compatible with aspiration in the setting of intubation  Bilateral pleural effusions similar prior examination  2   Persistent bilateral nephrograms which may be seen in the setting of acute tubular necrosis   3   Multiple dilated loops of small bowel measuring up to 4 cm in diameter with scattered areas of long segment fecalization  No definite abrupt transition point is seen however evaluation is limited due to lack of oral and enteric contrast   Gas is seen within the colon  Findings may be due to severe ileus versus partial bowel obstruction  4   High density collection within the pelvis measuring 7 6 x 5 1 x 7 4 cm compatible with hematoma  5   3 4 x 1 6 cm low density collection along the left external iliac vessels may reflect a small lymphocele  The study was marked in [facility] for immediate notification  Workstation performed: [id number]     Xr Chest Portable    Result Date: 10/23/2019  Narrative: CHEST INDICATION:  Hypoxia  COMPARISON:  10/21/2019, CT chest, abdomen and pelvis 10/13/2019 EXAM PERFORMED/VIEWS:  XR CHEST PORTABLE FINDINGS:  Left-sided PICC line tip terminates in the region of the SVC  Enteric tube has been removed  Cardiomediastinal silhouette appears stable  Bilateral airspace disease, grossly unchanged  Suspected bilateral pleural effusions  Mild biapical pleural thickening  No pneumothorax  Osseous structures appear within normal limits for patient age  Impression: Stable bilateral airspace disease  Workstation performed: [id number]     Xr Chest 1 View Portable    Result Date: 10/22/2019  Narrative: CHEST INDICATION:   bradycardia  COMPARISON:  10/18/2019 EXAM PERFORMED/VIEWS:  XR CHEST PORTABLE FINDINGS:  Lines and tubes are unchanged from prior exam  Cardiomediastinal silhouette appears unremarkable  Minimal improvement in bilateral airspace opacities  Small pleural effusions  Osseous structures appear within normal limits for patient age  Impression: Minimal improvement in bilateral airspace opacities  Small pleural effusions  Workstation performed: [id number]     Xr Chest Portable    Result Date: 10/17/2019  Narrative: CHEST INDICATION:   post bronch   COMPARISON:  10/16/2019 (901) EXAM PERFORMED/VIEWS: XR CHEST PORTABLE FINDINGS:  Endogastric tube and left-sided central catheter appear stable from prior  Cardiomediastinal silhouette is stable as well  There is some fullness of the right hilum  Moderately extensive bilateral pulmonary consolidations are seen  Localized irregular dense opacity in the right mid to upper lung field is stable  Right paramediastinal opacity in the upper chest is stable  Diffuse hazy infiltrate throughout much of the left lung is stable  No gross evidence of pleural fluid or pneumothorax or mediastinal shift  Osseous structures appear within normal limits for patient age  Impression: Stable appearance of diffuse infiltrates and line and tube Workstation performed: [id number]     Xr Chest Portable    Result Date: 10/16/2019  Narrative: CHEST INDICATION:   pulm edema  COMPARISON:  10/14/2019 EXAM PERFORMED/VIEWS:  XR CHEST PORTABLE FINDINGS:  Left PICC line tip overlies the SVC  Nasogastric tube tip overlies the stomach  Cardiomediastinal silhouette appears unremarkable  Increasing bilateral pulmonary opacities noted likely representing pulmonary edema noted  No pneumothorax or pleural effusion  Osseous structures appear within normal limits for patient age  Impression: Interval increase in bilateral pulmonary opacities likely representing pulmonary edema  Workstation performed: [id number]     Xr Chest Portable    Result Date: 10/14/2019  Narrative: CHEST INDICATION:   hypoxia  COMPARISON:  CT from the day before  EXAM PERFORMED/VIEWS:  XR CHEST PORTABLE FINDINGS:  The feeding tube is coursing below left hemidiaphragm with its tip not visualized  Cardiomediastinal silhouette appears unremarkable  Patchy airspace disease bilaterally, overall mildly improved since recent  radiograph allowing for different technique  Layering effusions are present  Osseous structures appear within normal limits for patient age       Impression: Feeding tube coursing below left hemidiaphragm with tip not visualized  Haziness of the lungs bilaterally appears overall mildly improved since recent CT allowing for differences in technique  Workstation performed: [id number]     Xr Chest Portable    Result Date: 10/13/2019  Narrative: CHEST INDICATION:   RESP FAILURE  COMPARISON:  10/12/2019 EXAM PERFORMED/VIEWS:  XR CHEST PORTABLE FINDINGS: Cardiomediastinal silhouette appears unremarkable  Persistent patchy airspace opacities bilaterally, right greater than left  Osseous structures appear within normal limits for patient age  Impression: Right greater than left patchy airspace opacities remain stable and are again concerning for infiltrates  Workstation performed: [id number]     Xr Chest Portable    Result Date: 10/12/2019  Narrative: CHEST INDICATION:   pneumonia  COMPARISON:  10/11/2019 EXAM PERFORMED/VIEWS:  XR CHEST PORTABLE FINDINGS: Cardiomediastinal silhouette appears unremarkable  Diffuse mid to lower lung zone interstitial and airspace infiltrates in the left lung appears stable  Moderate interval worsening of airspace consolidation in the right parahilar region with infiltrates also increasing in the right upper lobe and lung base  No pneumothorax  No pleural effusion  Osseous structures appear within normal limits for patient age  Impression: Worsening infiltrates on the right  Stable infiltrates on the left  The study was marked in [facility] for immediate notification  Workstation performed: [id number]     Xr Chest Portable    Result Date: 10/11/2019  Narrative: CHEST INDICATION:  Follow-up infiltrates, extubation  COMPARISON:  10/9/2019, CT chest, abdomen and pelvis 10/8/2019 EXAM PERFORMED/VIEWS:  XR CHEST PORTABLE FINDINGS:  Life support lines and tubes have been removed (including ET and enteric tubes as well as right IJ central line)  No pneumothorax  Cardiomediastinal silhouette appears stable   Patchy diffuse bilateral pulmonary airspace opacities appear stable or slightly improved with the exception of perhaps slight progression in the left upper lung zone  Suspected small pleural effusions  Mild apical pleural thickening, left greater than right  Osseous structures appear within normal limits for patient age  Impression: Lines and tubes as above  No pneumothorax  Patchy diffuse bilateral pulmonary airspace opacities appear stable or slightly improved with the exception of perhaps slight progression in the left upper lung zone  Workstation performed: [id number]     Xr Chest Portable    Result Date: 10/9/2019  Narrative: CHEST INDICATION:   Ventilator dependent respiratory failure  COMPARISON:  10/7/2019 EXAM PERFORMED/VIEWS:  XR CHEST PORTABLE FINDINGS:  Endotracheal tube tip is approximately 1 8 cm above the aaron, central line tip located within the region of the cavoatrial junction and with nasogastric tube extending below left hemidiaphragm  The cardiac silhouette is without change from the prior study  Multifocal airspace opacities are once again identified, without a significant interval change  No pneumothorax  Osseous structures appear within normal limits for patient age  Impression: Endotracheal tube tip 1 8 cm above the aaron Continued multifocal airspace opacities, without significant change since 10/7/2019 Workstation performed: [id number]     Xr Chest Portable    Result Date: 10/8/2019  Narrative: CHEST INDICATION:   hypoxia  COMPARISON:  10/7/2019 EXAM PERFORMED/VIEWS:  XR CHEST PORTABLE  AP semierect Images: 1 FINDINGS:  Endotracheal tube is present, in satisfactory position with its tip above the level of the aaron  Enteric tube is present with its tip extending below the left hemidiaphragm  Right IJ catheter terminates at the caval atrial junction  No pneumothorax  Cardiomediastinal silhouette appears unremarkable  Persistent bilateral airspace opacities which appears more consolidative in the left lower lobe concerning for aspiration  No large pleural effusions or pneumothorax  Osseous structures appear within normal limits for patient age  Impression: 1  Lines and tubes stable  2   Persistent bilateral airspace opacities more consolidative in the left lower lobe, likely infectious versus atypical edema  Workstation performed: [id number]     Xr Abdomen 1 View Kub    Result Date: 10/22/2019  Narrative: ABDOMEN INDICATION:   Evaluate for ileus  COMPARISON:  CT 10/13/2019 VIEWS:  AP supine FINDINGS: No evidence of suggest small bowel obstruction There is a nasogastric tube with its tip in the distal stomach  Questionable radiopaque density noted within the stomach, correlate for any recent contrast administration for medication There is a large amount of stool identified within the ascending colon and rectum, with a rectal diameter of approximately 10 7 cm  Lung bases are not included on the field-of-view Visualized osseous structures are unremarkable for the patient's age  Impression: There is a large amount of stool in the ascending colon and rectum  Workstation performed: [id number]     Xr Abdomen 1 View Kub    Result Date: 10/8/2019  Narrative: OBSTRUCTION SERIES INDICATION:   abdominal distention  COMPARISON:  CT scan 8/30/2019  EXAM PERFORMED/VIEWS:  XR ABDOMEN 1 VIEW KUB FINDINGS: There is marked distention of the stomach  Bowel gas pattern is otherwise unremarkable  No free air beneath the hemidiaphragms  There is bilateral renal contrast excretion related to CT PE study performed earlier same day  Osseous structures are unremarkable  Examination of the chest reveals a normal cardiomediastinal silhouette  Lungs are clear  Impression: Marked gastric distention  Workstation performed: [id number]     Ct Head Without Contrast    Result Date: 10/21/2019  Narrative: CT BRAIN - WITHOUT CONTRAST INDICATION:   ams  COMPARISON:  CT head 10/13/2019  TECHNIQUE:  CT examination of the brain was performed    In addition to axial images, coronal 2D reformatted images were created and submitted for interpretation  Radiation dose length product (DLP) for this visit:  946 6 mGy-cm   This examination, like all CT scans performed in the [facility], was performed utilizing techniques to minimize radiation dose exposure, including the use of iterative reconstruction and automated exposure control  IMAGE QUALITY:  Diagnostic  FINDINGS: PARENCHYMA:  No intracranial mass, mass effect or midline shift  No CT signs of acute infarction  No acute parenchymal hemorrhage  VENTRICLES AND EXTRA-AXIAL SPACES:  Normal for the patient's age  VISUALIZED ORBITS AND PARANASAL SINUSES:  Unremarkable  CALVARIUM AND EXTRACRANIAL SOFT TISSUES:  Stable bilateral mastoid effusions  No evidence of erosive mastoiditis  Impression: No acute intracranial abnormality  Workstation performed: [id number]     Ct Head Wo Contrast    Result Date: 10/13/2019  Narrative: CT BRAIN - WITHOUT CONTRAST INDICATION:   Altered mental status  COMPARISON:  10/7/2019 and 8/30/2019 TECHNIQUE:  CT examination of the brain was performed  In addition to axial images, coronal 2D reformatted images were created and submitted for interpretation  Radiation dose length product (DLP) for this visit:  864 mGy-cm   This examination, like all CT scans performed in the [facility], was performed utilizing techniques to minimize radiation dose exposure, including the use of iterative reconstruction and automated exposure control  IMAGE QUALITY:  Diagnostic  FINDINGS: PARENCHYMA:  Mild to moderate patchy periventricular and subcortical white matter hypodensities consistent with chronic microangiopathic changes  Parenchyma appears unchanged from prior  No acute infarct  No parenchymal hemorrhage  No mass  VENTRICLES AND EXTRA-AXIAL SPACES:  Normal for the patient's age  VISUALIZED ORBITS AND PARANASAL SINUSES:  Bilateral mastoid effusions  Paranasal sinuses are clear  Prior cataract surgeries without other abnormalities of the orbits  CALVARIUM AND EXTRACRANIAL SOFT TISSUES:  Normal      Impression: Bilateral mastoid effusions  No findings of erosive mastoiditis  Correlate for retroauricular pain  No acute intracranial findings  Workstation performed: [id number]     Ct Head Wo Contrast    Result Date: 10/7/2019  Narrative: CT BRAIN - WITHOUT CONTRAST INDICATION:   Cerebral edema  Status post code  Status post IVC filter placement earlier today  COMPARISON:  None  TECHNIQUE:  CT examination of the brain was performed  In addition to axial images, coronal 2D reformatted images were created and submitted for interpretation  Radiation dose length product (DLP) for this visit:  921 mGy-cm   This examination, like all CT scans performed in the [facility], was performed utilizing techniques to minimize radiation dose exposure, including the use of iterative reconstruction and automated exposure control  IMAGE QUALITY:  Diagnostic  FINDINGS: PARENCHYMA:  No intracranial mass, mass effect or midline shift  No CT signs of acute infarction  No acute parenchymal hemorrhage  Atherosclerotic calcifications noted  VENTRICLES AND EXTRA-AXIAL SPACES:  Normal for the patient's age  VISUALIZED ORBITS AND PARANASAL SINUSES:  Small air-fluid level in left maxillary sinus noted  Bilateral lens implants noted  Endotracheal tube noted on the  view  CALVARIUM AND EXTRACRANIAL SOFT TISSUES:  Normal      Impression: No acute intracranial hemorrhage, mass effect or edema  Workstation performed: [id number]     Ct Soft Tissue Neck W Contrast    Result Date: 10/13/2019  Narrative: CT NECK WITH CONTRAST INDICATION:   No clinical information provided  Technologist reports history of hypoxia and possible aspiration  COMPARISON:  CT of the chest, abdomen, and pelvis dated 10/18/2019   TECHNIQUE:  Axial, sagittal, and coronal 2D reformatted images were created from the axial source data and submitted for interpretation  Radiation dose length product (DLP) for this visit:  181 mGy-cm   This examination, like all CT scans performed in the [facility], was performed utilizing techniques to minimize radiation dose exposure, including the use of iterative reconstruction and automated exposure control  IV Contrast:  100 mL of iohexol (OMNIPAQUE) IMAGE QUALITY:  Diagnostic  FINDINGS: VISUALIZED BRAIN PARENCHYMA:  No acute intracranial pathology of the visualized brain parenchyma  VISUALIZED PARANASAL SINUSES:  Visualized paranasal sinuses are clear  Bilateral mastoid effusions without osseous erosion  NASAL CAVITY AND NASOPHARYNX:  Normal  SUPRAHYOID NECK:  Normal oral cavity, tongue base, tonsillar fossa and epiglottis  INFRAHYOID NECK:  Aryepiglottic folds and piriform sinuses are normal   Normal glottis and subglottic airway  THYROID GLAND:  Unremarkable  PAROTID AND SUBMANDIBULAR GLANDS:  Normal  LYMPH NODES:  No pathologic or enlarged adenopathy  VASCULAR STRUCTURES:  Minimal carotid atherosclerotic disease at the left bulb without significant stenoses  Peripheral filling defect within the right common jugular vein (series 15, image 48)  THORACIC INLET:  Patchy groundglass opacities at the lung apices  BONY STRUCTURES: Osteopenia  No acute fracture or destructive osseous lesion  Impression: 1  Small, peripheral, nonocclusive thrombus in the right common jugular vein  Previously seen right IJ catheter is no longer present  2   Bilateral mastoid effusions without evidence for erosive mastoiditis  Correlate for retroauricular pain  3   Ground glass opacities at the lung apices could represent pulmonary edema or atypical infection  The study was marked in EPIC for significant notification   Workstation performed: [id number]     Cta Upper Extremity Right W Wo Contrast    Result Date: 10/15/2019  Narrative: CT ANGIOGRAM OF THE RIGHT UPPER EXTREMITY WITH AND WITHOUT IV CONTRAST INDICATION:  Right upper extremity edema/pallor  Bluish discoloration of digits  Thrombus of jugular vein  On anticoagulation for atrial fibrillation  COMPARISON: None  TECHNIQUE:  CT angiogram examination of the abdomen was performed according to standard protocol  This examination, like all CT scans performed in the [facility], was performed utilizing techniques to minimize radiation dose exposure, including the use of iterative reconstruction and automated exposure control  Contrast as well as noncontrast images were obtained  Rad dose 3959 mGy-cm IV Contrast:  100 mL of iohexol (OMNIPAQUE)  FINDINGS: VASCULAR STRUCTURES: Arteries of the right upper extremity, from the aortic valve, to the ascending aorta, innominate, subclavian, axillary, and brachial are patent  On delayed images, the ulnar artery opacifies, and is seen to the level of the mid forearm  The interosseous is seen at the level of the mid forearm as well  The radial artery really does not opacify  Even on delayed images, I do not see any flow to the distal half of the forearm  Otherwise, there is a tiny focus of thrombus in the right internal jugular  This is not very impressive  There is also an even smaller focus of thrombus in the vertebral vein on the right  The lungs show patchy pneumonitis consistent with the history of aspiration pneumonia  There is a trace right pleural effusion  The right lower lobe is almost entirely collapsed  The bronchus to the right lower lobe appears to be occluded  This is best seen on axial image 10-67  This may represent a mucous plug  Other endobronchial processes are not excluded  There does appear to be edema of the subcutaneous fat of the right arm  This appears to extend from the elbow throughout the hand  No venous filling is identified over the forearm OTHER FINDINGS ABDOMEN: LOWER CHEST: Pneumonitis and pleural effusion   Obstruction of right lower lobe bronchus, with associated right lower lobe collapse  LIVER/BILIARY TREE: Vicarious excretion of contrast into the gallbladder GALLBLADDER: Vicarious excretion of contrast into the gallbladder SPLEEN: Not seen PANCREAS: Not seen ADRENAL GLANDS: Right adrenal gland appears normal KIDNEYS/URETERS: Right kidney appears normal ADDITIONAL ABDOMINAL  STRUCTURES STOMACH AND VISUALIZED BOWEL: Not seen to good advantage ABDOMINAL CAVITY:  No pathologically enlarged mesenteric or retroperitoneal lymph nodes  A small volume of ascites is visible in the abdomen  Only the right side of the abdomen is imaged ABDOMINAL WALL/INGUINAL REGIONS:  Unremarkable  OSSEOUS STRUCTURES: No acute fracture or destructive osseous lesion  Impression: Slow flow into the right upper extremity  No filling of the arteries in the distal forearm or hand  This can be seen with multiple conditions  This would include simply when the extremities are cold  This could also be seen there is diffuse abnormalities of the inflow, such as occlusion of all 3 forearm arteries  Certainly there appears to be occlusion of the radial artery  This can also be seen if there is abnormality of the venous outflow, including venous access attempts on the dorsal and ventral hand  It would probably be good to get a Doppler of the forearm arteries  This could be used to confirm that there is slow flow in the arteries, not total occlusion  Pneumonitis and right lower lobe collapse with endobronchial process Tiny amount of venous clot of doubtful significance Obstructed right lower lobe bronchus The study was marked in EPIC for immediate notification  Workstation performed: [id number]     Ct Chest Abdomen Pelvis W Contrast    Result Date: 10/13/2019  Narrative: CT CHEST, ABDOMEN AND PELVIS WITH IV CONTRAST INDICATION:   Provided history of "ovarian mass "  Review of chart shows that the patient had total hysterectomy and bilateral salpingo-oophorectomy on 10/4/2019   COMPARISON: Chest CT dated 10/7/2019  TECHNIQUE: CT examination of the chest, abdomen and pelvis was performed  Axial, sagittal, and coronal 2D reformatted images were created from the source data and submitted for interpretation  Radiation dose length product (DLP) for this visit:  499 mGy-cm   This examination, like all CT scans performed in the [facility], was performed utilizing techniques to minimize radiation dose exposure, including the use of iterative reconstruction and automated exposure control  IV Contrast:  100 mL of iohexol (OMNIPAQUE) Enteric Contrast: Enteric contrast was administered  FINDINGS: CHEST LUNGS:  Increasing central-predominant groundglass opacities in both lungs  No associated interlobular septal thickening  Dense consolidation within the posterior medial basilar right lower lobe with low density tubular endobronchial mucous plugging  Milder subsegmental and compressive atelectasis in the left lower lobe  No suspicious pulmonary masses  PLEURA:  Moderate layering bilateral pleural effusions, similar to prior  No loculated components or pneumothorax  HEART/GREAT VESSELS:  Heart size is normal   Aortic valvular and coronary calcifications  No pericardial thickening or effusion  Thoracic aorta is normal for age  MEDIASTINUM AND MYRNA:  Unremarkable  CHEST WALL AND LOWER NECK:   Mild anasarca  ABDOMEN LIVER/BILIARY TREE:  One or more subcentimeter sharply circumscribed low-density hepatic lesion(s) are noted, too small to accurately characterize, but statistically most likely to represent subcentimeter hepatic cysts  No suspicious solid hepatic lesion is identified  Hepatic contours are normal   No biliary dilatation  GALLBLADDER:  Hyperdensity in the gallbladder is probably previously administered of IV contrast  No pericholecystic inflammation  SPLEEN:  Unremarkable  PANCREAS:  Atrophic  No pancreatic masses or pancreatic ductal dilatation  ADRENAL GLANDS:  Unremarkable  KIDNEYS/URETERS:  One or more sharply circumscribed subcentimeter renal hypodensities are noted  These lesions are too small to accurately characterize, but are statistically most likely to represent benign cortical renal cyst(s)  According to the guidelines published in the [facility] Paper of the ACR Incidental Findings Committee (Radiology 2010), no further workup of these lesions is recommended  No suspicious parenchymal masses, perinephric collections, urolithiasis, or hydronephrosis  STOMACH AND BOWEL:  Unremarkable  APPENDIX:  Not well demonstrated  No evidence for acute appendicitis  ABDOMINOPELVIC CAVITY:  Mild ascites  Blood products again demonstrated anterior to the rectum related to prior surgery  No contrast extravasation into the collection  VESSELS:  Unremarkable for patient's age  PELVIS REPRODUCTIVE ORGANS:  Patient is status post hysterectomy  URINARY BLADDER:  Unremarkable  ABDOMINAL WALL/INGUINAL REGIONS:  Mild anasarca  OSSEOUS STRUCTURES:  No acute fracture or destructive osseous lesion  Impression: 1  Increasing central predominant groundglass opacities could represent edema or infection  Lack of intubation speaks against ARDS  2   Extensive mucous plugging in the posterior and medial basilar segmental bronchi of the right lower lobe with resultant postobstructive atelectasis and pneumonia  3   Unchanged moderate-sized bilateral pleural effusions, mild ascites, and anasarca  4   Postsurgical blood products again demonstrated within the pelvis  No significant change from prior or evidence for active extravasation  Workstation performed: [id number]     Vas Upper Limb Arterial Duplex, Complete Bilateral, Graft    Result Date: 10/22/2019  Narrative:  [facility] REPORT CLINICAL: Indications: PT C/O cyanotic right fingers and recent right UE venous deep and superficial thrombus  PT is currently anticoagulated  Physician wants to evaluate for UE ischemia   Operative History: Oophorectomy Risk Factors The patient has history of HTN, Diabetes, cardiac arrest, anxiety, frequent UTI, neuropathy, ovarian cancer, HLD, and DVT  The patient's current BMI is 22 96, Weight is 138 lb and height is 65 in  She is a non smoker  Brachial BP: Right:  98 mmHg  Left Forearm Pressure:  102 mmHg  FINDINGS:  Segment              Right                                    Left                                      Impression  Pressure (mmHg)  PSV (cm/s)  Pressure (mmHg)  Subclavian                                               145                   Axillary, 2nd Part                                       118                   Dist  Radial Artery  100%                                  0                   Mid Brachial                                             128                   Dist  Brachial                                           154                   Dist  Ulnar Artery                                        52                   Prox  Radial                                              34                   Mid Radial                                                19                   1st Digit                                                                  26  1st Digit - Stent                              0                               2nd digit                                      0                           70  Prox   Ulnar                                               69                   3rd digit                                      0                               4th digit                                      0                               Mid  Ulnar                                                50                   5th digit                                      0                               Innominate                                               148                   Axillary                                                  66                      CONCLUSION:  Impression RIGHT UPPER LIMB:  ABNORMAL: This resting evaluation shows an occluded radial artery from the mid forearm to the wrist  All five digits show flat line Doppler waveforms  Technical findings given to OBDULIO Ryan and faxed to chart  SIGNATURE: Electronically Signed by: Cole Fuentes MD on 2019-10-22 04:15:53 PM    Vas Upper Limb Venous Duplex Scan, Unilateral/limited    Result Date: 10/10/2019  Narrative:  [facility] REPORT CLINICAL: Indications:  Right Swelling [[id number]]  The patient is s/p cardiac arrest, currently in acute hypoxic respiratory failure, and noted to have right arm swelling  Operative History: oophorectomy Risk Factors The patient has history of HTN and Diabetes (Yes)  CONCLUSION: Impression   RIGHT UPPER LIMB: There is acute superficial thrombophlebitis in the cephalic vein in the forearm and antecubital fossa  There is acute superficial thrombophlebitis in the basilic vein from the antecubital fossa to the proximal upper arm  No evidence of acute or chronic deep vein thrombosis in the visualized portion of the subclavian vein, axillary vein and brachial veins  The internal jugular vein, innominate vein and proximal subclavian vein were obscured by central line with bandaging in the right neck  Doppler evaluation shows a normal response to augmentation maneuvers  LEFT UPPER LIMB LIMITED: Evaluation shows no evidence of thrombus in the internal jugular vein and subclavian vein,  Tech Note:  Technical findings given to "Miguelina Parra" the patient's nurse  SIGNATURE: Electronically Signed by: Yeni Charles MD on 2019-10-10 11:14:02 PM    Xr Chest Portable Icu    Result Date: 10/18/2019  Narrative: CHEST INDICATION:   hypoxia  COMPARISON:  October 16, 2019 EXAM PERFORMED/VIEWS:  XR CHEST PORTABLE ICU Single image FINDINGS:  Lungs adequately aerated  Similar patchy infiltrates in both lungs, slightly worse in the right lower lung fields compared to previous exam  Cardiac size within normal limits   Prominent right hilum and right superior mediastinum  Atherosclerotic aorta  Osseous structures appear within normal limits for patient age  Left side PICC line catheter tip in the upper SVC  Nasogastric tube in the stomach, tip is not seen  EKG leads in place  No free air below the diaphragm  Gas-filled bowel beneath left diaphragm     Impression: Extensive diffuse infiltrates in both lungs, slightly worse in the right lower lung compared to October 16  Workstation performed: [id number]     Xr Chest Portable Icu    Result Date: 10/8/2019  Narrative: CHEST INDICATION:   et tube  COMPARISON:  8/15/2019; 10/8/2019 EXAM PERFORMED/VIEWS:  XR CHEST PORTABLE ICU FINDINGS:  Endotracheal tube is present, in satisfactory position with its tip above the level of the aaron  Enteric tube is present with its tip extending below the left hemidiaphragm  Right IJ catheter noted tip in the mid SVC  Cardiomediastinal silhouette appears unremarkable  Multifocal strandy densities noted  Subcutaneous gas noted within the right arm and left chest/abdominal wall  Osseous structures appear within normal limits for patient age  Impression: 1  Multifocal parenchymal opacities may represent multifocal pneumonia versus ARDS versus pulmonary alveolar edema  2   Subcutaneous emphysema  Workstation performed: [id number]     Cta Chest Pe Study    Result Date: 10/7/2019  Narrative: CTA - CHEST WITH IV CONTRAST - PULMONARY ANGIOGRAM INDICATION:   Shortness of breath  COMPARISON: CT abdomen and pelvis study of August 30, 2019  TECHNIQUE: CTA examination of the chest was performed using angiographic technique according to a protocol specifically tailored to evaluate for pulmonary embolism  Axial, sagittal, and coronal 2D reformatted images were created from the source data and  submitted for interpretation  In addition, coronal 3D MIP postprocessing was performed on the acquisition scanner  Radiation dose length product (DLP) for this visit:  196 mGy-cm     This examination, like all CT scans performed in the [facility], was performed utilizing techniques to minimize radiation dose exposure, including the use of iterative reconstruction and automated exposure control  IV Contrast:  85 mL of iodixanol (VISIPAQUE)  FINDINGS: PULMONARY ARTERIAL TREE:  No pulmonary embolus is seen  LUNGS:  Mosaic attenuation  Groundglass nodule in the left upper lobe on image 51 of series 3, measuring 4 mm  2 mm granuloma along the right major fissure on image 55 of series 3   5 to 6 mm groundglass nodule in the right middle lobe on image 57 of series 3  Atelectasis in the right lower lobe with a perifissural 2 mm nodular density in adjacent calcified granuloma on image 68 of series 3  This is likely inflammatory  Bibasilar compressive  PLEURA:  Moderate size bilateral pleural effusions  HEART/GREAT VESSELS:  Unremarkable for patient's age  MEDIASTINUM AND MYRNA:  Dilated debris-filled esophagus with hiatal hernia and markedly dilated stomach  Punctate calcification within the right thyroid lobe CHEST WALL AND LOWER NECK:   Chest wall emphysema noted subcutaneously as well as in the subpectoral regions and left more than right axilla  VISUALIZED STRUCTURES IN THE UPPER ABDOMEN:  Markedly dilated debris-filled stomach  Gastric outlet obstruction not excluded  Cirrhotic appearance to the liver with ascites  OSSEOUS STRUCTURES:  No acute fracture or destructive osseous lesion  Schmorl's node deformity in the superior endplate of B24  Impression: 1  No evidence of pulmonary embolism  Chest wall emphysema, of uncertain etiology  Recommend correlation with clinical history  2   Moderate bilateral pleural effusions and mild compressive bibasilar atelectasis with mosaic attenuation, correlate for congestive heart failure  3   Scattered 5-6mmgroundglass nodules and calcified granulomas  These findings are likely post infectious/inflammatory    Follow-up CT chest evaluation in 3 months is recommended  4   Hiatal hernia with marked gastric and esophageal distention which appears to be debris filled  Gastric outlet obstruction not excluded  5   Nodular liver surface and abdominal ascites  Given the patient's history of a complex right adnexal mass, peritoneal carcinomatosis is not excluded  Consider endogastric placement and additional imaging of the abdomen and pelvis  I personally discussed this study with Ms Tsering Abbasi, the covering nurse in the ICU, on 10/7/2019 at 5:26 PM   Workstation performed: [id number]     Thank you for allowing us to participate in this patient's care  Counseling / Coordination of Care  Total floor / unit time spent today 45 minutes  Greater than 50% of total time was spent with the patient and / or family counseling and / or coordination of care  A description of the counseling / coordination of care: Review of history, current assessment, development of a plan  Code Status: Level 1 - Full Code    ** Please Note: Dragon 360 Dictation voice to text software may have been used in the creation of this document   **

## 2019-10-23 NOTE — ASSESSMENT & PLAN NOTE
Lab Results   Component Value Date    HGBA1C 6 4 (H) 10/22/2019    Will continue with Lantus 20 units being given twice daily however due to the reports that she is not eating as much; will put on a half dose  Continue insulin sliding scale and Accu-Cheks  Hemoglobin A1c      Recent Labs     10/23/19  0807 10/23/19  1016 10/23/19  1255 10/23/19  1417   POCGLU 167* 129 135 125       Blood Sugar Average: Last 72 hrs:  (P) 002 [id number]   Patient is currently on insulin drip  Monitor

## 2019-10-23 NOTE — RESPIRATORY THERAPY NOTE
RT Protocol Note  Lawerence Cam 82 y o  female MRN: [id number]  Unit/Bed#: [facility] 530-01 Encounter: [id number]    Assessment    Principal Problem:    Lethargy  Active Problems:    Adult onset hypothyroidism    ANAYA (generalized anxiety disorder)    Hypertension    Type 1 diabetes mellitus with diabetic neuropathy (HCC)    Diabetic neuropathy, painful (HCC)    Ovarian cancer ([facility] [address] )    Internal jugular (IJ) vein thromboembolism, acute, right (HCC)    Superficial thrombophlebitis of right upper extremity    A-fib (HCC)    Acute respiratory failure with hypercapnia (HCC)    Rectal bleeding      Home Pulmonary Medications:         Past Medical History:   Diagnosis Date    Anxiety     Arthritis     Constipation     Diabetes mellitus (HCC)     IDDM    Frequent UTI     Glaucoma     Hearing loss     Hyperlipidemia     Hypertension     Hyperthyroidism     in past    Hyponatremia     Hypothyroid     Neuropathy     bles    Right tubo-ovarian mass     Wears glasses      Social History     Socioeconomic History    Marital status:       Spouse name: None    Number of children: 2    Years of education: None    Highest education level: None   Occupational History    None   Social Needs    Financial resource strain: None    Food insecurity:     Worry: None     Inability: None    Transportation needs:     Medical: None     Non-medical: None   Tobacco Use    Smoking status: Never Smoker    Smokeless tobacco: Never Used   Substance and Sexual Activity    Alcohol use: Never     Frequency: Monthly or less     Drinks per session: 1 or 2     Binge frequency: Never     Comment: wine    Drug use: No    Sexual activity: None   Lifestyle    Physical activity:     Days per week: None     Minutes per session: None    Stress: None   Relationships    Social connections:     Talks on phone: None     Gets together: None     Attends Gnosticist service: None     Active member of club or organization: None     Attends meetings of clubs or organizations: None     Relationship status: None    Intimate partner violence:     Fear of current or ex partner: None     Emotionally abused: None     Physically abused: None     Forced sexual activity: None   Other Topics Concern    None   Social History Narrative    Caffeine use    No preference on Sikh beliefs    Lives at home alone prior to hysterectomy - now at [address] [place] [address] temporarily       Subjective         Objective    Physical Exam:   Assessment Type: Assess only  General Appearance: Awake(pt states she feels exhausted)  Respiratory Pattern: Shallow  Chest Assessment: Chest expansion symmetrical  Bilateral Breath Sounds: Diminished  Cough: Weak  O2 Device: oxymiser pendent    Vitals:  Blood pressure 95/56, pulse 82, temperature 97 9 °F (36 6 °C), temperature source Axillary, resp  rate 22, height 5' 5" (1 651 m), weight 66 7 kg (147 lb 0 8 oz), SpO2 92 %, not currently breastfeeding  Results from last 7 days   Lab Units 10/23/19  1258   PH ART  7 351   PCO2 ART mm Hg 67 3*   PO2 ART mm Hg 93 2   HCO3 ART mmol/L 36 4*   BASE EXC ART mmol/L 9 0   O2 CONTENT ART mL/dL 14 0*   O2 HGB, ARTERIAL % 96 0   ABG SOURCE  Radial, Left   IDANIA TEST  Yes       Imaging and other studies: I have personally reviewed pertinent reports        O2 Device: oxymiser pendent     Plan    Respiratory Plan: No distress/Pulmonary history  Airway Clearance Plan: Incentive Spirometer, Flutter     Resp Comments: pt assessed for ACP, pt appears tired and states feels like she has no energy, O2 sat 92% on 4 L oxymiser NC, pt was given udn tx this AM and stated she feels no difference post tx, BS diminished and shallow, instructed patient on IS at this time and patient demonstrated understandibng but patient too weak to do, Max IS volume < 250, will try to do flutter valve with patient and continue IS/deep breathing/coughing, RN aware, will continue to monitor

## 2019-10-23 NOTE — QUICK NOTE
QUICK NOTE - Deterioration Index  Chelly Castillo 82 y o  female MRN: [id number]  Unit/Bed#: [facility] 530-01 Encounter: [id number]    Date Paged: 10/22/19  Time Paged: [phone]  Room #: 600  Arrival Time: 5130  Deterioration index score at time of page: 62 9  %  Spoke with Vijay Carney PA-C from primary team  Need to escalate level of care: no     PROBLEMS resulting in high DI score:   19% Age is 82   17% Supplemental oxygen is Nasal cannula   14% Neurological exam is Lethargic   13% Sodium is 147 mmol/L   10% [place] coma scale is 14   8% Respiratory rate is 20   5% WBC count is 15 06 Thousand/uL   4% Systolic is 964   2% Hematocrit is 28 %   2% Blood pH is 7 306   2% Pulse oximetry is 100 %   1% BUN is 26 mg/dL   1% Pulse is 58   1% Platelet count is 542 Thousands/uL   <1% Potassium is 3 9 mmol/L   <1% Temperature is 98 7 °F (37 1 °C)     PLAN:    Contact by primary team prior to seeing patient about deterioration, re: change in mental status, hypercapnia, rectal bleeding, hypotension  Please see critical care resource service consult for assessment and plan  Patient seen and examined at bedside by myself and Dr Carlos Anderson, patient already a SD1 and stable to stay as such  Please call [phone] with any questions       Vitals:   Vitals:    10/22/19 2344 10/23/19 0004 10/23/19 0026 10/23/19 0100   BP: 91/58 118/57 102/55 113/56   BP Location: Left arm      Pulse: 73 78 73 76   Resp: 20      Temp: 98 7 °F (37 1 °C)      TempSrc: Oral      SpO2: 100%      Weight:       Height:           Respiratory:   SpO2: SpO2: 100 %  O2 Flow Rate (L/min): 6 L/min    Temperature: Temp (24hrs), Av 8 °F (37 1 °C), Min:98 7 °F (37 1 °C), Max:98 9 °F (37 2 °C)  Current: Temperature: 98 7 °F (37 1 °C)    Labs:   Results from last 7 days   Lab Units 10/22/19  2122 10/22/19  0950 10/22/19  0601 10/21/19  2030 10/18/19  0430 10/17/19  0547   WBC Thousand/uL  --  15 06* 14 83* 13 69* 22 53* 24 61*   HEMOGLOBIN g/dL 8 4* 8 2* 8 5* 9 0* 9 1* 9 0* HEMATOCRIT % 28 0* 28 1* 28 5* 29 9* 29 6* 30 0*   PLATELETS Thousands/uL  --  584* 613* 635* 531* 487*   NEUTROS PCT %  --   --  90*  --  89* 92*   MONOS PCT %  --   --  6  --  4 3*   MONO PCT %  --   --   --  2*  --   --      Results from last 7 days   Lab Units 10/22/19  0601 10/21/19  2030 10/18/19  0430   SODIUM mmol/L 147* 146* 140   POTASSIUM mmol/L 3 9 3 4* 3 4*   CHLORIDE mmol/L 109* 105 99*   CO2 mmol/L 36* 36* 37*   BUN mg/dL 26* 26* 27*   CREATININE mg/dL 0 57* 0 61 0 68   CALCIUM mg/dL 6 8* 7 1* 6 7*   ALK PHOS U/L  --  100  --    ALT U/L  --  34  --    AST U/L  --  27  --      Results from last 7 days   Lab Units 10/22/19  0601   MAGNESIUM mg/dL 2 3     Results from last 7 days   Lab Units 10/22/19  0950 10/21/19  2030   LACTIC ACID mmol/L 0 7 1 3     Results from last 7 days   Lab Units 10/21/19  2030   TROPONIN I ng/mL 0 03     Results from last 7 days   Lab Units 10/22/19  1218 10/16/19  0431   PROCALCITONIN ng/ml 0 16 0 97*       Code Status: Level 1 - Full Code

## 2019-10-23 NOTE — PROGRESS NOTES
Progress Note - Miko Joel 1935, 82 y o  female MRN: [id number]    Unit/Bed#: [facility] 530-01 Encounter: [id number]    Primary Care Provider: Richa Douglass MD   Date and time admitted to hospital: 10/21/2019  7:43 PM        * Lethargy  Assessment & Plan  Patient was admitted with lethargy coming from the LTAC  Patient with waxing and waning of mental status with the periods of lethargy  Patient was more lethargic after getting the Ativan yesterday patient only got 0 25 mg of Ativan x1  Will DC Ativan  Encephalopathy is likely multifactorial-likely toxic metabolic  Neurology evaluation appreciated  Patient was on  and tramadol which was discontinued in the facility  Her lethargy is multifactorial/poor nutrition/deconditioning      Hearing loss  Assessment & Plan  · Likely secondary to Lasix  · Discussed with pulmonology/daughter-ENT consultation     Rectal bleeding  Assessment & Plan  · Patient with 1 episode of rectal bleeding  Was evaluated by GI no evidence of further bleeding  Status post 1 unit of packed RBCs  GI evaluation appreciated  · No plan for any colonoscopy or EGD  · Okay for tube feeds    Acute respiratory failure with hypercapnia [facility])  Assessment & Plan  Patient has history of recent pneumonia and unfortunately ARDS requiring intubation and ICU admission and also was on high-flow oxygen  Currently patient is on 6 L nasal cannula with Oxymizer  Pulmonology evaluation appreciated  ABG reviewed  Acute hypoxic respiratory failure is likely multifactorial secondary to recent pneumonia/ARDS-patient was evaluated along with the pulmonologist and reported that it may take  Months for recovery    A-fib [facility])  Assessment & Plan  Currently on heparin drip  IV metoprolol with holding parameters  Bradycardia noted  Patient was bradycardic and Good [place] 2    Cardiology consultation likely secondary to medication/no further bradycardia since yesterday    Superficial thrombophlebitis of right upper extremity  Assessment & Plan  Supportive care    Internal jugular (IJ) vein thromboembolism, acute, right [facility])  Assessment & Plan  Patient was on Eliquis as an outpatient  Currently on heparin drip given right upper extremity ischemia    Ovarian cancer [facility])  Assessment & Plan  This is status post hysterectomy  Gyn Oncology on board    Diabetic neuropathy, painful [facility])  Assessment & Plan  Lab Results   Component Value Date    HGBA1C 6 4 (H) 10/22/2019    Will continue with Lantus 20 units being given twice daily however due to the reports that she is not eating as much; will put on a half dose  Continue insulin sliding scale and Accu-Cheks  Hemoglobin A1c  Recent Labs     10/23/19  0807 10/23/19  1016 10/23/19  1255 10/23/19  1417   POCGLU 167* 129 135 125       Blood Sugar Average: Last 72 hrs:  (P) 354 [id number]   Patient is currently on insulin drip  Monitor    Type 1 diabetes mellitus with diabetic neuropathy [facility])  Assessment & Plan  Lab Results   Component Value Date    HGBA1C 6 4 (H) 10/22/2019    Currently on Lantus 20 units Q 12  Because there were some reports of poor p o  Intake; will cut Lantus in half at 20 units at night  Continue insulin sliding scale with Accu-Cheks  Hemoglobin A1c  Recent Labs     10/23/19  1016 10/23/19  1255 10/23/19  1417 10/23/19  1603   POCGLU 129 135 125 135       Blood Sugar Average: Last 72 hrs:  (P) 154 45   Patient is type 1 type diabetes mellitus, continue with the insulin drip  Monitor  Continue with the D5 since the patient with poor p o  Intake    Hypertension  Assessment & Plan  Patient was on Lopressor as an outpatient  Currently on IV metoprolol a 2 5 mg q 6 hours with holding parameters  Hypotensive in the morning    Status post IV Lasix yesterday  Status post IV  albumin    ANAYA (generalized anxiety disorder)  Assessment & Plan  As patient was seen lethargic; will put on hold trazodone and lorazepam   No further sedating medication    Adult onset hypothyroidism  Assessment & Plan  Will continue with levothyroxine 50 mcg  TSH is minimally elevated      VTE Pharmacologic Prophylaxis:   Pharmacologic: Heparin Drip  Mechanical VTE Prophylaxis in Place: Yes    Patient Centered Rounds: I have performed bedside rounds with nursing staff today  Discussions with Specialists or Other Care Team Provider:  Vascular surgery geriatrics gastroenterology Cardiology pulmonology  Gyn Oncology    Education and Discussions with Family / Patient:  Daughter updated in the room in detail    Time Spent for Care: 1 hour  More than 50% of total time spent on counseling and coordination of care as described above  Current Length of Stay: 1 day(s)    Current Patient Status: Inpatient   Certification Statement:  Patient need continued inpatient hospital stay secondary to mental status   Discharge Plan:     Code Status: Level 1 - Full Code      Subjective:   Patient seen and examined  Overnight events noted  Gastroenterology evaluation appreciated  No signs of active bleeding  Gyn Oncology on board  Patient still with periods of lethargy with decreased responsiveness  When the patient is responsive she is alert awake oriented and follows commands with bilateral upper and lower extremities  Status post 1 unit of packed RBCs    Objective:     Vitals:   Temp (24hrs), Av °F (36 7 °C), Min:97 4 °F (36 3 °C), Max:98 9 °F (37 2 °C)    Temp:  [97 4 °F (36 3 °C)-98 9 °F (37 2 °C)] 97 5 °F (36 4 °C)  HR:  [58-90] 79  Resp:  [17-24] 20  BP: ()/(46-68) 129/60  SpO2:  [90 %-100 %] 94 %  Body mass index is 24 47 kg/m²  Input and Output Summary (last 24 hours): Intake/Output Summary (Last 24 hours) at 10/23/2019 1634  Last data filed at 10/23/2019 1003  Gross per 24 hour   Intake 1937 36 ml   Output 2122 ml   Net -184 64 ml       Physical Exam:     Physical Exam   Constitutional: No distress  HENT:   Head: Normocephalic     Eyes: Pupils are equal, round, and reactive to light  EOM are normal    Neck: No JVD present  Cardiovascular: Normal rate  No murmur heard  Pulmonary/Chest: She has rales  Decreased breath sounds bilateral with bilateral coarse breath sounds   Abdominal: Soft  She exhibits no distension  There is no tenderness  Musculoskeletal: She exhibits edema  Right upper extremity fingers-with dusky discoloration   Neurological: Coordination normal    Patient with periods of lethargy with decreased  responsiveness   Skin: Skin is warm  Additional Data:     Labs:    Results from last 7 days   Lab Units 10/23/19  1341 10/23/19  0450   WBC Thousand/uL  --  15 13*   HEMOGLOBIN g/dL 9 1* 9 4*   HEMATOCRIT % 29 5* 31 6*   PLATELETS Thousands/uL  --  515*   NEUTROS PCT %  --  89*   LYMPHS PCT %  --  4*   MONOS PCT %  --  6   EOS PCT %  --  0     Results from last 7 days   Lab Units 10/23/19  0450  10/21/19  2030   POTASSIUM mmol/L 3 2*   < > 3 4*   CHLORIDE mmol/L 106   < > 105   CO2 mmol/L 34*   < > 36*   BUN mg/dL 21   < > 26*   CREATININE mg/dL 0 45*   < > 0 61   CALCIUM mg/dL 6 3*   < > 7 1*   ALK PHOS U/L  --   --  100   ALT U/L  --   --  34   AST U/L  --   --  27    < > = values in this interval not displayed  Results from last 7 days   Lab Units 10/22/19  0950   INR  1 29*       * I Have Reviewed All Lab Data Listed Above  * Additional Pertinent Lab Tests Reviewed: [facility] Admission Reviewed    Imaging:    Imaging Reports Reviewed Today Include:  Chest x-ray  Imaging Personally Reviewed by Myself Includes:      Recent Cultures (last 7 days):     Results from last 7 days   Lab Units 10/21/19  2149 10/21/19  2030   BLOOD CULTURE  No Growth at 24 hrs  No Growth at 24 hrs         Last 24 Hours Medication List:     Current Facility-Administered Medications:  acetaminophen 650 mg Rectal Q6H PRN Tuesday JIM Felix    acetaminophen 650 mg Per NG Tube Q6H PRN Rik Jacob MD    albuterol 2 5 mg Nebulization Q4H PRN Clearance MD Bharath    aluminum-magnesium hydroxide-simethicone 15 mL Per NG Tube Q6H PRN Clearance MD Bharath    artificial tear  Both Eyes HS PRN Clearance MD Bharath    bisacodyl 10 mg Rectal Daily PRN Manasa Rivera MD    cefepime 2,000 mg Intravenous Q12H Manasa Rivera MD Last Rate: 2,000 mg (10/23/19 1224)   dextrose 5 % and sodium chloride 0 45 % 50 mL/hr Intravenous Continuous Gt Johnson PA-C Last Rate: 50 mL/hr (10/23/19 0554)   dorzolamide-timolol 1 drop Both Eyes BID Clearance MD Bharath    heparin (porcine) 3-30 Units/kg/hr (Order-Specific) Intravenous Titrated Angelita Maurice PA-C Last Rate: 15 Units/kg/hr (10/23/19 1224)   heparin (porcine) 2,400 Units Intravenous PRN Dee Kumar PA-C    heparin (porcine) 4,800 Units Intravenous PRN Angelita Maurice PA-C    insulin regular (HumuLIN R,NovoLIN R) infusion 0 3-21 Units/hr Intravenous Titrated Manasa Rivera MD Last Rate: 0 5 Units/hr (10/23/19 1017)   levothyroxine 50 mcg Per NG Tube Early Morning Clearance MD Bharath    lidocaine 1 patch Topical Daily Tuesday JIM Bell    Netarsudil Dimesylate 1 drop Ophthalmic Daily Clearance MD Bharath    Netarsudil Dimesylate 1 drop Right Eye HS Clearance MD Bharath    ondansetron 4 mg Intravenous Q6H PRN Clearance MD Bharath    pantoprazole 40 mg Intravenous Q24H [address] Manasa Rivera MD    senna 8 8 mg Per NG Tube Daily PRN Clearance MD Bharath    travoprost 1 drop Both Eyes HS Clearance MD Bharath         Today, Patient Was Seen By: Manasa Rivera MD    ** Please Note: Dictation voice to text software may have been used in the creation of this document   **

## 2019-10-23 NOTE — PROGRESS NOTES
Gyn Oncology Progress note   Paty Guerra 82 y o  female MRN: [id number]  Unit/Bed#: [facility] 530-01 Encounter: [id number]    Assessment and Plan:    1)  Acute respiratory failure with hypercapnia  - Managed per primary team  - Currently on 4L NC with O2 >92%  - Pt has thick secretions and mucous plugging that may benefit from repeat bronchoscopy   - On cefepime for aspiration pneumonia  - Pulmonology following     2) Delirium  - Pt with waxing and waning between consciousness and lethargy   - Ct head w/o contrast on admission without any acute abnormalities  - Pt noted to be lethargic and barely able to arouse on my examination this AM  - Repeat ABG last night with worsening hypercapnia: 63-->76 5  - Evaluated by critical care team at that time, but found to stable for SD1  See note  - Continue to avoid sedating medications, benzos  - Neurology following    3) Severe protein calorie malnutrition secondary to poor oral take  - [facility]  Poor oral intake since discharge to [facility]  - Currently has NGT  Recommend changing to feeding tube  - Speech pathology following- to evaluate swallowing function  Plan for video swallow study     4) Rectal Bleeding  - H/H stable, Occult blood lab sent     - Given 1 unit pRBCs followed by Lasix 40mg IV for clinical fluid overload  - Monitoring H/H q6hrs  - F/u GI consult    5) Internal Jugular (IJ) vein thromboembolism, acute right with new onset cyanotic right fingers  - Repeat doppler on 10/22 demonstrated occluded right radial artery from them id forearm to the wrist  All five digits show flat line doppler waveforms  - Pt's home Eliquis was placed on hold and she was started on Heparin gtt  - Vascular surgery following, no surgical intervention at this time  - Radial pulse noted with bedside doppler on my exam with nursing    6) Type 1 Diabetes  - Hgb A1c 6 4 on 10/22  - Glucose levels: 71 at 0410, 175 at 0453, 481 at 0559, and 113 at 0602  - Continue insulin gtt  - D5 1/2NS infusion was decreased to 50cc/hr    7) Stage 1B ovarian cancer s/p staging surgery 10/4/2019  - Good prognosis  - GYN ONC to continue to follow    8) Dispo: Given continued lethargy and concern for respiratory status of patient, strongly recommend transfer to ICU     Jovan Polk is barely arousable with sternal rub, opens eyes and nods her head  Unable to get a ROS from patient  However, per nursing, she had rectal bleeding that was evaluated last night  H/H was stable at 8 4  She was noted to be hypotensive and received 250mL of albumin, which brought her BP to 102/55  Her O2 was also decreased to 4L from 6L due to PO2 133 on ABG  She was then transfused 1 unit PRBCs for possible symptomatic anemia, which finished around 0500  Pt has been fluid overloaded and was given 60mg IV lasix to offset transfusion  [facility]IM was concerned for ABG and ongoing lethargy, so critical care was re-consulted to evaluate patient  She was determined to be stable enough to remain on SD1  /52   Pulse 80   Temp (!) 97 4 °F (36 3 °C)   Resp (!) 24   Ht 5' 5" (1 651 m)   Wt 66 7 kg (147 lb 0 8 oz)   SpO2 91%   BMI 24 47 kg/m²     I/O last 3 completed shifts: In: 2807 4 [I V :1207 4; Blood:350; IV Piggyback:1250]  Out: 6622 [Urine:1708]  No intake/output data recorded      Lab Results   Component Value Date    WBC 15 13 (H) 10/23/2019    HGB 9 4 (L) 10/23/2019    HCT 31 6 (L) 10/23/2019     (H) 10/23/2019     (H) 10/23/2019       Lab Results   Component Value Date    GLUCOSE 75 01/08/2016    CALCIUM 6 3 (L) 10/23/2019     (L) 01/08/2016    K 3 2 (L) 10/23/2019    CO2 34 (H) 10/23/2019     10/23/2019    BUN 21 10/23/2019    CREATININE 0 45 (L) 10/23/2019       Lab Results   Component Value Date/Time    POCGLU 113 10/23/2019 06:02 AM    POCGLU 481 (H) 10/23/2019 05:59 AM    POCGLU 175 (H) 10/23/2019 04:53 AM    POCGLU 71 10/23/2019 04:10 AM    POCGLU 84 10/23/2019 01:48 AM    POCGLU 164 02/15/2017 02:33 PM       Physical Exam   Constitutional: She appears lethargic  She appears ill  Nasal cannula in place  Lethargic and barely able to arouse with sternal rub  Pt only able to open eyes and nod to questions   HENT:   Head: Normocephalic  Cardiovascular:   Pulses:       Radial pulses are 2+ on the right side, and 2+ on the left side  Pulmonary/Chest: Tachypnea noted  4L NC  Chest retractions noted   Abdominal: Soft  She exhibits no distension  There is no tenderness  There is no rebound and no guarding  Midline vertical incision: C/D/I without signs of infection   Musculoskeletal: She exhibits edema (bilaterally)  SCDs on and on   Neurological: She appears lethargic  Vitals reviewed      Joana Ram MD  OBGYN, PGY-3  10/23/2019 7:57 AM

## 2019-10-23 NOTE — ASSESSMENT & PLAN NOTE
Patient has history of recent pneumonia and unfortunately ARDS requiring intubation and ICU admission and also was on high-flow oxygen  Currently patient is on 6 L nasal cannula with Oxymizer  Pulmonology evaluation appreciated    ABG reviewed  Acute hypoxic respiratory failure is likely multifactorial secondary to recent pneumonia/ARDS-patient was evaluated along with the pulmonologist and reported that it may take  Months for recovery

## 2019-10-23 NOTE — ASSESSMENT & PLAN NOTE
· No utility in Bipap at this time   Patient alert and oriented at the time of my examination, likely hypercapnia improved at this time  · Goal SpO2 >92%, on 4L NC

## 2019-10-23 NOTE — SOCIAL WORK
Pt is a 30D readmission from [address] [place] [address] 2/2 lethargy and dysphagia  Pt was recently hospitalized and is s/p hysterectomy, pneumonia with ARDS  Cm spoke with daughter Brianna Villanueva at bedside  Per Brianna Villanueva she is pt's POA and has documents at home, cm requested a copy  Brianna Villanueva would prefer if pt not return to [facility] would like to see if she would be a candidate for our [facility]  If pt is not able to go to [facility] then plan would be for d/c home with 24hr care  Pt had keofed placed today no d/c date at this time  Cm advised Brianna Villanueva will continue to follow for progress and assistance wirh discharge planning  Cm spoke with dr Elisha Jean during rounds and therapy evaluations to be ordered  Brianna Villanueva denies any hx of drugs/ETOH or inpt psych stays

## 2019-10-23 NOTE — ASSESSMENT & PLAN NOTE
Patient was on Lopressor as an outpatient  Currently on IV metoprolol a 2 5 mg q 6 hours with holding parameters  Hypotensive in the morning    Status post IV Lasix yesterday  Status post IV  albumin

## 2019-10-23 NOTE — PROGRESS NOTES
Progress Note - Neurology   Terese Half 82 y o  female MRN: [id number]  Unit/Bed#: [facility] 530-01 Encounter: [id number]    Assessment:    # Lethargy and AMS likely 2/2 multifactorial toxic, endocrinologic, and metabolic processes  · Significant comorbid metabolic derangements present likely contributing to transient lethargic state  · Pt was reassessed by critical care overnight and felt to be appropriate for SD1  · Endocrine: Pt has DM type 1, had 2 episodes of BG in the 20s at [facility] occurring simultaneously with lethargy, also had one episode of BG in the 400s overnight during a period of lethargy, also hypothyroid recently restarted on home levothyroxine dose  · Cardiology: Pt was noted to have HR in the 30s at [facility] after receiving tramadol, HR in the 40s noted in hospital yesterday after receiving ativan  Pt also appears volume overloaded  · Metabolic: ammonia level noted at 64  · Pulmonary/metabolic: acute hypercarbic respiratory failure with respiratory acidosis overnight with pH of 7 306 and CO2 of 76 5, currently on 6L NC oximyzer  Additionally, CXR shows no improvement of bilateral infiltrates with possible aspiration PNA  Pt has significant bronchial secretions    · Psych: pt is very anxious and depressed, she becomes tearful when asked if she is scared of being in the hospital   Plan:    · Aim to maintain BG within range of 140-180  · Although episodes of bradycardia appear to be medication related, recommend cardiology consult in the setting of recent PEA arrest and recent onset PAF  · Start lactulose for elevated ammonia  · Daily ABGs or more frequent if mental status is questionable  · Decrease total volume pt is receiving due to volume status  · Keep on cefepime for PNA  · Consider repeat bronch for secretions which may be affecting respiratory/mental status  · Consider psych consult for anxiety/depression  · Use geriatric pain management for pain control if needed      Subjective:   Pt was seen and examined beside by me this AM  Although she was noted to be minimally responsive overnight she was alert and able to appropriately follow commands and answer all questions correctly this AM  Notably she had a period of hyperglycemia and hypercarbic respiratory failure overnight which likely was associated with transiently decreased mental status overnight  ROS:  Review of Systems   Constitutional: Positive for activity change, appetite change and fatigue  HENT: Positive for congestion, trouble swallowing and voice change  Eyes: Negative for visual disturbance  Respiratory: Positive for cough and shortness of breath  Negative for chest tightness  Cardiovascular: Positive for chest pain (related to chest compressions) and leg swelling  Negative for palpitations  Gastrointestinal: Positive for blood in stool  Negative for abdominal distention and abdominal pain  Endocrine: Positive for cold intolerance  Neurological: Positive for weakness  Negative for dizziness, facial asymmetry, speech difficulty, light-headedness, numbness and headaches  Psychiatric/Behavioral: Positive for dysphoric mood  The patient is nervous/anxious  All other systems reviewed and are negative  Neurological ROS: no TIA or stroke symptoms    Vitals: Blood pressure 95/56, pulse 90, temperature 97 9 °F (36 6 °C), temperature source Axillary, resp  rate 22, height 5' 5" (1 651 m), weight 66 7 kg (147 lb 0 8 oz), SpO2 90 %, not currently breastfeeding  ,Body mass index is 24 47 kg/m²  Physical Exam   Constitutional: She is oriented to person, place, and time  She appears distressed  HENT:   Head: Normocephalic and atraumatic  Nose: Nose normal    Mouth/Throat: No oropharyngeal exudate  Eyes: Pupils are equal, round, and reactive to light  Conjunctivae and EOM are normal  No scleral icterus  Neck: Normal range of motion  Neck supple  No JVD present     Cardiovascular: Normal rate, regular rhythm, normal heart sounds and intact distal pulses  Exam reveals no gallop and no friction rub  No murmur heard  Pulmonary/Chest: Effort normal  She has rales in the right upper field and the left upper field  4-6 L NC oxymizer   Abdominal: Soft  Neurological: She is alert and oriented to person, place, and time  She has normal strength  She has a normal Finger-Nose-Finger Test    Skin: Skin is warm and dry  Capillary refill takes less than 2 seconds  Psychiatric: Her mood appears anxious  Her speech is delayed (and quiet)  She is slowed and withdrawn  She exhibits a depressed mood  Nursing note and vitals reviewed  Neurologic Exam     Mental Status   Oriented to person, place, and time  Attention: normal  Concentration: normal    Speech: (Quiet and slow)  Level of consciousness: alert  Knowledge: good  Cranial Nerves     CN II   Visual fields full to confrontation  CN III, IV, VI   Pupils are equal, round, and reactive to light  Extraocular motions are normal    Nystagmus: none   Diplopia: none  Ophthalmoparesis: none  Upgaze: normal  Downgaze: normal  Conjugate gaze: present    CN V   Facial sensation intact  CN VII   Facial expression full, symmetric  CN VIII   Hearing: impaired    CN IX, X   CN IX normal    CN X normal      CN XI   CN XI normal      CN XII   CN XII normal      Motor Exam   Muscle bulk: decreased  Overall muscle tone: decreased    Strength   Strength 5/5 throughout       Sensory Exam   Right arm light touch: normal  Left arm light touch: normal  Right leg light touch: decreased from ankle  Left leg light touch: decreased from ankle  Right arm vibration: normal  Left arm vibration: normal  Right leg vibration: decreased from ankle  Left leg vibration: decreased from ankle  Proprioception normal    Right arm pinprick: normal  Left arm pinprick: normal  Right leg pinprick: decreased from ankle  Left leg pinprick: decreased from ankle    Gait, Coordination, and Reflexes     Coordination Finger to nose coordination: normal    Tremor   Resting tremor: absent  Intention tremor: absent  Action tremor: absent        Lab, Imaging and other studies:   I have personally reviewed pertinent reports  , CBC:   Results from last 7 days   Lab Units 10/23/19  0450 10/22/19  2122 10/22/19  0950 10/22/19  0601   WBC Thousand/uL 15 13*  --  15 06* 14 83*   RBC Million/uL 3 04*  --  2 59* 2 64*   HEMOGLOBIN g/dL 9 4* 8 4* 8 2* 8 5*   HEMATOCRIT % 31 6* 28 0* 28 1* 28 5*   MCV fL 104*  --  109* 108*   PLATELETS Thousands/uL 515*  --  584* 613*   , BMP/CMP:   Results from last 7 days   Lab Units 10/23/19  0450 10/22/19  0601 10/21/19  2030   SODIUM mmol/L 143 147* 146*   POTASSIUM mmol/L 3 2* 3 9 3 4*   CHLORIDE mmol/L 106 109* 105   CO2 mmol/L 34* 36* 36*   BUN mg/dL 21 26* 26*   CREATININE mg/dL 0 45* 0 57* 0 61   CALCIUM mg/dL 6 3* 6 8* 7 1*   AST U/L  --   --  27   ALT U/L  --   --  34   ALK PHOS U/L  --   --  100   EGFR ml/min/1 73sq m 94 87 85   , Vitamin B12:   , HgBA1C:   Results from last 7 days   Lab Units 10/22/19  0158   HEMOGLOBIN A1C % 6 4*   , TSH:   Results from last 7 days   Lab Units 10/22/19  0601   TSH 3RD GENERATON uIU/mL 4 120*   , Coagulation:   Results from last 7 days   Lab Units 10/22/19  0950   INR  1 29*   , Lipid Profile:   , Ammonia:   Results from last 7 days   Lab Units 10/22/19  2333   AMMONIA umol/L 64*   , Urinalysis:   Results from last 7 days   Lab Units 10/22/19  1110   COLOR UA  Yellow   CLARITY UA  Cloudy   SPEC GRAV UA  1 013   PH UA  5 0   LEUKOCYTES UA  Elevated glucose may cause decreased leukocyte values   See urine microscopic for [facility] result/*   NITRITE UA  Negative   GLUCOSE UA mg/dl >=1000 (1%)*   KETONES UA mg/dl Negative   BILIRUBIN UA  Negative   BLOOD UA  Moderate*     VTE Prophylaxis: Sequential compression device (Venodyne)  and Heparin    Counseling / Coordination of Care  Total Critical Care time spent 42 minutes excluding procedures, teaching and family updates

## 2019-10-23 NOTE — QUICK NOTE
ABG results received, pH 7 306, pCO2 76 5  Continues to have decreased responsiveness, but eyes open and occasionally shakes head yes or no to questions  O2 currently @6L NC  PO2 on  with O2Hb 97 4  Will decrease to 4L NC    BP had dropped to 88/46 after initial assessment  Now 102/55 after 250mL Albumin  Assessed by Dr Dayana Buck as well who recommends transfusing 1 unit PRBC for possible symptomatic anemia  Spoke with Dr Ronn Christianson (ICU) regarding ABG and ongoing lethargy  States PA will be by to assess  Will place formal consult

## 2019-10-23 NOTE — QUICK NOTE
Late entry due to events on L&D floor  I was called by RN regarding maninder red blood on purwick  On physical exam, patient was difficult to arouse and appeared to be sleeping comfortable  We turned the patient to her side, at which time we were able to see fecal matter covered with small amount of maninder red blood  No bleeding noted from vagina  Hgb 8 4g/dL, currently on therapeutic heparin gtt  RN stated that she will notify [facility]IM of our findings  Dr Emily Marsh (GYN ONC) aware as well       Andrews Pascal MD  OBGYN, PGY-3  10/23/2019  12:36 AM

## 2019-10-23 NOTE — ASSESSMENT & PLAN NOTE
· Improved though patient remains lethargic  · Ammonia 62, would hold off on lactulose at this time given rectal bleeding  · Frequent neuro checks  · Trend LFTs  · Can consider Bipap if worsening lethargy given hypercapnia prior, though not needed at this time  · Avoid sedating medications, benzos   Would discontinue tramadol

## 2019-10-23 NOTE — NUTRITION
10/23/19 1246   Recommendations/Interventions   Summary Please discontinue supplement order for glucerna  Order tube feeding with no oral diet: Recommend Jevity 1 2 @10ml/hr advance as tolerated to goal rate 60ml/hr to provide 1440ml, 1728kcal, 80g pro, 1166ml free water  If IV fluids are stopped, add free water flushes of at least 100ml q 6 hours (would provide 1566ml total free water from flushes and TF)  Replete K  Monitor weight and electrolytes

## 2019-10-23 NOTE — CONSULTS
Consult- Chelly Castillo 1935, 82 y o  female MRN: [id number]    Unit/Bed#: [facility] 530-01 Encounter: [id number]    Primary Care Provider: Linwood Lau MD   Date and time admitted to hospital: 10/21/2019  7:43 PM      Inpatient consult to Jeanne Watson performed by: Anand Betancur PA-C  Consult ordered by: Fabiana Segovia PA-C        Acute respiratory failure with hypercapnia [facility])  Assessment & Plan  · No utility in Bipap at this time  Patient alert and oriented at the time of my examination, likely hypercapnia improved at this time  · Goal SpO2 >92%, on 4L NC    * Lethargy  Assessment & Plan  · Improved though patient remains lethargic  · Ammonia 62, would hold off on lactulose at this time given rectal bleeding  · Frequent neuro checks  · Trend LFTs  · Can consider Bipap if worsening lethargy given hypercapnia prior, though not needed at this time  · Avoid sedating medications, benzos  Would discontinue tramadol    Internal jugular (IJ) vein thromboembolism, acute, right (HCC)  Assessment & Plan  · Continue heparin gtt despite rectal bleeding   Trend hemoglobin, close monitoring  · Vascular surgery following    Rectal bleeding  Assessment & Plan  · Given 1 unit prbcs by primary team, recommend Lasix 40mg following given clinical volume overload and subjective shortness of breath  · Trend hemoglobin, transfuse for > 7 0  · GI consult    Type 1 diabetes mellitus with diabetic neuropathy [facility])  Assessment & Plan  Lab Results   Component Value Date    HGBA1C 6 4 (H) 10/22/2019       Recent Labs     10/22/19  1934 10/22/19  2124 10/22/19  2330 10/23/19  0148   POCGLU 112 124 108 84       Blood Sugar Average: Last 72 hrs:  (P) 140 7     · Continue insulin gtt  · Decrease D5 1/2NS infusion to 50cc/hr given volume overload    -------------------------------------------------------------------------------------------------------------  Chief Complaint: "Everything hurts"    History of Present Illness     Mihai Dowell is a 82 y o  female who presented to the hospital early yesterday morning with increased lethargy  Patient was recently admitted to [facility] from 10/4-10/18, initially for elective hysterectomy secondary to total hysterectomy and BSO, omentectomy, and peritoneal biopsy for ovarian mass  Her post-op course was complicated but aspiration pneumonitis, acute hypoxic respiratory failure requiring intubation, ARDS, PEA arrest, GI bleed, R IJ DVT, R arm superficial thrombophlebitis, and new onset AFib with RVR  She was ultimately discharged to [facility] on 10/18  On 10/22, patient presented from [facility] with increased lethargy, hypoglycemia, and minimal responsiveness  Septic workup was essentially negative, with a lactic of 1 3, negative procal, and a [place] 27612  She was started on cefepime for a questionable aspiration pneumonia  Vascular surgery was consulted for RUE DVT/thrombophlebitis and started a heparin drip  Early this morning, critical care was consulted for persistent lethargy as well as new rectal bleeding noted by nursing  Hemoglobin at that time was 8 4  ABG revealed at respiratory acidosis of 7 30/76 5  Upon critical care evaluation, patient alert and oriented though appears drowsy  History obtained from chart review and the patient   -------------------------------------------------------------------------------------------------------------  Dispo: Continue Stepdown Level 1 level of care     Code Status: Level 1 - Full Code  --------------------------------------------------------------------------------------------------------------  Review of Systems    A 12-point, complete review of systems was reviewed and negative except as stated above     Physical Exam   Constitutional: She is oriented to person, place, and time  Vital signs are normal  She appears ill  No distress  Nasal cannula in place  HENT:   Head: Normocephalic and atraumatic     Eyes: Pupils are equal, round, and reactive to light  Neck: Neck supple  Cardiovascular: Normal rate and regular rhythm  Pulses:       Radial pulses are 2+ on the right side, and 2+ on the left side  Dorsalis pedis pulses are 2+ on the right side, and 2+ on the left side  Pulmonary/Chest: Breath sounds normal  Tachypnea noted  No respiratory distress  She has no decreased breath sounds  She has no wheezes  She has no rhonchi  Abdominal: Soft  She exhibits no distension  There is no tenderness  Musculoskeletal:   1-2+ peripheral edema   Neurological: She is alert and oriented to person, place, and time  GCS eye subscore is 4  GCS verbal subscore is 5  GCS motor subscore is 6     Non-focal neuro exam   Skin: She is not diaphoretic      --------------------------------------------------------------------------------------------------------------  Historical Information   Past Medical History:   Diagnosis Date    Anxiety     Arthritis     Constipation     Diabetes mellitus (HCC)     IDDM    Frequent UTI     Glaucoma     Hearing loss     Hyperlipidemia     Hypertension     Hyperthyroidism     in past    Hyponatremia     Hypothyroid     Neuropathy     bles    Right tubo-ovarian mass     Wears glasses      Past Surgical History:   Procedure Laterality Date    APPENDECTOMY  1956    CATARACT EXTRACTION Bilateral     COLONOSCOPY  09/2014    Completed - Dr Sarah Mcgregor, due in 5 years    HYSTERECTOMY N/A 10/4/2019    Procedure: LAP TOTAL HYSTERECTOMY, BSO;  Surgeon: Trent Escalante MD;  Location: [facility];  Service: Gynecology Oncology    LAPAROTOMY N/A 10/4/2019    Procedure: EXPLORATORY LAPAROTOMY  EXLAP OMENTECTOMY  PELVIC AND DEANGELO-AORTIC LYMPH NODE DISSECTION  PERITONEAL BIOPSY TRANS ABDOMINUS BLOCK;  Surgeon: Trent Escalante MD;  [facility];  Service: Gynecology Oncology    SKIN LESION EXCISION      Ears     Social History   Social History     Substance and Sexual Activity Alcohol Use Never    Frequency: Monthly or less    Drinks per session: 1 or 2    Binge frequency: Never    Comment: wine     Social History     Substance and Sexual Activity   Drug Use No     Social History     Tobacco Use   Smoking Status Never Smoker   Smokeless Tobacco Never Used     Family History: I have reviewed this patient's family history and commented on sigificant items within the HPI    Vitals:   Vitals:    10/23/19 0100 10/23/19 0146 10/23/19 0200 10/23/19 0223   BP: 113/56 100/51 98/55 98/55   Pulse: 76 58 70 68   Resp:    20   Temp:    97 5 °F (36 4 °C)   TempSrc:       SpO2:    95%   Weight:       Height:         Temp  Min: 97 2 °F (36 2 °C)  Max: 98 9 °F (37 2 °C)  IBW: 57 kg  Height: 5' 5" (165 1 cm)  Body mass index is 23 08 kg/m²        Medications:  Current Facility-Administered Medications   Medication Dose Route Frequency    acetaminophen (TYLENOL) tablet 650 mg  650 mg Per NG Tube Q6H PRN    albuterol inhalation solution 2 5 mg  2 5 mg Nebulization Q4H PRN    aluminum-magnesium hydroxide-simethicone (MYLANTA) 200-200-20 mg/5 mL oral suspension 15 mL  15 mL Per NG Tube Q6H PRN    artificial tear (LUBRIFRESH P M ) ophthalmic ointment   Both Eyes HS PRN    cefepime (MAXIPIME) 2 g/50 mL dextrose IVPB  2,000 mg Intravenous Q12H    dextrose 5 % and sodium chloride 0 45 % infusion  50 mL/hr Intravenous Continuous    dorzolamide-timolol (COSOPT) 22 3-6 8 MG/ML ophthalmic solution 1 drop  1 drop Both Eyes BID    heparin (porcine) 25,000 units in 250 mL infusion (premix)  3-30 Units/kg/hr (Order-Specific) Intravenous Titrated    heparin (porcine) injection 2,400 Units  2,400 Units Intravenous PRN    heparin (porcine) injection 4,800 Units  4,800 Units Intravenous PRN    insulin regular (HumuLIN R,NovoLIN R) 1 Units/mL in sodium chloride 0 9 % 100 mL infusion  0 3-21 Units/hr Intravenous Titrated    levothyroxine tablet 50 mcg  50 mcg Per NG Tube Early Morning    metoprolol (LOPRESSOR) injection 2 5 mg  2 5 mg Intravenous Q6H    Netarsudil Dimesylate 0 02 % SOLN 1 drop  1 drop Ophthalmic Daily    Netarsudil Dimesylate 0 02 % SOLN 1 drop  1 drop Right Eye HS    ondansetron (ZOFRAN) injection 4 mg  4 mg Intravenous Q6H PRN    pantoprazole (PROTONIX) injection 40 mg  40 mg Intravenous Q24H GENNARO    senna 8 8 mg/5 mL oral syrup 8 8 mg  8 8 mg Per NG Tube Daily PRN    traMADol (ULTRAM) tablet 50 mg  50 mg Per NG Tube Q6H PRN    travoprost (TRAVATAN-Z) 0 004 % ophthalmic solution 1 drop  1 drop Both Eyes HS     Home medications:  Prior to Admission Medications   Prescriptions Last Dose Informant Patient Reported? Taking?    ERROR: CANNOT USE RATIO BASED PRESCRIPTION MIXTURE NAMING FOR A NON-MIXTURE   No No   Sig: Take 10 mL (20 mg total) by mouth daily   LORazepam (ATIVAN) 0 5 mg tablet  Self No Yes   Sig: Take 1 tablet (0 5 mg total) by mouth 3 (three) times a day   Patient taking differently: Take 0 5 mg by mouth 2 (two) times a day    Netarsudil Dimesylate (RHOPRESSA) 0 02 % SOLN   Yes Yes   Sig: Apply to eye   RHOPRESSA 0 02 % SOLN   Yes No   Sig: Administer 1 drop to the right eye daily at bedtime    acetaminophen (TYLENOL) 325 mg tablet   No No   Sig: Take 2 tablets (650 mg total) by mouth every 6 (six) hours as needed for mild pain   aluminum-magnesium hydroxide-simethicone (MYLANTA) 200-200-20 mg/5 mL suspension   No No   Sig: Take 30 mL by mouth every 4 (four) hours as needed for indigestion or heartburn   apixaban (ELIQUIS) 5 mg   No Yes   Sig: Take 1 tablet (5 mg total) by mouth 2 (two) times a day   artificial tear (LUBRIFRESH P M ) 83-15 % ophthalmic ointment   No No   Sig: Administer to both eyes daily at bedtime as needed (dry eyes)   dorzolamide-timolol (COSOPT) 22 3-6 8 MG/ML ophthalmic solution  Self Yes Yes   Sig: Administer 1 drop to both eyes 2 (two) times a day    furosemide (LASIX) 40 mg tablet   Yes Yes   Sig: Take 40 mg by mouth daily   insulin glargine (LANTUS) 100 units/mL subcutaneous injection Not Taking at Unknown time  No No   Sig: Inject 20 Units under the skin every 12 (twelve) hours   Patient not taking: Reported on 10/21/2019   insulin lispro (HumaLOG) 100 units/mL injection   No No   Sig: Inject 2-12 Units under the skin every 6 (six) hours   latanoprost (XALATAN) 0 005 % ophthalmic solution   Yes Yes   Si drop daily at bedtime   levalbuterol (XOPENEX) 1 25 mg/0 5 mL nebulizer solution   No Yes   Sig: Take 0 5 mL (1 25 mg total) by nebulization 3 (three) times a day   levothyroxine 50 mcg tablet   No Yes   Sig: Take 1 tablet (50 mcg total) by mouth daily   metoprolol tartrate (LOPRESSOR) 25 mg tablet   No Yes   Sig: Take 1 tablet (25 mg total) by mouth every 12 (twelve) hours   omeprazole (PriLOSEC) 20 mg delayed release capsule Not Taking at Unknown time  Yes No   Sig: Take 20 mg by mouth daily   ondansetron (ZOFRAN) 4 mg/2 mL injection   No No   Sig: Infuse 2 mL (4 mg total) into a venous catheter every 6 (six) hours as needed for nausea   pantoprazole (PROTONIX) 40 mg tablet   Yes Yes   Sig: Take 40 mg by mouth daily   polyethylene glycol (MIRALAX) 17 g packet   No No   Sig: Take 17 g by mouth daily   sodium chloride 0 9 % nebulizer solution   No No   Sig: Take 3 mL by nebulization 3 (three) times a day   traMADol (ULTRAM) 50 mg tablet   No No   Sig: Take 1 tablet (50 mg total) by mouth every 6 (six) hours as needed for moderate pain for up to 10 days   traZODone (DESYREL) 50 mg tablet   No Yes   Sig: Take 1 tablet (50 mg total) by mouth daily at bedtime   travoprost (TRAVATAN Z) 0 004 % ophthalmic solution Not Taking at Unknown time Self Yes No   Sig: Administer 1 drop to both eyes daily at bedtime       Facility-Administered Medications: None     Allergies:   Allergies   Allergen Reactions    Thiazide-Type Diuretics      Hyponatremia         Laboratory and Diagnostics:  Results from last 7 days   Lab Units 10/22/19  2122 10/22/19  0950 10/22/19  0601 10/21/19  2030 10/18/19  0430 10/17/19  0547 10/16/19  0431   WBC Thousand/uL  --  15 06* 14 83* 13 69* 22 53* 24 61* 23 52*   HEMOGLOBIN g/dL 8 4* 8 2* 8 5* 9 0* 9 1* 9 0* 8 7*   HEMATOCRIT % 28 0* 28 1* 28 5* 29 9* 29 6* 30 0* 29 3*   PLATELETS Thousands/uL  --  584* 613* 635* 531* 487* 437*   NEUTROS PCT %  --   --  90*  --  89* 92* 89*   BANDS PCT %  --   --   --  1  --   --   --    MONOS PCT %  --   --  6  --  4 3* 2*   MONO PCT %  --   --   --  2*  --   --   --      Results from last 7 days   Lab Units 10/22/19  0601 10/21/19  2030 10/18/19  0430 10/17/19  0546 10/16/19  0431   SODIUM mmol/L 147* 146* 140 141 145   POTASSIUM mmol/L 3 9 3 4* 3 4* 3 9 4 2   CHLORIDE mmol/L 109* 105 99* 102 106   CO2 mmol/L 36* 36* 37* 36* 35*   ANION GAP mmol/L 2* 5 4 3* 4   BUN mg/dL 26* 26* 27* 25 26*   CREATININE mg/dL 0 57* 0 61 0 68 0 72 0 72   CALCIUM mg/dL 6 8* 7 1* 6 7* 6 7* 6 6*   GLUCOSE RANDOM mg/dL 137 216* 180* 191* 145*   ALT U/L  --  34  --   --   --    AST U/L  --  27  --   --   --    ALK PHOS U/L  --  100  --   --   --    ALBUMIN g/dL  --  2 0*  --   --   --    TOTAL BILIRUBIN mg/dL  --  0 54  --   --   --      Results from last 7 days   Lab Units 10/22/19  0601   MAGNESIUM mg/dL 2 3   PHOSPHORUS mg/dL 2 7      Results from last 7 days   Lab Units 10/22/19  2333 10/22/19  1930 10/22/19  0950 10/21/19  2030   INR   --   --  1 29* 1 55*   PTT seconds 72* 132* 26 27      Results from last 7 days   Lab Units 10/21/19  2030   TROPONIN I ng/mL 0 03     Results from last 7 days   Lab Units 10/22/19  0950 10/21/19  2030   LACTIC ACID mmol/L 0 7 1 3     ABG:  Results from last 7 days   Lab Units 10/22/19  2343   PH ART  7 306*   PCO2 ART mm Hg 76 5*   PO2 ART mm Hg 133 0*   HCO3 ART mmol/L 37 3*   BASE EXC ART mmol/L 9 3   ABG SOURCE  Radial, Left     VBG:  Results from last 7 days   Lab Units 10/22/19  2343   ABG SOURCE  Radial, Left     Results from last 7 days   Lab Units 10/22/19  1218 10/16/19  0431   PROCALCITONIN ng/ml 0 16 0 97*         Micro:  Results from last 7 days   Lab Units 10/21/19  2149 10/21/19  2030 10/16/19  1607   BLOOD CULTURE  No Growth at 24 hrs  No Growth at 24 hrs   --    GRAM STAIN RESULT   --   --  2+ Epithelial cells per low power field*  1+ Disintegrating polys*  Rare Gram positive cocci in pairs*  Rare Gram negative rods*       Imaging: I have personally reviewed pertinent reports  ------------------------------------------------------------------------------------------------------------  Advance Directive and Living Will:      Power of :    POLST:    ------------------------------------------------------------------------------------------------------------  Counseling / Coordination of Care  Total Critical Care time spent 0 minutes excluding procedures, teaching and family updates          Lorna Mcguire PA-C

## 2019-10-23 NOTE — ASSESSMENT & PLAN NOTE
· Given 1 unit prbcs by primary team, recommend Lasix 40mg following given clinical volume overload and subjective shortness of breath  · Trend hemoglobin, transfuse for > 7 0  · GI consult

## 2019-10-23 NOTE — SPEECH THERAPY NOTE
Speech Language/Pathology    Speech/Language Pathology Progress Note    Patient Name: Mariela Adame  Today's Date: 10/23/2019     Problem List  Principal Problem:    Lethargy  Active Problems:    Adult onset hypothyroidism    ANAYA (generalized anxiety disorder)    Hypertension    Type 1 diabetes mellitus with diabetic neuropathy (HCC)    Diabetic neuropathy, painful (HCC)    Ovarian cancer ([facility] [address] )    Internal jugular (IJ) vein thromboembolism, acute, right (HCC)    Superficial thrombophlebitis of right upper extremity    A-fib (HCC)    Acute respiratory failure with hypercapnia (HCC)    Rectal bleeding       Past Medical History  Past Medical History:   Diagnosis Date    Anxiety     Arthritis     Constipation     Diabetes mellitus (HCC)     IDDM    Frequent UTI     Glaucoma     Hearing loss     Hyperlipidemia     Hypertension     Hyperthyroidism     in past    Hyponatremia     Hypothyroid     Neuropathy     bles    Right tubo-ovarian mass     Wears glasses         Past Surgical History  Past Surgical History:   Procedure Laterality Date    APPENDECTOMY  1956    CATARACT EXTRACTION Bilateral     COLONOSCOPY  09/2014    Completed - Dr Dez Garg, due in 5 years    HYSTERECTOMY N/A 10/4/2019    Procedure: LAP TOTAL HYSTERECTOMY, BSO;  Surgeon: Yulissa Presley MD;  [facility];  Service: Gynecology Oncology    LAPAROTOMY N/A 10/4/2019    Procedure: EXPLORATORY LAPAROTOMY  EXLAP OMENTECTOMY  PELVIC AND DEANGELO-AORTIC LYMPH NODE DISSECTION  PERITONEAL BIOPSY TRANS ABDOMINUS BLOCK;  Surgeon: Yulissa Presley MD;  [facility];  Service: Gynecology Oncology    SKIN LESION EXCISION      Ears         Subjective:  Patient is asleep, but wakes easily to verbal cues  Mostly aphonic and needs max encouragement for any voicing  Objective: The patient was very lethargic this am and required sternal rub for arousal  She continues on 6L NC with sats around 90%   Daughter is present and educated on results of VBS completed prior to admission  Repeat VBS is ordered to be completed, but patient is too lethargic and not quite stable enough to participate  Discussed nutrition options for now and daughter agrees with [facility] placement  RN and Dr Pipe Aquino notified and agree  Will hold video swallow order for now and completed as appropriate  Patient is given oral swab and can independently sweep mouth  However, no swallow is initiated today  Assessment:  Patient continues at high risk of aspiration and requires alternate means of nutrition  Plan/Recommendations:  Nutrition via Keofeed  Continued ST to assess swallow function

## 2019-10-23 NOTE — MALNUTRITION/BMI
This medical record reflects one or more clinical indicators suggestive of malnutrition  Malnutrition Findings:   Malnutrition type: Acute illness(malnutrition related to disease/condition as evidenced by <50% energy intake for >5 days, +2 edema in extremities, and moderate depletion of orbital body fat to be treated with enteral nutrition while not safe for PO intake )  Degree of Malnutrition: Other severe protein calorie malnutrition  Malnutrition Characteristics: Fat loss, Inadequate energy, Fluid accumulation      See Nutrition note dated 10/23/19 for additional details  Completed nutrition assessment is viewable in the nutrition documentation

## 2019-10-23 NOTE — ASSESSMENT & PLAN NOTE
Patient was on Eliquis as an outpatient    Currently on heparin drip given right upper extremity ischemia

## 2019-10-23 NOTE — ASSESSMENT & PLAN NOTE
· Continue heparin gtt despite rectal bleeding   Trend hemoglobin, close monitoring  · Vascular surgery following

## 2019-10-23 NOTE — ASSESSMENT & PLAN NOTE
Patient was admitted with lethargy coming from the [facility]  Patient with waxing and waning of mental status with the periods of lethargy  Patient was more lethargic after getting the Ativan yesterday patient only got 0 25 mg of Ativan x1  Will DC Ativan    Encephalopathy is likely multifactorial-likely toxic metabolic  Neurology evaluation appreciated  Patient was on  and tramadol which was discontinued in the facility  Her lethargy is multifactorial/poor nutrition/deconditioning

## 2019-10-23 NOTE — RESPIRATORY THERAPY NOTE
RT Ventilator Management Note  Wilfred Villarreal 82 y o  female MRN: [id number]  Unit/Bed#: [facility] 530-01 Encounter: [id number]      Daily Screen     No data found in the last 10 encounters              Physical Exam:   Assessment Type: Assess only  General Appearance: Awake(pt states she feels exhausted)  Respiratory Pattern: Shallow  Chest Assessment: Chest expansion symmetrical  Bilateral Breath Sounds: Diminished  Cough: Weak  O2 Device: oxymiser pendent      Resp Comments: pt placed on bipap 2nd to ABG, placed on 14/5 60% per Hope oliva

## 2019-10-23 NOTE — CONSULTS
Consultation - [address] [facility]  Mio Merida 82 y o  female MRN: [id number]  Unit/Bed#: [facility] 530-01 Encounter: [id number]        Inpatient consult to gastroenterology     Performed by  Megan Barraza MD     Authorized by Beryle Brittle, PA-C          Reason for Consult / Principal Problem:   GI Bleeding    ASSESSMENT AND PLAN:    Mio Merida is a 82 y o  old female with PMH:   Type 1 diabetes complicated by diabetic neuropathy, stage IB ovarian cancer S/P surgery on 10/04/19,  Protein calorie malnutrition, respiratory failure who presents for anemia and concern of GI bleed in the setting of heparin drip  #Macrocytic anemia  At this time would not recommend urgent endoscopic evaluation  Patient has soft blood pressures as well as bradycardic episodes and should 1st be optimized prior from cardiac/respiratory standpoint prior to endoscopic evaluation  Not concerned that there is an active aggressive bleed at this time and that if she is having about some luminal bleeding is likely lower in nature and possibly hemorrhoidal bleeding  Given that she did have ICU stay previously she is at increased risk for developing ulcers as well as ischemic colitis however given her presentation at this time not concerned for overt bleed  At this time given her dysphagia patient may not be able to tolerate colonoscopy prep so will allow her to regain some function 1st and then undergo a colonoscopy either later this admission or as an outpatient as she is due for screening colonoscopy anyways  If patient has any evidence of maninder hematochezia, melena, hematemesis we will more urgently intervene with endoscopic evaluation  Will continue to trend hemoglobin and continue to follow along       Plan:  -continue IV PPI 40mg   -can check Fe studies, but given recent transfusion may not be accurate  -rectal exam with brown stool, no melena visualized  -patient on heparin drip, monitor CBC q8hr  -check folate levels, B12 normal recently   -Avoid NSAIDs  -Please call on call GI if patient develops recurrent active signs of bleeding (hemodynamic instability, recurrent hematemesis, etc)  -Low threshold to transfer patient to ICU if patient has maninder hematemesis, hypotension or precipitous drop in H/H    ______________________________________________________________________    HPI:    Patient recently hospitalized to [facility] in setting of incidental finding of ovarian mass underwent hysterectomy and oophorectomy on 10/04/2019  Was doing well and then a rapid response was called secondary to  Tachypnea, dysphagia patient underwent cardiac arrest   Patient underwent chest compressions for 6 minutes with return circulation  Patient was admitted to the ICU team with new onset atrial fibrillation and treated for ARDS  Patient at that time developed some dysphagia and uncontrolled diabetes was in respiratory distress  Patient was found to have a pneumonia with ARDS and was then discharged to the [facility] for further rehabilitation  Patient was eventually downgraded and then medically stable to transfer to [facility] facility on 10/18/2019  Patient was at [facility] facility for a few days when her daughter noticed that her blood sugar was down to 20 and she was concerned that there were not appropriately managing her diabetes, and in setting of worsening mentation/sensorium and at her transfer her back to [facility]  It appears patient has been developing worsening dysphagia in addition to lethargy  Patient with decreased p o  Intake  Patient is on heparin drip in the setting of right upper extremity ischemia with concern for right IJ thrombus per imaging studies  Patient was seen by Dr Leonor Granado on 10/08 during previous admission and endoscopic evaluation was deferred at that time given her tenuous course       Patient underwent colonoscopy with Dr Alie Hopper in 2014 with biopsy showing a tubular adenoma in the ascending colon and rectal polyp with tubular adenoma but no evidence of high-grade dysplasia  No evidence of diverticulosis or other findings were seen at that time  REVIEW OF SYSTEMS:    CONSTITUTIONAL: Denies any fever, chills, rigors, and weight loss  HEENT: No earache or tinnitus  Denies hearing loss or visual disturbances  CARDIOVASCULAR: No chest pain or palpitations  RESPIRATORY: Denies any cough, hemoptysis, shortness of breath or dyspnea on exertion  GASTROINTESTINAL: As noted in the History of Present Illness  GENITOURINARY: No problems with urination  Denies any hematuria or dysuria  NEUROLOGIC: No dizziness or vertigo, denies headaches  MUSCULOSKELETAL: Denies any muscle or joint pain  SKIN: Denies skin rashes or itching  ENDOCRINE: Denies excessive thirst  Denies intolerance to heat or cold  PSYCHOSOCIAL: Denies depression or anxiety  Denies any recent memory loss       Historical Information   Past Medical History:   Diagnosis Date    Anxiety     Arthritis     Constipation     Diabetes mellitus (HCC)     IDDM    Frequent UTI     Glaucoma     Hearing loss     Hyperlipidemia     Hypertension     Hyperthyroidism     in past    Hyponatremia     Hypothyroid     Neuropathy     bles    Right tubo-ovarian mass     Wears glasses      Past Surgical History:   Procedure Laterality Date    APPENDECTOMY  1956    CATARACT EXTRACTION Bilateral     COLONOSCOPY  09/2014    Completed - Dr Leeann Ortiz, due in 5 years    HYSTERECTOMY N/A 10/4/2019    Procedure: LAP TOTAL HYSTERECTOMY, BSO;  Surgeon: Artis Woods MD;  Location: [facility];  Service: Gynecology Oncology    LAPAROTOMY N/A 10/4/2019    Procedure: EXPLORATORY LAPAROTOMY  EXLAP OMENTECTOMY  PELVIC AND DEANGELO-AORTIC LYMPH NODE DISSECTION  PERITONEAL BIOPSY TRANS ABDOMINUS BLOCK;  Surgeon: Artis Woods MD;  Location: [facility];  Service: Gynecology Oncology    SKIN LESION EXCISION      Ears Social History   Social History     Substance and Sexual Activity   Alcohol Use Never    Frequency: Monthly or less    Drinks per session: 1 or 2    Binge frequency: Never    Comment: wine     Social History     Substance and Sexual Activity   Drug Use No     Social History     Tobacco Use   Smoking Status Never Smoker   Smokeless Tobacco Never Used     Family History   Problem Relation Age of Onset    Heart attack Mother     Diabetes type II Mother    Rios Dementia Father     Stroke Father         CVA    Breast cancer Maternal Aunt     Stomach cancer Maternal Uncle     Diabetes Family     Stroke Family         Complications       Meds/Allergies     Medications Prior to Admission   Medication    apixaban (ELIQUIS) 5 mg    dorzolamide-timolol (COSOPT) 22 3-6 8 MG/ML ophthalmic solution    furosemide (LASIX) 40 mg tablet    latanoprost (XALATAN) 0 005 % ophthalmic solution    levalbuterol (XOPENEX) 1 25 mg/0 5 mL nebulizer solution    levothyroxine 50 mcg tablet    LORazepam (ATIVAN) 0 5 mg tablet    metoprolol tartrate (LOPRESSOR) 25 mg tablet    Netarsudil Dimesylate (RHOPRESSA) 0 02 % SOLN    pantoprazole (PROTONIX) 40 mg tablet    traZODone (DESYREL) 50 mg tablet    acetaminophen (TYLENOL) 325 mg tablet    aluminum-magnesium hydroxide-simethicone (MYLANTA) 200-200-20 mg/5 mL suspension    artificial tear (LUBRIFRESH P M ) 83-15 % ophthalmic ointment    ERROR: CANNOT USE RATIO BASED PRESCRIPTION MIXTURE NAMING FOR A NON-MIXTURE    insulin glargine (LANTUS) 100 units/mL subcutaneous injection    insulin lispro (HumaLOG) 100 units/mL injection    omeprazole (PriLOSEC) 20 mg delayed release capsule    ondansetron (ZOFRAN) 4 mg/2 mL injection    polyethylene glycol (MIRALAX) 17 g packet    RHOPRESSA 0 02 % SOLN    sodium chloride 0 9 % nebulizer solution    traMADol (ULTRAM) 50 mg tablet    travoprost (TRAVATAN Z) 0 004 % ophthalmic solution     Current Facility-Administered Medications   Medication Dose Route Frequency    acetaminophen (TYLENOL) tablet 650 mg  650 mg Per NG Tube Q6H PRN    albuterol inhalation solution 2 5 mg  2 5 mg Nebulization Q4H PRN    aluminum-magnesium hydroxide-simethicone (MYLANTA) 200-200-20 mg/5 mL oral suspension 15 mL  15 mL Per NG Tube Q6H PRN    artificial tear (LUBRIFRESH P M ) ophthalmic ointment   Both Eyes HS PRN    cefepime (MAXIPIME) 2 g/50 mL dextrose IVPB  2,000 mg Intravenous Q12H    dextrose 5 % and sodium chloride 0 45 % infusion  50 mL/hr Intravenous Continuous    dorzolamide-timolol (COSOPT) 22 3-6 8 MG/ML ophthalmic solution 1 drop  1 drop Both Eyes BID    heparin (porcine) 25,000 units in 250 mL infusion (premix)  3-30 Units/kg/hr (Order-Specific) Intravenous Titrated    heparin (porcine) injection 2,400 Units  2,400 Units Intravenous PRN    heparin (porcine) injection 4,800 Units  4,800 Units Intravenous PRN    insulin regular (HumuLIN R,NovoLIN R) 1 Units/mL in sodium chloride 0 9 % 100 mL infusion  0 3-21 Units/hr Intravenous Titrated    levothyroxine tablet 50 mcg  50 mcg Per NG Tube Early Morning    metoprolol (LOPRESSOR) injection 2 5 mg  2 5 mg Intravenous Q6H    Netarsudil Dimesylate 0 02 % SOLN 1 drop  1 drop Ophthalmic Daily    Netarsudil Dimesylate 0 02 % SOLN 1 drop  1 drop Right Eye HS    ondansetron (ZOFRAN) injection 4 mg  4 mg Intravenous Q6H PRN    pantoprazole (PROTONIX) injection 40 mg  40 mg Intravenous Q24H GENNARO    senna 8 8 mg/5 mL oral syrup 8 8 mg  8 8 mg Per NG Tube Daily PRN    traMADol (ULTRAM) tablet 50 mg  50 mg Per NG Tube Q6H PRN    travoprost (TRAVATAN-Z) 0 004 % ophthalmic solution 1 drop  1 drop Both Eyes HS     Allergies   Allergen Reactions    Thiazide-Type Diuretics      Hyponatremia       Objective     Blood pressure 106/52, pulse 80, temperature (!) 97 4 °F (36 3 °C), resp  rate (!) 24, height 5' 5" (1 651 m), weight 66 7 kg (147 lb 0 8 oz), SpO2 91 %, not currently breastfeeding  Body mass index is 24 47 kg/m²  Intake/Output Summary (Last 24 hours) at 10/23/2019 9507  Last data filed at 10/23/2019 0440  Gross per 24 hour   Intake 1807 36 ml   Output 1708 ml   Net 99 36 ml       PHYSICAL EXAM:      General Appearance:    alert but drowsy female lying in bed, appropriately nodding to questions   HEENT:   Normocephalic, atraumatic, anicteric  Neck:   Supple, symmetrical, trachea midline   Lungs:   Clear to auscultation bilaterally; no rales, rhonchi or wheezing; respirations unlabored    Heart:   Regular rate and rhythm; no murmur, rub, or gallop  Abdomen:    soft abdomen with no tenderness to deep palpation, bowel sounds present   Genitalia:   Deferred    Rectal:   Brown stool, decreased sphincter tone  No melanotic stool  Extremities:  No cyanosis, clubbing or edema    Pulses:  2+ and symmetric all extremities    Skin:  No jaundice, rashes, or lesions    Lymph nodes:  No palpable cervical lymphadenopathy      Lab Results:   No results displayed because visit has over 200 results  Imaging Studies: I have personally reviewed pertinent imaging studies  Ct Chest Abdomen Pelvis Wo Contrast    Addendum Date: 10/8/2019 Addendum:   ADDENDUM: Dense perihilar opacities may be due to aspiration and/or ARDS  Result Date: 10/8/2019  Narrative: CT CHEST, ABDOMEN AND PELVIS WITHOUT IV CONTRAST INDICATION:   Sepsis  COMPARISON:  CT of the chest on 10/7/2019  CT of abdomen pelvis on 8/30/2019  TECHNIQUE: CT examination of the chest, abdomen and pelvis was performed without intravenous contrast   Axial, sagittal, and coronal 2D reformatted images were created from the source data and submitted for interpretation  Radiation dose length product (DLP) for this visit:  607 mGy-cm     This examination, like all CT scans performed in the [facility], was performed utilizing techniques to minimize radiation dose exposure, including the use of iterative reconstruction and automated exposure control  Enteric contrast was not administered  FINDINGS: CHEST LUNGS:  There is endotracheal tube which terminates above the aaron  There is interval development of dense perihilar opacities more severe within the left greater than right lower lobes  The central airways are patent  PLEURA:  Bilateral pleural effusions similar prior examination  HEART/GREAT VESSELS:  Coronary artery calcifications  No pericardial effusion  Right-sided central venous catheter terminating within the superior vena cava  MEDIASTINUM AND MYRNA:  Unremarkable  CHEST WALL AND LOWER NECK:   Persistent anterior chest wall subcutaneous emphysema which may be postsurgical  ABDOMEN LIVER/BILIARY TREE:  Unremarkable  GALLBLADDER:  Hyperdensity within the gallbladder may be due to vicarious excretion of contrast material  SPLEEN:  Unremarkable  PANCREAS:  Unremarkable  ADRENAL GLANDS:  Unremarkable  KIDNEYS/URETERS:  Persistent bilateral nephrograms may be seen in setting of acute tubular necrosis  No hydronephrosis  STOMACH AND BOWEL: Evaluation limited due to lack of oral and enteric contrast   Enteric tube is coiled within the stomach with its tip in the distal stomach  There are multiple dilated loops of small bowel measuring up to 4 cm in diameter with scattered areas of long segment fecalization  No definite abrupt transition point is seen  Gas is seen within the colon  APPENDIX:  No findings to suggest appendicitis  ABDOMINOPELVIC CAVITY:  Near resolution of previously seen ascites  There is an high density collection within the pelvis measuring 7 6 x 5 1 x 7 4 cm (series 2, image 94) compatible with hematoma  3 4 x 1 6 cm low-density collection along the left external iliac vessels may reflect a small lymphocele  VESSELS:  Mild atherosclerotic calcifications  PELVIS REPRODUCTIVE ORGANS:  Status post hysterectomy   URINARY BLADDER:  Bladder is collapsed about a Ji catheter and contains a small amount of excreted contrast  ABDOMINAL WALL/INGUINAL REGIONS:  Diffuse subcutaneous edema  Scattered subcutaneous emphysema is seen which may be postsurgical  OSSEOUS STRUCTURES:  No acute fracture or destructive osseous lesion  Degenerative changes of the osseous structures  Impression: 1  Interval development of dense perihilar opacities most severe within the left greater than right lower lobes most compatible with aspiration in the setting of intubation  Bilateral pleural effusions similar prior examination  2   Persistent bilateral nephrograms which may be seen in the setting of acute tubular necrosis  3   Multiple dilated loops of small bowel measuring up to 4 cm in diameter with scattered areas of long segment fecalization  No definite abrupt transition point is seen however evaluation is limited due to lack of oral and enteric contrast   Gas is seen within the colon  Findings may be due to severe ileus versus partial bowel obstruction  4   High density collection within the pelvis measuring 7 6 x 5 1 x 7 4 cm compatible with hematoma  5   3 4 x 1 6 cm low density collection along the left external iliac vessels may reflect a small lymphocele  The study was marked in [facility] for immediate notification  Workstation performed: [id number]     Xr Chest 1 View Portable    Result Date: 10/22/2019  Narrative: CHEST INDICATION:   bradycardia  COMPARISON:  10/18/2019 EXAM PERFORMED/VIEWS:  XR CHEST PORTABLE FINDINGS:  Lines and tubes are unchanged from prior exam  Cardiomediastinal silhouette appears unremarkable  Minimal improvement in bilateral airspace opacities  Small pleural effusions  Osseous structures appear within normal limits for patient age  Impression: Minimal improvement in bilateral airspace opacities  Small pleural effusions  Workstation performed: [id number]     Xr Chest Portable    Result Date: 10/17/2019  Narrative: CHEST INDICATION:   post bronch   COMPARISON:  10/16/2019 (901) EXAM PERFORMED/VIEWS:  XR CHEST PORTABLE FINDINGS:  Endogastric tube and left-sided central catheter appear stable from prior  Cardiomediastinal silhouette is stable as well  There is some fullness of the right hilum  Moderately extensive bilateral pulmonary consolidations are seen  Localized irregular dense opacity in the right mid to upper lung field is stable  Right paramediastinal opacity in the upper chest is stable  Diffuse hazy infiltrate throughout much of the left lung is stable  No gross evidence of pleural fluid or pneumothorax or mediastinal shift  Osseous structures appear within normal limits for patient age  Impression: Stable appearance of diffuse infiltrates and line and tube Workstation performed: [id number]     Xr Chest Portable    Result Date: 10/16/2019  Narrative: CHEST INDICATION:   pulm edema  COMPARISON:  10/14/2019 EXAM PERFORMED/VIEWS:  XR CHEST PORTABLE FINDINGS:  Left PICC line tip overlies the SVC  Nasogastric tube tip overlies the stomach  Cardiomediastinal silhouette appears unremarkable  Increasing bilateral pulmonary opacities noted likely representing pulmonary edema noted  No pneumothorax or pleural effusion  Osseous structures appear within normal limits for patient age  Impression: Interval increase in bilateral pulmonary opacities likely representing pulmonary edema  Workstation performed: [id number]     Xr Chest Portable    Result Date: 10/14/2019  Narrative: CHEST INDICATION:   hypoxia  COMPARISON:  CT from the day before  EXAM PERFORMED/VIEWS:  XR CHEST PORTABLE FINDINGS:  The feeding tube is coursing below left hemidiaphragm with its tip not visualized  Cardiomediastinal silhouette appears unremarkable  Patchy airspace disease bilaterally, overall mildly improved since recent  radiograph allowing for different technique  Layering effusions are present  Osseous structures appear within normal limits for patient age       Impression: Feeding tube coursing below left hemidiaphragm with tip not visualized  Haziness of the lungs bilaterally appears overall mildly improved since recent CT allowing for differences in technique  Workstation performed: [id number]     Xr Chest Portable    Result Date: 10/13/2019  Narrative: CHEST INDICATION:   RESP FAILURE  COMPARISON:  10/12/2019 EXAM PERFORMED/VIEWS:  XR CHEST PORTABLE FINDINGS: Cardiomediastinal silhouette appears unremarkable  Persistent patchy airspace opacities bilaterally, right greater than left  Osseous structures appear within normal limits for patient age  Impression: Right greater than left patchy airspace opacities remain stable and are again concerning for infiltrates  Workstation performed: [id number]     Xr Chest Portable    Result Date: 10/12/2019  Narrative: CHEST INDICATION:   pneumonia  COMPARISON:  10/11/2019 EXAM PERFORMED/VIEWS:  XR CHEST PORTABLE FINDINGS: Cardiomediastinal silhouette appears unremarkable  Diffuse mid to lower lung zone interstitial and airspace infiltrates in the left lung appears stable  Moderate interval worsening of airspace consolidation in the right parahilar region with infiltrates also increasing in the right upper lobe and lung base  No pneumothorax  No pleural effusion  Osseous structures appear within normal limits for patient age  Impression: Worsening infiltrates on the right  Stable infiltrates on the left  The study was marked in [facility] for immediate notification  Workstation performed: [id number]     Xr Chest Portable    Result Date: 10/11/2019  Narrative: CHEST INDICATION:  Follow-up infiltrates, extubation  COMPARISON:  10/9/2019, CT chest, abdomen and pelvis 10/8/2019 EXAM PERFORMED/VIEWS:  XR CHEST PORTABLE FINDINGS:  Life support lines and tubes have been removed (including ET and enteric tubes as well as right IJ central line)  No pneumothorax  Cardiomediastinal silhouette appears stable   Patchy diffuse bilateral pulmonary airspace opacities appear stable or slightly improved with the exception of perhaps slight progression in the left upper lung zone  Suspected small pleural effusions  Mild apical pleural thickening, left greater than right  Osseous structures appear within normal limits for patient age  Impression: Lines and tubes as above  No pneumothorax  Patchy diffuse bilateral pulmonary airspace opacities appear stable or slightly improved with the exception of perhaps slight progression in the left upper lung zone  Workstation performed: [id number]     Xr Chest Portable    Result Date: 10/9/2019  Narrative: CHEST INDICATION:   Ventilator dependent respiratory failure  COMPARISON:  10/7/2019 EXAM PERFORMED/VIEWS:  XR CHEST PORTABLE FINDINGS:  Endotracheal tube tip is approximately 1 8 cm above the aaron, central line tip located within the region of the cavoatrial junction and with nasogastric tube extending below left hemidiaphragm  The cardiac silhouette is without change from the prior study  Multifocal airspace opacities are once again identified, without a significant interval change  No pneumothorax  Osseous structures appear within normal limits for patient age  Impression: Endotracheal tube tip 1 8 cm above the aaron Continued multifocal airspace opacities, without significant change since 10/7/2019 Workstation performed: [id number]     Xr Chest Portable    Result Date: 10/8/2019  Narrative: CHEST INDICATION:   hypoxia  COMPARISON:  10/7/2019 EXAM PERFORMED/VIEWS:  XR CHEST PORTABLE  AP semierect Images: 1 FINDINGS:  Endotracheal tube is present, in satisfactory position with its tip above the level of the aaron  Enteric tube is present with its tip extending below the left hemidiaphragm  Right IJ catheter terminates at the caval atrial junction  No pneumothorax  Cardiomediastinal silhouette appears unremarkable  Persistent bilateral airspace opacities which appears more consolidative in the left lower lobe concerning for aspiration  No large pleural effusions or pneumothorax  Osseous structures appear within normal limits for patient age  Impression: 1  Lines and tubes stable  2   Persistent bilateral airspace opacities more consolidative in the left lower lobe, likely infectious versus atypical edema  Workstation performed: [id number]     Xr Abdomen 1 View Kub    Result Date: 10/22/2019  Narrative: ABDOMEN INDICATION:   Evaluate for ileus  COMPARISON:  CT 10/13/2019 VIEWS:  AP supine FINDINGS: No evidence of suggest small bowel obstruction There is a nasogastric tube with its tip in the distal stomach  Questionable radiopaque density noted within the stomach, correlate for any recent contrast administration for medication There is a large amount of stool identified within the ascending colon and rectum, with a rectal diameter of approximately 10 7 cm  Lung bases are not included on the field-of-view Visualized osseous structures are unremarkable for the patient's age  Impression: There is a large amount of stool in the ascending colon and rectum  Workstation performed: [id number]     Xr Abdomen 1 View Kub    Result Date: 10/8/2019  Narrative: OBSTRUCTION SERIES INDICATION:   abdominal distention  COMPARISON:  CT scan 8/30/2019  EXAM PERFORMED/VIEWS:  XR ABDOMEN 1 VIEW KUB FINDINGS: There is marked distention of the stomach  Bowel gas pattern is otherwise unremarkable  No free air beneath the hemidiaphragms  There is bilateral renal contrast excretion related to CT PE study performed earlier same day  Osseous structures are unremarkable  Examination of the chest reveals a normal cardiomediastinal silhouette  Lungs are clear  Impression: Marked gastric distention  Workstation performed: [id number]     Ct Head Without Contrast    Result Date: 10/21/2019  Narrative: CT BRAIN - WITHOUT CONTRAST INDICATION:   ams  COMPARISON:  CT head 10/13/2019  TECHNIQUE:  CT examination of the brain was performed    In addition to axial images, coronal 2D reformatted images were created and submitted for interpretation  Radiation dose length product (DLP) for this visit:  946 6 mGy-cm   This examination, like all CT scans performed in the [facility], was performed utilizing techniques to minimize radiation dose exposure, including the use of iterative reconstruction and automated exposure control  IMAGE QUALITY:  Diagnostic  FINDINGS: PARENCHYMA:  No intracranial mass, mass effect or midline shift  No CT signs of acute infarction  No acute parenchymal hemorrhage  VENTRICLES AND EXTRA-AXIAL SPACES:  Normal for the patient's age  VISUALIZED ORBITS AND PARANASAL SINUSES:  Unremarkable  CALVARIUM AND EXTRACRANIAL SOFT TISSUES:  Stable bilateral mastoid effusions  No evidence of erosive mastoiditis  Impression: No acute intracranial abnormality  Workstation performed: [id number]     Ct Head Wo Contrast    Result Date: 10/13/2019  Narrative: CT BRAIN - WITHOUT CONTRAST INDICATION:   Altered mental status  COMPARISON:  10/7/2019 and 8/30/2019 TECHNIQUE:  CT examination of the brain was performed  In addition to axial images, coronal 2D reformatted images were created and submitted for interpretation  Radiation dose length product (DLP) for this visit:  864 mGy-cm   This examination, like all CT scans performed in the [facility], was performed utilizing techniques to minimize radiation dose exposure, including the use of iterative reconstruction and automated exposure control  IMAGE QUALITY:  Diagnostic  FINDINGS: PARENCHYMA:  Mild to moderate patchy periventricular and subcortical white matter hypodensities consistent with chronic microangiopathic changes  Parenchyma appears unchanged from prior  No acute infarct  No parenchymal hemorrhage  No mass  VENTRICLES AND EXTRA-AXIAL SPACES:  Normal for the patient's age  VISUALIZED ORBITS AND PARANASAL SINUSES:  Bilateral mastoid effusions  Paranasal sinuses are clear  Prior cataract surgeries without other abnormalities of the orbits  CALVARIUM AND EXTRACRANIAL SOFT TISSUES:  Normal      Impression: Bilateral mastoid effusions  No findings of erosive mastoiditis  Correlate for retroauricular pain  No acute intracranial findings  Workstation performed: [id number]     Ct Head Wo Contrast    Result Date: 10/7/2019  Narrative: CT BRAIN - WITHOUT CONTRAST INDICATION:   Cerebral edema  Status post code  Status post IVC filter placement earlier today  COMPARISON:  None  TECHNIQUE:  CT examination of the brain was performed  In addition to axial images, coronal 2D reformatted images were created and submitted for interpretation  Radiation dose length product (DLP) for this visit:  921 mGy-cm   This examination, like all CT scans performed in the [facility], was performed utilizing techniques to minimize radiation dose exposure, including the use of iterative reconstruction and automated exposure control  IMAGE QUALITY:  Diagnostic  FINDINGS: PARENCHYMA:  No intracranial mass, mass effect or midline shift  No CT signs of acute infarction  No acute parenchymal hemorrhage  Atherosclerotic calcifications noted  VENTRICLES AND EXTRA-AXIAL SPACES:  Normal for the patient's age  VISUALIZED ORBITS AND PARANASAL SINUSES:  Small air-fluid level in left maxillary sinus noted  Bilateral lens implants noted  Endotracheal tube noted on the  view  CALVARIUM AND EXTRACRANIAL SOFT TISSUES:  Normal      Impression: No acute intracranial hemorrhage, mass effect or edema  Workstation performed: [id number]     Ct Soft Tissue Neck W Contrast    Result Date: 10/13/2019  Narrative: CT NECK WITH CONTRAST INDICATION:   No clinical information provided  Technologist reports history of hypoxia and possible aspiration  COMPARISON:  CT of the chest, abdomen, and pelvis dated 10/18/2019   TECHNIQUE:  Axial, sagittal, and coronal 2D reformatted images were created from the axial source data and submitted for interpretation  Radiation dose length product (DLP) for this visit:  181 mGy-cm   This examination, like all CT scans performed in the [facility], was performed utilizing techniques to minimize radiation dose exposure, including the use of iterative reconstruction and automated exposure control  IV Contrast:  100 mL of iohexol (OMNIPAQUE) IMAGE QUALITY:  Diagnostic  FINDINGS: VISUALIZED BRAIN PARENCHYMA:  No acute intracranial pathology of the visualized brain parenchyma  VISUALIZED PARANASAL SINUSES:  Visualized paranasal sinuses are clear  Bilateral mastoid effusions without osseous erosion  NASAL CAVITY AND NASOPHARYNX:  Normal  SUPRAHYOID NECK:  Normal oral cavity, tongue base, tonsillar fossa and epiglottis  INFRAHYOID NECK:  Aryepiglottic folds and piriform sinuses are normal   Normal glottis and subglottic airway  THYROID GLAND:  Unremarkable  PAROTID AND SUBMANDIBULAR GLANDS:  Normal  LYMPH NODES:  No pathologic or enlarged adenopathy  VASCULAR STRUCTURES:  Minimal carotid atherosclerotic disease at the left bulb without significant stenoses  Peripheral filling defect within the right common jugular vein (series 15, image 48)  THORACIC INLET:  Patchy groundglass opacities at the lung apices  BONY STRUCTURES: Osteopenia  No acute fracture or destructive osseous lesion  Impression: 1  Small, peripheral, nonocclusive thrombus in the right common jugular vein  Previously seen right IJ catheter is no longer present  2   Bilateral mastoid effusions without evidence for erosive mastoiditis  Correlate for retroauricular pain  3   Ground glass opacities at the lung apices could represent pulmonary edema or atypical infection  The study was marked in EPIC for significant notification   Workstation performed: [id number]     Cta Upper Extremity Right W Wo Contrast    Result Date: 10/15/2019  Narrative: CT ANGIOGRAM OF THE RIGHT UPPER EXTREMITY WITH AND WITHOUT IV CONTRAST INDICATION:  Right upper extremity edema/pallor  Bluish discoloration of digits  Thrombus of jugular vein  On anticoagulation for atrial fibrillation  COMPARISON: None  TECHNIQUE:  CT angiogram examination of the abdomen was performed according to standard protocol  This examination, like all CT scans performed in the [facility], was performed utilizing techniques to minimize radiation dose exposure, including the use of iterative reconstruction and automated exposure control  Contrast as well as noncontrast images were obtained  Rad dose 3959 mGy-cm IV Contrast:  100 mL of iohexol (OMNIPAQUE)  FINDINGS: VASCULAR STRUCTURES: Arteries of the right upper extremity, from the aortic valve, to the ascending aorta, innominate, subclavian, axillary, and brachial are patent  On delayed images, the ulnar artery opacifies, and is seen to the level of the mid forearm  The interosseous is seen at the level of the mid forearm as well  The radial artery really does not opacify  Even on delayed images, I do not see any flow to the distal half of the forearm  Otherwise, there is a tiny focus of thrombus in the right internal jugular  This is not very impressive  There is also an even smaller focus of thrombus in the vertebral vein on the right  The lungs show patchy pneumonitis consistent with the history of aspiration pneumonia  There is a trace right pleural effusion  The right lower lobe is almost entirely collapsed  The bronchus to the right lower lobe appears to be occluded  This is best seen on axial image 10-67  This may represent a mucous plug  Other endobronchial processes are not excluded  There does appear to be edema of the subcutaneous fat of the right arm  This appears to extend from the elbow throughout the hand  No venous filling is identified over the forearm OTHER FINDINGS ABDOMEN: LOWER CHEST: Pneumonitis and pleural effusion   Obstruction of right lower lobe bronchus, with associated right lower lobe collapse  LIVER/BILIARY TREE: Vicarious excretion of contrast into the gallbladder GALLBLADDER: Vicarious excretion of contrast into the gallbladder SPLEEN: Not seen PANCREAS: Not seen ADRENAL GLANDS: Right adrenal gland appears normal KIDNEYS/URETERS: Right kidney appears normal ADDITIONAL ABDOMINAL  STRUCTURES STOMACH AND VISUALIZED BOWEL: Not seen to good advantage ABDOMINAL CAVITY:  No pathologically enlarged mesenteric or retroperitoneal lymph nodes  A small volume of ascites is visible in the abdomen  Only the right side of the abdomen is imaged ABDOMINAL WALL/INGUINAL REGIONS:  Unremarkable  OSSEOUS STRUCTURES: No acute fracture or destructive osseous lesion  Impression: Slow flow into the right upper extremity  No filling of the arteries in the distal forearm or hand  This can be seen with multiple conditions  This would include simply when the extremities are cold  This could also be seen there is diffuse abnormalities of the inflow, such as occlusion of all 3 forearm arteries  Certainly there appears to be occlusion of the radial artery  This can also be seen if there is abnormality of the venous outflow, including venous access attempts on the dorsal and ventral hand  It would probably be good to get a Doppler of the forearm arteries  This could be used to confirm that there is slow flow in the arteries, not total occlusion  Pneumonitis and right lower lobe collapse with endobronchial process Tiny amount of venous clot of doubtful significance Obstructed right lower lobe bronchus The study was marked in EPIC for immediate notification  Workstation performed: [id number]     Ct Chest Abdomen Pelvis W Contrast    Result Date: 10/13/2019  Narrative: CT CHEST, ABDOMEN AND PELVIS WITH IV CONTRAST INDICATION:   Provided history of "ovarian mass "  Review of chart shows that the patient had total hysterectomy and bilateral salpingo-oophorectomy on 10/4/2019   COMPARISON: Chest CT dated 10/7/2019  TECHNIQUE: CT examination of the chest, abdomen and pelvis was performed  Axial, sagittal, and coronal 2D reformatted images were created from the source data and submitted for interpretation  Radiation dose length product (DLP) for this visit:  499 mGy-cm   This examination, like all CT scans performed in the [facility], was performed utilizing techniques to minimize radiation dose exposure, including the use of iterative reconstruction and automated exposure control  IV Contrast:  100 mL of iohexol (OMNIPAQUE) Enteric Contrast: Enteric contrast was administered  FINDINGS: CHEST LUNGS:  Increasing central-predominant groundglass opacities in both lungs  No associated interlobular septal thickening  Dense consolidation within the posterior medial basilar right lower lobe with low density tubular endobronchial mucous plugging  Milder subsegmental and compressive atelectasis in the left lower lobe  No suspicious pulmonary masses  PLEURA:  Moderate layering bilateral pleural effusions, similar to prior  No loculated components or pneumothorax  HEART/GREAT VESSELS:  Heart size is normal   Aortic valvular and coronary calcifications  No pericardial thickening or effusion  Thoracic aorta is normal for age  MEDIASTINUM AND MYRNA:  Unremarkable  CHEST WALL AND LOWER NECK:   Mild anasarca  ABDOMEN LIVER/BILIARY TREE:  One or more subcentimeter sharply circumscribed low-density hepatic lesion(s) are noted, too small to accurately characterize, but statistically most likely to represent subcentimeter hepatic cysts  No suspicious solid hepatic lesion is identified  Hepatic contours are normal   No biliary dilatation  GALLBLADDER:  Hyperdensity in the gallbladder is probably previously administered of IV contrast  No pericholecystic inflammation  SPLEEN:  Unremarkable  PANCREAS:  Atrophic  No pancreatic masses or pancreatic ductal dilatation  ADRENAL GLANDS:  Unremarkable  KIDNEYS/URETERS:  One or more sharply circumscribed subcentimeter renal hypodensities are noted  These lesions are too small to accurately characterize, but are statistically most likely to represent benign cortical renal cyst(s)  According to the guidelines published in the [facility] Paper of the ACR Incidental Findings Committee (Radiology 2010), no further workup of these lesions is recommended  No suspicious parenchymal masses, perinephric collections, urolithiasis, or hydronephrosis  STOMACH AND BOWEL:  Unremarkable  APPENDIX:  Not well demonstrated  No evidence for acute appendicitis  ABDOMINOPELVIC CAVITY:  Mild ascites  Blood products again demonstrated anterior to the rectum related to prior surgery  No contrast extravasation into the collection  VESSELS:  Unremarkable for patient's age  PELVIS REPRODUCTIVE ORGANS:  Patient is status post hysterectomy  URINARY BLADDER:  Unremarkable  ABDOMINAL WALL/INGUINAL REGIONS:  Mild anasarca  OSSEOUS STRUCTURES:  No acute fracture or destructive osseous lesion  Impression: 1  Increasing central predominant groundglass opacities could represent edema or infection  Lack of intubation speaks against ARDS  2   Extensive mucous plugging in the posterior and medial basilar segmental bronchi of the right lower lobe with resultant postobstructive atelectasis and pneumonia  3   Unchanged moderate-sized bilateral pleural effusions, mild ascites, and anasarca  4   Postsurgical blood products again demonstrated within the pelvis  No significant change from prior or evidence for active extravasation  Workstation performed: [id number]     Vas Upper Limb Arterial Duplex, Complete Bilateral, Graft    Result Date: 10/22/2019  Narrative:  [facility] REPORT CLINICAL: Indications: PT C/O cyanotic right fingers and recent right UE venous deep and superficial thrombus  PT is currently anticoagulated  Physician wants to evaluate for UE ischemia   Operative History: Oophorectomy Risk Factors The patient has history of HTN, Diabetes, cardiac arrest, anxiety, frequent UTI, neuropathy, ovarian cancer, HLD, and DVT  The patient's current BMI is 22 96, Weight is 138 lb and height is 65 in  She is a non smoker  Brachial BP: Right:  98 mmHg  Left Forearm Pressure:  102 mmHg  FINDINGS:  Segment              Right                                    Left                                      Impression  Pressure (mmHg)  PSV (cm/s)  Pressure (mmHg)  Subclavian                                               145                   Axillary, 2nd Part                                       118                   Dist  Radial Artery  100%                                  0                   Mid Brachial                                             128                   Dist  Brachial                                           154                   Dist  Ulnar Artery                                        52                   Prox  Radial                                              34                   Mid Radial                                                19                   1st Digit                                                                  26  1st Digit - Stent                              0                               2nd digit                                      0                           70  Prox   Ulnar                                               69                   3rd digit                                      0                               4th digit                                      0                               Mid  Ulnar                                                50                   5th digit                                      0                               Innominate                                               148                   Axillary                                                  66                      CONCLUSION:  Impression RIGHT UPPER LIMB:  ABNORMAL: This resting evaluation shows an occluded radial artery from the mid forearm to the wrist  All five digits show flat line Doppler waveforms  Technical findings given to OBDULIO Spear and faxed to chart  SIGNATURE: Electronically Signed by: Rin Brooks MD on 2019-10-22 04:15:53 PM    Vas Upper Limb Venous Duplex Scan, Unilateral/limited    Result Date: 10/10/2019  Narrative:  THE [facility]ASCULAR [facility] REPORT CLINICAL: Indications:  Right Swelling [[id number]]  The patient is s/p cardiac arrest, currently in acute hypoxic respiratory failure, and noted to have right arm swelling  Operative History: oophorectomy Risk Factors The patient has history of HTN and Diabetes (Yes)  CONCLUSION: Impression   RIGHT UPPER LIMB: There is acute superficial thrombophlebitis in the cephalic vein in the forearm and antecubital fossa  There is acute superficial thrombophlebitis in the basilic vein from the antecubital fossa to the proximal upper arm  No evidence of acute or chronic deep vein thrombosis in the visualized portion of the subclavian vein, axillary vein and brachial veins  The internal jugular vein, innominate vein and proximal subclavian vein were obscured by central line with bandaging in the right neck  Doppler evaluation shows a normal response to augmentation maneuvers  LEFT UPPER LIMB LIMITED: Evaluation shows no evidence of thrombus in the internal jugular vein and subclavian vein,  Tech Note:  Technical findings given to "Gutierrez Coe" the patient's nurse  SIGNATURE: Electronically Signed by: Cassie An MD on 2019-10-10 11:14:02 PM    Xr Chest Portable Icu    Result Date: 10/18/2019  Narrative: CHEST INDICATION:   hypoxia  COMPARISON:  October 16, 2019 EXAM PERFORMED/VIEWS:  XR CHEST PORTABLE ICU Single image FINDINGS:  Lungs adequately aerated  Similar patchy infiltrates in both lungs, slightly worse in the right lower lung fields compared to previous exam  Cardiac size within normal limits   Prominent right hilum and right superior mediastinum  Atherosclerotic aorta  Osseous structures appear within normal limits for patient age  Left side PICC line catheter tip in the upper SVC  Nasogastric tube in the stomach, tip is not seen  EKG leads in place  No free air below the diaphragm  Gas-filled bowel beneath left diaphragm     Impression: Extensive diffuse infiltrates in both lungs, slightly worse in the right lower lung compared to October 16  Workstation performed: [id number]     Xr Chest Portable Icu    Result Date: 10/8/2019  Narrative: CHEST INDICATION:   et tube  COMPARISON:  8/15/2019; 10/8/2019 EXAM PERFORMED/VIEWS:  XR CHEST PORTABLE ICU FINDINGS:  Endotracheal tube is present, in satisfactory position with its tip above the level of the aaron  Enteric tube is present with its tip extending below the left hemidiaphragm  Right IJ catheter noted tip in the mid SVC  Cardiomediastinal silhouette appears unremarkable  Multifocal strandy densities noted  Subcutaneous gas noted within the right arm and left chest/abdominal wall  Osseous structures appear within normal limits for patient age  Impression: 1  Multifocal parenchymal opacities may represent multifocal pneumonia versus ARDS versus pulmonary alveolar edema  2   Subcutaneous emphysema  Workstation performed: [id number]     Cta Chest Pe Study    Result Date: 10/7/2019  Narrative: CTA - CHEST WITH IV CONTRAST - PULMONARY ANGIOGRAM INDICATION:   Shortness of breath  COMPARISON: CT abdomen and pelvis study of August 30, 2019  TECHNIQUE: CTA examination of the chest was performed using angiographic technique according to a protocol specifically tailored to evaluate for pulmonary embolism  Axial, sagittal, and coronal 2D reformatted images were created from the source data and  submitted for interpretation  In addition, coronal 3D MIP postprocessing was performed on the acquisition scanner  Radiation dose length product (DLP) for this visit:  196 mGy-cm     This examination, like all CT scans performed in the [facility], was performed utilizing techniques to minimize radiation dose exposure, including the use of iterative reconstruction and automated exposure control  IV Contrast:  85 mL of iodixanol (VISIPAQUE)  FINDINGS: PULMONARY ARTERIAL TREE:  No pulmonary embolus is seen  LUNGS:  Mosaic attenuation  Groundglass nodule in the left upper lobe on image 51 of series 3, measuring 4 mm  2 mm granuloma along the right major fissure on image 55 of series 3   5 to 6 mm groundglass nodule in the right middle lobe on image 57 of series 3  Atelectasis in the right lower lobe with a perifissural 2 mm nodular density in adjacent calcified granuloma on image 68 of series 3  This is likely inflammatory  Bibasilar compressive  PLEURA:  Moderate size bilateral pleural effusions  HEART/GREAT VESSELS:  Unremarkable for patient's age  MEDIASTINUM AND MYRNA:  Dilated debris-filled esophagus with hiatal hernia and markedly dilated stomach  Punctate calcification within the right thyroid lobe CHEST WALL AND LOWER NECK:   Chest wall emphysema noted subcutaneously as well as in the subpectoral regions and left more than right axilla  VISUALIZED STRUCTURES IN THE UPPER ABDOMEN:  Markedly dilated debris-filled stomach  Gastric outlet obstruction not excluded  Cirrhotic appearance to the liver with ascites  OSSEOUS STRUCTURES:  No acute fracture or destructive osseous lesion  Schmorl's node deformity in the superior endplate of K82  Impression: 1  No evidence of pulmonary embolism  Chest wall emphysema, of uncertain etiology  Recommend correlation with clinical history  2   Moderate bilateral pleural effusions and mild compressive bibasilar atelectasis with mosaic attenuation, correlate for congestive heart failure  3   Scattered 5-6mmgroundglass nodules and calcified granulomas  These findings are likely post infectious/inflammatory    Follow-up CT chest evaluation in 3 months is recommended  4   Hiatal hernia with marked gastric and esophageal distention which appears to be debris filled  Gastric outlet obstruction not excluded  5   Nodular liver surface and abdominal ascites  Given the patient's history of a complex right adnexal mass, peritoneal carcinomatosis is not excluded  Consider endogastric placement and additional imaging of the abdomen and pelvis    I personally discussed this study with Ms Yaniv Mann, the covering nurse in the ICU, on 10/7/2019 at 5:26 PM   Workstation performed: [id number]       ---------------------------------------------------  Note Electronically Signed By:    MD Monae Caraballo 75 Gastroenterology Fellow PGY-4  [address] [employer] #: [id number]

## 2019-10-23 NOTE — QUICK NOTE
RN reported small amount of bleeding, unsure vaginal vs rectal  Pt seen by GYN, determined bleeding was rectal  I saw and evaluated pt  She was difficult to arouse, only opened eyes to sternal rub  Previous documentation from earlier today notes waxing and waning lethargy which improved with IVF  Cool hands b/l with delayed capillary refill, unchanged from previous documentation  Nursing to update vitals  Hgb rechecked, 8 4 at this time  Given stable Hgb, will continue heparin drip  Rectal bleed: checking serial H/H, FOBT  GI consult placed  Pt already NPO  Lethargy: checking ammonia level, ABG  Pt had CT head yesterday, will get another CT head now

## 2019-10-23 NOTE — ASSESSMENT & PLAN NOTE
Lab Results   Component Value Date    HGBA1C 6 4 (H) 10/22/2019       Recent Labs     10/22/19  1934 10/22/19  2124 10/22/19  2330 10/23/19  0148   POCGLU 112 124 108 84       Blood Sugar Average: Last 72 hrs:  (P) 140 7     · Continue insulin gtt  · Decrease D5 1/2NS infusion to 50cc/hr given volume overload

## 2019-10-24 ENCOUNTER — APPOINTMENT (INPATIENT)
Dept: RADIOLOGY | Facility: HOSPITAL | Age: 84
DRG: 189 | End: 2019-10-24
Payer: COMMERCIAL

## 2019-10-24 PROBLEM — G72.81 CRITICAL ILLNESS MYOPATHY: Status: ACTIVE | Noted: 2019-10-24

## 2019-10-24 LAB
ALBUMIN SERPL BCP-MCNC: 2.4 G/DL (ref 3.5–5)
ALP SERPL-CCNC: 95 U/L (ref 46–116)
ALT SERPL W P-5'-P-CCNC: 21 U/L (ref 12–78)
ANION GAP SERPL CALCULATED.3IONS-SCNC: 5 MMOL/L (ref 4–13)
APTT PPP: 65 SECONDS (ref 23–37)
APTT PPP: 70 SECONDS (ref 23–37)
APTT PPP: 97 SECONDS (ref 23–37)
AST SERPL W P-5'-P-CCNC: 21 U/L (ref 5–45)
BASOPHILS # BLD AUTO: 0.02 THOUSANDS/ΜL (ref 0–0.1)
BASOPHILS NFR BLD AUTO: 0 % (ref 0–1)
BILIRUB SERPL-MCNC: 0.67 MG/DL (ref 0.2–1)
BUN SERPL-MCNC: 21 MG/DL (ref 5–25)
CALCIUM SERPL-MCNC: 6.4 MG/DL (ref 8.3–10.1)
CHLORIDE SERPL-SCNC: 106 MMOL/L (ref 100–108)
CO2 SERPL-SCNC: 35 MMOL/L (ref 21–32)
CREAT SERPL-MCNC: 0.51 MG/DL (ref 0.6–1.3)
EOSINOPHIL # BLD AUTO: 0.04 THOUSAND/ΜL (ref 0–0.61)
EOSINOPHIL NFR BLD AUTO: 0 % (ref 0–6)
ERYTHROCYTE [DISTWIDTH] IN BLOOD BY AUTOMATED COUNT: 16 % (ref 11.6–15.1)
GFR SERPL CREATININE-BSD FRML MDRD: 90 ML/MIN/1.73SQ M
GLUCOSE SERPL-MCNC: 116 MG/DL (ref 65–140)
GLUCOSE SERPL-MCNC: 120 MG/DL (ref 65–140)
GLUCOSE SERPL-MCNC: 143 MG/DL (ref 65–140)
GLUCOSE SERPL-MCNC: 145 MG/DL (ref 65–140)
GLUCOSE SERPL-MCNC: 152 MG/DL (ref 65–140)
GLUCOSE SERPL-MCNC: 157 MG/DL (ref 65–140)
GLUCOSE SERPL-MCNC: 163 MG/DL (ref 65–140)
GLUCOSE SERPL-MCNC: 172 MG/DL (ref 65–140)
GLUCOSE SERPL-MCNC: 184 MG/DL (ref 65–140)
GLUCOSE SERPL-MCNC: 203 MG/DL (ref 65–140)
GLUCOSE SERPL-MCNC: 204 MG/DL (ref 65–140)
GLUCOSE SERPL-MCNC: 251 MG/DL (ref 65–140)
GLUCOSE SERPL-MCNC: 76 MG/DL (ref 65–140)
GLUCOSE SERPL-MCNC: 90 MG/DL (ref 65–140)
HCT VFR BLD AUTO: 29.8 % (ref 34.8–46.1)
HCT VFR BLD AUTO: 30.3 % (ref 34.8–46.1)
HCT VFR BLD AUTO: 31 % (ref 34.8–46.1)
HGB BLD-MCNC: 9 G/DL (ref 11.5–15.4)
HGB BLD-MCNC: 9 G/DL (ref 11.5–15.4)
HGB BLD-MCNC: 9.4 G/DL (ref 11.5–15.4)
IMM GRANULOCYTES # BLD AUTO: 0.1 THOUSAND/UL (ref 0–0.2)
IMM GRANULOCYTES NFR BLD AUTO: 1 % (ref 0–2)
LYMPHOCYTES # BLD AUTO: 0.52 THOUSANDS/ΜL (ref 0.6–4.47)
LYMPHOCYTES NFR BLD AUTO: 5 % (ref 14–44)
MCH RBC QN AUTO: 31.8 PG (ref 26.8–34.3)
MCHC RBC AUTO-ENTMCNC: 30.2 G/DL (ref 31.4–37.4)
MCV RBC AUTO: 105 FL (ref 82–98)
MONOCYTES # BLD AUTO: 0.74 THOUSAND/ΜL (ref 0.17–1.22)
MONOCYTES NFR BLD AUTO: 6 % (ref 4–12)
MRSA NOSE QL CULT: NORMAL
NEUTROPHILS # BLD AUTO: 10.15 THOUSANDS/ΜL (ref 1.85–7.62)
NEUTS SEG NFR BLD AUTO: 88 % (ref 43–75)
NRBC BLD AUTO-RTO: 0 /100 WBCS
PLATELET # BLD AUTO: 459 THOUSANDS/UL (ref 149–390)
PMV BLD AUTO: 9.8 FL (ref 8.9–12.7)
POTASSIUM SERPL-SCNC: 3.3 MMOL/L (ref 3.5–5.3)
PROT SERPL-MCNC: 5.5 G/DL (ref 6.4–8.2)
RBC # BLD AUTO: 2.83 MILLION/UL (ref 3.81–5.12)
SODIUM SERPL-SCNC: 146 MMOL/L (ref 136–145)
WBC # BLD AUTO: 11.57 THOUSAND/UL (ref 4.31–10.16)

## 2019-10-24 PROCEDURE — 85730 THROMBOPLASTIN TIME PARTIAL: CPT | Performed by: PHYSICIAN ASSISTANT

## 2019-10-24 PROCEDURE — 97163 PT EVAL HIGH COMPLEX 45 MIN: CPT

## 2019-10-24 PROCEDURE — 99233 SBSQ HOSP IP/OBS HIGH 50: CPT | Performed by: INTERNAL MEDICINE

## 2019-10-24 PROCEDURE — 85014 HEMATOCRIT: CPT | Performed by: STUDENT IN AN ORGANIZED HEALTH CARE EDUCATION/TRAINING PROGRAM

## 2019-10-24 PROCEDURE — 99232 SBSQ HOSP IP/OBS MODERATE 35: CPT | Performed by: NURSE PRACTITIONER

## 2019-10-24 PROCEDURE — C9113 INJ PANTOPRAZOLE SODIUM, VIA: HCPCS | Performed by: FAMILY MEDICINE

## 2019-10-24 PROCEDURE — 94760 N-INVAS EAR/PLS OXIMETRY 1: CPT

## 2019-10-24 PROCEDURE — 85025 COMPLETE CBC W/AUTO DIFF WBC: CPT | Performed by: FAMILY MEDICINE

## 2019-10-24 PROCEDURE — 99233 SBSQ HOSP IP/OBS HIGH 50: CPT | Performed by: OBSTETRICS & GYNECOLOGY

## 2019-10-24 PROCEDURE — 80053 COMPREHEN METABOLIC PANEL: CPT | Performed by: FAMILY MEDICINE

## 2019-10-24 PROCEDURE — 99233 SBSQ HOSP IP/OBS HIGH 50: CPT | Performed by: FAMILY MEDICINE

## 2019-10-24 PROCEDURE — G8978 MOBILITY CURRENT STATUS: HCPCS

## 2019-10-24 PROCEDURE — G8979 MOBILITY GOAL STATUS: HCPCS

## 2019-10-24 PROCEDURE — 85018 HEMOGLOBIN: CPT | Performed by: STUDENT IN AN ORGANIZED HEALTH CARE EDUCATION/TRAINING PROGRAM

## 2019-10-24 PROCEDURE — 99231 SBSQ HOSP IP/OBS SF/LOW 25: CPT | Performed by: ANESTHESIOLOGY

## 2019-10-24 PROCEDURE — 71250 CT THORAX DX C-: CPT

## 2019-10-24 PROCEDURE — 99232 SBSQ HOSP IP/OBS MODERATE 35: CPT | Performed by: INTERNAL MEDICINE

## 2019-10-24 PROCEDURE — 99232 SBSQ HOSP IP/OBS MODERATE 35: CPT | Performed by: PSYCHIATRY & NEUROLOGY

## 2019-10-24 PROCEDURE — 97535 SELF CARE MNGMENT TRAINING: CPT

## 2019-10-24 PROCEDURE — 82948 REAGENT STRIP/BLOOD GLUCOSE: CPT

## 2019-10-24 PROCEDURE — G8987 SELF CARE CURRENT STATUS: HCPCS

## 2019-10-24 PROCEDURE — 74176 CT ABD & PELVIS W/O CONTRAST: CPT

## 2019-10-24 PROCEDURE — 97167 OT EVAL HIGH COMPLEX 60 MIN: CPT

## 2019-10-24 PROCEDURE — 92526 ORAL FUNCTION THERAPY: CPT

## 2019-10-24 PROCEDURE — 85014 HEMATOCRIT: CPT | Performed by: FAMILY MEDICINE

## 2019-10-24 PROCEDURE — G8988 SELF CARE GOAL STATUS: HCPCS

## 2019-10-24 PROCEDURE — 85018 HEMOGLOBIN: CPT | Performed by: FAMILY MEDICINE

## 2019-10-24 PROCEDURE — 99232 SBSQ HOSP IP/OBS MODERATE 35: CPT | Performed by: SURGERY

## 2019-10-24 RX ORDER — SIMETHICONE 20 MG/.3ML
40 EMULSION ORAL EVERY 6 HOURS PRN
Status: DISCONTINUED | OUTPATIENT
Start: 2019-10-24 | End: 2019-11-01

## 2019-10-24 RX ORDER — FUROSEMIDE 10 MG/ML
20 INJECTION INTRAMUSCULAR; INTRAVENOUS ONCE
Status: COMPLETED | OUTPATIENT
Start: 2019-10-24 | End: 2019-10-24

## 2019-10-24 RX ORDER — ACETAMINOPHEN 160 MG/5ML
975 SUSPENSION, ORAL (FINAL DOSE FORM) ORAL EVERY 8 HOURS SCHEDULED
Status: DISCONTINUED | OUTPATIENT
Start: 2019-10-24 | End: 2019-11-01

## 2019-10-24 RX ORDER — ACETAMINOPHEN 650 MG/1
650 SUPPOSITORY RECTAL EVERY 6 HOURS PRN
Status: DISCONTINUED | OUTPATIENT
Start: 2019-10-24 | End: 2019-11-01

## 2019-10-24 RX ORDER — POTASSIUM CHLORIDE 14.9 MG/ML
20 INJECTION INTRAVENOUS ONCE
Status: COMPLETED | OUTPATIENT
Start: 2019-10-24 | End: 2019-10-24

## 2019-10-24 RX ORDER — LIDOCAINE 50 MG/G
1 PATCH TOPICAL DAILY
Status: COMPLETED | OUTPATIENT
Start: 2019-10-24 | End: 2019-10-24

## 2019-10-24 RX ADMIN — CEFEPIME HYDROCHLORIDE 2000 MG: 2 INJECTION, POWDER, FOR SOLUTION INTRAVENOUS at 23:29

## 2019-10-24 RX ADMIN — LIDOCAINE 1 PATCH: 50 PATCH CUTANEOUS at 11:12

## 2019-10-24 RX ADMIN — DORZOLAMIDE HYDROCHLORIDE AND TIMOLOL MALEATE 1 DROP: 20; 5 SOLUTION/ DROPS OPHTHALMIC at 08:17

## 2019-10-24 RX ADMIN — ACETAMINOPHEN 975 MG: 160 SUSPENSION ORAL at 22:24

## 2019-10-24 RX ADMIN — TRAVOPROST 1 DROP: 0.04 SOLUTION/ DROPS OPHTHALMIC at 22:21

## 2019-10-24 RX ADMIN — LEVOTHYROXINE SODIUM 50 MCG: 50 TABLET ORAL at 06:08

## 2019-10-24 RX ADMIN — FUROSEMIDE 20 MG: 10 INJECTION, SOLUTION INTRAMUSCULAR; INTRAVENOUS at 17:17

## 2019-10-24 RX ADMIN — HEPARIN SODIUM AND DEXTROSE 13 UNITS/KG/HR: 10000; 5 INJECTION INTRAVENOUS at 17:29

## 2019-10-24 RX ADMIN — DEXTROSE AND SODIUM CHLORIDE 50 ML/HR: 5; .45 INJECTION, SOLUTION INTRAVENOUS at 04:32

## 2019-10-24 RX ADMIN — CEFEPIME HYDROCHLORIDE 2000 MG: 2 INJECTION, POWDER, FOR SOLUTION INTRAVENOUS at 12:48

## 2019-10-24 RX ADMIN — SODIUM CHLORIDE 6 UNITS/HR: 9 INJECTION, SOLUTION INTRAVENOUS at 17:21

## 2019-10-24 RX ADMIN — POTASSIUM CHLORIDE 20 MEQ: 14.9 INJECTION, SOLUTION INTRAVENOUS at 11:13

## 2019-10-24 RX ADMIN — DORZOLAMIDE HYDROCHLORIDE AND TIMOLOL MALEATE 1 DROP: 20; 5 SOLUTION/ DROPS OPHTHALMIC at 17:17

## 2019-10-24 RX ADMIN — SIMETHICONE 40 MG: 20 SUSPENSION/ DROPS ORAL at 14:17

## 2019-10-24 RX ADMIN — PANTOPRAZOLE SODIUM 40 MG: 40 INJECTION, POWDER, FOR SOLUTION INTRAVENOUS at 08:17

## 2019-10-24 RX ADMIN — CEFEPIME HYDROCHLORIDE 2000 MG: 2 INJECTION, POWDER, FOR SOLUTION INTRAVENOUS at 00:23

## 2019-10-24 RX ADMIN — ACETAMINOPHEN 650 MG: 650 SUPPOSITORY RECTAL at 20:31

## 2019-10-24 RX ADMIN — SERTRALINE HYDROCHLORIDE 25 MG: 20 SOLUTION ORAL at 22:25

## 2019-10-24 RX ADMIN — ACETAMINOPHEN 975 MG: 160 SUSPENSION ORAL at 14:17

## 2019-10-24 NOTE — ASSESSMENT & PLAN NOTE
· Patient with episodes of rectal bleeding  Was evaluated by GI   Status post 1 unit of packed RBCs yesterday  · Evaluated the patient along with Gastroenterology today  Plan is to hold off on colonoscopy or flex sig  · Monitor hemoglobin and transfuse p r n    · Continue with the heparin drip the since the bleeding is intermittent and only small amounts

## 2019-10-24 NOTE — PROGRESS NOTES
Patient was diagnosed with new onset urinary retention yesterday  She was straight cath'd for 500cc at 1700  Since then, she has not been able to spontaneously void  She was bladder scanned for 250cc around 2200  Next bladder scan scheduled for 0200  At this time, we recommend alexandre placement  I reached out to her primary team, Dr Shaheen Mott       D/w Dr Cheng St MD  OBGYN, PGY-3  10/24/2019  12:51 AM

## 2019-10-24 NOTE — CONSULTS
I met her briefly tonight  Currently on bipap  Please have her follow up in the office after discharge for a hearing evaluation, as this cannot be done at the bedside while inpatient  Will sign off  Please call with any specific questions

## 2019-10-24 NOTE — PROGRESS NOTES
Progress Note - Margaret Chirinos 1935, 82 y o  female MRN: [id number]    Unit/Bed#: [facility] 530-01 Encounter: [id number]    Primary Care Provider: Annie Thomson MD   Date and time admitted to hospital: 10/21/2019  7:43 PM        * Lethargy  Assessment & Plan  Patient was admitted with lethargy coming from the LTAC  Patient with waxing and waning of mental status with the periods of lethargy  Patient was more lethargic after getting the Ativan yesterday patient only got 0 25 mg of Ativan x1  Currently avoiding all sedating medication  Encephalopathy is likely multifactorial-likely toxic metabolic  Neurology evaluation appreciated  Patient was on  and tramadol which was discontinued in the facility  Her lethargy is multifactorial/poor nutrition/deconditioning  Appears to be improving      Hearing loss  Assessment & Plan  · Likely secondary to Lasix  · Discussed with pulmonology/daughter-ENT consultation     Rectal bleeding  Assessment & Plan  · Patient with episodes of rectal bleeding  Was evaluated by GI   Status post 1 unit of packed RBCs yesterday  · Evaluated the patient along with Gastroenterology today  Plan is to hold off on colonoscopy or flex sig  · Monitor hemoglobin and transfuse p r n  · Continue with the heparin drip the since the bleeding is intermittent and only small amounts    Acute respiratory failure with hypercapnia [facility])  Assessment & Plan  Patient has history of recent pneumonia and unfortunately ARDS requiring intubation and ICU admission and also was on high-flow oxygen before discharge to [facility]  Patient has to be on BiPAP overnight for elevated pCO2  Currently patient is on 6 L nasal cannula with Oxymizer  Pulmonology evaluation appreciated  ABG reviewed  Acute hypoxic respiratory failure is likely multifactorial secondary to recent pneumonia/ARDS-patient was evaluated along with the pulmonologist and reported that it recovery is going to be  Slow  Patient had a CT scan today  Discussed with the patient's family along with the pulmonology  Recommended thoracocentesis  INR is planning to do the thoracocentesis tomorrow  Will continue with the antibiotics  Gentle diuresis    A-fib (HCC)  Assessment & Plan  Currently on heparin drip  Was taken off of the IV metoprolol yesterday secondary to bradycardia  Atrial tachycardia overnight  Cardiology on board monitor heart rate on the telemetry  Superficial thrombophlebitis of right upper extremity  Assessment & Plan  Supportive care    Ovarian cancer [facility])  Assessment & Plan  This is status post hysterectomy  Gyn Oncology on board    Type 1 diabetes mellitus with diabetic neuropathy [facility])  Assessment & Plan  Lab Results   Component Value Date    HGBA1C 6 4 (H) 10/22/2019    Currently on Lantus 20 units Q 12  Because there were some reports of poor p o  Intake; will cut Lantus in half at 20 units at night  Continue insulin sliding scale with Accu-Cheks  Hemoglobin A1c  Recent Labs     10/24/19  1023 10/24/19  1231 10/24/19  1233 10/24/19  1449   POCGLU 163* 203* 172* 184*       Blood Sugar Average: Last 72 hrs:  (P) 148 5625   Patient is type 1 type diabetes mellitus, continue with the insulin drip  Currently off of the D5 since the patient was started on tube feeding  Adjust the algorithm depending upon the blood sugar    Hypertension  Assessment & Plan  Patient was on Lopressor as an outpatient  Patient was actually hypotensive yesterday  Blood pressure improved    ANAYA (generalized anxiety disorder)  Assessment & Plan  As patient was seen lethargic; will put on hold trazodone and lorazepam   Started on Zoloft at a low dose yesterday      Adult onset hypothyroidism  Assessment & Plan  Will continue with levothyroxine 50 mcg      TSH is minimally elevated        VTE Pharmacologic Prophylaxis:   Pharmacologic: Heparin Drip  Mechanical VTE Prophylaxis in Place: Yes    Patient Centered Rounds: I have performed bedside rounds with nursing staff today  Discussions with Specialists or Other Care Team Provider:   Gastroenterology pulmonology geriatrics gyn  Education and Discussions with Family / Patient:  Patient and family    Time Spent for Care: 1 hour  More than 50% of total time spent on counseling and coordination of care as described above  Current Length of Stay: 2 day(s)    Current Patient Status: Inpatient   Certification Statement: The patient will continue to require additional inpatient hospital stay due to Acute hypoxic respiratory failure    Discharge Plan:     Code Status: Level 1 - Full Code      Subjective:   Patient seen and examined  Overnight events noted  Currently patient is on nasal cannula with Oxymizer  Patient appears to be tired and deconditioned    Objective:     Vitals:   Temp (24hrs), Av 8 °F (37 1 °C), Min:97 9 °F (36 6 °C), Max:100 5 °F (38 1 °C)    Temp:  [97 9 °F (36 6 °C)-100 5 °F (38 1 °C)] 98 2 °F (36 8 °C)  HR:  [62-97] 72  Resp:  [16-20] 18  BP: (108-140)/(51-65) 127/60  SpO2:  [95 %-99 %] 99 %  Body mass index is 24 47 kg/m²  Input and Output Summary (last 24 hours): Intake/Output Summary (Last 24 hours) at 10/24/2019 1537  Last data filed at 10/24/2019 1300  Gross per 24 hour   Intake 1630 78 ml   Output 1125 ml   Net 505 78 ml       Physical Exam:     Physical Exam   Constitutional: No distress  HENT:   Head: Normocephalic  Eyes: Right eye exhibits no discharge  Left eye exhibits no discharge  Neck: No JVD present  Cardiovascular: Normal rate  No murmur heard  Pulmonary/Chest: No respiratory distress  She has no rales  Appears to be tachypnic  Shallow rapid breathing  Bilateral coarse breath sounds   Abdominal: Soft  She exhibits no distension  There is no tenderness  Musculoskeletal: She exhibits no edema  Neurological: She is alert  No cranial nerve deficit  Skin: Skin is warm         Additional Data:     Labs:    Results from last 7 days   Lab Units 10/24/19  1228 10/24/19  0454   WBC Thousand/uL  --  11 57*   HEMOGLOBIN g/dL 9 4* 9 0*   HEMATOCRIT % 31 0* 29 8*   PLATELETS Thousands/uL  --  459*   NEUTROS PCT %  --  88*   LYMPHS PCT %  --  5*   MONOS PCT %  --  6   EOS PCT %  --  0     Results from last 7 days   Lab Units 10/24/19  0454   POTASSIUM mmol/L 3 3*   CHLORIDE mmol/L 106   CO2 mmol/L 35*   BUN mg/dL 21   CREATININE mg/dL 0 51*   CALCIUM mg/dL 6 4*   ALK PHOS U/L 95   ALT U/L 21   AST U/L 21     Results from last 7 days   Lab Units 10/22/19  0950   INR  1 29*       * I Have Reviewed All Lab Data Listed Above  * Additional Pertinent Lab Tests Reviewed: Ale 66 Admission Reviewed    Imaging:    Imaging Reports Reviewed Today Include:   Imaging Personally Reviewed by Myself Includes:     Recent Cultures (last 7 days):     Results from last 7 days   Lab Units 10/21/19  2149 10/21/19  2030   BLOOD CULTURE  No Growth at 48 hrs  No Growth at 48 hrs         Last 24 Hours Medication List:     Current Facility-Administered Medications:  acetaminophen 975 mg Oral [facility] [address] Tuesday Minor Mitten, CRNP    acetaminophen 650 mg Rectal Q6H PRN David Hernández MD    albuterol 2 5 mg Nebulization Q4H PRN David Hernández MD    aluminum-magnesium hydroxide-simethicone 15 mL Per NG Tube Q6H PRN David Hernández MD    artificial tear  Both Eyes HS PRN David Hernández MD    bisacodyl 10 mg Rectal Daily PRN Shankar Mc MD    cefepime 2,000 mg Intravenous Q12H Shankar Mc MD Last Rate: 2,000 mg (10/24/19 1248)   dorzolamide-timolol 1 drop Both Eyes BID David Hernández MD    heparin (porcine) 3-30 Units/kg/hr (Order-Specific) Intravenous Titrated Apolinar Ellis PA-C Last Rate: 13 Units/kg/hr (10/24/19 1397)   heparin (porcine) 2,400 Units Intravenous PRN Marc Kumar PA-C    heparin (porcine) 4,800 Units Intravenous PRN Apolinar Ellis PA-C    insulin regular (HumuLIN R,NovoLIN R) infusion 0 3-21 Units/hr Intravenous Titrated Shankar Mc MD Last Rate: 2 Units/hr (10/24/19 1450)   levothyroxine 50 mcg Per NG Tube Early Morning Radha Shields MD    lidocaine 1 patch Topical Daily Carlota Lewis MD    Netarsudil Dimesylate 1 drop Ophthalmic Daily Radha Shields MD    Netarsudil Dimesylate 1 drop Right Eye HS Radha Shields MD    ondansetron 4 mg Intravenous Q6H PRN Radha Shields MD    pantoprazole 40 mg Intravenous Q24H [address] Carlota Lewis MD    senna 8 8 mg Per NG Tube Daily PRN Radha Shields MD    sertraline 25 mg Per NG Tube HS Carlota Lewis MD    simethicone 40 mg Oral Q6H PRN Carlota Lewis MD    travoprost 1 drop Both Eyes HS Radha Shields MD         Today, Patient Was Seen By: Carlota Lewis MD    ** Please Note: Dictation voice to text software may have been used in the creation of this document   **

## 2019-10-24 NOTE — ASSESSMENT & PLAN NOTE
· Secondary to multifocal pneumonia   · Patient had increased WOB last evening and mildly hypercarbia on ABG and was put on bipap 10/5, she wore bipap overnight and was transitioned to nasal cannula 6L this morning  Her WOB looks significantly improved this morning    · Continue to utilize bipap prn for increased WOB/hypercarbia  · May benefit from diuresis today  · Pulmonary following

## 2019-10-24 NOTE — QUICK NOTE
Chart reviewed and discussed with attending  Will plan for right thoracentesis Friday  No need to hold heparin       Mancel OBDULIO Saleh

## 2019-10-24 NOTE — PHYSICAL THERAPY NOTE
Physical Therapy Evaluation Note     Patient Name: Miko Joel    Today's Date: 10/24/2019     Problem List  Principal Problem:    Lethargy  Active Problems:    Adult onset hypothyroidism    ANAYA (generalized anxiety disorder)    Hypertension    Type 1 diabetes mellitus with diabetic neuropathy (HCC)    Diabetic neuropathy, painful (HCC)    Ovarian cancer ([address] )    Internal jugular (IJ) vein thromboembolism, acute, right (HCC)    Superficial thrombophlebitis of right upper extremity    A-fib (HCC)    Acute respiratory failure with hypercapnia (HCC)    Rectal bleeding    Hearing loss    Critical illness myopathy       Past Medical History  Past Medical History:   Diagnosis Date    Anxiety     Arthritis     Constipation     Diabetes mellitus (HCC)     IDDM    Frequent UTI     Glaucoma     Hearing loss     Hyperlipidemia     Hypertension     Hyperthyroidism     in past    Hyponatremia     Hypothyroid     Neuropathy     bles    Right tubo-ovarian mass     Wears glasses         Past Surgical History  Past Surgical History:   Procedure Laterality Date    APPENDECTOMY  1956    CATARACT EXTRACTION Bilateral     COLONOSCOPY  09/2014    Completed - Dr Gael Sy, due in 5 years    HYSTERECTOMY N/A 10/4/2019    Procedure: LAP TOTAL HYSTERECTOMY, BSO;  Surgeon: Khoa Pierre MD;  Location: [facility];  Service: Gynecology Oncology    LAPAROTOMY N/A 10/4/2019    Procedure: EXPLORATORY LAPAROTOMY  EXLAP OMENTECTOMY  PELVIC AND DEANGELO-AORTIC LYMPH NODE DISSECTION  PERITONEAL BIOPSY TRANS ABDOMINUS BLOCK;  Surgeon: Khoa Pierre MD;  Location: [facility];  Service: Gynecology Oncology    SKIN LESION EXCISION      Ears           10/24/19 6507   Note Type   Note type Eval only   Pain Assessment   Pain Assessment Kinsey-Baker FACES   Kinsey-Baker FACES Pain Rating 4   Pain Type Acute pain   Pain Location Generalized   Home Living   Type of [address] Layout One level   Additional Comments Pt lives in a 1 SH (condo) with 2-3 ARLENE  Pt was I prior without an AD  Pt drives and was very independent prior  Pt owns a RW  Pt recently was d/c from [facility]  Prior Function   Level of [place] Independent with ADLs and functional mobility   Lives With Alone   Receives Help From Family   ADL Assistance Independent   IADLs Independent   Restrictions/Precautions   Weight Bearing Precautions Per Order No   Other Precautions Fall Risk;Pain;Multiple lines;Telemetry;O2   General   Family/Caregiver Present Yes   Cognition   Overall Cognitive Status WFL   Arousal/Participation Alert   Orientation Level Oriented X4   RLE Assessment   RLE Assessment X   RLE Overall AROM   R Hip Flexion -3/5   R Knee Extension -3/5   R Ankle Dorsiflexion 3/5   LLE Assessment   LLE Assessment X   LLE Overall AROM   L Hip Flexion -3/5   L Knee Extension -3/5   L Ankle Dorsiflexion 3/5   Coordination   Movements are Fluid and Coordinated 0   Sensation WFL   Light Touch   RLE Light Touch Grossly intact   LLE Light Touch Grossly intact   Bed Mobility   Rolling R 2  Maximal assistance   Additional items Assist x 2   Supine to Sit 2  Maximal assistance   Additional items Assist x 2   Sit to Supine 2  Maximal assistance   Additional items Assist x 2   Transfers   Sit to Stand Unable to assess   Balance   Static Sitting Poor -   Dynamic Sitting Poor -   Endurance Deficit   Endurance Deficit Yes   Activity Tolerance   Activity Tolerance Patient limited by fatigue;Patient limited by pain   Medical Staff Made Aware OT and nurse present    Nurse Made Aware nurse approved therapy session   Assessment   Prognosis Guarded   Problem List Decreased strength;Decreased endurance; Impaired balance;Decreased mobility;Pain   Assessment Pt is a 83 yo female admitted to [place] [address] on 10/21/2019 s/p episode of altered mental status  Pt had a recent admission for hysterectomy and oophorectomy after dx of ovarian CA   Dx: acute respiratory failure with hypercapnia, lethargy, internal jugular vein thromboembolism on R, rectal bleeding  Two patient identifiers were used to confirm  Pt lives home alone in a condo that is 1 S story  Pt was I prior without the use of DME  Pt has 2-3 ARLENE Pt drives  Pt owns RW  Pt's impairments include reduced mobility, limited endurance, AMARO, high fall risk  These impairments limit the ability of the patient to perform mobility without increased assistance, return to PLOF and participate in everyday life activities  Pt would benefit from continued skilled therapy while in the hospital to improve overall mobility and work towards goals  Recommend discharge to  rehab  At the end of the session the patient was left in supine position with call bell and phone within reach  Pt's daughter was present throughout the session  Pt able to tolerate sitting EOB with assistance ranging from S to mod A x1  Pt sitting EOB for 10 minutes  Barriers to Discharge Inaccessible home environment;Decreased caregiver support   Goals   STG Expiration Date 11/03/19   Short Term Goal #1 STG 1: Pt will perform bed mobility at a I level to return to baseline  STG 2: Pt will wit EOB for 25-30 minutes with S to improve sitting tolerance and balance   PT Treatment Day 0   Plan   Treatment/Interventions Functional transfer training;LE strengthening/ROM; Therapeutic exercise; Endurance training;Equipment eval/education; Bed mobility;Gait training   PT Frequency Other (Comment)  (4-6xwk)   Recommendation   Recommendation Post acute IP rehab   PT - OK to Discharge No   Additional Comments need to trial transfers when appropriate    Modified Clarence Scale   Modified Clarence Scale 5   Barthel Index   Feeding 0   Bathing 0   Grooming Score 0   Dressing Score 5   Bladder Score 0   Bowels Score 5   Toilet Use Score 5   Transfers (Bed/Chair) Score 0   Mobility (Level Surface) Score 0   Stairs Score 0   Barthel Index Score 15   Mona Hatch, Pt, DPT

## 2019-10-24 NOTE — PROGRESS NOTES
Gyn Oncology Progress note   Izabel Garcia 82 y o  female MRN: [id number]  Unit/Bed#: [facility] 530-01 Encounter: [id number]    Assessment and Plan:    1)  Acute respiratory failure with hypercapnia  - Managed per primary team  - Transitioned to BIPAP 14/5 60% on 10/23/19 due to increased CO2 noted on ABG  [id number] 98%  - Pt has thick secretions and mucous plugging that may benefit from repeat bronchoscopy   - On cefepime for aspiration pneumonia  - Pulmonology following     2) Delirium  - Pt with waxing and waning between consciousness and lethargy   - Likely multi-factorial  - Ct head w/o contrast on admission without any acute abnormalities  - Pt alert and oriented this morning on my exam  - Continue to avoid sedating medications, benzos  - Neurology following    3) Severe protein calorie malnutrition secondary to poor oral take  - Pt was started on tube feeds on POD#10 in [facility]  Poor oral intake since discharge to LTAC    - NGT replaced with Keofed on 10/23/19-Jevity 1 2, advance to goal  - Speech pathology following     4) Rectal Bleeding, resolved  - H/H stable in 9s, Occult blood lab sent  - Given 1 unit pRBCs overnight 10/22/19 followed by Lasix 40mg IV for clinical fluid overload  - Monitoring H/H q6hrs  - s/p GI consult: stable, no intervention at this time   If acute onset maninder bleeding, reconsult    5) Internal Jugular (IJ) vein thromboembolism, acute right with new onset cyanotic right fingers  - Repeat doppler on 10/22 demonstrated occluded right radial artery from them id forearm to the wrist  All five digits show flat line doppler waveforms  - Pt's home Eliquis was placed on hold and she was started on Heparin gtt  - Vascular surgery following, no surgical intervention at this time  - Radial pulse noted with bedside doppler on my exam with nursing    6) Type 1 Diabetes  - Hgb A1c 6 4 on 10/22  - Glucose levels: Goal between 140-180   - Continue insulin gtt  - D5 1/2NS infusion     7) Stage 1B ovarian cancer s/p staging surgery 10/4/2019  - Good prognosis  - GYN ONC to continue to follow    8) Urinary Retention  s/p straight cath for 500cc at 1700 on 10/23/19, failed follow-up void  Ji catheter placed at 0400, 225cc UOP/ 1 5hrs    9) Decreased Hearing  Most-likely lasix  Evaluated by ENT on 10/23/19: outpatient follow-up as assessment cannot be done at bedside  Signed off    10) Paroxysmal Afib/bradycardia  On telemetry   Currently on heparin gtt  S/P Cardiology consult: likely secondary to medication, no further bradycardia noted  IV metoprolol held  Managed per primary team    11) Dispo: Inpatient, low threshold for ICU     Kasandra Perera is awake this AM upon entry into room breathing on continuous BIPAP  Pt states she feels hot all over, so blankets were removed, temperature taken and 99 5F  /58   Pulse 92   Temp 99 5 °F (37 5 °C) (Oral)   Resp 20   Ht 5' 5" (1 651 m)   Wt 66 7 kg (147 lb 0 8 oz)   SpO2 99%   BMI 24 47 kg/m²     I/O last 3 completed shifts: In: 2915 6 [I V :2065 6; Blood:350; IV Piggyback:500]  Out: 1030 [OSRGL:5456]  I/O this shift: In: 910 3 [I V :644 3; IV Piggyback:50]  Out: 475 [Urine:475]    Lab Results   Component Value Date    WBC 11 57 (H) 10/24/2019    HGB 9 0 (L) 10/24/2019    HCT 29 8 (L) 10/24/2019     (H) 10/24/2019     (H) 10/24/2019       Lab Results   Component Value Date    GLUCOSE 75 01/08/2016    CALCIUM 6 4 (L) 10/24/2019     (L) 01/08/2016    K 3 3 (L) 10/24/2019    CO2 35 (H) 10/24/2019     10/24/2019    BUN 21 10/24/2019    CREATININE 0 51 (L) 10/24/2019       Lab Results   Component Value Date/Time    POCGLU 152 (H) 10/24/2019 06:12 AM    POCGLU 120 10/24/2019 04:18 AM    POCGLU 90 10/24/2019 02:05 AM    POCGLU 76 10/24/2019 12:02 AM    POCGLU 81 10/23/2019 11:16 PM    POCGLU 164 02/15/2017 02:33 PM     Physical Exam   Constitutional: She is oriented to person, place, and time  Face mask in place     HENT:   Head: Normocephalic and atraumatic  Cardiovascular: Normal rate, regular rhythm and intact distal pulses  Pulmonary/Chest:   On BIPAP   Abdominal: Soft  There is no tenderness  There is no rebound and no guarding  Incision: C/D/I with dried histoacryl  No drainage, fluctuance or induration noted   Musculoskeletal: She exhibits edema (+1 bilaterally in LE)  Neurological: She is alert and oriented to person, place, and time  Skin: She is not diaphoretic  Vitals reviewed        Mojgan Wright MD  OBGYN, PGY-3  10/24/2019 6:30 AM

## 2019-10-24 NOTE — ASSESSMENT & PLAN NOTE
Patient has history of recent pneumonia and unfortunately ARDS requiring intubation and ICU admission and also was on high-flow oxygen before discharge to [facility]  Patient has to be on BiPAP overnight for elevated pCO2  Currently patient is on 6 L nasal cannula with Oxymizer  Pulmonology evaluation appreciated  ABG reviewed  Acute hypoxic respiratory failure is likely multifactorial secondary to recent pneumonia/ARDS-patient was evaluated along with the pulmonologist and reported that it recovery is going to be  Slow  Patient had a CT scan today  Discussed with the patient's family along with the pulmonology  Recommended thoracocentesis  INR is planning to do the thoracocentesis tomorrow  Will continue with the antibiotics    Gentle diuresis

## 2019-10-24 NOTE — PLAN OF CARE
Problem: OCCUPATIONAL THERAPY ADULT  Goal: Performs self-care activities at highest level of function for planned discharge setting  See evaluation for individualized goals  Description  Treatment Interventions: ADL retraining, Functional transfer training, UE strengthening/ROM, Endurance training, Cognitive reorientation, Patient/family training, Equipment evaluation/education, Fine motor coordination activities, Compensatory technique education, Energy conservation, Activityengagement  Equipment Recommended: (S) Bedside commode, Other (comment)(SC)       See flowsheet documentation for full assessment, interventions and recommendations  Outcome: Progressing  Note:   Limitation: Decreased ADL status, Decreased UE strength, Decreased UE ROM, Decreased Safe judgement during ADL, Decreased cognition, Decreased endurance, Decreased high-level ADLs, Decreased fine motor control  Prognosis: Fair  Assessment: Pt is a 81 yo female seen for OT eval s/p adm to [facility] dx'd w/ lethargy  Pt w/ recent admit to [facility] and discharged to [facility]  Comorbidities include a h/o ANAYA, HTN, DM 1, diabetic neuropathy, ovarian CA, IJ vein thromboembolism, A-fib, ARF w/ hypercapnia, rectal bleeding, hearing loss, diabetic retinopathy, osteoporosis  Pt with active OT orders and up with assistance  orders  Pt appears to be mildly confused - Dtr present to A w/ home set up and PLOF  Pt is retired and resides alone in [place] w/ 2-3STE  Dtr reports they could have hired help A Pt upon D/C PRN  Pt was I w/  ADLS and IADLS, drove, & required no use of DME PTA  Pt is currently demonstrating the following occupational deficits: Max A UB bathing/dressing, Total A LB bathing/dressing and toileting, Max Ax2 bed mobility and supine<>sit  Pt able to tolerate sitting EOB ~10 min w/ S-Mod A trunk/postural support   These deficits that are impacting pt's baseline areas of occupation are a result of the following impairments: pain, endurance, activity tolerance, functional mobility, forward functional reach, balance, trunk control, functional standing tolerance, unsupportive home environment, decreased I w/ ADLS/IADLS, strength, ROM, cognitive impairments, decreased safety awareness, decreased insight into deficits and impaired fine motor skills  The following Occupational Performance Areas to address include: eating, grooming, bathing/shower, toilet hygiene, dressing, health maintenance, functional mobility and clothing management  Pt scored overall 5/100 on the Barthel Index  Based on the aforementioned OT evaluation, functional performance deficits, and assessments, pt has been identified as a high complexity evaluation  Recommend STR upon D/C   Pt to continue to benefit from acute immediate OT services to address the following goals 3-5x/week to  w/in 10-14 days:      OT Discharge Recommendation: Short Term Rehab  OT - OK to Discharge: Yes(when medically cleared)

## 2019-10-24 NOTE — PLAN OF CARE
Problem: PHYSICAL THERAPY ADULT  Goal: Performs mobility at highest level of function for planned discharge setting  See evaluation for individualized goals  Description    Outcome: Progressing  Note:   Prognosis: Guarded  Problem List: Decreased strength, Decreased endurance, Impaired balance, Decreased mobility, Pain  Assessment: Pt is a 81 yo female admitted to [place] [address] on 10/21/2019 s/p episode of altered mental status  Pt had a recent admission for hysterectomy and oophorectomy after dx of ovarian CA  Dx: acute respiratory failure with hypercapnia, lethargy, internal jugular vein thromboembolism on R, rectal bleeding  Two patient identifiers were used to confirm  Pt lives home alone in a condo that is 1 S story  Pt was I prior without the use of DME  Pt has 2-3 ARLENE Pt drives  Pt owns RW  Pt's impairments include reduced mobility, limited endurance, AMARO, high fall risk  These impairments limit the ability of the patient to perform mobility without increased assistance, return to PLOF and participate in everyday life activities  Pt would benefit from continued skilled therapy while in the hospital to improve overall mobility and work towards goals  Recommend discharge to  rehab  At the end of the session the patient was left in supine position with call bell and phone within reach  Pt's daughter was present throughout the session  Pt able to tolerate sitting EOB with assistance ranging from S to mod A x1  Pt sitting EOB for 10 minutes  Barriers to Discharge: Inaccessible home environment, Decreased caregiver support     Recommendation: Post acute IP rehab     PT - OK to Discharge: No    See flowsheet documentation for full assessment

## 2019-10-24 NOTE — ASSESSMENT & PLAN NOTE
Currently on heparin drip  Was taken off of the IV metoprolol yesterday secondary to bradycardia  Atrial tachycardia overnight  Cardiology on board monitor heart rate on the telemetry

## 2019-10-24 NOTE — SPEECH THERAPY NOTE
Speech Language/Pathology    Speech/Language Pathology Progress Note    Patient Name: Rosemary Smyth  Today's Date: 10/24/2019     Problem List  Principal Problem:    Lethargy  Active Problems:    Adult onset hypothyroidism    ANAYA (generalized anxiety disorder)    Hypertension    Type 1 diabetes mellitus with diabetic neuropathy (HCC)    Diabetic neuropathy, painful (HCC)    Ovarian cancer ([facility] [address] )    Internal jugular (IJ) vein thromboembolism, acute, right (HCC)    Superficial thrombophlebitis of right upper extremity    A-fib (HCC)    Acute respiratory failure with hypercapnia (HCC)    Rectal bleeding    Hearing loss       Past Medical History  Past Medical History:   Diagnosis Date    Anxiety     Arthritis     Constipation     Diabetes mellitus (HCC)     IDDM    Frequent UTI     Glaucoma     Hearing loss     Hyperlipidemia     Hypertension     Hyperthyroidism     in past    Hyponatremia     Hypothyroid     Neuropathy     bles    Right tubo-ovarian mass     Wears glasses         Past Surgical History  Past Surgical History:   Procedure Laterality Date    APPENDECTOMY  1954    CATARACT EXTRACTION Bilateral     COLONOSCOPY  09/2014    Completed - Dr Shannan Daniel, due in 5 years    HYSTERECTOMY N/A 10/4/2019    Procedure: LAP TOTAL HYSTERECTOMY, BSO;  Surgeon: Susie Arellano MD;  Location: [facility];  Service: Gynecology Oncology    LAPAROTOMY N/A 10/4/2019    Procedure: EXPLORATORY LAPAROTOMY  EXLAP OMENTECTOMY  PELVIC AND DEANGELO-AORTIC LYMPH NODE DISSECTION  PERITONEAL BIOPSY TRANS ABDOMINUS BLOCK;  Surgeon: Susie Arellano MD;  Location: [facility];  Service: Gynecology Oncology    SKIN LESION EXCISION      Ears         Subjective:  "It was horrible" (wearing bi-pap)    Objective: The patient is awake and alert today  Notes report that she required more O2 last evening, and had to wear bi-pap for some time  Today she is back on NC  Family is present   Oral care is provided with oral swab and suction for the removal of a moderate amount of dried secretions from palate and lips  The patient is able to independently use swab for oral care  Daughter has been giving swabs with water  The patient is able to manipulate swab and initiate swallow, but swallow is delayed and weak  The patient has a cough in response to first water swab  Discussed completion of VBS and decided to reassess in the am to ensure that respiratory status remains stable and adequate  Patient and family agree with POC  Assessment:  Improved alertness  Monitory respiratory status  Plan/Recommendations:  Continue NPO with [employer]   Continue ST

## 2019-10-24 NOTE — ASSESSMENT & PLAN NOTE
As patient was seen lethargic; will put on hold trazodone and lorazepam   Started on Zoloft at a low dose yesterday

## 2019-10-24 NOTE — ASSESSMENT & PLAN NOTE
Patient was admitted with lethargy coming from the [facility]  Patient with waxing and waning of mental status with the periods of lethargy  Patient was more lethargic after getting the Ativan yesterday patient only got 0 25 mg of Ativan x1    Currently avoiding all sedating medication  Encephalopathy is likely multifactorial-likely toxic metabolic  Neurology evaluation appreciated  Patient was on  and tramadol which was discontinued in the facility  Her lethargy is multifactorial/poor nutrition/deconditioning  Appears to be improving

## 2019-10-24 NOTE — PROGRESS NOTES
Progress Note - Vascular Surgery   Colton Rowe 82 y o  female MRN: [id number]  Unit/Bed#: [facility] 530-01 Encounter: [id number]    Assessment:  82 y o  F w hx of stage IB ovarian cancer s/p QASIM, presents w subacute RUE ischemia, R IJ DVT, R cephalic and basilic vein superficial venous thrombosis     10/22 RUE venous duplex: R radial artery occlusion, flattened waveforms in all digits   AVSS  Pulses: R Dopp radial, ulnar, palmar arch     Plan:  Continue heparin gtt given clinical improvement  No surgical intervention at this time  Serial RUE neurovascular exams       Subjective/Objective     Subjective: No acute events overnight  Denies any pain at this time  No fevers  Objective:     Blood pressure 128/58, pulse 84, temperature 100 5 °F (38 1 °C), temperature source Axillary, resp  rate 16, height 5' 5" (1 651 m), weight 66 7 kg (147 lb 0 8 oz), SpO2 99 %, not currently breastfeeding  ,Body mass index is 24 47 kg/m²        Intake/Output Summary (Last 24 hours) at 10/24/2019 0734  Last data filed at 10/24/2019 7220  Gross per 24 hour   Intake 2018 5 ml   Output 1889 ml   Net 129 5 ml       Invasive Devices     Peripherally Inserted Central Catheter Line            PICC Line 66/92/95 Left Basilic 9 days          Peripheral Intravenous Line            Peripheral IV 10/23/19 Left Antecubital 1 day          Drain            NG/OG/Enteral Tube Nasogastric 8 Fr Right nares less than 1 day    Urethral Catheter 18 Fr  less than 1 day                  NAD, alert and oriented x3  Normocephalic, atraumatic  MMM, EOMI, PERRLA  Norm resp effort on CPAP  RRR  Abd soft, NT/ND  No calf tenderness  Motor/sensation intact in distal extremities  Pulses: R Dopp radial, ulnar, palmar arch   Diffuse trace edema to bilateral upper and lower extremities  CN grossly intact  -rash/lesions      Lab, Imaging and other studies:  CBC:   Lab Results   Component Value Date    WBC 11 57 (H) 10/24/2019    HGB 9 0 (L) 10/24/2019    HCT 29 8 (L) 10/24/2019     (H) 10/24/2019     (H) 10/24/2019    MCH 31 8 10/24/2019    MCHC 30 2 (L) 10/24/2019    RDW 16 0 (H) 10/24/2019    MPV 9 8 10/24/2019    NRBC 0 10/24/2019   , CMP:   Lab Results   Component Value Date    SODIUM 146 (H) 10/24/2019    K 3 3 (L) 10/24/2019     10/24/2019    CO2 35 (H) 10/24/2019    BUN 21 10/24/2019    CREATININE 0 51 (L) 10/24/2019    CALCIUM 6 4 (L) 10/24/2019    AST 21 10/24/2019    ALT 21 10/24/2019    ALKPHOS 95 10/24/2019    EGFR 90 10/24/2019     VTE Pharmacologic Prophylaxis: Heparin  VTE Mechanical Prophylaxis: sequential compression device

## 2019-10-24 NOTE — PROGRESS NOTES
Progress Note - Pulmonary   Janine Eldridge 82 y o  female MRN: [id number]  Unit/Bed#: [facility] 530-01 Encounter: [id number]      Impressions:    Acute hypoxic and hypercapnic respiratory failure- Unclear reason for hypercapnea, may be related to prior aspiration vs fibrotic phase of ARDs  Continue with xopenex nebulizer TID  atrovent was d/dante during last hospitalization for urinary retention  - MRSA nares neg  - Sputum culture not obtained  - procal is negative  Plan:  - Continue xopenex  - F/up sputum culture  - Barium video swallow to assess for aspiration  - Continue with O2 NC, wean as tolerated  - Repeat CT chest  - Depending on results of CT chest, may need a repeat bronchoscopy     Abnormal CXR- likely due to recent aspiration event  Treated with 9 days of abx for aspiration pneumonia  Previous sputum culture with serratia  Candida likely colonizer  ID previously evaluated  WBC improving  Plan:  - Repeat CT chest  - Trend WBC     Moderate pulm HTN- PASP 52mmHg  with tricuspid regurg  Still has significant peripheral edema  Recommend diuresis and monitor Is/Os closely     Lethargy- unclear etiology  No evidence of UTI  Does have urinary retention  ABG with mild hypercarbia but not signficant to explant level of lethargy  Patient is awake and follows all commands but is minimally verbal  CT head negative  Neurology consulted  MRI of head    Hx of recent pneumonia- bronch cultures on 10/16 grew serratia  Patient completed 9 days of antibiotics  Subjective:   Patient seen and examined bedside  She is minimally verbal but denies any shortness of breath or chest pain  Patient required increased oxygen overnight with transition to BiPAP but was back nasal cannula this morning  Per Respiratory there been no improvement with duo nebs and patient has a minimal volumes on incentive spirometry      Review of Systems   Unable to perform ROS: Mental status change       Objective:     Vitals:    10/24/19 0604 10/24/19 0730 10/24/19 0757 10/24/19 1025   BP: 131/58 128/58  132/63   BP Location:    Left arm   Pulse: 92 84  73   Resp:  16  18   Temp: 99 5 °F (37 5 °C) 100 5 °F (38 1 °C) 99 9 °F (37 7 °C) 98 4 °F (36 9 °C)   TempSrc: Oral Axillary Rectal Axillary   SpO2:  99%  97%   Weight:       Height:               Intake/Output Summary (Last 24 hours) at 10/24/2019 1138  Last data filed at 10/24/2019 0800  Gross per 24 hour   Intake 2122 44 ml   Output 975 ml   Net 1147 44 ml         Physical Exam   Constitutional: She is oriented to person, place, and time  No distress  HENT:   Head: Normocephalic and atraumatic  Nose: Nose normal    Mouth/Throat: Oropharynx is clear and moist  No oropharyngeal exudate  Eyes: Pupils are equal, round, and reactive to light  Conjunctivae and EOM are normal  No scleral icterus  Neck: Normal range of motion  Neck supple  No JVD present  No tracheal deviation present  No thyromegaly present  Cardiovascular: Normal rate, regular rhythm, normal heart sounds and intact distal pulses  Exam reveals no gallop and no friction rub  No murmur heard  Pulmonary/Chest: Effort normal and breath sounds normal  No stridor  No respiratory distress  She has no wheezes  She has no rales  Abdominal: Soft  Bowel sounds are normal  She exhibits no distension  There is no tenderness  There is no rebound and no guarding  Musculoskeletal: Normal range of motion  She exhibits edema  She exhibits no deformity  Significant bilateral upper extremity edema about 3+   Lymphadenopathy:     She has no cervical adenopathy  Neurological: She is alert and oriented to person, place, and time  No cranial nerve deficit or sensory deficit  Skin: Skin is warm  No rash noted  She is not diaphoretic  No erythema  Psychiatric: She has a normal mood and affect  Labs: I have personally reviewed pertinent lab results  , ABG:   Lab Results   Component Value Date    PHART 7 328 (L) 10/23/2019    [id number] 71 0 (HH) 10/23/2019    PO2ART 92 2 10/23/2019    KNQ3NGN 36 4 (H) 10/23/2019    BEART 8 5 10/23/2019    SOURCE Radial, Left 10/23/2019   , BNP: No results found for: BNP, CBC:   Lab Results   Component Value Date    WBC 11 57 (H) 10/24/2019    HGB 9 0 (L) 10/24/2019    HCT 29 8 (L) 10/24/2019     (H) 10/24/2019     (H) 10/24/2019    MCH 31 8 10/24/2019    MCHC 30 2 (L) 10/24/2019    RDW 16 0 (H) 10/24/2019    MPV 9 8 10/24/2019    NRBC 0 10/24/2019   , CMP:   Lab Results   Component Value Date    SODIUM 146 (H) 10/24/2019    K 3 3 (L) 10/24/2019     10/24/2019    CO2 35 (H) 10/24/2019    BUN 21 10/24/2019    CREATININE 0 51 (L) 10/24/2019    CALCIUM 6 4 (L) 10/24/2019    AST 21 10/24/2019    ALT 21 10/24/2019    ALKPHOS 95 10/24/2019    EGFR 90 10/24/2019   , PT/INR: No results found for: PT, INR, Troponin: No results found for: TROPONINI  Results from last 7 days   Lab Units 10/24/19  0454 10/23/19  1830 10/23/19  1341 10/23/19  0450  10/22/19  0950 10/22/19  0601   WBC Thousand/uL 11 57*  --   --  15 13*  --  15 06* 14 83*   HEMOGLOBIN g/dL 9 0* 9 2* 9 1* 9 4*   < > 8 2* 8 5*   HEMATOCRIT % 29 8* 29 7* 29 5* 31 6*   < > 28 1* 28 5*   PLATELETS Thousands/uL 459*  --   --  515*  --  584* 613*   NEUTROS PCT % 88*  --   --  89*  --   --  90*   MONOS PCT % 6  --   --  6  --   --  6    < > = values in this interval not displayed        Results from last 7 days   Lab Units 10/24/19  0454 10/23/19  0450 10/22/19  0601 10/21/19  2030   POTASSIUM mmol/L 3 3* 3 2* 3 9 3 4*   CHLORIDE mmol/L 106 106 109* 105   CO2 mmol/L 35* 34* 36* 36*   BUN mg/dL 21 21 26* 26*   CREATININE mg/dL 0 51* 0 45* 0 57* 0 61   CALCIUM mg/dL 6 4* 6 3* 6 8* 7 1*   ALK PHOS U/L 95  --   --  100   ALT U/L 21  --   --  34   AST U/L 21  --   --  27     Results from last 7 days   Lab Units 10/22/19  0601   MAGNESIUM mg/dL 2 3     Results from last 7 days   Lab Units 10/22/19  0601   PHOSPHORUS mg/dL 2 7      Results from last 7 days   Lab Units 10/24/19  0454 10/23/19  0441 10/22/19  2333  10/22/19  0950 10/21/19  2030   INR   --   --   --   --  1 29* 1 55*   PTT seconds 97* 83* 72*   < > 26 27    < > = values in this interval not displayed  Results from last 7 days   Lab Units 10/22/19  0950   LACTIC ACID mmol/L 0 7     0   Lab Value Date/Time    TROPONINI 0 03 10/21/2019 2030    TROPONINI 1 91 (H) 10/07/2019 2035       Microbiology:  MRSA nares negative, blood cultures negative at 48 hours, no sputum culture results    Imaging and other studies: I have personally reviewed pertinent reports     and I have personally reviewed pertinent films in PACS  Chest x-ray-bilateral patchy airspace infiltrates      Huseyin Tenorio MD  Pulmonary & Critical Care Fellow, [facility]

## 2019-10-24 NOTE — ASSESSMENT & PLAN NOTE
Patient was on Lopressor as an outpatient  Patient was actually hypotensive yesterday    Blood pressure improved

## 2019-10-24 NOTE — PROGRESS NOTES
Patient resting in bed with continuous bipap, keofed in place with jevity 1 2 increased now to 30mL/hr  Heparin, insulin, and IVF infusing via PICC line  All vitals stable  Patient with urine retention, OB doctor recommending alexandre placement, reached to SLIM PA and okay to insert alexandre catheter  Patient tolerated well  Had a small blood tinged BM as well this morning  Will continue to monitor

## 2019-10-24 NOTE — PROGRESS NOTES
Progress Note - Kasandra Perera 82 y o  female MRN: [id number]    Unit/Bed#: [facility] 530-01 Encounter: [id number]      Assessment/Plan:  1  Lethargy  Improved pt awake and alert, oriented x3  Continue delirium precautions  Treat pain  Monitor for constipation  Encourage activity    2  Anxiety  Started on zoloft 25mg po daily  Monitor sodium  Continue supportive care    3  Acute pain  Continue scheduled Tylenol  Lidoderm to back    4  Deconditioning  Albumin 2 4  PT/OT  Speech consult  Continue tube feed via keofeed  Nutrition consult    5  Hearing impairment  Decreased hearing to left ear  New since previous hospitalization  Seen by ENT, follow up as outpatient    6  Ambulatory dysfunction  No history of falls or use of assistive device   mobility limited secondary to peripheral neuropathy and vertigo  PT/OT  Rehab post hospitalization    7  Insomnia  Agree with hold of trazadone  Recommend melatonin 6mg po QHS    8  Hypothyroidism  TSH slightly elevated  Continue to synthroid 50mcg daily    9  DM type 1  HgA1c 6 4  Pt tracks blood sugars 6x days at home    10  Respiratory failure  Nasal canula  Pulmonary on consult    11  Right upper ischemia  Vascular sugery on consult  Heparin gtt    12  Rectal bleed  GI consult  HG 9 1    13  Stable 1B ovarian Ca  Post op    14  Dyshpagia  keofeed  Speech consult          Subjective:   Upon exam pt oob in bed  Alert and orient x3  States she did not have a good night  Per nursing pt did well overnight was on bipap, sm amount of rectal bleeding  Objective:     Vitals: Blood pressure 132/63, pulse 73, temperature 98 4 °F (36 9 °C), temperature source Axillary, resp  rate 18, height 5' 5" (1 651 m), weight 66 7 kg (147 lb 0 8 oz), SpO2 97 %, not currently breastfeeding  ,Body mass index is 24 47 kg/m²        Intake/Output Summary (Last 24 hours) at 10/24/2019 1216  Last data filed at 10/24/2019 1200  Gross per 24 hour   Intake 2372 64 ml   Output 1125 ml   Net 1247 64 ml Physical Exam:   General : NAD  HEENT : MMM   Heart : Normal rate, no murmur rub or gallop  Lungs : CTA no wheezes, rales or rhonchi  Abdomen : Soft, NT/ND, BS auscultated in all 4 quads  Ext :  generalized trace edema, plus 2-3 edema bilateral hands, +1 edema bilateral feet  Skin : Pink, warm, dry, age appropriate turgor and mobility  Neuro : Nonfocal  Psych : Alert and O x 3      Invasive Devices     Peripherally Inserted Central Catheter Line            PICC Line 25/97/73 Left Basilic 9 days          Peripheral Intravenous Line            Peripheral IV 10/23/19 Left Antecubital 1 day          Drain            NG/OG/Enteral Tube Nasogastric 8 Fr Right nares less than 1 day    Urethral Catheter 18 Fr  less than 1 day                Lab, Imaging and other studies: I have personally reviewed pertinent reports      VTE Pharmacologic Prophylaxis: Sequential compression device (Venodyne)   VTE Mechanical Prophylaxis: sequential compression device

## 2019-10-24 NOTE — PROGRESS NOTES
------------------------------------------------------------------------------------------------  Chief Complaint: "I want this mask off"    HPI/24hr events: Bipap 10/5 overnight, improved WOB   ----------------------------------------------------------------------------------------------  Acute respiratory failure with hypercapnia (HCC)  Assessment & Plan  · Secondary to multifocal pneumonia   · Patient had increased WOB last evening and mildly hypercarbia on ABG and was put on bipap 10/5, she wore bipap overnight and was transitioned to nasal cannula 6L this morning  Her WOB looks significantly improved this morning  · Continue to utilize bipap prn for increased WOB/hypercarbia  · May benefit from diuresis today  · Pulmonary following    General Appearance:  Alert and oriented  Ill appearing  HENT: Atruamtic  Neck: supple  Eyes: no icterus  Cardiac: irregular rate and rhythm, no murmur  Pulmonary: lungs course and diminished to auscultation bilaterally  Gastrointestinal: bowel sounds present, mild distention,  Non-tender  : Ji present: yes   Clear, yellow urine            Musculoskeletal: 1+ edema bilaterally  Neuro:  Intact  Psych: Mood and affect-depressed/flat  Skin: warm, dry, intact, pale   ------------------------------------------------------------------------------------------    Vitals:   Vitals:    10/24/19 0757 10/24/19 1025 10/24/19 1244 10/24/19 1446   BP:  132/63 140/65 127/60   BP Location:  Left arm  Right arm   Pulse:  73  72   Resp:  18  18   Temp: 99 9 °F (37 7 °C) 98 4 °F (36 9 °C)  98 2 °F (36 8 °C)   TempSrc: Rectal Axillary  Axillary   SpO2:  97%  99%   Weight:       Height:                 Temperature: Temp (24hrs), Av 8 °F (37 1 °C), Min:97 9 °F (36 6 °C), Max:100 5 °F (38 1 °C)  Current: Temperature: 98 2 °F (36 8 °C)    Weights: IBW: 57 kg  Body mass index is 24 47 kg/m²      Respiratory:  SpO2: SpO2: 99 %, SpO2 Activity: SpO2 Activity: At Rest, SpO2 Device: O2 Device: Nasal cannula  O2 Flow Rate (L/min): 6 L/min    Intake and Outputs:    Intake/Output Summary (Last 24 hours) at 10/24/2019 1738  Last data filed at 10/24/2019 1626  Gross per 24 hour   Intake 1796 81 ml   Output 625 ml   Net 1171 81 ml     I/O last 24 hours: In: 2668 7 [I V :1752 7; NG/GT:260; IV Piggyback:300; Feedings:356]  Out: 2039 [Urine:2039]    Nutrition:        Diet Orders   (From admission, onward)             Start     Ordered    10/24/19 0951  Diet Enteral/Parenteral; Tube Feeding No Oral Diet; Jevity 1 2 Shashank; Continuous; 10; 100; Every 6 hours  Diet effective now     Comments:  Started 10 mL/hour and advance 10 mL every 6 hours to a goal of 60 mL/hour as tolerated   Question Answer Comment   Diet Type Enteral/Parenteral    Enteral/Parenteral Tube Feeding No Oral Diet    Tube Feeding Formula: Jevity 1 2 Shashank    Bolus/Cyclic/Continuous Continuous    Tube Feeding Goal Rate (mL/hr): 10    Tube Feeding water flush (mL): 100    Water flush frequency: Every 6 hours    RD to adjust diet per protocol? No        10/24/19 0951                Labs:   Results from last 7 days   Lab Units 10/24/19  1228 10/24/19  0454 10/23/19  1830  10/23/19  0450  10/22/19  0950 10/22/19  0601   WBC Thousand/uL  --  11 57*  --   --  15 13*  --  15 06* 14 83*   HEMOGLOBIN g/dL 9 4* 9 0* 9 2*   < > 9 4*   < > 8 2* 8 5*   HEMATOCRIT % 31 0* 29 8* 29 7*   < > 31 6*   < > 28 1* 28 5*   PLATELETS Thousands/uL  --  459*  --   --  515*  --  584* 613*   NEUTROS PCT %  --  88*  --   --  89*  --   --  90*   MONOS PCT %  --  6  --   --  6  --   --  6    < > = values in this interval not displayed       Results from last 7 days   Lab Units 10/24/19  0454 10/23/19  0450 10/22/19  0601 10/21/19  2030   SODIUM mmol/L 146* 143 147* 146*   POTASSIUM mmol/L 3 3* 3 2* 3 9 3 4*   CHLORIDE mmol/L 106 106 109* 105   CO2 mmol/L 35* 34* 36* 36*   BUN mg/dL 21 21 26* 26*   CREATININE mg/dL 0 51* 0 45* 0 57* 0 61   CALCIUM mg/dL 6 4* 6  3* 6 8* 7 1*   ALK PHOS U/L 95  --   --  100   ALT U/L 21  --   --  34   AST U/L 21  --   --  27     Results from last 7 days   Lab Units 10/22/19  0601   MAGNESIUM mg/dL 2 3     Results from last 7 days   Lab Units 10/22/19  0601   PHOSPHORUS mg/dL 2 7      Results from last 7 days   Lab Units 10/24/19  1228 10/24/19  0454 10/23/19  0441  10/22/19  0950 10/21/19  2030   INR   --   --   --   --  1 29* 1 55*   PTT seconds 70* 97* 83*   < > 26 27    < > = values in this interval not displayed  Results from last 7 days   Lab Units 10/22/19  0950 10/21/19  2030   LACTIC ACID mmol/L 0 7 1 3     Results from last 7 days   Lab Units 10/21/19  2030   TROPONIN I ng/mL 0 03     Results from last 7 days   Lab Units 10/23/19  1608 10/23/19  1258 10/23/19  0809 10/22/19  2343 10/22/19  0958   PH ART  7 328* 7 351 7 414 7 306* 7 359   PCO2 ART mm Hg 71 0* 67 3* 56 0* 76 5* 63 2*   PO2 ART mm Hg 92 2 93 2 55 9* 133 0* 78 5   HCO3 ART mmol/L 36 4* 36 4* 35 0* 37 3* 34 8*   BASE EXC ART mmol/L 8 5 9 0 8 9 9 3 8 0   ABG SOURCE  Radial, Left Radial, Left Radial, Left Radial, Left Radial, Left     Results from last 7 days   Lab Units 10/23/19  0851 10/22/19  1218   PROCALCITONIN ng/ml 0 19 0 16     No results found for: Tylor Orozco   Results from last 7 days   Lab Units 10/22/19  0158   HEMOGLOBIN A1C % 6 4*     Results from last 7 days   Lab Units 10/22/19  0601   TSH 3RD GENERATON uIU/mL 4 120*       Imaging:  No new imaging    Micro:   Blood Culture:   Lab Results   Component Value Date    BLOODCX No Growth at 48 hrs  10/21/2019    BLOODCX No Growth at 48 hrs   10/21/2019     Urine Culture:   Lab Results   Component Value Date    URINECX 40,000-49,000 cfu/ml Enterococcus faecalis (A) 08/13/2019    URINECX <10,000 cfu/ml - X2 Gram Negative Kwame (A) 08/13/2019     Sputum Culture: No components found for: SPUTUMCX  Wound Culure: No results found for: WOUNDCULT  Results from last 7 days   Lab Units 10/22/19  2002 10/21/19  2148 10/21/19  2030   BLOOD CULTURE   --  No Growth at 48 hrs  No Growth at 48 hrs  MRSA CULTURE ONLY  No Methicillin Resistant Staphlyococcus aureus (MRSA) isolated  --   --        Allergies:    Allergies   Allergen Reactions    Thiazide-Type Diuretics      Hyponatremia       Medications:   Scheduled Meds:  Current Facility-Administered Medications:  acetaminophen 975 mg Oral [facility] [address] Tuesday Lunda Comas, CRNP    acetaminophen 650 mg Rectal Q6H PRN Marcel Marti MD    albuterol 2 5 mg Nebulization Q4H PRN Marcel Marti MD    aluminum-magnesium hydroxide-simethicone 15 mL Per NG Tube Q6H PRN Marcel Marti MD    artificial tear  Both Eyes HS PRN Marcel Marti MD    bisacodyl 10 mg Rectal Daily PRN Félix Anand MD    cefepime 2,000 mg Intravenous Q12H Félix Anand MD Last Rate: 2,000 mg (10/24/19 1248)   dorzolamide-timolol 1 drop Both Eyes BID Marcel Marti MD    heparin (porcine) 3-30 Units/kg/hr (Order-Specific) Intravenous Titrated Laura Yoans, PA-C Last Rate: 13 Units/kg/hr (10/24/19 1729)   heparin (porcine) 2,400 Units Intravenous PRN Laura Yonas, PA-C    heparin (porcine) 4,800 Units Intravenous PRN Laurayasmin Branham, PA-C    insulin regular (HumuLIN R,NovoLIN R) infusion 0 3-21 Units/hr Intravenous Titrated Félix Anand MD Last Rate: 6 Units/hr (10/24/19 1721)   levothyroxine 50 mcg Per NG Tube Early Morning Marcel Marti MD    lidocaine 1 patch Topical Daily Félix Anand MD    Netarsudil Dimesylate 1 drop Ophthalmic Daily Marcel Marti MD    Netarsudil Dimesylate 1 drop Right Eye HS Marcel Marti MD    ondansetron 4 mg Intravenous Q6H PRN Marcel Marti MD    pantoprazole 40 mg Intravenous Q24H [address] Félix Anand MD    senna 8 8 mg Per NG Tube Daily PRN Marcel Marit MD    sertraline 25 mg Per NG Tube HS Félix Anand MD    simethicone 40 mg Oral Q6H PRN Félix Anand MD    travoprost 1 drop Both Eyes HS Marcel Marti MD      Continuous Infusions:  heparin (porcine) 3-30 Units/kg/hr (Order-Specific) Last Rate: 13 Units/kg/hr (10/24/19 1729)   insulin regular (HumuLIN R,NovoLIN R) infusion 0 3-21 Units/hr Last Rate: 6 Units/hr (10/24/19 1721)     PRN Meds:    acetaminophen 650 mg Q6H PRN   albuterol 2 5 mg Q4H PRN   aluminum-magnesium hydroxide-simethicone 15 mL Q6H PRN   artificial tear  HS PRN   bisacodyl 10 mg Daily PRN   heparin (porcine) 2,400 Units PRN   heparin (porcine) 4,800 Units PRN   ondansetron 4 mg Q6H PRN   senna 8 8 mg Daily PRN   simethicone 40 mg Q6H PRN       VTE Pharmacologic Prophylaxis: Heparin Drip  VTE Mechanical Prophylaxis: sequential compression device    Invasive  Devices  Invasive Devices     Peripherally Inserted Central Catheter Line            PICC Line 63/15/10 Left Basilic 10 days          Peripheral Intravenous Line            Peripheral IV 10/23/19 Left Antecubital 1 day          Drain            NG/OG/Enteral Tube Nasogastric 8 Fr Right nares 1 day    Urethral Catheter 18 Fr  less than 1 day                Code Status: Level 1 - Full Code    Counseling / Coordination of Care  Total time spent today 28 minutes  Greater than 50% of total time was spent with the patient and / or family counseling and / or coordination of care   A description of the counseling / coordination of care: assesing patient, reviewing labs, medications, pmh, and hospital course     SIGNATURE: JIM Holman  DATE: October 24, 2019  TIME: 5:38 PM

## 2019-10-24 NOTE — PROGRESS NOTES
General Cardiology   Progress Note -  [employer]   Jayleen Covarrubias 82 y o  female MRN: [id number]    Unit/Bed#: [facility] 530-01 Encounter: [id number]    Assessment/ Plan    1  Paroxymal atrial fibrillation/bradycardia-   Reviewed telemetry showing sinus rhythm HR 50-70s with 1 episode of atrial tachycardia   Patient was on metoprolol 2 5 mg IV q 6 hours discontinued yesterday due to bradycardia  On heparin gtt for anticoagulation   Echocardiogram 10/10/19 showed EF 63%, mild MR, moderate TR  TSH 4 12    2  Iatrogenic volume overload-   CT of chest showed increasing bilateral pleural effusions   Monitor I/Os +  129 ml in 24 hours and 1 5 L since admission   Daily weights: 147 lbs (? 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   On heparin gtt   No surgical intervention per vascular surgery      6  Rectal bleeding- followed by GI   S/p 1 unit PRBCs  H/H stable  Hemoglobin 9 0 today      7  Hypertension- 124/58 average BP, continue to monitor    Subjective  Patient is lethargic and sleeping currently  Review of Systems   Unable to perform ROS: mental status change       Objective:   Vitals: Blood pressure 128/58, pulse 84, temperature 99 9 °F (37 7 °C), temperature source Rectal, resp  rate 16, height 5' 5" (1 651 m), weight 66 7 kg (147 lb 0 8 oz), SpO2 99 %, not currently breastfeeding ,       Body mass index is 24 47 kg/m²  ,     Systolic (80OMF), KAREN:072 , Min:108 , QZF:801     Diastolic (86FBR), UQF:60, Min:51, Max:63      Intake/Output Summary (Last 24 hours) at 10/24/2019 0952  Last data filed at 10/24/2019 5599  Gross per 24 hour   Intake 2018 5 ml Output 975 ml   Net 1043 5 ml     Weight (last 2 days)     Date/Time   Weight    10/23/19 0544   66 7 (147 05)            Telemetry Review: Sinus rhythm HR 50-70s with 1 episode of atrial tachycardia around 3:30 am     Physical Exam   Constitutional: No distress  Chronically ill appearing    HENT:   Head: Normocephalic  Neck: Neck supple  Pulmonary/Chest: Effort normal  No stridor  No respiratory distress  Course   No crackles  5 L NC    Abdominal: Soft  Bowel sounds are normal    Keofed with TF    Musculoskeletal: She exhibits edema  UE and LE dependent edema    Neurological:   Lethargic  Arouses to verbal stimuli    Skin: Skin is warm and dry  She is not diaphoretic         LABORATORY RESULTS  Results from last 7 days   Lab Units 10/21/19  2030   TROPONIN I ng/mL 0 03     CBC with diff: Results from last 7 days   Lab Units 10/24/19  0454 10/23/19  1830 10/23/19  1341 10/23/19  0450 10/22/19  2122 10/22/19  0950 10/22/19  0601 10/21/19  2030 10/18/19  0430   WBC Thousand/uL 11 57*  --   --  15 13*  --  15 06* 14 83* 13 69* 22 53*   HEMOGLOBIN g/dL 9 0* 9 2* 9 1* 9 4* 8 4* 8 2* 8 5* 9 0* 9 1*   HEMATOCRIT % 29 8* 29 7* 29 5* 31 6* 28 0* 28 1* 28 5* 29 9* 29 6*   MCV fL 105*  --   --  104*  --  109* 108* 105* 104*   PLATELETS Thousands/uL 459*  --   --  515*  --  584* 613* 635* 531*   MCH pg 31 8  --   --  30 9  --  31 7 32 2 31 5 31 8   MCHC g/dL 30 2*  --   --  29 7*  --  29 2* 29 8* 30 1* 30 7*   RDW % 16 0*  --   --  16 1*  --  14 9 14 9 14 6 14 0   MPV fL 9 8  --   --  9 6  --  9 5 9 7 9 9 10 2   NRBC AUTO /100 WBCs 0  --   --  0  --   --  0 0 0       CMP:  Results from last 7 days   Lab Units 10/24/19  0454 10/23/19  0450 10/22/19  0601 10/21/19  2030 10/18/19  0430   POTASSIUM mmol/L 3 3* 3 2* 3 9 3 4* 3 4*   CHLORIDE mmol/L 106 106 109* 105 99*   CO2 mmol/L 35* 34* 36* 36* 37*   BUN mg/dL 21 21 26* 26* 27*   CREATININE mg/dL 0 51* 0 45* 0 57* 0 61 0 68   CALCIUM mg/dL 6 4* 6 3* 6 8* 7 1* 6 7*   AST U/L 21  --   --  27  --    ALT U/L 21  --   --  34  --    ALK PHOS U/L 95  --   --  100  --    EGFR ml/min/1 73sq m 90 94 87 85 82       BMP:  Results from last 7 days   Lab Units 10/24/19  0454 10/23/19  0450 10/22/19  0601 10/21/19  2030 10/18/19  0430   POTASSIUM mmol/L 3 3* 3 2* 3 9 3 4* 3 4*   CHLORIDE mmol/L 106 106 109* 105 99*   CO2 mmol/L 35* 34* 36* 36* 37*   BUN mg/dL 21 21 26* 26* 27*   CREATININE mg/dL 0 51* 0 45* 0 57* 0 61 0 68   CALCIUM mg/dL 6 4* 6 3* 6 8* 7 1* 6 7*       Lab Results   Component Value Date    NTBNP 3,747 (H) 10/12/2019             Results from last 7 days   Lab Units 10/22/19  0601   MAGNESIUM mg/dL 2 3          Results from last 7 days   Lab Units 10/22/19  0158   HEMOGLOBIN A1C % 6 4*        Results from last 7 days   Lab Units 10/22/19  0601   TSH 3RD GENERATON uIU/mL 4 120*       Results from last 7 days   Lab Units 10/22/19  0950 10/21/19  2030   INR  1 29* 1 55*       Lipid Profile:   Lab Results   Component Value Date    CHOL 183 2016    CHOL 155 2015     Lab Results   Component Value Date    HDL 93 (H) 2019    HDL 84 (H) 2019    HDL 94 (H) 2019     Lab Results   Component Value Date    LDLCALC 87 2019    LDLCALC 82 2019    LDLCALC 91 2019     Lab Results   Component Value Date    TRIG 52 2019    TRIG 41 2019    TRIG 69 2019       Cardiac testing:   Results for orders placed during the hospital encounter of 10/04/19   Echo complete with contrast if indicated    Narrative [address]    [place], [address]  [phone]    Transthoracic Echocardiogram  2D, M-mode, Doppler, and Color Doppler    Study date:  10-Oct-2019    Patient: Meli Nava  MR number: [id number]  Account number: [de-identified]  : 1935  Age: 82 years  Gender: Female  Status: Inpatient  Location: Bedside  Height: 65 in  Weight: 149 6 lb  BP: 135/ 57 mmHg    Indications: Atrial fibrillation    Diagnoses: I48 0 - Atrial fibrillation    Sonographer:  RONALDO Schmidt  Primary Physician:  Richa Douglass MD  Referring Physician:  Jorge Scanlon:  [facility]  Interpreting Physician:  Zoila Stover MD    IMPRESSIONS:  Technical quality: Good  Cardiac rhythm: Sinus tachycardia    1  Normal left ventricular size, prominent basal interventricular septum, normal left ventricular systolic function with hyperdynamic wall motion  Normal diastolic function  EF around 63%  2  No other significant chamber hypertrophy or enlargement  3  Aortic valve sclerosis, no aortic valve stenosis or regurgitation  5  Mild mitral valve regurgitation  5  Moderate tricuspid valve regurgitation  6  Suspected moderate pulmonary hypertension  Estimated RVSP/PASP is 52 mmHg  7  No pericardial effusion  Compared to report of previous echocardiogram from September 2015 pulmonary hypertension and tricuspid valve regurgitation are new, otherwise no significant change  SUMMARY    LEFT VENTRICLE:  Normal left ventricular cavity size, normal wall thickness, normal left ventricular systolic function with hyperdynamic wall motion  Ejection fraction is estimated as around 62%  Normal diastolic function  RIGHT VENTRICLE:  Normal right ventricular size and systolic function  Estimated right ventricular systolic pressure moderately increased, 52 mmHg  Moderate pulmonary hypertension  LEFT ATRIUM:  Normal left atrial cavity size  Intact interatrial septum  RIGHT ATRIUM:  Normal right atrial cavity size  MITRAL VALVE:  Mild mitral valve leaflet sclerosis, adequate leaflet mobility  Mild mitral valve regurgitation with posteriorly directed jet  AORTIC VALVE:  Tricuspid aortic valve with sclerosis, adequate cuspal separation  No aortic valve stenosis or regurgitation  TRICUSPID VALVE:  Moderate tricuspid valve regurgitation      PULMONIC VALVE:  No significant pulmonic valve regurgitation  AORTA:  Aortic root and proximal ascending aorta are normal in size on 2D imaging  IVC, HEPATIC VEINS:  Inferior vena cava is normal in size and demonstrates appropriate respiratory phasic changes in diameter  PERICARDIUM:  No pericardial effusion  Suspected moderate to large pleural effusion with organized material in pleural cavity  HISTORY: PRIOR HISTORY: Risk factors: hypertension, diabetes, and hypercholesterolemia  PROCEDURE: The procedure was performed at the bedside  This was a routine study  The transthoracic approach was used  The study included complete 2D imaging, M-mode, complete spectral Doppler, and color Doppler  The heart rate was 97 bpm,  at the start of the study  Images were obtained from the parasternal, apical, subcostal, and suprasternal notch acoustic windows  Image quality was adequate  LEFT VENTRICLE: Normal left ventricular cavity size, normal wall thickness, normal left ventricular systolic function with hyperdynamic wall motion  Ejection fraction is estimated as around 62%  Normal diastolic function  RIGHT VENTRICLE: Normal right ventricular size and systolic function  Estimated right ventricular systolic pressure moderately increased, 52 mmHg  Moderate pulmonary hypertension  LEFT ATRIUM: Normal left atrial cavity size  Intact interatrial septum  RIGHT ATRIUM: Normal right atrial cavity size  MITRAL VALVE: Mild mitral valve leaflet sclerosis, adequate leaflet mobility  Mild mitral valve regurgitation with posteriorly directed jet  AORTIC VALVE: Tricuspid aortic valve with sclerosis, adequate cuspal separation  No aortic valve stenosis or regurgitation  TRICUSPID VALVE: Moderate tricuspid valve regurgitation  PULMONIC VALVE: No significant pulmonic valve regurgitation  PERICARDIUM: No pericardial effusion  Suspected moderate to large pleural effusion with organized material in pleural cavity      AORTA: Aortic root and proximal ascending aorta are normal in size on 2D imaging  SYSTEMIC VEINS: IVC: Inferior vena cava is normal in size and demonstrates appropriate respiratory phasic changes in diameter      SYSTEM MEASUREMENT TABLES    2D  %FS: 33 3 %  AVC: 300 9 ms  Ao Diam: 2 9 cm  EDV(Teich): 58 2 ml  EF(Teich): 62 9 %  ESV(Teich): 21 6 ml  HR_2Ch_Q: 101 4 BPM  HR_4Ch_Q: 99 4 BPM  IVSd: 0 8 cm  LA Diam: 3 4 cm  LAAs A4C: 15 4 cm2  LAESV A-L A4C: 44 7 ml  LAESV MOD A4C: 41 2 ml  LALs A4C: 4 5 cm  LVCO_2Ch_Q: 4 5 L/min  LVCO_4Ch_Q: 4 7 L/min  LVCO_BiP_Q: 4 6 L/min  LVEDV MOD A4C: 62 3 ml  LVEF MOD A4C: 67 1 %  LVEF_2Ch_Q: 53 2 %  LVEF_4Ch_Q: 66 %  LVEF_BiP_Q: 61 5 %  LVESV MOD A4C: 20 5 ml  LVIDd: 3 7 cm  LVIDs: 2 5 cm  LVLd A4C: 6 7 cm  LVLd_2Ch_Q: 6 7 cm  LVLd_4Ch_Q: 7 2 cm  LVLs A4C: 5 7 cm  LVLs_2Ch_Q: 5 7 cm  LVLs_4Ch_Q: 5 8 cm  LVPWd: 0 8 cm  LVSV_2Ch_Q: 44 4 ml  LVSV_4Ch_Q: 47 1 ml  LVSV_BiP_Q: 49 9 ml  LVVED_2Ch_Q: 83 3 ml  LVVED_4Ch_Q: 71 3 ml  LVVED_BiP_Q: 81 1 ml  LVVES_2Ch_Q: 39 ml  LVVES_4Ch_Q: 24 3 ml  LVVES_BiP_Q: 31 2 ml  RA Area: 11 7 cm2  RVIDd: 2 9 cm  RWT: 0 4  SV MOD A4C: 41 8 ml  SV(Teich): 36 6 ml    CW  TR Vmax: 3 2 m/s  TR maxP 6 mmHg    MM  TAPSE: 2 5 cm    PW  E' Av 1 m/s  E' Lat: 0 1 m/s  E' Sept: 0 1 m/s  E/E' Av 6  E/E' Lat: 6 5  E/E' Sept: 6 8  MV A Robin: 0 9 m/s  MV E Robin: 0 7 m/s  MV E/A Ratio: 0 7    [facility] Accredited Echocardiography Laboratory    Prepared and electronically signed by    Fernando Mane MD  Signed 10-Oct-2019 15:19:26       Meds/Allergies   all current active meds have been reviewed and current meds:   Current Facility-Administered Medications   Medication Dose Route Frequency    acetaminophen (TYLENOL) rectal suppository 650 mg  650 mg Rectal Q6H PRN    acetaminophen (TYLENOL) tablet 650 mg  650 mg Per NG Tube Q6H PRN    albuterol inhalation solution 2 5 mg  2 5 mg Nebulization Q4H PRN    aluminum-magnesium hydroxide-simethicone (MYLANTA) 200-200-20 mg/5 mL oral suspension 15 mL  15 mL Per NG Tube Q6H PRN    artificial tear (LUBRIFRESH P M ) ophthalmic ointment   Both Eyes HS PRN    bisacodyl (DULCOLAX) rectal suppository 10 mg  10 mg Rectal Daily PRN    cefepime (MAXIPIME) 2 g/50 mL dextrose IVPB  2,000 mg Intravenous Q12H    dorzolamide-timolol (COSOPT) 22 3-6 8 MG/ML ophthalmic solution 1 drop  1 drop Both Eyes BID    heparin (porcine) 25,000 units in 250 mL infusion (premix)  3-30 Units/kg/hr (Order-Specific) Intravenous Titrated    heparin (porcine) injection 2,400 Units  2,400 Units Intravenous PRN    heparin (porcine) injection 4,800 Units  4,800 Units Intravenous PRN    insulin regular (HumuLIN R,NovoLIN R) 1 Units/mL in sodium chloride 0 9 % 100 mL infusion  0 3-21 Units/hr Intravenous Titrated    levothyroxine tablet 50 mcg  50 mcg Per NG Tube Early Morning    lidocaine (LIDODERM) 5 % patch 1 patch  1 patch Topical Daily    Netarsudil Dimesylate 0 02 % SOLN 1 drop  1 drop Ophthalmic Daily    Netarsudil Dimesylate 0 02 % SOLN 1 drop  1 drop Right Eye HS    ondansetron (ZOFRAN) injection 4 mg  4 mg Intravenous Q6H PRN    pantoprazole (PROTONIX) injection 40 mg  40 mg Intravenous Q24H GENNARO    potassium chloride 20 mEq IVPB (premix)  20 mEq Intravenous Once    senna 8 8 mg/5 mL oral syrup 8 8 mg  8 8 mg Per NG Tube Daily PRN    sertraline (ZOLOFT) oral concentrated solution 25 mg  25 mg Per NG Tube HS    travoprost (TRAVATAN-Z) 0 004 % ophthalmic solution 1 drop  1 drop Both Eyes HS     Medications Prior to Admission   Medication    apixaban (ELIQUIS) 5 mg    dorzolamide-timolol (COSOPT) 22 3-6 8 MG/ML ophthalmic solution    furosemide (LASIX) 40 mg tablet    latanoprost (XALATAN) 0 005 % ophthalmic solution    levalbuterol (XOPENEX) 1 25 mg/0 5 mL nebulizer solution    levothyroxine 50 mcg tablet    LORazepam (ATIVAN) 0 5 mg tablet    metoprolol tartrate (LOPRESSOR) 25 mg tablet    Netarsudil Dimesylate (RHOPRESSA) 0 02 % SOLN    pantoprazole (PROTONIX) 40 mg tablet    traZODone (DESYREL) 50 mg tablet    acetaminophen (TYLENOL) 325 mg tablet    aluminum-magnesium hydroxide-simethicone (MYLANTA) 200-200-20 mg/5 mL suspension    artificial tear (LUBRIFRESH P M ) 83-15 % ophthalmic ointment    ERROR: CANNOT USE RATIO BASED PRESCRIPTION MIXTURE NAMING FOR A NON-MIXTURE    insulin glargine (LANTUS) 100 units/mL subcutaneous injection    insulin lispro (HumaLOG) 100 units/mL injection    omeprazole (PriLOSEC) 20 mg delayed release capsule    ondansetron (ZOFRAN) 4 mg/2 mL injection    polyethylene glycol (MIRALAX) 17 g packet    RHOPRESSA 0 02 % SOLN    sodium chloride 0 9 % nebulizer solution    traMADol (ULTRAM) 50 mg tablet    travoprost (TRAVATAN Z) 0 004 % ophthalmic solution         heparin (porcine) 3-30 Units/kg/hr (Order-Specific) Last Rate: 13 Units/kg/hr (10/24/19 0180)   insulin regular (HumuLIN R,NovoLIN R) infusion 0 3-21 Units/hr Last Rate: 1 Units/hr (10/24/19 0601)       Counseling / Coordination of Care  Total floor / unit time spent today 20 minutes  Greater than 50% of total time was spent with the patient and / or family counseling and / or coordination of care  ** Please Note: Dragon 360 Dictation voice to text software may have been used in the creation of this document   **

## 2019-10-24 NOTE — PROGRESS NOTES
Progress Note- Jennifer Flannery 82 y o  female MRN: [id number]  Unit/Bed#: [facility] 530-01 Encounter: [id number]    Assessment and Plan:  Jennifer Flannery is a 82 y o  old female with PMH:   Type 1 diabetes complicated by diabetic neuropathy, stage IB ovarian cancer S/P surgery on 10/04/19,  Protein calorie malnutrition, respiratory failure who presents for anemia and concern of GI bleed in the setting of heparin drip       #Macrocytic anemia  #Rectal Bleeding  Suspect her bleeding is likely lower in nature and possibly hemorrhoidal bleeding vs ischemic colitis  Patient will not be able to tolerate colonoscopy at this time given the need for prep  Flexible sigmoidoscopy was offered as an option, however after discussion with the patient her daughter they deferred given her other comorbidities at this time  If she continues to blood in her stool with evidence of hemoglobin dropped we can consider at a later point      Plan:  -tap water enema x2 ordered today to help with constipation   -if patient needs endoscopic evaluation of LGIB, can consider flexible sig    -at this time will hold off as patient and her daughter defer procedure at this time  -continue IV PPI 40mg   -rectal exam with redish   -patient on heparin drip, monitor CBC q8hr  -Avoid NSAIDs    ______________________________________________________________________    Subjective:     Patient states she is not a significant amount of pain  Did have some left lower quadrant abdominal tenderness on deep palpation but otherwise states that she would like not to proceed with in the endoscopic evaluation at this time      Medication Administration - last 24 hours from 10/23/2019 1004 to 10/24/2019 1004       Date/Time Order Dose Route Action Action by     10/24/2019 0817 dorzolamide-timolol (COSOPT) 22 3-6 8 MG/ML ophthalmic solution 1 drop 1 drop Both Eyes Given Fatemeh Mascorro RN     10/23/2019 1831 dorzolamide-timolol (COSOPT) 22 3-6 8 MG/ML ophthalmic solution 1 drop 1 drop Both Eyes Given Cornelious [place], [address]     10/24/2019 4480 levothyroxine tablet 50 mcg 50 mcg Per NG Tube Given Levander Galeazzi, RN     10/24/2019 0817 Netarsudil Dimesylate 0 02 % SOLN 1 drop 1 drop Ophthalmic Not Given Nathaniel Luque RN     10/23/2019 1024 Netarsudil Dimesylate 0 02 % SOLN 1 drop 1 drop Ophthalmic Not Given Nathaniel Luque RN     10/23/2019 2245 Netarsudil Dimesylate 0 02 % SOLN 1 drop 1 drop Right Eye Not Given Johnnie Ochoa RN     10/23/2019 2220 travoprost (TRAVATAN-Z) 0 004 % ophthalmic solution 1 drop 1 drop Both Eyes Given Johnnie Ochoa RN     10/23/2019 1047 sodium chloride 0 45 % with KCl 20 mEq/L infusion (premix) 0 mL/hr Intravenous Stopped Nathaniel Luque RN     10/24/2019 3199 heparin (porcine) 25,000 units in 250 mL infusion (premix) 13 Units/kg/hr Intravenous Rate/Dose Change Levander Galeazzi, RN     10/24/2019 0328 heparin (porcine) 25,000 units in 250 mL infusion (premix) 15 Units/kg/hr Intravenous Rate/Dose Verify Levander Galeazzi, RN     10/23/2019 1224 heparin (porcine) 25,000 units in 250 mL infusion (premix) 15 Units/kg/hr Intravenous [address] Nathaniel Luque RN     10/24/2019 0023 cefepime (MAXIPIME) 2 g/50 mL dextrose IVPB 2,000 mg Intravenous [address] Johnnie Ochoa RN     10/23/2019 1254 cefepime (MAXIPIME) 2 g/50 mL dextrose IVPB 0 mg Intravenous Stopped Elvia Virk RN     10/23/2019 1224 cefepime (MAXIPIME) 2 g/50 mL dextrose IVPB 2,000 mg Intravenous New Bag Nathaniel Luque RN     10/23/2019 1552 metoprolol (LOPRESSOR) injection 2 5 mg 2 5 mg Intravenous Not Given Nathaniel Luque RN     10/24/2019 0601 insulin regular (HumuLIN R,NovoLIN R) 1 Units/mL in sodium chloride 0 9 % 100 mL infusion 1 Units/hr Intravenous Rate/Dose Change Levander Galeazzi, RN     10/24/2019 0415 insulin regular (HumuLIN R,NovoLIN R) 1 Units/mL in sodium chloride 0 9 % 100 mL infusion 0 5 Units/hr Intravenous Rate/Dose Change Levander Galeazzi, RN     10/24/2019 7986 insulin regular (HumuLIN R,NovoLIN R) 1 Units/mL in sodium chloride 0 9 % 100 mL infusion 0 3 Units/hr Intravenous Rate/Dose Verify Angy Favors, RN     10/23/2019 2323 insulin regular (HumuLIN R,NovoLIN R) 1 Units/mL in sodium chloride 0 9 % 100 mL infusion 0 3 Units/hr Intravenous Rate/Dose Change Norlene Gain, RN     10/23/2019 2053 insulin regular (HumuLIN R,NovoLIN R) 1 Units/mL in sodium chloride 0 9 % 100 mL infusion 0 5 Units/hr Intravenous Rate/Dose Change Norlene Gain, RN     10/23/2019 1825 insulin regular (HumuLIN R,NovoLIN R) 1 Units/mL in sodium chloride 0 9 % 100 mL infusion 1 Units/hr Intravenous [address] Fernando Bertrand, RN     10/23/2019 1017 insulin regular (HumuLIN R,NovoLIN R) 1 Units/mL in sodium chloride 0 9 % 100 mL infusion 0 5 Units/hr Intravenous Restarted Judithe Pain, RN     10/24/2019 0432 dextrose 5 % and sodium chloride 0 45 % infusion 50 mL/hr Intravenous [address] Angy Favors, RN     10/23/2019 1729 dextrose 5 % and sodium chloride 0 45 % infusion 50 mL/hr Intravenous [address] Judithe Pain, RN     10/23/2019 1727 dextrose 5 % and sodium chloride 0 45 % infusion 0 mL/hr Intravenous Stopped Judithe Pain, RN     10/24/2019 0817 pantoprazole (PROTONIX) injection 40 mg 40 mg Intravenous Given Judithe Pain, RN     10/23/2019 1230 albumin human (FLEXBUMIN) 25 % injection 25 g 0 g Intravenous Stopped Therisa Azalea, RN     10/23/2019 1210 albumin human (FLEXBUMIN) 25 % injection 25 g 0 g Intravenous Stopped Judithe Pain, RN     10/23/2019 1056 albumin human (FLEXBUMIN) 25 % injection 25 g 25 g Intravenous [address] Judithe Pain, RN     10/23/2019 2220 acetaminophen (TYLENOL) rectal suppository 650 mg 650 mg Rectal Given Norlene Gain, RN     10/23/2019 1212 lidocaine (LIDODERM) 5 % patch 1 patch 0 patch Topical Hold Judithe Pain, RN     10/23/2019 2220 sertraline (ZOLOFT) oral concentrated solution 25 mg 25 mg Per NG Tube Given Kamlesh Solano RN Objective:     Vitals: Blood pressure 128/58, pulse 84, temperature 99 9 °F (37 7 °C), temperature source Rectal, resp  rate 16, height 5' 5" (1 651 m), weight 66 7 kg (147 lb 0 8 oz), SpO2 99 %, not currently breastfeeding  ,Body mass index is 24 47 kg/m²  Intake/Output Summary (Last 24 hours) at 10/24/2019 1004  Last data filed at 10/24/2019 0350  Gross per 24 hour   Intake 1888 5 ml   Output 975 ml   Net 913 5 ml       Physical Exam:   General Appearance:   Alert, cooperative, no distress   HEENT:   Normocephalic, atraumatic, anicteric  Neck:  Supple, symmetrical, trachea midline   Lungs:   Clear to auscultation bilaterally; no rales, rhonchi or wheezing; respirations unlabored    Heart[de-identified]   Regular rate and rhythm; no murmur, rub, or gallop  Abdomen:   LLQ abdominal pain on deep palpation  Genitalia:   Deferred    Rectal:   Deferred    Extremities:  No cyanosis, clubbing or edema    Pulses:  2+ and symmetric all extremities    Skin:  No jaundice, rashes, or lesions    Lymph nodes:  No palpable cervical lymphadenopathy        Invasive Devices     Peripherally Inserted Central Catheter Line            PICC Line 11/98/97 Left Basilic 9 days          Peripheral Intravenous Line            Peripheral IV 10/23/19 Left Antecubital 1 day          Drain            NG/OG/Enteral Tube Nasogastric 8 Fr Right nares less than 1 day    Urethral Catheter 18 Fr  less than 1 day              Lab Results:  No results displayed because visit has over 200 results  Imaging Studies: I have personally reviewed pertinent imaging studies        ---------------------------------------------------  Note Electronically Signed By:    MD Monae Ball 75 Gastroenterology Fellow PGY-4  PI #: [id number]

## 2019-10-24 NOTE — ASSESSMENT & PLAN NOTE
Lab Results   Component Value Date    HGBA1C 6 4 (H) 10/22/2019    Currently on Lantus 20 units Q 12  Because there were some reports of poor p o  Intake; will cut Lantus in half at 20 units at night  Continue insulin sliding scale with Accu-Cheks  Hemoglobin A1c      Recent Labs     10/24/19  1023 10/24/19  1231 10/24/19  1233 10/24/19  1449   POCGLU 163* 203* 172* 184*       Blood Sugar Average: Last 72 hrs:  (P) 031 6549   Patient is type 1 type diabetes mellitus, continue with the insulin drip  Currently off of the D5 since the patient was started on tube feeding  Adjust the algorithm depending upon the blood sugar

## 2019-10-24 NOTE — PROGRESS NOTES
Progress Note - Neurology   Rosemary Smyth 82 y o  female MRN: [id number]  Unit/Bed#: [facility] 530-01 Encounter: [id number]    Assessment/Plan:    Principal Problem:    Lethargy  Active Problems:    Adult onset hypothyroidism    ANAYA (generalized anxiety disorder)    Hypertension    Type 1 diabetes mellitus with diabetic neuropathy (HCC)    Diabetic neuropathy, painful (HCC)    Ovarian cancer ([address] )    Internal jugular (IJ) vein thromboembolism, acute, right (HCC)    Superficial thrombophlebitis of right upper extremity    A-fib (HCC)    Acute respiratory failure with hypercapnia (HCC)    Rectal bleeding    Hearing loss    Critical illness myopathy  Resolved Problems:    * No resolved hospital problems  *      1  Lethargy/AMS   · likely 2/2 multifactorial toxic, endocrinologic, and metabolic processes  · Significant comorbid metabolic derangements present likely contributing to transient lethargic state  ? Endocrine: Pt has DM type 1, had 2 episodes of BG in the 20s at [facility] occurring simultaneously with lethargy, also had one episode of BG in the 400s during a period of lethargy, also hypothyroid recently restarted on home levothyroxine dose  ? Cardiology: Pt was noted to have HR in the 30s at [facility] after receiving tramadol, HR in the 40s noted in hospital yesterday after receiving ativan  Pt also appears volume overloaded  Per cards diuresis discussed with attending  ? Metabolic: ammonia level noted at 64  ? Pulmonary/metabolic: acute hypercarbic respiratory failure, pulmonary is consulted, plan for right thoracentesis tomorrow  ? Psych: pt is very anxious and depressed, she becomes tearful when asked if she is scared of being in the hospital  Started on zoloft per geriatric medicine    · Plan:  · Maintain BG between 140-180  · Continue levothyroxine  · Perform thoracentesis  · Continue zoloft   · Monitor on tele for low heart rates and arrhythmias, pt has history of afib  · Maintain electrolyte levels  · Maintain SPO2 sats >92%  · Avoid benzodiazepines and opiates  · We will monitor this pt peripherally, please call with any concerns or questions  · Thank you for this consult  Subjective:   Pt was seen and examined bedside by me this AM and was alert and cooperative  She answered all questions correctly, smiled, and stated she just wants to be able to go home  She was able to communicate well  ROS:  History obtained from chart review and the patient  General ROS: positive for  - fatigue, malaise and sleep disturbance  negative for - chills, fever or night sweats  Neurological ROS: positive for - behavioral changes and weakness  negative for - dizziness, headaches, impaired coordination/balance, memory loss, seizures, tremors  Vitals: Blood pressure 127/60, pulse 72, temperature 98 2 °F (36 8 °C), temperature source Axillary, resp  rate 18, height 5' 5" (1 651 m), weight 66 7 kg (147 lb 0 8 oz), SpO2 99 %, not currently breastfeeding  ,Body mass index is 24 47 kg/m²  Physical Exam   Constitutional: She is oriented to person, place, and time  HENT:   Head: Normocephalic and atraumatic  Nose: Nose normal    Eyes: Pupils are equal, round, and reactive to light  Conjunctivae and EOM are normal    Neck: Normal range of motion  No JVD present  Cardiovascular: Normal rate, regular rhythm, normal heart sounds and intact distal pulses  Exam reveals no gallop and no friction rub  No murmur heard  Pulmonary/Chest: Effort normal  No respiratory distress  She has rales  On 4-6 L NC oxymizer   Abdominal: Soft  Musculoskeletal: Normal range of motion  Neurological: She is alert and oriented to person, place, and time  She has normal strength  She displays normal reflexes  No cranial nerve deficit or sensory deficit  She has a normal Finger-Nose-Finger Test  Coordination normal    Reflex Scores:       Tricep reflexes are 2+ on the right side and 2+ on the left side         Bicep reflexes are 2+ on the right side and 2+ on the left side  Brachioradialis reflexes are 2+ on the right side and 2+ on the left side  Patellar reflexes are 2+ on the right side and 2+ on the left side  Achilles reflexes are 2+ on the right side and 2+ on the left side  Skin: Skin is warm and dry  Capillary refill takes less than 2 seconds  Nursing note and vitals reviewed  Neurologic Exam     Mental Status   Oriented to person, place, and time  Attention: normal    Speech: (Slow)  Level of consciousness: alert  Knowledge: good  Able to name object  Cranial Nerves     CN II   Visual fields full to confrontation  CN III, IV, VI   Pupils are equal, round, and reactive to light  Extraocular motions are normal    CN III: no CN III palsy  CN VI: no CN VI palsy  Nystagmus: none   Diplopia: none  Ophthalmoparesis: none  Upgaze: normal  Downgaze: normal  Conjugate gaze: present    CN V   Facial sensation intact  CN VII   Facial expression full, symmetric  CN VIII   Hearing: impaired    CN IX, X   CN IX normal    CN X normal      CN XI   CN XI normal      CN XII   CN XII normal      Motor Exam     Strength   Strength 5/5 throughout  Sensory Exam   Light touch normal    Pinprick normal      Gait, Coordination, and Reflexes     Coordination   Finger to nose coordination: normal    Tremor   Resting tremor: absent  Intention tremor: absent  Action tremor: absent    Reflexes   Right brachioradialis: 2+  Left brachioradialis: 2+  Right biceps: 2+  Left biceps: 2+  Right triceps: 2+  Left triceps: 2+  Right patellar: 2+  Left patellar: 2+  Right achilles: 2+  Left achilles: 2+  Right : 2+  Left : 2+  Right ankle clonus: absent  Left ankle clonus: absent  Right pendular knee jerk: absent  Left pendular knee jerk: absent          Lab, Imaging and other studies:   I have personally reviewed pertinent reports    , CBC:   Results from last 7 days   Lab Units 10/24/19  1228 10/24/19  0454 10/23/19  1830  10/23/19  0453 10/22/19  0950   WBC Thousand/uL  --  11 57*  --   --  15 13*  --  15 06*   RBC Million/uL  --  2 83*  --   --  3 04*  --  2 59*   HEMOGLOBIN g/dL 9 4* 9 0* 9 2*   < > 9 4*   < > 8 2*   HEMATOCRIT % 31 0* 29 8* 29 7*   < > 31 6*   < > 28 1*   MCV fL  --  105*  --   --  104*  --  109*   PLATELETS Thousands/uL  --  459*  --   --  515*  --  584*    < > = values in this interval not displayed    , BMP/CMP:   Results from last 7 days   Lab Units 10/24/19  0454 10/23/19  0450 10/22/19  0601 10/21/19  2030   SODIUM mmol/L 146* 143 147* 146*   POTASSIUM mmol/L 3 3* 3 2* 3 9 3 4*   CHLORIDE mmol/L 106 106 109* 105   CO2 mmol/L 35* 34* 36* 36*   BUN mg/dL 21 21 26* 26*   CREATININE mg/dL 0 51* 0 45* 0 57* 0 61   CALCIUM mg/dL 6 4* 6 3* 6 8* 7 1*   AST U/L 21  --   --  27   ALT U/L 21  --   --  34   ALK PHOS U/L 95  --   --  100   EGFR ml/min/1 73sq m 90 94 87 85   , Vitamin B12:   , HgBA1C:   Results from last 7 days   Lab Units 10/22/19  0158   HEMOGLOBIN A1C % 6 4*   , TSH:   Results from last 7 days   Lab Units 10/22/19  0601   TSH 3RD GENERATON uIU/mL 4 120*   , Coagulation:   Results from last 7 days   Lab Units 10/22/19  0950   INR  1 29*   , Lipid Profile:   , Ammonia:   Results from last 7 days   Lab Units 10/22/19  2333   AMMONIA umol/L 64*   , Urinalysis:   Results from last 7 days   Lab Units 10/22/19  1110   COLOR UA  Yellow   CLARITY UA  Cloudy   SPEC GRAV UA  1 013   PH UA  5 0   LEUKOCYTES UA  Elevated glucose may cause decreased leukocyte values   See urine microscopic for [facility] result/*   NITRITE UA  Negative   GLUCOSE UA mg/dl >=1000 (1%)*   KETONES UA mg/dl Negative   BILIRUBIN UA  Negative   BLOOD UA  Moderate*   , Drug Screen:   , Medication Drug Levels:       Invalid input(s): CARBAMAZEPINE,  PHENOBARB, LACOSAMIDE, OXCARBAZEPINE  VTE Prophylaxis: Sequential compression device (Venodyne)  and Heparin    Counseling / Coordination of Care  Total Critical Care time spent 32 minutes excluding procedures, teaching and family updates

## 2019-10-24 NOTE — OCCUPATIONAL THERAPY NOTE
Occupational Therapy Evaluation      Margaret Chirinos    10/24/2019    Principal Problem:    Lethargy  Active Problems:    Adult onset hypothyroidism    ANAYA (generalized anxiety disorder)    Hypertension    Type 1 diabetes mellitus with diabetic neuropathy (HCC)    Diabetic neuropathy, painful (HCC)    Ovarian cancer ([address] )    Internal jugular (IJ) vein thromboembolism, acute, right ([address] )    Superficial thrombophlebitis of right upper extremity    A-fib (HCC)    Acute respiratory failure with hypercapnia (HCC)    Rectal bleeding    Hearing loss    Critical illness myopathy      Past Medical History:   Diagnosis Date    Anxiety     Arthritis     Constipation     Diabetes mellitus (HCC)     IDDM    Frequent UTI     Glaucoma     Hearing loss     Hyperlipidemia     Hypertension     Hyperthyroidism     in past    Hyponatremia     Hypothyroid     Neuropathy     bles    Right tubo-ovarian mass     Wears glasses        Past Surgical History:   Procedure Laterality Date    APPENDECTOMY  1954    CATARACT EXTRACTION Bilateral     COLONOSCOPY  09/2014    Completed - Dr Joi Haq, due in 5 years    HYSTERECTOMY N/A 10/4/2019    Procedure: LAP TOTAL HYSTERECTOMY, BSO;  Surgeon: Dano Brown MD;  Location: [facility];  Service: Gynecology Oncology    LAPAROTOMY N/A 10/4/2019    Procedure: EXPLORATORY LAPAROTOMY  EXLAP OMENTECTOMY  PELVIC AND DEANGELO-AORTIC LYMPH NODE DISSECTION  PERITONEAL BIOPSY TRANS ABDOMINUS BLOCK;  Surgeon: Dano Brown MD;  Location: [facility];  Service: Gynecology Oncology    SKIN LESION EXCISION      Ears        10/24/19 3700   Note Type   Note type Eval/Treat   Restrictions/Precautions   Weight Bearing Precautions Per Order No   Other Precautions Cognitive; Chair Alarm; Bed Alarm;Multiple lines;Telemetry;O2;Fall Risk;Pain;Hard of hearing   Pain Assessment   Pain Assessment 0-10   Pain Score 4   Pain Type Acute pain   Pain Location Generalized   Pain Descriptors Other (Comment)  ("I hurt all over")   Pain Frequency Intermittent   Pain Onset Ongoing   Clinical Progression Not changed   Patient's Stated Pain Goal No pain   Hospital Pain Intervention(s) Repositioned; Ambulation/increased activity; Distraction; Emotional support   Response to Interventions Tolerated   Home Living   Type of [address] One level;Performs ADLs on one level;Stairs to enter with rails  ([facility]; 2-3 ARLENE)   Bathroom Shower/Tub Walk-in shower   Bathroom Toilet Standard   Bathroom Equipment Other (Comment)  (denies)   [address] [place]   Additional Comments Pt presents mildly confused and is a poor historian  Dtr present during initial eval to A w/ home set up and PLOF  Dtr reports Pt did not ambulate w/ AD PTA  Prior Function   Level of [place] Independent with ADLs and functional mobility   Lives With Alone   Receives Help From Family   ADL Assistance Independent   IADLs Independent   Falls in the last 6 months   (will need to clarify w/ family - TBD)   Vocational Retired   Lifestyle   Autonomy Per dtr, Pt was I w/ all ADLS and IADLS; (+) drives PTA   Reciprocal Relationships Pt lives alone  Dtr stated "we can hire help at home if needed "   Service to Others Pt is retired    Intrinsic Gratification Per dtr, Pt enjoys staying active and being I w/ all tasks  Psychosocial   Psychosocial (WDL) X   Patient Behaviors/Mood Flat affect   ADL   Where Assessed Edge of bed   Eating Assistance Unable to assess  (NGT)   Grooming Assistance 3  Moderate Assistance   UB Bathing Assistance 2  Maximal Assistance   LB Bathing Assistance 1  Total Assistance   UB Dressing Assistance 2  Maximal Assistance   LB Dressing Assistance 1  Total Assistance   Toileting Assistance  1  Total Assistance   Bed Mobility   Rolling R 2  Maximal assistance   Additional items Assist x 2; Increased time required;LE management;Verbal cues; Bedrails;HOB elevated   Rolling L 2  Maximal assistance Additional items Assist x 2; Increased time required;LE management;Verbal cues; Bedrails;HOB elevated   Supine to Sit 2  Maximal assistance   Additional items Assist x 2; Increased time required;LE management;Verbal cues; Bedrails;HOB elevated   Sit to Supine 2  Maximal assistance   Additional items Assist x 2; Increased time required;LE management;Verbal cues; Bedrails   Additional Comments Pt laying supine in bed upon OT arrival  Pt sat EOB ~10 min w/ varying trunk/postural support S-Mod A  Pt seated in chair position in bed s/p OT session  Transfers   Sit to Stand Unable to assess   Stand to Sit Unable to assess   Additional Comments Transfers unsafe/unable to assess this session  OTR to see to address functional transfers/goals*   Balance   Static Sitting Poor -   Dynamic Sitting Poor -   Activity Tolerance   Activity Tolerance Patient limited by fatigue;Patient limited by pain   Medical Staff Made Aware PT Aga Crowley; RN staff present - Terri Bullard and John Vann; ZO for safe dispo planning   Nurse Made Aware RN cleared Pt for OT eval   RUE Assessment   RUE Assessment X  (AROM WFL; Gross strength 3+/5)   LUE Assessment   LUE Assessment X  (AROM WFL; Gross strength 3+/5)   Hand Function   Gross Motor Coordination Impaired   Fine Motor Coordination Impaired   Sensation   Light Touch No apparent deficits   Cognition   Overall Cognitive Status Impaired   Arousal/Participation Alert; Cooperative;Lethargic   Attention Attends with cues to redirect   Orientation Level Oriented to person;Oriented to place; Disoriented to time;Disoriented to situation   Memory Decreased recall of precautions;Decreased recall of recent events;Decreased short term memory   Following Commands Follows one step commands with increased time or repetition   Comments Pt presents lethargic, however cooperative to participate in therapy this day  Presents w/ decreased insight into deficits and decreased safety awareness   Required VC for safety, sequencing, and redirection to task t/o functional tasks  Assessment   Limitation Decreased ADL status; Decreased UE strength;Decreased UE ROM; Decreased Safe judgement during ADL;Decreased cognition;Decreased endurance;Decreased high-level ADLs; Decreased fine motor control   Prognosis Fair   Assessment Pt is a 83 yo female seen for OT eval s/p adm to B dx'd w/ lethargy  Pt w/ recent admit to [facility] and discharged to [facility]  Comorbidities include a h/o ANAYA, HTN, DM 1, diabetic neuropathy, ovarian CA, IJ vein thromboembolism, A-fib, ARF w/ hypercapnia, rectal bleeding, hearing loss, diabetic retinopathy, osteoporosis  Pt with active OT orders and up with assistance  orders  Pt appears to be mildly confused - Dtr present to A w/ home set up and PLOF  Pt is retired and resides alone in [facility] w/ 2-3STE  Dtr reports they could have hired help A Pt upon D/C PRN  Pt was I w/  ADLS and IADLS, drove, & required no use of DME PTA  Pt is currently demonstrating the following occupational deficits: Max A UB bathing/dressing, Total A LB bathing/dressing and toileting, Max Ax2 bed mobility and supine<>sit  Pt able to tolerate sitting EOB ~10 min w/ S-Mod A trunk/postural support  These deficits that are impacting pt's baseline areas of occupation are a result of the following impairments: pain, endurance, activity tolerance, functional mobility, forward functional reach, balance, trunk control, functional standing tolerance, unsupportive home environment, decreased I w/ ADLS/IADLS, strength, ROM, cognitive impairments, decreased safety awareness, decreased insight into deficits and impaired fine motor skills  The following Occupational Performance Areas to address include: eating, grooming, bathing/shower, toilet hygiene, dressing, health maintenance, functional mobility and clothing management  Pt scored overall 5/100 on the Barthel Index   Based on the aforementioned OT evaluation, functional performance deficits, and assessments, pt has been identified as a high complexity evaluation  Recommend STR upon D/C  Pt to continue to benefit from acute immediate OT services to address the following goals 3-5x/week to  w/in 10-14 days:    Goals   Patient Goals To take a shower   LTG Time Frame 10-   Long Term Goal #1 Refer to goals below   Plan   Treatment Interventions ADL retraining;Functional transfer training;UE strengthening/ROM; Endurance training;Cognitive reorientation;Patient/family training;Equipment evaluation/education; Fine motor coordination activities; Compensatory technique education; Energy conservation; Activityengagement   Goal Expiration Date 19   OT Treatment Day 1   OT Frequency 3-5x/wk   Additional Treatment Session   Start Time 1625   End Time 4983   Treatment Assessment Patient participated in Skilled OT session this date with interventions consisting of ADL re training with the use of correct body mechnaics, deep breathing technique, safety awareness and fall prevention techniques,  Pt/family education* and therapeutic exercise to: increase functional use of BUEs, increase BUE muscle strength    Patient agreeable to OT treatment session, upon arrival patient was found seated in chair position in bed  OT gave Pt blue squeeze ball for B/L UE exercise to increase strength and decrease B/L UE edema  OT educated Pt and dtr to prop pillows under arms while in bed for decreased swelling in preparation for functional use of UEs  OT educated Pt and dtr on OT and STR services and benefits as well as DME (I e  BSC)  Dtr reports she would like Pt to go to [facility] s/p hospital stay  OT educated Pt and dtr on current POC goals for further therapy sessions  Pt and dtr w/ no further questions/concerns  Patient requiring verbal cues for safety, verbal cues for correct technique, verbal cues for pacing thru activity steps, one step directives and frequent rest periods   Patient continues to be functioning below baseline level, occupational performance remains limited secondary to factors listed above and increased risk for falls and injury  From OT standpoint, recommendation at time of d/c would be Short Term Rehab  Patient to benefit from continued Occupational Therapy treatment while in the hospital to address deficits as defined above and maximize level of functional independence with ADLs and functional mobility  Recommendation   OT Discharge Recommendation Short Term Rehab   Equipment Recommended Bedside commode; Other (comment)  (SC)   OT - OK to Discharge Yes  (when medically cleared)   Barthel Index   Feeding 0   Bathing 0   Grooming Score 0   Dressing Score 0   Bladder Score 0   Bowels Score 5   Toilet Use Score 0   Transfers (Bed/Chair) Score 0   Mobility (Level Surface) Score 0   Stairs Score 0   Barthel Index Score 5   Modified [place] Scale   Modified [place] Scale 5     GOALS    1) Pt will imrpove activity tolerance to G for min 30 min txment sessions  2) Pt will complete ADLs/self care w/ Min A  3) Pt will complete toileting w/ Min A w/ G hygiene/thoroughness using DME PRN  4) Pt will improve fx'l tfers on/off all surfaces using dME PRN w/ G balance/safety including toileting w/ Min A  6) Pt will engage in ongoing cognitive assessment w/ G participation w/ mod I to A w/ safe d/c planning/recommendations  7) Pt will demonstrate G carryover of pt/caregiver education and training as appropriate w/ mod I w/o cues w/ G tolerance  8) Pt will engage in depression screen/leisure interest checklist w/ mod I and G participation to monitor s/s depression and ID 3 positive coping strategies to A w/ emotional regulation and management  9) Pt will tolerate bed mobility and EOB seated tasks w/ min A for min 30 mins to engage in fx'l I/ADL/leisure tasks w/ min A w/ min cues  11) Pt will follow 100% simple one step verbal commands and be A/Ox4 consistently t/o use of external environmental cues w/ mod I  12) Pt will demonstrate 100% carryover of E C  techniques w/ mod I t/o fx'l I/ADL/ leisure tasks w/o cues s/p skilled education  14) Pt will tolerate PROM/AROM/AAROM in all planes w/ mod I w/ G participation to improve fx'l UB use as prerequisite for I/ADL/leisure        Phi Guardado MS, OTR/L

## 2019-10-24 NOTE — PLAN OF CARE
Problem: Potential for Falls  Goal: Patient will remain free of falls  Description  INTERVENTIONS:  - Assess patient frequently for physical needs  -  Identify cognitive and physical deficits and behaviors that affect risk of falls  -  [place] fall precautions as indicated by assessment   - Educate patient/family on patient safety including physical limitations  - Instruct patient to call for assistance with activity based on assessment  - Modify environment to reduce risk of injury  - Consider OT/PT consult to assist with strengthening/mobility  Outcome: Progressing     Problem: Prexisting or High Potential for Compromised Skin Integrity  Goal: Skin integrity is maintained or improved  Description  INTERVENTIONS:  - Identify patients at risk for skin breakdown  - Assess and monitor skin integrity  - Assess and monitor nutrition and hydration status  - Monitor labs   - Assess for incontinence   - Turn and reposition patient  - Assist with mobility/ambulation  - Relieve pressure over bony prominences  - Avoid friction and shearing  - Provide appropriate hygiene as needed including keeping skin clean and dry  - Evaluate need for skin moisturizer/barrier cream  - Collaborate with interdisciplinary team   - Patient/family teaching  - Consider wound care consult   Outcome: Progressing     Problem: Nutrition/Hydration-ADULT  Goal: Nutrient/Hydration intake appropriate for improving, restoring or maintaining nutritional needs  Description  Monitor and assess patient's nutrition/hydration status for malnutrition  Collaborate with interdisciplinary team and initiate plan and interventions as ordered  Monitor patient's weight and dietary intake as ordered or per policy  Utilize nutrition screening tool and intervene as necessary  Determine patient's food preferences and provide high-protein, high-caloric foods as appropriate       INTERVENTIONS:  - Monitor oral intake, urinary output, labs, and treatment plans  - Assess nutrition and hydration status and recommend course of action  - Evaluate amount of meals eaten  - Assist patient with eating if necessary   - Allow adequate time for meals  - Recommend/ encourage appropriate diets, oral nutritional supplements, and vitamin/mineral supplements  - Order, calculate, and assess calorie counts as needed  - Recommend, monitor, and adjust tube feedings and TPN/PPN based on assessed needs  - Assess need for intravenous fluids  - Provide specific nutrition/hydration education as appropriate  - Include patient/family/caregiver in decisions related to nutrition  Outcome: Progressing

## 2019-10-25 ENCOUNTER — APPOINTMENT (INPATIENT)
Dept: RADIOLOGY | Facility: HOSPITAL | Age: 84
DRG: 189 | End: 2019-10-25
Attending: FAMILY MEDICINE
Payer: COMMERCIAL

## 2019-10-25 ENCOUNTER — APPOINTMENT (INPATIENT)
Dept: RADIOLOGY | Facility: HOSPITAL | Age: 84
DRG: 189 | End: 2019-10-25
Payer: COMMERCIAL

## 2019-10-25 PROBLEM — R33.9 URINARY RETENTION: Status: ACTIVE | Noted: 2019-10-25

## 2019-10-25 PROBLEM — E10.9 TYPE 1 DIABETES MELLITUS (HCC): Status: ACTIVE | Noted: 2019-05-12

## 2019-10-25 PROBLEM — E10.9 TYPE 1 DIABETES MELLITUS (HCC): Status: RESOLVED | Noted: 2019-05-12 | Resolved: 2019-10-25

## 2019-10-25 LAB
ALBUMIN FLD-MCNC: 0.7 G/DL
AMYLASE FLD QL: 16 U/L
ANION GAP SERPL CALCULATED.3IONS-SCNC: 2 MMOL/L (ref 4–13)
APTT PPP: 75 SECONDS (ref 23–37)
BUN SERPL-MCNC: 22 MG/DL (ref 5–25)
CA-I BLD-SCNC: 0.83 MMOL/L (ref 1.12–1.32)
CALCIUM SERPL-MCNC: 6.3 MG/DL (ref 8.3–10.1)
CHLORIDE SERPL-SCNC: 104 MMOL/L (ref 100–108)
CO2 SERPL-SCNC: 36 MMOL/L (ref 21–32)
CREAT SERPL-MCNC: 0.45 MG/DL (ref 0.6–1.3)
ERYTHROCYTE [DISTWIDTH] IN BLOOD BY AUTOMATED COUNT: 15 % (ref 11.6–15.1)
GFR SERPL CREATININE-BSD FRML MDRD: 94 ML/MIN/1.73SQ M
GLUCOSE FLD-MCNC: 121 MG/DL
GLUCOSE SERPL-MCNC: 100 MG/DL (ref 65–140)
GLUCOSE SERPL-MCNC: 111 MG/DL (ref 65–140)
GLUCOSE SERPL-MCNC: 113 MG/DL (ref 65–140)
GLUCOSE SERPL-MCNC: 115 MG/DL (ref 65–140)
GLUCOSE SERPL-MCNC: 119 MG/DL (ref 65–140)
GLUCOSE SERPL-MCNC: 125 MG/DL (ref 65–140)
GLUCOSE SERPL-MCNC: 127 MG/DL (ref 65–140)
GLUCOSE SERPL-MCNC: 135 MG/DL (ref 65–140)
GLUCOSE SERPL-MCNC: 188 MG/DL (ref 65–140)
GLUCOSE SERPL-MCNC: 197 MG/DL (ref 65–140)
GLUCOSE SERPL-MCNC: 218 MG/DL (ref 65–140)
GLUCOSE SERPL-MCNC: 83 MG/DL (ref 65–140)
HCT VFR BLD AUTO: 30.5 % (ref 34.8–46.1)
HCT VFR BLD AUTO: 31 % (ref 34.8–46.1)
HCT VFR BLD AUTO: 31.3 % (ref 34.8–46.1)
HGB BLD-MCNC: 9.1 G/DL (ref 11.5–15.4)
HGB BLD-MCNC: 9.2 G/DL (ref 11.5–15.4)
HGB BLD-MCNC: 9.4 G/DL (ref 11.5–15.4)
LDH FLD L TO P-CCNC: 243 U/L
MAGNESIUM SERPL-MCNC: 2.3 MG/DL (ref 1.6–2.6)
MCH RBC QN AUTO: 31.5 PG (ref 26.8–34.3)
MCHC RBC AUTO-ENTMCNC: 29.8 G/DL (ref 31.4–37.4)
MCV RBC AUTO: 106 FL (ref 82–98)
PH BODY FLUID: 7.8
PLATELET # BLD AUTO: 476 THOUSANDS/UL (ref 149–390)
PMV BLD AUTO: 9.7 FL (ref 8.9–12.7)
POTASSIUM SERPL-SCNC: 3.8 MMOL/L (ref 3.5–5.3)
PROT FLD-MCNC: <2 G/DL
RBC # BLD AUTO: 2.89 MILLION/UL (ref 3.81–5.12)
SODIUM SERPL-SCNC: 142 MMOL/L (ref 136–145)
WBC # BLD AUTO: 10.6 THOUSAND/UL (ref 4.31–10.16)

## 2019-10-25 PROCEDURE — 88305 TISSUE EXAM BY PATHOLOGIST: CPT | Performed by: PATHOLOGY

## 2019-10-25 PROCEDURE — 32555 ASPIRATE PLEURA W/ IMAGING: CPT

## 2019-10-25 PROCEDURE — 92526 ORAL FUNCTION THERAPY: CPT

## 2019-10-25 PROCEDURE — 87070 CULTURE OTHR SPECIMN AEROBIC: CPT | Performed by: OBSTETRICS & GYNECOLOGY

## 2019-10-25 PROCEDURE — 87205 SMEAR GRAM STAIN: CPT | Performed by: OBSTETRICS & GYNECOLOGY

## 2019-10-25 PROCEDURE — 88112 CYTOPATH CELL ENHANCE TECH: CPT | Performed by: PATHOLOGY

## 2019-10-25 PROCEDURE — 97530 THERAPEUTIC ACTIVITIES: CPT

## 2019-10-25 PROCEDURE — 99232 SBSQ HOSP IP/OBS MODERATE 35: CPT | Performed by: INTERNAL MEDICINE

## 2019-10-25 PROCEDURE — 32555 ASPIRATE PLEURA W/ IMAGING: CPT | Performed by: RADIOLOGY

## 2019-10-25 PROCEDURE — 84157 ASSAY OF PROTEIN OTHER: CPT | Performed by: INTERNAL MEDICINE

## 2019-10-25 PROCEDURE — 82150 ASSAY OF AMYLASE: CPT | Performed by: INTERNAL MEDICINE

## 2019-10-25 PROCEDURE — 83735 ASSAY OF MAGNESIUM: CPT | Performed by: FAMILY MEDICINE

## 2019-10-25 PROCEDURE — C9113 INJ PANTOPRAZOLE SODIUM, VIA: HCPCS | Performed by: FAMILY MEDICINE

## 2019-10-25 PROCEDURE — 85018 HEMOGLOBIN: CPT | Performed by: FAMILY MEDICINE

## 2019-10-25 PROCEDURE — 88342 IMHCHEM/IMCYTCHM 1ST ANTB: CPT | Performed by: PATHOLOGY

## 2019-10-25 PROCEDURE — 82042 OTHER SOURCE ALBUMIN QUAN EA: CPT | Performed by: INTERNAL MEDICINE

## 2019-10-25 PROCEDURE — 0W993ZZ DRAINAGE OF RIGHT PLEURAL CAVITY, PERCUTANEOUS APPROACH: ICD-10-PCS | Performed by: RADIOLOGY

## 2019-10-25 PROCEDURE — 82945 GLUCOSE OTHER FLUID: CPT | Performed by: INTERNAL MEDICINE

## 2019-10-25 PROCEDURE — 88341 IMHCHEM/IMCYTCHM EA ADD ANTB: CPT | Performed by: PATHOLOGY

## 2019-10-25 PROCEDURE — NC001 PR NO CHARGE: Performed by: PHYSICIAN ASSISTANT

## 2019-10-25 PROCEDURE — 97535 SELF CARE MNGMENT TRAINING: CPT

## 2019-10-25 PROCEDURE — 99233 SBSQ HOSP IP/OBS HIGH 50: CPT | Performed by: OBSTETRICS & GYNECOLOGY

## 2019-10-25 PROCEDURE — 82330 ASSAY OF CALCIUM: CPT | Performed by: FAMILY MEDICINE

## 2019-10-25 PROCEDURE — 92611 MOTION FLUOROSCOPY/SWALLOW: CPT

## 2019-10-25 PROCEDURE — 80048 BASIC METABOLIC PNL TOTAL CA: CPT | Performed by: FAMILY MEDICINE

## 2019-10-25 PROCEDURE — 83986 ASSAY PH BODY FLUID NOS: CPT | Performed by: INTERNAL MEDICINE

## 2019-10-25 PROCEDURE — 97110 THERAPEUTIC EXERCISES: CPT

## 2019-10-25 PROCEDURE — 99232 SBSQ HOSP IP/OBS MODERATE 35: CPT | Performed by: FAMILY MEDICINE

## 2019-10-25 PROCEDURE — 85730 THROMBOPLASTIN TIME PARTIAL: CPT | Performed by: PHYSICIAN ASSISTANT

## 2019-10-25 PROCEDURE — 85027 COMPLETE CBC AUTOMATED: CPT | Performed by: FAMILY MEDICINE

## 2019-10-25 PROCEDURE — 82948 REAGENT STRIP/BLOOD GLUCOSE: CPT

## 2019-10-25 PROCEDURE — 83615 LACTATE (LD) (LDH) ENZYME: CPT | Performed by: INTERNAL MEDICINE

## 2019-10-25 PROCEDURE — 74230 X-RAY XM SWLNG FUNCJ C+: CPT

## 2019-10-25 PROCEDURE — 99223 1ST HOSP IP/OBS HIGH 75: CPT | Performed by: INTERNAL MEDICINE

## 2019-10-25 PROCEDURE — 85014 HEMATOCRIT: CPT | Performed by: FAMILY MEDICINE

## 2019-10-25 RX ORDER — OXYBUTYNIN CHLORIDE 5 MG/1
2.5 TABLET ORAL 2 TIMES DAILY
Status: DISCONTINUED | OUTPATIENT
Start: 2019-10-25 | End: 2019-10-26

## 2019-10-25 RX ORDER — ATROPA BELLADONNA AND OPIUM 16.2; 3 MG/1; MG/1
30 SUPPOSITORY RECTAL EVERY 8 HOURS PRN
Status: DISCONTINUED | OUTPATIENT
Start: 2019-10-25 | End: 2019-10-26

## 2019-10-25 RX ORDER — LIDOCAINE 50 MG/G
1 PATCH TOPICAL DAILY
Status: DISCONTINUED | OUTPATIENT
Start: 2019-10-25 | End: 2019-11-01 | Stop reason: HOSPADM

## 2019-10-25 RX ORDER — LANOLIN ALCOHOL/MO/W.PET/CERES
3 CREAM (GRAM) TOPICAL
Status: DISCONTINUED | OUTPATIENT
Start: 2019-10-25 | End: 2019-10-27

## 2019-10-25 RX ADMIN — DORZOLAMIDE HYDROCHLORIDE AND TIMOLOL MALEATE 1 DROP: 20; 5 SOLUTION/ DROPS OPHTHALMIC at 09:23

## 2019-10-25 RX ADMIN — PANTOPRAZOLE SODIUM 40 MG: 40 INJECTION, POWDER, FOR SOLUTION INTRAVENOUS at 09:20

## 2019-10-25 RX ADMIN — TRAVOPROST 1 DROP: 0.04 SOLUTION/ DROPS OPHTHALMIC at 21:55

## 2019-10-25 RX ADMIN — ACETAMINOPHEN 975 MG: 160 SUSPENSION ORAL at 21:54

## 2019-10-25 RX ADMIN — SERTRALINE HYDROCHLORIDE 25 MG: 20 SOLUTION ORAL at 21:55

## 2019-10-25 RX ADMIN — MELATONIN 3 MG: 3 TAB ORAL at 21:55

## 2019-10-25 RX ADMIN — CALCIUM GLUCONATE 1 G: 98 INJECTION, SOLUTION INTRAVENOUS at 17:38

## 2019-10-25 RX ADMIN — LIDOCAINE 1 PATCH: 50 PATCH CUTANEOUS at 13:24

## 2019-10-25 RX ADMIN — ACETAMINOPHEN 975 MG: 160 SUSPENSION ORAL at 05:41

## 2019-10-25 RX ADMIN — DORZOLAMIDE HYDROCHLORIDE AND TIMOLOL MALEATE 1 DROP: 20; 5 SOLUTION/ DROPS OPHTHALMIC at 17:35

## 2019-10-25 RX ADMIN — ACETAMINOPHEN 975 MG: 160 SUSPENSION ORAL at 13:23

## 2019-10-25 RX ADMIN — OXYBUTYNIN CHLORIDE 2.5 MG: 5 TABLET ORAL at 13:18

## 2019-10-25 RX ADMIN — LEVOTHYROXINE SODIUM 50 MCG: 50 TABLET ORAL at 05:41

## 2019-10-25 RX ADMIN — BENZOCAINE AND LEVOMENTHOL: 200; 5 SPRAY TOPICAL at 09:22

## 2019-10-25 RX ADMIN — OXYBUTYNIN CHLORIDE 2.5 MG: 5 TABLET ORAL at 17:32

## 2019-10-25 NOTE — ASSESSMENT & PLAN NOTE
Patient was on Lopressor as an outpatient  Blood pressure appears to be improving    Continue to hold antihypertensives for now

## 2019-10-25 NOTE — PROGRESS NOTES
Progress Note - Moira Payment 1935, 82 y o  female MRN: [id number]    Unit/Bed#: [facility] 530-01 Encounter: [id number]    Primary Care Provider: Franco Grey MD   Date and time admitted to hospital: 10/21/2019  7:43 PM        * Lethargy  Assessment & Plan  Patient was admitted with lethargy coming from the LTAC  Patient with waxing and waning of mental status with the periods of lethargy  Patient was more lethargic after getting the Ativan yesterday patient only got 0 25 mg of Ativan x1  Currently avoiding all sedating medication  Encephalopathy is likely multifactorial-likely toxic metabolic  Neurology evaluation appreciated  Patient was on  and tramadol which was discontinued in the facility  Her lethargy is multifactorial/poor nutrition/deconditioning  Appears to be improving  Supportive care      Urinary retention  Assessment & Plan  · Patient complaining of severe bladder spasms and burning sensation  · Will attempt voiding trial  · If the dysuria is persistent we will obtain urine culture    Critical illness myopathy  Assessment & Plan  · PT OT evaluation  Needs most likely rehabilitation at the time of discharge    Hearing loss  Assessment & Plan  · Likely secondary to Lasix  · Discussed with pulmonology/daughter-ENT consultation appreciated    Rectal bleeding  Assessment & Plan  · Patient with episodes of rectal bleeding  Was evaluated by GI   Status post 1 unit of packed RBCs on venous day  · Patient had a large bowel movements yesterday no further bleeding  Discussed with GI no plan for any further intervention    Acute respiratory failure with hypercapnia [facility])  Assessment & Plan  Patient has history of recent pneumonia and unfortunately ARDS requiring intubation and ICU admission and also was on high-flow oxygen before discharge to [facility]  Patient has to be on BiPAP overnight for elevated pCO2  Currently patient is on 6 L nasal cannula with Oxymizer  Pulmonology evaluation appreciated  ABG reviewed  Acute hypoxic respiratory failure is likely multifactorial secondary to recent pneumonia/ARDS-patient was evaluated along with the pulmonologist and reported that it recovery is going to be  Slow  Patient had a CT scan yesterday and at that time consideration for thoracocentesis was done  Patient had thoracocentesis on the right side removing 500 mL of serosanguineous fluid  Patient with legs oxygen requirement  Infectious Disease evaluation appreciated discontinue IV antibiotic    A-fib ([address] )  Assessment & Plan  Currently on heparin drip  Was taken off of the IV metoprolol  secondary to bradycardia  Atrial tachycardia on Wednesday night   Cardiology on board ,monitor heart rate on the telemetry  Internal jugular (IJ) vein thromboembolism, acute, right [facility])  Assessment & Plan  Patient was on Eliquis as an outpatient  Currently on heparin drip given right upper extremity ischemia    Ovarian cancer [facility])  Assessment & Plan  This is status post hysterectomy  Gyn Oncology on board    Type 1 diabetes mellitus with diabetic neuropathy [facility])  Assessment & Plan  Lab Results   Component Value Date    HGBA1C 6 4 (H) 10/22/2019    Currently on Lantus 20 units Q 12  Because there were some reports of poor p o  Intake; will cut Lantus in half at 20 units at night  Continue insulin sliding scale with Accu-Cheks  Hemoglobin A1c  Recent Labs     10/25/19  0619 10/25/19  0906 10/25/19  1227 10/25/19  1402   POCGLU 125 119 100 188*       Blood Sugar Average: Last 72 hrs:  (P) 969 [id number]   Patient is type 1 type diabetes mellitus, continue with the insulin drip  Currently off of the D5 since the patient was started on tube feeding  Adjust the algorithm depending upon the blood sugar    Hypertension  Assessment & Plan  Patient was on Lopressor as an outpatient  Blood pressure appears to be improving    Continue to hold antihypertensives for now    ANAYA (generalized anxiety disorder)  Assessment & Plan  As patient was seen lethargic; will put on hold trazodone and lorazepam   Started on Zoloft at a low dose on wednesday      Adult onset hypothyroidism  Assessment & Plan  Will continue with levothyroxine 50 mcg  TSH is minimally elevated    Type 1 diabetes mellitus (HCC)resolved as of 10/25/2019  Assessment & Plan  Lab Results   Component Value Date    HGBA1C 6 4 (H) 10/22/2019    Will continue with Lantus 20 units being given twice daily however due to the reports that she is not eating as much; will put on a half dose  Continue insulin sliding scale and Accu-Cheks  Hemoglobin A1c  Recent Labs     10/25/19  0619 10/25/19  0906 10/25/19  1227 10/25/19  1402   POCGLU 125 119 100 188*       Blood Sugar Average: Last 72 hrs:  (P) 466 [id number]   Patient is currently on insulin drip  Monitor  Monitor blood sugar on insulin drip  Transition to subcutaneous insulin eventually        VTE Pharmacologic Prophylaxis:   Pharmacologic: Enoxaparin (Lovenox)  Mechanical VTE Prophylaxis in Place: Yes    Patient Centered Rounds: I have performed bedside rounds with nursing staff today  Discussions with Specialists or Other Care Team Provider:     Education and Discussions with Family / Patient:  Patient and family    Time Spent for Care: 45 minutes  More than 50% of total time spent on counseling and coordination of care as described above  Current Length of Stay: 3 day(s)    Current Patient Status: Inpatient   Certification Statement: The patient will continue to require additional inpatient hospital stay due to Multiple medical comorbidities    Discharge Plan:     Code Status: Level 1 - Full Code      Subjective:   Patient seen and examined multiple times today  Patient had thoracocentesis in the morning removed 500 mL of serosanguineous fluid  Patient went  for video barium swallow  Patient is also reported that a Ji catheter is bothering her complaining of severe pain/spasms    No improvement with the medication and we will attempt voiding trial since the patient had bowel movement with likely resolution of fecal impaction      Objective:     Vitals:   Temp (24hrs), Av 5 °F (36 9 °C), Min:98 2 °F (36 8 °C), Max:98 9 °F (37 2 °C)    Temp:  [98 2 °F (36 8 °C)-98 9 °F (37 2 °C)] 98 4 °F (36 9 °C)  HR:  [83-89] 86  Resp:  [18-22] 22  BP: (120-177)/(58-90) 134/90  SpO2:  [96 %-99 %] 99 %  Body mass index is 24 47 kg/m²  Input and Output Summary (last 24 hours): Intake/Output Summary (Last 24 hours) at 10/25/2019 1559  Last data filed at 10/25/2019 1045  Gross per 24 hour   Intake 1029 86 ml   Output 1590 ml   Net -560 14 ml       Physical Exam:     Physical Exam   Constitutional: No distress  HENT:   Head: Normocephalic  Eyes: Right eye exhibits no discharge  Neck: No JVD present  Cardiovascular: Normal rate  No murmur heard  Pulmonary/Chest: No respiratory distress  Patient appears to be tachypnea  Rapid shallow breaths  Currently on nasal cannula with Oxymizer  Decreased breath sounds bilateral   Bilateral coarse breath sounds   Abdominal: Soft  She exhibits no distension  There is no tenderness  Musculoskeletal: She exhibits edema  Neurological: She is alert  No cranial nerve deficit  Coordination normal    Skin: Skin is warm  Additional Data:     Labs:    Results from last 7 days   Lab Units 10/25/19  0612  10/24/19  0454   WBC Thousand/uL 10 60*  --  11 57*   HEMOGLOBIN g/dL 9 4*  9 1*   < > 9 0*   HEMATOCRIT % 31 3*  30 5*   < > 29 8*   PLATELETS Thousands/uL 476*  --  459*   NEUTROS PCT %  --   --  88*   LYMPHS PCT %  --   --  5*   MONOS PCT %  --   --  6   EOS PCT %  --   --  0    < > = values in this interval not displayed       Results from last 7 days   Lab Units 10/25/19  0612 10/24/19  0454   POTASSIUM mmol/L 3 8 3 3*   CHLORIDE mmol/L 104 106   CO2 mmol/L 36* 35*   BUN mg/dL 22 21   CREATININE mg/dL 0 45* 0 51*   CALCIUM mg/dL 6 3* 6 4*   ALK PHOS U/L  -- 95   ALT U/L  --  21   AST U/L  --  21     Results from last 7 days   Lab Units 10/22/19  0950   INR  1 29*       * I Have Reviewed All Lab Data Listed Above  * Additional Pertinent Lab Tests Reviewed: Ale 66 Admission Reviewed    Imaging:    Imaging Reports Reviewed Today Include:   Imaging Personally Reviewed by Myself Includes:      Recent Cultures (last 7 days):     Results from last 7 days   Lab Units 10/25/19  1100 10/21/19  2149 10/21/19  2030   BLOOD CULTURE   --  No Growth at 72 hrs  No Growth at 72 hrs     GRAM STAIN RESULT  Rare Polys  Rare Mononuclear Cells  No bacteria seen  --   --        Last 24 Hours Medication List:     Current Facility-Administered Medications:  acetaminophen 975 mg Oral [facility] [address] Tuesday JIM Hayes    acetaminophen 650 mg Rectal Q6H PRN Gaurav Swift MD    albuterol 2 5 mg Nebulization Q4H PRN Gaurav Swift MD    aluminum-magnesium hydroxide-simethicone 15 mL Per NG Tube Q6H PRN Gaurav Swift MD    artificial tear  Both Eyes HS PRN Gaurav Swift MD    belladonna-opium 30 mg Rectal Q8H PRN Catrachita Avila MD    benzocaine-menthol-lanolin-aloe  Topical BID Marda Essex, MD    bisacodyl 10 mg Rectal Daily PRN Catrachita Avila MD    dorzolamide-timolol 1 drop Both Eyes BID Gaurav Swift MD    heparin (porcine) 3-30 Units/kg/hr (Order-Specific) Intravenous Titrated Dante Carvalho PA-C Last Rate: 13 Units/kg/hr (10/24/19 1729)   heparin (porcine) 2,400 Units Intravenous PRN Shazia Sofia PA-C    heparin (porcine) 4,800 Units Intravenous PRN Dante Carvalho PA-C    insulin regular (HumuLIN R,NovoLIN R) infusion 0 3-21 Units/hr Intravenous Titrated Catrachita Avila MD Last Rate: 2 Units/hr (10/25/19 1429)   levothyroxine 50 mcg Per NG Tube Early Morning Gaurav Swift MD    lidocaine 1 patch Topical Daily Catrachita Avila MD    Netarsudil Dimesylate 1 drop Ophthalmic Daily Gaurav Swift MD    Netarsudil Dimesylate 1 drop Right Eye HS Kurtis King Roxann Cesar MD    ondansetron 4 mg Intravenous Q6H PRN Marin Leonard MD    oxybutynin 2 5 mg Oral BID Kiah Mcleod MD    pantoprazole 40 mg Intravenous Q24H [address] Kiah Mcleod MD    senna 8 8 mg Per NG Tube Daily PRN Marin Leonard MD    sertraline 25 mg Per NG Tube HS Kiah Mcleod MD    simethicone 40 mg Oral Q6H PRN Kiah Mcleod MD    travoprost 1 drop Both Eyes HS Marin Leonard MD         Today, Patient Was Seen By: Kiah Mcleod MD    ** Please Note: Dictation voice to text software may have been used in the creation of this document   **

## 2019-10-25 NOTE — ASSESSMENT & PLAN NOTE
Lab Results   Component Value Date    HGBA1C 6 4 (H) 10/22/2019    Currently on Lantus 20 units Q 12  Because there were some reports of poor p o  Intake; will cut Lantus in half at 20 units at night  Continue insulin sliding scale with Accu-Cheks  Hemoglobin A1c      Recent Labs     10/25/19  0619 10/25/19  0906 10/25/19  1227 10/25/19  1402   POCGLU 125 119 100 188*       Blood Sugar Average: Last 72 hrs:  (P) 295 [id number]   Patient is type 1 type diabetes mellitus, continue with the insulin drip  Currently off of the D5 since the patient was started on tube feeding  Adjust the algorithm depending upon the blood sugar

## 2019-10-25 NOTE — DISCHARGE INSTRUCTIONS
Thoracentesis   WHAT YOU NEED TO KNOW:   A thoracentesis is a procedure to remove extra fluid or air from between your lungs and your inner chest wall  Air or fluid buildup may make it hard for you to breathe  A thoracentesis allows your lungs to expand fully so you can breathe more easily  DISCHARGE INSTRUCTIONS:     Small amount of shoulder pain and bloody sputum is normal after a Thoracentesis  Rest:  Rest when you feel it is needed  Slowly start to do more each day  Return to your daily activities as directed  Resume your normal diet  Small sips of flat soda will help mild nausea  Do not smoke: If you smoke, it is never too late to quit  Ask for information about how to stop smoking if you need help  Contact Interventional Radiology at [phone] Jem PATIENTS: Contact Interventional Radiology at [phone] Roman PATIENTS: Contact Interventional Radiology at [phone]) if:   · You have a fever  · Your puncture site is red, warm, swollen, or draining pus  · You have questions or concerns about your procedure, medicine, or care  Seek care immediately or call 911 if:   · Severe chest pain with inspiration and shortness of breath    · Large amounts of blood in your sputum    Follow up with your healthcare provider as directed

## 2019-10-25 NOTE — PROGRESS NOTES
Pulmonary follow up   Chelly Castillo 82 y o  female MRN: [id number]  Unit/Bed#: [facility] 530-01 Encounter: [id number]      Subjective  The patient improved slightly from the breathing standpoint  She had a thoracentesis this morning with removal of 500 cc of clear fluid  Objective  Vitals: Blood pressure 134/90, pulse 86, temperature 98 4 °F (36 9 °C), resp  rate 22, height 5' 5" (1 651 m), weight 66 7 kg (147 lb 0 7 oz), SpO2 99 %, not currently breastfeeding , Body mass index is 24 47 kg/m²  Physical Exam   Constitutional: She is oriented to person, place, and time  No distress  Weak   HENT:   Head: Normocephalic and atraumatic  Eyes: Pupils are equal, round, and reactive to light  EOM are normal    Neck: Normal range of motion  Neck supple  No JVD present  Cardiovascular: Normal rate, regular rhythm and normal heart sounds  Exam reveals no friction rub  No murmur heard  Pulmonary/Chest: No stridor  No respiratory distress  She has no wheezes  She has rales  Abdominal: Soft  She exhibits no distension  Musculoskeletal: Normal range of motion  She exhibits no edema or deformity  Neurological: She is alert and oriented to person, place, and time  Skin: Skin is warm  She is not diaphoretic  Psychiatric: She has a normal mood and affect  Assessment  1  Post ARDS changes  2  Muscle weakness  3  Bilateral pleural effusions  Status post thoracentesis and removal of 500 cc of clear fluid from the right pleural space  Exudative by LDH, noncomplicated parapneumonic effusion  Low protein low albumin, normal glucose normal pH   4  Suspected GI bleeding  Plan  1  Plan for 2nd thoracentesis on Monday from the left lung  2  Work hard with physical therapy, chest physiotherapy and incentive spirometry  3  Trend hemoglobin  Will continue follow-up  I have personally seen and examined the patient       Jnenifer Patricia MD/PhD  [facility]

## 2019-10-25 NOTE — PROGRESS NOTES
Gyn Oncology Progress note   Nick García 82 y o  female MRN: [id number]  Unit/Bed#: [facility] 530-01 Encounter: [id number]    Assessment and Plan:    1)  Acute respiratory failure with hypercapnia  - Managed per primary team  - Transitioned from BIPAP back to 6L NC on 10/24  SaO2 98%  - CT CAP 10/24/19: worsening extensive multifocal pneumonia and increasing bilateral pleural effusions  - Currently on cefepime for aspiration pneumonia- recommend ID consult given worsening pneumonia despite antibiotics  Previous sputum culture grew Serratia  F/u repeat sputum culture  - May benefit from repeat bronchoscopy  - Pulmonology following    2) Delirium  - Pt with waxing and waning between consciousness and lethargy   - Likely multi-factorial  - Ct head w/o contrast on admission without any acute abnormalities  - Pt alert and oriented this morning on my exam  - Continue to avoid sedating medications, benzos  - Neurology following    3) Severe protein calorie malnutrition secondary to poor oral take  - Pt was started on tube feeds on POD#10 in [facility]  Poor oral intake since discharge to LTAC    - NGT replaced with Keofed on 10/23/19-Jevity 1 2, advance to goal  - Speech pathology following: plan for barium swallow study today (10/25)     4) Rectal Bleeding, intermittent  - Evaluated by GI and s/p 1u PRBCs on 10/22/19  - H/H stable in 9s, continue to monitor  - CT CAP 10/24/19 demonstrated fecal impaction, s/p enema with large BM   Pt continues to have watery stools overnight (approximately 3x per nursing)    5) Internal Jugular (IJ) vein thromboembolism, acute right with new onset cyanotic right fingers  - Repeat doppler on 10/22 demonstrated occluded right radial artery from them id forearm to the wrist  All five digits show flat line doppler waveforms  - Pt's home Eliquis was placed on hold and she was started on Heparin gtt  - Vascular surgery following, no surgical intervention at this time  - Radial pulse noted with bedside doppler on my exam with nursing    6) Type 1 Diabetes  - Hgb A1c 6 4 on 10/22  - Glucose levels: Goal between 140-180   - Continue insulin gtt  - D5 1/2NS infusion     7) Stage 1B ovarian cancer s/p staging surgery 10/4/2019  - Good prognosis  - GYN ONC to continue to follow    8) Urinary Retention  - Likely multifactorial etiology  - Alexandre catheter in place: UOP 0 7cc/kg/hr     - Pt reports burning at site: will order dermoplast spray  Encouraged more frequent perineal care    9) Decreased Hearing  Per primary team, most-likely due to lasix  Evaluated by ENT on 10/23/19: outpatient follow-up as assessment cannot be done at bedside  Signed off    10) Paroxysmal Afib/bradycardia  On telemetry   Currently on heparin gtt  S/P Cardiology consult: likely secondary to medication, no further bradycardia noted  IV metoprolol held  Managed per primary team    11) Adult Onset Hypothyroidism  Levothyroxine 50mcg daily  TSH is minimally elevated  Managed per primary team    12) HTN  Pt has had episodes of hypotension inpatient, Lopressor on hold  Bps currently 120s-130s/58-60s    13) Weakness  Evaluated by PT/OT: recommending acute STR when stable for discharge  Pt's daughter would like patient to go to [facility] program      14) Dispo: Inpatient    Ruthellen Prudent is is awake and oriented this AM  She reports vaginal discomfort as "burning sensation " States "it really hurts " Upon inspection, appears to be where alexandre catheter is with residual betadine  Nursing at bedside to clean area  Pt would like to try the dermoplast spray  Pt reports difficulty breathing with NC, however, sating 98%  Denies chest pain  Per nursing, patient has had 3 watery stools overnight since enema for fecal impaction noted on CT scan  Improvement in edema  Nursing repositioning patient every 2 hrs, at bedside to reposition again         /63   Pulse 83   Temp 98 5 °F (36 9 °C)   Resp 18   Ht 5' 5" (1 651 m)   Wt 66 7 kg (147 lb 0 8 oz) SpO2 98%   BMI 24 47 kg/m²     I/O last 3 completed shifts: In: 3124 6 [I V :1808 6; NG/GT:260; IV Piggyback:350; Feedings:706]  Out: 2489 [Urine:2489]  I/O this shift:  In: 262 2 [I V :112 2; NG/GT:100; IV Piggyback:50]  Out: 500 [Urine:500]    Lab Results   Component Value Date    WBC 11 57 (H) 10/24/2019    HGB 9 0 (L) 10/24/2019    HCT 30 3 (L) 10/24/2019     (H) 10/24/2019     (H) 10/24/2019       Lab Results   Component Value Date    GLUCOSE 75 01/08/2016    CALCIUM 6 4 (L) 10/24/2019     (L) 01/08/2016    K 3 3 (L) 10/24/2019    CO2 35 (H) 10/24/2019     10/24/2019    BUN 21 10/24/2019    CREATININE 0 51 (L) 10/24/2019       Lab Results   Component Value Date/Time    POCGLU 113 10/25/2019 04:00 AM    POCGLU 83 10/25/2019 02:24 AM    POCGLU 197 (H) 10/25/2019 12:23 AM    POCGLU 145 (H) 10/24/2019 11:00 PM    POCGLU 143 (H) 10/24/2019 08:08 PM    POCGLU 164 02/15/2017 02:33 PM     Physical Exam   Constitutional: She is oriented to person, place, and time  No distress  Nasal cannula in place  HENT:   Head: Normocephalic and atraumatic  Cardiovascular: Normal rate, regular rhythm and intact distal pulses  Pulmonary/Chest: Effort normal  No respiratory distress  She has rales  On BIPAP   Abdominal: Soft  There is no tenderness  There is no rebound and no guarding  Incision: C/D/I with dried histoacryl  No drainage, fluctuance or induration noted   Genitourinary:   Genitourinary Comments: Residual betadine on perineum- tenderness to palpation around alexandre area   Musculoskeletal: She exhibits edema (+1 bilaterally in LE)  Neurological: She is alert and oriented to person, place, and time  Skin: She is not diaphoretic  Psychiatric: She has a normal mood and affect  Her behavior is normal  Judgment and thought content normal    Vitals reviewed        Emmy Nieves MD  OBGYN, PGY-3  10/25/2019 6:13 AM

## 2019-10-25 NOTE — ASSESSMENT & PLAN NOTE
As patient was seen lethargic; will put on hold trazodone and lorazepam   Started on Zoloft at a low dose on wednesday

## 2019-10-25 NOTE — PLAN OF CARE
Problem: OCCUPATIONAL THERAPY ADULT  Goal: Performs self-care activities at highest level of function for planned discharge setting  See evaluation for individualized goals  Description  Treatment Interventions: ADL retraining, Functional transfer training, UE strengthening/ROM, Endurance training, Cognitive reorientation, Patient/family training, Equipment evaluation/education, Fine motor coordination activities, Compensatory technique education, Energy conservation, Activityengagement  Equipment Recommended: (S) Bedside commode, Other (comment)(SC)       See flowsheet documentation for full assessment, interventions and recommendations  10/25/2019 1652 by Marcelo Motta OT  Outcome: Progressing  Note:   Limitation: Decreased ADL status, Decreased UE strength, Decreased UE ROM, Decreased Safe judgement during ADL, Decreased cognition, Decreased endurance, Decreased high-level ADLs, Decreased fine motor control  Prognosis: Fair  Assessment: Patient participated in Skilled OT session this date with interventions consisting of ADL re training with the use of correct body mechnaics, Energy Conservation techniques, deep breathing technique, safety awareness and fall prevention techniques, increase dynamic sit/ stand balance during functional activity  and increase OOB/ sitting tolerance   Patient agreeable to OT treatment session, upon arrival patient was found supine in bed  In comparison to previous session, patient with improvements in bed mobility, functional transfers, functional mobility, and carryover of deep breathing techniques  Please refer to chart for functional levels  Patient requiring frequent re direction, verbal cues for safety, verbal cues for correct technique, verbal cues for pacing thru activity steps, cognitive assistance to anticipate next step, one step directives and frequent rest periods   Patient continues to be functioning below baseline level, occupational performance remains limited secondary to factors listed above and increased risk for falls and injury  From OT standpoint, recommendation at time of d/c would be Short Term Rehab  Patient to benefit from continued Occupational Therapy treatment while in the hospital to address deficits as defined above and maximize level of functional independence with ADLs and functional mobility  OT Discharge Recommendation: Short Term Rehab  OT - OK to Discharge: Yes(when medically cleared)    10/25/2019 1134 by Ximena Vergara OT  Outcome: Progressing  Note:   Limitation: Decreased ADL status, Decreased UE strength, Decreased UE ROM, Decreased Safe judgement during ADL, Decreased cognition, Decreased endurance, Decreased high-level ADLs, Decreased fine motor control  Prognosis: Fair  Assessment: Patient participated in Skilled OT session this date with interventions consisting of ADL re training with the use of correct body mechnaics, Energy Conservation techniques, deep breathing technique, safety awareness and fall prevention techniques,  therapeutic activities to: increase activity tolerance, increase dynamic sit/ stand balance during functional activity , increase postural control, increase trunk control and increase OOB/ sitting tolerance   Patient agreeable to OT treatment session, upon arrival patient was found supine in bed  In comparison to previous session, patient with improvements in bed mobility, functional transfers, sitting EOB tolerance and endurance  Please refer to chart for functional levels  Patient requiring frequent re direction, verbal cues for safety, verbal cues for correct technique, verbal cues for pacing thru activity steps, cognitive assistance to anticipate next step, one step directives and frequent rest periods  Patient continues to be functioning below baseline level, occupational performance remains limited secondary to factors listed above and increased risk for falls and injury     From OT standpoint, recommendation at time of d/c would be Short Term Rehab  Patient to benefit from continued Occupational Therapy treatment while in the hospital to address deficits as defined above and maximize level of functional independence with ADLs and functional mobility        OT Discharge Recommendation: Short Term Rehab  OT - OK to Discharge: Yes(when medically cleared)

## 2019-10-25 NOTE — PROCEDURES
Video Swallow Study      Patient Name: Tracey Peabody  UTKHF'A Date: 10/25/2019        Past Medical History  Past Medical History:   Diagnosis Date    Anxiety     Arthritis     Constipation     Diabetes mellitus ([address] )     IDDM    Frequent UTI     Glaucoma     Hearing loss     Hyperlipidemia     Hypertension     Hyperthyroidism     in past    Hyponatremia     Hypothyroid     Neuropathy     bles    Right tubo-ovarian mass     Wears glasses         Past Surgical History  Past Surgical History:   Procedure Laterality Date    APPENDECTOMY  1956    CATARACT EXTRACTION Bilateral     COLONOSCOPY  09/2014    Completed - Dr Gonzalo Calvillo, due in 5 years    HYSTERECTOMY N/A 10/4/2019    Procedure: LAP TOTAL HYSTERECTOMY, BSO;  Surgeon: Robert Alston MD;  [facility];  Service: Gynecology Oncology    LAPAROTOMY N/A 10/4/2019    Procedure: EXPLORATORY LAPAROTOMY  EXLAP OMENTECTOMY  PELVIC AND DEANGELO-AORTIC LYMPH NODE DISSECTION  PERITONEAL BIOPSY TRANS ABDOMINUS BLOCK;  Surgeon: Robert Alston MD;  [facility];  Service: Gynecology Oncology    SKIN LESION EXCISION      Ears     Video Barium Swallow Study    Summary:  Images are on PACS for review  Initial study images did not record, which included the trials of strategies  Chin tuck and head turn  The patient presents with a moderate to severe oropharyngeal dysphagia  This is characterized by slow a-p transfer with all consistencies  Tongue pumping is observed to help transfer bolus, with decreased BOT retraction  The patient has delayed swallow initiation time with all to the level of the valleculae  Epiglottic inversion is reduced with decreased pharyngeal constriction resulting in a significant amount of valleculae retention  No penetration events are observed, as compared to last VBS  However, the patient is observed with trace aspiration events of material retained in valleculae   Aspiration is silent  The patient was observed with naso-pharyngeal regurgitation on last VBS, which has improved and is not seen in current assessment  Recommendations:  Diet: NPO-trials of puree and honey/nectar thick liquids at bedside   Liquids: Honey or nectar   Meds: Via NG for now  Strategies: Double swallows   Upright position  F/u ST tx: Yes  Therapy Prognosis: Fair  Prognosis considerations: Complex medication prognosis with respiratory compromise  Aspiration Precautions    Results reviewed with: pt, nursing, family  Aspiration precautions posted  If a dedicated assessment of the esophagus is desired, consider esophagram/barium swallow or EGD  Patient's goal: None stated     Goals:  Pt will tolerate least restrictive diet w/out s/s aspiration or oral/pharyngeal difficulties  From H&P: Jax Parra a 82 y  o  female who was admitted in [facility] previously and was just recently discharged to [facility]   Essentially, the patient was and admitted for an incidental ovarian mass and therefore underwent hysterectomy  Neale Nageotte the patient then developed acute no with dysphagia and uncontrolled diabetes mellitus   She was in distress and subsequently coded; went to the ICU   She was then diagnosed to have pneumonia with ARDS   Initially initially, the patient had an NG tube due to dysphonia and dysphagia   But slowly, the NG tube was then removed after she was trained to swallow initially with small ice chips   She was therefore discharged to [facility] for further rehabilitation    She was doing quite well until approximately within the past 24 hours when she was noted to be more lethargic than usual   She was also seen to have problems with swallowing and therefore an NG tube was placed this afternoon   No fever   However she was noted to have extreme lethargy and sometimes unresponsive even with deep sternal rub; with that concern, she was then sent to [facility] for further monitoring and workup    According to the daughter who is in the room, she was noted not to be eating much for the past 24 hours   Also while in rehabilitation, she was seen to have an episode of bradycardia at 30     Currently, patient is initially lethargic and somewhat mildly or barely responsive to loud name calling and noxious stimulus  Regina Charm when the patient was placed upright immediately, she became responsive with eyes open and essentially communicative albeit difficult to speak due to the presence of NG tube  Previous VBS:  10/21/19: She had a video swallow study completed yesterday at [facility] due to ongoing concerns for aspiration with signs at bedside and changes on CXR  Swallow study results indicate a moderate-severe pharyngeal dysphagia with penetration on all textures and eventual silent aspiration on puree secondary to pharyngeal residue  Patient was unable to cough on command to clear any aspirated material    Current Diet:  NPO   Premorbid diet:  Went to [facility] on puree with honey thick liquids   Dentition:  WFL  O2 requirement:  NC  Oral mech:  Strength and ROM: WFL  Vocal Quality/Speech:  Decreased volume   Cognitive status:  Awake and alert  Cooperative, but overall deconditioned     Consistencies administered: Barium laden applesauce, mixed consistency soft solid, honey thick, nectar thick, thin liquids  Liquids were administered by tsp and straw  Pt was seated laterally at 90 degrees  Oral stage: Moderate  Lip closure: WFL  Mastication: Min prolonged with soft solids   Bolus formation: WFL  Bolus control: Adequate  Transfer: Prolonged, with lingual pumping observed   Residue: No    Pharyngeal stage: Moderate-severe   Swallow promptness: Delayed  Spill to valleculae:  With all   Spill to pyriforms: No  Epiglottic inversion: Decreased  Laryngeal excursion: Decreased  Pharyngeal constriction: Decreased  Vallecular retention: Significant   Pyriform retention: Minimal   PPW coating: Yes  Osteophytes: No  CP prominence: No  Retropulsion from prominence: N/A  Transient penetration: No  Epiglottic undercoat: At times   Penetration: No  Aspiration: Trace amounts of retained material   Strategies: Chin tuck, head turn and double swallows did not help clear residue    Response to aspiration: Silent     Screening of Esophageal stage:  Unable to screen due to positioning in bed during exam

## 2019-10-25 NOTE — SPEECH THERAPY NOTE
Speech Language/Pathology    Speech/Language Pathology Progress Note    Patient Name: Mahesh Roberson  SKSWJ'I Date: 10/25/2019     Problem List  Principal Problem:    Lethargy  Active Problems:    Adult onset hypothyroidism    ANAYA (generalized anxiety disorder)    Hypertension    Type 1 diabetes mellitus with diabetic neuropathy (HCC)    Ovarian cancer ([facility] [place] [address] )    Internal jugular (IJ) vein thromboembolism, acute, right (HCC)    Superficial thrombophlebitis of right upper extremity    A-fib (HCC)    Acute respiratory failure with hypercapnia (HCC)    Rectal bleeding    Hearing loss    Critical illness myopathy    Urinary retention       Past Medical History  Past Medical History:   Diagnosis Date    Anxiety     Arthritis     Constipation     Diabetes mellitus ([facility] [address] )     IDDM    Frequent UTI     Glaucoma     Hearing loss     Hyperlipidemia     Hypertension     Hyperthyroidism     in past    Hyponatremia     Hypothyroid     Neuropathy     bles    Right tubo-ovarian mass     Wears glasses         Past Surgical History  Past Surgical History:   Procedure Laterality Date    APPENDECTOMY  1956    CATARACT EXTRACTION Bilateral     COLONOSCOPY  09/2014    Completed - Dr Juan Gaspar, due in 5 years    HYSTERECTOMY N/A 10/4/2019    Procedure: LAP TOTAL HYSTERECTOMY, BSO;  Surgeon: Alonso Davis MD;  [facility];  Service: Gynecology Oncology    IR THORACENTESIS  10/25/2019    LAPAROTOMY N/A 10/4/2019    Procedure: EXPLORATORY LAPAROTOMY  EXLAP OMENTECTOMY  PELVIC AND DEANGELO-AORTIC LYMPH NODE DISSECTION  PERITONEAL BIOPSY TRANS ABDOMINUS BLOCK;  Surgeon: Alonso Davis MD;  [facility];  Service: Gynecology Oncology    SKIN LESION EXCISION      Ears         Subjective:  Patient OOB in chair  Objective:  Patient seen for dysphagia therapy following VBS  Education provided to patient and daughter re: results of video swallow study and risk of aspiration   Trace aspiration events were seen on video from valleculae retention  Patient and family wish to begin trials with ST at bedside  ST recommends trials of puree with honey or nectar thick liquids, with consideration of ice chips  The patient requests to trial ice chips and is assessed x4  Oral control and transfer appears adequate  Swallow is audible and double swallows are observed at times  The patient is educated on signs of aspiration to look for following intake, including wet voice, change in breathing and/or any coughing episodes  A delayed cough is observed after 4th ice chip  No other signs of aspiration observed  Patient is too fatigued to continue and requests to stop PO trials at this time  Assessment:  Results of VBS reviewed  Patient at high risk for aspiration but wishes to begin conservative trials at bedside  Plan/Recommendations:  Continue primary nutrition via NG  ST to trial at bedside  D/W patient and daughter

## 2019-10-25 NOTE — ASSESSMENT & PLAN NOTE
Patient was admitted with lethargy coming from the [facility]  Patient with waxing and waning of mental status with the periods of lethargy  Patient was more lethargic after getting the Ativan yesterday patient only got 0 25 mg of Ativan x1    Currently avoiding all sedating medication  Encephalopathy is likely multifactorial-likely toxic metabolic  Neurology evaluation appreciated  Patient was on  and tramadol which was discontinued in the facility  Her lethargy is multifactorial/poor nutrition/deconditioning  Appears to be improving  Supportive care

## 2019-10-25 NOTE — ASSESSMENT & PLAN NOTE
· Patient complaining of severe bladder spasms and burning sensation    · Will attempt voiding trial  · If the dysuria is persistent we will obtain urine culture

## 2019-10-25 NOTE — PROGRESS NOTES
Critical Care Interval Progress Note:    Patient now on 4LPM NC with oximeter breathing comfortably  Pulm consulted  Blood pressures have been stable  Critical care to sign off  Discussed with Dr Elyce Prader for SD2  Please call [phone] with questions

## 2019-10-25 NOTE — ASSESSMENT & PLAN NOTE
Lab Results   Component Value Date    HGBA1C 6 4 (H) 10/22/2019    Will continue with Lantus 20 units being given twice daily however due to the reports that she is not eating as much; will put on a half dose  Continue insulin sliding scale and Accu-Cheks  Hemoglobin A1c  Recent Labs     10/25/19  0619 10/25/19  0906 10/25/19  1227 10/25/19  1402   POCGLU 125 119 100 188*       Blood Sugar Average: Last 72 hrs:  (P) 960 [id number]   Patient is currently on insulin drip  Monitor  Monitor blood sugar on insulin drip    Transition to subcutaneous insulin eventually

## 2019-10-25 NOTE — ASSESSMENT & PLAN NOTE
· Patient with episodes of rectal bleeding  Was evaluated by GI   Status post 1 unit of packed RBCs on venous day  · Patient had a large bowel movements yesterday no further bleeding    Discussed with GI no plan for any further intervention

## 2019-10-25 NOTE — CONSULTS
Consultation - Infectious Disease   Ruby Mott 82 y o  female MRN: [id number]  Unit/Bed#: [facility] 530-01 Encounter: [id number]      IMPRESSION & RECOMMENDATIONS:   Impression/Recommendations: This is a 82 y o  female, with recent hospitalization earlier this month for acute hypoxic respiratory failure/ARDS status post hysterectomy/oophorectomy for ovarian mass, admitted 10/22 from [place] [address] with acute hypoxic respiratory failure and lethargy  CXR and chest CT showed persistent bilateral infiltrates, with enlarging pleural effusions  1  Abnormal CXR/chest CT with diffuse/bilateral infiltrates  Patient had recent admission earlier this month with ARDS  I suspected CXR/chest CT changes we see now are residual effect from recent ARDS  Patient has no fever or significant leukocytosis  Procalcitonin is in the normal range  At this point, it is best to discontinue antibiotic and observe, to avoid potential antibiotic toxicities  Discontinue antibiotic (cefepime)  Observe closely off it  Monitor respiratory symptoms  2  Bilateral pleural effusions  No evidence of loculation on CT  Without clinical pneumonia, doubt empyema  Patient is status post thoracentesis  Pleural fluid parameters are still pending but thus far, appears to be transudative  Follow-up on final pleural fluid studies  Monitor respiratory symptoms  3  Acute hypoxic respiratory failure  I suspect this is residual effect from recent ARDS  Patient is improved O2 support  No evidence of pneumonia clinically, as in above  O2 support per primary and Pulmonary service  Monitor respiratory status  4  Lethargy, multifactorial   Hypoxia probably plays a role, in addition to recent extensive and prolonged illness  No obvious active infection  Monitor  5  Abnormal abdominal CT with extensive stool in rectum  Abdominal exam is mildly distended but otherwise benign  No clinical colitis/proctitis  Monitor      6  Rectal bleed, appears to be resolving  GI evaluation noted  7  Ovarian cancer, status post hysterectomy/oophorectomy  Gyn oncology follow-up  Recent hospitalization records reviewed in detail  Discussed with patient in detail regarding above plan  Discussed with Dr Mel Haywood from [facility] service  Thank you for this consultation  We will follow along with you  HISTORY OF PRESENT ILLNESS:  Reason for Consult:  Pneumonia  HPI: Mariela Adame is a 82 y o  female, with multiple medical problems, including recently diagnosed ovarian cancer, was recently admitted at [facility] from 10/04 until 10/18 for cardiac arrest and acute hypoxic respiratory failure/ARDS post hysterectomy/oophorectomy for ovarian mass, require mechanical ventilation  Patient subsequently improved and was transferred to [facility]  She was doing reasonably well LTAC until the last 24 hours, when she started becoming more lethargic with swallowing difficulties  NG tube was placed  She was transferred to [facility] on 10/22 for further evaluation  On presentation, patient had no fever and improved leukocytosis from discharge  Patient was admitted for further workup and management  CXR and chest CT showed bibasilar infiltrates  Cefepime was started for presumptive pneumonia  Due to lack of improvement, we are asked to evaluate the patient  At present, patient states she is more comfortable than on admission  Dyspnea is improved, although she still has dyspnea with conversation  Energy is improved  No chills  No abdominal pain  REVIEW OF SYSTEMS:  A complete 12 point system-based review was done  Except for what is noted in HPI above, ROS of systems is otherwise negative      PAST MEDICAL HISTORY:  Past Medical History:   Diagnosis Date    Anxiety     Arthritis     Constipation     Diabetes mellitus ([address] )     IDDM    Frequent UTI     Glaucoma     Hearing loss     Hyperlipidemia     Hypertension     Hyperthyroidism     in past    Hyponatremia     Hypothyroid     Neuropathy     bles    Right tubo-ovarian mass     Wears glasses      Past Surgical History:   Procedure Laterality Date    APPENDECTOMY      CATARACT EXTRACTION Bilateral     COLONOSCOPY  2014    Completed - Dr Brandee Lomeli, due in 5 years    HYSTERECTOMY N/A 10/4/2019    Procedure: LAP TOTAL HYSTERECTOMY, BSO;  Surgeon: Ramon Parsons MD;  [facility];  Service: Gynecology Oncology    LAPAROTOMY N/A 10/4/2019    Procedure: EXPLORATORY LAPAROTOMY  EXLAP OMENTECTOMY  PELVIC AND DEANGELO-AORTIC LYMPH NODE DISSECTION  PERITONEAL BIOPSY TRANS ABDOMINUS BLOCK;  Surgeon: Ramon Parsons MD;  [facility];  Service: Gynecology Oncology    SKIN LESION EXCISION      Ears     Problem list reviewed  FAMILY HISTORY:  Non-contributory    SOCIAL HISTORY:  Social History     Substance and Sexual Activity   Alcohol Use Never    Frequency: Monthly or less    Drinks per session: 1 or 2    Binge frequency: Never    Comment: wine     Social History     Substance and Sexual Activity   Drug Use No     Social History     Tobacco Use   Smoking Status Never Smoker   Smokeless Tobacco Never Used       ALLERGIES:  Allergies   Allergen Reactions    Thiazide-Type Diuretics      Hyponatremia       MEDICATIONS:  All current active medications have been reviewed  Patient is currently on IV cefepime  PHYSICAL EXAM:  Vitals:  Temp:  [98 2 °F (36 8 °C)-98 9 °F (37 2 °C)] 98 4 °F (36 9 °C)  HR:  [72-89] 86  Resp:  [18-22] 22  BP: (120-177)/(58-90) 134/90  SpO2:  [96 %-99 %] 99 %  Temp (24hrs), Av 4 °F (36 9 °C), Min:98 2 °F (36 8 °C), Max:98 9 °F (37 2 °C)  Current: Temperature: 98 4 °F (36 9 °C)     Physical Exam:  General:  Obese, comfortable, nontoxic, in no acute distress  Sleepy but easily arousable  Able to answer questions appropriately  Very mild confusion    Eyes:  Conjunctive clear with no hemorrhages or effusions  Oropharynx: No ulcers, no lesions, pharynx benign, no tonsillitis  Neck:  Supple, no lymphadenopathy, no mass, nontender  Lungs:  Decreased breath sounds bilaterally, bibasilar rales, no wheezing, no accessory muscle use  Cardiac:  Regular rate and rhythm, normal S1, normal S2, no murmurs  Abdomen:  Soft, nondistended, non-tender, no HSM  Extremities:  1+ leg edema, no erythema, nontender  No ulcers  Skin:  No rashes, no ulcers  Neurological:  Moves all four extremities spontaneously, sensation grossly intact    LABS, IMAGING, & OTHER STUDIES:  Lab Results:  I have personally reviewed pertinent labs  Results from last 7 days   Lab Units 10/25/19  0612 10/24/19  0454 10/23/19  0450  10/21/19  2030   POTASSIUM mmol/L 3 8 3 3* 3 2*   < > 3 4*   CHLORIDE mmol/L 104 106 106   < > 105   CO2 mmol/L 36* 35* 34*   < > 36*   BUN mg/dL 22 21 21   < > 26*   CREATININE mg/dL 0 45* 0 51* 0 45*   < > 0 61   EGFR ml/min/1 73sq m 94 90 94   < > 85   CALCIUM mg/dL 6 3* 6 4* 6 3*   < > 7 1*   AST U/L  --  21  --   --  27   ALT U/L  --  21  --   --  34   ALK PHOS U/L  --  95  --   --  100    < > = values in this interval not displayed  Results from last 7 days   Lab Units 10/25/19  0612 10/24/19  1823 10/24/19  1228 10/24/19  0454  10/23/19  0450   WBC Thousand/uL 10 60*  --   --  11 57*  --  15 13*   HEMOGLOBIN g/dL 9 4*  9 1* 9 0* 9 4* 9 0*   < > 9 4*   PLATELETS Thousands/uL 476*  --   --  459*  --  515*    < > = values in this interval not displayed  Results from last 7 days   Lab Units 10/22/19  2002 10/21/19  2149 10/21/19  2030   BLOOD CULTURE   --  No Growth at 72 hrs  No Growth at 72 hrs  MRSA CULTURE ONLY  No Methicillin Resistant Staphlyococcus aureus (MRSA) isolated  --   --        Imaging Studies:   I have personally reviewed pertinent imaging study reports and images in PACS  CXR reviewed personally  Bilateral airspace disease  Chest/abdomen/pelvis CT reviewed personally    Extensive multifocal infiltrates with increasing bilateral pleural effusions  Small volume ascites  Stool in rectum  Head CT reviewed personally  No acute changes  EKG, Pathology, and Other Studies:   I have personally reviewed pertinent reports

## 2019-10-25 NOTE — ASSESSMENT & PLAN NOTE
Currently on heparin drip  Was taken off of the IV metoprolol  secondary to bradycardia  Atrial tachycardia on Wednesday night   Cardiology on board ,monitor heart rate on the telemetry

## 2019-10-25 NOTE — OCCUPATIONAL THERAPY NOTE
Occupational Therapy Treatment Note      Terese Half    10/25/2019    Principal Problem:    Lethargy  Active Problems:    Adult onset hypothyroidism    ANAYA (generalized anxiety disorder)    Hypertension    Type 1 diabetes mellitus with diabetic neuropathy (HCC)    Ovarian cancer ([facility] )    Internal jugular (IJ) vein thromboembolism, acute, right (HCC)    Superficial thrombophlebitis of right upper extremity    A-fib (HCC)    Acute respiratory failure with hypercapnia (HCC)    Rectal bleeding    Hearing loss    Critical illness myopathy    Urinary retention      Past Medical History:   Diagnosis Date    Anxiety     Arthritis     Constipation     Diabetes mellitus ([facility] )     IDDM    Frequent UTI     Glaucoma     Hearing loss     Hyperlipidemia     Hypertension     Hyperthyroidism     in past    Hyponatremia     Hypothyroid     Neuropathy     bles    Right tubo-ovarian mass     Wears glasses        Past Surgical History:   Procedure Laterality Date    APPENDECTOMY  1956    CATARACT EXTRACTION Bilateral     COLONOSCOPY  09/2014    Completed - Dr Sarah Mcgregor, due in 5 years    HYSTERECTOMY N/A 10/4/2019    Procedure: LAP TOTAL HYSTERECTOMY, BSO;  Surgeon: Trent Escalante MD;  [facility];  Service: Gynecology Oncology    IR THORACENTESIS  10/25/2019    LAPAROTOMY N/A 10/4/2019    Procedure: EXPLORATORY LAPAROTOMY  EXLAP OMENTECTOMY  PELVIC AND DEANGELO-AORTIC LYMPH NODE DISSECTION  PERITONEAL BIOPSY TRANS ABDOMINUS BLOCK;  Surgeon: Trent Escalante MD;  [facility];  Service: Gynecology Oncology    SKIN LESION EXCISION      Ears        10/25/19 1545   Restrictions/Precautions   Weight Bearing Precautions Per Order No   Other Precautions Cognitive; Chair Alarm; Bed Alarm;Multiple lines;Telemetry;O2;Fall Risk;Pain;Hard of hearing   Lifestyle   Autonomy Per dtr, Pt was I w/ all ADLS and IADLS; (+) drives PTA   Reciprocal Relationships Pt lives alone   Dtr stated "we can hire help at home if needed "   Service to Others Pt is retired    Intrinsic Gratification Per dtr, Pt enjoys staying active and being I w/ all tasks  Pain Assessment   Pain Assessment 0-10   Pain Score 5   Pain Type Acute pain   Pain Location Generalized   Patient's Stated Pain Goal No pain   Hospital Pain Intervention(s) Repositioned; Ambulation/increased activity; Distraction; Emotional support   Response to Interventions Tolerated   ADL   LB Dressing Assistance 1  Total Assistance   LB Dressing Deficit Setup;Verbal cueing;Supervision/safety; Increased time to complete; Don/doff R sock; Don/doff L sock   Toileting Assistance  1  Total Assistance   Toileting Deficit Setup;Supervison/safety;Verbal cueing; Increased time to complete   Toileting Comments Pt incontinent of bowel during functional mobility EOB>chair, requiring Total A for toileting, CM, and hygiene  Bed Mobility   Supine to Sit 2  Maximal assistance   Additional items Assist x 2; Increased time required;LE management;Verbal cues; Bedrails;HOB elevated   Sit to Supine Unable to assess   Additional Comments Pt laying supine in bed upon OT arrival  Pt seated OOB in chair w/ chair alarm activated and all needs in reach s/p OT session  Transfers   Sit to Stand 2  Maximal assistance   Additional items Assist x 2; Increased time required;Verbal cues;Armrests   Stand to Sit 2  Maximal assistance   Additional items Assist x 2; Increased time required;Verbal cues;Armrests   Additional Comments Transfers w/ RW  VC and TC required for initiation, sequencing, safety  Functional Mobility   Functional Mobility 2  Maximal assistance   Additional Comments Pt demonstrates short distance household mobility in room ~3ft EOB>chair w/ RW w/ Max Ax1 and SBA of 2nd and 3rd for safety  Pt required Max VC and TC for initiation, sequencing, safety, safe RW management/navigation   Pt presented SOB during transfer and mobility, however nursing removed pulse ox from finger during transfer/mobility 2' tight line management  OT educated Pt on diaphragmatic breathing techniques to decrease SOB - Pt demonstrated fair carryover w/ verbal and visual cueing  Additional items Rolling walker   Cognition   Overall Cognitive Status Impaired   Arousal/Participation Alert; Cooperative;Lethargic   Attention Attends with cues to redirect   Orientation Level Oriented to person;Oriented to place; Disoriented to time;Disoriented to situation   Memory Decreased recall of precautions;Decreased recall of recent events;Decreased short term memory   Following Commands Follows one step commands with increased time or repetition   Comments Pt required VC and TC for all intiation, sequencing, and safety t/o functional tasks  Activity Tolerance   Activity Tolerance Patient limited by fatigue;Patient limited by pain;Treatment limited secondary to medical complications (Comment)  (SOB)   Medical Staff Made Aware RN Stephanie Briones and RN [facility] present t/o session  Assessment   Assessment Patient participated in Skilled OT session this date with interventions consisting of ADL re training with the use of correct body mechnaics, Energy Conservation techniques, deep breathing technique, safety awareness and fall prevention techniques, increase dynamic sit/ stand balance during functional activity  and increase OOB/ sitting tolerance   Patient agreeable to OT treatment session, upon arrival patient was found supine in bed  Dtr present t/o session  In comparison to previous session, patient with improvements in bed mobility, functional transfers, functional mobility, and carryover of deep breathing techniques  Please refer to chart for functional levels  Patient requiring frequent re direction, verbal cues for safety, verbal cues for correct technique, verbal cues for pacing thru activity steps, cognitive assistance to anticipate next step, one step directives and frequent rest periods   Patient continues to be functioning below baseline level, occupational performance remains limited secondary to factors listed above and increased risk for falls and injury  From OT standpoint, recommendation at time of d/c would be Short Term Rehab  Patient to benefit from continued Occupational Therapy treatment while in the hospital to address deficits as defined above and maximize level of functional independence with ADLs and functional mobility  Plan   Treatment Interventions ADL retraining;Functional transfer training;UE strengthening/ROM; Endurance training;Cognitive reorientation;Patient/family training;Equipment evaluation/education; Fine motor coordination activities; Compensatory technique education; Activityengagement; Energy conservation   Goal Expiration Date 11/07/19   OT Treatment Day 3   OT Frequency 3-5x/wk   Recommendation   OT Discharge Recommendation Short Term Rehab   OT - OK to Discharge Yes  (when medically cleared)   Barthel Index   Feeding 0   Bathing 0   Grooming Score 0   Dressing Score 0   Bladder Score 0   Bowels Score 0   Toilet Use Score 0   Transfers (Bed/Chair) Score 5   Mobility (Level Surface) Score 0   Stairs Score 0   Barthel Index Score 5   Modified Ana Scale   Modified Ana Scale 5       Sury Mann MS, OTR/L

## 2019-10-25 NOTE — SEDATION DOCUMENTATION
Right thoracentesis completed for 500ml serosanguinous fluid in IR by Dr Burt Hollingsworth without complication  Tolerated well  Labs sent as ordered

## 2019-10-25 NOTE — PLAN OF CARE
Problem: Potential for Falls  Goal: Patient will remain free of falls  Description  INTERVENTIONS:  - Assess patient frequently for physical needs  -  Identify cognitive and physical deficits and behaviors that affect risk of falls  -  Independence fall precautions as indicated by assessment   - Educate patient/family on patient safety including physical limitations  - Instruct patient to call for assistance with activity based on assessment  - Modify environment to reduce risk of injury  - Consider OT/PT consult to assist with strengthening/mobility  Outcome: Progressing     Problem: Prexisting or High Potential for Compromised Skin Integrity  Goal: Skin integrity is maintained or improved  Description  INTERVENTIONS:  - Identify patients at risk for skin breakdown  - Assess and monitor skin integrity  - Assess and monitor nutrition and hydration status  - Monitor labs   - Assess for incontinence   - Turn and reposition patient  - Assist with mobility/ambulation  - Relieve pressure over bony prominences  - Avoid friction and shearing  - Provide appropriate hygiene as needed including keeping skin clean and dry  - Evaluate need for skin moisturizer/barrier cream  - Collaborate with interdisciplinary team   - Patient/family teaching  - Consider wound care consult   Outcome: Progressing     Problem: Nutrition/Hydration-ADULT  Goal: Nutrient/Hydration intake appropriate for improving, restoring or maintaining nutritional needs  Description  Monitor and assess patient's nutrition/hydration status for malnutrition  Collaborate with interdisciplinary team and initiate plan and interventions as ordered  Monitor patient's weight and dietary intake as ordered or per policy  Utilize nutrition screening tool and intervene as necessary  Determine patient's food preferences and provide high-protein, high-caloric foods as appropriate       INTERVENTIONS:  - Monitor oral intake, urinary output, labs, and treatment plans  - Assess nutrition and hydration status and recommend course of action  - Evaluate amount of meals eaten  - Assist patient with eating if necessary   - Allow adequate time for meals  - Recommend/ encourage appropriate diets, oral nutritional supplements, and vitamin/mineral supplements  - Order, calculate, and assess calorie counts as needed  - Recommend, monitor, and adjust tube feedings and TPN/PPN based on assessed needs  - Assess need for intravenous fluids  - Provide specific nutrition/hydration education as appropriate  - Include patient/family/caregiver in decisions related to nutrition  Outcome: Progressing

## 2019-10-25 NOTE — OCCUPATIONAL THERAPY NOTE
Occupational Therapy Treatment Note      Ana Helm    10/25/2019    Principal Problem:    Lethargy  Active Problems:    Adult onset hypothyroidism    ANAYA (generalized anxiety disorder)    Hypertension    Type 1 diabetes mellitus with diabetic neuropathy (HCC)    Diabetic neuropathy, painful (HCC)    Ovarian cancer ([facility] )    Internal jugular (IJ) vein thromboembolism, acute, right (HCC)    Superficial thrombophlebitis of right upper extremity    A-fib (HCC)    Acute respiratory failure with hypercapnia (HCC)    Rectal bleeding    Hearing loss    Critical illness myopathy      Past Medical History:   Diagnosis Date    Anxiety     Arthritis     Constipation     Diabetes mellitus ([facility] )     IDDM    Frequent UTI     Glaucoma     Hearing loss     Hyperlipidemia     Hypertension     Hyperthyroidism     in past    Hyponatremia     Hypothyroid     Neuropathy     bles    Right tubo-ovarian mass     Wears glasses        Past Surgical History:   Procedure Laterality Date    APPENDECTOMY  1956    CATARACT EXTRACTION Bilateral     COLONOSCOPY  09/2014    Completed - Dr Leeann Ortiz, due in 5 years    HYSTERECTOMY N/A 10/4/2019    Procedure: LAP TOTAL HYSTERECTOMY, BSO;  Surgeon: Artis Woods MD;  [facility];  Service: Gynecology Oncology    LAPAROTOMY N/A 10/4/2019    Procedure: EXPLORATORY LAPAROTOMY  EXLAP OMENTECTOMY  PELVIC AND DEANGELO-AORTIC LYMPH NODE DISSECTION  PERITONEAL BIOPSY TRANS ABDOMINUS BLOCK;  Surgeon: Artis Woods MD;  [facility];  Service: Gynecology Oncology    SKIN LESION EXCISION      Ears        10/25/19 0909   Restrictions/Precautions   Weight Bearing Precautions Per Order No   Other Precautions Cognitive; Chair Alarm; Bed Alarm;Multiple lines;Telemetry; Fall Risk;O2;Pain;Hard of hearing   Lifestyle   Autonomy Per dtr, Pt was I w/ all ADLS and IADLS; (+) drives PTA   Reciprocal Relationships Pt lives alone   Dtr stated "we can hire help at home if needed " Service to Others Pt is retired    Intrinsic Gratification Per dtr, Pt enjoys staying active and being I w/ all tasks  Pain Assessment   Pain Assessment 0-10   Pain Score Worst Possible Pain   Pain Type Acute pain   Pain Location Generalized   Pain Descriptors Discomfort;Aching; Sore   Pain Frequency Constant/continuous   Pain Onset Ongoing   Clinical Progression Not changed   Patient's Stated Pain Goal No pain   Hospital Pain Intervention(s) Repositioned; Ambulation/increased activity; Distraction; Emotional support   Response to Interventions Pain increased w/ functional movement   ADL   Where Assessed Supine, bed   LB Dressing Assistance 1  Total Assistance   LB Dressing Deficit Setup;Verbal cueing;Supervision/safety; Increased time to complete; Don/doff R sock; Don/doff L sock   Toileting Assistance  1  Total Assistance   Toileting Deficit Setup;Verbal cueing;Supervison/safety; Increased time to complete;Clothing management up;Clothing management down;Perineal hygiene   Toileting Comments Pt incontinent of bowel s/p standing trial, requiring Total A for CM and hygiene  PCA notified and aware  Functional Standing Tolerance   Time ~20-30 seconds   Activity Static standing w/ B/L UE support of RW w/ Max Ax2  Bed Mobility   Rolling R 2  Maximal assistance   Additional items Assist x 2; Increased time required;LE management;Verbal cues; Bedrails   Rolling L 2  Maximal assistance   Additional items Assist x 2; Increased time required;LE management;Verbal cues; Bedrails   Supine to Sit 2  Maximal assistance   Additional items Assist x 2;LE management;Verbal cues; Increased time required; Bedrails;HOB elevated   Sit to Supine 2  Maximal assistance   Additional items Assist x 2; Increased time required;LE management;Verbal cues; Bedrails   Additional Comments Pt laying supine in bed upon OT arrival  Pt seated at EOB ~10-15 min w/ varying trunk/postural support ranging S-Mod A   Pt reported difficulty breathing, however SpO2 ranged 93-95% while seated EOB w/ supplemental NC O2  Transfers   Sit to Stand 2  Maximal assistance   Additional items Assist x 2; Increased time required;Verbal cues;Armrests  (A of 3rd for B/L knee blocking)   Stand to Sit 2  Maximal assistance   Additional items Assist x 2; Increased time required;Verbal cues;Armrests  (A of 3rd for B/L knee blocking)   Additional Comments Transfers w/ RW  VC and TC required for initiation, sequencing, hand placement, and safety awareness  Pt able to achieve full upright posture in stance and tolerated static standing ~20-30 seconds  OT/PT attempted for sit pivot or slide board transfer OOB to chair this day, however Pt midly tachypnic, c/o 10/10 generalized pain, fatigue, and difficulty breathing towards end of session  CASSIDY Clark notified and aware - recommeding Pt return to bed at end of session  Pt seated in semi-chair position in bed w/ all needs in reach s/p OT session  Functional Mobility   Additional Comments Unable/unsafe to assess this day  Exercise Tools   Exercise Tools Yes  (blue sequeeze ball; 2x10 B/L hands)   Cognition   Overall Cognitive Status Impaired   Arousal/Participation Alert; Cooperative;Lethargic   Attention Attends with cues to redirect   Orientation Level Oriented to person;Oriented to place; Disoriented to time;Disoriented to situation   Memory Decreased recall of precautions;Decreased recall of recent events;Decreased short term memory   Following Commands Follows one step commands with increased time or repetition   Comments Pt presents more lethargic this day, however cooperative to particpiate in therapy  Pt required VC for all initiation, sequencing, and safety awareness t/o functional tasks  OT educated Pt multiple times on diaphragmatic breathing when experiencing SOB - demonstrated fair understanding/demonstration w/ visual and verbal cueing   Will need further cognitive assessment for safe dispo planning   Activity Tolerance   Activity Tolerance Patient limited by fatigue;Patient limited by pain   Medical Staff Made Aware CASSIDY Solis cleared Pt for OT session; PT Elvis Lyons; [facility]   Assessment   Assessment Patient participated in Skilled OT session this date with interventions consisting of ADL re training with the use of correct body mechnaics, Energy Conservation techniques, deep breathing technique, safety awareness and fall prevention techniques,  therapeutic activities to: increase activity tolerance, increase dynamic sit/ stand balance during functional activity , increase postural control, increase trunk control and increase OOB/ sitting tolerance   Patient agreeable to OT treatment session, upon arrival patient was found supine in bed  In comparison to previous session, patient with improvements in bed mobility, functional transfers, sitting EOB tolerance and endurance  Please refer to chart for functional levels  Patient requiring frequent re direction, verbal cues for safety, verbal cues for correct technique, verbal cues for pacing thru activity steps, cognitive assistance to anticipate next step, one step directives and frequent rest periods  Patient continues to be functioning below baseline level, occupational performance remains limited secondary to factors listed above and increased risk for falls and injury  From OT standpoint, recommendation at time of d/c would be Short Term Rehab  Patient to benefit from continued Occupational Therapy treatment while in the hospital to address deficits as defined above and maximize level of functional independence with ADLs and functional mobility  Plan   Treatment Interventions ADL retraining;Functional transfer training;UE strengthening/ROM; Endurance training;Cognitive reorientation;Patient/family training;Equipment evaluation/education; Fine motor coordination activities; Compensatory technique education; Activityengagement; Energy conservation   Goal Expiration Date 11/07/19   OT Treatment Day 2   OT Frequency 3-5x/wk   Recommendation   OT Discharge Recommendation Short Term Rehab   Equipment Recommended Bedside commode; Other (comment)  (SC)   OT - OK to Discharge Yes  (when medically cleared)   Barthel Index   Feeding 0   Bathing 0   Grooming Score 0   Dressing Score 0   Bladder Score 0   Bowels Score 0   Toilet Use Score 0   Transfers (Bed/Chair) Score 5   Mobility (Level Surface) Score 0   Stairs Score 0   Barthel Index Score 5   Modified [place] Scale   Modified Ana Scale 5     Goals added this day 10/25/19: 1) Pt will improve fx'l mobility during I/ADl/leisure tasks using DME PRN w/ g balance/safety w/ Mod A    Thai Aguirre MS, OTR/L

## 2019-10-25 NOTE — BRIEF OP NOTE (RAD/CATH)
Thoracentesis Procedure Note    PATIENT NAME: Izabel Garcia  : 1935  MRN: [id number]     Pre-op Diagnosis:   1  Transient alteration of awareness    2  Superficial thrombophlebitis of right upper extremity    3  Malignant neoplasm of ovary, unspecified laterality ([address] )    4  Absent pulse    5  Cyanosis of fingertip    6  Lethargy    7  Hypoxia    8  History of ARDS    9  History of cardiac arrest    10  Cyanosis    11  Rectal bleeding    12  Acute respiratory failure with hypercapnia (HCC)    13  Respiratory acidosis    14  Hearing loss    15  A-fib ([address] )    16  Pneumonia      Post-op Diagnosis:   1  Transient alteration of awareness    2  Superficial thrombophlebitis of right upper extremity    3  Malignant neoplasm of ovary, unspecified laterality ([address] )    4  Absent pulse    5  Cyanosis of fingertip    6  Lethargy    7  Hypoxia    8  History of ARDS    9  History of cardiac arrest    10  Cyanosis    11  Rectal bleeding    12  Acute respiratory failure with hypercapnia (HCC)    13  Respiratory acidosis    14  Hearing loss    15  A-fib ([address] )    16  Pneumonia        Surgeon:   Berlin Yanez MD  Assistants:     No qualified resident was available, Resident is only observing    Estimated Blood Loss: none  Findings: Ultrasound guided right thoracentesis yielded 450 ml of serosanguinous fluid      Specimens: right pleural fluid    Complications:  none    Anesthesia: Local    Berlin Yanez MD     Date: 10/25/2019  Time: 10:39 AM

## 2019-10-25 NOTE — PLAN OF CARE
Problem: OCCUPATIONAL THERAPY ADULT  Goal: Performs self-care activities at highest level of function for planned discharge setting  See evaluation for individualized goals  Description  Treatment Interventions: ADL retraining, Functional transfer training, UE strengthening/ROM, Endurance training, Cognitive reorientation, Patient/family training, Equipment evaluation/education, Fine motor coordination activities, Compensatory technique education, Energy conservation, Activityengagement  Equipment Recommended: (S) Bedside commode, Other (comment)(SC)       See flowsheet documentation for full assessment, interventions and recommendations  Outcome: Progressing  Note:   Limitation: Decreased ADL status, Decreased UE strength, Decreased UE ROM, Decreased Safe judgement during ADL, Decreased cognition, Decreased endurance, Decreased high-level ADLs, Decreased fine motor control  Prognosis: Fair  Assessment: Patient participated in Skilled OT session this date with interventions consisting of ADL re training with the use of correct body mechnaics, Energy Conservation techniques, deep breathing technique, safety awareness and fall prevention techniques,  therapeutic activities to: increase activity tolerance, increase dynamic sit/ stand balance during functional activity , increase postural control, increase trunk control and increase OOB/ sitting tolerance   Patient agreeable to OT treatment session, upon arrival patient was found supine in bed  In comparison to previous session, patient with improvements in bed mobility, functional transfers, sitting EOB tolerance and endurance  Please refer to chart for functional levels  Patient requiring frequent re direction, verbal cues for safety, verbal cues for correct technique, verbal cues for pacing thru activity steps, cognitive assistance to anticipate next step, one step directives and frequent rest periods   Patient continues to be functioning below baseline level, occupational performance remains limited secondary to factors listed above and increased risk for falls and injury  From OT standpoint, recommendation at time of d/c would be Short Term Rehab  Patient to benefit from continued Occupational Therapy treatment while in the hospital to address deficits as defined above and maximize level of functional independence with ADLs and functional mobility        OT Discharge Recommendation: Short Term Rehab  OT - OK to Discharge: Yes(when medically cleared)

## 2019-10-25 NOTE — ASSESSMENT & PLAN NOTE
Patient has history of recent pneumonia and unfortunately ARDS requiring intubation and ICU admission and also was on high-flow oxygen before discharge to [facility]  Patient has to be on BiPAP overnight for elevated pCO2  Currently patient is on 6 L nasal cannula with Oxymizer  Pulmonology evaluation appreciated  ABG reviewed  Acute hypoxic respiratory failure is likely multifactorial secondary to recent pneumonia/ARDS-patient was evaluated along with the pulmonologist and reported that it recovery is going to be  Slow  Patient had a CT scan yesterday and at that time consideration for thoracocentesis was done  Patient had thoracocentesis on the right side removing 500 mL of serosanguineous fluid    Patient with legs oxygen requirement  Infectious Disease evaluation appreciated discontinue IV antibiotic

## 2019-10-25 NOTE — PHYSICAL THERAPY NOTE
Physical Therapy Treatment Note     Patient Name: Tracey Peabody    Today's Date: 10/25/2019     Problem List  Principal Problem:    Lethargy  Active Problems:    Adult onset hypothyroidism    ANAYA (generalized anxiety disorder)    Hypertension    Type 1 diabetes mellitus with diabetic neuropathy (HCC)    Diabetic neuropathy, painful (HCC)    Ovarian cancer ([address] )    Internal jugular (IJ) vein thromboembolism, acute, right (HCC)    Superficial thrombophlebitis of right upper extremity    A-fib (HCC)    Acute respiratory failure with hypercapnia (HCC)    Rectal bleeding    Hearing loss    Critical illness myopathy       Past Medical History  Past Medical History:   Diagnosis Date    Anxiety     Arthritis     Constipation     Diabetes mellitus (HCC)     IDDM    Frequent UTI     Glaucoma     Hearing loss     Hyperlipidemia     Hypertension     Hyperthyroidism     in past    Hyponatremia     Hypothyroid     Neuropathy     bles    Right tubo-ovarian mass     Wears glasses         Past Surgical History  Past Surgical History:   Procedure Laterality Date    APPENDECTOMY  1954    CATARACT EXTRACTION Bilateral     COLONOSCOPY  09/2014    Completed - Dr Gonzalo Calvillo, due in 5 years    HYSTERECTOMY N/A 10/4/2019    Procedure: LAP TOTAL HYSTERECTOMY, BSO;  Surgeon: Robert Alston MD;  [facility]: [facility];  Service: Gynecology Oncology    LAPAROTOMY N/A 10/4/2019    Procedure: EXPLORATORY LAPAROTOMY  EXLAP OMENTECTOMY  PELVIC AND DEANGELO-AORTIC LYMPH NODE DISSECTION  PERITONEAL BIOPSY TRANS ABDOMINUS BLOCK;  Surgeon: Robert Alston MD;  [facility];  Service: Gynecology Oncology    SKIN LESION EXCISION      Ears           10/25/19 0997   Pain Assessment   Pain Assessment 0-10   Pain Score Worst Possible Pain   Pain Type Acute pain   Pain Location Generalized   Restrictions/Precautions   Weight Bearing Precautions Per Order No   Other Precautions Cognitive; Chair Alarm; Bed Alarm;Limb alert;Multiple lines;Telemetry; Fall Risk;Pain;O2   General   Chart Reviewed Yes   Response to Previous Treatment Patient with no complaints from previous session  Cognition   Overall Cognitive Status Impaired   Arousal/Participation Alert; Cooperative;Lethargic   Attention Attends with cues to redirect   Bed Mobility   Rolling R 2  Maximal assistance   Additional items Assist x 2   Supine to Sit 2  Maximal assistance   Additional items Assist x 2   Sit to Supine 2  Maximal assistance   Additional items Assist x 2   Transfers   Sit to Stand 2  Maximal assistance   Additional items Assist x 3   Stand to Sit 2  Maximal assistance   Additional items Assist x 3   Balance   Static Sitting Poor -   Dynamic Sitting Poor -   Activity Tolerance   Activity Tolerance Patient limited by pain; Patient limited by fatigue   Medical Staff Made Aware OT present    Nurse Made Aware nurse approved therapy session   Exercises   Balance training  sitting EOB with varying support for 15-20 minutes   Assessment   Prognosis Guarded   Problem List Decreased strength;Decreased endurance; Impaired balance;Decreased mobility; Decreased cognition;Pain   Assessment Pt presents to therapy today with reduced mobility, limited endurance, high fall risk, generalized 10/10 pain  These impairments limit the patient by requiring increased assistance for mobility  Pt would benefit from continued skilled therapy while in the hospital to improve overall mobility and work towards a safe d/c  Recommend rehab  At end of session patient was left seated with call bell within reach  Pt was able to complete a sit to stand transfer with 3 person support  Support was involved for blocking B LE to reduce knee buckling  Pt's daughter was present for part of the session in the beginning  Pt also able to sit EOB with support for 15-20 minutes  Education about PLB     Barriers to Discharge Inaccessible home environment;Decreased caregiver support   Goals   STG Expiration Date 11/03/19   Short Term Goal #1 STG 1: Pt will perform bed mobility at a I level to return to baseline  STG 2: Pt will wit EOB for 25-30 minutes with S to improve sitting tolerance and balance  STG 3: Pt will perform transfers at a MI level to return to PLOF  PT Treatment Day 1   Plan   Treatment/Interventions Functional transfer training;LE strengthening/ROM; Therapeutic exercise; Endurance training;Bed mobility;Gait training;Equipment eval/education   Progress Slow progress, decreased activity tolerance   PT Frequency Other (Comment)  (4-6xwk)   Recommendation   Recommendation Post acute IP rehab   PT - OK to Discharge Yes   Additional Comments if to rehab when medically cleared    Kiran Daugherty, Pt, DPT

## 2019-10-26 LAB
ALBUMIN SERPL BCP-MCNC: 2 G/DL (ref 3.5–5)
ALP SERPL-CCNC: 91 U/L (ref 46–116)
ALT SERPL W P-5'-P-CCNC: 31 U/L (ref 12–78)
ANION GAP SERPL CALCULATED.3IONS-SCNC: 1 MMOL/L (ref 4–13)
APTT PPP: 69 SECONDS (ref 23–37)
AST SERPL W P-5'-P-CCNC: 31 U/L (ref 5–45)
BACTERIA BLD CULT: NORMAL
BACTERIA BLD CULT: NORMAL
BACTERIA UR QL AUTO: ABNORMAL /HPF
BASOPHILS # BLD AUTO: 0.02 THOUSANDS/ΜL (ref 0–0.1)
BASOPHILS NFR BLD AUTO: 0 % (ref 0–1)
BILIRUB SERPL-MCNC: 0.29 MG/DL (ref 0.2–1)
BILIRUB UR QL STRIP: ABNORMAL
BUN SERPL-MCNC: 25 MG/DL (ref 5–25)
CA-I BLD-SCNC: 0.93 MMOL/L (ref 1.12–1.32)
CALCIUM SERPL-MCNC: 6.7 MG/DL (ref 8.3–10.1)
CHLORIDE SERPL-SCNC: 103 MMOL/L (ref 100–108)
CLARITY UR: ABNORMAL
CO2 SERPL-SCNC: 37 MMOL/L (ref 21–32)
COLOR UR: ABNORMAL
CREAT SERPL-MCNC: 0.42 MG/DL (ref 0.6–1.3)
EOSINOPHIL # BLD AUTO: 0.01 THOUSAND/ΜL (ref 0–0.61)
EOSINOPHIL NFR BLD AUTO: 0 % (ref 0–6)
ERYTHROCYTE [DISTWIDTH] IN BLOOD BY AUTOMATED COUNT: 15.2 % (ref 11.6–15.1)
GFR SERPL CREATININE-BSD FRML MDRD: 96 ML/MIN/1.73SQ M
GLUCOSE SERPL-MCNC: 101 MG/DL (ref 65–140)
GLUCOSE SERPL-MCNC: 108 MG/DL (ref 65–140)
GLUCOSE SERPL-MCNC: 119 MG/DL (ref 65–140)
GLUCOSE SERPL-MCNC: 150 MG/DL (ref 65–140)
GLUCOSE SERPL-MCNC: 152 MG/DL (ref 65–140)
GLUCOSE SERPL-MCNC: 160 MG/DL (ref 65–140)
GLUCOSE SERPL-MCNC: 163 MG/DL (ref 65–140)
GLUCOSE SERPL-MCNC: 180 MG/DL (ref 65–140)
GLUCOSE SERPL-MCNC: 182 MG/DL (ref 65–140)
GLUCOSE SERPL-MCNC: 193 MG/DL (ref 65–140)
GLUCOSE SERPL-MCNC: 201 MG/DL (ref 65–140)
GLUCOSE SERPL-MCNC: 220 MG/DL (ref 65–140)
GLUCOSE SERPL-MCNC: 67 MG/DL (ref 65–140)
GLUCOSE UR STRIP-MCNC: ABNORMAL MG/DL
HCT VFR BLD AUTO: 30 % (ref 34.8–46.1)
HCT VFR BLD AUTO: 30.7 % (ref 34.8–46.1)
HCT VFR BLD AUTO: 31.5 % (ref 34.8–46.1)
HGB BLD-MCNC: 8.7 G/DL (ref 11.5–15.4)
HGB BLD-MCNC: 9 G/DL (ref 11.5–15.4)
HGB BLD-MCNC: 9.3 G/DL (ref 11.5–15.4)
HGB UR QL STRIP.AUTO: ABNORMAL
HYALINE CASTS #/AREA URNS LPF: ABNORMAL /LPF
IMM GRANULOCYTES # BLD AUTO: 0.13 THOUSAND/UL (ref 0–0.2)
IMM GRANULOCYTES NFR BLD AUTO: 1 % (ref 0–2)
KETONES UR STRIP-MCNC: NEGATIVE MG/DL
LEUKOCYTE ESTERASE UR QL STRIP: ABNORMAL
LYMPHOCYTES # BLD AUTO: 0.58 THOUSANDS/ΜL (ref 0.6–4.47)
LYMPHOCYTES NFR BLD AUTO: 5 % (ref 14–44)
MAGNESIUM SERPL-MCNC: 2.6 MG/DL (ref 1.6–2.6)
MCH RBC QN AUTO: 31.5 PG (ref 26.8–34.3)
MCHC RBC AUTO-ENTMCNC: 29.5 G/DL (ref 31.4–37.4)
MCV RBC AUTO: 107 FL (ref 82–98)
MONOCYTES # BLD AUTO: 0.8 THOUSAND/ΜL (ref 0.17–1.22)
MONOCYTES NFR BLD AUTO: 7 % (ref 4–12)
NEUTROPHILS # BLD AUTO: 10.19 THOUSANDS/ΜL (ref 1.85–7.62)
NEUTS SEG NFR BLD AUTO: 87 % (ref 43–75)
NITRITE UR QL STRIP: NEGATIVE
NON-SQ EPI CELLS URNS QL MICRO: ABNORMAL /HPF
NRBC BLD AUTO-RTO: 0 /100 WBCS
PH UR STRIP.AUTO: 7.5 [PH]
PLATELET # BLD AUTO: 470 THOUSANDS/UL (ref 149–390)
PMV BLD AUTO: 9.8 FL (ref 8.9–12.7)
POTASSIUM SERPL-SCNC: 4.2 MMOL/L (ref 3.5–5.3)
PROT SERPL-MCNC: 5.3 G/DL (ref 6.4–8.2)
PROT UR STRIP-MCNC: ABNORMAL MG/DL
RBC # BLD AUTO: 2.95 MILLION/UL (ref 3.81–5.12)
RBC #/AREA URNS AUTO: ABNORMAL /HPF
SODIUM SERPL-SCNC: 141 MMOL/L (ref 136–145)
SP GR UR STRIP.AUTO: 1.01 (ref 1–1.03)
UROBILINOGEN UR QL STRIP.AUTO: 0.2 E.U./DL
WBC # BLD AUTO: 11.73 THOUSAND/UL (ref 4.31–10.16)
WBC #/AREA URNS AUTO: ABNORMAL /HPF

## 2019-10-26 PROCEDURE — 99233 SBSQ HOSP IP/OBS HIGH 50: CPT | Performed by: INTERNAL MEDICINE

## 2019-10-26 PROCEDURE — 85730 THROMBOPLASTIN TIME PARTIAL: CPT | Performed by: FAMILY MEDICINE

## 2019-10-26 PROCEDURE — C9113 INJ PANTOPRAZOLE SODIUM, VIA: HCPCS | Performed by: FAMILY MEDICINE

## 2019-10-26 PROCEDURE — 85018 HEMOGLOBIN: CPT | Performed by: FAMILY MEDICINE

## 2019-10-26 PROCEDURE — 80053 COMPREHEN METABOLIC PANEL: CPT | Performed by: FAMILY MEDICINE

## 2019-10-26 PROCEDURE — 87077 CULTURE AEROBIC IDENTIFY: CPT | Performed by: FAMILY MEDICINE

## 2019-10-26 PROCEDURE — 81001 URINALYSIS AUTO W/SCOPE: CPT | Performed by: FAMILY MEDICINE

## 2019-10-26 PROCEDURE — 94760 N-INVAS EAR/PLS OXIMETRY 1: CPT

## 2019-10-26 PROCEDURE — 87186 SC STD MICRODIL/AGAR DIL: CPT | Performed by: FAMILY MEDICINE

## 2019-10-26 PROCEDURE — 83735 ASSAY OF MAGNESIUM: CPT | Performed by: FAMILY MEDICINE

## 2019-10-26 PROCEDURE — 85014 HEMATOCRIT: CPT | Performed by: FAMILY MEDICINE

## 2019-10-26 PROCEDURE — 85025 COMPLETE CBC W/AUTO DIFF WBC: CPT | Performed by: FAMILY MEDICINE

## 2019-10-26 PROCEDURE — 82330 ASSAY OF CALCIUM: CPT | Performed by: FAMILY MEDICINE

## 2019-10-26 PROCEDURE — 94668 MNPJ CHEST WALL SBSQ: CPT

## 2019-10-26 PROCEDURE — 99231 SBSQ HOSP IP/OBS SF/LOW 25: CPT | Performed by: OBSTETRICS & GYNECOLOGY

## 2019-10-26 PROCEDURE — 82948 REAGENT STRIP/BLOOD GLUCOSE: CPT

## 2019-10-26 PROCEDURE — 87086 URINE CULTURE/COLONY COUNT: CPT | Performed by: FAMILY MEDICINE

## 2019-10-26 PROCEDURE — 99232 SBSQ HOSP IP/OBS MODERATE 35: CPT | Performed by: FAMILY MEDICINE

## 2019-10-26 RX ORDER — FLUCONAZOLE 200 MG/1
200 TABLET ORAL ONCE
Status: COMPLETED | OUTPATIENT
Start: 2019-10-26 | End: 2019-10-26

## 2019-10-26 RX ORDER — FUROSEMIDE 10 MG/ML
40 INJECTION INTRAMUSCULAR; INTRAVENOUS ONCE
Status: COMPLETED | OUTPATIENT
Start: 2019-10-26 | End: 2019-10-26

## 2019-10-26 RX ORDER — POLYVINYL ALCOHOL 14 MG/ML
1 SOLUTION/ DROPS OPHTHALMIC
Status: DISCONTINUED | OUTPATIENT
Start: 2019-10-26 | End: 2019-10-26

## 2019-10-26 RX ORDER — FUROSEMIDE 10 MG/ML
40 INJECTION INTRAMUSCULAR; INTRAVENOUS DAILY
Status: DISCONTINUED | OUTPATIENT
Start: 2019-10-26 | End: 2019-10-26

## 2019-10-26 RX ORDER — LIDOCAINE HYDROCHLORIDE 20 MG/ML
1 JELLY TOPICAL ONCE
Status: COMPLETED | OUTPATIENT
Start: 2019-10-26 | End: 2019-10-26

## 2019-10-26 RX ADMIN — ACETAMINOPHEN 975 MG: 160 SUSPENSION ORAL at 21:19

## 2019-10-26 RX ADMIN — MELATONIN 3 MG: 3 TAB ORAL at 21:19

## 2019-10-26 RX ADMIN — TRAVOPROST 1 DROP: 0.04 SOLUTION/ DROPS OPHTHALMIC at 21:21

## 2019-10-26 RX ADMIN — DORZOLAMIDE HYDROCHLORIDE AND TIMOLOL MALEATE 1 DROP: 20; 5 SOLUTION/ DROPS OPHTHALMIC at 18:34

## 2019-10-26 RX ADMIN — LIDOCAINE HYDROCHLORIDE 1 APPLICATION: 20 JELLY TOPICAL at 23:00

## 2019-10-26 RX ADMIN — FUROSEMIDE 40 MG: 10 INJECTION, SOLUTION INTRAMUSCULAR; INTRAVENOUS at 08:40

## 2019-10-26 RX ADMIN — SERTRALINE HYDROCHLORIDE 25 MG: 20 SOLUTION ORAL at 21:21

## 2019-10-26 RX ADMIN — ACETAMINOPHEN 975 MG: 160 SUSPENSION ORAL at 16:04

## 2019-10-26 RX ADMIN — FLUCONAZOLE 200 MG: 200 TABLET ORAL at 12:29

## 2019-10-26 RX ADMIN — LEVOTHYROXINE SODIUM 50 MCG: 50 TABLET ORAL at 07:57

## 2019-10-26 RX ADMIN — PANTOPRAZOLE SODIUM 40 MG: 40 INJECTION, POWDER, FOR SOLUTION INTRAVENOUS at 08:07

## 2019-10-26 RX ADMIN — HEPARIN SODIUM AND DEXTROSE 13 UNITS/KG/HR: 10000; 5 INJECTION INTRAVENOUS at 00:39

## 2019-10-26 RX ADMIN — DEXTRAN 70 AND HYPROMELLOSE 2910 1 DROP: 1; 3 SOLUTION/ DROPS OPHTHALMIC at 21:20

## 2019-10-26 RX ADMIN — ONDANSETRON 4 MG: 2 INJECTION INTRAMUSCULAR; INTRAVENOUS at 01:07

## 2019-10-26 RX ADMIN — HEPARIN SODIUM AND DEXTROSE 7.8 UNITS/KG/HR: 10000; 5 INJECTION INTRAVENOUS at 18:34

## 2019-10-26 RX ADMIN — OXYBUTYNIN CHLORIDE 2.5 MG: 5 TABLET ORAL at 08:06

## 2019-10-26 RX ADMIN — ACETAMINOPHEN 650 MG: 650 SUPPOSITORY RECTAL at 23:00

## 2019-10-26 RX ADMIN — BENZOCAINE AND LEVOMENTHOL 1 APPLICATION: 200; 5 SPRAY TOPICAL at 18:34

## 2019-10-26 RX ADMIN — LIDOCAINE 1 PATCH: 50 PATCH CUTANEOUS at 08:06

## 2019-10-26 RX ADMIN — SODIUM CHLORIDE 0.3 UNITS/HR: 9 INJECTION, SOLUTION INTRAVENOUS at 12:23

## 2019-10-26 RX ADMIN — DORZOLAMIDE HYDROCHLORIDE AND TIMOLOL MALEATE 1 DROP: 20; 5 SOLUTION/ DROPS OPHTHALMIC at 08:07

## 2019-10-26 RX ADMIN — ACETAMINOPHEN 975 MG: 160 SUSPENSION ORAL at 07:57

## 2019-10-26 NOTE — ASSESSMENT & PLAN NOTE
· Patient complaining of severe bladder spasms and burning sensation  · Patient could not tolerate the Ji catheter anymore    Catheter was removed yesterday continue the voiding trial   Started on Diflucan by gyn Oncology for possible candidiasis

## 2019-10-26 NOTE — PROGRESS NOTES
Gyn Oncology Progress note   Lydia Lopez 82 y o  female MRN: [id number]  Unit/Bed#: [facility] 530-01 Encounter: [id number]    A/P: 65MZ s/p complicated hospitalization post surgical debulking/staging for early stage ovarian cancer now readmitted with acute respiratory failure/unresponsiveness and worsening right arm thromboembolism  1)  Acute respiratory failure with hypercapnia  - Managed per primary team  - Transitioned to BIPAP 14/5 60% on 10/23/19 due to increased CO2 noted on ABG  [id number] 98%  - Pt has thick secretions and mucous plugging that may benefit from repeat bronchoscopy   - Pulmonology following      2) Delirium  - Pt with waxing and waning between consciousness and lethargy   - Likely multi-factorial  - Ct head w/o contrast on admission without any acute abnormalities  - Pt resting comfortably on exam but responsive to verbal commands  - Continue to avoid sedating medications, benzos  - Neurology following     3) Severe protein calorie malnutrition secondary to poor oral take  - [facility]  Poor oral intake since discharge to LT[facility]    - NGT replaced with Keofed on 10/23/19-Jevity 1 2, advance to goal  - Speech pathology following     4) Rectal Bleeding, resolved  - H/H stable in 9s, Occult blood lab sent  - Given 1 unit pRBCs overnight 10/22/19 followed by Lasix 40mg IV for clinical fluid overload  - Monitoring H/H q6hrs  - s/p GI consult: stable, no intervention at this time   If acute onset maninder bleeding, reconsult     5) Internal Jugular (IJ) vein thromboembolism, acute right with new onset cyanotic right fingers  - Repeat doppler on 10/22 demonstrated occluded right radial artery from them id forearm to the wrist  All five digits show flat line doppler waveforms  - Pt's home Eliquis was placed on hold and she was started on Heparin gtt  - Vascular surgery following, no surgical intervention at this time  - Radial pulse noted with bedside doppler on my exam with nursing     6) Type 1 Diabetes  - Hgb A1c 6 4 on 10/22  - Glucose levels: Goal between 140-180   - Continue insulin gtt  - D5 1/2NS infusion      7) Stage 1B ovarian cancer s/p staging surgery 10/4/2019  - Good prognosis  - GYN ONC to continue to follow     8) Urinary Retention  s/p straight cath for 500cc at 1700 on 10/23/19, failed follow-up void  Ji catheter placed at 0400, 1100mL/yesterday     9) Decreased Hearing  Most-likely lasix  Evaluated by ENT on 10/23/19: outpatient follow-up as assessment cannot be done at bedside  Signed off     10) Paroxysmal Afib/bradycardia  On telemetry   Currently on heparin gtt  S/P Cardiology consult: likely secondary to medication, no further bradycardia noted  IV metoprolol held  Managed per primary team     11) Dispo: Inpatient, low threshold for ICU     Patient was resting comfortably on evaluation but responsive to verbal commands  BP 98/65 (BP Location: Left arm)   Pulse (!) 106   Temp 98 °F (36 7 °C) (Oral)   Resp 18   Ht 5' 5" (1 651 m)   Wt 66 7 kg (147 lb 0 7 oz)   SpO2 97%   BMI 24 47 kg/m²     I/O last 3 completed shifts: In: 2126 [I V :532; NG/GT:600; IV Piggyback:100; Feedings:894]  Out: 1990 [Urine:1490;  Other:500]  I/O this shift:  In: 655 4 [I V :72 4; NG/GT:60; IV Piggyback:130]  Out: 0     Lab Results   Component Value Date    WBC 11 73 (H) 10/26/2019    HGB 9 3 (L) 10/26/2019    HCT 31 5 (L) 10/26/2019     (H) 10/26/2019     (H) 10/26/2019       Lab Results   Component Value Date    GLUCOSE 75 01/08/2016    CALCIUM 6 7 (L) 10/26/2019     (L) 01/08/2016    K 4 2 10/26/2019    CO2 37 (H) 10/26/2019     10/26/2019    BUN 25 10/26/2019    CREATININE 0 42 (L) 10/26/2019       Lab Results   Component Value Date/Time    POCGLU 160 (H) 10/26/2019 04:43 AM    POCGLU 180 (H) 10/26/2019 12:25 AM    POCGLU 127 10/25/2019 10:12 PM    POCGLU 111 10/25/2019 08:01 PM    POCGLU 135 10/25/2019 06:16 PM    POCGLU 164 02/15/2017 02:33 PM       Physical Exam  Constitutional: No distress  Nasal cannula in place  Cardiovascular: Normal rate, regular rhythm and intact distal pulses  Pulmonary/Chest: Effort normal  No respiratory distress  She has rales  On BIPAP   Abdominal: Soft  There is no tenderness  There is no rebound and no guarding  Incision: C/D/I with dried histoacryl  No drainage, fluctuance or induration noted   Musculoskeletal: She exhibits edema (+1 bilaterally in LE)  Neurological: She is alert and oriented to person, place, and time  Skin: She is not diaphoretic  Psychiatric: She has a normal mood and affect  Her behavior is normal  Judgment and thought content normal    Vitals reviewed        Tani Rowland MD  OB/GYN PGY-4  10/26/2019 6:21 AM

## 2019-10-26 NOTE — ASSESSMENT & PLAN NOTE
Patient was admitted with lethargy coming from the [facility]  Patient with waxing and waning of mental status with the periods of lethargy    Patient was more lethargic after getting the Ativan   Encephalopathy is likely multifactorial-likely toxic metabolic  Neurology evaluation appreciated  Patient was on  and tramadol which was discontinued in the facility  Her lethargy is multifactorial/poor nutrition/deconditioning  Appears to be improving  Supportive care

## 2019-10-26 NOTE — SOCIAL WORK
CM received call from Kathy at [facility] requesting PMR consult  Cm spoke with Dr Carmelita Pimentel who will place consult  Pt more alert and oxygen requirement is improving

## 2019-10-26 NOTE — ASSESSMENT & PLAN NOTE
Lab Results   Component Value Date    HGBA1C 6 4 (H) 10/22/2019    Currently on Lantus 20 units Q 12  Because there were some reports of poor p o  Intake; will cut Lantus in half at 20 units at night  Continue insulin sliding scale with Accu-Cheks  Hemoglobin A1c      Recent Labs     10/26/19  1001 10/26/19  1155 10/26/19  1448 10/26/19  1616   POCGLU 152* 101 163* 201*       Blood Sugar Average: Last 72 hrs:  (P) 482 [id number]   Patient is type 1 type diabetes mellitus, continue with the insulin drip  Currently off of the D5 since the patient was started on tube feeding  Adjust the algorithm depending upon the blood sugar

## 2019-10-26 NOTE — PROGRESS NOTES
Progress Note - Janine Eldridge 1935, 82 y o  female MRN: [id number]    Unit/Bed#: [facility] 530-01 Encounter: [id number]    Primary Care Provider: Marta Dumas MD   Date and time admitted to hospital: 10/21/2019  7:43 PM        * Lethargy  Assessment & Plan  Patient was admitted with lethargy coming from the [place] [address]  Patient with waxing and waning of mental status with the periods of lethargy  Patient was more lethargic after getting the Ativan   Encephalopathy is likely multifactorial-likely toxic metabolic  Neurology evaluation appreciated  Patient was on  and tramadol which was discontinued in the facility  Her lethargy is multifactorial/poor nutrition/deconditioning  Appears to be improving  Supportive care      Urinary retention  Assessment & Plan  · Patient complaining of severe bladder spasms and burning sensation  · Patient could not tolerate the Ji catheter anymore  Catheter was removed yesterday continue the voiding trial   Started on Diflucan by gyn Oncology for possible candidiasis    Critical illness myopathy  Assessment & Plan  · PT OT evaluation  Needs most likely rehabilitation at the time of discharge    Hearing loss  Assessment & Plan  · Likely secondary to Lasix  · Discussed with pulmonology/daughter-ENT consultation appreciated    Rectal bleeding  Assessment & Plan  · Patient with episodes of rectal bleeding  Was evaluated by GI   Status post 1 unit of packed RBCs on venous day  · Patient had a large bowel movements yesterday no further bleeding  Discussed with GI no plan for any further intervention    Acute respiratory failure with hypercapnia [facility])  Assessment & Plan  Patient has history of recent pneumonia and unfortunately ARDS requiring intubation and ICU admission and also was on high-flow oxygen before discharge to [place] [address]  Patient has to be on BiPAP overnight for elevated pCO2  Currently patient is on 6 L nasal cannula with Oxymizer  Pulmonology evaluation appreciated    ABG reviewed  Acute hypoxic respiratory failure is likely multifactorial secondary to recent pneumonia/ARDS-patient was evaluated along with the pulmonologist and reported that it recovery is going to be  Slow  Patient had a CT scan yesterday and at that time consideration for thoracocentesis was done  Patient had thoracocentesis on the right side removing 500 mL of serosanguineous fluid  Patient with legs oxygen requirement  Infectious Disease evaluation appreciated discontinue IV antibiotic    A-fib ([address] )  Assessment & Plan  Currently on heparin drip  Was taken off of the IV metoprolol  secondary to bradycardia  Atrial tachycardia on Wednesday night   Cardiology on board ,monitor heart rate on the telemetry  Superficial thrombophlebitis of right upper extremity  Assessment & Plan  Supportive care    Internal jugular (IJ) vein thromboembolism, acute, right [facility])  Assessment & Plan  Patient was on Eliquis as an outpatient  Currently on heparin drip given right upper extremity ischemia    Ovarian cancer [facility])  Assessment & Plan  This is status post hysterectomy  Gyn Oncology on board    Type 1 diabetes mellitus with diabetic neuropathy [facility])  Assessment & Plan  Lab Results   Component Value Date    HGBA1C 6 4 (H) 10/22/2019    Currently on Lantus 20 units Q 12  Because there were some reports of poor p o  Intake; will cut Lantus in half at 20 units at night  Continue insulin sliding scale with Accu-Cheks  Hemoglobin A1c  Recent Labs     10/26/19  1001 10/26/19  1155 10/26/19  1448 10/26/19  1616   POCGLU 152* 101 163* 201*       Blood Sugar Average: Last 72 hrs:  (P) 615 [id number]   Patient is type 1 type diabetes mellitus, continue with the insulin drip  Currently off of the D5 since the patient was started on tube feeding  Adjust the algorithm depending upon the blood sugar    Hypertension  Assessment & Plan  Patient was on Lopressor as an outpatient  Blood pressure appears to be improving  Continue to hold antihypertensives for now    ANAYA (generalized anxiety disorder)  Assessment & Plan  As patient was seen lethargic; will put on hold trazodone and lorazepam   Started on Zoloft at a low dose on wednesday      Adult onset hypothyroidism  Assessment & Plan  Will continue with levothyroxine 50 mcg  TSH is minimally elevated        VTE Pharmacologic Prophylaxis:   Pharmacologic: Heparin Drip  Mechanical VTE Prophylaxis in Place: Yes    Patient Centered Rounds: I have performed bedside rounds with nursing staff today  Discussions with Specialists or Other Care Team Provider:     Education and Discussions with Family / Patient: patient    Time Spent for Care: 30 minutes  More than 50% of total time spent on counseling and coordination of care as described above  Current Length of Stay: 4 day(s)    Current Patient Status: Inpatient   Certification Statement: The patient will continue to require additional inpatient hospital stay due to Acute hypoxic respiratory failure    Discharge Plan:     Code Status: Level 1 - Full Code      Subjective:   Patient seen and examined  Patient reported that she is feeling better  Sitting up in the chair and respiration appears to be improved   Objective:     Vitals:   Temp (24hrs), Av 1 °F (36 7 °C), Min:98 °F (36 7 °C), Max:98 2 °F (36 8 °C)    Temp:  [98 °F (36 7 °C)-98 2 °F (36 8 °C)] 98 °F (36 7 °C)  HR:  [] 82  Resp:  [18] 18  BP: ()/(56-69) 115/69  SpO2:  [95 %-98 %] 98 %  Body mass index is 24 08 kg/m²  Input and Output Summary (last 24 hours): Intake/Output Summary (Last 24 hours) at 10/26/2019 192  Last data filed at 10/26/2019 1834  Gross per 24 hour   Intake 1985 79 ml   Output 835 ml   Net 1150 79 ml       Physical Exam:     Physical Exam   Constitutional: No distress  HENT:   Head: Normocephalic  Eyes: Right eye exhibits no discharge  Left eye exhibits no discharge  Neck: No JVD present     Cardiovascular:   No murmur heard   Pulmonary/Chest: No stridor  No respiratory distress  Decreased breath sounds bilateral   Abdominal: Soft  She exhibits no distension  There is no tenderness  Musculoskeletal: She exhibits edema (Bilateral upper and lower extremity)  Neurological: She is alert  No cranial nerve deficit  Skin: Skin is warm  Additional Data:     Labs:    Results from last 7 days   Lab Units 10/26/19  1840  10/26/19  0439   WBC Thousand/uL  --   --  11 73*   HEMOGLOBIN g/dL 8 7*   < > 9 3*   HEMATOCRIT % 30 0*   < > 31 5*   PLATELETS Thousands/uL  --   --  470*   NEUTROS PCT %  --   --  87*   LYMPHS PCT %  --   --  5*   MONOS PCT %  --   --  7   EOS PCT %  --   --  0    < > = values in this interval not displayed  Results from last 7 days   Lab Units 10/26/19  0439   POTASSIUM mmol/L 4 2   CHLORIDE mmol/L 103   CO2 mmol/L 37*   BUN mg/dL 25   CREATININE mg/dL 0 42*   CALCIUM mg/dL 6 7*   ALK PHOS U/L 91   ALT U/L 31   AST U/L 31     Results from last 7 days   Lab Units 10/22/19  0950   INR  1 29*       * I Have Reviewed All Lab Data Listed Above  * Additional Pertinent Lab Tests Reviewed: Ale [address] Admission Reviewed    Imaging:    Imaging Reports Reviewed Today Include:   Imaging Personally Reviewed by Myself Includes:      Recent Cultures (last 7 days):     Results from last 7 days   Lab Units 10/25/19  1100 10/21/19  2149 10/21/19  2030   BLOOD CULTURE   --  No Growth After 4 Days  No Growth After 4 Days     GRAM STAIN RESULT  Rare Polys  Rare Mononuclear Cells  No bacteria seen  --   --    BODY FLUID CULTURE, STERILE  No growth  --   --        Last 24 Hours Medication List:     Current Facility-Administered Medications:  acetaminophen 975 mg Oral [facility] [address] Tuesday Elyce Spine, CRNP    acetaminophen 650 mg Rectal Q6H PRN Cedric Gregory MD    albuterol 2 5 mg Nebulization Q4H PRN Cedric Gregory MD    aluminum-magnesium hydroxide-simethicone 15 mL Per NG Tube Q6H PRN Cedric Gregory MD    artificial tear  Both Eyes HS PRN Jean Carlos Guillory MD    benzocaine-menthol-lanolin-aloe  Topical BID Elouise Osgood, MD    bisacodyl 10 mg Rectal Daily PRN Kady Ruiz MD    dextran 70-hypromellose 1 drop Both Eyes Q3H PRN Edilia Patrick DO    dorzolamide-timolol 1 drop Both Eyes BID Jean Carlos Guillory MD    heparin (porcine) 3-30 Units/kg/hr (Order-Specific) Intravenous Titrated Shweta Kemp PA-C Last Rate: 7 8 Units/kg/hr (10/26/19 1834)   heparin (porcine) 2,400 Units Intravenous PRN Dania Kumar PA-C    heparin (porcine) 4,800 Units Intravenous PRN Shazia Reis PA-C    insulin regular (HumuLIN R,NovoLIN R) infusion 0 3-21 Units/hr Intravenous Titrated Kady Ruiz MD Last Rate: 4 Units/hr (10/26/19 1831)   levothyroxine 50 mcg Per NG Tube Early Morning Jean Carlos Guillory MD    lidocaine 1 patch Topical Daily Kady Ruiz MD    melatonin 3 mg Oral HS Jenifer Banks, DO    NON FORMULARY  Right Eye HS Kady Ruiz MD    ondansetron 4 mg Intravenous Q6H PRN Jean Carlos Guillory MD    pantoprazole 40 mg Intravenous Q24H [facility] Kady Ruiz MD    senna 8 8 mg Per NG Tube Daily PRN Jean Carlos Guillory MD    sertraline 25 mg Per NG Tube HS Kady Ruiz MD    simethicone 40 mg Oral Q6H PRN Kady Ruiz MD    travoprost 1 drop Both Eyes HS Jean Carlos Guillory MD         Today, Patient Was Seen By: Kady Ruiz MD    ** Please Note: Dictation voice to text software may have been used in the creation of this document   **

## 2019-10-26 NOTE — PROGRESS NOTES
Progress Note - Pulmonary   Francisco Javier Conception 82 y o  female MRN: [id number]  Unit/Bed#: [facility] 530-01 Encounter: [id number]      No new Assessment & Plan notes have been filed by the current user since the last note was generated  1  Acute hypoxic/hypercapnic respiratory failure  - unclear etiology, likely due to prior aspiration versus fibrotic phase of ARDS  - MRSA nares negative, procalcitonin negative, blood cultures without growth, sputum culture pending pleural fluid cultures neck  - moderate-severe oral pharyngeal dysphagia on video barium swallow, therefore NG tube in place  - wean nasal cannula as tolerated, currently on 4 L  - out of bed to chair, incentive spirometry, maintain O2 sat > 90%  - recently completed 9 days of antibiotics, recently hospitalized for pneumonia bronch cultures on 10/16 grew Serratia  - underwent right-sided thoracentesis on 10/25 with 500 cc of fluid removed, cultures thus far have remained negative; appears exudative by LDH, but low protein and albumin, likely consistent with uncomplicated parapneumonic effusion  - may benefit from left-sided thoracentesis, will discuss    2  Abnormal CT chest  - likely due to recent aspiration pneumonia and resolving ARDS with bilateral pleural effusions  - currently does not appear to be infected, monitor off antibiotics    3  Moderate pulmonary hypertension  - PASP 52 mmHg with tricuspid regurgitation and peripheral edema  - given that patient clinically appears volume overloaded, will diurese with 40 mg IV Lasix and monitor intake/output, currently net positive about 2 L since admission  - will repeat limited echo; last echo with EF 63%    4  Lethargy  - appears to be improving, patient more awake and interactive this morning    Subjective:   Patient seen and examined at bedside  She seems to be more awake and interactive this morning  Does complain of mild cough, but denies any shortness of breath, fever, chills, nausea, vomiting    Does complain of mild abdominal discomfort  Review of Systems   Constitutional: Positive for fatigue  Negative for chills, fever and unexpected weight change  Weakness    HENT: Negative for congestion, rhinorrhea, sneezing and sore throat  Respiratory: Positive for cough  Negative for shortness of breath and wheezing  Cardiovascular: Negative for chest pain, palpitations and leg swelling  Gastrointestinal: Positive for abdominal pain  Negative for constipation, diarrhea, nausea and vomiting  Genitourinary: Negative for dysuria  Musculoskeletal: Negative for arthralgias  Neurological: Negative for dizziness and numbness  Objective:     Vitals:    10/26/19 0250 10/26/19 0539 10/26/19 0600 10/26/19 0757   BP: 109/58 98/65  115/69   BP Location: Left arm Left arm     Pulse: (!) 106   82   Resp: 18   18   Temp: 98 °F (36 7 °C)      TempSrc: Oral      SpO2: 97%   95%   Weight:   65 6 kg (144 lb 11 2 oz)    Height:               Intake/Output Summary (Last 24 hours) at 10/26/2019 0917  Last data filed at 10/26/2019 0630  Gross per 24 hour   Intake 1692 6 ml   Output 1200 ml   Net 492 6 ml       Physical Exam   Constitutional: She is oriented to person, place, and time  She appears well-nourished  No distress  HENT:   Head: Normocephalic and atraumatic  Mouth/Throat: Oropharynx is clear and moist  No oropharyngeal exudate  Eyes: EOM are normal  No scleral icterus  Cardiovascular: Normal rate, regular rhythm and normal heart sounds  No murmur heard  Pulmonary/Chest: Effort normal  No respiratory distress  She has no wheezes  Mild rales bilateral bases   Abdominal: Soft  Bowel sounds are normal  She exhibits no distension  There is no tenderness  Healing incision sites noted, soft, nontender   Musculoskeletal: She exhibits edema (2-3+ upper/lower extremity edema)  Neurological: She is alert and oriented to person, place, and time  Skin: She is not diaphoretic  Labs:  I have personally reviewed pertinent lab results  Results from last 7 days   Lab Units 10/26/19  0439 10/25/19  1817 10/25/19  0612  10/24/19  0454  10/23/19  0450   WBC Thousand/uL 11 73*  --  10 60*  --  11 57*  --  15 13*   HEMOGLOBIN g/dL 9 3* 9 2* 9 4*  9 1*   < > 9 0*   < > 9 4*   HEMATOCRIT % 31 5* 31 0* 31 3*  30 5*   < > 29 8*   < > 31 6*   PLATELETS Thousands/uL 470*  --  476*  --  459*  --  515*   NEUTROS PCT % 87*  --   --   --  88*  --  89*   MONOS PCT % 7  --   --   --  6  --  6    < > = values in this interval not displayed  Results from last 7 days   Lab Units 10/26/19  0439 10/25/19  0612 10/24/19  0454  10/21/19  2030   POTASSIUM mmol/L 4 2 3 8 3 3*   < > 3 4*   CHLORIDE mmol/L 103 104 106   < > 105   CO2 mmol/L 37* 36* 35*   < > 36*   BUN mg/dL 25 22 21   < > 26*   CREATININE mg/dL 0 42* 0 45* 0 51*   < > 0 61   CALCIUM mg/dL 6 7* 6 3* 6 4*   < > 7 1*   ALK PHOS U/L 91  --  95  --  100   ALT U/L 31  --  21  --  34   AST U/L 31  --  21  --  27    < > = values in this interval not displayed  Results from last 7 days   Lab Units 10/26/19  0439 10/25/19  0612 10/22/19  0601   MAGNESIUM mg/dL 2 6 2 3 2 3     Results from last 7 days   Lab Units 10/22/19  0601   PHOSPHORUS mg/dL 2 7      Results from last 7 days   Lab Units 10/26/19  0439 10/25/19  0612 10/24/19  1823  10/22/19  0950 10/21/19  2030   INR   --   --   --   --  1 29* 1 55*   PTT seconds 69* 75* 65*   < > 26 27    < > = values in this interval not displayed  Results from last 7 days   Lab Units 10/22/19  0950   LACTIC ACID mmol/L 0 7     0   Lab Value Date/Time    TROPONINI 0 03 10/21/2019 2030    TROPONINI 1 91 (H) 10/07/2019 2035     Microbiology:  MRSA nares negative, blood cultures negative at 48 hours, no sputum culture results     Imaging and other studies: I have personally reviewed pertinent reports     and I have personally reviewed pertinent films in PACS  Chest x-ray-bilateral patchy airspace infiltrates      Shamar Capone, MD  Pulmonary & Critical Care Fellow, PGY4  [facility]

## 2019-10-26 NOTE — ASSESSMENT & PLAN NOTE
Patient has history of recent pneumonia and unfortunately ARDS requiring intubation and ICU admission and also was on high-flow oxygen before discharge to [place] [address]  Patient has to be on BiPAP overnight for elevated pCO2  Currently patient is on 6 L nasal cannula with Oxymizer  Pulmonology evaluation appreciated  ABG reviewed  Acute hypoxic respiratory failure is likely multifactorial secondary to recent pneumonia/ARDS-patient was evaluated along with the pulmonologist and reported that it recovery is going to be  Slow  Patient had a CT scan yesterday and at that time consideration for thoracocentesis was done  Patient had thoracocentesis on the right side removing 500 mL of serosanguineous fluid    Patient with legs oxygen requirement  Infectious Disease evaluation appreciated discontinue IV antibiotic

## 2019-10-27 ENCOUNTER — APPOINTMENT (INPATIENT)
Dept: RADIOLOGY | Facility: HOSPITAL | Age: 84
DRG: 189 | End: 2019-10-27
Payer: COMMERCIAL

## 2019-10-27 ENCOUNTER — APPOINTMENT (INPATIENT)
Dept: NON INVASIVE DIAGNOSTICS | Facility: HOSPITAL | Age: 84
DRG: 189 | End: 2019-10-27
Payer: COMMERCIAL

## 2019-10-27 LAB
ALBUMIN FLD-MCNC: 0.7 G/DL
ALBUMIN SERPL BCP-MCNC: 2 G/DL (ref 3.5–5)
ALP SERPL-CCNC: 102 U/L (ref 46–116)
ALT SERPL W P-5'-P-CCNC: 48 U/L (ref 12–78)
ANION GAP SERPL CALCULATED.3IONS-SCNC: 0 MMOL/L (ref 4–13)
APTT PPP: 41 SECONDS (ref 23–37)
APTT PPP: 89 SECONDS (ref 23–37)
AST SERPL W P-5'-P-CCNC: 50 U/L (ref 5–45)
BACTERIA SPEC BFLD CULT: ABNORMAL
BASE EXCESS BLDA CALC-SCNC: 11.8 MMOL/L
BILIRUB SERPL-MCNC: 0.16 MG/DL (ref 0.2–1)
BUN SERPL-MCNC: 27 MG/DL (ref 5–25)
CALCIUM SERPL-MCNC: 6.7 MG/DL (ref 8.3–10.1)
CHLORIDE SERPL-SCNC: 102 MMOL/L (ref 100–108)
CO2 SERPL-SCNC: 40 MMOL/L (ref 21–32)
CREAT SERPL-MCNC: 0.44 MG/DL (ref 0.6–1.3)
ERYTHROCYTE [DISTWIDTH] IN BLOOD BY AUTOMATED COUNT: 15.1 % (ref 11.6–15.1)
GFR SERPL CREATININE-BSD FRML MDRD: 94 ML/MIN/1.73SQ M
GLUCOSE FLD-MCNC: 112 MG/DL
GLUCOSE SERPL-MCNC: 105 MG/DL (ref 65–140)
GLUCOSE SERPL-MCNC: 108 MG/DL (ref 65–140)
GLUCOSE SERPL-MCNC: 114 MG/DL (ref 65–140)
GLUCOSE SERPL-MCNC: 115 MG/DL (ref 65–140)
GLUCOSE SERPL-MCNC: 118 MG/DL (ref 65–140)
GLUCOSE SERPL-MCNC: 172 MG/DL (ref 65–140)
GLUCOSE SERPL-MCNC: 173 MG/DL (ref 65–140)
GLUCOSE SERPL-MCNC: 185 MG/DL (ref 65–140)
GLUCOSE SERPL-MCNC: 189 MG/DL (ref 65–140)
GLUCOSE SERPL-MCNC: 211 MG/DL (ref 65–140)
GLUCOSE SERPL-MCNC: 390 MG/DL (ref 65–140)
GLUCOSE SERPL-MCNC: 71 MG/DL (ref 65–140)
GRAM STN SPEC: ABNORMAL
HCO3 BLDA-SCNC: 36.3 MMOL/L (ref 22–28)
HCT VFR BLD AUTO: 28.6 % (ref 34.8–46.1)
HGB BLD-MCNC: 8.2 G/DL (ref 11.5–15.4)
LDH FLD L TO P-CCNC: 188 U/L
LDH SERPL-CCNC: 442 U/L (ref 81–234)
LYMPHOCYTES NFR BLD AUTO: 19 %
MCH RBC QN AUTO: 31.5 PG (ref 26.8–34.3)
MCHC RBC AUTO-ENTMCNC: 28.7 G/DL (ref 31.4–37.4)
MCV RBC AUTO: 110 FL (ref 82–98)
MONOCYTES NFR BLD AUTO: 12 %
NASAL CANNULA: 4
NEUTS SEG NFR BLD AUTO: 69 %
O2 CT BLDA-SCNC: 13.7 ML/DL (ref 16–23)
OXYHGB MFR BLDA: 95.4 % (ref 94–97)
PCO2 BLDA: 47.3 MM HG (ref 36–44)
PH BLDA: 7.5 [PH] (ref 7.35–7.45)
PH BODY FLUID: 7.8
PLATELET # BLD AUTO: 364 THOUSANDS/UL (ref 149–390)
PMV BLD AUTO: 9.5 FL (ref 8.9–12.7)
PO2 BLDA: 68.8 MM HG (ref 75–129)
POTASSIUM SERPL-SCNC: 3.9 MMOL/L (ref 3.5–5.3)
PROCALCITONIN SERPL-MCNC: <0.25 NG/ML
PROT FLD-MCNC: <2 G/DL
PROT SERPL-MCNC: 5.1 G/DL (ref 6.4–8.2)
RBC # BLD AUTO: 2.6 MILLION/UL (ref 3.81–5.12)
SODIUM SERPL-SCNC: 142 MMOL/L (ref 136–145)
SPECIMEN SOURCE: ABNORMAL
TOTAL CELLS COUNTED SPEC: 100
WBC # BLD AUTO: 12.42 THOUSAND/UL (ref 4.31–10.16)
WBC # FLD MANUAL: 198 /UL

## 2019-10-27 PROCEDURE — 99223 1ST HOSP IP/OBS HIGH 75: CPT | Performed by: INTERNAL MEDICINE

## 2019-10-27 PROCEDURE — 93308 TTE F-UP OR LMTD: CPT

## 2019-10-27 PROCEDURE — 83986 ASSAY PH BODY FLUID NOS: CPT | Performed by: INTERNAL MEDICINE

## 2019-10-27 PROCEDURE — 93321 DOPPLER ECHO F-UP/LMTD STD: CPT | Performed by: INTERNAL MEDICINE

## 2019-10-27 PROCEDURE — 93325 DOPPLER ECHO COLOR FLOW MAPG: CPT | Performed by: INTERNAL MEDICINE

## 2019-10-27 PROCEDURE — 87205 SMEAR GRAM STAIN: CPT | Performed by: INTERNAL MEDICINE

## 2019-10-27 PROCEDURE — 32555 ASPIRATE PLEURA W/ IMAGING: CPT | Performed by: INTERNAL MEDICINE

## 2019-10-27 PROCEDURE — NC001 PR NO CHARGE: Performed by: INTERNAL MEDICINE

## 2019-10-27 PROCEDURE — 99232 SBSQ HOSP IP/OBS MODERATE 35: CPT | Performed by: FAMILY MEDICINE

## 2019-10-27 PROCEDURE — 94668 MNPJ CHEST WALL SBSQ: CPT

## 2019-10-27 PROCEDURE — 82805 BLOOD GASES W/O2 SATURATION: CPT | Performed by: INTERNAL MEDICINE

## 2019-10-27 PROCEDURE — 71045 X-RAY EXAM CHEST 1 VIEW: CPT

## 2019-10-27 PROCEDURE — 99232 SBSQ HOSP IP/OBS MODERATE 35: CPT | Performed by: OBSTETRICS & GYNECOLOGY

## 2019-10-27 PROCEDURE — 99232 SBSQ HOSP IP/OBS MODERATE 35: CPT | Performed by: INTERNAL MEDICINE

## 2019-10-27 PROCEDURE — 87070 CULTURE OTHR SPECIMN AEROBIC: CPT | Performed by: INTERNAL MEDICINE

## 2019-10-27 PROCEDURE — 88112 CYTOPATH CELL ENHANCE TECH: CPT | Performed by: PATHOLOGY

## 2019-10-27 PROCEDURE — 89051 BODY FLUID CELL COUNT: CPT | Performed by: INTERNAL MEDICINE

## 2019-10-27 PROCEDURE — 97535 SELF CARE MNGMENT TRAINING: CPT

## 2019-10-27 PROCEDURE — 82042 OTHER SOURCE ALBUMIN QUAN EA: CPT | Performed by: INTERNAL MEDICINE

## 2019-10-27 PROCEDURE — 82948 REAGENT STRIP/BLOOD GLUCOSE: CPT

## 2019-10-27 PROCEDURE — 85730 THROMBOPLASTIN TIME PARTIAL: CPT | Performed by: FAMILY MEDICINE

## 2019-10-27 PROCEDURE — 83615 LACTATE (LD) (LDH) ENZYME: CPT | Performed by: INTERNAL MEDICINE

## 2019-10-27 PROCEDURE — 36600 WITHDRAWAL OF ARTERIAL BLOOD: CPT

## 2019-10-27 PROCEDURE — 84157 ASSAY OF PROTEIN OTHER: CPT | Performed by: INTERNAL MEDICINE

## 2019-10-27 PROCEDURE — 82945 GLUCOSE OTHER FLUID: CPT | Performed by: INTERNAL MEDICINE

## 2019-10-27 PROCEDURE — 93308 TTE F-UP OR LMTD: CPT | Performed by: INTERNAL MEDICINE

## 2019-10-27 PROCEDURE — 0W9B3ZZ DRAINAGE OF LEFT PLEURAL CAVITY, PERCUTANEOUS APPROACH: ICD-10-PCS | Performed by: INTERNAL MEDICINE

## 2019-10-27 PROCEDURE — 84145 PROCALCITONIN (PCT): CPT | Performed by: FAMILY MEDICINE

## 2019-10-27 PROCEDURE — 80053 COMPREHEN METABOLIC PANEL: CPT | Performed by: FAMILY MEDICINE

## 2019-10-27 PROCEDURE — 94760 N-INVAS EAR/PLS OXIMETRY 1: CPT

## 2019-10-27 PROCEDURE — C9113 INJ PANTOPRAZOLE SODIUM, VIA: HCPCS | Performed by: FAMILY MEDICINE

## 2019-10-27 PROCEDURE — 85027 COMPLETE CBC AUTOMATED: CPT | Performed by: FAMILY MEDICINE

## 2019-10-27 RX ORDER — ALPRAZOLAM 0.25 MG/1
0.25 TABLET ORAL ONCE
Status: COMPLETED | OUTPATIENT
Start: 2019-10-27 | End: 2019-10-27

## 2019-10-27 RX ORDER — PHENAZOPYRIDINE HYDROCHLORIDE 100 MG/1
100 TABLET, FILM COATED ORAL 3 TIMES DAILY
Status: COMPLETED | OUTPATIENT
Start: 2019-10-27 | End: 2019-10-28

## 2019-10-27 RX ORDER — TRAZODONE HYDROCHLORIDE 50 MG/1
25 TABLET ORAL
Status: DISCONTINUED | OUTPATIENT
Start: 2019-10-27 | End: 2019-11-01

## 2019-10-27 RX ORDER — OXYCODONE HYDROCHLORIDE 5 MG/1
2.5 TABLET ORAL EVERY 4 HOURS PRN
Status: DISCONTINUED | OUTPATIENT
Start: 2019-10-27 | End: 2019-10-28

## 2019-10-27 RX ORDER — FUROSEMIDE 10 MG/ML
20 INJECTION INTRAMUSCULAR; INTRAVENOUS ONCE
Status: DISCONTINUED | OUTPATIENT
Start: 2019-10-27 | End: 2019-10-27

## 2019-10-27 RX ORDER — INSULIN GLARGINE 100 [IU]/ML
20 INJECTION, SOLUTION SUBCUTANEOUS
Status: DISCONTINUED | OUTPATIENT
Start: 2019-10-27 | End: 2019-10-27

## 2019-10-27 RX ORDER — INSULIN GLARGINE 100 [IU]/ML
24 INJECTION, SOLUTION SUBCUTANEOUS
Status: DISCONTINUED | OUTPATIENT
Start: 2019-10-27 | End: 2019-10-27

## 2019-10-27 RX ORDER — PHENAZOPYRIDINE HYDROCHLORIDE 100 MG/1
100 TABLET, FILM COATED ORAL
Status: DISCONTINUED | OUTPATIENT
Start: 2019-10-27 | End: 2019-10-27

## 2019-10-27 RX ORDER — LANOLIN ALCOHOL/MO/W.PET/CERES
6 CREAM (GRAM) TOPICAL
Status: DISCONTINUED | OUTPATIENT
Start: 2019-10-27 | End: 2019-11-01

## 2019-10-27 RX ADMIN — BENZOCAINE AND LEVOMENTHOL: 200; 5 SPRAY TOPICAL at 11:54

## 2019-10-27 RX ADMIN — MELATONIN 6 MG: 3 TAB ORAL at 22:46

## 2019-10-27 RX ADMIN — LIDOCAINE 1 PATCH: 50 PATCH CUTANEOUS at 09:52

## 2019-10-27 RX ADMIN — DORZOLAMIDE HYDROCHLORIDE AND TIMOLOL MALEATE 1 DROP: 20; 5 SOLUTION/ DROPS OPHTHALMIC at 09:52

## 2019-10-27 RX ADMIN — OXYCODONE HYDROCHLORIDE 2.5 MG: 5 TABLET ORAL at 14:15

## 2019-10-27 RX ADMIN — LEVOTHYROXINE SODIUM 50 MCG: 50 TABLET ORAL at 05:58

## 2019-10-27 RX ADMIN — MICONAZOLE NITRATE: KIT at 15:47

## 2019-10-27 RX ADMIN — ACETAMINOPHEN 975 MG: 160 SUSPENSION ORAL at 22:48

## 2019-10-27 RX ADMIN — OXYCODONE HYDROCHLORIDE 2.5 MG: 5 TABLET ORAL at 22:46

## 2019-10-27 RX ADMIN — ACETAMINOPHEN 650 MG: 650 SUPPOSITORY RECTAL at 11:54

## 2019-10-27 RX ADMIN — SERTRALINE HYDROCHLORIDE 25 MG: 20 SOLUTION ORAL at 22:49

## 2019-10-27 RX ADMIN — CEFTRIAXONE SODIUM 1000 MG: 10 INJECTION, POWDER, FOR SOLUTION INTRAVENOUS at 16:34

## 2019-10-27 RX ADMIN — PHENAZOPYRIDINE 100 MG: 100 TABLET ORAL at 22:56

## 2019-10-27 RX ADMIN — HEPARIN SODIUM 4800 UNITS: 1000 INJECTION INTRAVENOUS; SUBCUTANEOUS at 17:18

## 2019-10-27 RX ADMIN — DORZOLAMIDE HYDROCHLORIDE AND TIMOLOL MALEATE 1 DROP: 20; 5 SOLUTION/ DROPS OPHTHALMIC at 18:18

## 2019-10-27 RX ADMIN — MICONAZOLE NITRATE 1 APPLICATION: KIT at 22:50

## 2019-10-27 RX ADMIN — TRAVOPROST 1 DROP: 0.04 SOLUTION/ DROPS OPHTHALMIC at 22:52

## 2019-10-27 RX ADMIN — PANTOPRAZOLE SODIUM 40 MG: 40 INJECTION, POWDER, FOR SOLUTION INTRAVENOUS at 09:52

## 2019-10-27 RX ADMIN — ALPRAZOLAM 0.25 MG: 0.25 TABLET ORAL at 01:48

## 2019-10-27 RX ADMIN — CALCIUM GLUCONATE 1 G: 98 INJECTION, SOLUTION INTRAVENOUS at 20:23

## 2019-10-27 RX ADMIN — MICONAZOLE NITRATE 200 MG: 200 SUPPOSITORY VAGINAL at 22:52

## 2019-10-27 RX ADMIN — TRAZODONE HYDROCHLORIDE 25 MG: 50 TABLET ORAL at 22:45

## 2019-10-27 NOTE — PROGRESS NOTES
Cardiology Progress Note - Demi Houston 82 y o  female MRN: [id number]    Unit/Bed#: [facility] 530-01 Encounter: [id number]  Assessment and plan  1  Paroxysmal atrial fibrillation  2  Tachy-jeronimo syndrome  3  Iatrogenic volume overload  4  Hypokalemia  5  Metabolic encephalopathy  6  Pleural effusion status post thoracentesis    Recommendations:  Telemetry was reviewed no further bradycardia she did have a brief episode of atrial fibrillation yesterday morning  Would restart her on metoprolol 12 5 mg p  O  B i d  And watch telemetry closely  Subjective:    No significant events overnight  Patient complains of back pain where she had a thoracentesis  Denies lightheadedness or dizziness  ROS    Objective:   Vitals: Blood pressure 107/52, pulse 83, temperature (!) 97 4 °F (36 3 °C), temperature source Axillary, resp  rate 20, height 5' 5" (1 651 m), weight 65 9 kg (145 lb 4 5 oz), SpO2 100 %, not currently breastfeeding , Body mass index is 24 18 kg/m² , Orthostatic Blood Pressures      Most Recent Value   Blood Pressure  107/52 filed at 10/27/2019 0724   Patient Position - Orthostatic VS  Lying filed at 10/26/2019 6758         Systolic (90VNG), EHU:288 , Min:107 , XOT:503     Diastolic (79JRA), HCJ:64, Min:52, Max:55      Intake/Output Summary (Last 24 hours) at 10/27/2019 1108  Last data filed at 10/27/2019 0800  Gross per 24 hour   Intake 1760 02 ml   Output 688 ml   Net 1072 02 ml     Weight (last 2 days)     Date/Time   Weight    10/27/19 0556   65 9 (145 28)    10/27/19 0500   65 9 (145 28)    10/26/19 0600   65 6 (144 7)    10/25/19 0600   66 7 (147 05)                Telemetry Review: No significant arrhythmias seen on telemetry review  EKG personally reviewed by Bethanie Gaytan DO  Physical Exam   Constitutional: She is oriented to person, place, and time  She appears well-nourished  No distress  HENT:   Head: Atraumatic  Eyes: Pupils are equal, round, and reactive to light   Conjunctivae are normal    Neck: Neck supple  Cardiovascular: Normal rate, regular rhythm and normal heart sounds  Exam reveals no friction rub  No murmur heard  Pulmonary/Chest: Effort normal and breath sounds normal  No respiratory distress  She has no wheezes  She has no rales  Abdominal: Bowel sounds are normal  She exhibits no distension  There is no tenderness  There is no rebound  Musculoskeletal: She exhibits edema  Neurological: She is alert and oriented to person, place, and time  No cranial nerve deficit  Skin: Skin is warm and dry  No erythema  Nursing note and vitals reviewed  Laboratory Results:  Results from last 7 days   Lab Units 10/21/19  2030   TROPONIN I ng/mL 0 03       CBC with diff: Results from last 7 days   Lab Units 10/27/19  0425 10/26/19  1840 10/26/19  1530 10/26/19  0439 10/25/19  1817 10/25/19  0612 10/24/19  1823  10/24/19  0454  10/23/19  0450  10/22/19  0950 10/22/19  0601 10/21/19  2030   WBC Thousand/uL 12 42*  --   --  11 73*  --  10 60*  --   --  11 57*  --  15 13*  --  15 06* 14 83* 13 69*   HEMOGLOBIN g/dL 8 2* 8 7* 9 0* 9 3* 9 2* 9 4*  9 1* 9 0*   < > 9 0*   < > 9 4*   < > 8 2* 8 5* 9 0*   HEMATOCRIT % 28 6* 30 0* 30 7* 31 5* 31 0* 31 3*  30 5* 30 3*   < > 29 8*   < > 31 6*   < > 28 1* 28 5* 29 9*   MCV fL 110*  --   --  107*  --  106*  --   --  105*  --  104*  --  109* 108* 105*   PLATELETS Thousands/uL 364  --   --  470*  --  476*  --   --  459*  --  515*  --  584* 613* 635*   MCH pg 31 5  --   --  31 5  --  31 5  --   --  31 8  --  30 9  --  31 7 32 2 31 5   MCHC g/dL 28 7*  --   --  29 5*  --  29 8*  --   --  30 2*  --  29 7*  --  29 2* 29 8* 30 1*   RDW % 15 1  --   --  15 2*  --  15 0  --   --  16 0*  --  16 1*  --  14 9 14 9 14 6   MPV fL 9 5  --   --  9 8  --  9 7  --   --  9 8  --  9 6  --  9 5 9 7 9 9   NRBC AUTO /100 WBCs  --   --   --  0  --   --   --   --  0  --  0  --   --  0 0    < > = values in this interval not displayed           CMP:  Results from last 7 days   Lab Units 10/27/19  0425 10/26/19  0439 10/25/19  0612 10/24/19  0454 10/23/19  0450 10/22/19  0601 10/21/19  2030   POTASSIUM mmol/L 3 9 4 2 3 8 3 3* 3 2* 3 9 3 4*   CHLORIDE mmol/L 102 103 104 106 106 109* 105   CO2 mmol/L 40* 37* 36* 35* 34* 36* 36*   BUN mg/dL 27* 25 22 21 21 26* 26*   CREATININE mg/dL 0 44* 0 42* 0 45* 0 51* 0 45* 0 57* 0 61   CALCIUM mg/dL 6 7* 6 7* 6 3* 6 4* 6 3* 6 8* 7 1*   AST U/L 50* 31  --  21  --   --  27   ALT U/L 48 31  --  21  --   --  34   ALK PHOS U/L 102 91  --  95  --   --  100   EGFR ml/min/1 73sq m 94 96 94 90 94 87 85         BMP:  Results from last 7 days   Lab Units 10/27/19  0425 10/26/19  0439 10/25/19  0612 10/24/19  0454 10/23/19  0450 10/22/19  0601 10/21/19  2030   POTASSIUM mmol/L 3 9 4 2 3 8 3 3* 3 2* 3 9 3 4*   CHLORIDE mmol/L 102 103 104 106 106 109* 105   CO2 mmol/L 40* 37* 36* 35* 34* 36* 36*   BUN mg/dL 27* 25 22 21 21 26* 26*   CREATININE mg/dL 0 44* 0 42* 0 45* 0 51* 0 45* 0 57* 0 61   CALCIUM mg/dL 6 7* 6 7* 6 3* 6 4* 6 3* 6 8* 7 1*       BNP: No results for input(s): BNP in the last 72 hours  Magnesium:   Results from last 7 days   Lab Units 10/26/19  0439 10/25/19  0612 10/22/19  0601   MAGNESIUM mg/dL 2 6 2 3 2 3       Coags:   Results from last 7 days   Lab Units 10/27/19  0425 10/26/19  0439 10/25/19  0612 10/24/19  1823 10/24/19  1228 10/24/19  0454 10/23/19  0441  10/22/19  0950 10/21/19  2030   PTT seconds 89* 69* 75* 65* 70* 97* 83*   < > 26 27   INR   --   --   --   --   --   --   --   --  1 29* 1 55*    < > = values in this interval not displayed  TSH:        Hemoglobin A1C   Results from last 7 days   Lab Units 10/22/19  0158   HEMOGLOBIN A1C % 6 4*       Lipid Profile:       Cardiac testing:   Results for orders placed during the hospital encounter of 10/04/19   Echo complete with contrast if indicated    Narrative [address]    [place], [address]  [phone]    Transthoracic Echocardiogram  2D, M-mode, Doppler, and Color Doppler    Study date:  10-Oct-2019    Patient: Meli Nava  MR number: [id number]  Account number: [de-identified]  : [id number]  Age: 82 years  Gender: Female  Status: Inpatient  Location: Bedside  Height: 65 in  Weight: 149 6 lb  BP: 135/ 57 mmHg    Indications: Atrial fibrillation    Diagnoses: I48 0 - Atrial fibrillation    Sonographer:  RONALDO Buck  Primary Physician:  Annie Thomson MD  Referring Physician:  Cassia Borjas:  [facility]  Interpreting Physician:  Melodie Martinez MD    IMPRESSIONS:  Technical quality: Good  Cardiac rhythm: Sinus tachycardia    1  Normal left ventricular size, prominent basal interventricular septum, normal left ventricular systolic function with hyperdynamic wall motion  Normal diastolic function  EF around 63%  2  No other significant chamber hypertrophy or enlargement  3  Aortic valve sclerosis, no aortic valve stenosis or regurgitation  5  Mild mitral valve regurgitation  5  Moderate tricuspid valve regurgitation  6  Suspected moderate pulmonary hypertension  Estimated RVSP/PASP is 52 mmHg  7  No pericardial effusion  Compared to report of previous echocardiogram from 2015 pulmonary hypertension and tricuspid valve regurgitation are new, otherwise no significant change  SUMMARY    LEFT VENTRICLE:  Normal left ventricular cavity size, normal wall thickness, normal left ventricular systolic function with hyperdynamic wall motion  Ejection fraction is estimated as around 62%  Normal diastolic function  RIGHT VENTRICLE:  Normal right ventricular size and systolic function  Estimated right ventricular systolic pressure moderately increased, 52 mmHg  Moderate pulmonary hypertension  LEFT ATRIUM:  Normal left atrial cavity size  Intact interatrial septum  RIGHT ATRIUM:  Normal right atrial cavity size      MITRAL VALVE:  Mild mitral valve leaflet sclerosis, adequate leaflet mobility  Mild mitral valve regurgitation with posteriorly directed jet  AORTIC VALVE:  Tricuspid aortic valve with sclerosis, adequate cuspal separation  No aortic valve stenosis or regurgitation  TRICUSPID VALVE:  Moderate tricuspid valve regurgitation  PULMONIC VALVE:  No significant pulmonic valve regurgitation  AORTA:  Aortic root and proximal ascending aorta are normal in size on 2D imaging  IVC, HEPATIC VEINS:  Inferior vena cava is normal in size and demonstrates appropriate respiratory phasic changes in diameter  PERICARDIUM:  No pericardial effusion  Suspected moderate to large pleural effusion with organized material in pleural cavity  HISTORY: PRIOR HISTORY: Risk factors: hypertension, diabetes, and hypercholesterolemia  PROCEDURE: The procedure was performed at the bedside  This was a routine study  The transthoracic approach was used  The study included complete 2D imaging, M-mode, complete spectral Doppler, and color Doppler  The heart rate was 97 bpm,  at the start of the study  Images were obtained from the parasternal, apical, subcostal, and suprasternal notch acoustic windows  Image quality was adequate  LEFT VENTRICLE: Normal left ventricular cavity size, normal wall thickness, normal left ventricular systolic function with hyperdynamic wall motion  Ejection fraction is estimated as around 62%  Normal diastolic function  RIGHT VENTRICLE: Normal right ventricular size and systolic function  Estimated right ventricular systolic pressure moderately increased, 52 mmHg  Moderate pulmonary hypertension  LEFT ATRIUM: Normal left atrial cavity size  Intact interatrial septum  RIGHT ATRIUM: Normal right atrial cavity size  MITRAL VALVE: Mild mitral valve leaflet sclerosis, adequate leaflet mobility  Mild mitral valve regurgitation with posteriorly directed jet  AORTIC VALVE: Tricuspid aortic valve with sclerosis, adequate cuspal separation   No aortic valve stenosis or regurgitation  TRICUSPID VALVE: Moderate tricuspid valve regurgitation  PULMONIC VALVE: No significant pulmonic valve regurgitation  PERICARDIUM: No pericardial effusion  Suspected moderate to large pleural effusion with organized material in pleural cavity  AORTA: Aortic root and proximal ascending aorta are normal in size on 2D imaging  SYSTEMIC VEINS: IVC: Inferior vena cava is normal in size and demonstrates appropriate respiratory phasic changes in diameter  SYSTEM MEASUREMENT TABLES    2D  %FS: 33 3 %  AVC: 300 9 ms  Ao Diam: 2 9 cm  EDV(Teich): 58 2 ml  EF(Teich): 62 9 %  ESV(Teich): 21 6 ml  HR_2Ch_Q: 101 4 BPM  HR_4Ch_Q: 99 4 BPM  IVSd: 0 8 cm  LA Diam: 3 4 cm  LAAs A4C: 15 4 cm2  LAESV A-L A4C: 44 7 ml  LAESV MOD A4C: 41 2 ml  LALs A4C: 4 5 cm  LVCO_2Ch_Q: 4 5 L/min  LVCO_4Ch_Q: 4 7 L/min  LVCO_BiP_Q: 4 6 L/min  LVEDV MOD A4C: 62 3 ml  LVEF MOD A4C: 67 1 %  LVEF_2Ch_Q: 53 2 %  LVEF_4Ch_Q: 66 %  LVEF_BiP_Q: 61 5 %  LVESV MOD A4C: 20 5 ml  LVIDd: 3 7 cm  LVIDs: 2 5 cm  LVLd A4C: 6 7 cm  LVLd_2Ch_Q: 6 7 cm  LVLd_4Ch_Q: 7 2 cm  LVLs A4C: 5 7 cm  LVLs_2Ch_Q: 5 7 cm  LVLs_4Ch_Q: 5 8 cm  LVPWd: 0 8 cm  LVSV_2Ch_Q: 44 4 ml  LVSV_4Ch_Q: 47 1 ml  LVSV_BiP_Q: 49 9 ml  LVVED_2Ch_Q: 83 3 ml  LVVED_4Ch_Q: 71 3 ml  LVVED_BiP_Q: 81 1 ml  LVVES_2Ch_Q: 39 ml  LVVES_4Ch_Q: 24 3 ml  LVVES_BiP_Q: 31 2 ml  RA Area: 11 7 cm2  RVIDd: 2 9 cm  RWT: 0 4  SV MOD A4C: 41 8 ml  SV(Teich): 36 6 ml    CW  TR Vmax: 3 2 m/s  TR maxP 6 mmHg    MM  TAPSE: 2 5 cm    PW  E' Av 1 m/s  E' Lat: 0 1 m/s  E' Sept: 0 1 m/s  E/E' Av 6  E/E' Lat: 6 5  E/E' Sept: 6 8  MV A Robin: 0 9 m/s  MV E Robin: 0 7 m/s  MV E/A Ratio: 0 7    [facility] Accredited Echocardiography [facility]    Prepared and electronically signed by    Walter Morales MD  Signed 10-Oct-2019 15:19:26       No results found for this or any previous visit  No results found for this or any previous visit    No results found for this or any previous visit      Meds/Allergies   all current active meds have been reviewed  Medications Prior to Admission   Medication    acetaminophen (TYLENOL) 325 mg tablet    aluminum-magnesium hydroxide-simethicone (MYLANTA) 200-200-20 mg/5 mL suspension    apixaban (ELIQUIS) 5 mg    artificial tear (LUBRIFRESH P M ) 83-15 % ophthalmic ointment    dorzolamide-timolol (COSOPT) 22 3-6 8 MG/ML ophthalmic solution    ERROR: CANNOT USE RATIO BASED PRESCRIPTION MIXTURE NAMING FOR A NON-MIXTURE    furosemide (LASIX) 40 mg tablet    insulin glargine (LANTUS) 100 units/mL subcutaneous injection    insulin lispro (HumaLOG) 100 units/mL injection    latanoprost (XALATAN) 0 005 % ophthalmic solution    levalbuterol (XOPENEX) 1 25 mg/0 5 mL nebulizer solution    levothyroxine 50 mcg tablet    LORazepam (ATIVAN) 0 5 mg tablet    metoprolol tartrate (LOPRESSOR) 25 mg tablet    Netarsudil Dimesylate (RHOPRESSA) 0 02 % SOLN    omeprazole (PriLOSEC) 20 mg delayed release capsule    ondansetron (ZOFRAN) 4 mg/2 mL injection    pantoprazole (PROTONIX) 40 mg tablet    polyethylene glycol (MIRALAX) 17 g packet    RHOPRESSA 0 02 % SOLN    sodium chloride 0 9 % nebulizer solution    traMADol (ULTRAM) 50 mg tablet    travoprost (TRAVATAN Z) 0 004 % ophthalmic solution    traZODone (DESYREL) 50 mg tablet         heparin (porcine) 3-30 Units/kg/hr (Order-Specific) Last Rate: 7 8 Units/kg/hr (10/27/19 0930)   insulin regular (HumuLIN R,NovoLIN R) infusion 0 3-21 Units/hr Last Rate: 0 3 Units/hr (10/27/19 1002)     Assessment:  Principal Problem:    Lethargy  Active Problems:    Adult onset hypothyroidism    ANAYA (generalized anxiety disorder)    Hypertension    Type 1 diabetes mellitus with diabetic neuropathy (HCC)    Ovarian cancer (HCC)    Internal jugular (IJ) vein thromboembolism, acute, right (HCC)    Superficial thrombophlebitis of right upper extremity    A-fib (HCC)    Acute respiratory failure with hypercapnia (HCC)    Rectal bleeding    Hearing loss    Critical illness myopathy    Urinary retention

## 2019-10-27 NOTE — ASSESSMENT & PLAN NOTE
· Patient complaining of severe bladder spasms and burning sensation  · Patient could not tolerate the Ji catheter anymore  Catheter was removed yesterday continue the voiding trial   Started on Diflucan by gyn Oncology for possible candidiasis  · Patient with severe dysuria  Likely secondary to vulvar vaginitis  Discussed with Infectious Disease    Plan is to start on antibiotic  · Consult Urology-unsure pyridium is a good option given her confusion and shortness of breath

## 2019-10-27 NOTE — PLAN OF CARE
Problem: OCCUPATIONAL THERAPY ADULT  Goal: Performs self-care activities at highest level of function for planned discharge setting  See evaluation for individualized goals  Description  Treatment Interventions: ADL retraining, Functional transfer training, UE strengthening/ROM, Endurance training, Cognitive reorientation, Patient/family training, Equipment evaluation/education, Fine motor coordination activities, Compensatory technique education, Energy conservation, Activityengagement  Equipment Recommended: (S) Bedside commode, Other (comment)(SC)       See flowsheet documentation for full assessment, interventions and recommendations  Outcome: Progressing  Note:   Limitation: Decreased ADL status, Decreased UE strength, Decreased UE ROM, Decreased Safe judgement during ADL, Decreased cognition, Decreased endurance, Decreased high-level ADLs, Decreased fine motor control  Prognosis: Fair  Assessment: Pt tolerated Ot session marginally this AM limited by decreased arousal, lathargy, pain, and generalized weakness/deconditioning  Pt stated "pain" appx 3 times to OT but was unable to ellaborate where or to what extent  Signficant time spent assisting with bed repositioning with goal of increasing comfort/decreasing pain but toward end of session she still stated pain  CASSIDY Stoll is aware  Pt was not appropriate for edge of bed activity given limited command following of less than 10%, recurrent reports of pain, and numerous medical complexities  Pt was dependent for bed repositioning and rolling, dependent x2 for scooting up in bed, grooming dependent to wash face/hands, dependent for UB ADLS  Pt's affect remained flat/withdrawn t/o session despite attempts to discuss "old times" to spark her memory/interests  Pt's vitals remained stable on 4L of 02, IV's running, and NG tube intact  Pt to continue to benefit from acute OT services to A in d/c planning and maximize I with self care        OT Discharge Recommendation: Short Term Rehab  OT - OK to Discharge: Yes(once medically cleared)    Abdullahi Boyd MS,OTR/L

## 2019-10-27 NOTE — OCCUPATIONAL THERAPY NOTE
OccupationalTherapy Progress Note     Patient Name: Alyssa Peñaloza  Today's Date: 10/27/2019  Problem List  Principal Problem:    Lethargy  Active Problems:    Adult onset hypothyroidism    ANAYA (generalized anxiety disorder)    Hypertension    Type 1 diabetes mellitus with diabetic neuropathy (HCC)    Ovarian cancer ([facility] [address] )    Internal jugular (IJ) vein thromboembolism, acute, right (HCC)    Superficial thrombophlebitis of right upper extremity    A-fib (HCC)    Acute respiratory failure with hypercapnia (HCC)    Rectal bleeding    Hearing loss    Critical illness myopathy    Urinary retention            10/27/19 1115   Restrictions/Precautions   Weight Bearing Precautions Per Order No   Other Precautions O2;Multiple lines;Limb alert;Aspiration; Bed Alarm; Chair Alarm;Cognitive; Fall Risk;Pain;Telemetry   Lifestyle   Intrinsic Gratification attempted to engage patient in familiar conversation regarding her past experiences however patient unable to verbalize other than "pain" "this is pain"   Pain Assessment   Pain Rating: FLACC (Rest) - Face 1   Pain Rating: FLACC (Rest) - Legs 1   Pain Rating: FLACC (Rest) - Activity 1   Pain Rating: FLACC (Rest) - Cry 1   Pain Rating: FLACC (Rest) - Consolability 1   Score: FLACC (Rest) 5   ADL   Grooming Assistance 1  Total Assistance   Grooming Deficit Setup;Supervision/safety; Increased time to complete;Wash/dry hands; Wash/dry face   Grooming Comments Pt was provided with a warm washcloth and provided [place] in RUE to wash face  Pt minimally actively participating needing total A overall  Pt was awake but unable to participate in the grooming task  total A to comb hair   UB Bathing Assistance 1  Total Assistance   UB Bathing Comments Pt requires total A to wash BUE  OT then supported BUE on pillows due to pitting edema distally of BUE R wornse than L      Bed Mobility   Rolling R 1  Dependent   Rolling L 1  Dependent   Additional Comments Trialed a variety of repositioning in the bed to decrease patients discomfort/pain  Pt requires total A for half rolling and assist of 2 to boost up toward the head of the bed  OT adjusted hips using wedges with attempts to offload buttock/low back, pillows under calf for pressure relief of feet and pillows under b/l arms due to edema  After all these attempts pt still states "pain" but unable to give descriptive info regarding level of pain and where  OT communicated patient was in pain to nursing  Transfers   Additional Comments OOB activity was not appropraite this session due to pain, medical status/complexity, and decreased activity tolerance/arousal   Pt minimally interactive despite environmental changes of increased light, engaging conversation, and using a cool rag to wipe her eyes   Cognition   Orientation Level Oriented to person;Disoriented to place; Disoriented to time;Disoriented to situation   Memory Unable to assess   Following Commands Unable to follow one step commands   Comments Pt followed less than 10% of Ot commands for e g  lift your right arm, wash your face, close your eyes   Activity Tolerance   Activity Tolerance Patient limited by pain; Patient limited by fatigue   Medical Staff Made Aware CASSIDY Sainz made aware   Assessment   Assessment Pt tolerated OT session marginally this AM limited by decreased arousal, lathargy, pain, and generalized weakness/deconditioning  Pt on 4L of o2 via NC  Pt stated "pain" appx 3 times to OT but was unable to ellaborate where or to what extent  Signficant time spent assisting with bed repositioning with goal of increasing comfort/decreasing pain but toward end of session she still stated pain  CASSIDY Sainz is aware  Pt was not appropriate for edge of bed activity given limited command following of less than 10%, recurrent reports of pain, and numerous medical complexities    Pt was dependent for bed repositioning and rolling, dependent x2 for scooting up in bed, grooming dependent to wash face/hands, dependent for UB ADLS  Pt's affect remained flat/withdrawn t/o session despite attempts to discuss "old times" to spark her memory/interests  Pt's vitals remained stable on 4L of 02, IV's running, and NG tube intact  Pt's performance may have been impacted by prior thoracentesis procedure leading to increased level of pain  Pt to continue to benefit from acute OT services to A in d/c planning and maximize I with self care      Plan   Treatment Interventions ADL retraining;Cognitive reorientation   Goal Expiration Date 11/07/19   OT Treatment Day 4   OT Frequency 3-5x/wk   Recommendation   OT Discharge Recommendation Short Term Rehab   OT - OK to Discharge Yes  (once medically cleared)   Barthel Index   Feeding 0   Bathing 0   Grooming Score 0   Dressing Score 0   Bladder Score 0   Bowels Score 0   Toilet Use Score 0   Transfers (Bed/Chair) Score 0   Mobility (Level Surface) Score 0   Stairs Score 0   Barthel Index Score 0     Estela Rolle MS,OTR/L

## 2019-10-27 NOTE — PROGRESS NOTES
Progress Note - Pulmonary   Francisco Javier Conception 82 y o  female MRN: [id number]  Unit/Bed#: [facility] 530-01 Encounter: [id number]    1  Acute hypoxic/hypercapnic respiratory failure  - likely due to prior aspiration versus fibrotic phase of ARDS  - moderate-severe oral pharyngeal dysphagia on video barium swallow, therefore NG tube in place  - wean nasal cannula as tolerated, currently on 4 L with pendant; will remove pendant and place only on NC, which patient should tolerate   - out of bed to chair, incentive spirometry, maintain O2 sat > 90%  - recently completed 9 days of antibiotics, recently hospitalized for pneumonia bronch cultures on 10/16 grew Serratia  - underwent right-sided thoracentesis on 10/25 with 500 cc of fluid removed, cultures thus far have remained negative; appears exudative by LDH, but low protein and albumin, likely consistent with uncomplicated parapneumonic effusion, cytology pending   - performed left sided thoracentesis this morning - 630cc yellow, serous, cloudy fluid removed, tolerated well, will send for fluid studies, LDH, protein, albumin, ph, gram stain, cell count, culture, cytology, CXR pending  - low threshold to repeat ABG if mental status worsens     2  Abnormal CT chest  - likely due to recent aspiration pneumonia and resolving ARDS with bilateral pleural effusions  - currently does not appear to be infected, monitor off antibiotics     3  Moderate pulmonary hypertension  - PASP 52 mmHg with tricuspid regurgitation and peripheral edema  - given that patient clinically appears volume overloaded,   - received 40mg IV lasix yesterday, 1 2L out, but 640cc net positive   Does have some evidence likely of contraction alkalosis on bmp, will dec to 20mg IV today, monitor intake/output, weights,    - will repeat limited echo; last echo with EF 63%     4  Lethargy  - was more lethargic this morning, but did receive 0 25 mg Xanax overnight due to agitation and did not sleep well  - did wake up for procedure this morning, continue to monitor    Subjective:   Patient seen examined at bedside  More lethargic this morning  Was noted to be agitated overnight and received 0 25 mg Xanax  States that she has pain all over  Denies any fever, chills, nausea, vomiting, chest pain, shortness of breath  Does have nonproductive cough  Review of Systems   Constitutional: Positive for fatigue  Negative for chills, fever and unexpected weight change  Complains of pain everywhere    HENT: Negative for congestion, rhinorrhea, sneezing and sore throat  Respiratory: Positive for cough  Negative for shortness of breath and wheezing  Cardiovascular: Negative for chest pain, palpitations and leg swelling  Gastrointestinal: Positive for abdominal pain  Negative for constipation, diarrhea, nausea and vomiting  Genitourinary: Positive for dysuria  Musculoskeletal: Negative for arthralgias  Neurological: Negative for dizziness and numbness  Objective:     Vitals:    10/27/19 0326 10/27/19 0500 10/27/19 0556 10/27/19 0724   BP: 113/52   107/52   BP Location:       Pulse: 85   83   Resp: 22   20   Temp: 98 4 °F (36 9 °C)   (!) 97 4 °F (36 3 °C)   TempSrc: Oral   Axillary   SpO2: 100%   100%   Weight:  65 9 kg (145 lb 4 5 oz) 65 9 kg (145 lb 4 5 oz)    Height:               Intake/Output Summary (Last 24 hours) at 10/27/2019 0943  Last data filed at 10/27/2019 0601  Gross per 24 hour   Intake 1755 13 ml   Output 1213 ml   Net 542 13 ml         Physical Exam   Constitutional: She is oriented to person, place, and time  She appears well-developed and well-nourished  No distress  Appears more lethargic today, slow to respond to questions    HENT:   Head: Normocephalic and atraumatic  Mouth/Throat: Oropharynx is clear and moist  No oropharyngeal exudate  Eyes: EOM are normal  No scleral icterus  Cardiovascular: Normal rate, regular rhythm and normal heart sounds  No murmur heard    Pulmonary/Chest: Effort normal  No respiratory distress  She has no wheezes  Mild bibasilar rales   Abdominal: Soft  Bowel sounds are normal  She exhibits no distension  There is no tenderness  Healed incisions over abdomen     Musculoskeletal: She exhibits edema  Neurological: She is alert and oriented to person, place, and time  Skin: She is not diaphoretic  Labs: I have personally reviewed pertinent lab results  Results from last 7 days   Lab Units 10/27/19  0425 10/26/19  1840 10/26/19  1530 10/26/19  0439  10/25/19  0612  10/24/19  0454  10/23/19  0450   WBC Thousand/uL 12 42*  --   --  11 73*  --  10 60*  --  11 57*  --  15 13*   HEMOGLOBIN g/dL 8 2* 8 7* 9 0* 9 3*   < > 9 4*  9 1*   < > 9 0*   < > 9 4*   HEMATOCRIT % 28 6* 30 0* 30 7* 31 5*   < > 31 3*  30 5*   < > 29 8*   < > 31 6*   PLATELETS Thousands/uL 364  --   --  470*  --  476*  --  459*  --  515*   NEUTROS PCT %  --   --   --  87*  --   --   --  88*  --  89*   MONOS PCT %  --   --   --  7  --   --   --  6  --  6    < > = values in this interval not displayed  Results from last 7 days   Lab Units 10/27/19  0425 10/26/19  0439 10/25/19  0612 10/24/19  0454   POTASSIUM mmol/L 3 9 4 2 3 8 3 3*   CHLORIDE mmol/L 102 103 104 106   CO2 mmol/L 40* 37* 36* 35*   BUN mg/dL 27* 25 22 21   CREATININE mg/dL 0 44* 0 42* 0 45* 0 51*   CALCIUM mg/dL 6 7* 6 7* 6 3* 6 4*   ALK PHOS U/L 102 91  --  95   ALT U/L 48 31  --  21   AST U/L 50* 31  --  21     Results from last 7 days   Lab Units 10/26/19  0439 10/25/19  0612 10/22/19  0601   MAGNESIUM mg/dL 2 6 2 3 2 3     Results from last 7 days   Lab Units 10/22/19  0601   PHOSPHORUS mg/dL 2 7      Results from last 7 days   Lab Units 10/27/19  0425 10/26/19  0439 10/25/19  0612  10/22/19  0950 10/21/19  2030   INR   --   --   --   --  1 29* 1 55*   PTT seconds 89* 69* 75*   < > 26 27    < > = values in this interval not displayed       Results from last 7 days   Lab Units 10/22/19  0950   LACTIC ACID mmol/L 0 7     0 Lab Value Date/Time    TROPONINI 0 03 10/21/2019 2030    TROPONINI 1 91 (H) 10/07/2019 2035      Imaging and other studies: I have personally reviewed pertinent reports  Dayana Castrejon I have personally reviewed pertinent films in PACS    CT chest 10/24  Extensive multifocal infiltrates with bilateral pleural effusions        Florencio Robertson MD  Pulmonary & Critical Care Fellow, [address] [place] [address]

## 2019-10-27 NOTE — ASSESSMENT & PLAN NOTE
Patient has history of recent pneumonia and unfortunately ARDS requiring intubation and ICU admission and also was on high-flow oxygen before discharge to [facility]  Patient has to be on BiPAP overnight for elevated pCO2  Currently patient is on 6 L nasal cannula with Oxymizer  Pulmonology evaluation appreciated  ABG reviewed  Acute hypoxic respiratory failure is likely multifactorial secondary to recent pneumonia/ARDS-patient was evaluated along with the pulmonologist and reported that it recovery is going to be  Slow  Patient had a CT scan yesterday and at that time consideration for thoracocentesis was done  Patient had thoracocentesis on the right side removing 500 mL of serosanguineous fluid  Patient with less oxygen requirement  Infectious Disease evaluation appreciated- discontinued IV antibiotic on Friday  Status post thoracocentesis today by Cardiology  Postprocedure patient was more tachypneic  X-ray reviewed  No evidence of pneumothorax  Patient is more lethargic today    Discussed with infectious disease and recommended to start back on antibiotics since there is a concern for dysuria with the urinary tract infection  Follow-up on the cultures

## 2019-10-27 NOTE — PROCEDURES
Thoracentesis  Date/Time: 10/27/2019 9:23 AM  Performed by: Amelie Ayon MD  Authorized by: Amelie Ayon MD     Patient location:  Bedside  Other Assisting Provider: Yes (comment) (  Yovanny Grandchild )    Consent:     Consent obtained:  Verbal (from daughter Liam Rodriguez)    Risks discussed:  Bleeding, infection, pain, pneumothorax, nerve damage and incomplete drainage    Alternatives discussed:  No treatment and observation  Universal protocol:     Procedure explained and questions answered to patient or proxy's satisfaction: yes      Relevant documents present and verified: yes      Test results available and properly labeled: yes      Radiology Images displayed and confirmed  If images not available, report reviewed: yes      Required blood products, implants, devices and special equipment available: yes      Site/side marked: yes      Immediately prior to procedure a time out was called: yes      Patient identity confirmed:  Arm band and verbally with patient  Indications:     Procedure Purpose: diagnostic and therapeutic      Indications: pleural effusion    Anesthesia (see MAR for exact dosages): Anesthesia method:  Local infiltration    Local anesthetic:  Lidocaine 1% w/o epi  Procedure details:     Preparation: Patient was prepped and draped in usual sterile fashion      Standard thoracentesis cath kit used: Yes      Patient position:  Sitting    Laterality:  Left    Location:  Posterior    Intercostal space:  6th    Puncture method:  Over-the-needle catheter    Ultrasound guidance: yes      Reason for ultrasound: Identify fluid collection and guide cathetar placement  Indwelling catheter placed: no      Number of attempts:  1    Needle gauge:  22    Catheter size:  8 Fr    Drainage color:  Yellow    Drainage characteristics:  Cloudy (serous )    Fluid removed amount:  630cc  Post-procedure details:     Post-procedure chest x-ray: ordered, pending       Patient tolerance of procedure:   Tolerated well, no immediate complications

## 2019-10-27 NOTE — PROGRESS NOTES
Progress Note - Jeanelle Severin 1935, 82 y o  female MRN: [id number]    Unit/Bed#: [facility] 530-01 Encounter: [id number]    Primary Care Provider: Valentin Gardner MD   Date and time admitted to hospital: 10/21/2019  7:43 PM        * Lethargy  Assessment & Plan  Patient was admitted with lethargy coming from the [facility]  Patient with waxing and waning of mental status with the periods of lethargy  Patient was more lethargic after getting the Ativan   Encephalopathy is likely multifactorial-likely toxic metabolic  Neurology evaluation appreciated  Patient was on  and tramadol which was discontinued in the facility  Her lethargy is multifactorial/poor nutrition/deconditioning  Patient is more lethargic when compared to yesterday today  Discussed with infectious disease  Plan is to start on antibiotics since patient is having significant dysuria  Follow-up on the urine culture  Supportive care      Urinary retention  Assessment & Plan  · Patient complaining of severe bladder spasms and burning sensation  · Patient could not tolerate the Ji catheter anymore  Catheter was removed yesterday continue the voiding trial   Started on Diflucan by gyn Oncology for possible candidiasis  · Patient with severe dysuria  Likely secondary to vulvar vaginitis  Discussed with Infectious Disease  Plan is to start on antibiotic  · Consult Urology-unsure pyridium is a good option given her confusion and shortness of breath    Hearing loss  Assessment & Plan  · Likely secondary to Lasix  · Discussed with pulmonology/daughter-ENT consultation appreciated    Rectal bleeding  Assessment & Plan  · Patient with episodes of rectal bleeding  Was evaluated by GI   Status post 1 unit of packed RBCs on wednesday  · Patient had a large bowel movements yesterday no further bleeding    Discussed with GI no plan for any further intervention  · No further bleeding    Acute respiratory failure with hypercapnia (HCC)  Assessment & Plan  Patient has history of recent pneumonia and unfortunately ARDS requiring intubation and ICU admission and also was on high-flow oxygen before discharge to [facility]  Patient has to be on BiPAP overnight for elevated pCO2  Currently patient is on 6 L nasal cannula with Oxymizer  Pulmonology evaluation appreciated  ABG reviewed  Acute hypoxic respiratory failure is likely multifactorial secondary to recent pneumonia/ARDS-patient was evaluated along with the pulmonologist and reported that it recovery is going to be  Slow  Patient had a CT scan yesterday and at that time consideration for thoracocentesis was done  Patient had thoracocentesis on the right side removing 500 mL of serosanguineous fluid  Patient with less oxygen requirement  Infectious Disease evaluation appreciated- discontinued IV antibiotic on Friday  Status post thoracocentesis today by Cardiology  Postprocedure patient was more tachypneic  X-ray reviewed  No evidence of pneumothorax  Patient is more lethargic today  Discussed with infectious disease and recommended to start back on antibiotics since there is a concern for dysuria with the urinary tract infection  Follow-up on the cultures    A-fib [facility])  Assessment & Plan  Currently on heparin drip  Was taken off of the IV metoprolol  secondary to bradycardia  Cardiology on board    Superficial thrombophlebitis of right upper extremity  Assessment & Plan  Supportive care    Internal jugular (IJ) vein thromboembolism, acute, right [facility])  Assessment & Plan  Patient was on Eliquis as an outpatient  Currently on heparin drip given right upper extremity ischemia  Likely transition to Eliquis in the next couple of days    Ovarian cancer [facility])  Assessment & Plan  This is status post hysterectomy  Gyn Oncology on board    Type 1 diabetes mellitus with diabetic neuropathy [facility])  Assessment & Plan  Lab Results   Component Value Date    HGBA1C 6 4 (H) 10/22/2019    Currently on Lantus 20 units Q 12  Because there were some reports of poor p o  Intake; will cut Lantus in half at 20 units at night  Continue insulin sliding scale with Accu-Cheks  Hemoglobin A1c  Recent Labs     10/27/19  0852 10/27/19  1000 10/27/19  1219 10/27/19  1540   POCGLU 114 108 172* 211*       Blood Sugar Average: Last 72 hrs:  (P) 751 [id number]   Patient is type 1 type diabetes mellitus  Since the patient is on-call for the tube feeding will transition to subcutaneous insulin tonight  Monitor blood sugar    Hypertension  Assessment & Plan  Patient was on Lopressor as an outpatient  Blood pressure appears to be improving  Continue to hold antihypertensives for now    ANAYA (generalized anxiety disorder)  Assessment & Plan  As patient was seen lethargic; will put on hold trazodone and lorazepam   Started on Zoloft at a low dose on Wednesday  Patient received 1 time dose of xanax overnight      Adult onset hypothyroidism  Assessment & Plan  Will continue with levothyroxine 50 mcg  TSH is minimally elevated        VTE Pharmacologic Prophylaxis:   Pharmacologic: Heparin Drip  Mechanical VTE Prophylaxis in Place: Yes    Patient Centered Rounds: I have performed bedside rounds with nursing staff today  Discussions with Specialists or Other Care Team Provider:     Education and Discussions with Family / Patient:  Pulmonology and Infectious Disease gyn Oncology    Time Spent for Care: 30 minutes  More than 50% of total time spent on counseling and coordination of care as described above  Current Length of Stay: 5 day(s)    Current Patient Status: Inpatient   Certification Statement: The patient will continue to require additional inpatient hospital stay due to Multiple medical comorbidities    Discharge Plan:     Code Status: Level 1 - Full Code      Subjective:   Patient seen and examined  Patient still complaining burning sensation in the perineal region    Discussed with Urology okay for Monistat  Discussed with infectious disease recommended to start on antibiotics since the patient is more lethargic and symptomatic    Objective:     Vitals:   Temp (24hrs), Av 8 °F (36 6 °C), Min:97 3 °F (36 3 °C), Max:98 4 °F (36 9 °C)    Temp:  [97 3 °F (36 3 °C)-98 4 °F (36 9 °C)] 97 7 °F (36 5 °C)  HR:  [77-85] 82  Resp:  [18-22] 18  BP: ()/(51-56) 106/56  SpO2:  [97 %-100 %] 97 %  Body mass index is 24 18 kg/m²  Input and Output Summary (last 24 hours): Intake/Output Summary (Last 24 hours) at 10/27/2019 1812  Last data filed at 10/27/2019 1600  Gross per 24 hour   Intake 1642 12 ml   Output 1037 ml   Net 605 12 ml       Physical Exam:     Physical Exam   Constitutional: No distress  HENT:   Head: Normocephalic  Eyes: Right eye exhibits no discharge  Neck: No JVD present  Cardiovascular: Normal rate  No murmur heard  Pulmonary/Chest: Effort normal  No respiratory distress  Abdominal: Soft  She exhibits no distension  Musculoskeletal: She exhibits edema (Bilateral upper lower extremity edema)  Neurological: She is alert  Patient appears to be more lethargic today   Skin: Skin is warm  Additional Data:     Labs:    Results from last 7 days   Lab Units 10/27/19  0425  10/26/19  0439   WBC Thousand/uL 12 42*  --  11 73*   HEMOGLOBIN g/dL 8 2*   < > 9 3*   HEMATOCRIT % 28 6*   < > 31 5*   PLATELETS Thousands/uL 364  --  470*   NEUTROS PCT %  --   --  87*   LYMPHS PCT %  --   --  5*   MONOS PCT %  --   --  7   EOS PCT %  --   --  0    < > = values in this interval not displayed  Results from last 7 days   Lab Units 10/27/19  0425   POTASSIUM mmol/L 3 9   CHLORIDE mmol/L 102   CO2 mmol/L 40*   BUN mg/dL 27*   CREATININE mg/dL 0 44*   CALCIUM mg/dL 6 7*   ALK PHOS U/L 102   ALT U/L 48   AST U/L 50*     Results from last 7 days   Lab Units 10/22/19  0950   INR  1 29*       * I Have Reviewed All Lab Data Listed Above  * Additional Pertinent Lab Tests Reviewed:  Ale 66 Admission Reviewed    Imaging:    Imaging Reports Reviewed Today Include:   Imaging Personally Reviewed by Myself Includes:      Recent Cultures (last 7 days):     Results from last 7 days   Lab Units 10/25/19  1100 10/21/19  2149 10/21/19  2030   BLOOD CULTURE   --  No Growth After 5 Days  No Growth After 5 Days     GRAM STAIN RESULT  Rare Polys  Rare Mononuclear Cells  No bacteria seen  --   --    BODY FLUID CULTURE, STERILE  Growth in Broth culture only Bacillus species NOT anthracis*  --   --        Last 24 Hours Medication List:     Current Facility-Administered Medications:  acetaminophen 975 mg Oral [facility] [address] Tuesday Will[place] End, CRNP    acetaminophen 650 mg Rectal Q6H PRN Maricruz Greenwood MD    albuterol 2 5 mg Nebulization Q4H PRN Opal Hamilton MD    aluminum-magnesium hydroxide-simethicone 15 mL Per NG Tube Q6H PRN Opal Hamilton MD    artificial tear  Both Eyes HS PRN Opal Hamilton MD    benzocaine-menthol-lanolin-aloe  Topical BID Cesar Carney MD    bisacodyl 10 mg Rectal Daily PRN Yocasta Contreras MD    cefTRIAXone 1,000 mg Intravenous Q24H Yocasta Contreras MD Last Rate: 1,000 mg (10/27/19 1634)   dextran 70-hypromellose 1 drop Both Eyes Q3H PRN Cami Ortiz DO    dorzolamide-timolol 1 drop Both Eyes BID Opal Hamilton MD    heparin (porcine) 3-30 Units/kg/hr (Order-Specific) Intravenous Titrated James Davis PA-C Last Rate: 11 8 Units/kg/hr (10/27/19 1723)   heparin (porcine) 2,400 Units Intravenous PRN Linwood Kumar PA-C    heparin (porcine) 4,800 Units Intravenous PRN Linwood Kumar PA-C    insulin glargine 24 Units Subcutaneous HS Yocasta Contreras MD    insulin lispro 1-5 Units Subcutaneous TID [facility] Yocasta Contreras MD    insulin lispro 1-5 Units Subcutaneous HS Yocasta Contreras MD    [START ON 10/28/2019] insulin lispro 1-5 Units Subcutaneous 0200 Yocasta Contreras MD    insulin regular (HumuLIN R,NovoLIN R) infusion 0 3-21 Units/hr Intravenous Titrated Yocasta Conterras MD Last Rate: 3 Units/hr (10/27/19 1946) levothyroxine 50 mcg Per NG Tube Early Morning Gail Ji MD    lidocaine 1 patch Topical Daily Magdiel Vega MD    melatonin 6 mg Oral HS Kim Berry MD    miconazole  Topical BID PRN Magdiel Vega MD    And        miconazole 200 mg Vaginal HS Magdiel Vega MD    Netarsudil Dimesylate 1 drop Right Eye HS Magdiel Vega MD    ondansetron 4 mg Intravenous Q6H PRN Gail Ji MD    oxyCODONE 2 5 mg Oral Q4H PRN Kim Berry MD    pantoprazole 40 mg Intravenous Q24H [facility] Magdiel Vgea MD    senna 8 8 mg Per NG Tube Daily PRN Gail Ji MD    sertraline 25 mg Per NG Tube HS Magdiel Vega MD    simethicone 40 mg Oral Q6H PRN Magdiel Vega MD    travoprost 1 drop Both Eyes HS Gail Ji MD    traZODone 25 mg Oral HS Kim Berry MD         Today, Patient Was Seen By: Magdiel Vega MD    ** Please Note: Dictation voice to text software may have been used in the creation of this document   **

## 2019-10-27 NOTE — ASSESSMENT & PLAN NOTE
Lab Results   Component Value Date    HGBA1C 6 4 (H) 10/22/2019    Currently on Lantus 20 units Q 12  Because there were some reports of poor p o  Intake; will cut Lantus in half at 20 units at night  Continue insulin sliding scale with Accu-Cheks  Hemoglobin A1c      Recent Labs     10/27/19  0852 10/27/19  1000 10/27/19  1219 10/27/19  1540   POCGLU 114 108 172* 211*       Blood Sugar Average: Last 72 hrs:  (P) 367 [id number]   Patient is type 1 type diabetes mellitus  Since the patient is on-call for the tube feeding will transition to subcutaneous insulin tonight  Monitor blood sugar Left arm;

## 2019-10-27 NOTE — CONSULTS
Consultation - Palliative and Supportive Care   Perry Tena 82 y o  female [id number]    Assessment:     Patient Active Problem List   Diagnosis    Adult onset hypothyroidism    ANAYA (generalized anxiety disorder)    Hyperlipidemia    Hypertension    [facility], [facility], proliferative [facility]    Type 1 diabetes mellitus with diabetic neuropathy ([facility] )    Age-related osteoporosis without current pathological fracture    Hyponatremia    Ovarian cancer ([facility] )    Type 1 diabetes mellitus with hyperglycemia (HCC)    Internal jugular (IJ) vein thromboembolism, acute, right (HCC)    Superficial thrombophlebitis of right upper extremity    A-fib (HCC)    Acute respiratory failure with hypercapnia (HCC)    Lethargy    Rectal bleeding    Hearing loss    Critical illness myopathy    Urinary retention         Plan:  1  Symptom management -    - Agree with scheduled tylenol and lidocaine patches  - Patient's pain has been challenging to control with above, I think low-dose opioid therapy would be indicated at this time  Would avoid tramadol and instead use oxycodone 2 5mg with strict hold parameters  - Will re-institute low dose trazodone for insomnia at a reduced dose of 25mg to promote sleep/wake cycle  Will increase patient's melatonin to 6 mg as well  If an adjunct is needed may consider low-dose zyrpexa as well and would avoid benzodiazepines such as xanax  - Reviewed delirium precautions at some length as below:    Recommend global delirium precautions including:      - Establishment of day/night cycle via lights during the day and blinds open  Please limit interruptions at night as medically appropriate  - Provide glasses/hearing aids as apprioriate  - Minimize deliriogenic meds as able  - Provide reorientation including date on board and visible clock  - Avoid restraints as able, frequent verbal reorientations or patient care sitter as appropriate          2  Goals - Visited superficial goals conversation during this visit  Daughter's stated goal is to return her as close to pre-morbid baseline as possible  I did discuss that perhaps this is time to revisit code status but that Palliative Care would help work towards stated goal and be available for ongoing goals discussions if/when appropriate  Daughter (who works in geriatrics) has a good understanding of the role of palliative medicine, and was very appreciative for the extra services  Code Status:  Level one   Decisional apparatus:  Patient is not competent on my exam today  If competence is lost, patient's substitute decision maker would default to daughter by [place]  Advance Directive / Living Will / POLST:  None on file     I have reviewed the patient's controlled substance dispensing history in the Prescription Drug Monitoring Program in compliance with the [facility] regulations before prescribing any controlled substances  We appreciate the invitation to be involved in this patient's care  We will continue to follow  Please do not hesitate to reach our on call provider through our clinic answering service at  should you have acute symptom control concerns  IDENTIFICATION:  Consults  Physician Requesting Consult: Wayne Ordoñez MD  Reason for Consult / Principal Problem:  Symptom assessment  Hx and PE limited by:  Patient delirium    HISTORY OF PRESENT ILLNESS:       Omar Gardner is a 82 y o  female who previous to admission in early October was relatively healthy who presented for hysterectomy with new oncologic lesion  Unfortunately patient's hospital stay lead to patient being coded and intubated in ICU  Patient had a prolonged admission to [facility], and was recently discharged to [facility] rehab  Unfortunately she was not doing well there and was [facility] [place] [facility] [place]  Since admission patient has had waxing and waning delirium    Patient's daughter who is an employee of [employer] was at bedside and was able to tell me that patient has had very variable mentation  Yesterday she was sitting in chair and watching TV for most of the day  Per chart review, patient has had issues with insomnia, and last evening received 0 25 mg of Xanax  Today she is more sleepy and sedated  She has also been complaining of pain, most prominent around the area of a yeast infection in her groin, but per nursing she has complained of diffuse pain  Patient has been seen by Geriatrics and started on geriatric pain protocol, with Tylenol and topical lidocaine  Daughter also reports patient has been having poor sleep wake cycle  I noted on imaging revealed the patient did have a large stool burden, which appears to have resolved based on documented bowel movement  Patient herself is currently sedate      Review of Systems   Unable to perform ROS: mental status change       Past Medical History:   Diagnosis Date    Anxiety     Arthritis     Constipation     Diabetes mellitus ([address] )     IDDM    Frequent UTI     Glaucoma     Hearing loss     Hyperlipidemia     Hypertension     Hyperthyroidism     in past    Hyponatremia     Hypothyroid     Neuropathy     bles    Right tubo-ovarian mass     Wears glasses      Past Surgical History:   Procedure Laterality Date    APPENDECTOMY  1954    CATARACT EXTRACTION Bilateral     COLONOSCOPY  09/2014    Completed - Dr Elzbieta Stanley, due in 5 years    HYSTERECTOMY N/A 10/4/2019    Procedure: LAP TOTAL HYSTERECTOMY, BSO;  Surgeon: Sulema Alegria MD;  Location: [facility];  Service: Gynecology Oncology    IR THORACENTESIS  10/25/2019    LAPAROTOMY N/A 10/4/2019    Procedure: EXPLORATORY LAPAROTOMY  EXLAP OMENTECTOMY  PELVIC AND DEANGELO-AORTIC LYMPH NODE DISSECTION  PERITONEAL BIOPSY TRANS ABDOMINUS BLOCK;  Surgeon: Sulema Alegria MD;  Location: [facility];  Service: Gynecology Oncology    SKIN LESION EXCISION      Ears     Social History Socioeconomic History    Marital status:       Spouse name: Not on file    Number of children: 2    Years of education: Not on file    Highest education level: Not on file   Occupational History    Not on file   Social Needs    Financial resource strain: Not on file    Food insecurity:     Worry: Not on file     Inability: Not on file    Transportation needs:     Medical: Not on file     Non-medical: Not on file   Tobacco Use    Smoking status: Never Smoker    Smokeless tobacco: Never Used   Substance and Sexual Activity    Alcohol use: Never     Frequency: Monthly or less     Drinks per session: 1 or 2     Binge frequency: Never     Comment: wine    Drug use: No    Sexual activity: Not on file   Lifestyle    Physical activity:     Days per week: Not on file     Minutes per session: Not on file    Stress: Not on file   Relationships    Social connections:     Talks on phone: Not on file     Gets together: Not on file     Attends Islam service: Not on file     Active member of club or organization: Not on file     Attends meetings of clubs or organizations: Not on file     Relationship status: Not on file    Intimate partner violence:     Fear of current or ex partner: Not on file     Emotionally abused: Not on file     Physically abused: Not on file     Forced sexual activity: Not on file   Other Topics Concern    Not on file   Social History Narrative    Caffeine use    No preference on Islam beliefs    Lives at home alone prior to hysterectomy - now at [facility] temporarily     Family History   Problem Relation Age of Onset    Heart attack Mother     Diabetes type II Mother     Dementia Father     Stroke Father         CVA    Breast cancer Maternal Aunt     Stomach cancer Maternal Uncle     Diabetes Family     Stroke Family         Complications       MEDICATIONS / ALLERGIES:    all current active meds have been reviewed and current meds:   Current Facility-Administered Medications   Medication Dose Route Frequency    acetaminophen (TYLENOL) oral suspension 975 mg  975 mg Oral Q8H [facility]    acetaminophen (TYLENOL) rectal suppository 650 mg  650 mg Rectal Q6H PRN    albuterol inhalation solution 2 5 mg  2 5 mg Nebulization Q4H PRN    aluminum-magnesium hydroxide-simethicone (MYLANTA) 200-200-20 mg/5 mL oral suspension 15 mL  15 mL Per NG Tube Q6H PRN    artificial tear (LUBRIFRESH P M ) ophthalmic ointment   Both Eyes HS PRN    benzocaine-menthol-lanolin-aloe (DERMOPLAST) 20-0 5 % topical spray   Topical BID    bisacodyl (DULCOLAX) rectal suppository 10 mg  10 mg Rectal Daily PRN    dextran 70-hypromellose (GENTEAL TEARS) 0 1-0 3 % ophthalmic solution 1 drop  1 drop Both Eyes Q3H PRN    dorzolamide-timolol (COSOPT) 22 3-6 8 MG/ML ophthalmic solution 1 drop  1 drop Both Eyes BID    heparin (porcine) 25,000 units in 250 mL infusion (premix)  3-30 Units/kg/hr (Order-Specific) Intravenous Titrated    heparin (porcine) injection 2,400 Units  2,400 Units Intravenous PRN    heparin (porcine) injection 4,800 Units  4,800 Units Intravenous PRN    insulin regular (HumuLIN R,NovoLIN R) 1 Units/mL in sodium chloride 0 9 % 100 mL infusion  0 3-21 Units/hr Intravenous Titrated    levothyroxine tablet 50 mcg  50 mcg Per NG Tube Early Morning    lidocaine (LIDODERM) 5 % patch 1 patch  1 patch Topical Daily    melatonin tablet 6 mg  6 mg Oral HS    miconazole (MONISTAT) external vaginal cream   Topical BID PRN    And    miconazole (MONISTAT) vaginal suppository 200 mg  200 mg Vaginal HS    Netarsudil Dimesylate 0 02 % SOLN 1 drop  1 drop Right Eye HS    ondansetron (ZOFRAN) injection 4 mg  4 mg Intravenous Q6H PRN    oxyCODONE (ROXICODONE) IR tablet 2 5 mg  2 5 mg Oral Q4H PRN    pantoprazole (PROTONIX) injection 40 mg  40 mg Intravenous Q24H GENNARO    senna 8 8 mg/5 mL oral syrup 8 8 mg  8 8 mg Per NG Tube Daily PRN    sertraline (ZOLOFT) oral concentrated solution 25 mg  25 mg Per NG Tube HS    simethicone (MYLICON) oral suspension 40 mg  40 mg Oral Q6H PRN    travoprost (TRAVATAN-Z) 0 004 % ophthalmic solution 1 drop  1 drop Both Eyes HS    traZODone (DESYREL) tablet 25 mg  25 mg Oral HS       Allergies   Allergen Reactions    Thiazide-Type Diuretics      Hyponatremia       OBJECTIVE:    Physical Exam  Physical Exam   Constitutional: No distress  HENT:   Head: Atraumatic  Eyes: Right eye exhibits no discharge  Left eye exhibits no discharge  Pulmonary/Chest: Effort normal  No respiratory distress  Abdominal: Soft  She exhibits no distension  There is no tenderness  Musculoskeletal: She exhibits edema  Neurological: She is alert  Opens eyes to voice and exam   Skin: She is not diaphoretic    cool distally, intermittent upper extremity distal cyanosis   Psychiatric:   Unable to assess   Nursing note and vitals reviewed  Lab Results:   I have personally reviewed pertinent labs  , CBC:   Lab Results   Component Value Date    WBC 12 42 (H) 10/27/2019    HGB 8 2 (L) 10/27/2019    HCT 28 6 (L) 10/27/2019     (H) 10/27/2019     10/27/2019    MCH 31 5 10/27/2019    MCHC 28 7 (L) 10/27/2019    RDW 15 1 10/27/2019    MPV 9 5 10/27/2019   , CMP:   Lab Results   Component Value Date    SODIUM 142 10/27/2019    K 3 9 10/27/2019     10/27/2019    CO2 40 (H) 10/27/2019    BUN 27 (H) 10/27/2019    CREATININE 0 44 (L) 10/27/2019    CALCIUM 6 7 (L) 10/27/2019    AST 50 (H) 10/27/2019    ALT 48 10/27/2019    ALKPHOS 102 10/27/2019    EGFR 94 10/27/2019     Imaging Studies:  I personally reviewed relevant imaging reports  EKG, Pathology, and Other Studies:  I personally reviewed relevant reports    Counseling / Coordination of Care  Total floor / unit time spent today 60+ minutes  Greater than 50% of total time was spent with the patient and / or family counseling and / or coordination of care (12:30-13:00)   A description of the counseling / coordination of care:  Psychosocial support, medication review, symptom assessment, initial goals of care, pain management including initiation t opioid therapy

## 2019-10-27 NOTE — ASSESSMENT & PLAN NOTE
Patient was admitted with lethargy coming from the [facility]  Patient with waxing and waning of mental status with the periods of lethargy  Patient was more lethargic after getting the Ativan   Encephalopathy is likely multifactorial-likely toxic metabolic  Neurology evaluation appreciated  Patient was on  and tramadol which was discontinued in the facility  Her lethargy is multifactorial/poor nutrition/deconditioning  Patient is more lethargic when compared to yesterday today  Discussed with infectious disease  Plan is to start on antibiotics since patient is having significant dysuria    Follow-up on the urine culture  Supportive care

## 2019-10-27 NOTE — ASSESSMENT & PLAN NOTE
Currently on heparin drip  Was taken off of the IV metoprolol  secondary to bradycardia     Cardiology on board

## 2019-10-27 NOTE — ASSESSMENT & PLAN NOTE
Patient was on Eliquis as an outpatient    Currently on heparin drip given right upper extremity ischemia  Likely transition to Eliquis in the next couple of days

## 2019-10-27 NOTE — ASSESSMENT & PLAN NOTE
· Patient with episodes of rectal bleeding  Was evaluated by GI   Status post 1 unit of packed RBCs on wednesday  · Patient had a large bowel movements yesterday no further bleeding    Discussed with GI no plan for any further intervention  · No further bleeding

## 2019-10-27 NOTE — PROGRESS NOTES
Pt  Woke up coughing/yelling, stated " I can't breath, I don't want to live like this"  Nurse at bedside comforting pt  Vitals stable: HR 60-80's, /49 MAP 70, O2 >95% on 4L Oximizer, resp >20, pt appeared very anxious  Yael Held [employer] texted, 0 25mg Xanax ordered per [employer]  Will continue to monitor

## 2019-10-27 NOTE — PROGRESS NOTES
Gyn Oncology Progress note   Wilfred Villarreal 82 y o  female MRN: [id number]  Unit/Bed#: [facility] 530-01 Encounter: [id number]    A/P: 45MS s/p complicated hospitalization post surgical debulking/staging for early stage ovarian cancer (POD 23) now readmitted with acute respiratory failure/unresponsiveness and worsening right arm thromboembolism  1)  Acute respiratory failure with hypercapnia  - Managed per primary team  - Transitioned to BIPAP 14/5 60% on 10/23/19 due to increased CO2 noted on ABG  [id number] 98%  - Pt has thick secretions and mucous plugging that may benefit from repeat bronchoscopy   - Pulmonology following, recommend increasing Lasix  - For L thoracocentesis today     2) Delirium  - Pt with waxing and waning between consciousness and lethargy   - Likely multi-factorial  - Ct head w/o contrast on admission without any acute abnormalities  - Pt resting comfortably on exam but responsive to verbal commands  - Continue to avoid sedating medications, benzos  - Neurology following     3) Severe protein calorie malnutrition secondary to poor oral take  - Pt was started on tube feeds on POD#10 in [place]  Poor oral intake since discharge to LTAC    - Dobhoff replaced with Keofed on 10/23/19-Jevity 1 2, advance to goal  - Speech pathology following     4) Rectal Bleeding, resolved  - H/H stable in 9s, Occult blood lab sent  - Given 1 unit pRBCs overnight 10/22/19 followed by Lasix 40mg IV for clinical fluid overload  - Monitoring H/H q6hrs  - s/p GI consult: stable, no intervention at this time   If acute onset maninder bleeding, reconsult     5) Internal Jugular (IJ) vein thromboembolism, acute right with new onset cyanotic right fingers  - Repeat doppler on 10/22 demonstrated occluded right radial artery from them id forearm to the wrist  All five digits show flat line doppler waveforms  - Pt's home Eliquis was placed on hold and she was started on Heparin gtt  - Heparin GTT held at 0000 for thoracocentesis today  - Vascular surgery following, no surgical intervention at this time  - Radial pulse noted with bedside doppler on my exam with nursing     6) Type 1 Diabetes  - Hgb A1c 6 4 on 10/22  - Glucose levels: Goal between 140-180   - Continue insulin gtt  - D5 1/2NS infusion      7) Stage 1B ovarian cancer s/p staging surgery 10/4/2019  - Good prognosis  - GYN ONC to continue to follow     8) Urinary Retention  s/p straight cath for 500cc at 1700 on 10/23/19, failed follow-up void  Ji catheter placed and removed yesterday for subsequent void trial; 1725mL/yesterday; s/p 3 straight caths, then spontaneous void on pad, measuring approx 300mL     9) Decreased Hearing  Most-likely lasix  Evaluated by ENT on 10/23/19: outpatient follow-up as assessment cannot be done at bedside  Signed off     10) Paroxysmal Afib/bradycardia  On telemetry   Currently on heparin gtt  S/P Cardiology consult: likely secondary to medication, no further bradycardia noted  IV metoprolol held  Managed per primary team     11) Vulvovaginal candidiasis  - vulvar erythema, white discharge  - s/p dose of diflucan 10/26/19    12) Dispo: Inpatient, low threshold for ICU     Patient was resting comfortably on evaluation but responsive to verbal commands  /52   Pulse 85   Temp 98 4 °F (36 9 °C) (Oral)   Resp 22   Ht 5' 5" (1 651 m)   Wt 65 9 kg (145 lb 4 5 oz)   SpO2 100%   BMI 24 18 kg/m²     I/O last 3 completed shifts: In: 2743 4 [I V :340 4; NG/GT:660; IV Piggyback:130; Feedings:1613]  Out: 8118 [Urine:1225; Other:500]  I/O this shift:   In: 803 8 [I V :95 8; NG/GT:290]  Out: 687 [Urine:687]    Lab Results   Component Value Date    WBC 12 42 (H) 10/27/2019    HGB 8 2 (L) 10/27/2019    HCT 28 6 (L) 10/27/2019     (H) 10/27/2019     10/27/2019       Lab Results   Component Value Date    GLUCOSE 75 01/08/2016    CALCIUM 6 7 (L) 10/27/2019     (L) 01/08/2016    K 3 9 10/27/2019    CO2 40 (H) 10/27/2019     10/27/2019    BUN 27 (H) 10/27/2019    CREATININE 0 44 (L) 10/27/2019       Lab Results   Component Value Date/Time    POCGLU 105 10/27/2019 06:00 AM    POCGLU 118 10/27/2019 04:20 AM    POCGLU 189 (H) 10/27/2019 01:51 AM    POCGLU 185 (H) 10/27/2019 12:00 AM    POCGLU 108 10/26/2019 09:55 PM    POCGLU 164 02/15/2017 02:33 PM       Physical Exam  Constitutional: Alert, no distress but irritable  Cardiovascular: Normal rate, regular rhythm and intact distal pulses  Pulmonary/Chest: Effort normal  No respiratory distress  She has rales  Abdominal: Soft  There is no tenderness  There is no rebound and no guarding  Incision: C/D/I with dried histoacryl  No drainage, fluctuance or induration noted   Musculoskeletal: She exhibits edema (+2 bilaterally in LE)  Swelling of digits   Neurological: She is alert and oriented to person, place, and time  Skin: She is not diaphoretic  Edematous at lower extremities and digits  Vitals reviewed        Aishwarya Rosales MD  OB/GYN PGY-4  10/27/2019 6:33 AM

## 2019-10-27 NOTE — ASSESSMENT & PLAN NOTE
As patient was seen lethargic; will put on hold trazodone and lorazepam   Started on Zoloft at a low dose on Wednesday  Patient received 1 time dose of xanax overnight

## 2019-10-28 LAB
ALBUMIN SERPL BCP-MCNC: 2.1 G/DL (ref 3.5–5)
ALP SERPL-CCNC: 129 U/L (ref 46–116)
ALT SERPL W P-5'-P-CCNC: 58 U/L (ref 12–78)
ANION GAP SERPL CALCULATED.3IONS-SCNC: 3 MMOL/L (ref 4–13)
APTT PPP: 67 SECONDS (ref 23–37)
APTT PPP: 79 SECONDS (ref 23–37)
AST SERPL W P-5'-P-CCNC: 56 U/L (ref 5–45)
BILIRUB SERPL-MCNC: 0.24 MG/DL (ref 0.2–1)
BUN SERPL-MCNC: 28 MG/DL (ref 5–25)
CA-I BLD-SCNC: 0.99 MMOL/L (ref 1.12–1.32)
CALCIUM SERPL-MCNC: 7 MG/DL (ref 8.3–10.1)
CHLORIDE SERPL-SCNC: 100 MMOL/L (ref 100–108)
CO2 SERPL-SCNC: 38 MMOL/L (ref 21–32)
CREAT SERPL-MCNC: 0.5 MG/DL (ref 0.6–1.3)
ERYTHROCYTE [DISTWIDTH] IN BLOOD BY AUTOMATED COUNT: 15.4 % (ref 11.6–15.1)
GFR SERPL CREATININE-BSD FRML MDRD: 90 ML/MIN/1.73SQ M
GLUCOSE SERPL-MCNC: 124 MG/DL (ref 65–140)
GLUCOSE SERPL-MCNC: 152 MG/DL (ref 65–140)
GLUCOSE SERPL-MCNC: 156 MG/DL (ref 65–140)
GLUCOSE SERPL-MCNC: 164 MG/DL (ref 65–140)
GLUCOSE SERPL-MCNC: 170 MG/DL (ref 65–140)
GLUCOSE SERPL-MCNC: 186 MG/DL (ref 65–140)
GLUCOSE SERPL-MCNC: 193 MG/DL (ref 65–140)
GLUCOSE SERPL-MCNC: 193 MG/DL (ref 65–140)
GLUCOSE SERPL-MCNC: 200 MG/DL (ref 65–140)
GLUCOSE SERPL-MCNC: 200 MG/DL (ref 65–140)
GLUCOSE SERPL-MCNC: 207 MG/DL (ref 65–140)
GLUCOSE SERPL-MCNC: 236 MG/DL (ref 65–140)
GLUCOSE SERPL-MCNC: 258 MG/DL (ref 65–140)
GLUCOSE SERPL-MCNC: 87 MG/DL (ref 65–140)
HCT VFR BLD AUTO: 29.6 % (ref 34.8–46.1)
HGB BLD-MCNC: 8.8 G/DL (ref 11.5–15.4)
MAGNESIUM SERPL-MCNC: 2.5 MG/DL (ref 1.6–2.6)
MCH RBC QN AUTO: 32.2 PG (ref 26.8–34.3)
MCHC RBC AUTO-ENTMCNC: 29.7 G/DL (ref 31.4–37.4)
MCV RBC AUTO: 108 FL (ref 82–98)
PLATELET # BLD AUTO: 361 THOUSANDS/UL (ref 149–390)
PMV BLD AUTO: 10.1 FL (ref 8.9–12.7)
POTASSIUM SERPL-SCNC: 3.9 MMOL/L (ref 3.5–5.3)
PROCALCITONIN SERPL-MCNC: 0.12 NG/ML
PROT SERPL-MCNC: 5.3 G/DL (ref 6.4–8.2)
RBC # BLD AUTO: 2.73 MILLION/UL (ref 3.81–5.12)
SODIUM SERPL-SCNC: 141 MMOL/L (ref 136–145)
VIT B12 SERPL-MCNC: 1321 PG/ML (ref 100–900)
WBC # BLD AUTO: 15.61 THOUSAND/UL (ref 4.31–10.16)

## 2019-10-28 PROCEDURE — 85730 THROMBOPLASTIN TIME PARTIAL: CPT | Performed by: FAMILY MEDICINE

## 2019-10-28 PROCEDURE — 94668 MNPJ CHEST WALL SBSQ: CPT

## 2019-10-28 PROCEDURE — 82607 VITAMIN B-12: CPT | Performed by: INTERNAL MEDICINE

## 2019-10-28 PROCEDURE — C9113 INJ PANTOPRAZOLE SODIUM, VIA: HCPCS | Performed by: FAMILY MEDICINE

## 2019-10-28 PROCEDURE — 99232 SBSQ HOSP IP/OBS MODERATE 35: CPT | Performed by: INTERNAL MEDICINE

## 2019-10-28 PROCEDURE — 97110 THERAPEUTIC EXERCISES: CPT

## 2019-10-28 PROCEDURE — 94760 N-INVAS EAR/PLS OXIMETRY 1: CPT

## 2019-10-28 PROCEDURE — 84145 PROCALCITONIN (PCT): CPT | Performed by: FAMILY MEDICINE

## 2019-10-28 PROCEDURE — 99222 1ST HOSP IP/OBS MODERATE 55: CPT | Performed by: PHYSICIAN ASSISTANT

## 2019-10-28 PROCEDURE — NC001 PR NO CHARGE: Performed by: INTERNAL MEDICINE

## 2019-10-28 PROCEDURE — 99233 SBSQ HOSP IP/OBS HIGH 50: CPT | Performed by: INTERNAL MEDICINE

## 2019-10-28 PROCEDURE — 92526 ORAL FUNCTION THERAPY: CPT

## 2019-10-28 PROCEDURE — 97530 THERAPEUTIC ACTIVITIES: CPT

## 2019-10-28 PROCEDURE — 99232 SBSQ HOSP IP/OBS MODERATE 35: CPT | Performed by: FAMILY MEDICINE

## 2019-10-28 PROCEDURE — 83735 ASSAY OF MAGNESIUM: CPT | Performed by: FAMILY MEDICINE

## 2019-10-28 PROCEDURE — 99233 SBSQ HOSP IP/OBS HIGH 50: CPT | Performed by: PHYSICIAN ASSISTANT

## 2019-10-28 PROCEDURE — 82948 REAGENT STRIP/BLOOD GLUCOSE: CPT

## 2019-10-28 PROCEDURE — 82330 ASSAY OF CALCIUM: CPT | Performed by: FAMILY MEDICINE

## 2019-10-28 PROCEDURE — 85027 COMPLETE CBC AUTOMATED: CPT | Performed by: FAMILY MEDICINE

## 2019-10-28 PROCEDURE — NC001 PR NO CHARGE: Performed by: OBSTETRICS & GYNECOLOGY

## 2019-10-28 PROCEDURE — 99232 SBSQ HOSP IP/OBS MODERATE 35: CPT | Performed by: NURSE PRACTITIONER

## 2019-10-28 PROCEDURE — 99222 1ST HOSP IP/OBS MODERATE 55: CPT | Performed by: PHYSICAL MEDICINE & REHABILITATION

## 2019-10-28 PROCEDURE — 80053 COMPREHEN METABOLIC PANEL: CPT | Performed by: FAMILY MEDICINE

## 2019-10-28 RX ORDER — OXYCODONE HYDROCHLORIDE 5 MG/1
2.5 TABLET ORAL EVERY 4 HOURS PRN
Status: DISCONTINUED | OUTPATIENT
Start: 2019-10-28 | End: 2019-10-29

## 2019-10-28 RX ORDER — LIDOCAINE HYDROCHLORIDE 20 MG/ML
1 JELLY TOPICAL ONCE
Status: COMPLETED | OUTPATIENT
Start: 2019-10-28 | End: 2019-10-28

## 2019-10-28 RX ORDER — OXYCODONE HYDROCHLORIDE 5 MG/1
5 TABLET ORAL 4 TIMES DAILY
Status: DISCONTINUED | OUTPATIENT
Start: 2019-10-28 | End: 2019-10-29

## 2019-10-28 RX ORDER — NYSTATIN 100000 [USP'U]/G
POWDER TOPICAL 2 TIMES DAILY
Status: DISCONTINUED | OUTPATIENT
Start: 2019-10-28 | End: 2019-10-28

## 2019-10-28 RX ORDER — OXYCODONE HYDROCHLORIDE 5 MG/1
5 TABLET ORAL EVERY 4 HOURS PRN
Status: DISCONTINUED | OUTPATIENT
Start: 2019-10-28 | End: 2019-10-29

## 2019-10-28 RX ORDER — INSULIN GLARGINE 100 [IU]/ML
25 INJECTION, SOLUTION SUBCUTANEOUS
Status: DISCONTINUED | OUTPATIENT
Start: 2019-10-28 | End: 2019-10-29

## 2019-10-28 RX ADMIN — NYSTATIN AND TRIAMCINOLONE ACETONIDE 1 APPLICATION: 100000; 1 CREAM TOPICAL at 17:26

## 2019-10-28 RX ADMIN — MICONAZOLE NITRATE 200 MG: 200 SUPPOSITORY VAGINAL at 21:49

## 2019-10-28 RX ADMIN — LEVOTHYROXINE SODIUM 50 MCG: 50 TABLET ORAL at 06:27

## 2019-10-28 RX ADMIN — MELATONIN 6 MG: 3 TAB ORAL at 21:44

## 2019-10-28 RX ADMIN — OXYCODONE HYDROCHLORIDE 2.5 MG: 5 TABLET ORAL at 09:12

## 2019-10-28 RX ADMIN — OXYCODONE HYDROCHLORIDE 2.5 MG: 5 TABLET ORAL at 02:42

## 2019-10-28 RX ADMIN — PHENAZOPYRIDINE 100 MG: 100 TABLET ORAL at 16:20

## 2019-10-28 RX ADMIN — ACETAMINOPHEN 975 MG: 160 SUSPENSION ORAL at 13:09

## 2019-10-28 RX ADMIN — PANTOPRAZOLE SODIUM 40 MG: 40 INJECTION, POWDER, FOR SOLUTION INTRAVENOUS at 09:13

## 2019-10-28 RX ADMIN — ACETAMINOPHEN 975 MG: 160 SUSPENSION ORAL at 06:27

## 2019-10-28 RX ADMIN — ACETAMINOPHEN 975 MG: 160 SUSPENSION ORAL at 21:42

## 2019-10-28 RX ADMIN — SERTRALINE HYDROCHLORIDE 25 MG: 20 SOLUTION ORAL at 21:43

## 2019-10-28 RX ADMIN — OXYCODONE HYDROCHLORIDE 5 MG: 5 TABLET ORAL at 18:00

## 2019-10-28 RX ADMIN — CEFTRIAXONE SODIUM 1000 MG: 10 INJECTION, POWDER, FOR SOLUTION INTRAVENOUS at 16:14

## 2019-10-28 RX ADMIN — DORZOLAMIDE HYDROCHLORIDE AND TIMOLOL MALEATE 1 DROP: 20; 5 SOLUTION/ DROPS OPHTHALMIC at 09:12

## 2019-10-28 RX ADMIN — TRAVOPROST 1 DROP: 0.04 SOLUTION/ DROPS OPHTHALMIC at 21:44

## 2019-10-28 RX ADMIN — HEPARIN SODIUM AND DEXTROSE 11.8 UNITS/KG/HR: 10000; 5 INJECTION INTRAVENOUS at 07:25

## 2019-10-28 RX ADMIN — DORZOLAMIDE HYDROCHLORIDE AND TIMOLOL MALEATE 1 DROP: 20; 5 SOLUTION/ DROPS OPHTHALMIC at 17:26

## 2019-10-28 RX ADMIN — LIDOCAINE HYDROCHLORIDE 1 APPLICATION: 20 JELLY TOPICAL at 02:42

## 2019-10-28 RX ADMIN — MICONAZOLE NITRATE: KIT at 21:47

## 2019-10-28 RX ADMIN — TRAZODONE HYDROCHLORIDE 25 MG: 50 TABLET ORAL at 21:43

## 2019-10-28 RX ADMIN — SODIUM CHLORIDE 2 UNITS/HR: 9 INJECTION, SOLUTION INTRAVENOUS at 07:27

## 2019-10-28 RX ADMIN — OXYCODONE HYDROCHLORIDE 5 MG: 5 TABLET ORAL at 17:58

## 2019-10-28 RX ADMIN — BENZOCAINE AND LEVOMENTHOL: 200; 5 SPRAY TOPICAL at 11:07

## 2019-10-28 RX ADMIN — NYSTATIN AND TRIAMCINOLONE ACETONIDE: 100000; 1 CREAM TOPICAL at 11:07

## 2019-10-28 RX ADMIN — OXYCODONE HYDROCHLORIDE 5 MG: 5 TABLET ORAL at 21:43

## 2019-10-28 RX ADMIN — PHENAZOPYRIDINE 100 MG: 100 TABLET ORAL at 09:15

## 2019-10-28 RX ADMIN — INSULIN GLARGINE 25 UNITS: 100 INJECTION, SOLUTION SUBCUTANEOUS at 21:43

## 2019-10-28 RX ADMIN — OXYCODONE HYDROCHLORIDE 2.5 MG: 5 TABLET ORAL at 13:12

## 2019-10-28 NOTE — SPEECH THERAPY NOTE
Speech Language/Pathology    Speech/Language Pathology Progress Note    Patient Name: Jennifer Flannery  ZDIFI'O Date: 10/28/2019     Problem List  Principal Problem:    Lethargy  Active Problems:    Adult onset hypothyroidism    ANAYA (generalized anxiety disorder)    Hypertension    Type 1 diabetes mellitus with diabetic neuropathy (HCC)    Ovarian cancer ([facility] )    Internal jugular (IJ) vein thromboembolism, acute, right (HCC)    Superficial thrombophlebitis of right upper extremity    A-fib (HCC)    Acute respiratory failure with hypercapnia (HCC)    Rectal bleeding    Hearing loss    Critical illness myopathy    Urinary retention       Past Medical History  Past Medical History:   Diagnosis Date    Anxiety     Arthritis     Constipation     Diabetes mellitus ([facility] )     IDDM    Frequent UTI     Glaucoma     Hearing loss     Hyperlipidemia     Hypertension     Hyperthyroidism     in past    Hyponatremia     Hypothyroid     Neuropathy     bles    Right tubo-ovarian mass     Wears glasses         Past Surgical History  Past Surgical History:   Procedure Laterality Date    APPENDECTOMY  1956    CATARACT EXTRACTION Bilateral     COLONOSCOPY  09/2014    Completed - Dr Lea Roth, due in 5 years    HYSTERECTOMY N/A 10/4/2019    Procedure: LAP TOTAL HYSTERECTOMY, BSO;  Surgeon: Williams Lopez MD;  Location: [facility];  Service: Gynecology Oncology    IR THORACENTESIS  10/25/2019    LAPAROTOMY N/A 10/4/2019    Procedure: EXPLORATORY LAPAROTOMY  EXLAP OMENTECTOMY  PELVIC AND DEANGELO-AORTIC LYMPH NODE DISSECTION  PERITONEAL BIOPSY TRANS ABDOMINUS BLOCK;  Surgeon: Williams Lopez MD;  Location: [facility];  Service: Gynecology Oncology    SKIN LESION EXCISION      Ears         Subjective:  "Im so exhausted"     Objective: The patient is OOB in chair  She is asleep, but wakes to verbal cues  The patient is seen for f/u dysphagia therapy  She continues to be NPO with Johnnie Galeana for nutrition   Patient initially refuses PO trials but requests oral care  Oral care is provided via oral swab and suction kit  Oral cavity is dry, with the removal of a minimal amount of dried secretions from lips  Daughter then arrives in room and encourages patient to have some PO trials  She is assessed with 2 ice chips and 1, 1/4 tsp honey thick water  Oral control and tolerance of bolus appears adequate  The patient is encouraged to use effortful swallow  Cough observed after honey thick liquids and patient becomes visibly more SOB  O2 sats remain adequate  She refuses additional intake and states that she is too tired  Patient and daughter are educated on risk of aspiration  Patient will most likely need alternative means of nutrition at this time  Assessment:  Patient with signs of aspiration after small trial of honey thick liquids      Plan/Recommendations:  Continue NPO and continue ST

## 2019-10-28 NOTE — UTILIZATION REVIEW
Continued Stay Review    Date: 10/27/19 Ricky                         Current Patient Class: INPATIENT   Current Level of Care: LEVEL 2 STEPDOWN    HPI:   82year old female with very complicated PMHx recent surgery, aspiration pneumonia, mechanical ventilation ileus  Transferred from [facility] + initially admitted to [facility] on 10/21/19 2nd Altered Mental Status/ lethargy, RUE Ischemia/ RIJ DVT and Right cephalic/basilic vein SVT + Hypoxemia    Per Pulm consult - Acute hypoxic and hypercapnic respiratory failure- Unclear reason for hypercapnea    10/25/19 - THORACENTESIS PROCEDURE NOTE:  Findings: Ultrasound guided right thoracentesis yielded 450 ml of serosanguinous fluid    Specimens: right pleural fluid     CURRENT ASSESSMENT/ PLAN PER   1  Acute hypoxic/hypercapnic respiratory failure  - likely due to prior aspiration versus fibrotic phase of ARDS  - moderate-severe oral pharyngeal dysphagia on video barium swallow, therefore NG tube in place  - wean nasal cannula as tolerated, currently on 4 L with pendant; will remove pendant and place only on NC, which patient should tolerate   - out of bed to chair, incentive spirometry, maintain O2 sat > 90%  - recently completed 9 days of antibiotics, recently hospitalized for pneumonia bronch cultures on 10/16 grew Serratia  - underwent right-sided thoracentesis on 10/25 with 500 cc of fluid removed, cultures thus far have remained negative; appears exudative by LDH, but low protein and albumin, likely consistent with uncomplicated parapneumonic effusion, cytology pending   - performed left sided thoracentesis this morning - 630cc yellow, serous, cloudy fluid removed, tolerated well, will send for fluid studies, LDH, protein, albumin, ph, gram stain, cell count, culture, cytology, CXR pending  - low threshold to repeat ABG if mental status worsens      2  Abnormal CT chest  - likely due to recent aspiration pneumonia and resolving ARDS with bilateral pleural effusions  - currently does not appear to be infected, monitor off antibiotics     3  Moderate pulmonary hypertension  - PASP 52 mmHg with tricuspid regurgitation and peripheral edema  - given that patient clinically appears volume overloaded,   - received 40mg IV lasix yesterday, 1 2L out, but 640cc net positive   Does have some evidence likely of contraction alkalosis on bmp, will dec to 20mg IV today, monitor intake/output, weights,    - will repeat limited echo; last echo with EF 63%     4  Lethargy  - was more lethargic this morning, but did receive 0 25 mg Xanax overnight due to agitation and did not sleep well  - did wake up for procedure this morning, continue to monitor     Pertinent Labs/Diagnostic Results:   Results from last 7 days   Lab Units 10/27/19  0425 10/26/19  1840 10/26/19  1530 10/26/19  0439  10/24/19  0454  10/23/19  0450  10/21/19  2030   WBC Thousand/uL 12 42*  --   --  11 73*   < > 11 57*  --  15 13*   < > 13 69*   HEMOGLOBIN g/dL 8 2* 8 7* 9 0* 9 3*   < > 9 0*   < > 9 4*   < > 9 0*   HEMATOCRIT % 28 6* 30 0* 30 7* 31 5*   < > 29 8*   < > 31 6*   < > 29 9*   PLATELETS Thousands/uL 364  --   --  470*   < > 459*  --  515*   < > 635*   NEUTROS ABS Thousands/µL  --   --   --  10 19*  --  10 15*  --  13 48*   < >  --    BANDS PCT %  --   --   --   --   --   --   --   --   --  1       Results from last 7 days   Lab Units 10/27/19  0425 10/26/19  0439 10/25/19  0612 10/24/19  0454  10/22/19  0601   SODIUM mmol/L 142 141 142 146*   < > 147*   POTASSIUM mmol/L 3 9 4 2 3 8 3 3*   < > 3 9   CHLORIDE mmol/L 102 103 104 106   < > 109*   CO2 mmol/L 40* 37* 36* 35*   < > 36*   ANION GAP mmol/L 0* 1* 2* 5   < > 2*   BUN mg/dL 27* 25 22 21   < > 26*   CREATININE mg/dL 0 44* 0 42* 0 45* 0 51*   < > 0 57*   EGFR ml/min/1 73sq m 94 96 94 90   < > 87   CALCIUM mg/dL 6 7* 6 7* 6 3* 6 4*   < > 6 8*   CALCIUM, IONIZED mmol/L  --  0 93* 0 83*  --   --   --    MAGNESIUM mg/dL  --  2 6 2 3  --   --  2 3   PHOSPHORUS mg/dL  --   --   --   --   --  2 7     Results from last 7 days   Lab Units 10/27/19  0425 10/26/19  0439 10/24/19  0454 10/22/19  2333 10/21/19  2030   AST U/L 50* 31 21  --  27   ALT U/L 48 31 21  --  34   ALK PHOS U/L 102 91 95  --  100   TOTAL PROTEIN g/dL 5 1* 5 3* 5 5*  --  5 9*   ALBUMIN g/dL 2 0* 2 0* 2 4*  --  2 0*   TOTAL BILIRUBIN mg/dL 0 16* 0 29 0 67  --  0 54   AMMONIA umol/L  --   --   --  64*  --      Results from last 7 days   Lab Units 10/27/19  2359 10/27/19  2230 10/27/19  2032   POC GLUCOSE mg/dl 124 71 390*     Results from last 7 days   Lab Units 10/27/19  1436 10/23/19  1608 10/23/19  1258   PH ART  7 503* 7 328* 7 351   PCO2 ART mm Hg 47 3* 71 0* 67 3*   PO2 ART mm Hg 68 8* 92 2 93 2   HCO3 ART mmol/L 36 3* 36 4* 36 4*   BASE EXC ART mmol/L 11 8 8 5 9 0   O2 CONTENT ART mL/dL 13 7* 14 1* 14 0*   O2 HGB, ARTERIAL % 95 4 95 7 96 0   ABG SOURCE  Brachial, Left Radial, Left Radial, Left     Results from last 7 days   Lab Units 10/21/19  2030   TROPONIN I ng/mL 0 03       Results from last 7 days   Lab Units 10/27/19  1630  10/22/19  0950 10/21/19  2030   PROTIME seconds  --   --  15 7* 18 1*   INR   --   --  1 29* 1 55*   PTT seconds 41*   < > 26 27     Results from last 7 days   Lab Units 10/22/19  0601   TSH 3RD GENERATON uIU/mL 4 120*     Results from last 7 days   Lab Units 10/23/19  0851 10/22/19  1218   PROCALCITONIN ng/ml 0 19 0 16     Results from last 7 days   Lab Units 10/22/19  0950 10/21/19  2030   LACTIC ACID mmol/L 0 7 1 3     Results from last 7 days   Lab Units 10/26/19  1127 10/22/19  1110   CLARITY UA  Turbid Cloudy   COLOR UA  Red Yellow   SPEC GRAV UA  1 011 1 013   PH UA  7 5 5 0   GLUCOSE UA mg/dl 100 (1/10%)* >=1000 (1%)*   KETONES UA mg/dl Negative Negative   BLOOD UA  Large* Moderate*   PROTEIN UA mg/dl >=1000 (4+)* Negative   NITRITE UA  Negative Negative   BILIRUBIN UA  Interference- unable to analyze* Negative   UROBILINOGEN UA E U /dl 0 2 0 2   LEUKOCYTES UA  Trace* Elevated glucose may cause decreased leukocyte values  See urine microscopic for [facility] result/*   WBC UA /hpf 20-30* 2-4*   RBC UA /hpf Innumerable* None Seen   BACTERIA UA /hpf None Seen Occasional   EPITHELIAL CELLS WET PREP /hpf Occasional None Seen     Results from last 7 days   Lab Units 10/27/19  1129 10/26/19  1127 10/25/19  1100 10/21/19  2149 10/21/19  2030   BLOOD CULTURE   --   --   --  No Growth After 5 Days  No Growth After 5 Days     GRAM STAIN RESULT  Rare Polys  No bacteria seen  --  Rare Polys  Rare Mononuclear Cells  No bacteria seen  --   --    URINE CULTURE   --  Culture too young- will reincubate  --   --   --    BODY FLUID CULTURE, STERILE  No growth  --  Growth in Broth culture only Bacillus species NOT anthracis*  --   --        Vital Signs:     10/27/19 0326 10/27/19 0500 10/27/19 0556 10/27/19 0724   BP: 113/52     107/52   BP Location:           Pulse: 85     83   Resp: 22     20   Temp: 98 4 °F (36 9 °C)     (!) 97 4 °F (36 3 °C)   TempSrc: Oral     Axillary   SpO2: 100%     100%   Weight:   65 9 kg (145 lb 4 5 oz) 65 9 kg (145 lb 4 5 oz)     Height:                  Intake/Output Summary (Last 24 hours) at 10/27/2019 0943  Gross per 24 hour   Intake 1755 13 ml   Output 1213 ml   Net 542 13 ml     Diet Enteral/Parenteral; Tube Feeding No Oral Diet; Jevity 1 2 Shashank; Continuous; 10; 100; Every 6 hours     Scheduled Medications:   acetaminophen 975 mg Oral Q8H [address]   benzocaine-menthol-lanolin-aloe  Topical BID   cefTRIAXone 1,000 mg Intravenous Q24H   dorzolamide-timolol 1 drop Both Eyes BID   levothyroxine 50 mcg Per NG Tube Early Morning   lidocaine 1 patch Topical Daily   melatonin 6 mg Oral HS   miconazole 200 mg Vaginal HS   Netarsudil Dimesylate 1 drop Right Eye HS   nystatin-triamcinolone  Topical BID   pantoprazole 40 mg Intravenous Q24H GENNARO   phenazopyridine 100 mg Oral TID   sertraline 25 mg Per NG Tube HS   travoprost 1 drop Both Eyes HS   traZODone 25 mg Oral HS     Continuous IV Infusions:  heparin (porcine) 3-30 Units/kg/hr (Order-Specific) Intravenous Titrated   insulin regular (HumuLIN R,NovoLIN R) infusion 0 3-21 Units/hr Intravenous Titrated     PRN MEDS:  acetaminophen 650 mg Rectal Q6H PRN  GIVEN X 1/ 24 HRS   albuterol 2 5 mg Nebulization Q4H PRN   aluminum-magnesium hydroxide-simethicone 15 mL Per NG Tube Q6H PRN   artificial tear  Both Eyes HS PRN   bisacodyl 10 mg Rectal Daily PRN   dextran 70-hypromellose 1 drop Both Eyes Q3H PRN   heparin (porcine) 2,400 Units Intravenous PRN   heparin (porcine) 4,800 Units Intravenous PRN   miconazole  Topical BID PRN  GIVEN X 2/ 24 HRS   ondansetron 4 mg Intravenous Q6H PRN   oxyCODONE 2 5 mg Oral Q4H PRN  GIVEN X 4/ 24 HRS   senna 8 8 mg Per NG Tube Daily PRN   simethicone 40 mg Oral Q6H PRN     Discharge Plan:   TO BE DETERMINED / AT [facility]  INPT CASE MANAGEMENT FOLLOWING CLOSELY FOR ALL DISCHARGE NEEDS    Network Utilization Review Department  [email or web address]  ATTENTION: Please call with any questions or concerns to [phone] and carefully listen to the prompts so that you are directed to the right person  All voicemails are confidential   Lashaun Cruz all requests for admission clinical reviews, approved or denied determinations and any other requests to dedicated fax number below belonging to the campus where the patient is receiving treatment    FACILITY NAME UR FAX NUMBER   ADMISSION DENIALS (Administrative/Medical Necessity) [address] [facility] (Maternity/NICU/Pediatrics) [phone]   [facility] [address] [facility] [phone]   [address] [place] [address] 15[address] [facility] [phone]   Dione Bhatia [address] [place] [address] [phone]

## 2019-10-28 NOTE — CONSULTS
[address] NOTE   Admission Date: 10/21/2019    Patient Identifiers: Yanira Barraza (MRN: [id number])  Service Requesting Consultation: Radha Burns MD  Service Providing Consultation:  Urology, Rosemary Oshea PA-C  Consults  Date of Service: 10/28/2019    Reason for Consultation:   Urinary retention    History of Present Illness:     Yanira Barraza is a 82 y o  Female with no prior urologic problems and a fairly complicated recent medical history  She had a hysterectomy for a tubo-ovarian mass  Her hospital course was complicated by cardiac arrest pneumonia ARDS metabolic acidosis and AFib  She went to [facility] for further evaluation she was noted to have increased lethargy and was brought back to the hospital where a feeding tube was placed   For dysphagia  She was unable to empty her bladder and was undergoing straight catheterization and a Ji catheter  This was removed on Friday due to patient discomfort however she still has significant postvoid residuals and the Ji catheter will be reinserted  Gynecology is treating her for vulvo vaginosis  On her CT scan October 24th there was a large amount of stool in the rectal vault  She is very weak but awake and alert  Not moving very much      Past Medical, Past Surgical History:     Past Medical History:   Diagnosis Date    Anxiety     Arthritis     Constipation     Diabetes mellitus ([address] )     IDDM    Frequent UTI     Glaucoma     Hearing loss     Hyperlipidemia     Hypertension     Hyperthyroidism     in past    Hyponatremia     Hypothyroid     Neuropathy     bles    Right tubo-ovarian mass     Wears glasses    :    Past Surgical History:   Procedure Laterality Date    APPENDECTOMY  1954    CATARACT EXTRACTION Bilateral     COLONOSCOPY  09/2014    Completed - Dr Milli Munoz, due in 5 years    HYSTERECTOMY N/A 10/4/2019    Procedure: LAP TOTAL HYSTERECTOMY, BSO;  Surgeon: Lauren Guzman Eneida Modi MD;  Location: [facility];  Service: Gynecology Oncology    IR THORACENTESIS  10/25/2019    LAPAROTOMY N/A 10/4/2019    Procedure: EXPLORATORY LAPAROTOMY  EXLAP OMENTECTOMY  PELVIC AND DEANGELO-AORTIC LYMPH NODE DISSECTION  PERITONEAL BIOPSY TRANS ABDOMINUS BLOCK;  Surgeon: Reanna Shin MD;  Location[facility];  Service: Gynecology Oncology    SKIN LESION EXCISION      Ears   :    Medications, Allergies:     Current Facility-Administered Medications:     acetaminophen (TYLENOL) oral suspension 975 mg, 975 mg, Oral, Q8H [facility], Tuesday M JIM Peres, 975 mg at 10/28/19 1309    acetaminophen (TYLENOL) rectal suppository 650 mg, 650 mg, Rectal, Q6H PRN, Blair Phillips MD, 650 mg at 10/27/19 1154    albuterol inhalation solution 2 5 mg, 2 5 mg, Nebulization, Q4H PRN, Brian De La Rosa MD, 2 5 mg at 10/23/19 0816    aluminum-magnesium hydroxide-simethicone (MYLANTA) 200-200-20 mg/5 mL oral suspension 15 mL, 15 mL, Per NG Tube, Q6H PRN, Brian De La Rosa MD    artificial tear (LUBRIFRESH P M ) ophthalmic ointment, , Both Eyes, HS PRN, Brian De La Rosa MD    benzocaine-menthol-lanolin-aloe (DERMOPLAST) 20-0 5 % topical spray, , Topical, BID, Immanuel Levin MD    bisacodyl (DULCOLAX) rectal suppository 10 mg, 10 mg, Rectal, Daily PRN, Janice Saldaña MD    cefTRIAXone (ROCEPHIN) 1,000 mg in dextrose 5 % 50 mL IVPB, 1,000 mg, Intravenous, Q24H, Janice Saldaña MD, Stopped at 10/27/19 1704    dextran 70-hypromellose (GENTEAL TEARS) 0 1-0 3 % ophthalmic solution 1 drop, 1 drop, Both Eyes, Q3H PRN, Kenia Aguila DO, 1 drop at 10/26/19 2120    dorzolamide-timolol (COSOPT) 22 3-6 8 MG/ML ophthalmic solution 1 drop, 1 drop, Both Eyes, BID, Brian De La Rosa MD, 1 drop at 10/28/19 0912    heparin (porcine) 25,000 units in 250 mL infusion (premix), 3-30 Units/kg/hr (Order-Specific), Intravenous, Titrated, Shazia Kumar PA-C, Last Rate: 7 1 mL/hr at 10/28/19 0725, 11 8 Units/kg/hr at 10/28/19 0725   heparin (porcine) injection 2,400 Units, 2,400 Units, Intravenous, PRN, Shazia Kumar PA-C    heparin (porcine) injection 4,800 Units, 4,800 Units, Intravenous, PRN, Dante Carvalho PA-C, 4,800 Units at 10/27/19 1718    insulin regular (HumuLIN R,NovoLIN R) 1 Units/mL in sodium chloride 0 9 % 100 mL infusion, 0 3-21 Units/hr, Intravenous, Titrated, Catrachita Avila MD, Last Rate: 2 mL/hr at 10/28/19 1206, 2 Units/hr at 10/28/19 1206    levothyroxine tablet 50 mcg, 50 mcg, Per NG Tube, Early Morning, Gaurav Swift MD, 50 mcg at 10/28/19 0627    lidocaine (LIDODERM) 5 % patch 1 patch, 1 patch, Topical, Daily, Catrachita Avila MD, Stopped at 10/28/19 0913    melatonin tablet 6 mg, 6 mg, Oral, HS, Mir Thomas MD, 6 mg at 10/27/19 2246    miconazole (MONISTAT) external vaginal cream, , Topical, BID PRN, 1 application at 72/97/51 2250 **AND** miconazole (MONISTAT) vaginal suppository 200 mg, 200 mg, Vaginal, HS, Catrachita Avila MD, 200 mg at 10/27/19 2252    Netarsudil Dimesylate 0 02 % SOLN 1 drop, 1 drop, Right Eye, HS, Catrachita Avila MD, 1 drop at 10/27/19 2255    nystatin-triamcinolone (MYCOLOG-II) cream, , Topical, BID, Marda Essex, MD    [facility]ron [facility]) injection 4 mg, 4 mg, Intravenous, Q6H PRN, Gaurav Swift MD, 4 mg at 10/26/19 0107    oxyCODONE (ROXICODONE) IR tablet 2 5 mg, 2 5 mg, Oral, Q4H PRN, Mir Thomas MD, 2 5 mg at 10/28/19 1312    pantoprazole (PROTONIX) injection 40 mg, 40 mg, Intravenous, Q24H [facility], Catrachita Avila MD, 40 mg at 10/28/19 0913    phenazopyridine (PYRIDIUM) tablet 100 mg, 100 mg, Oral, TID, Catrachita Avila MD, 100 mg at 10/28/19 0915    senna 8 8 mg/5 mL oral syrup 8 8 mg, 8 8 mg, Per NG Tube, Daily PRN, Gaurav Swift MD    sertraline (ZOLOFT) oral concentrated solution 25 mg, 25 mg, Per NG Tube, HS, Catrachita Avial MD, 25 mg at 10/27/19 2246    simethicone (MYLICON) oral suspension 40 mg, 40 mg, Oral, Q6H PRN, Catrachita Avila MD, 40 mg at 10/24/19 1417   travoprost (TRAVATAN-Z) 0 004 % ophthalmic solution 1 drop, 1 drop, Both Eyes, HS, Christianne Alston MD, 1 drop at 10/27/19 2251    traZODone (DESYREL) tablet 25 mg, 25 mg, Oral, HS, Eric Mae MD, 25 mg at 10/27/19 2245    Allergies: Allergies   Allergen Reactions    Thiazide-Type Diuretics      Hyponatremia   :    Social and Family History:   Social History:   Social History     Tobacco Use    Smoking status: Never Smoker    Smokeless tobacco: Never Used   Substance Use Topics    Alcohol use: Never     Frequency: Monthly or less     Drinks per session: 1 or 2     Binge frequency: Never     Comment: wine    Drug use: No        Social History     Tobacco Use   Smoking Status Never Smoker   Smokeless Tobacco Never Used       Family History:  Family History   Problem Relation Age of Onset    Heart attack Mother     Diabetes type II Mother     Dementia Father     Stroke Father         CVA    Breast cancer Maternal Aunt     Stomach cancer Maternal Uncle     Diabetes Family     Stroke Family         Complications   :     Review of Systems:     General: Fever, chills, or night sweats: negative  Cardiac: Negative for chest pain  Pulmonary: Negative for shortness of breath  Gastrointestinal: Abdominal pain negative  Nausea, vomiting, or diarrhea negative,  Genitourinary: See HPI above  Patient does not have hematuria  All other systems queried were negative  Physical Exam:   General: Patient is pleasant and in NAD  Awake and alert  /63 (BP Location: Left arm)   Pulse 78   Temp 98 1 °F (36 7 °C) (Oral)   Resp 20   Ht 5' 5" (1 651 m)   Wt 65 9 kg (145 lb 4 5 oz)   SpO2 95%   BMI 24 18 kg/m²   Cardiac: Peripheral edema: negative  Pulmonary: Non-labored breathing  Abdomen: Soft, non-tender, non-distended  No surgical scars  No masses, tenderness, hernias noted  Genitourinary: Negative CVA tenderness, negative suprapubic tenderness      (FeMale) Her daughter was present for the  exam  :Pelvic: urethra without abnormality or discharge,   There is some  redness and tenderness  and urethral meatus      Labs:     Lab Results   Component Value Date    HGB 8 8 (L) 10/28/2019    HCT 29 6 (L) 10/28/2019    WBC 15 61 (H) 10/28/2019     10/28/2019   ]    Lab Results   Component Value Date     (L) 01/08/2016    K 3 9 10/28/2019     10/28/2019    CO2 38 (H) 10/28/2019    BUN 28 (H) 10/28/2019    CREATININE 0 50 (L) 10/28/2019    CALCIUM 7 0 (L) 10/28/2019    GLUCOSE 75 01/08/2016   ]    Imaging:   I personally reviewed the images and report of the following studies, and reviewed them with the patient:   CT CHEST, ABDOMEN AND PELVIS WITHOUT IV CONTRAST        IMPRESSION:     Very large bolus of stool in the rectum strongly suggestive of fecal impaction  Moderate stool and gaseous distention of large bowel loops proximal to the rectum consistent with constipation  This probably accounts for the increasing abdominal   distention discussed in the patient's history  Small volume of abdominopelvic ascites, increased when compared to prior examination  Slight progression of body wall edema  Worsening extensive multifocal pneumonia and increasing bilateral pleural effusions  ASSESSMENT:      1  Urinary retention   2  Ovarian cancer status post QASIM BSO   3  Candida vulvovaginitis- on topical antifungal   4  Generalized deconditioning   5  Previous ARDS and hypoxic respiratory failure    PLAN:   - etiology of urinary retention is multifactorial  - there does not appear to be evidence for UTI  - assure adequate bowel program  - trial of voiding when stronger  - urology will continue to follow    Thank you for allowing me to participate in this patients care  Please do not hesitate to call with any additional questions    Martin Garcia PA-C

## 2019-10-28 NOTE — PROGRESS NOTES
Progress Note - Colton Rowe 82 y o  female MRN: [id number]    Unit/Bed#: [facility] 530-01 Encounter: [id number]      Assessment/Plan:  1  Lethargy  Improved, patient awake and alert, oriented x3  Continue delirium precautions  Treat pain  Monitor for constipation  Encourage daytime activity  Maintain circadian rhythm, medications revised by palliative care team    2  Anxiety  Started on Zoloft 25 mg p o  Daily  Continue supportive care    3  Acute pain  Continue Tylenol  Lidoderm to back    4  Deconditioning  Encourage ambulation, out of bed  PT/OT  Speech consult  Continue tube feed via [address] [place] [address] consult    5  Hearing impairment  Decreased hearing to left ear  New since previous hospitalization  Follow up with ENT as outpatient    6  Ambulatory dysfunction  PT/OT  Rehab post hospitalization    7  Insomnia  Continue melatonin 6 mg  Started on trazodone by palliative care team    8  Urinary retention  Patient failed voiding trial  Continues with Ji  Urology and Gynecology on consult    10  Acute hypoxic and hypercapnic respiratory failure  Pulmonary on consult  Oxygen at 4 L nasal cannula  Infectious Disease on consult, patient started on antibiotic    11  Rectal bleeding  Resolved  GI on consult    12  Superficial thrombophlebitis of right upper extremity, internal jugular vein thromboembolism  Continue heparin drip  Management by Internal Medicine    13  Type 1 diabetes  Endocrine on consult                Subjective: On exam patient lying in bed  She is alert and orient x3  Visitors at bedside  Patient states that she had a rough weekend  She states that she still not feeling well  Objective:     Vitals: Blood pressure 134/63, pulse 78, temperature 98 1 °F (36 7 °C), temperature source Oral, resp  rate 20, height 5' 5" (1 651 m), weight 65 9 kg (145 lb 4 5 oz), SpO2 95 %, not currently breastfeeding  ,Body mass index is 24 18 kg/m²        Intake/Output Summary (Last 24 hours) at 10/28/2019 1901 [facility] filed at 10/28/2019 1101  Gross per 24 hour   Intake 2128 76 ml   Output 500 ml   Net 1628 76 ml       Physical Exam:   General : NAD  HEENT : dry, voice soft  Heart : Normal rate, no murmur rub or gallop  Lungs : decreased  Abdomen : Soft, NT/ND, BS auscultated in all 4 quads  Ext :  edema to left arm, hand  Skin : Pink, warm, dry, age appropriate turgor and mobility  Neuro : Nonfocal  Psych : Alert and O x 3      Invasive Devices     Peripherally Inserted Central Catheter Line            PICC Line 42/55/87 Left Basilic 14 days          Drain            NG/OG/Enteral Tube Nasogastric 8 Fr Right nares 5 days    Urethral Catheter less than 1 day                Lab, Imaging and other studies: I have personally reviewed pertinent reports      VTE Pharmacologic Prophylaxis: Sequential compression device (Venodyne)   VTE Mechanical Prophylaxis: sequential compression device

## 2019-10-28 NOTE — PROGRESS NOTES
Progress note - Palliative and Supportive Care   Jennifer Flannery 82 y o  female [id number]    Assessment:  Patient Active Problem List   Diagnosis    Adult onset hypothyroidism    ANAYA (generalized anxiety disorder)    Hyperlipidemia    Hypertension    Retinopathy, diabetic, proliferative ([facility] )    Type 1 diabetes mellitus with diabetic neuropathy ([facility] )    Age-related osteoporosis without current pathological fracture    Hyponatremia    Ovarian cancer ([place] [address] )    Type 1 diabetes mellitus with hyperglycemia ([facility]    Internal jugular (IJ) vein thromboembolism, acute, right ([facility])    Superficial thrombophlebitis of right upper extremity    A-fib (H[facility])    Acute respiratory failure with hypercapnia ([facility])    Lethargy    Rectal bleeding    Hearing loss    Critical illness myopathy    Urinary retention     Plan:  1  Symptom management -   Pain   - tylenol   - lidocaine patches   - oxycodone 2 5mg PO Q4H PRN moderate pain (hold parameters)   - oxycodone 5mg PO Q4H PRN severe pain(hold parameters)   - oxycodone 5mg PO QID GENNARO (hold parameters)    Insomnia   - melatonin 5mg HS   - trazodone 25mg HS   - consider zyprexa 2 5mg HS if persistent insomnia   - Recommend global delirium precautions including:    - Establishment of day/night cycle via lights during the day and blinds open   Please limit interruptions at night as medically appropriate  - Provide glasses/hearing aids as apprioriate      - Minimize deliriogenic meds as able  - Provide reorientation including date on board and visible clock  - Avoid restraints as able, frequent verbal reorientations or patient care sitter as appropriate  2  Goals -    - Patient unable to clearly express her goals beyond wanting her pain better controlled   - Patient's daughter expresses wishes for full medical cares to "get stronger" and return as close to baseline as possible       Code Status: full - Level 1   Decisional apparatus:  Patient is not competent on my exam today  If competence is lost, patient's substitute decision maker would default to adult children by [place]  Advance Directive / Living Will / POLST:  None on file    Interval history:       Patient continues to expereince severe perineal burning secondary to thought vulvovaginitis for which patient was given a 1x dose of diflucan and was started on monistat and nystatin  Patient continues on ceftriaxone for CXR concerning for pneumonia  Speech therapy reassessed, but still not safe for PO diet  24H Pain History  - scores 9-10/10 in past 24H  - Location: vagina  - Quality: burning  - Alleviation: minimal relief from oxycodone  - Exacerbation: nystatin cream  - 4 PRNs  - OME 15    Patient reports minimal relief with 2 5mg oxycodone and no relief/worsening effect from lidocaine  She also endorses that nystatin cream burns but understands this is guided toward treatment of the candidiasis  Patient does feel an icepack is somewhat helpful  She was able to transfer to the chair today with the help of PT/OT which she is proud of        MEDICATIONS / ALLERGIES:     all current active meds have been reviewed and current meds:   Current Facility-Administered Medications   Medication Dose Route Frequency    acetaminophen (TYLENOL) oral suspension 975 mg  975 mg Oral Q8H [address]    acetaminophen (TYLENOL) rectal suppository 650 mg  650 mg Rectal Q6H PRN    albuterol inhalation solution 2 5 mg  2 5 mg Nebulization Q4H PRN    aluminum-magnesium hydroxide-simethicone (MYLANTA) 200-200-20 mg/5 mL oral suspension 15 mL  15 mL Per NG Tube Q6H PRN    artificial tear (LUBRIFRESH P M ) ophthalmic ointment   Both Eyes HS PRN    benzocaine-menthol-lanolin-aloe (DERMOPLAST) 20-0 5 % topical spray   Topical BID    bisacodyl (DULCOLAX) rectal suppository 10 mg  10 mg Rectal Daily PRN    cefTRIAXone (ROCEPHIN) 1,000 mg in dextrose 5 % 50 mL IVPB  1,000 mg Intravenous Q24H    dextran 70-hypromellose (GENTEAL TEARS) 0 1-0 3 % ophthalmic solution 1 drop  1 drop Both Eyes Q3H PRN    dorzolamide-timolol (COSOPT) 22 3-6 8 MG/ML ophthalmic solution 1 drop  1 drop Both Eyes BID    heparin (porcine) 25,000 units in 250 mL infusion (premix)  3-30 Units/kg/hr (Order-Specific) Intravenous Titrated    heparin (porcine) injection 2,400 Units  2,400 Units Intravenous PRN    heparin (porcine) injection 4,800 Units  4,800 Units Intravenous PRN    insulin regular (HumuLIN R,NovoLIN R) 1 Units/mL in sodium chloride 0 9 % 100 mL infusion  0 3-21 Units/hr Intravenous Titrated    levothyroxine tablet 50 mcg  50 mcg Per NG Tube Early Morning    lidocaine (LIDODERM) 5 % patch 1 patch  1 patch Topical Daily    melatonin tablet 6 mg  6 mg Oral HS    miconazole (MONISTAT) external vaginal cream   Topical BID PRN    And    miconazole (MONISTAT) vaginal suppository 200 mg  200 mg Vaginal HS    Netarsudil Dimesylate 0 02 % SOLN 1 drop  1 drop Right Eye HS    nystatin-triamcinolone (MYCOLOG-II) cream   Topical BID    ondansetron (ZOFRAN) injection 4 mg  4 mg Intravenous Q6H PRN    oxyCODONE (ROXICODONE) IR tablet 2 5 mg  2 5 mg Oral Q4H PRN    oxyCODONE (ROXICODONE) IR tablet 5 mg  5 mg Oral Q4H PRN    oxyCODONE (ROXICODONE) IR tablet 5 mg  5 mg Oral 4x Daily    pantoprazole (PROTONIX) injection 40 mg  40 mg Intravenous Q24H GENNARO    senna 8 8 mg/5 mL oral syrup 8 8 mg  8 8 mg Per NG Tube Daily PRN    sertraline (ZOLOFT) oral concentrated solution 25 mg  25 mg Per NG Tube HS    simethicone (MYLICON) oral suspension 40 mg  40 mg Oral Q6H PRN    travoprost (TRAVATAN-Z) 0 004 % ophthalmic solution 1 drop  1 drop Both Eyes HS    traZODone (DESYREL) tablet 25 mg  25 mg Oral HS       Allergies   Allergen Reactions    Thiazide-Type Diuretics      Hyponatremia       OBJECTIVE:    Physical Exam  Physical Exam   Constitutional: She appears well-developed  HENT:   Head: Normocephalic and atraumatic     Eyes: Conjunctivae are normal    Cardiovascular: Normal rate  Pulmonary/Chest:   O2 via NC, tachypnea associated with discomfort   Abdominal: Soft  She exhibits no distension  There is no tenderness  Musculoskeletal: She exhibits edema  Neurological: She is alert  Poor attention/concentration, hard of hearing   Psychiatric:   irritable       Lab Results:   I have personally reviewed pertinent labs  , CBC:   Lab Results   Component Value Date    WBC 15 61 (H) 10/28/2019    HGB 8 8 (L) 10/28/2019    HCT 29 6 (L) 10/28/2019     (H) 10/28/2019     10/28/2019    MCH 32 2 10/28/2019    MCHC 29 7 (L) 10/28/2019    RDW 15 4 (H) 10/28/2019    MPV 10 1 10/28/2019   , CMP:   Lab Results   Component Value Date    SODIUM 141 10/28/2019    K 3 9 10/28/2019     10/28/2019    CO2 38 (H) 10/28/2019    BUN 28 (H) 10/28/2019    CREATININE 0 50 (L) 10/28/2019    CALCIUM 7 0 (L) 10/28/2019    AST 56 (H) 10/28/2019    ALT 58 10/28/2019    ALKPHOS 129 (H) 10/28/2019    EGFR 90 10/28/2019     Imaging Studies: reviewed pertinent studies  EKG, Pathology, and Other Studies: reviewed pertinent studies    Counseling / Coordination of Care  Total floor / unit time spent today 35+ minutes  Greater than 50% of total time was spent with the patient and / or family counseling and / or coordination of care  A description of the counseling / coordination of care: time spent discussing symptom management, goals of care, communication with RN

## 2019-10-28 NOTE — PROGRESS NOTES
Progress Note - Infectious Disease   Omar Gardner 82 y o  female MRN: [id number]  Unit/Bed#: [facility] 530-01 Encounter: [id number]      Impression/Recommendations:  1  Abnormal CXR/chest CT with diffuse/bilateral infiltrates  Patient had recent admission earlier this month with ARDS  I suspected CXR/chest CT changes we see now are residual effect from recent ARDS  Patient has no fever or significant leukocytosis  Procalcitonin is in the normal range  Antibiotic was started but discontinued  Patient remains well off prior antibiotic  No antibiotic necessary for this  Monitor respiratory symptoms      2  Bilateral pleural effusions  No evidence of loculation on CT  Without clinical pneumonia, doubt empyema  Patient is status post thoracentesis x 2  Pleural fluid parameters not consistent with empyema  Bacillus growing in pleural fluid culture is most likely contaminant  Monitor respiratory symptoms      3  Acute hypoxic respiratory failure  I suspect this is residual effect from recent ARDS  Patient is improved O2 support, and thoracentesis  No evidence of pneumonia clinically, as in above  O2 support per primary and Pulmonary service  Monitor respiratory status      4   Urinary retention with dysuria  UA is benign  Doubt UTI  Dysuria improved with multiple maneuvers  For now, will continue antibiotic for 24 hours, pending urine culture  Continue ceftriaxone  Follow-up on pending urine culture  Monitor urinary symptoms  5  Candida vulvovaginitis  Patient is on topical antifungal     6  Lethargy, multifactorial   Hypoxia probably plays a role, in addition to recent extensive and prolonged illness  No obvious active infection  Monitor      7  Abnormal abdominal CT with extensive stool in rectum  Abdominal exam is mildly distended but otherwise benign  No clinical colitis/proctitis  Monitor      8  Ovarian cancer, status post hysterectomy/oophorectomy    Gyn oncology follow-up      Discussed with patient and her daughter in detail regarding above plan  Discussed with Dr Brooklyn Bardales from [facility] service  Antibiotics:  Ceftriaxone # 2    Subjective:  Events over the weekend noted  Patient had significant dysuria, felt to be secondary to Ji catheter  Catheter was removed  Dysuria worsen  Catheter was reinserted  Antifungal was started for possible Candida vaginitis  Ceftriaxone was started for possible UTI  Dysuria is improved today  Dyspnea improved after 2nd thoracentesis  Mental status baseline  Temperature stays down  No chills  She is tolerating antibiotic well  No nausea, vomiting or diarrhea  Objective:  Vitals:  Temp:  [97 4 °F (36 3 °C)-98 1 °F (36 7 °C)] 98 1 °F (36 7 °C)  HR:  [78-96] 78  Resp:  [18-22] 20  BP: (106-146)/(56-72) 134/63  SpO2:  [88 %-99 %] 95 %  Temp (24hrs), Av 8 °F (36 6 °C), Min:97 4 °F (36 3 °C), Max:98 1 °F (36 7 °C)  Current: Temperature: 98 1 °F (36 7 °C)    Physical Exam:     General: Awake, alert, cooperative, no distress  Neck:  Supple  No mass  No lymphadenopathy  Lungs: Decreased breath sounds, sparse basilar rales, no wheezing, respirations unlabored  Heart:  Regular rate and rhythm, S1 and S2 normal, no murmur  Abdomen: Soft, nondistended, non-tender, bowel sounds active all four quadrants,        no masses, no organomegaly  Extremities: Stable leg edema  No erythema/warmth  No ulcer  Nontender to palpation  Skin:  No rash  Neuro: Moves all extremities  Invasive Devices     Peripherally Inserted Central Catheter Line            PICC Line 72/15/73 Left Basilic 13 days          Drain            NG/OG/Enteral Tube Nasogastric 8 Fr Right nares 4 days    Urethral Catheter less than 1 day                Labs studies:   I have personally reviewed pertinent labs    Results from last 7 days   Lab Units 10/28/19  0453 10/27/19  0425 10/26/19  0439   POTASSIUM mmol/L 3 9 3 9 4 2   CHLORIDE mmol/L 100 102 103   CO2 mmol/L 38* 40* 37*   BUN mg/dL 28* 27* 25   CREATININE mg/dL 0 50* 0 44* 0 42*   EGFR ml/min/1 73sq m 90 94 96   CALCIUM mg/dL 7 0* 6 7* 6 7*   AST U/L 56* 50* 31   ALT U/L 58 48 31   ALK PHOS U/L 129* 102 91     Results from last 7 days   Lab Units 10/28/19  0453 10/27/19  0425 10/26/19  1840  10/26/19  0439   WBC Thousand/uL 15 61* 12 42*  --   --  11 73*   HEMOGLOBIN g/dL 8 8* 8 2* 8 7*   < > 9 3*   PLATELETS Thousands/uL 361 364  --   --  470*    < > = values in this interval not displayed  Results from last 7 days   Lab Units 10/27/19  1129 10/26/19  1127 10/25/19  1100 10/22/19  2002 10/21/19  2149 10/21/19  2030   BLOOD CULTURE   --   --   --   --  No Growth After 5 Days  No Growth After 5 Days  GRAM STAIN RESULT  Rare Polys  No bacteria seen  --  Rare Polys  Rare Mononuclear Cells  No bacteria seen  --   --   --    URINE CULTURE   --  Culture too young- will reincubate  --   --   --   --    BODY FLUID CULTURE, STERILE  No growth  --  Growth in Broth culture only Bacillus species NOT anthracis*  --   --   --    MRSA CULTURE ONLY   --   --   --  No Methicillin Resistant Staphlyococcus aureus (MRSA) isolated  --   --        Imaging Studies:   I have personally reviewed pertinent imaging study reports and images in PACS  EKG, Pathology, and Other Studies:   I have personally reviewed pertinent reports

## 2019-10-28 NOTE — PLAN OF CARE
Problem: PHYSICAL THERAPY ADULT  Goal: Performs mobility at highest level of function for planned discharge setting  See evaluation for individualized goals  Description  Treatment/Interventions: Functional transfer training, LE strengthening/ROM, Therapeutic exercise, Endurance training, Cognitive reorientation, Patient/family training, Equipment eval/education, Bed mobility, Spoke to nursing, Family  Equipment Recommended: Jaky Colvin       See flowsheet documentation for full assessment, interventions and recommendations  Outcome: Progressing  Note:   Prognosis: Guarded  Problem List: Decreased strength, Decreased range of motion, Decreased endurance, Impaired balance, Decreased mobility, Decreased coordination, Decreased cognition, Decreased safety awareness, Pain  Assessment: Pt  is an 83 yo F who presents with lethargy and weakness s/p recent hospitlization with mulitple complications  Pt  was identified with full name and birthdate  Pt  agreeable to PT session  Pt  Continues to demonstrate need for MaxAx2 to perform transfers with limited standing tolerance and postural and gait mechanics degradation noted with fatigue  Pt  Is progressing towards goals, however progress is slowed 2/2 limited activity tolerance and multiple medical status limitations  Pt  Will benefit from continued skilled therapy to increased functional independence and aid patient in return to PLOF with decreased burden of care  Barriers to Discharge: Inaccessible home environment, Decreased caregiver support     Recommendation: Post acute IP rehab     PT - OK to Discharge: Yes    See flowsheet documentation for full assessment

## 2019-10-28 NOTE — PROGRESS NOTES
Progress Note - Pulmonary   Tahir Beasley 82 y o  female MRN: [id number]  Unit/Bed#: [facility] 530-01 Encounter: [id number]      Impressions:    Acute hypoxic and hypercapnic respiratory failure- Unclear reason for hypercapnea, may be related to prior aspiration vs fibrotic phase of ARDs  Continue with xopenex nebulizer TID  atrovent was d/dante during last hospitalization for urinary retention  - MRSA nares neg  - Sputum culture not obtained  - procal is negative  - Video barium swallow with mod-severe oropharyngeal dysphagia  Plan:  - Continue xopenex  - F/up sputum culture  - Continue with O2 NC, wean as tolerated, now 98% on 4L which is a significant improvement    Bilateral pleural effusions- RT thoracentesis on 10/25 of 500cc and recent left sided of 600cc both appear to be transudative  Plan:  - Stable on CXR  - Awaiting cytology and remaining labs     Abnormal CT chest- likely due to recent aspiration event  Treated with 9 days of abx for aspiration pneumonia  Previous sputum culture with serratia  Candida likely colonizer  ID previously evaluated  WBC improving  Plan:  - Trend WBC  - ID on board     Previous moderate pulm HTN- PASP 52mmHg  with tricuspid regurg  Still has significant peripheral edema  Recommend diuresis and monitor Is/Os closely  Stop lasix as there may be some contraction alkalosis  Echo yesterday with an EF of 65% and a peak PA pressure of 36mmHg which is borderline mild pulm HTN    Lethargy- unclear etiology  No evidence of UTI  Does have urinary retention  ABG with mild hypercarbia but not signficant to explant level of lethargy  Patient is awake and follows all commands but is minimally verbal  CT head negative  Neurology consulted  Received xanax over the weekend  Macrocytic anemia, will check a B12 level       Hx of recent pneumonia- bronch cultures on 10/16 grew serratia  Patient completed 9 days of antibiotics  Subjective:   Patient seen and examined bedside  Sitting in a chair   More conversational  NG tube in place  No acute issues overnight    Review of Systems   Constitutional: Positive for fatigue  Negative for chills, diaphoresis and fever  HENT: Negative for congestion, ear pain, postnasal drip, rhinorrhea, sneezing, trouble swallowing and voice change  Eyes: Negative for redness and itching  Respiratory: Negative for apnea, cough, choking, chest tightness, shortness of breath, wheezing and stridor  Cardiovascular: Negative for chest pain, palpitations and leg swelling  Gastrointestinal: Negative for abdominal distention, abdominal pain, blood in stool, constipation, diarrhea, nausea and vomiting  Endocrine: Negative for polydipsia and polyuria  Genitourinary: Negative for dysuria and flank pain  Musculoskeletal: Negative for arthralgias, back pain and joint swelling  Skin: Negative for pallor and rash  Neurological: Positive for weakness  Negative for dizziness, syncope, light-headedness, numbness and headaches  Hematological: Negative for adenopathy  Psychiatric/Behavioral: Negative for agitation  Objective:     Vitals:    10/28/19 0214 10/28/19 0307 10/28/19 0729 10/28/19 0732   BP:  128/58 146/72    BP Location:  Left arm Left arm    Pulse:  83 96    Resp:  22 20    Temp:  97 5 °F (36 4 °C) 98 °F (36 7 °C)    TempSrc:  Oral Oral    SpO2: 93% 99% 95% 96%   Weight:       Height:               Intake/Output Summary (Last 24 hours) at 10/28/2019 0936  Last data filed at 10/28/2019 0630  Gross per 24 hour   Intake 1943 9 ml   Output 850 ml   Net 1093 9 ml         Physical Exam   Constitutional: She is oriented to person, place, and time  No distress  HENT:   Head: Normocephalic and atraumatic  Nose: Nose normal    Mouth/Throat: Oropharynx is clear and moist  No oropharyngeal exudate  Eyes: Pupils are equal, round, and reactive to light  Conjunctivae and EOM are normal  No scleral icterus  Neck: Normal range of motion  Neck supple  No JVD present   No tracheal deviation present  No thyromegaly present  Cardiovascular: Normal rate, regular rhythm, normal heart sounds and intact distal pulses  Exam reveals no gallop and no friction rub  No murmur heard  Pulmonary/Chest: Effort normal and breath sounds normal  No stridor  No respiratory distress  She has no wheezes  She has no rales  Nasal cannula   Abdominal: Soft  Bowel sounds are normal  She exhibits no distension  There is no tenderness  There is no rebound and no guarding  Musculoskeletal: Normal range of motion  She exhibits edema  She exhibits no deformity  Bilateral upper extremity 2+ pitting edema   Lymphadenopathy:     She has no cervical adenopathy  Neurological: She is alert and oriented to person, place, and time  No cranial nerve deficit or sensory deficit  Skin: Skin is warm  No rash noted  She is not diaphoretic  No erythema  Psychiatric: She has a normal mood and affect  Labs: I have personally reviewed pertinent lab results  , ABG:   Lab Results   Component Value Date    PHART 7 503 (H) 10/27/2019    CXU4QSO 47 3 (H) 10/27/2019    PO2ART 68 8 (L) 10/27/2019    UJV9VIA 36 3 (H) 10/27/2019    BEART 11 8 10/27/2019    SOURCE Brachial, Left 10/27/2019   , BNP: No results found for: BNP, CBC:   Lab Results   Component Value Date    WBC 15 61 (H) 10/28/2019    HGB 8 8 (L) 10/28/2019    HCT 29 6 (L) 10/28/2019     (H) 10/28/2019     10/28/2019    MCH 32 2 10/28/2019    MCHC 29 7 (L) 10/28/2019    RDW 15 4 (H) 10/28/2019    MPV 10 1 10/28/2019   , CMP:   Lab Results   Component Value Date    SODIUM 141 10/28/2019    K 3 9 10/28/2019     10/28/2019    CO2 38 (H) 10/28/2019    BUN 28 (H) 10/28/2019    CREATININE 0 50 (L) 10/28/2019    CALCIUM 7 0 (L) 10/28/2019    AST 56 (H) 10/28/2019    ALT 58 10/28/2019    ALKPHOS 129 (H) 10/28/2019    EGFR 90 10/28/2019   , PT/INR: No results found for: PT, INR, Troponin: No results found for: TROPONINI  Results from last 7 days   Lab Units 10/28/19  0453 10/27/19  0425 10/26/19  1840  10/26/19  0439  10/24/19  0454  10/23/19  0450   WBC Thousand/uL 15 61* 12 42*  --   --  11 73*   < > 11 57*  --  15 13*   HEMOGLOBIN g/dL 8 8* 8 2* 8 7*   < > 9 3*   < > 9 0*   < > 9 4*   HEMATOCRIT % 29 6* 28 6* 30 0*   < > 31 5*   < > 29 8*   < > 31 6*   PLATELETS Thousands/uL 361 364  --   --  470*   < > 459*  --  515*   NEUTROS PCT %  --   --   --   --  87*  --  88*  --  89*   MONOS PCT %  --   --   --   --  7  --  6  --  6    < > = values in this interval not displayed  Results from last 7 days   Lab Units 10/28/19  0453 10/27/19  0425 10/26/19  0439   POTASSIUM mmol/L 3 9 3 9 4 2   CHLORIDE mmol/L 100 102 103   CO2 mmol/L 38* 40* 37*   BUN mg/dL 28* 27* 25   CREATININE mg/dL 0 50* 0 44* 0 42*   CALCIUM mg/dL 7 0* 6 7* 6 7*   ALK PHOS U/L 129* 102 91   ALT U/L 58 48 31   AST U/L 56* 50* 31     Results from last 7 days   Lab Units 10/28/19  0453 10/26/19  0439 10/25/19  0612   MAGNESIUM mg/dL 2 5 2 6 2 3     Results from last 7 days   Lab Units 10/22/19  0601   PHOSPHORUS mg/dL 2 7      Results from last 7 days   Lab Units 10/28/19  0619 10/28/19  0002 10/27/19  1630  10/22/19  0950 10/21/19  2030   INR   --   --   --   --  1 29* 1 55*   PTT seconds 79* 67* 41*   < > 26 27    < > = values in this interval not displayed  Results from last 7 days   Lab Units 10/22/19  0950   LACTIC ACID mmol/L 0 7     0   Lab Value Date/Time    TROPONINI 0 03 10/21/2019 2030    TROPONINI 1 91 (H) 10/07/2019 2035       Microbiology:  Pleural fluid- bacilius not anthracis  MRSA nares neg  Sputum culture- no results  Blood culture neg at 5 days    Imaging and other studies: I have personally reviewed pertinent reports     and I have personally reviewed pertinent films in PACS  CT chest- bilateral multifocal infiltrates with bilateral pleural effusions      Albert Daley MD  Pulmonary & Critical Care Fellow, [facility]

## 2019-10-28 NOTE — CONSULTS
PHYSICAL MEDICINE AND REHABILITATION CONSULT NOTE  Jovan Polk 82 y o  female MRN: [id number]  Unit/Bed#: [facility] 530-01 Encounter: [id number]    Requested by (Physician/Service): Kady Ruiz MD  Reason for Consultation:  Assessment of rehabilitation needs  Reason for Hospitalization:  Cyanosis [R23 0]  Absent pulse [R09 89]  Cyanosis of fingertip [R23 0]  Malignant neoplasm of ovary, unspecified laterality ([address] ) [C56 9]  Superficial thrombophlebitis of right upper extremity [I80 8]  Rehabilitation Diagnosis: debility following hysterectomy     History of Present Illness:  Jovan Polk is a 82 y o  female who  has a past medical history of Anxiety, Arthritis, Constipation, Diabetes mellitus ([facility] [address] ), Frequent UTI, Glaucoma, Hearing loss, Hyperlipidemia, Hypertension, Hyperthyroidism, Hyponatremia, Hypothyroid, Neuropathy, Right tubo-ovarian mass, and Wears glasses  who [facility] on 10/22/19 for progressive lethargy and debility  She was recently admitted and discharged from [facility] to [facility]  She had hysterectomy for ovarian mass during that hospitalization  Hospital course was complicated by cardiac arrest, pneumonia with ARDS, metabolic acidosis, and afib with RVR  She was then discharged to [facility] for further rehabilitation  Her daughter was visiting, and noted significant lethargy, and she was brought back to hospital  NGT was placed for dysphagia  CTH negative for acute intracranial findings  CXR with diffuse and bilateral infiltrates, thought secondary to residual effect from recent ARDS  ID was consulted and recommended stopping antibiotics  Pulmonology was consulted, and thoracentesis of bilateral pleural effusions revealed transudative findings  She was found to have right upper extremity thrombophlebitis, as well as IJ vein thromboembolism  She was started on heparin gtt  Vascular surgery recommended no surgical intervention       Hospital course complicated by delirium, of unclear etiology, although there has been some temporal relationship with low blood glucose levels  Neurology was consulted, and suspect drug-induced encephalopathy secondary to benzos and other sedating agents  EEG pending  PM&R consulted for rehabilitation recommendations  Restrictions include:  None     Hospital Complications/Comorbidities:   Complications: As above  Comorbidities: As above    Morbid Obesity (BMI > 40) No     Last Weight Last BMI   Wt Readings from Last 1 Encounters:   10/27/19 65 9 kg (145 lb 4 5 oz)    Body mass index is 24 18 kg/m²  Functional History:     Prior to Admission:   Prior to hospitalization for hysterectomy, independent ADLs and mobility        Present:  Physical Therapy Occupational Therapy Speech Therapy   10/25/19 0930    Pain Assessment   Pain Assessment 0-10   Pain Score Worst Possible Pain   Pain Type Acute pain   Pain Location Generalized   Restrictions/Precautions   Weight Bearing Precautions Per Order No   Other Precautions Cognitive; Chair Alarm; Bed Alarm;Limb alert;Multiple lines;Telemetry; Fall Risk;Pain;O2   General   Chart Reviewed Yes   Response to Previous Treatment Patient with no complaints from previous session  Cognition   Overall Cognitive Status Impaired   Arousal/Participation Alert; Cooperative;Lethargic   Attention Attends with cues to redirect   Bed Mobility   Rolling R 2  Maximal assistance   Additional items Assist x 2   Supine to Sit 2  Maximal assistance   Additional items Assist x 2   Sit to Supine 2  Maximal assistance   Additional items Assist x 2   Transfers   Sit to Stand 2  Maximal assistance   Additional items Assist x 3   Stand to Sit 2  Maximal assistance   Additional items Assist x 3   Balance   Static Sitting Poor -   Dynamic Sitting Poor -   Activity Tolerance   Activity Tolerance Patient limited by pain; Patient limited by fatigue   Medical Staff Made Aware OT present    Nurse Made Aware nurse approved therapy session   Exercises   Balance training  sitting EOB with varying support for 15-20 minutes      10/27/19 1115    Restrictions/Precautions   Weight Bearing Precautions Per Order No   Other Precautions O2;Multiple lines;Limb alert;Aspiration; Bed Alarm; Chair Alarm;Cognitive; Fall Risk;Pain;Telemetry   Lifestyle   Intrinsic Gratification attempted to engage patient in familiar conversation regarding her past experiences however patient unable to verbalize other than "pain" "this is pain"   Pain Assessment   Pain Rating: FLACC (Rest) - Face 1   Pain Rating: FLACC (Rest) - Legs 1   Pain Rating: FLACC (Rest) - Activity 1   Pain Rating: FLACC (Rest) - Cry 1   Pain Rating: FLACC (Rest) - Consolability 1   Score: FLACC (Rest) 5   ADL   Grooming Assistance 1  Total Assistance   Grooming Deficit Setup;Supervision/safety; Increased time to complete;Wash/dry hands; Wash/dry face   Grooming Comments Pt was provided with a warm washcloth and provided [place] in RUE to wash face  Pt minimally actively participating needing total A overall  Pt was awake but unable to participate in the grooming task  total A to comb hair   UB Bathing Assistance 1  Total Assistance   UB Bathing Comments Pt requires total A to wash BUE  OT then supported BUE on pillows due to pitting edema distally of BUE R wornse than L  Bed Mobility   Rolling R 1  Dependent   Rolling L 1  Dependent   Additional Comments Trialed a variety of repositioning in the bed to decrease patients discomfort/pain  Pt requires total A for half rolling and assist of 2 to boost up toward the head of the bed  OT adjusted hips using wedges with attempts to offload buttock/low back, pillows under calf for pressure relief of feet and pillows under b/l arms due to edema  After all these attempts pt still states "pain" but unable to give descriptive info regarding level of pain and where  OT communicated patient was in pain to nursing      Transfers   Additional Comments OOB activity was not appropraite this session due to pain, medical status/complexity, and decreased activity tolerance/arousal   Pt minimally interactive despite environmental changes of increased light, engaging conversation, and using a cool rag to wipe her eyes   Cognition   Orientation Level Oriented to person;Disoriented to place; Disoriented to time;Disoriented to situation   Memory Unable to assess   Following Commands Unable to follow one step commands   Comments Pt followed less than 10% of Ot commands for e g  lift your right arm, wash your face, close your eyes   Activity Tolerance   Activity Tolerance Patient limited by pain; Patient limited by fatigue   Medical Staff Made Aware RN Leticia Hall made aware           Past Medical History:   Past Surgical History:   Family History:     Past Medical History:   Diagnosis Date    Anxiety     Arthritis     Constipation     Diabetes mellitus ([facility] [address] )     IDDM    Frequent UTI     Glaucoma     Hearing loss     Hyperlipidemia     Hypertension     Hyperthyroidism     in past    Hyponatremia     Hypothyroid     Neuropathy     bles    Right tubo-ovarian mass     Wears glasses     Past Surgical History:   Procedure Laterality Date    APPENDECTOMY  1954    CATARACT EXTRACTION Bilateral     COLONOSCOPY  09/2014    Completed - Dr Shannan Daniel, due in 5 years    HYSTERECTOMY N/A 10/4/2019    Procedure: LAP TOTAL HYSTERECTOMY, BSO;  Surgeon: Susie Arellano MD;  Location: [facility];  Service: Gynecology Oncology    IR THORACENTESIS  10/25/2019    LAPAROTOMY N/A 10/4/2019    Procedure: EXPLORATORY LAPAROTOMY  EXLAP OMENTECTOMY  PELVIC AND DEANGELO-AORTIC LYMPH NODE DISSECTION  PERITONEAL BIOPSY TRANS ABDOMINUS BLOCK;  Surgeon: Susie Arellano MD;  Location: [facility];  Service: Gynecology Oncology    SKIN LESION EXCISION      Ears     Family History   Problem Relation Age of Onset    Heart attack Mother     Diabetes type II Mother     Dementia Father     Stroke Father         CVA    Breast cancer Maternal Aunt     Stomach cancer Maternal Uncle     Diabetes Family     Stroke Family         Complications           Medications:    Current Facility-Administered Medications:     acetaminophen (TYLENOL) oral suspension 975 mg, 975 mg, Oral, Q8H [address], Tuesday M Ja, JIM, 975 mg at 10/28/19 5043    acetaminophen (TYLENOL) rectal suppository 650 mg, 650 mg, Rectal, Q6H PRN, Blair Phillips MD, 650 mg at 10/27/19 1154    albuterol inhalation solution 2 5 mg, 2 5 mg, Nebulization, Q4H PRN, Brian De La Rosa MD, 2 5 mg at 10/23/19 0816    aluminum-magnesium hydroxide-simethicone (MYLANTA) 200-200-20 mg/5 mL oral suspension 15 mL, 15 mL, Per NG Tube, Q6H PRN, Brian De La Rosa MD    artificial tear (LUBRIFRESH P M ) ophthalmic ointment, , Both Eyes, HS PRN, Brian De La Rosa MD    benzocaine-menthol-lanolin-aloe (DERMOPLAST) 20-0 5 % topical spray, , Topical, BID, Immanuel Levin MD    bisacodyl (DULCOLAX) rectal suppository 10 mg, 10 mg, Rectal, Daily PRN, Janice Saldaña MD    cefTRIAXone (ROCEPHIN) 1,000 mg in dextrose 5 % 50 mL IVPB, 1,000 mg, Intravenous, Q24H, Janice Saldaña MD, Stopped at 10/27/19 1704    dextran 70-hypromellose (GENTEAL TEARS) 0 1-0 3 % ophthalmic solution 1 drop, 1 drop, Both Eyes, Q3H PRN, Kenia Aguila DO, 1 drop at 10/26/19 2120    dorzolamide-timolol (COSOPT) 22 3-6 8 MG/ML ophthalmic solution 1 drop, 1 drop, Both Eyes, BID, Brian De La Rosa MD, 1 drop at 10/28/19 0912    heparin (porcine) 25,000 units in 250 mL infusion (premix), 3-30 Units/kg/hr (Order-Specific), Intravenous, Titrated, Shazia Kumar PA-C, Last Rate: 7 1 mL/hr at 10/28/19 0725, 11 8 Units/kg/hr at 10/28/19 0725    heparin (porcine) injection 2,400 Units, 2,400 Units, Intravenous, PRN, Shazia Kumar PA-C    heparin (porcine) injection 4,800 Units, 4,800 Units, Intravenous, PRN, Rosalio Gonzalez PA-C, 4,800 Units at 10/27/19 1716    insulin regular (HumuLIN R,NovoLIN R) 1 Units/mL in sodium chloride 0 9 % 100 mL infusion, 0 3-21 Units/hr, Intravenous, Titrated, Radha Burns MD, Last Rate: 2 mL/hr at 10/28/19 1206, 2 Units/hr at 10/28/19 1206    levothyroxine tablet 50 mcg, 50 mcg, Per NG Tube, Early Morning, Abundio Olivas MD, 50 mcg at 10/28/19 0627    lidocaine (LIDODERM) 5 % patch 1 patch, 1 patch, Topical, Daily, Radha Burns MD, Stopped at 10/28/19 0913    melatonin tablet 6 mg, 6 mg, Oral, HS, Aga Salinas MD, 6 mg at 10/27/19 2246    miconazole (MONISTAT) external vaginal cream, , Topical, BID PRN, 1 application at 28/55/06 2250 **AND** miconazole (MONISTAT) vaginal suppository 200 mg, 200 mg, Vaginal, HS, Radha Burns MD, 200 mg at 10/27/19 2252    Netarsudil Dimesylate 0 02 % SOLN 1 drop, 1 drop, Right Eye, HS, Radha Burns MD, 1 drop at 10/27/19 2255    nystatin-triamcinolone (MYCOLOG-II) cream, , Topical, BID, Josh Huggins MD    [facility] injection 4 mg, 4 mg, Intravenous, Q6H PRN, Abundio Olivas MD, 4 mg at 10/26/19 0107    oxyCODONE (ROXICODONE) IR tablet 2 5 mg, 2 5 mg, Oral, Q4H PRN, Aga Salinas MD, 2 5 mg at 10/28/19 0912    pantoprazole (PROTONIX) injection 40 mg, 40 mg, Intravenous, Q24H [address], Radha Burns MD, 40 mg at 10/28/19 0913    phenazopyridine (PYRIDIUM) tablet 100 mg, 100 mg, Oral, TID, Radha Burns MD, 100 mg at 10/28/19 0915    senna 8 8 mg/5 mL oral syrup 8 8 mg, 8 8 mg, Per NG Tube, Daily PRN, Abundio Olivas MD    sertraline (ZOLOFT) oral concentrated solution 25 mg, 25 mg, Per NG Tube, HS, Radha Burns MD, 25 mg at 10/27/19 2249    simethicone (MYLICON) oral suspension 40 mg, 40 mg, Oral, Q6H PRN, Radha Burns MD, 40 mg at 10/24/19 1417    travoprost (TRAVATAN-Z) 0 004 % ophthalmic solution 1 drop, 1 drop, Both Eyes, HS, Abundioalfonso Olivas MD, 1 drop at 10/27/19 2252    traZODone (DESYREL) tablet 25 mg, 25 mg, Oral, HS, Aga Salinas MD, 25 mg at 10/27/19 2245    Allergies:    Allergies   Allergen Reactions    Thiazide-Type Diuretics      Hyponatremia        Social History:    Social History     Socioeconomic History    Marital status:      Spouse name: None    Number of children: 2    Years of education: None    Highest education level: None   Occupational History    None   Social Needs    Financial resource strain: None    Food insecurity:     Worry: None     Inability: None    Transportation needs:     Medical: None     Non-medical: None   Tobacco Use    Smoking status: Never Smoker    Smokeless tobacco: Never Used   Substance and Sexual Activity    Alcohol use: Never     Frequency: Monthly or less     Drinks per session: 1 or 2     Binge frequency: Never     Comment: wine    Drug use: No    Sexual activity: None   Lifestyle    Physical activity:     Days per week: None     Minutes per session: None    Stress: None   Relationships    Social connections:     Talks on phone: None     Gets together: None     Attends Judaism service: None     Active member of club or organization: None     Attends meetings of clubs or organizations: None     Relationship status: None    Intimate partner violence:     Fear of current or ex partner: None     Emotionally abused: None     Physically abused: None     Forced sexual activity: None   Other Topics Concern    None   Social History Narrative    Caffeine use    No preference on Judaism beliefs    Lives at home alone prior to hysterectomy - now at [address] [place] [address]        Review of Systems: A 10-point review of systems was performed  +fatigue  Denies abdominal pain at this time  Negative except as listed above       Physical Exam:  Vital Signs:      Temp:  [97 4 °F (36 3 °C)-98 1 °F (36 7 °C)] 98 1 °F (36 7 °C)  HR:  [78-96] 78  Resp:  [18-22] 20  BP: (106-146)/(56-72) 134/63   Intake/Output Summary (Last 24 hours) at 10/28/2019 1245  Last data filed at 10/28/2019 1101  Gross per 24 hour   Intake 2128 76 ml   Output 850 ml   Net 1278 76 ml        Laboratory:      Lab Results   Component Value Date    HGB 8 8 (L) 10/28/2019    HGB 13 0 01/08/2016    HCT 29 6 (L) 10/28/2019    HCT 37 9 01/08/2016    WBC 15 61 (H) 10/28/2019    WBC 6 04 01/08/2016     Lab Results   Component Value Date    BUN 28 (H) 10/28/2019    BUN 25 01/08/2016     (L) 01/08/2016    K 3 9 10/28/2019    K 4 8 01/08/2016     10/28/2019    CL 99 (L) 01/08/2016    GLUCOSE 75 01/08/2016    CREATININE 0 50 (L) 10/28/2019    CREATININE 0 94 01/08/2016     Lab Results   Component Value Date    PROTIME 15 7 (H) 10/22/2019    INR 1 29 (H) 10/22/2019        GEN: NAD, sitting comfortably in chair  Head: NCAT, no gross lesions  Eyes: PERRL, EOMI  Throat: clear, no thrush, MMM  Pulm: CTAB, no rales/wheezes; wearing nasal cannula   CV: RRR, normal s1/s2  Abd: soft, NTND  Ext: no pedal edema bilaterally, distal extremities warm and well perfused  Psych: somnolent   Skin: no observable rashes; alexandre in place; purpura bilateral forearms   Neuro: somnolent, hypophonic, follows simple commands with repeated cues    Right  Left  Site  Right  Left  Site    3 3  S Ab: Shoulder Abductors  1  1  HF: Hip Flexors    3 3  EF: Elbow Flexors       3  3  EE: Elbow Extensors  2  2  KE: Knee Extensors    4  3  WE: Wrist Extensors  2  2  DR: Dorsi Flexors    4  4  FF: Finger Flexors  3  3  PF: Plantar Flexors     3 2  HI: Hand Intrinsics  3  3  EHL: Extensor Hallucis Longus       Imaging: Reviewed  Ct Chest Abdomen Pelvis Wo Contrast    Result Date: 10/24/2019  Impression: Very large bolus of stool in the rectum strongly suggestive of fecal impaction  Moderate stool and gaseous distention of large bowel loops proximal to the rectum consistent with constipation  This probably accounts for the increasing abdominal distention discussed in the patient's history  Small volume of abdominopelvic ascites, increased when compared to prior examination  Slight progression of body wall edema   Worsening extensive multifocal pneumonia and increasing bilateral pleural effusions  Workstation performed: [id number]     Xr Chest Portable    Result Date: 10/23/2019  Impression: Stable bilateral airspace disease  Workstation performed: [id number]     Xr Chest 1 View Portable    Result Date: 10/22/2019  Impression: Minimal improvement in bilateral airspace opacities  Small pleural effusions  Workstation performed: [id number]     Xr Abdomen 1 View Kub    Result Date: 10/23/2019  Impression: Feeding tube tip in the stomach  Workstation performed: [id number]     Xr Abdomen 1 View Kub    Result Date: 10/22/2019  Impression: There is a large amount of stool in the ascending colon and rectum  Workstation performed: [id number]     Ct Head Without Contrast    Result Date: 10/21/2019  Impression: No acute intracranial abnormality  Workstation performed: [id number]     Ir Thoracentesis    Result Date: 10/25/2019  Impression: Impression: Successful ultrasound-guided thoracentesis  Workstation performed: [id number]     Xr Chest Portable Icu    Result Date: 10/23/2019  Impression: Feeding tube tip terminating in the region of the midesophagus  Grossly stable patchy airspace disease bilaterally small effusions  Workstation performed: [id number]       Assessment and Recommendations:  82 y o  female with recent hysterectomy, complicated by post-op cardiac arrest, aspiration pneumonia, afib with RVR  PM&R consulted for rehabilitation recommendations  Impairments:  Impaired functional mobility and ability to perform ADL's  Impaired cognition    Recommendations:  1   Rehab  - continue PT/OT while inpatient  - recommend SLP for cognition, in setting of waxing/waning delirium   - at this time, unable to participate in acute rehabilitation with 3 hours of therapy/day   - currently max A to total dependence for ADLs and mobility    - recommend discharge to SNF pending medical stability, for more gentle course of rehabilitation    - can consider admission from SNF to [facility] once her endurance improves    2  Bilateral pleural effusions - s/p thoracentesis  - pulm following   - awaiting cytology    3  Nutrition  - with NGT  - SLP following  - recommend dietary consult for protein-calorie malnutrition    4  Insomnia   - palliative care following, recommend melatonin + trazodone   - recommend avoiding benzos/opioids as significant sedation noted   - minimize nighttime noise/lights, blinds up during day to promote day/night cycle       Thank you for allowing the PM&R service to participate in the care of this patient  We will continue to follow Donna Ross's progress with you  Please do not hesitate to call with questions or concerns    ** Please Note: Fluency Direct voice to text software may have been used in the creation of this document   **

## 2019-10-28 NOTE — PHYSICAL THERAPY NOTE
PHYSICAL THERAPY Treatment NOTE    Patient Name: Bear Jenkins  SVIBI'K Date: 10/28/2019        10/28/19 1282   Pain Assessment   Pain Assessment 0-10   Pain Score Worst Possible Pain   Pain Type Acute pain   Pain Location Vagina   Restrictions/Precautions   Weight Bearing Precautions Per Order No   Other Precautions O2;Multiple lines; Fall Risk;Pain;Hard of hearing; Chair Alarm; Bed Alarm;Cognitive   General   Chart Reviewed Yes   Additional Pertinent History Pt  is an 83 yo F who presents with lethargy and weakness s/p recent hospitlization with mulitple complications  Family/Caregiver Present Yes   Cognition   Overall Cognitive Status Impaired   Arousal/Participation Alert; Cooperative   Attention Attends with cues to redirect   Orientation Level Oriented X4   Memory Within functional limits   Following Commands Follows one step commands with increased time or repetition   Comments Pt  was identified with full name and birthdate   Subjective   Subjective Pt  agreeable to PT session   Bed Mobility   Sit to Supine 2  Maximal assistance   Additional items Assist x 2; Increased time required;LE management;Verbal cues;HOB elevated; Bedrails   Additional Comments Pt  performed bed mobility with MaxAx2 requiring increased time and LE managment to perform mobility task  Transfers   Sit to Stand 2  Maximal assistance   Additional items Assist x 2; Increased time required;Verbal cues  (for hand placement and sequencing)   Stand to Sit 2  Maximal assistance   Additional items Assist x 2;Impulsive;Verbal cues  (to reach back to control descent)   Stand pivot 2  Maximal assistance   Additional items Assist x 2; Increased time required;Verbal cues  (for sequencing)   Additional Comments Pt  performed sit<> stand transfers with MaxAx2 requiring increased time to stand and impulsive to return to sitting with VCs for hand placement and sequencing and to reach back to control descent  Note significant difficulty with motor planing and task completion with fatigue, limited weight shifting and difficulty with foot placement   Balance   Static Sitting Fair   Dynamic Sitting Poor +   Static Standing Poor -   Dynamic Standing Poor -   Ambulatory Poor -   Endurance Deficit   Endurance Deficit Yes   Endurance Deficit Description postural and gait degradation noted with fatigue   Activity Tolerance   Activity Tolerance Patient limited by fatigue   Nurse Made Aware Spoke to RN   Exercises   Heelslides Supine;10 reps;Bilateral;AAROM  (x2)   Hip Abduction Supine;10 reps;Bilateral;AAROM   Hip Adduction Supine;10 reps;Bilateral;AAROM  (x2)   Knee AROM Short Arc Quad Supine;10 reps;Bilateral;AAROM  (x2)   Ankle Pumps Supine;10 reps;Bilateral;AAROM  (x2)   Assessment   Prognosis Guarded   Problem List Decreased strength;Decreased range of motion;Decreased endurance; Impaired balance;Decreased mobility; Decreased coordination;Decreased cognition;Decreased safety awareness;Pain   Assessment Pt  is an 81 yo F who presents with lethargy and weakness s/p recent hospitlization with mulitple complications  Pt  was identified with full name and birthdate  Pt  agreeable to PT session  Pt  Continues to demonstrate need for MaxAx2 to perform transfers with limited standing tolerance and postural and gait mechanics degradation noted with fatigue  Pt  Is progressing towards goals, however progress is slowed 2/2 limited activity tolerance and multiple medical status limitations  Pt  Will benefit from continued skilled therapy to increased functional independence and aid patient in return to PLOF with decreased burden of care  Goals   Patient Goals to get back to bed   STG Expiration Date 11/03/19   PT Treatment Day 2   Plan   Treatment/Interventions Functional transfer training;LE strengthening/ROM; Therapeutic exercise; Endurance training;Cognitive reorientation;Patient/family training;Equipment eval/education; Bed mobility;Spoke to nursing;Family   Progress Slow progress, decreased activity tolerance   PT Frequency   (4-6x/wk)   Recommendation   Recommendation Post acute IP rehab   Equipment Recommended Walker   PT - OK to Discharge Yes   Additional Comments to rehab when medically appropriate        10/28/19 2937   Pain Assessment   Pain Assessment 0-10   Pain Score Worst Possible Pain   Pain Type Acute pain   Pain Location Vagina   Restrictions/Precautions   Weight Bearing Precautions Per Order No   Other Precautions O2;Multiple lines; Fall Risk;Pain;Hard of hearing; Chair Alarm; Bed Alarm;Cognitive   General   Chart Reviewed Yes   Additional Pertinent History Pt  is an 81 yo F who presents with lethargy and weakness s/p recent hospitlization with mulitple complications  Family/Caregiver Present Yes   Cognition   Overall Cognitive Status Impaired   Arousal/Participation Alert; Cooperative   Attention Attends with cues to redirect   Orientation Level Oriented X4   Memory Within functional limits   Following Commands Follows one step commands with increased time or repetition   Comments Pt  was identified with full name and birthdate   Subjective   Subjective Pt  agreeable to PT session   Bed Mobility   Sit to Supine 2  Maximal assistance   Additional items Assist x 2; Increased time required;LE management;Verbal cues;HOB elevated; Bedrails   Additional Comments Pt  performed bed mobility with MaxAx2 requiring increased time and LE managment to perform mobility task  Transfers   Sit to Stand 2  Maximal assistance   Additional items Assist x 2; Increased time required;Verbal cues  (for hand placement and sequencing)   Stand to Sit 2  Maximal assistance   Additional items Assist x 2;Impulsive;Verbal cues  (to reach back to control descent)   Stand pivot 2  Maximal assistance   Additional items Assist x 2; Increased time required;Verbal cues  (for sequencing)   Additional Comments Pt  performed sit<> stand transfers with MaxAx2 requiring increased time to stand and impulsive to return to sitting with VCs for hand placement and sequencing and to reach back to control descent  Note significant difficulty with motor planing and task completion with fatigue, limited weight shifting and difficulty with foot placement   Balance   Static Sitting Fair   Dynamic Sitting Poor +   Static Standing Poor -   Dynamic Standing Poor -   Ambulatory Poor -   Endurance Deficit   Endurance Deficit Yes   Endurance Deficit Description postural and gait degradation noted with fatigue   Activity Tolerance   Activity Tolerance Patient limited by fatigue   Nurse Made Aware Spoke to RN   Exercises   Heelslides Supine;10 reps;Bilateral;AAROM  (x2)   Hip Abduction Supine;10 reps;Bilateral;AAROM   Hip Adduction Supine;10 reps;Bilateral;AAROM  (x2)   Knee AROM Short Arc Quad Supine;10 reps;Bilateral;AAROM  (x2)   Ankle Pumps Supine;10 reps;Bilateral;AAROM  (x2)   Assessment   Prognosis Guarded   Problem List Decreased strength;Decreased range of motion;Decreased endurance; Impaired balance;Decreased mobility; Decreased coordination;Decreased cognition;Decreased safety awareness;Pain   Assessment Pt  is an 83 yo F who presents with lethargy and weakness s/p recent hospitlization with mulitple complications  Pt  was identified with full name and birthdate  Pt  agreeable to PT session  Pt  Continues to demonstrate need for MaxAx2 to perform transfers with limited standing tolerance and postural and gait mechanics degradation noted with fatigue  Pt  Is progressing towards goals, however progress is slowed 2/2 limited activity tolerance and multiple medical status limitations  Pt  Will benefit from continued skilled therapy to increased functional independence and aid patient in return to PLOF with decreased burden of care      Goals   Patient Goals to get back to bed   STG Expiration Date 11/03/19   PT Treatment Day 2   Plan Treatment/Interventions Functional transfer training;LE strengthening/ROM; Therapeutic exercise; Endurance training;Cognitive reorientation;Patient/family training;Equipment eval/education; Bed mobility;Spoke to nursing;Family   Progress Slow progress, decreased activity tolerance   PT Frequency   (4-6x/wk)   Recommendation   Recommendation Post acute IP rehab   Equipment Recommended Walker   PT - OK to Discharge Yes   Additional Comments to rehab when medically appropriate     Delaney Pavon, PT, DPT 10/28/2019

## 2019-10-29 ENCOUNTER — APPOINTMENT (INPATIENT)
Dept: RADIOLOGY | Facility: HOSPITAL | Age: 84
DRG: 189 | End: 2019-10-29
Payer: COMMERCIAL

## 2019-10-29 PROBLEM — Z71.89 GOALS OF CARE, COUNSELING/DISCUSSION: Status: ACTIVE | Noted: 2019-10-29

## 2019-10-29 LAB
ANION GAP SERPL CALCULATED.3IONS-SCNC: 1 MMOL/L (ref 4–13)
APTT PPP: 59 SECONDS (ref 23–37)
APTT PPP: 64 SECONDS (ref 23–37)
APTT PPP: 81 SECONDS (ref 23–37)
ARTERIAL PATENCY WRIST A: YES
ATRIAL RATE: 82 BPM
BACTERIA UR CULT: ABNORMAL
BASE EXCESS BLDA CALC-SCNC: 15.3 MMOL/L
BUN SERPL-MCNC: 25 MG/DL (ref 5–25)
CALCIUM SERPL-MCNC: 7.1 MG/DL (ref 8.3–10.1)
CHLORIDE SERPL-SCNC: 99 MMOL/L (ref 100–108)
CO2 SERPL-SCNC: 41 MMOL/L (ref 21–32)
CREAT SERPL-MCNC: 0.42 MG/DL (ref 0.6–1.3)
ERYTHROCYTE [DISTWIDTH] IN BLOOD BY AUTOMATED COUNT: 15.6 % (ref 11.6–15.1)
GFR SERPL CREATININE-BSD FRML MDRD: 96 ML/MIN/1.73SQ M
GLUCOSE SERPL-MCNC: 108 MG/DL (ref 65–140)
GLUCOSE SERPL-MCNC: 129 MG/DL (ref 65–140)
GLUCOSE SERPL-MCNC: 139 MG/DL (ref 65–140)
GLUCOSE SERPL-MCNC: 159 MG/DL (ref 65–140)
GLUCOSE SERPL-MCNC: 197 MG/DL (ref 65–140)
GLUCOSE SERPL-MCNC: 203 MG/DL (ref 65–140)
GLUCOSE SERPL-MCNC: 207 MG/DL (ref 65–140)
GLUCOSE SERPL-MCNC: 60 MG/DL (ref 65–140)
HCO3 BLDA-SCNC: 41.8 MMOL/L (ref 22–28)
HCT VFR BLD AUTO: 27.6 % (ref 34.8–46.1)
HGB BLD-MCNC: 8.3 G/DL (ref 11.5–15.4)
MCH RBC QN AUTO: 32.3 PG (ref 26.8–34.3)
MCHC RBC AUTO-ENTMCNC: 30.1 G/DL (ref 31.4–37.4)
MCV RBC AUTO: 107 FL (ref 82–98)
NASAL CANNULA: 4
O2 CT BLDA-SCNC: 12.8 ML/DL (ref 16–23)
OXYHGB MFR BLDA: 93.1 % (ref 94–97)
P AXIS: 56 DEGREES
PCO2 BLDA: 64.2 MM HG (ref 36–44)
PH BLDA: 7.43 [PH] (ref 7.35–7.45)
PLATELET # BLD AUTO: 317 THOUSANDS/UL (ref 149–390)
PMV BLD AUTO: 10 FL (ref 8.9–12.7)
PO2 BLDA: 69.3 MM HG (ref 75–129)
POTASSIUM SERPL-SCNC: 4.1 MMOL/L (ref 3.5–5.3)
PR INTERVAL: 136 MS
PROCALCITONIN SERPL-MCNC: 0.12 NG/ML
QRS AXIS: 54 DEGREES
QRSD INTERVAL: 76 MS
QT INTERVAL: 364 MS
QTC INTERVAL: 425 MS
RBC # BLD AUTO: 2.57 MILLION/UL (ref 3.81–5.12)
SODIUM SERPL-SCNC: 141 MMOL/L (ref 136–145)
SPECIMEN SOURCE: ABNORMAL
T WAVE AXIS: 46 DEGREES
TROPONIN I SERPL-MCNC: 0.03 NG/ML
TROPONIN I SERPL-MCNC: 0.04 NG/ML
VENTRICULAR RATE: 82 BPM
WBC # BLD AUTO: 14.31 THOUSAND/UL (ref 4.31–10.16)

## 2019-10-29 PROCEDURE — 82805 BLOOD GASES W/O2 SATURATION: CPT | Performed by: HOSPITALIST

## 2019-10-29 PROCEDURE — 94640 AIRWAY INHALATION TREATMENT: CPT

## 2019-10-29 PROCEDURE — 99233 SBSQ HOSP IP/OBS HIGH 50: CPT | Performed by: PHYSICIAN ASSISTANT

## 2019-10-29 PROCEDURE — 99232 SBSQ HOSP IP/OBS MODERATE 35: CPT | Performed by: OBSTETRICS & GYNECOLOGY

## 2019-10-29 PROCEDURE — 36600 WITHDRAWAL OF ARTERIAL BLOOD: CPT

## 2019-10-29 PROCEDURE — 84484 ASSAY OF TROPONIN QUANT: CPT | Performed by: INTERNAL MEDICINE

## 2019-10-29 PROCEDURE — 85730 THROMBOPLASTIN TIME PARTIAL: CPT | Performed by: HOSPITALIST

## 2019-10-29 PROCEDURE — 80048 BASIC METABOLIC PNL TOTAL CA: CPT | Performed by: FAMILY MEDICINE

## 2019-10-29 PROCEDURE — 93010 ELECTROCARDIOGRAM REPORT: CPT | Performed by: INTERNAL MEDICINE

## 2019-10-29 PROCEDURE — C9113 INJ PANTOPRAZOLE SODIUM, VIA: HCPCS | Performed by: FAMILY MEDICINE

## 2019-10-29 PROCEDURE — 99233 SBSQ HOSP IP/OBS HIGH 50: CPT | Performed by: INTERNAL MEDICINE

## 2019-10-29 PROCEDURE — 99232 SBSQ HOSP IP/OBS MODERATE 35: CPT | Performed by: NURSE PRACTITIONER

## 2019-10-29 PROCEDURE — 92526 ORAL FUNCTION THERAPY: CPT

## 2019-10-29 PROCEDURE — 93005 ELECTROCARDIOGRAM TRACING: CPT

## 2019-10-29 PROCEDURE — 84145 PROCALCITONIN (PCT): CPT | Performed by: HOSPITALIST

## 2019-10-29 PROCEDURE — 94760 N-INVAS EAR/PLS OXIMETRY 1: CPT

## 2019-10-29 PROCEDURE — 99233 SBSQ HOSP IP/OBS HIGH 50: CPT | Performed by: HOSPITALIST

## 2019-10-29 PROCEDURE — 82948 REAGENT STRIP/BLOOD GLUCOSE: CPT

## 2019-10-29 PROCEDURE — 85027 COMPLETE CBC AUTOMATED: CPT | Performed by: FAMILY MEDICINE

## 2019-10-29 PROCEDURE — 71045 X-RAY EXAM CHEST 1 VIEW: CPT

## 2019-10-29 RX ORDER — OXYCODONE HCL 5 MG/5 ML
5 SOLUTION, ORAL ORAL 4 TIMES DAILY
Status: DISCONTINUED | OUTPATIENT
Start: 2019-10-29 | End: 2019-10-30

## 2019-10-29 RX ORDER — OXYCODONE HCL 5 MG/5 ML
5 SOLUTION, ORAL ORAL EVERY 4 HOURS PRN
Status: DISCONTINUED | OUTPATIENT
Start: 2019-10-29 | End: 2019-11-01

## 2019-10-29 RX ORDER — INSULIN GLARGINE 100 [IU]/ML
20 INJECTION, SOLUTION SUBCUTANEOUS
Status: DISCONTINUED | OUTPATIENT
Start: 2019-10-29 | End: 2019-10-30

## 2019-10-29 RX ORDER — OXYCODONE HCL 5 MG/5 ML
2.5 SOLUTION, ORAL ORAL EVERY 4 HOURS PRN
Status: DISCONTINUED | OUTPATIENT
Start: 2019-10-29 | End: 2019-11-01

## 2019-10-29 RX ORDER — FLUCONAZOLE 200 MG/1
200 TABLET ORAL ONCE
Status: DISCONTINUED | OUTPATIENT
Start: 2019-10-29 | End: 2019-10-29

## 2019-10-29 RX ORDER — FLUCONAZOLE 2 MG/ML
200 INJECTION, SOLUTION INTRAVENOUS ONCE
Status: COMPLETED | OUTPATIENT
Start: 2019-10-29 | End: 2019-10-29

## 2019-10-29 RX ORDER — OXYCODONE HCL 5 MG/5 ML
5 SOLUTION, ORAL ORAL EVERY 4 HOURS PRN
Status: DISCONTINUED | OUTPATIENT
Start: 2019-10-29 | End: 2019-10-29

## 2019-10-29 RX ORDER — DEXTROSE MONOHYDRATE 25 G/50ML
INJECTION, SOLUTION INTRAVENOUS
Status: COMPLETED
Start: 2019-10-29 | End: 2019-10-29

## 2019-10-29 RX ADMIN — KETAMINE HYDROCHLORIDE 0.05 MG/KG/HR: 50 INJECTION, SOLUTION INTRAMUSCULAR; INTRAVENOUS at 14:04

## 2019-10-29 RX ADMIN — CEFEPIME HYDROCHLORIDE 1000 MG: 1 INJECTION, POWDER, FOR SOLUTION INTRAMUSCULAR; INTRAVENOUS at 19:47

## 2019-10-29 RX ADMIN — TRAVOPROST 1 DROP: 0.04 SOLUTION/ DROPS OPHTHALMIC at 21:09

## 2019-10-29 RX ADMIN — HEPARIN SODIUM AND DEXTROSE 13.8 UNITS/KG/HR: 10000; 5 INJECTION INTRAVENOUS at 12:10

## 2019-10-29 RX ADMIN — OXYCODONE HYDROCHLORIDE 5 MG: 5 SOLUTION ORAL at 19:47

## 2019-10-29 RX ADMIN — MICONAZOLE NITRATE: KIT at 21:07

## 2019-10-29 RX ADMIN — PANTOPRAZOLE SODIUM 40 MG: 40 INJECTION, POWDER, FOR SOLUTION INTRAVENOUS at 09:27

## 2019-10-29 RX ADMIN — ACETAMINOPHEN 975 MG: 160 SUSPENSION ORAL at 14:43

## 2019-10-29 RX ADMIN — LEVOTHYROXINE SODIUM 50 MCG: 50 TABLET ORAL at 06:00

## 2019-10-29 RX ADMIN — METRONIDAZOLE 500 MG: 500 INJECTION, SOLUTION INTRAVENOUS at 12:50

## 2019-10-29 RX ADMIN — DORZOLAMIDE HYDROCHLORIDE AND TIMOLOL MALEATE 1 DROP: 20; 5 SOLUTION/ DROPS OPHTHALMIC at 09:26

## 2019-10-29 RX ADMIN — OXYCODONE HYDROCHLORIDE 5 MG: 5 SOLUTION ORAL at 12:51

## 2019-10-29 RX ADMIN — DEXTROSE 50 % IN WATER (D50W) INTRAVENOUS SYRINGE 25 ML: at 12:01

## 2019-10-29 RX ADMIN — INSULIN LISPRO 1 UNITS: 100 INJECTION, SOLUTION INTRAVENOUS; SUBCUTANEOUS at 02:18

## 2019-10-29 RX ADMIN — ACETAMINOPHEN 975 MG: 160 SUSPENSION ORAL at 21:05

## 2019-10-29 RX ADMIN — NYSTATIN AND TRIAMCINOLONE ACETONIDE: 100000; 1 CREAM TOPICAL at 19:48

## 2019-10-29 RX ADMIN — METRONIDAZOLE 500 MG: 500 INJECTION, SOLUTION INTRAVENOUS at 21:00

## 2019-10-29 RX ADMIN — FLUCONAZOLE, SODIUM CHLORIDE 200 MG: 2 INJECTION INTRAVENOUS at 16:51

## 2019-10-29 RX ADMIN — ALBUTEROL SULFATE 2.5 MG: 2.5 SOLUTION RESPIRATORY (INHALATION) at 18:15

## 2019-10-29 RX ADMIN — MELATONIN 6 MG: 3 TAB ORAL at 21:05

## 2019-10-29 RX ADMIN — INSULIN LISPRO 1 UNITS: 100 INJECTION, SOLUTION INTRAVENOUS; SUBCUTANEOUS at 00:20

## 2019-10-29 RX ADMIN — TRAZODONE HYDROCHLORIDE 25 MG: 50 TABLET ORAL at 21:04

## 2019-10-29 RX ADMIN — DORZOLAMIDE HYDROCHLORIDE AND TIMOLOL MALEATE 1 DROP: 20; 5 SOLUTION/ DROPS OPHTHALMIC at 19:48

## 2019-10-29 RX ADMIN — LIDOCAINE 1 PATCH: 50 PATCH CUTANEOUS at 09:26

## 2019-10-29 RX ADMIN — BENZOCAINE AND LEVOMENTHOL: 200; 5 SPRAY TOPICAL at 09:31

## 2019-10-29 RX ADMIN — MICONAZOLE NITRATE 200 MG: 200 SUPPOSITORY VAGINAL at 21:11

## 2019-10-29 RX ADMIN — SERTRALINE HYDROCHLORIDE 25 MG: 20 SOLUTION ORAL at 21:11

## 2019-10-29 RX ADMIN — INSULIN GLARGINE 20 UNITS: 100 INJECTION, SOLUTION SUBCUTANEOUS at 22:11

## 2019-10-29 RX ADMIN — OXYCODONE HYDROCHLORIDE 5 MG: 5 SOLUTION ORAL at 21:09

## 2019-10-29 RX ADMIN — NYSTATIN AND TRIAMCINOLONE ACETONIDE: 100000; 1 CREAM TOPICAL at 09:26

## 2019-10-29 RX ADMIN — ACETAMINOPHEN 975 MG: 160 SUSPENSION ORAL at 06:00

## 2019-10-29 RX ADMIN — HEPARIN SODIUM 2400 UNITS: 1000 INJECTION INTRAVENOUS; SUBCUTANEOUS at 06:01

## 2019-10-29 NOTE — PROGRESS NOTES
Progress Note - Pulmonary   Terryl Aroldo 82 y o  female MRN: [id number]  Unit/Bed#: [facility] 530-01 Encounter: [id number]      Impressions:    Acute hypoxic and hypercapnic respiratory failure- Unclear reason for hypercapnea, may be related to prior aspiration vs fibrotic phase of ARDs  Continue with xopenex nebulizer TID  atrovent was d/dante during last hospitalization for urinary retention  New chest x-ray today shows worsening of the left lower lobe with a dense consolidation   - MRSA nares neg  - Sputum culture not obtained  - procal is negative  - Video barium swallow with mod-severe oropharyngeal dysphagia  Plan:  - Continue xopenex  - F/up sputum culture  - Continue with O2 NC, wean as tolerated, currently requiring more than yesterday and is saturating 95% on 6 L  - bedside chest x-ray today obtained and shows a dense consolidation in the left lower lobe that is much worse than prior imaging  Recommend discussion with ID and initiation of empiric therapy for presumed pneumonia  - patient may end up requiring a repeat bronchoscopy at some point if she does not improve, however currently she is too ill and would likely require intubation  - recommend checking ABG     Bilateral pleural effusions- RT thoracentesis on 10/25 of 500cc and recent left sided of 600cc both appear to be transudative  Plan:  - Awaiting cytology/pathology from most recent thoracentesis performed on the 27th involving the left side     Abnormal CT chest- likely due to recent aspiration event  Treated with 9 days of abx for aspiration pneumonia  Previous sputum culture with serratia  Candida likely colonizer  ID previously evaluated  WBC improving  Plan:  - Trend WBC  - ID on board     Previous moderate pulm HTN- PASP 52mmHg  with tricuspid regurg  Still has significant peripheral edema  Recommend diuresis and monitor Is/Os closely  Stop lasix as there may be some contraction alkalosis   Echo yesterday with an EF of 65% and a peak PA pressure of 36mmHg which is borderline mild pulm HTN     Lethargy- unclear etiology  No evidence of UTI  Does have urinary retention   ABG with mild hypercarbia but not signficant to explant level of lethargy  Patient is awake and follows all commands but is minimally verbal  CT head negative  Neurology consulted  Received xanax over the weekend  Macrocytic anemia- B12 normal   Recommend further workup with reticulocyte count, folate level  Other differential would include a hemolytic anemia although less likely given the normal bilirubin  Patient has no underlying thyroid disease or liver disease  In addition with poor nutrition could have developed copper deficiency which could mimic B12     Hx of recent pneumonia- bronch cultures on 10/16 grew serratia  Patient completed 9 days of antibiotics      Subjective:   Patient seen and examined bedside  More tachypneic today with increased work of breathing and requiring more oxygen    Review of Systems   Constitutional: Negative for chills, diaphoresis, fatigue and fever  HENT: Negative for congestion, ear pain, postnasal drip, rhinorrhea, sneezing, trouble swallowing and voice change  Eyes: Negative for redness and itching  Respiratory: Negative for apnea, cough, choking, chest tightness, shortness of breath, wheezing and stridor  Cardiovascular: Negative for chest pain, palpitations and leg swelling  Gastrointestinal: Negative for abdominal distention, abdominal pain, blood in stool, constipation, diarrhea, nausea and vomiting  Endocrine: Negative for polydipsia and polyuria  Genitourinary: Negative for dysuria and flank pain  Musculoskeletal: Negative for arthralgias, back pain and joint swelling  Skin: Negative for pallor and rash  Neurological: Negative for dizziness, syncope, weakness, light-headedness, numbness and headaches  Hematological: Negative for adenopathy  Psychiatric/Behavioral: Negative for agitation         Objective: Vitals:    10/29/19 0000 10/29/19 0600 10/29/19 0730 10/29/19 0751   BP: 137/63  136/63    BP Location: Left arm  Left arm    Pulse: 85  95    Resp: 18  20    Temp: 98 7 °F (37 1 °C)  97 8 °F (36 6 °C)    TempSrc: Oral  Oral    SpO2: 94%  95% 95%   Weight:  66 5 kg (146 lb 9 7 oz)     Height:               Intake/Output Summary (Last 24 hours) at 10/29/2019 0839  Last data filed at 10/29/2019 5173  Gross per 24 hour   Intake 2147 63 ml   Output 750 ml   Net 1397 63 ml         Physical Exam   Constitutional: She is oriented to person, place, and time  No distress  HENT:   Head: Normocephalic and atraumatic  Nose: Nose normal    Mouth/Throat: Oropharynx is clear and moist  No oropharyngeal exudate  Eyes: Pupils are equal, round, and reactive to light  Conjunctivae and EOM are normal  No scleral icterus  Neck: Normal range of motion  Neck supple  No JVD present  No tracheal deviation present  No thyromegaly present  Cardiovascular: Normal rate, regular rhythm, normal heart sounds and intact distal pulses  Exam reveals no gallop and no friction rub  No murmur heard  Pulmonary/Chest: Effort normal and breath sounds normal  No stridor  No respiratory distress  She has no wheezes  She has no rales  On nasal cannula   Abdominal: Soft  Bowel sounds are normal  She exhibits no distension  There is no tenderness  There is no rebound and no guarding  Musculoskeletal: Normal range of motion  She exhibits no edema or deformity  Lymphadenopathy:     She has no cervical adenopathy  Neurological: She is alert and oriented to person, place, and time  No cranial nerve deficit or sensory deficit  Skin: Skin is warm  No rash noted  She is not diaphoretic  No erythema  Psychiatric: She has a normal mood and affect  Labs: I have personally reviewed pertinent lab results  , ABG: No results found for: PHART, DEJ9BEP, PO2ART, ZCZ1TSP, B4VQYNQT, BEART, SOURCE, BNP: No results found for: BNP, CBC:   Lab Results   Component Value Date    WBC 14 31 (H) 10/29/2019    HGB 8 3 (L) 10/29/2019    HCT 27 6 (L) 10/29/2019     (H) 10/29/2019     10/29/2019    MCH 32 3 10/29/2019    MCHC 30 1 (L) 10/29/2019    RDW 15 6 (H) 10/29/2019    MPV 10 0 10/29/2019   , CMP:   Lab Results   Component Value Date    SODIUM 141 10/29/2019    K 4 1 10/29/2019    CL 99 (L) 10/29/2019    CO2 41 (H) 10/29/2019    BUN 25 10/29/2019    CREATININE 0 42 (L) 10/29/2019    CALCIUM 7 1 (L) 10/29/2019    EGFR 96 10/29/2019   , PT/INR: No results found for: PT, INR, Troponin: No results found for: TROPONINI  Results from last 7 days   Lab Units 10/29/19  0452 10/28/19  0453 10/27/19  0425  10/26/19  0439  10/24/19  0454  10/23/19  0450   WBC Thousand/uL 14 31* 15 61* 12 42*  --  11 73*   < > 11 57*  --  15 13*   HEMOGLOBIN g/dL 8 3* 8 8* 8 2*   < > 9 3*   < > 9 0*   < > 9 4*   HEMATOCRIT % 27 6* 29 6* 28 6*   < > 31 5*   < > 29 8*   < > 31 6*   PLATELETS Thousands/uL 317 361 364  --  470*   < > 459*  --  515*   NEUTROS PCT %  --   --   --   --  87*  --  88*  --  89*   MONOS PCT %  --   --   --   --  7  --  6  --  6    < > = values in this interval not displayed  Results from last 7 days   Lab Units 10/29/19  0452 10/28/19  0453 10/27/19  0425 10/26/19  0439   POTASSIUM mmol/L 4 1 3 9 3 9 4 2   CHLORIDE mmol/L 99* 100 102 103   CO2 mmol/L 41* 38* 40* 37*   BUN mg/dL 25 28* 27* 25   CREATININE mg/dL 0 42* 0 50* 0 44* 0 42*   CALCIUM mg/dL 7 1* 7 0* 6 7* 6 7*   ALK PHOS U/L  --  129* 102 91   ALT U/L  --  58 48 31   AST U/L  --  56* 50* 31     Results from last 7 days   Lab Units 10/28/19  0453 10/26/19  0439 10/25/19  0612   MAGNESIUM mg/dL 2 5 2 6 2 3          Results from last 7 days   Lab Units 10/29/19  0452 10/28/19  0619 10/28/19  0002  10/22/19  0950   INR   --   --   --   --  1 29*   PTT seconds 59* 79* 67*   < > 26    < > = values in this interval not displayed       Results from last 7 days   Lab Units 10/22/19  0950   LACTIC ACID mmol/L 0 7     0   Lab Value Date/Time    TROPONINI 0 03 10/21/2019 2030    TROPONINI 1 91 (H) 10/07/2019 2035       Microbiology:  Pleural fluid- bacilius not anthracis  MRSA nares neg  Sputum culture- no results  Blood culture neg at 5 days    Imaging and other studies: I have personally reviewed pertinent reports     and I have personally reviewed pertinent films in PACS  CT chest- bilateral multifocal infiltrates with bilateral pleural effusions      Tramaine Burgos MD  Pulmonary & Critical Care Fellow, [facility]

## 2019-10-29 NOTE — ASSESSMENT & PLAN NOTE
· Patient complaining of severe bladder spasms and burning sensation  · Patient could not tolerate the Ji catheter anymore  Ji catheter was removed but failed voiding trial   Started on Diflucan by gyn Oncology for possible candidiasis  · Patient with severe dysuria  Likely secondary to vulvar vaginitis  Discussed with Infectious Disease    Plan is to start on antibiotic  · Patient had the Ji catheter back in  · Pain control per palliative care

## 2019-10-29 NOTE — ASSESSMENT & PLAN NOTE
· Palliative care on board  · Seems like pt may not want PEG, failed speech, cannot continue keofed for long

## 2019-10-29 NOTE — ASSESSMENT & PLAN NOTE
· Likely secondary to Lasix  · Discussed with pulmonology/daughter-ENT consultation [id number] as an outpatient

## 2019-10-29 NOTE — SPEECH THERAPY NOTE
Speech Language/Pathology    Speech/Language Pathology Progress Note    Patient Name: Jovan Polk  VCQRK'V Date: 10/29/2019     Problem List  Principal Problem:    Lethargy  Active Problems:    Adult onset hypothyroidism    ANAYA (generalized anxiety disorder)    Hypertension    Type 1 diabetes mellitus with diabetic neuropathy (HCC)    Ovarian cancer ([facility] [address] )    Internal jugular (IJ) vein thromboembolism, acute, right (HCC)    Superficial thrombophlebitis of right upper extremity    A-fib (HCC)    Acute respiratory failure with hypercapnia (HCC)    Rectal bleeding    Hearing loss    Critical illness myopathy    Urinary retention       Past Medical History  Past Medical History:   Diagnosis Date    Anxiety     Arthritis     Constipation     Diabetes mellitus ([facility] [address] )     IDDM    Frequent UTI     Glaucoma     Hearing loss     Hyperlipidemia     Hypertension     Hyperthyroidism     in past    Hyponatremia     Hypothyroid     Neuropathy     bles    Right tubo-ovarian mass     Wears glasses         Past Surgical History  Past Surgical History:   Procedure Laterality Date    APPENDECTOMY  1954    CATARACT EXTRACTION Bilateral     COLONOSCOPY  09/2014    Completed - Dr Marlo Anand, due in 5 years    HYSTERECTOMY N/A 10/4/2019    Procedure: LAP TOTAL HYSTERECTOMY, BSO;  Surgeon: Akin Padilla MD;  Location: [facility];  Service: Gynecology Oncology    IR THORACENTESIS  10/25/2019    LAPAROTOMY N/A 10/4/2019    Procedure: EXPLORATORY LAPAROTOMY  EXLAP OMENTECTOMY  PELVIC AND DEANGELO-AORTIC LYMPH NODE DISSECTION  PERITONEAL BIOPSY TRANS ABDOMINUS BLOCK;  Surgeon: Akin Padilla MD;  Location: [facility];  Service: Gynecology Oncology    SKIN LESION EXCISION      Ears         Subjective:  Patient is awake, but nonverbal today  Family is visiting at bedside  Objective:  Patient has difficulty maintaining alertness today  Her breathing appears more labored, and when asked she states "its hard"   She is not appropriate for PO trials today  Oral care is provided with oral swab and suction  Oral cavity is dry, but no secretions are removed  Moisturizer is applied  No bedside water and swabs are recommended at this time due to high risk of aspiration  Repeat chest xray from the am shows the following:   Bilateral pneumonia, worsening on the left since 10/27/2019  Assessment:  Increased work of breathing with worsening pneumonia on chest xray  Not appropriate for PO trials today  Plan/Recommendations:  Continue NG  Patient will most likely need alternate means of nutrition   Continue ST

## 2019-10-29 NOTE — ASSESSMENT & PLAN NOTE
Lab Results   Component Value Date    HGBA1C 6 4 (H) 10/22/2019    Currently on Lantus 20 units Q 12  Because there were some reports of poor p o  Intake; will cut Lantus in half at 20 units at night  Continue insulin sliding scale with Accu-Cheks  Hemoglobin A1c  Recent Labs     10/28/19  1151 10/28/19  1206 10/28/19  1412 10/28/19  1609   POCGLU 186* 200* 236* 170*       Blood Sugar Average: Last 72 hrs:  (P) 221 [id number]   Patient is type 1 type diabetes mellitus  Since the patient is on-call for the tube feeding will transition to subcutaneous insulin tonight    Patient was hypoglycemic yesterday so did not transition to subcutaneous insulin last night  Monitor blood sugar

## 2019-10-29 NOTE — PLAN OF CARE
Problem: Potential for Falls  Goal: Patient will remain free of falls  Description  INTERVENTIONS:  - Assess patient frequently for physical needs  -  Identify cognitive and physical deficits and behaviors that affect risk of falls  -  [place] fall precautions as indicated by assessment   - Educate patient/family on patient safety including physical limitations  - Instruct patient to call for assistance with activity based on assessment  - Modify environment to reduce risk of injury  - Consider OT/PT consult to assist with strengthening/mobility  Outcome: Progressing     Problem: Prexisting or High Potential for Compromised Skin Integrity  Goal: Skin integrity is maintained or improved  Description  INTERVENTIONS:  - Identify patients at risk for skin breakdown  - Assess and monitor skin integrity  - Assess and monitor nutrition and hydration status  - Monitor labs   - Assess for incontinence   - Turn and reposition patient  - Assist with mobility/ambulation  - Relieve pressure over bony prominences  - Avoid friction and shearing  - Provide appropriate hygiene as needed including keeping skin clean and dry  - Evaluate need for skin moisturizer/barrier cream  - Collaborate with interdisciplinary team   - Patient/family teaching  - Consider wound care consult   Outcome: Progressing     Problem: Nutrition/Hydration-ADULT  Goal: Nutrient/Hydration intake appropriate for improving, restoring or maintaining nutritional needs  Description  Monitor and assess patient's nutrition/hydration status for malnutrition  Collaborate with interdisciplinary team and initiate plan and interventions as ordered  Monitor patient's weight and dietary intake as ordered or per policy  Utilize nutrition screening tool and intervene as necessary  Determine patient's food preferences and provide high-protein, high-caloric foods as appropriate       INTERVENTIONS:  - Monitor oral intake, urinary output, labs, and treatment plans  - Assess nutrition and hydration status and recommend course of action  - Evaluate amount of meals eaten  - Assist patient with eating if necessary   - Allow adequate time for meals  - Recommend/ encourage appropriate diets, oral nutritional supplements, and vitamin/mineral supplements  - Order, calculate, and assess calorie counts as needed  - Recommend, monitor, and adjust tube feedings and TPN/PPN based on assessed needs  - Assess need for intravenous fluids  - Provide specific nutrition/hydration education as appropriate  - Include patient/family/caregiver in decisions related to nutrition  Outcome: Progressing

## 2019-10-29 NOTE — PROGRESS NOTES
Progress Note - Francisco Javier Conception 82 y o  female MRN: [id number]    Unit/Bed#: [facility] 530-01 Encounter: [id number]      Assessment/Plan:  1  Lethargy  waxing and waning  Alert and oriented x3 opens eyes briefly  ID on consult, CXR worsening pneumonia  Continue previously outlined delirium precautions    2  Anxiety  Started on Zoloft 25 mg p o  Daily  Continue supportive care    3  Acute pain  Continue Tylenol  Lidoderm to back    4  Deconditioning  Encourage out of bed  PT/OT  Speech consult  Continued tube feeds via keofed    5  Hearing impairment  Decreased hearing ot left ear  Improved    6  Ambulatory dysfunction  PT/OT  Rehab post hospitalization    7  Insomnia  Continue melatonin 6 mg po QHS  Continue trazodone 25 mg p o  Q h s     8  Urinary retention  Patient failed voiding trial  Continues with Ji  Urology and Gynecology on consult    9  Acute hypoxic hypercapnic during failure  Pulmonary on consult  Infectious Disease on consult  Continue supportive oxygen  Continue antibiotics          Subjective:   Upon exam pt in bed  Alert and oriented x3  Slightly dyspneic  Daughter at bedside  Objective:     Vitals: Blood pressure 154/68, pulse 88, temperature 97 8 °F (36 6 °C), temperature source Oral, resp  rate 20, height 5' 5" (1 651 m), weight 66 5 kg (146 lb 9 7 oz), SpO2 94 %, not currently breastfeeding  ,Body mass index is 24 4 kg/m²  Intake/Output Summary (Last 24 hours) at 10/29/2019 1504  Last data filed at 10/29/2019 1251  Gross per 24 hour   Intake 1860 65 ml   Output 650 ml   Net 1210 65 ml       Physical Exam:   General : NAD  HEENT : mouth dry  Heart : Normal rate, no murmur rub or gallop  Lungs : decreased slight tacchynpnea  Abdomen : Soft, NT/ND, BS auscultated in all 4 quads     Ext :  Left hand +2 edema, bilateral lower extremities +1 edema  Skin : Pink, warm, dry, age appropriate turgor and mobility  Neuro : Nonfocal  Psych : Alert and O x 3      Invasive Devices     Peripherally Inserted Central Catheter Line            PICC Line 29/05/74 Left Basilic 15 days          Drain            NG/OG/Enteral Tube Nasogastric 8 Fr Right nares 6 days    Urethral Catheter 1 day                Lab, Imaging and other studies: I have personally reviewed pertinent reports      VTE Pharmacologic Prophylaxis: Sequential compression device (Venodyne)   VTE Mechanical Prophylaxis: sequential compression device

## 2019-10-29 NOTE — ASSESSMENT & PLAN NOTE
Patient has history of recent pneumonia and unfortunately ARDS requiring intubation and ICU admission and also was on high-flow oxygen before discharge to [facility]  Patient has to be on BiPAP overnight for elevated pCO2  Currently patient is on 6 L nasal cannula with Oxymizer  Pulmonology evaluation appreciated  ABG reviewed  Acute hypoxic respiratory failure is likely multifactorial secondary to recent pneumonia/ARDS-patient was evaluated along with the pulmonologist and reported that it recovery is going to be  Slow  S/p thora x 2 on 10/25 & 10/27  Infectious Disease evaluation appreciated- discontinued IV antibiotic on Friday  More tachypneic today 10/29- CXR repeated - d/w Pulm, ID , bilateral opacities worse on the left - broaden Abx to cover GNR HCAP/aspiration  Place on cefepime, flagyl  Check procal today & tomorrow   ABG shows metabolic alkalosis with resp compensation ph [phone]

## 2019-10-29 NOTE — PROGRESS NOTES
Progress Note - Perry Tena 1935, 82 y o  female MRN: [id number]    Unit/Bed#: [facility] 530-01 Encounter: [id number]    Primary Care Provider: Zulema Jacobsen MD   Date and time admitted to hospital: 10/21/2019  7:43 PM        Goals of care, counseling/discussion  Assessment & Plan  · Palliative care on board  · Seems like pt may not want PEG, failed speech, cannot continue keofed for long    Urinary retention  Assessment & Plan  · Patient complaining of severe bladder spasms and burning sensation  · Patient could not tolerate the Ji catheter anymore  Ji catheter was removed but failed voiding trial   Started on Diflucan by gyn Oncology for possible candidiasis  · Patient with severe dysuria  Likely secondary to vulvar vaginitis  Discussed with Infectious Disease  Plan is to start on antibiotic  · Patient had the Ji catheter back in  · Pain control per palliative care    Critical illness myopathy  Assessment & Plan  · PT OT evaluation  Needs most likely rehabilitation at the time of discharge  · Patient is not appropriate for acute rehab as per PMR physician May need subacute level of rehab at the time of discharge    Hearing loss  Assessment & Plan  · Likely secondary to Lasix  · Discussed with pulmonology/daughter-ENT consultation [id number] as an outpatient    Rectal bleeding  Assessment & Plan  · Patient with episodes of rectal bleeding  Was evaluated by GI   Status post 1 unit of packed RBCs on wednesday  · Patient had a large bowel movements yesterday no further bleeding    Discussed with GI no plan for any further intervention  · No further bleeding    Acute respiratory failure with hypercapnia [facility])  Assessment & Plan  Patient has history of recent pneumonia and unfortunately ARDS requiring intubation and ICU admission and also was on high-flow oxygen before discharge to [facility]  Patient has to be on BiPAP overnight for elevated pCO2  Currently patient is on 6 L nasal cannula with [facility]  Pulmonology evaluation appreciated  ABG reviewed  Acute hypoxic respiratory failure is likely multifactorial secondary to recent pneumonia/ARDS-patient was evaluated along with the pulmonologist and reported that it recovery is going to be  Slow  S/p thora x 2 on 10/25 & 10/27  Infectious Disease evaluation appreciated- discontinued IV antibiotic on Friday  More tachypneic today 10/29- CXR repeated - d/w Pulm, ID , bilateral opacities worse on the left - broaden Abx to cover GNR HCAP/aspiration  Place on cefepime, flagyl  Check procal today & tomorrow  ABG shows metabolic alkalosis with resp compensation ph [phone]    A-fib (HCC)  Assessment & Plan  Currently on heparin drip  Was taken off of the IV metoprolol  secondary to bradycardia  Cardiology on board    Superficial thrombophlebitis of right upper extremity  Assessment & Plan  Supportive care    Internal jugular (IJ) vein thromboembolism, acute, right [facility])  Assessment & Plan  Patient was on Eliquis as an outpatient  Currently on heparin drip given right upper extremity ischemia  Likely transition to Eliquis in the next couple of days once decision regarding feeding tube placement is made    Ovarian cancer [facility])  Assessment & Plan  This is status post hysterectomy  Gyn Oncology on board- good response    Type 1 diabetes mellitus with diabetic neuropathy [facility])  Assessment & Plan  Lab Results   Component Value Date    HGBA1C 6 4 (H) 10/22/2019    Currently on Lantus 20 units Q 12  Because there were some reports of poor p o  Intake; will cut Lantus in half at 20 units at night  Continue insulin sliding scale with Accu-Cheks  Hemoglobin A1c      Recent Labs     10/29/19  0605 10/29/19  1146 10/29/19  1303 10/29/19  1604   POCGLU 129 60* 108 139       Blood Sugar Average: Last 72 hrs:  (P) 226 [id number]   Patient is type 1 type diabetes mellitus  Now on TF  Insulin drip DCed  Decrease lantus to 20 U HS d/t low BS today    Hypertension  Assessment & Plan  Patient was on Lopressor as an outpatient  Blood pressure appears to be improving  Continue to hold antihypertensives for now    ANAYA (generalized anxiety disorder)  Assessment & Plan  As patient was seen lethargic; will put on hold trazodone and lorazepam   Started on Zoloft at a low dose on Wednesday  Palliative care on board      Adult onset hypothyroidism  Assessment & Plan  Will continue with levothyroxine 50 mcg  TSH is minimally elevated      [facility] Internal Medicine Progress Note  Patient: Erika Luna 82 y o  female   MRN: [id number]  PCP: Stephan Dennison MD  Unit/Bed#: [facility] 530-01 Encounter: [id number]  Date Of Visit: 10/29/19    Assessment:    Principal Problem:    Lethargy  Active Problems:    Adult onset hypothyroidism    ANAYA (generalized anxiety disorder)    Hypertension    Type 1 diabetes mellitus with diabetic neuropathy (HCC)    Ovarian cancer ([address] )    Internal jugular (IJ) vein thromboembolism, acute, right ([address] )    Superficial thrombophlebitis of right upper extremity    A-fib (HCC)    Acute respiratory failure with hypercapnia (HCC)    Rectal bleeding    Hearing loss    Critical illness myopathy    Urinary retention    Goals of care, counseling/discussion      Plan:      VTE Pharmacologic Prophylaxis:   Pharmacologic: Heparin Drip  Mechanical VTE Prophylaxis in Place: Yes    Patient Centered Rounds: I have performed bedside rounds with nursing staff today  Discussions with Specialists or Other Care Team Provider: ANAIS Jacobs, palliative care    Education and Discussions with Family / Patient: daughter    Time Spent for Care: 45 minutes  More than 50% of total time spent on counseling and coordination of care as described above      Current Length of Stay: 7 day(s)    Current Patient Status: Inpatient   Certification Statement: The patient will continue to require additional inpatient hospital stay due to not stable    Discharge Plan / Estimated Discharge Date:     Code Status: Level 1 - Full Code      Subjective:   C/o vagianl pain, feels SOB    Objective:     Vitals:   Temp (24hrs), Av 3 °F (36 8 °C), Min:97 8 °F (36 6 °C), Max:98 7 °F (37 1 °C)    Temp:  [97 8 °F (36 6 °C)-98 7 °F (37 1 °C)] 97 8 °F (36 6 °C)  HR:  [85-95] 88  Resp:  [18-20] 20  BP: (114-154)/(56-68) 154/68  SpO2:  [94 %-96 %] 94 %  Body mass index is 24 4 kg/m²  Input and Output Summary (last 24 hours): Intake/Output Summary (Last 24 hours) at 10/29/2019 1737  Last data filed at 10/29/2019 1251  Gross per 24 hour   Intake 1247 28 ml   Output 650 ml   Net 597 28 ml       Physical Exam:     Physical Exam   Constitutional: She appears well-developed and well-nourished  HENT:   Head: Normocephalic and atraumatic  Mouth/Throat: Oropharynx is clear and moist    Eyes: Conjunctivae are normal    Cardiovascular: Normal rate and regular rhythm  Exam reveals no friction rub  No murmur heard  Pulmonary/Chest: Effort normal  No stridor  No respiratory distress  Coarse on LLL  tachypneic   Abdominal: Soft  She exhibits no distension  There is no tenderness  Neurological: She is alert  lethargic   Vitals reviewed  Additional Data:     Labs:    Results from last 7 days   Lab Units 10/29/19  0452  10/26/19  0439   WBC Thousand/uL 14 31*   < > 11 73*   HEMOGLOBIN g/dL 8 3*   < > 9 3*   HEMATOCRIT % 27 6*   < > 31 5*   PLATELETS Thousands/uL 317   < > 470*   NEUTROS PCT %  --   --  87*   LYMPHS PCT %  --   --  5*   MONOS PCT %  --   --  7   EOS PCT %  --   --  0    < > = values in this interval not displayed  Results from last 7 days   Lab Units 10/29/19  0452 10/28/19  0453   POTASSIUM mmol/L 4 1 3 9   CHLORIDE mmol/L 99* 100   CO2 mmol/L 41* 38*   BUN mg/dL 25 28*   CREATININE mg/dL 0 42* 0 50*   CALCIUM mg/dL 7 1* 7 0*   ALK PHOS U/L  --  129*   ALT U/L  --  58   AST U/L  --  56*           * I Have Reviewed All Lab Data Listed Above    * Additional Pertinent Lab Tests Reviewed:  All Labs Within Last 24 Hours Reviewed    Imaging:    Imaging Reports Reviewed Today Include: CXR  Imaging Personally Reviewed by Myself Includes:  CXR, reviewed Left sifde opacity    Recent Cultures (last 7 days):     Results from last 7 days   Lab Units 10/27/19  1129 10/26/19  1127 10/25/19  1100   GRAM STAIN RESULT  Rare Polys  No bacteria seen  --  Rare Polys  Rare Mononuclear Cells  No bacteria seen   URINE CULTURE   --  <10,000 cfu/ml Enterococcus faecalis*  --    BODY FLUID CULTURE, STERILE  No growth  --  Growth in Broth culture only Bacillus species NOT anthracis*       Last 24 Hours Medication List:     Current Facility-Administered Medications:  acetaminophen 975 mg Oral [facility] [address] Tuesday JIM Blackmon    acetaminophen 650 mg Rectal Q6H PRN Jerrell Chiang MD    albuterol 2 5 mg Nebulization Q4H PRN Jessica Carrizales MD    aluminum-magnesium hydroxide-simethicone 15 mL Per NG Tube Q6H PRN Jessica Carrizales MD    artificial tear  Both Eyes HS PRN Jessica Carrizales MD    benzocaine-menthol-lanolin-aloe  Topical BID Jorje Riley MD    bisacodyl 10 mg Rectal Daily PRN Jovany Rg MD    cefepime 1,000 mg Intravenous Q12H Adrian Alas MD    dextran 70-hypromellose 1 drop Both Eyes Q3H PRN Shannan Castaneda DO    dorzolamide-timolol 1 drop Both Eyes BID Jessica Carrizales MD    fluconazole 200 mg Intravenous Once Jorje Riley MD Last Rate: 200 mg (10/29/19 1651)   heparin (porcine) 3-30 Units/kg/hr (Order-Specific) Intravenous Titrated Pete Kumar PA-C Last Rate: 13 8 Units/kg/hr (10/29/19 1210)   heparin (porcine) 2,400 Units Intravenous PRN Pete Kumar PA-C    heparin (porcine) 4,800 Units Intravenous PRN Shazia Kumar PA-C    insulin glargine 20 Units Subcutaneous HS Patt Moran MD    insulin lispro 1-5 Units Subcutaneous Q6H [address] Jovany Rg MD    insulin lispro 1-5 Units Subcutaneous 0200 Jovany Rg MD    ketamine 0 05 mg/kg/hr Intravenous Continuous Roland Abreu, PA-C Last Rate: 0 05 mg/kg/hr (10/29/19 1404)   levothyroxine 50 mcg Per NG Tube Early Morning Aimee Ram MD    lidocaine 1 patch Topical Daily Armandina Prader, MD    melatonin 6 mg Oral HS Arun Salinas MD    metroNIDAZOLE 500 mg Intravenous Q8H Sanjuana Lemos MD Last Rate: 500 mg (10/29/19 1250)   miconazole  Topical BID PRN Armandina Prader, MD    And        miconazole 200 mg Vaginal HS Armandina Prader, MD    Netarsudil Dimesylate 1 drop Right Eye HS Armandina Prader, MD    nystatin-triamcinolone  Topical BID Samir Costello MD    ondansetron 4 mg Intravenous Q6H PRN Aimee Ram MD    oxyCODONE 2 5 mg Oral Q4H PRN Patt Moran MD    oxyCODONE 5 mg Oral 4x Daily Patt Moran MD    oxyCODONE 5 mg Oral Q4H PRN Sanjuana Lemos MD    pantoprazole 40 mg Intravenous Q24H [facility] Armandina Prader, MD    senna 8 8 mg Per NG Tube Daily PRN Aimee Ram MD    sertraline 25 mg Per NG Tube HS Armandina Prader, MD    simethicone 40 mg Oral Q6H PRN Armandina Prader, MD    travoprost 1 drop Both Eyes HS Aimee Ram MD    traZODone 25 mg Oral HS Arun Salinas MD         Today, Patient Was Seen By: Sanjuana Lemos MD    ** Please Note: This note has been constructed using a voice recognition system   **

## 2019-10-29 NOTE — ASSESSMENT & PLAN NOTE
Patient has history of recent pneumonia and unfortunately ARDS requiring intubation and ICU admission and also was on high-flow oxygen before discharge to [facility]  Patient has to be on BiPAP overnight for elevated pCO2  Currently patient is on 6 L nasal cannula with Oxymizer  Pulmonology evaluation appreciated  ABG reviewed  Acute hypoxic respiratory failure is likely multifactorial secondary to recent pneumonia/ARDS-patient was evaluated along with the pulmonologist and reported that it recovery is going to be  Slow  Patient had a CT scan yesterday and at that time consideration for thoracocentesis was done  Patient had thoracocentesis on the right side removing 500 mL of serosanguineous fluid  Patient with less oxygen requirement  Infectious Disease evaluation appreciated- discontinued IV antibiotic on Friday  Status post thoracocentesis today by Cardiology  Postprocedure patient was more tachypneic  X-ray reviewed  No evidence of pneumothorax  Patient is more lethargic today    Discussed with infectious disease yesterday and recommended to start back on antibiotics since there is a concern for dysuria with the urinary tract infection  Follow-up on the cultures-likely DC antibiotics by tomorrow

## 2019-10-29 NOTE — PROGRESS NOTES
Progress Note - Manfred Shafer 1935, 82 y o  female MRN: [id number]    Unit/Bed#: [facility] 530-01 Encounter: [id number]    Primary Care Provider: Liliam Lyn MD   Date and time admitted to hospital: 10/21/2019  7:43 PM        * Lethargy  Assessment & Plan  Patient was admitted with lethargy coming from the [facility] 19  Patient with waxing and waning of mental status with the periods of lethargy  Patient was more lethargic after getting the Ativan   Encephalopathy is likely multifactorial-likely toxic metabolic  Neurology evaluation appreciated  Patient was on  and tramadol which was discontinued in the facility  Her lethargy is multifactorial/poor nutrition/deconditioning  Patient was more lethargic yesterday  Appears to be improving  Supportive care      Urinary retention  Assessment & Plan  · Patient complaining of severe bladder spasms and burning sensation  · Patient could not tolerate the Ji catheter anymore  Ji catheter was removed but failed voiding trial   Started on Diflucan by gyn Oncology for possible candidiasis  · Patient with severe dysuria  Likely secondary to vulvar vaginitis  Discussed with Infectious Disease  Plan is to start on antibiotic  · Patient had the Ji catheter back in  With the treatment for the wall were Jovon Kat is patient is tolerating the Ji catheter    Critical illness myopathy  Assessment & Plan  · PT OT evaluation  Needs most likely rehabilitation at the time of discharge  · Patient is not appropriate for acute rehab as per PMR physician May need subacute level of rehab at the time of discharge    Hearing loss  Assessment & Plan  · Likely secondary to Lasix  · Discussed with pulmonology/daughter-ENT consultation [id number] as an outpatient    Rectal bleeding  Assessment & Plan  · Patient with episodes of rectal bleeding  Was evaluated by GI     Status post 1 unit of packed RBCs on wednesday  · Patient had a large bowel movements yesterday no further bleeding  Discussed with GI no plan for any further intervention  · No further bleeding    Acute respiratory failure with hypercapnia [facility])  Assessment & Plan  Patient has history of recent pneumonia and unfortunately ARDS requiring intubation and ICU admission and also was on high-flow oxygen before discharge to [facility]  Patient has to be on BiPAP overnight for elevated pCO2  Currently patient is on 6 L nasal cannula with Oxymizer  Pulmonology evaluation appreciated  ABG reviewed  Acute hypoxic respiratory failure is likely multifactorial secondary to recent pneumonia/ARDS-patient was evaluated along with the pulmonologist and reported that it recovery is going to be  Slow  Patient had a CT scan yesterday and at that time consideration for thoracocentesis was done  Patient had thoracocentesis on the right side removing 500 mL of serosanguineous fluid  Patient with less oxygen requirement  Infectious Disease evaluation appreciated- discontinued IV antibiotic on Friday  Status post thoracocentesis today by Cardiology  Postprocedure patient was more tachypneic  X-ray reviewed  No evidence of pneumothorax  Patient is more lethargic today  Discussed with infectious disease yesterday and recommended to start back on antibiotics since there is a concern for dysuria with the urinary tract infection  Follow-up on the cultures-likely DC antibiotics by tomorrow    A-fib [facility])  Assessment & Plan  Currently on heparin drip  Was taken off of the IV metoprolol  secondary to bradycardia  Cardiology on board    Superficial thrombophlebitis of right upper extremity  Assessment & Plan  Supportive care    Internal jugular (IJ) vein thromboembolism, acute, right [facility])  Assessment & Plan  Patient was on Eliquis as an outpatient    Currently on heparin drip given right upper extremity ischemia  Likely transition to Eliquis in the next couple of days once decision regarding feeding tube placement is made    Ovarian cancer [facility])  Assessment & Plan  This is status post hysterectomy  Gyn Oncology on board- good response    Type 1 diabetes mellitus with diabetic neuropathy [facility])  Assessment & Plan  Lab Results   Component Value Date    HGBA1C 6 4 (H) 10/22/2019    Currently on Lantus 20 units Q 12  Because there were some reports of poor p o  Intake; will cut Lantus in half at 20 units at night  Continue insulin sliding scale with Accu-Cheks  Hemoglobin A1c  Recent Labs     10/28/19  1151 10/28/19  1206 10/28/19  1412 10/28/19  1609   POCGLU 186* 200* 236* 170*       Blood Sugar Average: Last 72 hrs:  (P) 934 [id number]   Patient is type 1 type diabetes mellitus  Since the patient is on-call for the tube feeding will transition to subcutaneous insulin tonight  Patient was hypoglycemic yesterday so did not transition to subcutaneous insulin last night  Monitor blood sugar    Hypertension  Assessment & Plan  Patient was on Lopressor as an outpatient  Blood pressure appears to be improving  Continue to hold antihypertensives for now    ANAYA (generalized anxiety disorder)  Assessment & Plan  As patient was seen lethargic; will put on hold trazodone and lorazepam   Started on Zoloft at a low dose on Wednesday  Palliative care on board      Adult onset hypothyroidism  Assessment & Plan  Will continue with levothyroxine 50 mcg  TSH is minimally elevated        VTE Pharmacologic Prophylaxis:   Pharmacologic: Heparin Drip  Mechanical VTE Prophylaxis in Place: Yes    Patient Centered Rounds: I have performed bedside rounds with nursing staff today  Discussions with Specialists or Other Care Team Provider:  Gyn Oncology, pulmonology, Infectious Disease    Education and Discussions with Family / Patient:  Daughter updated in detail    Time Spent for Care: 30 minutes  More than 50% of total time spent on counseling and coordination of care as described above      Current Length of Stay: 6 day(s)    Current Patient Status: Inpatient Certification Statement: The patient will continue to require additional inpatient hospital stay due to Multiple medical issues    Discharge Plan:     Code Status: Level 1 - Full Code      Subjective:   Monitor patient seen and examined, still complaining of burning sensation in the vaginal region  Discussed with gyn Oncology planning for nystatin with topical steroids  Ji catheter was reinserted given failure with voiding trial   Discussed with pulmonology plan is to continue with the supportive care    Objective:     Vitals:   Temp (24hrs), Av 1 °F (36 7 °C), Min:97 5 °F (36 4 °C), Max:98 5 °F (36 9 °C)    Temp:  [97 5 °F (36 4 °C)-98 5 °F (36 9 °C)] 98 4 °F (36 9 °C)  HR:  [78-96] 87  Resp:  [18-22] 18  BP: (114-146)/(56-72) 114/56  SpO2:  [88 %-99 %] 96 %  Body mass index is 24 18 kg/m²  Input and Output Summary (last 24 hours): Intake/Output Summary (Last 24 hours) at 10/28/2019 2008  Last data filed at 10/28/2019 1800  Gross per 24 hour   Intake 1778 38 ml   Output 850 ml   Net 928 38 ml       Physical Exam:     Physical Exam   Constitutional: No distress  Ill appearing   HENT:   Head: Normocephalic and atraumatic  Neck: No JVD present  Cardiovascular: Normal rate  No murmur heard  Pulmonary/Chest: No respiratory distress  Bilateral decreased breath sounds  Tachypnea-appears to be improving when compared to before   Abdominal: Soft  There is no tenderness  There is no guarding  Genitourinary:   Genitourinary Comments: Perineal erythema   Musculoskeletal: She exhibits edema  Neurological: She is alert  Skin: Skin is warm         Additional Data:     Labs:    Results from last 7 days   Lab Units 10/28/19  0453  10/26/19  0439   WBC Thousand/uL 15 61*   < > 11 73*   HEMOGLOBIN g/dL 8 8*   < > 9 3*   HEMATOCRIT % 29 6*   < > 31 5*   PLATELETS Thousands/uL 361   < > 470*   NEUTROS PCT %  --   --  87*   LYMPHS PCT %  --   --  5*   MONOS PCT %  --   --  7   EOS PCT %  --   --  0 < > = values in this interval not displayed  Results from last 7 days   Lab Units 10/28/19  0453   POTASSIUM mmol/L 3 9   CHLORIDE mmol/L 100   CO2 mmol/L 38*   BUN mg/dL 28*   CREATININE mg/dL 0 50*   CALCIUM mg/dL 7 0*   ALK PHOS U/L 129*   ALT U/L 58   AST U/L 56*     Results from last 7 days   Lab Units 10/22/19  0950   INR  1 29*       * I Have Reviewed All Lab Data Listed Above  * Additional Pertinent Lab Tests Reviewed: Ale 66 Admission Reviewed    Imaging:    Imaging Reports Reviewed Today Included  Imaging Personally Reviewed by Myself Includes:      Recent Cultures (last 7 days):     Results from last 7 days   Lab Units 10/27/19  1129 10/26/19  1127 10/25/19  1100 10/21/19  2149 10/21/19  2030   BLOOD CULTURE   --   --   --  No Growth After 5 Days  No Growth After 5 Days     GRAM STAIN RESULT  Rare Polys  No bacteria seen  --  Rare Polys  Rare Mononuclear Cells  No bacteria seen  --   --    URINE CULTURE   --  Culture too young- will reincubate  --   --   --    BODY FLUID CULTURE, STERILE  No growth  --  Growth in Broth culture only Bacillus species NOT anthracis*  --   --        Last 24 Hours Medication List:     Current Facility-Administered Medications:  acetaminophen 975 mg Oral [facility] [address] Tuesday JIM Murrieta    acetaminophen 650 mg Rectal Q6H PRN Blair Phillips MD    albuterol 2 5 mg Nebulization Q4H PRN Brian De La Rosa MD    aluminum-magnesium hydroxide-simethicone 15 mL Per NG Tube Q6H PRN Brian De La Rosa MD    artificial tear  Both Eyes HS PRN Brian De La Rosa MD    benzocaine-menthol-lanolin-aloe  Topical BID Immanuel Levin MD    bisacodyl 10 mg Rectal Daily PRN Janice Saldaña MD    cefTRIAXone 1,000 mg Intravenous Q24H Janice Saldaña MD Last Rate: Stopped (10/28/19 1728)   dextran 70-hypromellose 1 drop Both Eyes Q3H PRN Kenia Aguila DO    dorzolamide-timolol 1 drop Both Eyes BID Brian De La Rosa MD    heparin (porcine) 3-30 Units/kg/hr (Order-Specific) Intravenous Titrated Tayo Kumar PA-C Last Rate: 11 8 Units/kg/hr (10/28/19 0725)   heparin (porcine) 2,400 Units Intravenous PRN Tayo Kumar PA-C    heparin (porcine) 4,800 Units Intravenous PRN Shazia Kumar PA-C    insulin regular (HumuLIN R,NovoLIN R) infusion 0 3-21 Units/hr Intravenous Titrated Angelika Carroll MD Last Rate: 1 Units/hr (10/28/19 1611)   levothyroxine 50 mcg Per NG Tube Early Morning Kevin Chambers MD    lidocaine 1 patch Topical Daily Angelika Carroll MD    melatonin 6 mg Oral HS Zac Soto MD    miconazole  Topical BID PRN Angelika Carroll MD    And        miconazole 200 mg Vaginal HS Angelika Carroll MD    Netarsudil Dimesylate 1 drop Right Eye HS Angelika Carroll MD    nystatin-triamcinolone  Topical BID Ju Stein MD    ondansetron 4 mg Intravenous Q6H PRN Kevin Chambers MD    oxyCODONE 2 5 mg Oral Q4H PRN Roseorlando Lopes PA-C    oxyCODONE 5 mg Oral Q4H PRN Roselind Cluster, PA-JOSUÉ    oxyCODONE 5 mg Oral 4x Daily Adilia López PA-C    pantoprazole 40 mg Intravenous Q24H [address] Angelika Carroll MD    senna 8 8 mg Per NG Tube Daily PRN Kevin Chambers MD    sertraline 25 mg Per NG Tube HS Angelika Carroll MD    simethicone 40 mg Oral Q6H PRN Angelika Carroll MD    travoprost 1 drop Both Eyes HS Kevin Chambers MD    traZODone 25 mg Oral HS Zac Soto MD         Today, Patient Was Seen By: Angelika Carroll MD    ** Please Note: Dictation voice to text software may have been used in the creation of this document   **

## 2019-10-29 NOTE — ASSESSMENT & PLAN NOTE
· PT OT evaluation    Needs most likely rehabilitation at the time of discharge  · Patient is not appropriate for acute rehab as per PMR physician May need subacute level of rehab at the time of discharge

## 2019-10-29 NOTE — ASSESSMENT & PLAN NOTE
· Likely secondary to Lasix  · Discussed with pulmonology/daughter-ENT consultation [id number] as an outpatient

## 2019-10-29 NOTE — ASSESSMENT & PLAN NOTE
Patient was on Eliquis as an outpatient    Currently on heparin drip given right upper extremity ischemia  Likely transition to Eliquis in the next couple of days once decision regarding feeding tube placement is made

## 2019-10-29 NOTE — PROGRESS NOTES
Progress Note - Infectious Disease   Francisco Javier Conception 82 y o  female MRN: [id number]  Unit/Bed#: [facility] 530-01 Encounter: [id number]      Impression/Recommendations:  1  Developing pneumonia, with today's CXR showing worsening left-sided infiltrate  Patient has worse dyspnea and cough  She is at risk for aspiration  At this point, it is reasonable to start patient on antibiotic for possible aspiration pneumonia  Will check and monitor procalcitonin  Change antibiotic to cefepime/Flagyl  Check procalcitonin  Monitor  Monitor respiratory symptoms      2  Bilateral pleural effusions   No evidence of loculation on CT   Without clinical pneumonia, doubt empyema   Patient is status post thoracentesis x 2   Pleural fluid parameters not consistent with empyema  Bacillus growing in pleural fluid culture is most likely contaminant  Monitor respiratory symptoms      3  Acute hypoxic respiratory failure  Initially, I suspect this is residual effect from recent ARDS   Patient did improve with thoracentesis  However, respiratory status deteriorated over last 24 hours, with CXR showing new infiltrate, as in above  Antibiotic plan as in above  O2 support per primary and Pulmonary service  Monitor respiratory status      4   Urinary retention with dysuria  UA is benign  Doubt UTI  Dysuria improved with multiple maneuvers  No further antibiotic needed for this  Follow-up on pending urine culture  Monitor urinary symptoms      5  Candida vulvovaginitis  Patient is on topical antifungal   Continue topical antifungal      6  Lethargy, multifactorial   Patient is overall stable  Monitor      7  Abnormal abdominal CT with extensive stool in rectum   Abdominal exam is mildly distended but otherwise benign   No clinical colitis/proctitis  Monitor      8  Ovarian cancer, status post hysterectomy/oophorectomy  Gyn oncology follow-up      Discussed with patient    Discussed with Dr Song Perez from [facility] service      Antibiotics:  Ceftriaxone # 3     Subjective:  Patient with more dyspnea today  She has a weak cough  Dysuria mild and baseline  Mental status baseline, with stable confusion  Temperature stays down  No chills  She is tolerating antibiotic well  No nausea, vomiting or diarrhea  Objective:  Vitals:  Temp:  [97 8 °F (36 6 °C)-98 7 °F (37 1 °C)] 97 8 °F (36 6 °C)  HR:  [85-95] 95  Resp:  [18-20] 20  BP: (114-137)/(56-64) 136/63  SpO2:  [94 %-96 %] 95 %  Temp (24hrs), Av 4 °F (36 9 °C), Min:97 8 °F (36 6 °C), Max:98 7 °F (37 1 °C)  Current: Temperature: 97 8 °F (36 6 °C)    Physical Exam:     General: Awake, alert, cooperative, no distress  Neck:  Supple  No mass  No lymphadenopathy  Lungs: Decreased breath sounds, bibasilar rales, no wheezing, respirations unlabored  Heart:  Tachycardic with regular rhythm, S1 and S2 normal, no murmur  Abdomen: Soft, nondistended, mild suprapubic tenderness, bowel sounds active all four quadrants,        no masses, no organomegaly  Extremities: Stable leg edema  No erythema/warmth  No ulcer  Nontender to palpation  Skin:  No rash  Neuro: Moves all extremities  Invasive Devices     Peripherally Inserted Central Catheter Line            PICC Line /49/17 Left Basilic 14 days          Drain            NG/OG/Enteral Tube Nasogastric 8 Fr Right nares 5 days    Urethral Catheter 1 day                Labs studies:   I have personally reviewed pertinent labs    Results from last 7 days   Lab Units 10/29/19  0452 10/28/19  0453 10/27/19  0425 10/26/19  0439   POTASSIUM mmol/L 4 1 3 9 3 9 4 2   CHLORIDE mmol/L 99* 100 102 103   CO2 mmol/L 41* 38* 40* 37*   BUN mg/dL 25 28* 27* 25   CREATININE mg/dL 0 42* 0 50* 0 44* 0 42*   EGFR ml/min/1 73sq m 96 90 94 96   CALCIUM mg/dL 7 1* 7 0* 6 7* 6 7*   AST U/L  --  56* 50* 31   ALT U/L  --  58 48 31   ALK PHOS U/L  --  129* 102 91     Results from last 7 days   Lab Units 10/29/19  0452 10/28/19  0453 10/27/19  0425   WBC Thousand/uL 14 31* 15 61* 12 42*   HEMOGLOBIN g/dL 8 3* 8 8* 8 2*   PLATELETS Thousands/uL 317 361 364     Results from last 7 days   Lab Units 10/27/19  1129 10/26/19  1127 10/25/19  1100 10/22/19  2002   GRAM STAIN RESULT  Rare Polys  No bacteria seen  --  Rare Polys  Rare Mononuclear Cells  No bacteria seen  --    URINE CULTURE   --  Culture too young- will reincubate  --   --    BODY FLUID CULTURE, STERILE  No growth  --  Growth in Broth culture only Bacillus species NOT anthracis*  --    MRSA CULTURE ONLY   --   --   --  No Methicillin Resistant Staphlyococcus aureus (MRSA) isolated       Imaging Studies:   I have personally reviewed pertinent imaging study reports and images in PACS  Today's CXR reviewed personally  Worsening left-sided infiltrate  EKG, Pathology, and Other Studies:   I have personally reviewed pertinent reports

## 2019-10-29 NOTE — ASSESSMENT & PLAN NOTE
Patient was admitted with lethargy coming from the [facility]  Patient with waxing and waning of mental status with the periods of lethargy  Patient was more lethargic after getting the Ativan   Encephalopathy is likely multifactorial-likely toxic metabolic  Neurology evaluation appreciated  Patient was on  and tramadol which was discontinued in the facility  Her lethargy is multifactorial/poor nutrition/deconditioning  Patient was more lethargic yesterday    Appears to be improving  Supportive care

## 2019-10-29 NOTE — PROGRESS NOTES
Gyn Oncology Progress note   Margaret Chirinos 82 y o  female MRN: [id number]  Unit/Bed#: [facility] 530-01 Encounter: [id number]    Assessment and Plan:    1)  Acute respiratory failure with hypercapnia  - Managed per primary team  - Currently on 4L NC with SaO2 98%  - CT CAP 10/24/19: worsening extensive multifocal pneumonia and increasing bilateral pleural effusions  However, per ID evaluation, changes appear to be residual effect from recent ARDS as patient has no fever or significant leukocytosis  Procal was normal  Therefore, antibiotics discontinued  - s/p thoracocentesis x2 (10/25 & 10/27), Pleural fluid studies appear transudative  - Pulmonology following    2) Delirium  - Pt with waxing and waning between consciousness and lethargy   - Likely multi-factorial  - Ct head w/o contrast on admission without any acute abnormalities  - Pt alert and oriented this morning on my exam  - Continue to avoid sedating medications, benzos  - Neurology following  - S/p Palliative care consult 10/27/19: Recommended global delirium precautions    3) Severe protein calorie malnutrition secondary to poor oral take  - Pt was started on tube feeds on POD#10 in [place]   Poor oral intake since discharge to LTAC    - NGT replaced with Keofed on 10/23/19-Jevity 1 2, advance to goal  - Speech pathology following: s/p barium swallow study (10/25): high risk for aspiration, however, conservative PO trials at bedside      4) Rectal Bleeding, resolved  - Evaluated by GI and s/p 1u PRBCs on 10/22/19  - H/H stable in 9s, continue to monitor  - CT CAP 10/24/19 demonstrated fecal impaction, s/p enema with large BM   - No further bleeding     5) Internal Jugular (IJ) vein thromboembolism, presented with acute cyanotic right fingers, improved  - Repeat doppler on 10/22 demonstrated occluded right radial artery from them id forearm to the wrist  All five digits show flat line doppler waveforms  - Pt's home Eliquis was placed on hold and she was started on Heparin gtt  - s/p Vascular surgery consult, no surgical intervention at this time    6) Type 1 Diabetes  - Hgb A1c 6 4 on 10/22  - Glucose levels: Goal between 140-180   - Continue insulin gtt  - D5 1/2NS infusion     7) Stage 1B ovarian cancer s/p staging surgery 10/4/2019  - Good prognosis  - GYN ONC to continue to follow    8) Urinary Retention  - Likely multifactorial etiology  - Ji catheter removed on 10/27 at patient request  Replaced on 10/28/19 after failing voiding trial  Nursing changing pads frequently  - Dysuria: U/A unremarkable  Urine culture pre-mak results: culture too young, re-incubating  Most-likely related to vulvar vaginitis  - ID initiated Ceftriaxone, however, plan to discontinue as unlikely UTI    9) Decreased Hearing  Per primary team, most-likely due to lasix  Evaluated by ENT on 10/23/19: outpatient follow-up as assessment cannot be done at bedside  Signed off    10) Paroxysmal Afib/bradycardia  On telemetry   Currently on heparin gtt  Cardiology follwoing: likely secondary to medication, no further bradycardia noted  IV metoprolol held  Managed per primary team    11) Adult Onset Hypothyroidism  Levothyroxine 50mcg daily  TSH is minimally elevated  Managed per primary team    12) HTN  Pt has had episodes of hypotension inpatient, Lopressor on hold  Bps currently 114-146/50s-60s    13) Weakness  Evaluated by PT/OT: recommending acute STR when stable for discharge  S/p PMR consult on 10/28/19: "recommend discharge to SNF pending medical stability  Can consider admission from SNF to [facility] once endurance improves"    14) Vulvovaginitis   S/p Diflucan  Monistat initiated on 10/27/19  Mycolog II cream started 10/28/19 with significant improvement in vulvar discomfort   Continue perineal care and keep area dry    15) Dispo: Inpatient    Lenoard Houston is is awake and oriented this AM  Per nursing, she has not had any acute events overnight   Pt reports improvement in her perineal discomfort after the addition of topical cream  She is keeping ice packs to area as well  She denies pain  Pt was evaluated by PMR yesterday and is not a candidate for ARC, but recommended subacute rehab  Per nursing, patient having loose BMs yesterday  She was on 2L O2 NC yesterday, but had to be increased to 4L overnight due to her sleep apnea  /63 (BP Location: Left arm)   Pulse 85   Temp 98 7 °F (37 1 °C) (Oral)   Resp 18   Ht 5' 5" (1 651 m)   Wt 65 9 kg (145 lb 4 5 oz)   SpO2 94%   BMI 24 18 kg/m²     I/O last 3 completed shifts: In: 2933 5 [I V :311 5; NG/GT:960; IV Piggyback:130; Feedings:1532]  Out: 1200 [Urine:1200]  I/O this shift:  In: 1193 [I V :35; NG/GT:450]  Out: 200 [Urine:200]    Lab Results   Component Value Date    WBC 14 31 (H) 10/29/2019    HGB 8 3 (L) 10/29/2019    HCT 27 6 (L) 10/29/2019     (H) 10/29/2019     10/29/2019       Lab Results   Component Value Date    GLUCOSE 75 01/08/2016    CALCIUM 7 1 (L) 10/29/2019     (L) 01/08/2016    K 4 1 10/29/2019    CO2 41 (H) 10/29/2019    CL 99 (L) 10/29/2019    BUN 25 10/29/2019    CREATININE 0 42 (L) 10/29/2019       Lab Results   Component Value Date/Time    POCGLU 129 10/29/2019 06:05 AM    POCGLU 207 (H) 10/29/2019 02:07 AM    POCGLU 203 (H) 10/28/2019 11:57 PM    POCGLU 200 (H) 10/28/2019 10:02 PM    POCGLU 207 (H) 10/28/2019 08:05 PM    POCGLU 164 02/15/2017 02:33 PM     Physical Exam   Constitutional: She is oriented to person, place, and time  No distress  HENT:   Head: Normocephalic and atraumatic  Cardiovascular: Normal rate, regular rhythm, normal heart sounds and intact distal pulses  Pulmonary/Chest: Effort normal  No respiratory distress  4L NC   Abdominal: Soft  Bowel sounds are normal  She exhibits no distension  There is no tenderness  There is no rebound and no guarding     Incision: C/D/I without signs of infection     Genitourinary:   Genitourinary Comments: Ji in place Musculoskeletal: She exhibits edema (+1 bilaterally)  She exhibits no tenderness  Neurological: She is alert and oriented to person, place, and time  Skin: She is not diaphoretic  Psychiatric: Her behavior is normal  Thought content normal  She exhibits a depressed mood  Vitals reviewed          Juani Brewer MD  OBGYN, PGY-3  10/29/2019 6:14 AM

## 2019-10-29 NOTE — SOCIAL WORK
Cm received call from [place] at [facility]  Pt was reviewed by PM&R, patient requiring total assist  It is felt patient will not be able to tolerate acute rehab, SNF recommended

## 2019-10-29 NOTE — PROGRESS NOTES
Progress note - Palliative and Supportive Care   Ana Helm 82 y o  female [id number]    Assessment:  Patient Active Problem List   Diagnosis    Adult onset hypothyroidism    ANAYA (generalized anxiety disorder)    Hyperlipidemia    Hypertension    Retinopathy, diabetic, proliferative ([place] [id number] )    Type 1 diabetes mellitus with diabetic neuropathy ([place] [address] )    Age-related osteoporosis without current pathological fracture    Hyponatremia    Ovarian cancer ([place] [address] )    Type 1 diabetes mellitus with hyperglycemia ([facility])    Internal jugular (IJ) vein thromboembolism, acute, right ([facility])    Superficial thrombophlebitis of right upper extremity    A-fib ([facility])    Acute respiratory failure with hypercapnia ([facility])    Lethargy    Rectal bleeding    Hearing loss    Critical illness myopathy    Urinary retention     Plan:  1  Symptom management -   Pain   - tylenol 975mg Q8H [address]   - lidocaine patch   - ice packs to affected area   - oxycodone 2 5mg PO Q4H PRN moderate pain (hold parameters)   - oxycodone 5mg PO Q4H PRN severe pain(hold parameters)   - oxycodone 5mg PO QID [address] (hold parameters)   -add ketamine infusion 0 5mg/kg/hr for burning sensation   - bowel regimen: senna daily PRN    Insomnia   - melatonin 5mg HS   - trazodone 25mg HS   - consider zyprexa 2 5mg HS if persistent insomnia   - Recommend global delirium precautions including:    - Establishment of day/night cycle via lights during the day and blinds open   Please limit interruptions at night as medically appropriate  - Provide glasses/hearing aids as apprioriate      - Minimize deliriogenic meds as able  - Provide reorientation including date on board and visible clock  - Avoid restraints as able, frequent verbal reorientations or patient care sitter as appropriate  2  Goals - level 1 full code   - Patient with minimal verbal communication  She is able to express she wants her pain controlled and wishes she could die   Otherwise does not engage in conversation    - Daughter, Tiffanie Mcclendon, at bedside expresses understanding of patient's tenuous medical condition but does not feel that her previously expressed wishes would be in line with discontinuing medical treatment at this time  Therefor she wishes for patient to be a full code while continuing medical optimization and pain management  She does acknowledge patient's wishes not to pursue a PEG if unable to be cleared for a diet  Discussed alternative of comfort guided care  - Discussed that previously expressed goal to return to baseline functional status and independence is unlikely  Code Status: full - Level 1   Decisional apparatus:  Patient is not competent on my exam today  If competence is lost, patient's substitute decision maker would default to adult children [place] 169  Advance Directive / Living Will / POLST:  None on file    Interval history:       24H Pain History  - scores 4-10/10 in past 24H  - Location: perineum/vagina  - Quality: burning  - Alleviation: oxycodone/ice packs  - Exacerbation: topical cream  - OME 26 25    Patient denies significant improvement with scheduled and increased oxycodone dosing  She had poor sleep last evening  This morning endorses difficulty breathing  CXR demonstrates worsening left lower lobe consolidation       MEDICATIONS / ALLERGIES:     all current active meds have been reviewed and current meds:   Current Facility-Administered Medications   Medication Dose Route Frequency    acetaminophen (TYLENOL) oral suspension 975 mg  975 mg Oral Q8H [address]    acetaminophen (TYLENOL) rectal suppository 650 mg  650 mg Rectal Q6H PRN    albuterol inhalation solution 2 5 mg  2 5 mg Nebulization Q4H PRN    aluminum-magnesium hydroxide-simethicone (MYLANTA) 200-200-20 mg/5 mL oral suspension 15 mL  15 mL Per NG Tube Q6H PRN    artificial tear (LUBRIFRESH P M ) ophthalmic ointment   Both Eyes HS PRN    benzocaine-menthol-lanolin-aloe (DERMOPLAST) 20-0 5 % topical spray   Topical BID    bisacodyl (DULCOLAX) rectal suppository 10 mg  10 mg Rectal Daily PRN    cefTRIAXone (ROCEPHIN) 1,000 mg in dextrose 5 % 50 mL IVPB  1,000 mg Intravenous Q24H    dextran 70-hypromellose (GENTEAL TEARS) 0 1-0 3 % ophthalmic solution 1 drop  1 drop Both Eyes Q3H PRN    dorzolamide-timolol (COSOPT) 22 3-6 8 MG/ML ophthalmic solution 1 drop  1 drop Both Eyes BID    heparin (porcine) 25,000 units in 250 mL infusion (premix)  3-30 Units/kg/hr (Order-Specific) Intravenous Titrated    heparin (porcine) injection 2,400 Units  2,400 Units Intravenous PRN    heparin (porcine) injection 4,800 Units  4,800 Units Intravenous PRN    insulin glargine (LANTUS) subcutaneous injection 25 Units 0 25 mL  25 Units Subcutaneous HS    insulin lispro (HumaLOG) 100 units/mL subcutaneous injection 1-5 Units  1-5 Units Subcutaneous Q6H [address]    insulin lispro (HumaLOG) 100 units/mL subcutaneous injection 1-5 Units  1-5 Units Subcutaneous 0200    levothyroxine tablet 50 mcg  50 mcg Per NG Tube Early Morning    lidocaine (LIDODERM) 5 % patch 1 patch  1 patch Topical Daily    melatonin tablet 6 mg  6 mg Oral HS    miconazole (MONISTAT) external vaginal cream   Topical BID PRN    And    miconazole (MONISTAT) vaginal suppository 200 mg  200 mg Vaginal HS    Netarsudil Dimesylate 0 02 % SOLN 1 drop  1 drop Right Eye HS    nystatin-triamcinolone (MYCOLOG-II) cream   Topical BID    ondansetron (ZOFRAN) injection 4 mg  4 mg Intravenous Q6H PRN    oxyCODONE (ROXICODONE) IR tablet 2 5 mg  2 5 mg Oral Q4H PRN    oxyCODONE (ROXICODONE) IR tablet 5 mg  5 mg Oral Q4H PRN    oxyCODONE (ROXICODONE) IR tablet 5 mg  5 mg Oral 4x Daily    pantoprazole (PROTONIX) injection 40 mg  40 mg Intravenous Q24H GENNARO    senna 8 8 mg/5 mL oral syrup 8 8 mg  8 8 mg Per NG Tube Daily PRN    sertraline (ZOLOFT) oral concentrated solution 25 mg  25 mg Per NG Tube HS    simethicone (MYLICON) oral suspension 40 mg  40 mg Oral Q6H PRN  travoprost (TRAVATAN-Z) 0 004 % ophthalmic solution 1 drop  1 drop Both Eyes HS    traZODone (DESYREL) tablet 25 mg  25 mg Oral HS       Allergies   Allergen Reactions    Thiazide-Type Diuretics      Hyponatremia       OBJECTIVE:    Physical Exam  Physical Exam   Constitutional:   Appears ill, FLACC 3/10   HENT:   Head: Normocephalic and atraumatic  Eyes: Conjunctivae are normal    Cardiovascular: Normal rate  Pulmonary/Chest:   Tachypneic, on 6L O2 via NC  Desaturates quickly with removal    Abdominal: She exhibits no distension  There is no guarding  Genitourinary:   Genitourinary Comments: Ji in place, improved appearance of labial irritation   Musculoskeletal: She exhibits edema (diffuse)  Neurological: She is alert  Skin:   Improved appearance of labial erythema  Abdominal incision CDI   Psychiatric:   Depressed mood/affect       Lab Results:   I have personally reviewed pertinent labs  , CBC:   Lab Results   Component Value Date    WBC 14 31 (H) 10/29/2019    HGB 8 3 (L) 10/29/2019    HCT 27 6 (L) 10/29/2019     (H) 10/29/2019     10/29/2019    MCH 32 3 10/29/2019    MCHC 30 1 (L) 10/29/2019    RDW 15 6 (H) 10/29/2019    MPV 10 0 10/29/2019   , CMP:   Lab Results   Component Value Date    SODIUM 141 10/29/2019    K 4 1 10/29/2019    CL 99 (L) 10/29/2019    CO2 41 (H) 10/29/2019    BUN 25 10/29/2019    CREATININE 0 42 (L) 10/29/2019    CALCIUM 7 1 (L) 10/29/2019    EGFR 96 10/29/2019     Imaging Studies: reviewed pertinent studies  EKG, Pathology, and Other Studies: reviewed pertinent studies    Counseling / Coordination of Care  Total floor / unit time spent today 45+ minutes  Greater than 50% of total time was spent with the patient and / or family counseling and / or coordination of care   A description of the counseling / coordination of care: time spent from approximately 10am-10:45 discussing symptom management and goals of care with patient, her daughter, pulmonary team, primary team, and RN

## 2019-10-29 NOTE — ASSESSMENT & PLAN NOTE
As patient was seen lethargic; will put on hold trazodone and lorazepam   Started on Zoloft at a low dose on Wednesday  Palliative care on board

## 2019-10-29 NOTE — ASSESSMENT & PLAN NOTE
Lab Results   Component Value Date    HGBA1C 6 4 (H) 10/22/2019    Currently on Lantus 20 units Q 12  Because there were some reports of poor p o  Intake; will cut Lantus in half at 20 units at night  Continue insulin sliding scale with Accu-Cheks  Hemoglobin A1c      Recent Labs     10/29/19  0605 10/29/19  1146 10/29/19  1303 10/29/19  1604   POCGLU 129 60* 108 139       Blood Sugar Average: Last 72 hrs:  (P) 423 [id number]   Patient is type 1 type diabetes mellitus  Now on TF  Insulin drip DCed  Decrease lantus to 20 U HS d/t low BS today

## 2019-10-30 LAB
ANION GAP SERPL CALCULATED.3IONS-SCNC: 3 MMOL/L (ref 4–13)
APTT PPP: 103 SECONDS (ref 23–37)
APTT PPP: 39 SECONDS (ref 23–37)
APTT PPP: 99 SECONDS (ref 23–37)
BACTERIA SPEC BFLD CULT: NO GROWTH
BASOPHILS # BLD AUTO: 0.02 THOUSANDS/ΜL (ref 0–0.1)
BASOPHILS NFR BLD AUTO: 0 % (ref 0–1)
BUN SERPL-MCNC: 18 MG/DL (ref 5–25)
CALCIUM SERPL-MCNC: 7.3 MG/DL (ref 8.3–10.1)
CHLORIDE SERPL-SCNC: 98 MMOL/L (ref 100–108)
CO2 SERPL-SCNC: 36 MMOL/L (ref 21–32)
CREAT SERPL-MCNC: 0.42 MG/DL (ref 0.6–1.3)
EOSINOPHIL # BLD AUTO: 0.06 THOUSAND/ΜL (ref 0–0.61)
EOSINOPHIL NFR BLD AUTO: 1 % (ref 0–6)
ERYTHROCYTE [DISTWIDTH] IN BLOOD BY AUTOMATED COUNT: 15.8 % (ref 11.6–15.1)
GFR SERPL CREATININE-BSD FRML MDRD: 96 ML/MIN/1.73SQ M
GLUCOSE SERPL-MCNC: 109 MG/DL (ref 65–140)
GLUCOSE SERPL-MCNC: 137 MG/DL (ref 65–140)
GLUCOSE SERPL-MCNC: 208 MG/DL (ref 65–140)
GLUCOSE SERPL-MCNC: 215 MG/DL (ref 65–140)
GLUCOSE SERPL-MCNC: 308 MG/DL (ref 65–140)
GLUCOSE SERPL-MCNC: 312 MG/DL (ref 65–140)
GRAM STN SPEC: NORMAL
GRAM STN SPEC: NORMAL
HCT VFR BLD AUTO: 30.5 % (ref 34.8–46.1)
HGB BLD-MCNC: 9.2 G/DL (ref 11.5–15.4)
IMM GRANULOCYTES # BLD AUTO: 0.2 THOUSAND/UL (ref 0–0.2)
IMM GRANULOCYTES NFR BLD AUTO: 2 % (ref 0–2)
LYMPHOCYTES # BLD AUTO: 0.63 THOUSANDS/ΜL (ref 0.6–4.47)
LYMPHOCYTES NFR BLD AUTO: 5 % (ref 14–44)
MCH RBC QN AUTO: 32.4 PG (ref 26.8–34.3)
MCHC RBC AUTO-ENTMCNC: 30.2 G/DL (ref 31.4–37.4)
MCV RBC AUTO: 107 FL (ref 82–98)
MONOCYTES # BLD AUTO: 0.85 THOUSAND/ΜL (ref 0.17–1.22)
MONOCYTES NFR BLD AUTO: 7 % (ref 4–12)
NEUTROPHILS # BLD AUTO: 11.09 THOUSANDS/ΜL (ref 1.85–7.62)
NEUTS SEG NFR BLD AUTO: 85 % (ref 43–75)
NRBC BLD AUTO-RTO: 0 /100 WBCS
PLATELET # BLD AUTO: 321 THOUSANDS/UL (ref 149–390)
PMV BLD AUTO: 9.9 FL (ref 8.9–12.7)
POTASSIUM SERPL-SCNC: 4.1 MMOL/L (ref 3.5–5.3)
PROCALCITONIN SERPL-MCNC: 0.1 NG/ML
RBC # BLD AUTO: 2.84 MILLION/UL (ref 3.81–5.12)
SODIUM SERPL-SCNC: 137 MMOL/L (ref 136–145)
TROPONIN I SERPL-MCNC: 0.03 NG/ML
WBC # BLD AUTO: 12.85 THOUSAND/UL (ref 4.31–10.16)

## 2019-10-30 PROCEDURE — 84484 ASSAY OF TROPONIN QUANT: CPT | Performed by: INTERNAL MEDICINE

## 2019-10-30 PROCEDURE — 85730 THROMBOPLASTIN TIME PARTIAL: CPT | Performed by: HOSPITALIST

## 2019-10-30 PROCEDURE — 99232 SBSQ HOSP IP/OBS MODERATE 35: CPT | Performed by: INTERNAL MEDICINE

## 2019-10-30 PROCEDURE — 99233 SBSQ HOSP IP/OBS HIGH 50: CPT | Performed by: HOSPITALIST

## 2019-10-30 PROCEDURE — 80048 BASIC METABOLIC PNL TOTAL CA: CPT | Performed by: HOSPITALIST

## 2019-10-30 PROCEDURE — 97530 THERAPEUTIC ACTIVITIES: CPT

## 2019-10-30 PROCEDURE — 97112 NEUROMUSCULAR REEDUCATION: CPT

## 2019-10-30 PROCEDURE — 97535 SELF CARE MNGMENT TRAINING: CPT

## 2019-10-30 PROCEDURE — 82948 REAGENT STRIP/BLOOD GLUCOSE: CPT

## 2019-10-30 PROCEDURE — 84145 PROCALCITONIN (PCT): CPT | Performed by: HOSPITALIST

## 2019-10-30 PROCEDURE — 94760 N-INVAS EAR/PLS OXIMETRY 1: CPT

## 2019-10-30 PROCEDURE — 85025 COMPLETE CBC W/AUTO DIFF WBC: CPT | Performed by: HOSPITALIST

## 2019-10-30 PROCEDURE — 99233 SBSQ HOSP IP/OBS HIGH 50: CPT | Performed by: PHYSICIAN ASSISTANT

## 2019-10-30 PROCEDURE — NC001 PR NO CHARGE: Performed by: OBSTETRICS & GYNECOLOGY

## 2019-10-30 PROCEDURE — 85730 THROMBOPLASTIN TIME PARTIAL: CPT | Performed by: INTERNAL MEDICINE

## 2019-10-30 PROCEDURE — C9113 INJ PANTOPRAZOLE SODIUM, VIA: HCPCS | Performed by: FAMILY MEDICINE

## 2019-10-30 PROCEDURE — 99233 SBSQ HOSP IP/OBS HIGH 50: CPT | Performed by: INTERNAL MEDICINE

## 2019-10-30 RX ORDER — METOPROLOL TARTRATE 5 MG/5ML
5 INJECTION INTRAVENOUS EVERY 8 HOURS
Status: DISCONTINUED | OUTPATIENT
Start: 2019-10-30 | End: 2019-10-30

## 2019-10-30 RX ORDER — METOPROLOL TARTRATE 5 MG/5ML
5 INJECTION INTRAVENOUS EVERY 6 HOURS PRN
Status: DISCONTINUED | OUTPATIENT
Start: 2019-10-30 | End: 2019-11-01

## 2019-10-30 RX ORDER — ACETAZOLAMIDE 500 MG/5ML
250 INJECTION, POWDER, LYOPHILIZED, FOR SOLUTION INTRAVENOUS EVERY 12 HOURS SCHEDULED
Status: DISCONTINUED | OUTPATIENT
Start: 2019-10-30 | End: 2019-10-30

## 2019-10-30 RX ORDER — INSULIN GLARGINE 100 [IU]/ML
22 INJECTION, SOLUTION SUBCUTANEOUS
Status: DISCONTINUED | OUTPATIENT
Start: 2019-10-30 | End: 2019-11-01

## 2019-10-30 RX ORDER — OXYCODONE HCL 5 MG/5 ML
2.5 SOLUTION, ORAL ORAL 4 TIMES DAILY
Status: DISCONTINUED | OUTPATIENT
Start: 2019-10-30 | End: 2019-10-31

## 2019-10-30 RX ORDER — FUROSEMIDE 10 MG/ML
40 INJECTION INTRAMUSCULAR; INTRAVENOUS
Status: DISCONTINUED | OUTPATIENT
Start: 2019-10-30 | End: 2019-10-31

## 2019-10-30 RX ADMIN — METOPROLOL TARTRATE 12.5 MG: 25 TABLET ORAL at 11:44

## 2019-10-30 RX ADMIN — INSULIN LISPRO 3 UNITS: 100 INJECTION, SOLUTION INTRAVENOUS; SUBCUTANEOUS at 01:53

## 2019-10-30 RX ADMIN — CEFEPIME HYDROCHLORIDE 1000 MG: 1 INJECTION, POWDER, FOR SOLUTION INTRAMUSCULAR; INTRAVENOUS at 04:36

## 2019-10-30 RX ADMIN — BENZOCAINE AND LEVOMENTHOL: 200; 5 SPRAY TOPICAL at 17:53

## 2019-10-30 RX ADMIN — OXYCODONE HYDROCHLORIDE 2.5 MG: 5 SOLUTION ORAL at 17:31

## 2019-10-30 RX ADMIN — ACETAMINOPHEN 975 MG: 160 SUSPENSION ORAL at 05:53

## 2019-10-30 RX ADMIN — LEVOTHYROXINE SODIUM 50 MCG: 50 TABLET ORAL at 05:53

## 2019-10-30 RX ADMIN — DORZOLAMIDE HYDROCHLORIDE AND TIMOLOL MALEATE 1 DROP: 20; 5 SOLUTION/ DROPS OPHTHALMIC at 08:35

## 2019-10-30 RX ADMIN — NYSTATIN AND TRIAMCINOLONE ACETONIDE: 100000; 1 CREAM TOPICAL at 17:53

## 2019-10-30 RX ADMIN — DEXTRAN 70 AND HYPROMELLOSE 2910 1 DROP: 1; 3 SOLUTION/ DROPS OPHTHALMIC at 21:46

## 2019-10-30 RX ADMIN — INSULIN LISPRO 2 UNITS: 100 INJECTION, SOLUTION INTRAVENOUS; SUBCUTANEOUS at 06:40

## 2019-10-30 RX ADMIN — HEPARIN SODIUM 4800 UNITS: 1000 INJECTION INTRAVENOUS; SUBCUTANEOUS at 13:23

## 2019-10-30 RX ADMIN — TRAVOPROST 1 DROP: 0.04 SOLUTION/ DROPS OPHTHALMIC at 21:46

## 2019-10-30 RX ADMIN — KETAMINE HYDROCHLORIDE 0.05 MG/KG/HR: 50 INJECTION, SOLUTION INTRAMUSCULAR; INTRAVENOUS at 21:32

## 2019-10-30 RX ADMIN — METRONIDAZOLE 500 MG: 500 INJECTION, SOLUTION INTRAVENOUS at 02:39

## 2019-10-30 RX ADMIN — MELATONIN 6 MG: 3 TAB ORAL at 21:33

## 2019-10-30 RX ADMIN — OXYCODONE HYDROCHLORIDE 5 MG: 5 SOLUTION ORAL at 11:49

## 2019-10-30 RX ADMIN — ACETAMINOPHEN 975 MG: 160 SUSPENSION ORAL at 21:32

## 2019-10-30 RX ADMIN — INSULIN LISPRO 3 UNITS: 100 INJECTION, SOLUTION INTRAVENOUS; SUBCUTANEOUS at 00:18

## 2019-10-30 RX ADMIN — ACETAMINOPHEN 975 MG: 160 SUSPENSION ORAL at 13:23

## 2019-10-30 RX ADMIN — TRAZODONE HYDROCHLORIDE 25 MG: 50 TABLET ORAL at 21:33

## 2019-10-30 RX ADMIN — PANTOPRAZOLE SODIUM 40 MG: 40 INJECTION, POWDER, FOR SOLUTION INTRAVENOUS at 08:34

## 2019-10-30 RX ADMIN — BENZOCAINE AND LEVOMENTHOL: 200; 5 SPRAY TOPICAL at 08:35

## 2019-10-30 RX ADMIN — HEPARIN SODIUM AND DEXTROSE 15 UNITS/KG/HR: 10000; 5 INJECTION INTRAVENOUS at 17:30

## 2019-10-30 RX ADMIN — OXYCODONE HYDROCHLORIDE 5 MG: 5 SOLUTION ORAL at 08:34

## 2019-10-30 RX ADMIN — INSULIN GLARGINE 22 UNITS: 100 INJECTION, SOLUTION SUBCUTANEOUS at 21:34

## 2019-10-30 RX ADMIN — SERTRALINE HYDROCHLORIDE 25 MG: 20 SOLUTION ORAL at 21:34

## 2019-10-30 RX ADMIN — LIDOCAINE 1 PATCH: 50 PATCH CUTANEOUS at 08:35

## 2019-10-30 RX ADMIN — METOPROLOL TARTRATE 12.5 MG: 25 TABLET ORAL at 21:35

## 2019-10-30 RX ADMIN — NYSTATIN AND TRIAMCINOLONE ACETONIDE: 100000; 1 CREAM TOPICAL at 08:36

## 2019-10-30 RX ADMIN — FUROSEMIDE 40 MG: 10 INJECTION, SOLUTION INTRAMUSCULAR; INTRAVENOUS at 16:23

## 2019-10-30 RX ADMIN — ACETAZOLAMIDE 250 MG: 500 INJECTION, POWDER, LYOPHILIZED, FOR SOLUTION INTRAVENOUS at 12:02

## 2019-10-30 RX ADMIN — DORZOLAMIDE HYDROCHLORIDE AND TIMOLOL MALEATE 1 DROP: 20; 5 SOLUTION/ DROPS OPHTHALMIC at 17:53

## 2019-10-30 NOTE — PHYSICAL THERAPY NOTE
Physical Therapy Progress Note     10/30/19 0856   Pain Assessment   Pain Assessment 0-10   Pain Score Worst Possible Pain   Pain Type Acute pain   Pain Location [facility] Pain Intervention(s) Repositioned; Emotional support   Response to Interventions Tolerated, unchanged  Restrictions/Precautions   Other Precautions Cognitive; Chair Alarm; Fall Risk;Pain;Multiple lines   Subjective   Subjective The pt  notes that she has severe pain, but she is agreeable to work with therapy  Bed Mobility   Supine to Sit 2  Maximal assistance   Additional items Assist x 2; Increased time required;Verbal cues;LE management   Sit to Supine 2  Maximal assistance   Additional items Assist x 2; Increased time required;Verbal cues;LE management   Transfers   Sit to Stand 2  Maximal assistance   Additional items Assist x 2; Increased time required;Verbal cues   Stand to Sit 2  Maximal assistance   Additional items Assist x 2; Increased time required;Verbal cues   Ambulation/Elevation   Assistive Device Rolling walker   Balance   Static Sitting Fair   Dynamic Sitting Poor +   Static Standing Poor -  (63 seconds )   Activity Tolerance   Activity Tolerance Patient tolerated treatment well;Patient limited by fatigue;Patient limited by pain   Nurse [id number] [place], RN  Assessment   Prognosis Poor   Problem List Decreased strength;Decreased range of motion;Decreased endurance; Impaired balance;Decreased mobility; Decreased coordination;Decreased cognition;Decreased safety awareness;Pain   Assessment The pt  has improving command following, activity tolerance, and strength  She does fatigue rapidly with activity, but she was able to stand for a short period  She was unable to unweight her LLE, and she required maximal assistance for her balance and to unweight her RLE in order to reposition it  She also required occasional blocking of her knees when in stance   She was able to sit for 8 mins with assistance, and then 18 minutes with close supervision  She followed all one-step commands today as well  She was repositioned back in the bed in the cardiac chair position with her arms elevated on pillows  Discussed the pt's progress with her daughter  Barriers to Discharge Inaccessible home environment;Decreased caregiver support   Goals   Patient Goals None expressed  STG Expiration Date 11/03/19   PT Treatment Day 3   Plan   Treatment/Interventions Functional transfer training;LE strengthening/ROM; Therapeutic exercise; Endurance training;Patient/family training;Bed mobility   Progress Slow progress, decreased activity tolerance   PT Frequency   (3-4x a week as per Tawana Dickerson , PT DPT )   Recommendation   Recommendation Post acute IP rehab   Equipment Recommended Rob Ladd, SIMA

## 2019-10-30 NOTE — ASSESSMENT & PLAN NOTE
Lab Results   Component Value Date    HGBA1C 6 4 (H) 10/22/2019    Currently on Lantus 20 units Q 12  Because there were some reports of poor p o  Intake; will cut Lantus in half at 20 units at night  Continue insulin sliding scale with Accu-Cheks  Hemoglobin A1c      Recent Labs     10/30/19  0003 10/30/19  0151 10/30/19  0629 10/30/19  1153   POCGLU 312* 308* 215* 109       Blood Sugar Average: Last 72 hrs:  (P) 329 [id number]   Patient is type 1 type diabetes mellitus  Now on TF  Insulin drip DCed  Decrease lantus to 20 U HS d/t low BS

## 2019-10-30 NOTE — PROGRESS NOTES
Progress note - Palliative and Supportive Care   Jennifer Flannery 82 y o  female [id number]    Assessment:  Patient Active Problem List   Diagnosis    Adult onset hypothyroidism    ANAYA (generalized anxiety disorder)    Hyperlipidemia    Hypertension    Retinopathy, diabetic, proliferative ([place] [address] )    Type 1 diabetes mellitus with diabetic neuropathy ([place] [address] )    Age-related osteoporosis without current pathological fracture    Hyponatremia    Ovarian cancer ([place] [address] )    Type 1 [facility] mellitus [facility] hyperglycemia [facility])    Internal jugular (IJ) vein thromboembolism, acute, right ([facility])    Superficial thrombophlebitis of right upper extremity    A-fib ([facility])    Acute respiratory failure with hypercapnia ([facility])    Lethargy    Rectal bleeding    Hearing loss    Critical illness myopathy    Urinary retention    Goals of care, counseling/discussion     Plan:  1  Symptom management -   Pain   - tylenol 975mg Q8H [facility]   - lidocaine patch   - ice packs to affected area   - oxycodone 2 5mg PO Q4H PRN moderate pain (hold parameters)   - oxycodone 5mg PO Q4H PRN severe pain(hold parameters)   - oxycodone 2 5mg PO QID [facility] decreased from 5mg QID (hold parameters)   - ketamine infusion 0 5mg/kg/hr for burning sensation (consider discontinuation tomorrow after 48H of infusion)   - bowel regimen: senna daily PRN    Insomnia   - melatonin 5mg HS   - trazodone 25mg HS   - Recommend global delirium precautions including:    - Establishment of day/night cycle via lights during the day and blinds open   Please limit interruptions at night as medically appropriate  - Provide glasses/hearing aids as apprioriate      - Minimize deliriogenic meds as able  - Provide reorientation including date on board and visible clock  - Avoid restraints as able, frequent verbal reorientations or patient care sitter as appropriate      2  Goals - level 1 full code   - Patient with minimal verbal communication, able to answer simple yes/no questions, but not competent to make medical decisions    - Daughter, Alirio Faulkner, at bedside expresses understanding of patient's tenuous medical condition but does not feel that her previously expressed wishes would be in line with discontinuing medical treatment at this time, despite patient's verbalization of "I wish I could die"  Therefor daughter wishes for patient to be a full code while continuing medical optimization and pain management  Patient and her daughter both previously acknowledged wishes not to pursue a PEG, but after recent discussion with primary team and geriatrics have agreed to GI consultation for evaluation  Code Status: full - Level 1   Decisional apparatus:  Patient is not competent on my exam today  If competence is lost, patient's substitute decision maker would default to adult children [place] 169  Advance Directive / Living Will / POLST:  None on file    Interval history:         24H Pain History  - scores 0-10/10 in past 24H  - Location: vagina  - Quality: burning  - Alleviation: ketamine and oxycodone  - Exacerbation: movement/coughing  - 0 PRNs  - OME 30    Patient slightly drowsy today  Does answer yes/no questions with persistence and encouragement  Per discussion with RN staff mental status seems to wax and wane throughout the day  Patient reports her pain is better controlled today and rates pain 5/10  Has been too drowsy and with too high O2 requirement to do speech/swallow trial so far today  Admits to sleeping better at night, but also sleeping during the day  Denies any other complaints         MEDICATIONS / ALLERGIES:     all current active meds have been reviewed and current meds:   Current Facility-Administered Medications   Medication Dose Route Frequency    acetaminophen (TYLENOL) oral suspension 975 mg  975 mg Oral Q8H [address]    acetaminophen (TYLENOL) rectal suppository 650 mg  650 mg Rectal Q6H PRN    acetaZOLAMIDE (DIAMOX) injection 250 mg  250 mg Intravenous Q12H [address]    albuterol inhalation solution 2 5 mg  2 5 mg Nebulization Q4H PRN    aluminum-magnesium hydroxide-simethicone (MYLANTA) 200-200-20 mg/5 mL oral suspension 15 mL  15 mL Per NG Tube Q6H PRN    artificial tear (LUBRIFRESH P M ) ophthalmic ointment   Both Eyes HS PRN    benzocaine-menthol-lanolin-aloe (DERMOPLAST) 20-0 5 % topical spray   Topical BID    bisacodyl (DULCOLAX) rectal suppository 10 mg  10 mg Rectal Daily PRN    dextran 70-hypromellose (GENTEAL TEARS) 0 1-0 3 % ophthalmic solution 1 drop  1 drop Both Eyes Q3H PRN    dorzolamide-timolol (COSOPT) 22 3-6 8 MG/ML ophthalmic solution 1 drop  1 drop Both Eyes BID    heparin (porcine) 25,000 units in 250 mL infusion (premix)  3-30 Units/kg/hr (Order-Specific) Intravenous Titrated    heparin (porcine) injection 2,400 Units  2,400 Units Intravenous PRN    heparin (porcine) injection 4,800 Units  4,800 Units Intravenous PRN    insulin glargine (LANTUS) subcutaneous injection 22 Units 0 22 mL  22 Units Subcutaneous HS    insulin lispro (HumaLOG) 100 units/mL subcutaneous injection 1-5 Units  1-5 Units Subcutaneous Q6H [address]    insulin lispro (HumaLOG) 100 units/mL subcutaneous injection 1-5 Units  1-5 Units Subcutaneous 0200    ketamine 250 mg (STANDARD CONCENTRATION) IV in sodium chloride 0 9% 250 mL  0 05 mg/kg/hr Intravenous Continuous    levothyroxine tablet 50 mcg  50 mcg Per NG Tube Early Morning    lidocaine (LIDODERM) 5 % patch 1 patch  1 patch Topical Daily    melatonin tablet 6 mg  6 mg Oral HS    metoprolol tartrate (LOPRESSOR) partial tablet 12 5 mg  12 5 mg Oral Q12H GENNARO    miconazole (MONISTAT) external vaginal cream   Topical BID PRN    Netarsudil Dimesylate 0 02 % SOLN 1 drop  1 drop Right Eye HS    nystatin-triamcinolone (MYCOLOG-II) cream   Topical BID    ondansetron (ZOFRAN) injection 4 mg  4 mg Intravenous Q6H PRN    oxyCODONE (ROXICODONE) oral solution 2 5 mg  2 5 mg Oral Q4H PRN    oxyCODONE (ROXICODONE) oral solution 5 mg  5 mg Oral 4x Daily    oxyCODONE (ROXICODONE) oral solution 5 mg  5 mg Oral Q4H PRN    pantoprazole (PROTONIX) injection 40 mg  40 mg Intravenous Q24H GENNARO    senna 8 8 mg/5 mL oral syrup 8 8 mg  8 8 mg Per NG Tube Daily PRN    sertraline (ZOLOFT) oral concentrated solution 25 mg  25 mg Per NG Tube HS    simethicone (MYLICON) oral suspension 40 mg  40 mg Oral Q6H PRN    travoprost (TRAVATAN-Z) 0 004 % ophthalmic solution 1 drop  1 drop Both Eyes HS    traZODone (DESYREL) tablet 25 mg  25 mg Oral HS       Allergies   Allergen Reactions    Thiazide-Type Diuretics      Hyponatremia       OBJECTIVE:    Physical Exam  Physical Exam   Constitutional:   Appears ill   HENT:   Head: Normocephalic and atraumatic  Eyes: Conjunctivae are normal    Cardiovascular:   Irregular rate   Pulmonary/Chest:   Improved work of breathing since yesterday  Coarse breath sounds in RLL  Mostly mouth breathing with O2 via NC in place  Abdominal: Soft  Bowel sounds are normal  She exhibits no distension  There is no tenderness  Midline incision CDI   Musculoskeletal: She exhibits edema  Neurological:   Hard of hearing  Answers simple yes/no questions, but not oriented to her comprehensive medical situation  Skin: Skin is warm and dry  Psychiatric:   Flat affect       Lab Results:   I have personally reviewed pertinent labs  , CBC:   Lab Results   Component Value Date    WBC 12 85 (H) 10/30/2019    HGB 9 2 (L) 10/30/2019    HCT 30 5 (L) 10/30/2019     (H) 10/30/2019     10/30/2019    MCH 32 4 10/30/2019    MCHC 30 2 (L) 10/30/2019    RDW 15 8 (H) 10/30/2019    MPV 9 9 10/30/2019    NRBC 0 10/30/2019   , CMP:   Lab Results   Component Value Date    SODIUM 137 10/30/2019    K 4 1 10/30/2019    CL 98 (L) 10/30/2019    CO2 36 (H) 10/30/2019    BUN 18 10/30/2019    CREATININE 0 42 (L) 10/30/2019    CALCIUM 7 3 (L) 10/30/2019    EGFR 96 10/30/2019     Imaging Studies: reviewed pertinent studies  EKG, Pathology, and Other Studies: reviewed cytology from pleural fluid drawn 10/27: no evidence of malignant cells    Counseling / Coordination of Care  Total floor / unit time spent today 35+ minutes  Greater than 50% of total time was spent with the patient and / or family counseling and / or coordination of care  A description of the counseling / coordination of care: time spent communicating with primary team, RN, geriatrics team, and patient

## 2019-10-30 NOTE — ASSESSMENT & PLAN NOTE
As patient was seen lethargic trazodone and lorazepam were held  Started on Zoloft at a low dose on Wednesday  Palliative care on board

## 2019-10-30 NOTE — PLAN OF CARE
Problem: OCCUPATIONAL THERAPY ADULT  Goal: Performs self-care activities at highest level of function for planned discharge setting  See evaluation for individualized goals  Description  Treatment Interventions: ADL retraining, Functional transfer training, UE strengthening/ROM, Endurance training, Cognitive reorientation, Patient/family training, Equipment evaluation/education, Fine motor coordination activities, Compensatory technique education, Energy conservation, Activityengagement  Equipment Recommended: (S) Bedside commode, Other (comment)(SC)       See flowsheet documentation for full assessment, interventions and recommendations  Outcome: Progressing  Note:   Limitation: Decreased ADL status, Decreased UE strength, Decreased UE ROM, Decreased Safe judgement during ADL, Decreased cognition, Decreased endurance, Decreased high-level ADLs, Decreased fine motor control  Prognosis: Fair  Assessment: Patient participated in Skilled OT session this date with interventions consisting of ADL re training with the use of correct body mechnaics, Energy Conservation techniques, deep breathing technique, safety awareness and fall prevention techniques, therapeutic exercise to: increase functional use of BUEs, increase BUE muscle strength ,  therapeutic activities to: increase activity tolerance, increase postural control, increase trunk control and increase OOB/ sitting tolerance   Patient agreeable to OT treatment session, upon arrival patient was found supine in bed  Pt participated in bed mobility, sitting EOB tolerance, grooming, UE exercises, and STS transfers  Please refer to chart for functional levels  Patient requiring frequent re direction, verbal cues for safety, verbal cues for correct technique, verbal cues for pacing thru activity steps, cognitive assistance to anticipate next step, one step directives and frequent rest periods   Patient continues to be functioning below baseline level, occupational performance remains limited secondary to factors listed above and increased risk for falls and injury  From OT standpoint, recommendation at time of d/c would be Short Term Rehab  Patient to benefit from continued Occupational Therapy treatment while in the hospital to address deficits as defined above and maximize level of functional independence with ADLs and functional mobility        OT Discharge Recommendation: Short Term Rehab  OT - OK to Discharge: Yes(when medically cleared)

## 2019-10-30 NOTE — PROGRESS NOTES
Pt HR increasing in rapid afib in the 140s-150s  Informed SLIM  Verbal order for PRN metoprolol ordered   For HR > 120s

## 2019-10-30 NOTE — PROGRESS NOTES
General Cardiology   Progress Note -  Team One   Ana Helm 82 y o  female MRN: [id number]    Unit/Bed#: [facility] 530-01 Encounter: [id number]    Assessment/ Plan  1  PAF, prior bradycardic episodes  -24 hr telemetry review: NSR, rates 70s to 90s  -No BB has been restarted   -Home eliquis currently on hold, remains on a IV heparin infusion    2  Acute hypercapnic/hypxoc respiratory failure; multifactorial recent PNA, and ARDS  3  B/L pleural effusions--s/p thoracentesis x 2  4  Developing PNA  -Chest x-ray 10/29:  Bilateral PNA, worsening on the left  -Pulm, and ID following  -On IV antibiotics  5  Volume overload; multifactorial the setting of fluid resuscitation, poor nutritional status, and prolonged immobility  -On exam, patient is grossly overloaded with fluid; positive JVD, left lower lobe fine crackles, +2 pitting edema of the right upper extremity, +1 pitting edema of the left upper extremity, +2 pitting edema bilateral lower extremities; she is on 3 L of supplemental oxygen  -Unable to express symptoms due to mental status  -Limited 2D echo--LVEF 65%, mild TR, mild pulm HTN  -IV diuresis has been placed on hold  -24 hour I&O balance: -410 ml    5  RIJ thromboembolism  -On IV heparin drip  -Vasc surgery eval, no plan for intervetion    Plan  -Resume metoprolol at 12 5 mg BID (she was previously taking 25 mg BID at home)  -Start IV furosemide 40 mg BID  -Closely monitor volume status; strict I&Os  -Closely monitor renal function and electrolytes  -Goals of care discussions in process with primary team and palliative care    Subjective  Review of Systems   Unable to perform ROS: mental status change       Objective:   Vitals: Blood pressure 135/62, pulse 91, temperature 97 8 °F (36 6 °C), temperature source Oral, resp  rate 22, height 5' 5" (1 651 m), weight 66 5 kg (146 lb 9 7 oz), SpO2 94 %, not currently breastfeeding ,       Body mass index is 24 4 kg/m²  ,     Systolic (06REX), BJM:208 , Min:111 , Max:181 Diastolic (02EFM), FZH:05, Min:56, Max:88      Intake/Output Summary (Last 24 hours) at 10/30/2019 0951  Last data filed at 10/30/2019 0900  Gross per 24 hour   Intake 1826 79 ml   Output 1250 ml   Net 576 79 ml     Weight (last 2 days)     Date/Time   Weight    10/29/19 0600   66 5 (146 61)            Telemetry Review: No significant arrhythmias seen on telemetry review    EKG personally reviewed by JIM Fay    Physical Exam   Constitutional:   Patient is lethargic  She intermittently follows commands  She is unable to answer questions  She does not appear to be acutely distressed   HENT:   Head: Normocephalic and atraumatic  Eyes: No scleral icterus  Neck: JVD present  Cardiovascular: Normal rate, regular rhythm, normal heart sounds and intact distal pulses  No murmur heard  Patient is grossly overloaded with fluid; positive JVD, left lower lobe fine crackles, +2 pitting edema of the right upper extremity, +1 pitting edema of the left upper extremity, +2 pitting edema bilateral lower extremities   Pulmonary/Chest: Effort normal  She has no wheezes  She has rales  left lower lobe fine crackles   Abdominal: Soft  Bowel sounds are normal  There is no tenderness  Musculoskeletal: She exhibits edema  Neurological:   Lethargic   Skin: Skin is warm  Capillary refill takes less than 2 seconds  Psychiatric: She has a normal mood and affect  Nursing note and vitals reviewed        LABORATORY RESULTS  Results from last 7 days   Lab Units 10/30/19  0033 10/29/19  2145 10/29/19  1802   TROPONIN I ng/mL 0 03 0 04 0 03     CBC with diff: Results from last 7 days   Lab Units 10/30/19  0500 10/29/19  0452 10/28/19  0453 10/27/19  0425 10/26/19  1840 10/26/19  1530 10/26/19  0439  10/25/19  0612  10/24/19  0454   WBC Thousand/uL 12 85* 14 31* 15 61* 12 42*  --   --  11 73*  --  10 60*  --  11 57*   HEMOGLOBIN g/dL 9 2* 8 3* 8 8* 8 2* 8 7* 9 0* 9 3*   < > 9 4*  9 1*   < > 9 0*   HEMATOCRIT % 30 5* 27 6* 29 6* 28 6* 30 0* 30 7* 31 5*   < > 31 3*  30 5*   < > 29 8*   MCV fL 107* 107* 108* 110*  --   --  107*  --  106*  --  105*   PLATELETS Thousands/uL 321 317 361 364  --   --  470*  --  476*  --  459*   MCH pg 32 4 32 3 32 2 31 5  --   --  31 5  --  31 5  --  31 8   MCHC g/dL 30 2* 30 1* 29 7* 28 7*  --   --  29 5*  --  29 8*  --  30 2*   RDW % 15 8* 15 6* 15 4* 15 1  --   --  15 2*  --  15 0  --  16 0*   MPV fL 9 9 10 0 10 1 9 5  --   --  9 8  --  9 7  --  9 8   NRBC AUTO /100 WBCs 0  --   --   --   --   --  0  --   --   --  0    < > = values in this interval not displayed         CMP:  Results from last 7 days   Lab Units 10/30/19  0546 10/29/19  0452 10/28/19  0453 10/27/19  0425 10/26/19  0439 10/25/19  0612 10/24/19  0454   POTASSIUM mmol/L 4 1 4 1 3 9 3 9 4 2 3 8 3 3*   CHLORIDE mmol/L 98* 99* 100 102 103 104 106   CO2 mmol/L 36* 41* 38* 40* 37* 36* 35*   BUN mg/dL 18 25 28* 27* 25 22 21   CREATININE mg/dL 0 42* 0 42* 0 50* 0 44* 0 42* 0 45* 0 51*   CALCIUM mg/dL 7 3* 7 1* 7 0* 6 7* 6 7* 6 3* 6 4*   AST U/L  --   --  56* 50* 31  --  21   ALT U/L  --   --  58 48 31  --  21   ALK PHOS U/L  --   --  129* 102 91  --  95   EGFR ml/min/1 73sq m 96 96 90 94 96 94 90       BMP:  Results from last 7 days   Lab Units 10/30/19  0546 10/29/19  0452 10/28/19  0453 10/27/19  0425 10/26/19  0439 10/25/19  0612 10/24/19  0454   POTASSIUM mmol/L 4 1 4 1 3 9 3 9 4 2 3 8 3 3*   CHLORIDE mmol/L 98* 99* 100 102 103 104 106   CO2 mmol/L 36* 41* 38* 40* 37* 36* 35*   BUN mg/dL 18 25 28* 27* 25 22 21   CREATININE mg/dL 0 42* 0 42* 0 50* 0 44* 0 42* 0 45* 0 51*   CALCIUM mg/dL 7 3* 7 1* 7 0* 6 7* 6 7* 6 3* 6 4*       Lab Results   Component Value Date    NTBNP 3,747 (H) 10/12/2019             Results from last 7 days   Lab Units 10/28/19  0453 10/26/19  0439 10/25/19  0612   MAGNESIUM mg/dL 2 5 2 6 2 3                           Lipid Profile:   Lab Results   Component Value Date    CHOL 183 01/08/2016    CHOL 155 2015     Lab Results   Component Value Date    HDL 93 (H) 2019    HDL 84 (H) 2019    HDL 94 (H) 2019     Lab Results   Component Value Date    LDLCALC 87 2019    LDLCALC 82 2019    LDLCALC 91 2019     Lab Results   Component Value Date    TRIG 52 2019    TRIG 41 2019    TRIG 69 2019       Cardiac testing:   Results for orders placed during the hospital encounter of 10/04/19   Echo complete with contrast if indicated    Narrative [address]  [place], [address]  [phone]    Transthoracic Echocardiogram  2D, M-mode, Doppler, and Color Doppler    Study date:  10-Oct-2019    Patient: Denis Scherer  MR number: [id number]  Account number: [de-[email or web address]  : 1935  Age: 82 years  Gender: Female  Status: Inpatient  Location: Bedside  Height: 65 in  Weight: 149 6 lb  BP: 135/ 57 mmHg    Indications: Atrial fibrillation    Diagnoses: I48 0 - Atrial fibrillation    Sonographer:  RONALDO Ba  Primary Physician:  Rickie Almeida MD  Referring Physician:  Edvin Allen:  [facility]  Interpreting Physician:  Martin Moon MD    IMPRESSIONS:  Technical quality: Good  Cardiac rhythm: Sinus tachycardia    1  Normal left ventricular size, prominent basal interventricular septum, normal left ventricular systolic function with hyperdynamic wall motion  Normal diastolic function  EF around 63%  2  No other significant chamber hypertrophy or enlargement  3  Aortic valve sclerosis, no aortic valve stenosis or regurgitation  5  Mild mitral valve regurgitation  5  Moderate tricuspid valve regurgitation  6  Suspected moderate pulmonary hypertension  Estimated RVSP/PASP is 52 mmHg  7  No pericardial effusion      Compared to report of previous echocardiogram from 2015 pulmonary hypertension and tricuspid valve regurgitation are new, otherwise no significant change  SUMMARY    LEFT VENTRICLE:  Normal left ventricular cavity size, normal wall thickness, normal left ventricular systolic function with hyperdynamic wall motion  Ejection fraction is estimated as around 62%  Normal diastolic function  RIGHT VENTRICLE:  Normal right ventricular size and systolic function  Estimated right ventricular systolic pressure moderately increased, 52 mmHg  Moderate pulmonary hypertension  LEFT ATRIUM:  Normal left atrial cavity size  Intact interatrial septum  RIGHT ATRIUM:  Normal right atrial cavity size  MITRAL VALVE:  Mild mitral valve leaflet sclerosis, adequate leaflet mobility  Mild mitral valve regurgitation with posteriorly directed jet  AORTIC VALVE:  Tricuspid aortic valve with sclerosis, adequate cuspal separation  No aortic valve stenosis or regurgitation  TRICUSPID VALVE:  Moderate tricuspid valve regurgitation  PULMONIC VALVE:  No significant pulmonic valve regurgitation  AORTA:  Aortic root and proximal ascending aorta are normal in size on 2D imaging  IVC, HEPATIC VEINS:  Inferior vena cava is normal in size and demonstrates appropriate respiratory phasic changes in diameter  PERICARDIUM:  No pericardial effusion  Suspected moderate to large pleural effusion with organized material in pleural cavity  HISTORY: PRIOR HISTORY: Risk factors: hypertension, diabetes, and hypercholesterolemia  PROCEDURE: The procedure was performed at the bedside  This was a routine study  The transthoracic approach was used  The study included complete 2D imaging, M-mode, complete spectral Doppler, and color Doppler  The heart rate was 97 bpm,  at the start of the study  Images were obtained from the parasternal, apical, subcostal, and suprasternal notch acoustic windows  Image quality was adequate  LEFT VENTRICLE: Normal left ventricular cavity size, normal wall thickness, normal left ventricular systolic function with hyperdynamic wall motion  Ejection fraction is estimated as around 62%  Normal diastolic function  RIGHT VENTRICLE: Normal right ventricular size and systolic function  Estimated right ventricular systolic pressure moderately increased, 52 mmHg  Moderate pulmonary hypertension  LEFT ATRIUM: Normal left atrial cavity size  Intact interatrial septum  RIGHT ATRIUM: Normal right atrial cavity size  MITRAL VALVE: Mild mitral valve leaflet sclerosis, adequate leaflet mobility  Mild mitral valve regurgitation with posteriorly directed jet  AORTIC VALVE: Tricuspid aortic valve with sclerosis, adequate cuspal separation  No aortic valve stenosis or regurgitation  TRICUSPID VALVE: Moderate tricuspid valve regurgitation  PULMONIC VALVE: No significant pulmonic valve regurgitation  PERICARDIUM: No pericardial effusion  Suspected moderate to large pleural effusion with organized material in pleural cavity  AORTA: Aortic root and proximal ascending aorta are normal in size on 2D imaging  SYSTEMIC VEINS: IVC: Inferior vena cava is normal in size and demonstrates appropriate respiratory phasic changes in diameter      SYSTEM MEASUREMENT TABLES    2D  %FS: 33 3 %  AVC: 300 9 ms  Ao Diam: 2 9 cm  EDV(Teich): 58 2 ml  EF(Teich): 62 9 %  ESV(Teich): 21 6 ml  HR_2Ch_Q: 101 4 BPM  HR_4Ch_Q: 99 4 BPM  IVSd: 0 8 cm  LA Diam: 3 4 cm  LAAs A4C: 15 4 cm2  LAESV A-L A4C: 44 7 ml  LAESV MOD A4C: 41 2 ml  LALs A4C: 4 5 cm  LVCO_2Ch_Q: 4 5 L/min  LVCO_4Ch_Q: 4 7 L/min  LVCO_BiP_Q: 4 6 L/min  LVEDV MOD A4C: 62 3 ml  LVEF MOD A4C: 67 1 %  LVEF_2Ch_Q: 53 2 %  LVEF_4Ch_Q: 66 %  LVEF_BiP_Q: 61 5 %  LVESV MOD A4C: 20 5 ml  LVIDd: 3 7 cm  LVIDs: 2 5 cm  LVLd A4C: 6 7 cm  LVLd_2Ch_Q: 6 7 cm  LVLd_4Ch_Q: 7 2 cm  LVLs A4C: 5 7 cm  LVLs_2Ch_Q: 5 7 cm  LVLs_4Ch_Q: 5 8 cm  LVPWd: 0 8 cm  LVSV_2Ch_Q: 44 4 ml  LVSV_4Ch_Q: 47 1 ml  LVSV_BiP_Q: 49 9 ml  LVVED_2Ch_Q: 83 3 ml  LVVED_4Ch_Q: 71 3 ml  LVVED_BiP_Q: 81 1 ml  LVVES_2Ch_Q: 39 ml  LVVES_4Ch_Q: 24 3 ml  LVVES_BiP_Q: 31 2 ml  RA Area: 11 7 cm2  RVIDd: 2 9 cm  RWT: 0 4  SV MOD A4C: 41 8 ml  SV(Teich): 36 6 ml    CW  TR Vmax: 3 2 m/s  TR maxP 6 mmHg    MM  TAPSE: 2 5 cm    PW  E' Av 1 m/s  E' Lat: 0 1 m/s  E' Sept: 0 1 m/s  E/E' Av 6  E/E' Lat: 6 5  E/E' Sept: 6 8  MV A Robin: 0 9 m/s  MV E Robin: 0 7 m/s  MV E/A Ratio: 0 7    [facility]    Prepared and electronically signed by    Kim Cazares MD  Signed 10-Oct-2019 15:19:26       No results found for this or any previous visit  No results found for this or any previous visit  No procedure found  No results found for this or any previous visit        Meds/Allergies   all current active meds have been reviewed, current meds:   Current Facility-Administered Medications   Medication Dose Route Frequency    acetaminophen (TYLENOL) oral suspension 975 mg  975 mg Oral Q8H [facility]    acetaminophen (TYLENOL) rectal suppository 650 mg  650 mg Rectal Q6H PRN    albuterol inhalation solution 2 5 mg  2 5 mg Nebulization Q4H PRN    aluminum-magnesium hydroxide-simethicone (MYLANTA) 200-200-20 mg/5 mL oral suspension 15 mL  15 mL Per NG Tube Q6H PRN    artificial tear (LUBRIFRESH P M ) ophthalmic ointment   Both Eyes HS PRN    benzocaine-menthol-lanolin-aloe (DERMOPLAST) 20-0 5 % topical spray   Topical BID    bisacodyl (DULCOLAX) rectal suppository 10 mg  10 mg Rectal Daily PRN    cefepime (MAXIPIME) 1,000 mg in dextrose 5 % 50 mL IVPB  1,000 mg Intravenous Q12H    dextran 70-hypromellose (GENTEAL TEARS) 0 1-0 3 % ophthalmic solution 1 drop  1 drop Both Eyes Q3H PRN    dorzolamide-timolol (COSOPT) 22 3-6 8 MG/ML ophthalmic solution 1 drop  1 drop Both Eyes BID    heparin (porcine) 25,000 units in 250 mL infusion (premix)  3-30 Units/kg/hr (Order-Specific) Intravenous Titrated    heparin (porcine) injection 2,400 Units  2,400 Units Intravenous PRN    heparin (porcine) injection 4,800 Units  4,800 Units Intravenous PRN    insulin glargine (LANTUS) subcutaneous injection 22 Units 0 22 mL  22 Units Subcutaneous HS    insulin lispro (HumaLOG) 100 units/mL subcutaneous injection 1-5 Units  1-5 Units Subcutaneous Q6H [facility]    insulin lispro (HumaLOG) 100 units/mL subcutaneous injection 1-5 Units  1-5 Units Subcutaneous 0200    ketamine 250 mg (STANDARD CONCENTRATION) IV in sodium chloride 0 9% 250 mL  0 05 mg/kg/hr Intravenous Continuous    levothyroxine tablet 50 mcg  50 mcg Per NG Tube Early Morning    lidocaine (LIDODERM) 5 % patch 1 patch  1 patch Topical Daily    melatonin tablet 6 mg  6 mg Oral HS    metroNIDAZOLE (FLAGYL) IVPB (premix) 500 mg  500 mg Intravenous Q8H    miconazole (MONISTAT) external vaginal cream   Topical BID PRN    Netarsudil Dimesylate 0 02 % SOLN 1 drop  1 drop Right Eye HS    nystatin-triamcinolone (MYCOLOG-II) cream   Topical BID    ondansetron (ZOFRAN) injection 4 mg  4 mg Intravenous Q6H PRN    oxyCODONE (ROXICODONE) oral solution 2 5 mg  2 5 mg Oral Q4H PRN    oxyCODONE (ROXICODONE) oral solution 5 mg  5 mg Oral 4x Daily    oxyCODONE (ROXICODONE) oral solution 5 mg  5 mg Oral Q4H PRN    pantoprazole (PROTONIX) injection 40 mg  40 mg Intravenous Q24H [facility]    senna 8 8 mg/5 mL oral syrup 8 8 mg  8 8 mg Per NG Tube Daily PRN    sertraline (ZOLOFT) oral concentrated solution 25 mg  25 mg Per NG Tube HS    simethicone (MYLICON) oral suspension 40 mg  40 mg Oral Q6H PRN    travoprost (TRAVATAN-Z) 0 004 % ophthalmic solution 1 drop  1 drop Both Eyes HS    traZODone (DESYREL) tablet 25 mg  25 mg Oral HS    and PTA meds:   Prior to Admission Medications   Prescriptions Last Dose Informant Patient Reported? Taking?    ERROR: CANNOT USE RATIO BASED PRESCRIPTION MIXTURE NAMING FOR A NON-MIXTURE Unknown at Unknown time  No No   Sig: Take 10 mL (20 mg total) by mouth daily   LORazepam (ATIVAN) 0 5 mg tablet Unknown at Unknown time Self No No   Sig: Take 1 tablet (0 5 mg total) by mouth 3 (three) times a day   Patient taking differently: Take 0 5 mg by mouth 2 (two) times a day    Netarsudil Dimesylate (RHOPRESSA) 0 02 % SOLN Unknown at Unknown time  Yes No   Sig: Apply to eye   RHOPRESSA 0 02 % SOLN Unknown at Unknown time  Yes No   Sig: Administer 1 drop to the right eye daily at bedtime    acetaminophen (TYLENOL) 325 mg tablet Unknown at Unknown time  No No   Sig: Take 2 tablets (650 mg total) by mouth every 6 (six) hours as needed for mild pain   aluminum-magnesium hydroxide-simethicone (MYLANTA) 200-200-20 mg/5 mL suspension Unknown at Unknown time  No No   Sig: Take 30 mL by mouth every 4 (four) hours as needed for indigestion or heartburn   apixaban (ELIQUIS) 5 mg Unknown at Unknown time  No No   Sig: Take 1 tablet (5 mg total) by mouth 2 (two) times a day   artificial tear (LUBRIFRESH P M ) 83-15 % ophthalmic ointment Unknown at Unknown time  No No   Sig: Administer to both eyes daily at bedtime as needed (dry eyes)   dorzolamide-timolol (COSOPT) 22 3-6 8 MG/ML ophthalmic solution Unknown at Unknown time Self Yes No   Sig: Administer 1 drop to both eyes 2 (two) times a day    furosemide (LASIX) 40 mg tablet Unknown at Unknown time  Yes No   Sig: Take 40 mg by mouth daily   insulin glargine (LANTUS) 100 units/mL subcutaneous injection Not Taking at Unknown time  No No   Sig: Inject 20 Units under the skin every 12 (twelve) hours   Patient not taking: Reported on 10/21/2019   insulin lispro (HumaLOG) 100 units/mL injection Unknown at Unknown time  No No   Sig: Inject 2-12 Units under the skin every 6 (six) hours   latanoprost (XALATAN) 0 005 % ophthalmic solution Unknown at Unknown time  Yes No   Si drop daily at bedtime   levalbuterol (XOPENEX) 1 25 mg/0 5 mL nebulizer solution Unknown at Unknown time  No No   Sig: Take 0 5 mL (1 25 mg total) by nebulization 3 (three) times a day   levothyroxine 50 mcg tablet Unknown at Unknown time  No No   Sig: Take 1 tablet (50 mcg total) by mouth daily   metoprolol tartrate (LOPRESSOR) 25 mg tablet Unknown at Unknown time  No No   Sig: Take 1 tablet (25 mg total) by mouth every 12 (twelve) hours   omeprazole (PriLOSEC) 20 mg delayed release capsule Unknown at Unknown time  Yes No   Sig: Take 20 mg by mouth daily   ondansetron (ZOFRAN) 4 mg/2 mL injection Unknown at Unknown time  No No   Sig: Infuse 2 mL (4 mg total) into a venous catheter every 6 (six) hours as needed for nausea   pantoprazole (PROTONIX) 40 mg tablet Unknown at Unknown time  Yes No   Sig: Take 40 mg by mouth daily   polyethylene glycol (MIRALAX) 17 g packet Unknown at Unknown time  No No   Sig: Take 17 g by mouth daily   sodium chloride 0 9 % nebulizer solution Unknown at Unknown time  No No   Sig: Take 3 mL by nebulization 3 (three) times a day   traMADol (ULTRAM) 50 mg tablet Unknown at Unknown time  No No   Sig: Take 1 tablet (50 mg total) by mouth every 6 (six) hours as needed for moderate pain for up to 10 days   traZODone (DESYREL) 50 mg tablet Unknown at Unknown time  No No   Sig: Take 1 tablet (50 mg total) by mouth daily at bedtime   travoprost (TRAVATAN Z) 0 004 % ophthalmic solution Not Taking at Unknown time Self Yes No   Sig: Administer 1 drop to both eyes daily at bedtime       Facility-Administered Medications: None     Medications Prior to Admission   Medication    acetaminophen (TYLENOL) 325 mg tablet    aluminum-magnesium hydroxide-simethicone (MYLANTA) 200-200-20 mg/5 mL suspension    apixaban (ELIQUIS) 5 mg    artificial tear (LUBRIFRESH P M ) 83-15 % ophthalmic ointment    dorzolamide-timolol (COSOPT) 22 3-6 8 MG/ML ophthalmic solution    ERROR: CANNOT USE RATIO BASED PRESCRIPTION MIXTURE NAMING FOR A NON-MIXTURE    furosemide (LASIX) 40 mg tablet    insulin glargine (LANTUS) 100 units/mL subcutaneous injection    insulin lispro (HumaLOG) 100 units/mL injection    latanoprost (XALATAN) 0 005 % ophthalmic solution    levalbuterol (XOPENEX) 1 25 mg/0 5 mL nebulizer solution    levothyroxine 50 mcg tablet    LORazepam (ATIVAN) 0 5 mg tablet    metoprolol tartrate (LOPRESSOR) 25 mg tablet    Netarsudil Dimesylate (RHOPRESSA) 0 02 % SOLN    omeprazole (PriLOSEC) 20 mg delayed release capsule    ondansetron (ZOFRAN) 4 mg/2 mL injection    pantoprazole (PROTONIX) 40 mg tablet    polyethylene glycol (MIRALAX) 17 g packet    RHOPRESSA 0 02 % SOLN    sodium chloride 0 9 % nebulizer solution    [] traMADol (ULTRAM) 50 mg tablet    travoprost (TRAVATAN Z) 0 004 % ophthalmic solution    traZODone (DESYREL) 50 mg tablet         heparin (porcine) 3-30 Units/kg/hr (Order-Specific) Last Rate: 11 Units/kg/hr (10/30/19 0550)   ketamine 0 05 mg/kg/hr Last Rate: 0 05 mg/kg/hr (10/29/19 1404)     Assessment:  Principal Problem:    Lethargy  Active Problems:    Adult onset hypothyroidism    ANAYA (generalized anxiety disorder)    Hypertension    Type 1 diabetes mellitus with diabetic neuropathy (HCC)    Ovarian cancer (HCC)    Internal jugular (IJ) vein thromboembolism, acute, right (HCC)    Superficial thrombophlebitis of right upper extremity    A-fib (HCC)    Acute respiratory failure with hypercapnia (HCC)    Rectal bleeding    Hearing loss    Critical illness myopathy    Urinary retention    Goals of care, counseling/discussion    Counseling / Coordination of Care  Total floor / unit time spent today 20 minutes  Greater than 50% of total time was spent with the patient and / or family counseling and / or coordination of care  ** Please Note: Dragon 360 Dictation voice to text software may have been used in the creation of this document   **

## 2019-10-30 NOTE — PROGRESS NOTES
Gyn Oncology Progress note   Nick García 82 y o  female MRN: [id number]  Unit/Bed#: [facility] 530-01 Encounter: [id number]    Assessment and Plan:    1)  Acute respiratory failure with hypercapnia  - Managed per primary team  - Currently on 4L NC with SaO2 98%  - CT CAP 10/24/19: worsening extensive multifocal pneumonia and increasing bilateral pleural effusions  However, per ID evaluation, changes appear to be residual effect from recent ARDS as patient has no fever or significant leukocytosis  Procal was normal  Therefore, antibiotics discontinued  - s/p thoracocentesis x2 (10/25 & 10/27), Pleural fluid studies appear transudative  - More tachypnea with cough on 10/29  CXR demonstrated bilateral PNA, worsened on left side  Started on Cefepime and flagyl to cover GNR HCAP/aspiration  ABG demonstrated metabolic alkalosis with respiratory compensation  Procal normal x2 (10/29 & 10/30)  - Pulmonology following    2) Delirium  - Pt with waxing and waning between consciousness and lethargy   - Likely multi-factorial  - Ct head w/o contrast on admission without any acute abnormalities  - Pt drowsy this AM and non-verbal, however, responded to verbal commands  - On Ketamine gtt per palliative care  - Global delirium precautions  - Neurology following    3) Severe protein calorie malnutrition secondary to poor oral take  - Pt was started on tube feeds on POD#10 in [place]   Poor oral intake since discharge to LTAC    - NGT replaced with Keofed on 10/23/19-Jevity 1 2  - Speech pathology following: s/p barium swallow study (10/25): high risk for aspiration, will need alternative means of nutrition, however, patient does not desire PEG tube     4) Rectal Bleeding, resolved  - Evaluated by GI and s/p 1u PRBCs on 10/22/19  - H/H stable in 9s, continue to monitor  - CT CAP 10/24/19 demonstrated fecal impaction, s/p enema with large BM   - No further bleeding     5) Internal Jugular (IJ) vein thromboembolism, presented with acute cyanotic right fingers, improved  - Repeat doppler on 10/22 demonstrated occluded right radial artery from them id forearm to the wrist  All five digits show flat line doppler waveforms  - Pt's home Eliquis was placed on hold and she was started on Heparin gtt  - s/p Vascular surgery consult, no surgical intervention at this time    6) Type 1 Diabetes  - Hgb A1c 6 4 on 10/22  - Glucose levels overnight: 312 @ 0003 and 308 at 0151  Goal between 140-180   - Continue insulin gtt      7) Stage 1B ovarian cancer s/p staging surgery 10/4/2019  - Good prognosis  - GYN ONC to continue to follow    8) Urinary Retention  - Likely multifactorial etiology  - Ji catheter removed on 10/27 at patient request  Replaced on 10/28/19 after failing voiding trial  Nursing changing pads frequently  - Dysuria: U/A unremarkable  Urine culture <10k Enterococcus faecalis, re-incubating  Most-likely related to vulvar vaginitis    9) Decreased Hearing  Per primary team, most-likely due to lasix  Evaluated by ENT on 10/23/19: outpatient follow-up as assessment cannot be done at bedside  Signed off    10) Paroxysmal Afib/bradycardia  On telemetry   Currently on heparin gtt  Cardiology follwoing: likely secondary to medication, no further bradycardia noted  IV metoprolol held  Managed per primary team    11) Adult Onset Hypothyroidism  Levothyroxine 50mcg daily  TSH is minimally elevated  Managed per primary team    12) HTN  Pt has had episodes of hypotension inpatient, Lopressor on hold  Bps currently 111-181/50s-80s    13) Weakness  Evaluated by PT/OT: recommending acute STR when stable for discharge  S/p PMR consult on 10/28/19: "recommend discharge to SNF pending medical stability   Can consider admission from SNF to [facility] once endurance improves"    14) Vulvovaginitis   S/p Diflucan x2 (last dose 10/29)  Monistat initiated on 10/27/19  Mycolog II cream started 10/28/19 with significant improvement in vulvar discomfort   Continue perineal care and keep area dry    15) Dispo: Inpatient    Terese Car is sleeping upon entry, but arousal,  She is non-verbal but responded to commands  She was able to point to head when asked where her pain is  When asked if she has a headache, she nodded her head yes  Per nursing, had to decrease heparin overnight due to elevated aPTT of 103  Pt is currently on 2L NC sating 95%  Her pain has otherwise been controlled with addition of ketamine gtt yesterday  No BMs    /56   Pulse 80   Temp 98 4 °F (36 9 °C)   Resp 20   Ht 5' 5" (1 651 m)   Wt 66 5 kg (146 lb 9 7 oz)   SpO2 93%   BMI 24 40 kg/m²     I/O last 3 completed shifts: In: 2293 [I V :303; NG/GT:780; IV Piggyback:50; Feedings:1160]  Out: 1000 [Urine:1000]  I/O this shift:  In: 1456 4 [I V :81 4; NG/GT:225; IV Piggyback:150]  Out: 1000 [Urine:1000]    Lab Results   Component Value Date    WBC 12 85 (H) 10/30/2019    HGB 9 2 (L) 10/30/2019    HCT 30 5 (L) 10/30/2019     (H) 10/30/2019     10/30/2019       Lab Results   Component Value Date    GLUCOSE 75 01/08/2016    CALCIUM 7 3 (L) 10/30/2019     (L) 01/08/2016    K 4 1 10/30/2019    CO2 36 (H) 10/30/2019    CL 98 (L) 10/30/2019    BUN 18 10/30/2019    CREATININE 0 42 (L) 10/30/2019       Lab Results   Component Value Date/Time    POCGLU 312 (H) 10/30/2019 12:03 AM    POCGLU 197 (H) 10/29/2019 08:25 PM    POCGLU 139 10/29/2019 04:04 PM    POCGLU 108 10/29/2019 01:03 PM    POCGLU 60 (L) 10/29/2019 11:46 AM    POCGLU 164 02/15/2017 02:33 PM     Physical Exam   Constitutional: She is sleeping and cooperative  No distress  Nasal cannula in place  HENT:   Head: Normocephalic and atraumatic  Cardiovascular: Normal rate, regular rhythm, normal heart sounds and intact distal pulses  Pulmonary/Chest: No respiratory distress  4L NC     Abdominal: Soft  She exhibits no distension  There is no tenderness  There is no rebound and no guarding     Hypoactive bowel sounds  Incision: C/D/I without signs of infection   Musculoskeletal: She exhibits edema (1+ bilaterally)  She exhibits no tenderness  Skin: She is not diaphoretic  Vitals reviewed        Willian Reed MD  OBGYN, PGY-3  10/30/2019 6:23 AM

## 2019-10-30 NOTE — ASSESSMENT & PLAN NOTE
· Likely secondary to Lasix  · Discussed with pulmonology/daughter-ENT consultation [id number] as an outpatient

## 2019-10-30 NOTE — PLAN OF CARE
Problem: Potential for Falls  Goal: Patient will remain free of falls  Description  INTERVENTIONS:  - Assess patient frequently for physical needs  -  Identify cognitive and physical deficits and behaviors that affect risk of falls  -  [place]kins fall precautions as indicated by assessment   - Educate patient/family on patient safety including physical limitations  - Instruct patient to call for assistance with activity based on assessment  - Modify environment to reduce risk of injury  - Consider OT/PT consult to assist with strengthening/mobility  Outcome: Progressing     Problem: Prexisting or High Potential for Compromised Skin Integrity  Goal: Skin integrity is maintained or improved  Description  INTERVENTIONS:  - Identify patients at risk for skin breakdown  - Assess and monitor skin integrity  - Assess and monitor nutrition and hydration status  - Monitor labs   - Assess for incontinence   - Turn and reposition patient  - Assist with mobility/ambulation  - Relieve pressure over bony prominences  - Avoid friction and shearing  - Provide appropriate hygiene as needed including keeping skin clean and dry  - Evaluate need for skin moisturizer/barrier cream  - Collaborate with interdisciplinary team   - Patient/family teaching  - Consider wound care consult   Outcome: Progressing     Problem: Nutrition/Hydration-ADULT  Goal: Nutrient/Hydration intake appropriate for improving, restoring or maintaining nutritional needs  Description  Monitor and assess patient's nutrition/hydration status for malnutrition  Collaborate with interdisciplinary team and initiate plan and interventions as ordered  Monitor patient's weight and dietary intake as ordered or per policy  Utilize nutrition screening tool and intervene as necessary  Determine patient's food preferences and provide high-protein, high-caloric foods as appropriate       INTERVENTIONS:  - Monitor oral intake, urinary output, labs, and treatment plans  - Assess nutrition and hydration status and recommend course of action  - Evaluate amount of meals eaten  - Assist patient with eating if necessary   - Allow adequate time for meals  - Recommend/ encourage appropriate diets, oral nutritional supplements, and vitamin/mineral supplements  - Order, calculate, and assess calorie counts as needed  - Recommend, monitor, and adjust tube feedings and TPN/PPN based on assessed needs  - Assess need for intravenous fluids  - Provide specific nutrition/hydration education as appropriate  - Include patient/family/caregiver in decisions related to nutrition  Outcome: Progressing     Problem: PAIN - ADULT  Goal: Verbalizes/displays adequate comfort level or baseline comfort level  Description  Interventions:  - Encourage patient to monitor pain and request assistance  - Assess pain using appropriate pain scale  - Administer analgesics based on type and severity of pain and evaluate response  - Implement non-pharmacological measures as appropriate and evaluate response  - Consider cultural and social influences on pain and pain management  - Notify physician/advanced practitioner if interventions unsuccessful or patient reports new pain  Outcome: Progressing     Problem: INFECTION - ADULT  Goal: Absence or prevention of progression during hospitalization  Description  INTERVENTIONS:  - Assess and monitor for signs and symptoms of infection  - Monitor lab/diagnostic results  - Monitor all insertion sites, i e  indwelling lines, tubes, and drains  - Monitor endotracheal if appropriate and nasal secretions for changes in amount and color  - [place] appropriate cooling/warming therapies per order  - Administer medications as ordered  - Instruct and encourage patient and family to use good hand hygiene technique  - Identify and instruct in appropriate isolation precautions for identified infection/condition  Outcome: Progressing  Goal: Absence of fever/infection during neutropenic period  Description  INTERVENTIONS:  - Monitor WBC    Outcome: Progressing     Problem: RESPIRATORY - ADULT  Goal: Achieves optimal ventilation and oxygenation  Description  INTERVENTIONS:  - Assess for changes in respiratory status  - Assess for changes in mentation and behavior  - Position to facilitate oxygenation and minimize respiratory effort  - Oxygen administered by appropriate delivery if ordered  - Initiate smoking cessation education as indicated  - Encourage broncho-pulmonary hygiene including cough, deep breathe, Incentive Spirometry  - Assess the need for suctioning and aspirate as needed  - Assess and instruct to report SOB or any respiratory difficulty  - Respiratory Therapy support as indicated  Outcome: Progressing     Problem: GENITOURINARY - ADULT  Goal: Maintains or returns to baseline urinary function  Description  INTERVENTIONS:  - Assess urinary function  - Encourage oral fluids to ensure adequate hydration if ordered  - Administer IV fluids as ordered to ensure adequate hydration  - Administer ordered medications as needed  - Offer frequent toileting  - Follow urinary retention protocol if ordered  Outcome: Progressing  Goal: Absence of urinary retention  Description  INTERVENTIONS:  - Assess patients ability to void and empty bladder  - Monitor I/O  - Bladder scan as needed  - Discuss with physician/AP medications to alleviate retention as needed  - Discuss catheterization for long term situations as appropriate  Outcome: Progressing  Goal: Urinary catheter remains patent  Description  INTERVENTIONS:  - Assess patency of urinary catheter  - If patient has a chronic alexandre, consider changing catheter if non-functioning  - Follow guidelines for intermittent irrigation of non-functioning urinary catheter  Outcome: Progressing

## 2019-10-30 NOTE — PROGRESS NOTES
Progress Note - Tahir Beasley 82 y o  female MRN: [id number]    Unit/Bed#: [facility] 530-01 Encounter: [id number]      Assessment/Plan:  1  Lethargy  Waxing and waning, opens eyes briefly  Palliative care on consult, started on ketamine gtt for burning sensation  ID on consult for worsening infiltrates on CXR  Continue previously outlined delirium precautions    2  Anxiety  Started on zoloft 25mg po daily    3  Acute pain  Palliative care following  Started on ketamine gtt    4  Deconditioning  Tube feed via keofed  PT/OT    5  Hearing impairment  Decreased hearing to left ear  Improved per daughter    10  Ambulatory dysfunction  PT/OT  Rehab post hospitalization    7  Insomnia  Continue melatonin 6mg po QHS  Continue trazadone 25mg po QHS  zyprexa 2 5mgpo per palliative medicine    8  Urinary Retention  Failed voiding trial  Continues with alexandre  Urology and gynecology following    9  Acute hypoxic hypercapnic respiratory failure  Pulmonary on consult  ID on consult  Continue supportive oxygen  Antibiotics d c per ID    10  Tachycardia  Cardiology on consult    Subjective:   Upon exam pt in bed, opens eyes briefly in response to name  Continues with keofeed, ketamine drip  Seen by ID for worsening infiltrates on CXR  Cardiology following for tachycardia  Objective:     Vitals: Blood pressure 129/69, pulse 97, temperature 97 8 °F (36 6 °C), temperature source Oral, resp  rate 20, height 5' 5" (1 651 m), weight 66 5 kg (146 lb 9 7 oz), SpO2 98 %, not currently breastfeeding  ,Body mass index is 24 4 kg/m²  Intake/Output Summary (Last 24 hours) at 10/30/2019 1341  Last data filed at 10/30/2019 0900  Gross per 24 hour   Intake 1720 98 ml   Output 1000 ml   Net 720 98 ml       Physical Exam:   General : NAD  HEENT : dry  Heart : tacchycardia  Lungs : decreased,  scattered rhonchi  Abdomen : Soft, NT/ND, BS auscultated in all 4 quads     Ext :  generalized edema, plus 2 extremities  Skin : Pink, warm, dry, age appropriate turgor and mobility  Neuro : Nonfocal  Psych : lethargic      Invasive Devices     Peripherally Inserted Central Catheter Line            PICC Line 95/33/25 Left Basilic 16 days          Peripheral Intravenous Line            Peripheral IV 10/29/19 Left Arm 1 day          Drain            NG/OG/Enteral Tube Nasogastric 8 Fr Right nares 7 days    Urethral Catheter 2 days                Lab, Imaging and other studies: I have personally reviewed pertinent reports      VTE Pharmacologic Prophylaxis: Sequential compression device (Venodyne)   VTE Mechanical Prophylaxis: sequential compression device

## 2019-10-30 NOTE — ASSESSMENT & PLAN NOTE
· Patient complaining of severe bladder spasms and burning sensation  · Patient could not tolerate the Ji catheter anymore  Ji catheter was removed but failed voiding trial   Started on Diflucan by gyn Oncology for possible candidiasis  · Patient with severe dysuria    Likely secondary to vulvar vaginitis  · Patient had the Ji catheter back in  · Pain control per palliative care

## 2019-10-30 NOTE — ASSESSMENT & PLAN NOTE
resp failure with hypoxia & hypercapnea  Patient has history of recent pneumonia and unfortunately ARDS requiring intubation and ICU admission and also was on high-flow oxygen before discharge to [facility]  Patient has to be on BiPAP overnight for elevated pCO2  Currently patient is on 6 L nasal cannula with Oxymizer  Pulmonology evaluation appreciated  ABG reviewed  Acute hypoxic respiratory failure is likely multifactorial secondary to recent pneumonia/ARDS-patient was evaluated along with the pulmonologist and reported that it recovery is going to be  Slow  S/p thora x 2 on 10/25 & 10/27  Infectious Disease evaluation appreciated- discontinued IV antibiotic on Friday  More tachypneic 10/29- CXR repeated - d/w Pulm, ID , bilateral opacities worse on the left - broaden Abx to cover GNR HCAP/aspiration  Place on cefepime, flagyl   ABG shows metabolic alkalosis with resp compensation ph [phone]  since procal neg x 3, stopped Abx, suspect ongoing aspiration  Cardio ordered trial IV lasix

## 2019-10-30 NOTE — PROGRESS NOTES
Progress Note - Francisco Javier Conception 1935, 82 y o  female MRN: [id number]    Unit/Bed#: [facility] 530-01 Encounter: [id number]    Primary Care Provider: Marta Leblanc MD   Date and time admitted to hospital: 10/21/2019  7:43 PM        Goals of care, counseling/discussion  Assessment & Plan  · Palliative care on board  · Seems like pt may not want PEG, failed speech, cannot continue keofed for long  · D/w daughter today - she wants everything done for the patient including PEG tube & wants Dr Merline Godwin to see her  · Placed GI consult but may not be done until hypoxia improves    Urinary retention  Assessment & Plan  · Patient complaining of severe bladder spasms and burning sensation  · Patient could not tolerate the Ji catheter anymore  Ji catheter was removed but failed voiding trial   Started on Diflucan by gyn Oncology for possible candidiasis  · Patient with severe dysuria  Likely secondary to vulvar vaginitis  · Patient had the Ji catheter back in  · Pain control per palliative care    Critical illness myopathy  Assessment & Plan  · PT OT evaluation  Needs most likely rehabilitation at the time of discharge  · Patient is not appropriate for acute rehab as per PMR physician May need subacute level of rehab at the time of discharge    Hearing loss  Assessment & Plan  · Likely secondary to Lasix  · Discussed with pulmonology/daughter-ENT consultation [id number] as an outpatient    Rectal bleeding  Assessment & Plan  · Patient with episodes of rectal bleeding  Was evaluated by GI   Status post 1 unit of packed RBCs on wednesday  · Patient had a large bowel movements yesterday no further bleeding    Discussed with GI no plan for any further intervention  · No further bleeding    Acute respiratory failure with hypercapnia (HCC)  Assessment & Plan  resp failure with hypoxia & hypercapnea  Patient has history of recent pneumonia and unfortunately ARDS requiring intubation and ICU admission and also was on high-flow oxygen before discharge to [facility]  Patient has to be on BiPAP overnight for elevated pCO2  Currently patient is on 6 L nasal cannula with Oxymizer  Pulmonology evaluation appreciated  ABG reviewed  Acute hypoxic respiratory failure is likely multifactorial secondary to recent pneumonia/ARDS-patient was evaluated along with the pulmonologist and reported that it recovery is going to be  Slow  S/p thora x 2 on 10/25 & 10/27  Infectious Disease evaluation appreciated- discontinued IV antibiotic on Friday  More tachypneic 10/29- CXR repeated - d/w Pulm, ID , bilateral opacities worse on the left - broaden Abx to cover GNR HCAP/aspiration  Place on cefepime, flagyl  ABG shows metabolic alkalosis with resp compensation ph 7 [phone]  since procal neg x 3, stopped Abx, suspect ongoing aspiration  Cardio ordered trial IV lasix    A-fib (HCC)  Assessment & Plan  Currently on heparin drip  Was taken off of the IV metoprolol  secondary to bradycardia  Cardiology on board - today went into RVR, placed on low dose BB again    Superficial thrombophlebitis of right upper extremity  Assessment & Plan  Supportive care    Internal jugular (IJ) vein thromboembolism, acute, right [facility])  Assessment & Plan  Patient was on Eliquis as an outpatient  Currently on heparin drip given right upper extremity ischemia  Likely transition to Eliquis in the next couple of days once decision regarding feeding tube placement is made    Ovarian cancer [facility])  Assessment & Plan  This is status post hysterectomy  Gyn Oncology on board- good response    Type 1 diabetes mellitus with diabetic neuropathy [facility])  Assessment & Plan  Lab Results   Component Value Date    HGBA1C 6 4 (H) 10/22/2019    Currently on Lantus 20 units Q 12  Because there were some reports of poor p o  Intake; will cut Lantus in half at 20 units at night  Continue insulin sliding scale with Accu-Cheks  Hemoglobin A1c      Recent Labs     10/30/19  0003 10/30/19  0151 10/30/19  0629 10/30/19  1153   POCGLU 312* 308* 215* 109       Blood Sugar Average: Last 72 hrs:  (P) 186 [id number]   Patient is type 1 type diabetes mellitus  Now on TF  Insulin drip DCed  Decrease lantus to 20 U HS d/t low BS     Hypertension  Assessment & Plan  Patient was on Lopressor as an outpatient  Restarted today after tachy    ANAYA (generalized anxiety disorder)  Assessment & Plan  As patient was seen lethargic trazodone and lorazepam were held  Started on Zoloft at a low dose on Wednesday  Palliative care on board      Adult onset hypothyroidism  Assessment & Plan  Will continue with levothyroxine 50 mcg  TSH is minimally elevated        [facility] Internal Medicine Progress Note  Patient: Bear Jenkins 82 y o  female   MRN: [id number]  PCP: Shilpa Appiah MD  Unit/Bed#: [facility] 530-01 Encounter: [id number]  Date Of Visit: 10/30/19    Assessment:    Principal Problem:    Lethargy  Active Problems:    Adult onset hypothyroidism    ANAYA (generalized anxiety disorder)    Hypertension    Type 1 diabetes mellitus with diabetic neuropathy (HCC)    Ovarian cancer ([facility] [address] )    Internal jugular (IJ) vein thromboembolism, acute, right (HCC)    Superficial thrombophlebitis of right upper extremity    A-fib (HCC)    Acute respiratory failure with hypercapnia (HCC)    Rectal bleeding    Hearing loss    Critical illness myopathy    Urinary retention    Goals of care, counseling/discussion      Plan:    ·        VTE Pharmacologic Prophylaxis:   Pharmacologic: Heparin Drip  Mechanical VTE Prophylaxis in Place: Yes    Patient Centered Rounds: I have performed bedside rounds with nursing staff today  Discussions with Specialists or Other Care Team Provider: palliative care, Pul, ID< GI    Education and Discussions with Family / Patient: daughter    Time Spent for Care: 45 minutes  More than 50% of total time spent on counseling and coordination of care as described above      Current Length of Stay: 8 day(s)    Current Patient Status: Inpatient   Certification Statement: The patient will continue to require additional inpatient hospital stay due to not stable    Discharge Plan / Estimated Discharge Date:     Code Status: Level 1 - Full Code      Subjective:   Pt feels tired, some SOB  Daughter states she wants everything done, she states that patient gets depressed with all the conversations    Objective:     Vitals:   Temp (24hrs), Av 1 °F (36 7 °C), Min:97 8 °F (36 6 °C), Max:98 9 °F (37 2 °C)    Temp:  [97 8 °F (36 6 °C)-98 9 °F (37 2 °C)] 98 9 °F (37 2 °C)  HR:  [] 85  Resp:  [18-32] 18  BP: (111-181)/(56-88) 112/58  SpO2:  [92 %-98 %] 94 %  Body mass index is 24 4 kg/m²  Input and Output Summary (last 24 hours): Intake/Output Summary (Last 24 hours) at 10/30/2019 1815  Last data filed at 10/30/2019 1428  Gross per 24 hour   Intake 1681 4 ml   Output 1360 ml   Net 321 4 ml       Physical Exam:     Physical Exam   Constitutional: She appears well-developed and well-nourished  HENT:   Head: Normocephalic and atraumatic  Mouth/Throat: Oropharynx is clear and moist    Eyes: Conjunctivae are normal    Cardiovascular: Normal rate and regular rhythm  Exam reveals no friction rub  No murmur heard  Pulmonary/Chest: Effort normal and breath sounds normal  No stridor  No respiratory distress  Abdominal: Soft  She exhibits no distension  There is no tenderness  Musculoskeletal: She exhibits no tenderness  Neurological: She is alert  Vitals reviewed        Additional Data:     Labs:    Results from last 7 days   Lab Units 10/30/19  0500   WBC Thousand/uL 12 85*   HEMOGLOBIN g/dL 9 2*   HEMATOCRIT % 30 5*   PLATELETS Thousands/uL 321   NEUTROS PCT % 85*   LYMPHS PCT % 5*   MONOS PCT % 7   EOS PCT % 1     Results from last 7 days   Lab Units 10/30/19  0546  10/28/19  0453   POTASSIUM mmol/L 4 1   < > 3 9   CHLORIDE mmol/L 98*   < > 100   CO2 mmol/L 36*   < > 38*   BUN mg/dL 18   < > 28* CREATININE mg/dL 0 42*   < > 0 50*   CALCIUM mg/dL 7 3*   < > 7 0*   ALK PHOS U/L  --   --  129*   ALT U/L  --   --  58   AST U/L  --   --  56*    < > = values in this interval not displayed  * I Have Reviewed All Lab Data Listed Above  * Additional Pertinent Lab Tests Reviewed:  All Labs Within Last 24 Hours Reviewed    Imaging:    Imaging Reports Reviewed Today Include:   Imaging Personally Reviewed by Myself Includes:      Recent Cultures (last 7 days):     Results from last 7 days   Lab Units 10/27/19  1129 10/26/19  1127 10/25/19  1100   GRAM STAIN RESULT  Rare Polys  No bacteria seen  --  Rare Polys  Rare Mononuclear Cells  No bacteria seen   URINE CULTURE   --  <10,000 cfu/ml Enterococcus faecalis*  --    BODY FLUID CULTURE, STERILE  No growth  --  Growth in Broth culture only Bacillus species NOT anthracis*       Last 24 Hours Medication List:     Current Facility-Administered Medications:  acetaminophen 975 mg Oral [facility] [address] Tuesday JIM Hayes    acetaminophen 650 mg Rectal Q6H PRN Mir Thomas MD    albuterol 2 5 mg Nebulization Q4H PRN Gaurav Swift MD    aluminum-magnesium hydroxide-simethicone 15 mL Per NG Tube Q6H PRN Gaurav Swift MD    artificial tear  Both Eyes HS PRN Gaurav Swift MD    benzocaine-menthol-lanolin-aloe  Topical BID Marda Essex, MD    bisacodyl 10 mg Rectal Daily PRN Catrachita Avila MD    dextran 70-hypromellose 1 drop Both Eyes Q3H PRN Severa Curly, DO    dorzolamide-timolol 1 drop Both Eyes BID Gaurav Swift MD    furosemide 40 mg Intravenous BID (diuretic) JIM Sparks    heparin (porcine) 3-30 Units/kg/hr (Order-Specific) Intravenous Titrated Dante Carvalho PA-C Last Rate: 15 Units/kg/hr (10/30/19 1730)   heparin (porcine) 2,400 Units Intravenous PRN Jennifer Kumar PA-C    heparin (porcine) 4,800 Units Intravenous PRN Shazia Kumar PA-C    insulin glargine 22 Units Subcutaneous HS Yue Cox MD    insulin lispro 1-5 Units Subcutaneous Q6H [address] Marlys Goodman MD    insulin lispro 1-5 Units Subcutaneous 0200 Marlys Goodman MD    ketamine 0 05 mg/kg/hr Intravenous Continuous Jackie Right, PA-C Last Rate: 0 05 mg/kg/hr (10/29/19 1404)   levothyroxine 50 mcg Per NG Tube Early Morning Dimple Pinto MD    lidocaine 1 patch Topical Daily Marlys Goodman MD    melatonin 6 mg Oral HS Rocío Gomez MD    metoprolol 5 mg Intravenous Q6H PRN Lusi Carrillo MD    metoprolol tartrate 12 5 mg Oral Q12H [address] JIM Lorenz    miconazole  Topical BID PRN Marlys Goodman MD    Netarsudil Dimesylate 1 drop Right Eye HS Marlys Goodman MD    nystatin-triamcinolone  Topical BID Savanna Mejia MD    ondansetron 4 mg Intravenous Q6H PRN Dimple Pinto MD    oxyCODONE 2 5 mg Oral Q4H PRN Patt Moran MD    oxyCODONE 2 5 mg Oral 4x Daily Adilia López, PA-C    oxyCODONE 5 mg Oral Q4H PRN Luis Carrillo MD    pantoprazole 40 mg Intravenous Q24H [address] Marlys Goodman MD    senna 8 8 mg Per NG Tube Daily PRN Dimple Pinto MD    sertraline 25 mg Per NG Tube HS Marlys Goodman MD    simethicone 40 mg Oral Q6H PRN Marlys Goodman MD    travoprost 1 drop Both Eyes HS Dimple Pinto MD    traZODone 25 mg Oral HS Rocío Gomez MD         Today, Patient Was Seen By: Luis Carrillo MD    ** Please Note: This note has been constructed using a voice recognition system   **

## 2019-10-30 NOTE — PROGRESS NOTES
Progress Note - Infectious Disease   Jovan Polk 82 y o  female MRN: [id number]  Unit/Bed#: [facility] 530-01 Encounter: [id number]      Impression/Recommendations:  1  Abnormal CXR, with intermittently worsening infiltrates  I suspect this is all secondary to chronic and ongoing aspiration  Antibiotics were started yesterday with no change in respiratory status  Repeat procalcitonin yesterday was normal, with today's also normal   Doubt pneumonia  At this point, it is best to discontinue antibiotics to avoid potential antibiotic toxicities  Patient should be NPO  She may need PEG for nutrition  Discontinue antibiotics and observe closely  Monitor respiratory symptoms      2  Bilateral pleural effusions   No evidence of loculation on CT   Without clinical pneumonia, doubt empyema   Patient is status post thoracentesis x 2   Pleural fluid parameters not consistent with empyema   Bacillus growing in pleural fluid culture is most likely contaminant  Monitor respiratory symptoms      3  Acute hypoxic respiratory failure  Initially, I suspect this is residual effect from recent ARDS   Patient did improve with thoracentesis  However, respiratory status deteriorated over last 48 hours, with CXR showing new infiltrate  Suspect ongoing aspiration, as in above  O2 support per primary and Pulmonary service  Monitor respiratory status      4   Urinary retention with dysuria  Selene Bernard is benign   Dysuria improved with multiple maneuvers  Urine culture grew Enterococcus  With improving urinary symptoms despite no coverage for Enterococcus in urine, doubt that this is true pathogen  Doubt UTI clinically  No antibiotic needed for this  Monitor urinary symptoms      5  Candida vulvovaginitis   Patient is on topical antifungal   One dose fluconazole noted  Continue topical antifungal      6  Lethargy, multifactorial   Patient is overall stable  Monitor      7  Abnormal abdominal CT with extensive stool in rectum   Abdominal exam is mildly distended but otherwise benign   No clinical colitis/proctitis  Monitor      8  Ovarian cancer, status post hysterectomy/oophorectomy  Gyn oncology follow-up      Discussed with patient  Discussed with Dr Farooq Winn from [facility] service      Antibiotics:  Cefepime/Flagyl # 2  Antibiotic # 4      Subjective:  Patient with stable dyspnea  Stable mild weak cough  Dysuria mild and stable, much improved from weekend  Mental status baseline, with stable confusion  Temperature stays down   No chills  She is tolerating antibiotic well   No nausea, vomiting or diarrhea  Objective:  Vitals:  Temp:  [97 8 °F (36 6 °C)-98 4 °F (36 9 °C)] 97 8 °F (36 6 °C)  HR:  [] 91  Resp:  [18-32] 22  BP: (111-181)/(56-88) 135/62  SpO2:  [92 %-94 %] 94 %  Temp (24hrs), Av °F (36 7 °C), Min:97 8 °F (36 6 °C), Max:98 4 °F (36 9 °C)  Current: Temperature: 97 8 °F (36 6 °C)    Physical Exam:     General: Awake, alert, cooperative, no distress  Neck:  Supple  No mass  No lymphadenopathy  Lungs: Decreased breath sounds, stable bibasilar rhonchi and rales, no wheezing, respirations unlabored  Heart:  Regular rate and rhythm, S1 and S2 normal, no murmur  Abdomen: Soft, nondistended, non-tender, bowel sounds active all four quadrants,        no masses, no organomegaly  Extremities: Stable leg edema  No erythema/warmth  No ulcer  Nontender to palpation  Skin:  No rash  Neuro: Moves all extremities  Invasive Devices     Peripherally Inserted Central Catheter Line            PICC Line  Left Basilic 15 days          Peripheral Intravenous Line            Peripheral IV 10/29/19 Left Arm less than 1 day          Drain            NG/OG/Enteral Tube Nasogastric 8 Fr Right nares 6 days    Urethral Catheter 2 days                Labs studies:   I have personally reviewed pertinent labs    Results from last 7 days   Lab Units 10/30/19  0546 10/29/19  6553 10/28/19  0453 10/27/19  1969 10/26/19  0439   POTASSIUM mmol/L 4 1 4 1 3 9 3 9 4 2   CHLORIDE mmol/L 98* 99* 100 102 103   CO2 mmol/L 36* 41* 38* 40* 37*   BUN mg/dL 18 25 28* 27* 25   CREATININE mg/dL 0 42* 0 42* 0 50* 0 44* 0 42*   EGFR ml/min/1 73sq m 96 96 90 94 96   CALCIUM mg/dL 7 3* 7 1* 7 0* 6 7* 6 7*   AST U/L  --   --  56* 50* 31   ALT U/L  --   --  58 48 31   ALK PHOS U/L  --   --  129* 102 91     Results from last 7 days   Lab Units 10/30/19  0500 10/29/19  0452 10/28/19  0453   WBC Thousand/uL 12 85* 14 31* 15 61*   HEMOGLOBIN g/dL 9 2* 8 3* 8 8*   PLATELETS Thousands/uL 321 317 361     Results from last 7 days   Lab Units 10/27/19  1129 10/26/19  1127 10/25/19  1100   GRAM STAIN RESULT  Rare Polys  No bacteria seen  --  Rare Polys  Rare Mononuclear Cells  No bacteria seen   URINE CULTURE   --  <10,000 cfu/ml Enterococcus faecalis*  --    BODY FLUID CULTURE, STERILE  No growth  --  Growth in Broth culture only Bacillus species NOT anthracis*       Imaging Studies:   I have personally reviewed pertinent imaging study reports and images in PACS  EKG, Pathology, and Other Studies:   I have personally reviewed pertinent reports

## 2019-10-30 NOTE — ASSESSMENT & PLAN NOTE
· Palliative care on board  · Seems like pt may not want PEG, failed speech, cannot continue keofed for long  · D/w daughter today - she wants everything done for the patient including PEG tube & wants Dr Khoa Rojo to see her  · Placed GI consult but may not be done until hypoxia improves

## 2019-10-30 NOTE — PROGRESS NOTES
Progress Note - Pulmonary   Laurvipin Blend 82 y o  female MRN: [id number]  Unit/Bed#: [facility] 530-01 Encounter: [id number]      Impressions:    Acute hypoxic and hypercapnic respiratory failure- Unclear reason for hypercapnea, may be related to prior aspiration vs fibrotic phase of ARDs  Continue with xopenex nebulizer TID  atrovent was d/dante during last hospitalization for urinary retention  New chest x-ray today shows worsening of the left lower lobe with a dense consolidation   - MRSA nares neg  - Sputum culture not obtained  - procal is negative  - Video barium swallow with mod-severe oropharyngeal dysphagia  Plan:  - Continue xopenex  - F/up sputum culture  - Continue with O2 NC, wean as tolerated,  on 5 L today saturating 98%  -  chest x-ray yesterday revealed a left lower lobe dense consolidation  Patient was started on cefepime and Flagyl empirically and treatment was transient and discontinued following a normal procalcitonin  Currently being monitored off of antibiotics  - patient may end up requiring a repeat bronchoscopy at some point if she does not improve, however currently she is too ill and would likely require intubation      Bilateral pleural effusions- RT thoracentesis on 10/25 of 500cc and recent left sided of 600cc both appear to be transudative  Cytology negative for malignancy  Recent chest x-ray does not show reaccumulation of effusion     Abnormal CT chest- likely due to recent aspiration event  Treated with 9 days of abx for aspiration pneumonia  Previous sputum culture with serratia  Candida likely colonizer  ID previously evaluated  WBC improving  Plan:  - Trend WBC  - ID on board     Previous moderate pulm HTN- PASP 52mmHg  with tricuspid regurg  Still has significant peripheral edema  Recommend diuresis and monitor Is/Os closely  Stop lasix as there may be some contraction alkalosis   Echo yesterday with an EF of 65% and a peak PA pressure of 36mmHg which is borderline mild pulm HTN     Lethargy- unclear etiology  No evidence of UTI  Does have urinary retention   ABG with mild hypercarbia but not signficant to explant level of lethargy  Patient is awake and follows all commands but is minimally verbal  CT head negative  Neurology consulted  Received xanax over the weekend        Hx of recent pneumonia- bronch cultures on 10/16 grew serratia  Patient completed 9 days of antibiotics        Subjective:   Patient seen and examined bedside  Lethargic  On ketamine drip  Minimally verbal no increased work of breathing distress    Review of Systems   Unable to perform ROS: Other    Patient not responding to questions today although awake  Objective:     Vitals:    10/29/19 2301 10/30/19 0300 10/30/19 0729 10/30/19 1156   BP: 136/60 111/56 135/62 129/69   BP Location:   Left arm Left arm   Pulse: 81 80 91 97   Resp: 18 20 22 20   Temp: 98 °F (36 7 °C) 98 4 °F (36 9 °C) 97 8 °F (36 6 °C) 97 8 °F (36 6 °C)   TempSrc:   Oral Oral   SpO2: 93% 93% 94% 98%   Weight:       Height:               Intake/Output Summary (Last 24 hours) at 10/30/2019 1533  Last data filed at 10/30/2019 1428  Gross per 24 hour   Intake 1720 98 ml   Output 1360 ml   Net 360 98 ml         Physical Exam   Constitutional: She is oriented to person, place, and time  No distress  HENT:   Head: Normocephalic and atraumatic  Nose: Nose normal    Mouth/Throat: Oropharynx is clear and moist  No oropharyngeal exudate  Eyes: Pupils are equal, round, and reactive to light  Conjunctivae and EOM are normal  No scleral icterus  Neck: Normal range of motion  Neck supple  No JVD present  No tracheal deviation present  No thyromegaly present  Cardiovascular: Normal rate, regular rhythm, normal heart sounds and intact distal pulses  Exam reveals no gallop and no friction rub  No murmur heard  Pulmonary/Chest: Effort normal and breath sounds normal  No stridor  No respiratory distress  She has no wheezes  She has no rales  Abdominal: Soft  Bowel sounds are normal  She exhibits no distension  There is no tenderness  There is no rebound and no guarding  Musculoskeletal: Normal range of motion  She exhibits edema  She exhibits no deformity  2+ bilateral upper extremity pitting edema   Lymphadenopathy:     She has no cervical adenopathy  Neurological: She is alert and oriented to person, place, and time  No cranial nerve deficit or sensory deficit  Skin: Skin is warm  No rash noted  She is not diaphoretic  No erythema  Psychiatric: She has a normal mood and affect  Labs: I have personally reviewed pertinent lab results  , ABG: No results found for: PHART, TRM8FPS, PO2ART, DEB6UVU, O8QBWXZR, BEART, SOURCE, BNP: No results found for: BNP, CBC:   Lab Results   Component Value Date    WBC 12 85 (H) 10/30/2019    HGB 9 2 (L) 10/30/2019    HCT 30 5 (L) 10/30/2019     (H) 10/30/2019     10/30/2019    MCH 32 4 10/30/2019    MCHC 30 2 (L) 10/30/2019    RDW 15 8 (H) 10/30/2019    MPV 9 9 10/30/2019    NRBC 0 10/30/2019   , CMP:   Lab Results   Component Value Date    SODIUM 137 10/30/2019    K 4 1 10/30/2019    CL 98 (L) 10/30/2019    CO2 36 (H) 10/30/2019    BUN 18 10/30/2019    CREATININE 0 42 (L) 10/30/2019    CALCIUM 7 3 (L) 10/30/2019    EGFR 96 10/30/2019   , PT/INR: No results found for: PT, INR, Troponin:   Lab Results   Component Value Date    TROPONINI 0 03 10/30/2019     Results from last 7 days   Lab Units 10/30/19  0500 10/29/19  0452 10/28/19  0453  10/26/19  0439  10/24/19  0454   WBC Thousand/uL 12 85* 14 31* 15 61*   < > 11 73*   < > 11 57*   HEMOGLOBIN g/dL 9 2* 8 3* 8 8*   < > 9 3*   < > 9 0*   HEMATOCRIT % 30 5* 27 6* 29 6*   < > 31 5*   < > 29 8*   PLATELETS Thousands/uL 321 317 361   < > 470*   < > 459*   NEUTROS PCT % 85*  --   --   --  87*  --  88*   MONOS PCT % 7  --   --   --  7  --  6    < > = values in this interval not displayed        Results from last 7 days   Lab Units 10/30/19  0546 10/29/19  0452 10/28/19  0453 10/27/19  0425 10/26/19  0439   POTASSIUM mmol/L 4 1 4 1 3 9 3 9 4 2   CHLORIDE mmol/L 98* 99* 100 102 103   CO2 mmol/L 36* 41* 38* 40* 37*   BUN mg/dL 18 25 28* 27* 25   CREATININE mg/dL 0 42* 0 42* 0 50* 0 44* 0 42*   CALCIUM mg/dL 7 3* 7 1* 7 0* 6 7* 6 7*   ALK PHOS U/L  --   --  129* 102 91   ALT U/L  --   --  58 48 31   AST U/L  --   --  56* 50* 31     Results from last 7 days   Lab Units 10/28/19  0453 10/26/19  0439 10/25/19  0612   MAGNESIUM mg/dL 2 5 2 6 2 3          Results from last 7 days   Lab Units 10/30/19  1235 10/30/19  0538 10/29/19  2145   PTT seconds 39* 103* 64*         0   Lab Value Date/Time    TROPONINI 0 03 10/30/2019 0033    TROPONINI 0 04 10/29/2019 2145    TROPONINI 0 03 10/29/2019 1802    TROPONINI 0 03 10/21/2019 2030    TROPONINI 1 91 (H) 10/07/2019 2035       Microbiology:  Pleural fluid- bacilius not anthracis  MRSA nares neg  Sputum culture- no results  Blood culture neg at 5 days    Imaging and other studies: I have personally reviewed pertinent reports     and I have personally reviewed pertinent films in PACS  CT chest- bilateral multifocal infiltrates with bilateral pleural effusions         Von Velasquez MD  Pulmonary & Critical Care Fellow, [facility]

## 2019-10-30 NOTE — ASSESSMENT & PLAN NOTE
Currently on heparin drip  Was taken off of the IV metoprolol  secondary to bradycardia     Cardiology on board - today went into RVR, placed on low dose BB again

## 2019-10-30 NOTE — OCCUPATIONAL THERAPY NOTE
Occupational Therapy Treatment Note      Jenny Jorgensen    10/30/2019    Principal Problem:    Lethargy  Active Problems:    Adult onset hypothyroidism    ANAYA (generalized anxiety disorder)    Hypertension    Type 1 diabetes mellitus with diabetic neuropathy (HCC)    [facility] ([facility] )    Internal jugular (IJ) vein thromboembolism, acute, right (HCC)    Superficial thrombophlebitis of right upper extremity    A-fib (HCC)    Acute respiratory failure with hypercapnia (HCC)    Rectal bleeding    Hearing loss    Critical illness myopathy    Urinary retention    Goals of care, counseling/discussion      Past Medical History:   Diagnosis Date    Anxiety     Arthritis     Constipation     [facility]     IDDM    Frequent UTI     Glaucoma     Hearing loss     Hyperlipidemia     Hypertension     Hyperthyroidism     in past    Hyponatremia     Hypothyroid     Neuropathy     bles    Right tubo-ovarian mass     Wears glasses        Past Surgical History:   Procedure Laterality Date    APPENDECTOMY  1954    CATARACT EXTRACTION Bilateral     COLONOSCOPY  09/2014    Completed - Dr Elzbieta Stanley, mario in 5 years    HYSTERECTOMY N/A 10/4/2019    Procedure: LAP TOTAL HYSTERECTOMY, BSO;  Surgeon: Sulema Alegria MD;  Location: [facility];  Service: Gynecology Oncology    IR THORACENTESIS  10/25/2019    LAPAROTOMY N/A 10/4/2019    Procedure: EXPLORATORY LAPAROTOMY  EXLAP OMENTECTOMY  PELVIC AND DEANGELO-AORTIC LYMPH NODE DISSECTION  PERITONEAL BIOPSY TRANS ABDOMINUS BLOCK;  Surgeon: Sulema Alegria MD;  Location: [facility];  Service: Gynecology Oncology    SKIN LESION EXCISION      Ears        10/30/19 0855   Restrictions/Precautions   Weight Bearing Precautions Per Order No   Other Precautions Cognitive;Multiple lines;Telemetry;O2;Fall Risk;Pain;Hard of hearing  (5L NC O2)   Lifestyle   Autonomy Per dtr, Pt was I w/ all ADLS and IADLS; (+) drives PTA   Reciprocal Relationships Pt lives alone   Dtr stated "we can hire help at home if needed "   Service to Others Pt is retired    Intrinsic Gratification attempted to engage patient in familiar conversation regarding her past experiences however patient unable to verbalize other than "pain" "this is pain"   Pain Assessment   Pain Assessment 0-10   Pain Score Worst Possible Pain   Pain Type Acute pain   Pain Location Groin   Pain Frequency Constant/continuous   Pain Onset Ongoing   Clinical Progression Not changed   Patient's Stated Pain Goal No pain   Hospital Pain Intervention(s) Repositioned; Ambulation/increased activity; Distraction; Emotional support   Response to Interventions Tolerated   ADL   Where Assessed Edge of bed   Grooming Assistance 4  Minimal Assistance   Grooming Deficit Setup;Steadying;Supervision/safety;Verbal cueing; Increased time to complete;Wash/dry hands; Wash/dry face; Other (Comment)  (applying chap stick)   Grooming Comments Pt was provided a warm washcloth in RUE to wash hands and face, Pt able to complete w/ Min A and w/ frequent VC for all initiation, sequencing, and completion of task      LB Dressing Assistance 1  Total Assistance   LB Dressing Deficit Setup;Verbal cueing;Supervision/safety; Increased time to complete; Don/doff R sock; Don/doff L sock   LB Dressing Comments Pt required total A to adjust B/L socks while seated at EOB   Functional Standing Tolerance   Time ~63 seconds   Activity Static standing w/ B/L UE support of RW  Comments Pt required Max Ax2 for STS and static standing  Required frequent VC and TC for initiation, sequencing, maintaining upright posture, and to keep eyes open during stance  Pt's SpO2 on 5L NC desat to 90-91% during standing w/ HR 95-97 bpm  Pt's SpO2 while seated at EOB remained ~93-94% on 5L NC and HR at 79 bpm    Bed Mobility   Supine to Sit 2  Maximal assistance   Additional items Assist x 2; Increased time required;LE management;Verbal cues; Bedrails;HOB elevated   Sit to Supine 2  Maximal assistance   Additional items Assist x 2; Increased time required;Verbal cues;LE management; Bedrails   Additional Comments Pt laying supine in bed upon OT arrival  Pt seated EOB ~15 minutes w/ Fair- trunk/postural control while completing grooming tasks and RN applying medications  Pt presented w/ labored breathing on 5L NC O2 - OT educated Pt on diaphragmatic breathing techniques and Pt demonstrated fair carryover  Pt required frequent VC to maintain eyes open while sitting EOB and to maintain alert t/o session  Pt seated in chair position in bed w/ B/L UEs propped up on pillows for decreased edema and all neeeds in reach s/p OT session  Transfers   Sit to Stand 2  Maximal assistance   Additional items Assist x 2; Increased time required;Verbal cues;Armrests   Stand to Sit 2  Maximal assistance   Additional items Assist x 2; Increased time required;Bed elevated;Armrests   Stand pivot Unable to assess   Additional Comments Transfers completed w/ RW  VC and TC required for all initiation, sequencing, hand placement, and safe transfer techniques  Pt noted w/ motor planning difficulty and required TC and VC for B/L feet placement and weight shifting  Pt attempted to perform lateral side steps, however unable to perform adequate/safe standing or weight shifting to advance feet  Therapeutic Excerise-Strength   UE Strength Yes   Right Upper Extremity- Strength   Equipment Other (Comment)  (blue therapy squeeze ball)   R Weight/Reps/Sets 1x5   RUE Strength Comment Pt performed 1x5 B/L UE squeezes on blue therapy ball while seated at EOB w/ Fair- balance  Pt required [facility] cueing for initiation, sequencing, and completion of exercises  Cognition   Overall Cognitive Status Impaired   Arousal/Participation Arousable; Cooperative;Lethargic;Responsive   Attention Attends with cues to redirect   Orientation Level Oriented to person;Disoriented to place; Disoriented to time;Disoriented to situation   Memory Decreased recall of recent events;Decreased short term memory;Decreased recall of precautions   Following Commands Follows one step commands with increased time or repetition   Comments Pt presented lethargic and required continuous VC and TC to maintain alert and keep eyes open t/o session  Pt required frequent VC and TC for all iniation, sequencing, safety, redirection t/o all functional tasks  Pt presents w/ highly labored breathing - CASSIDY Newton notified and aware  Activity Tolerance   Activity Tolerance Patient limited by fatigue;Patient limited by pain;Treatment limited secondary to medical complications (Comment)  (SOB/AMARO)   Medical Staff Made Aware PTAADRI Maynard; SIMA Palomino; SIMA Velázquez; CASSIDY Newton cleared Pt for OT session   Assessment   Assessment Patient participated in Skilled OT session this date with interventions consisting of ADL re training with the use of correct body mechnaics, Energy Conservation techniques, deep breathing technique, safety awareness and fall prevention techniques, therapeutic exercise to: increase functional use of BUEs, increase BUE muscle strength ,  therapeutic activities to: increase activity tolerance, increase postural control, increase trunk control and increase OOB/ sitting tolerance   Patient agreeable to OT treatment session, upon arrival patient was found supine in bed  Pt participated in bed mobility, sitting EOB tolerance, grooming, UE exercises, and STS transfers  Please refer to chart for functional levels  Patient requiring frequent re direction, verbal cues for safety, verbal cues for correct technique, verbal cues for pacing thru activity steps, cognitive assistance to anticipate next step, one step directives and frequent rest periods  Patient continues to be functioning below baseline level, occupational performance remains limited secondary to factors listed above and increased risk for falls and injury     From OT standpoint, recommendation at time of d/c would be Short Term Rehab  Patient to benefit from continued Occupational Therapy treatment while in the hospital to address deficits as defined above and maximize level of functional independence with ADLs and functional mobility  Plan   Treatment Interventions ADL retraining;Functional transfer training;UE strengthening/ROM; Endurance training;Cognitive reorientation;Patient/family training;Equipment evaluation/education; Compensatory technique education; Activityengagement; Energy conservation   Goal Expiration Date 11/07/19   OT Treatment Day 5   OT Frequency 2-3x/wk   Recommendation   OT Discharge Recommendation Short Term Rehab   OT - OK to Discharge Yes  (when medically cleared)   Barthel Index   Feeding 0   Bathing 0   Grooming Score 0   Dressing Score 0   Bladder Score 0   Bowels Score 0   Toilet Use Score 0   Transfers (Bed/Chair) Score 5   Mobility (Level Surface) Score 0   Stairs Score 0   Barthel Index Score 5   Modified Ana Scale   Modified Ana Scale 5       Sury Mann MS, OTR/L

## 2019-10-30 NOTE — PLAN OF CARE
Problem: PHYSICAL THERAPY ADULT  Goal: Performs mobility at highest level of function for planned discharge setting  See evaluation for individualized goals  Description  Treatment/Interventions: Functional transfer training, LE strengthening/ROM, Therapeutic exercise, Endurance training, Cognitive reorientation, Patient/family training, Equipment eval/education, Bed mobility, Spoke to nursing, Family  Equipment Recommended: Macario Love       See flowsheet documentation for full assessment, interventions and recommendations  Outcome: Progressing  Note:   Prognosis: Poor  Problem List: Decreased strength, Decreased range of motion, Decreased endurance, Impaired balance, Decreased mobility, Decreased coordination, Decreased cognition, Decreased safety awareness, Pain  Assessment: The pt  has improving command following, activity tolerance, and strength  She does fatigue rapidly with activity, but she was able to stand for a short period  She was unable to unweight her LLE, and she required maximal assistance for her balance and to unweight her RLE in order to reposition it  She also required occasional blocking of her knees when in stance  She was able to sit for 8 mins with assistance, and then 18 minutes with close supervision  She followed all one-step commands today as well  She was repositioned back in the bed in the cardiac chair position with her arms elevated on pillows  Discussed the pt's progress with her daughter  Barriers to Discharge: Inaccessible home environment, Decreased caregiver support     Recommendation: Post acute IP rehab     PT - OK to Discharge: Yes    See flowsheet documentation for full assessment

## 2019-10-31 LAB
AMMONIA PLAS-SCNC: 39 UMOL/L (ref 11–35)
ANION GAP SERPL CALCULATED.3IONS-SCNC: 2 MMOL/L (ref 4–13)
APTT PPP: 108 SECONDS (ref 23–37)
APTT PPP: 43 SECONDS (ref 23–37)
APTT PPP: 78 SECONDS (ref 23–37)
ARTERIAL PATENCY WRIST A: YES
ARTERIAL PATENCY WRIST A: YES
BASE EXCESS BLDA CALC-SCNC: 12 MMOL/L
BASE EXCESS BLDA CALC-SCNC: 15.3 MMOL/L
BASE EXCESS BLDA CALC-SCNC: 9.4 MMOL/L
BASOPHILS # BLD AUTO: 0.01 THOUSANDS/ΜL (ref 0–0.1)
BASOPHILS NFR BLD AUTO: 0 % (ref 0–1)
BUN SERPL-MCNC: 26 MG/DL (ref 5–25)
CALCIUM SERPL-MCNC: 7.5 MG/DL (ref 8.3–10.1)
CHLORIDE SERPL-SCNC: 97 MMOL/L (ref 100–108)
CO2 SERPL-SCNC: 37 MMOL/L (ref 21–32)
CREAT SERPL-MCNC: 0.52 MG/DL (ref 0.6–1.3)
EOSINOPHIL # BLD AUTO: 0.07 THOUSAND/ΜL (ref 0–0.61)
EOSINOPHIL NFR BLD AUTO: 1 % (ref 0–6)
ERYTHROCYTE [DISTWIDTH] IN BLOOD BY AUTOMATED COUNT: 16.1 % (ref 11.6–15.1)
GFR SERPL CREATININE-BSD FRML MDRD: 89 ML/MIN/1.73SQ M
GLUCOSE SERPL-MCNC: 113 MG/DL (ref 65–140)
GLUCOSE SERPL-MCNC: 210 MG/DL (ref 65–140)
GLUCOSE SERPL-MCNC: 224 MG/DL (ref 65–140)
GLUCOSE SERPL-MCNC: 227 MG/DL (ref 65–140)
GLUCOSE SERPL-MCNC: 228 MG/DL (ref 65–140)
GLUCOSE SERPL-MCNC: 250 MG/DL (ref 65–140)
GLUCOSE SERPL-MCNC: 47 MG/DL (ref 65–140)
GLUCOSE SERPL-MCNC: 85 MG/DL (ref 65–140)
GLUCOSE SERPL-MCNC: 90 MG/DL (ref 65–140)
HCO3 BLDA-SCNC: 37.4 MMOL/L (ref 22–28)
HCO3 BLDA-SCNC: 39.7 MMOL/L (ref 22–28)
HCO3 BLDA-SCNC: 42.5 MMOL/L (ref 22–28)
HCT VFR BLD AUTO: 26.5 % (ref 34.8–46.1)
HFNC FLOW LPM: 50
HGB BLD-MCNC: 7.7 G/DL (ref 11.5–15.4)
IMM GRANULOCYTES # BLD AUTO: 0.17 THOUSAND/UL (ref 0–0.2)
IMM GRANULOCYTES NFR BLD AUTO: 1 % (ref 0–2)
IPAP: 12
LYMPHOCYTES # BLD AUTO: 0.58 THOUSANDS/ΜL (ref 0.6–4.47)
LYMPHOCYTES NFR BLD AUTO: 5 % (ref 14–44)
MCH RBC QN AUTO: 32.1 PG (ref 26.8–34.3)
MCHC RBC AUTO-ENTMCNC: 29.1 G/DL (ref 31.4–37.4)
MCV RBC AUTO: 110 FL (ref 82–98)
MONOCYTES # BLD AUTO: 0.83 THOUSAND/ΜL (ref 0.17–1.22)
MONOCYTES NFR BLD AUTO: 7 % (ref 4–12)
NASAL CANNULA: 3
NEUTROPHILS # BLD AUTO: 10.48 THOUSANDS/ΜL (ref 1.85–7.62)
NEUTS SEG NFR BLD AUTO: 86 % (ref 43–75)
NON VENT HFNC FIO2: 40
NON VENT TYPE HFNC: ABNORMAL
NON VENT- BIPAP: ABNORMAL
NRBC BLD AUTO-RTO: 0 /100 WBCS
O2 CT BLDA-SCNC: 11.9 ML/DL (ref 16–23)
O2 CT BLDA-SCNC: 12.1 ML/DL (ref 16–23)
O2 CT BLDA-SCNC: 12.2 ML/DL (ref 16–23)
OXYHGB MFR BLDA: 95.4 % (ref 94–97)
OXYHGB MFR BLDA: 96.6 % (ref 94–97)
OXYHGB MFR BLDA: 96.7 % (ref 94–97)
PCO2 BLDA: 72 MM HG (ref 36–44)
PCO2 BLDA: 75.2 MM HG (ref 36–44)
PCO2 BLDA: 76.3 MM HG (ref 36–44)
PEEP MAX SETTING VENT: 5 CM[H2O]
PH BLDA: 7.31 [PH] (ref 7.35–7.45)
PH BLDA: 7.34 [PH] (ref 7.35–7.45)
PH BLDA: 7.39 [PH] (ref 7.35–7.45)
PLATELET # BLD AUTO: 295 THOUSANDS/UL (ref 149–390)
PMV BLD AUTO: 9.6 FL (ref 8.9–12.7)
PO2 BLDA: 106.3 MM HG (ref 75–129)
PO2 BLDA: 106.6 MM HG (ref 75–129)
PO2 BLDA: 87.2 MM HG (ref 75–129)
POTASSIUM SERPL-SCNC: 4.3 MMOL/L (ref 3.5–5.3)
RBC # BLD AUTO: 2.4 MILLION/UL (ref 3.81–5.12)
SODIUM SERPL-SCNC: 136 MMOL/L (ref 136–145)
SPECIMEN SOURCE: ABNORMAL
VENT BIPAP FIO2: 40 %
WBC # BLD AUTO: 12.14 THOUSAND/UL (ref 4.31–10.16)

## 2019-10-31 PROCEDURE — 80048 BASIC METABOLIC PNL TOTAL CA: CPT | Performed by: HOSPITALIST

## 2019-10-31 PROCEDURE — 82140 ASSAY OF AMMONIA: CPT | Performed by: HOSPITALIST

## 2019-10-31 PROCEDURE — 85025 COMPLETE CBC W/AUTO DIFF WBC: CPT | Performed by: HOSPITALIST

## 2019-10-31 PROCEDURE — 94660 CPAP INITIATION&MGMT: CPT

## 2019-10-31 PROCEDURE — 99232 SBSQ HOSP IP/OBS MODERATE 35: CPT | Performed by: NURSE PRACTITIONER

## 2019-10-31 PROCEDURE — 94760 N-INVAS EAR/PLS OXIMETRY 1: CPT

## 2019-10-31 PROCEDURE — 99232 SBSQ HOSP IP/OBS MODERATE 35: CPT | Performed by: INTERNAL MEDICINE

## 2019-10-31 PROCEDURE — C9113 INJ PANTOPRAZOLE SODIUM, VIA: HCPCS | Performed by: FAMILY MEDICINE

## 2019-10-31 PROCEDURE — 82948 REAGENT STRIP/BLOOD GLUCOSE: CPT

## 2019-10-31 PROCEDURE — 99233 SBSQ HOSP IP/OBS HIGH 50: CPT | Performed by: INTERNAL MEDICINE

## 2019-10-31 PROCEDURE — 99232 SBSQ HOSP IP/OBS MODERATE 35: CPT | Performed by: OBSTETRICS & GYNECOLOGY

## 2019-10-31 PROCEDURE — 85730 THROMBOPLASTIN TIME PARTIAL: CPT | Performed by: HOSPITALIST

## 2019-10-31 PROCEDURE — 82805 BLOOD GASES W/O2 SATURATION: CPT | Performed by: HOSPITALIST

## 2019-10-31 PROCEDURE — 99233 SBSQ HOSP IP/OBS HIGH 50: CPT | Performed by: HOSPITALIST

## 2019-10-31 RX ORDER — BUMETANIDE 0.25 MG/ML
1 INJECTION, SOLUTION INTRAMUSCULAR; INTRAVENOUS 2 TIMES DAILY
Status: DISCONTINUED | OUTPATIENT
Start: 2019-10-31 | End: 2019-10-31

## 2019-10-31 RX ORDER — DEXTROSE MONOHYDRATE 25 G/50ML
25 INJECTION, SOLUTION INTRAVENOUS ONCE
Status: COMPLETED | OUTPATIENT
Start: 2019-10-31 | End: 2019-10-31

## 2019-10-31 RX ORDER — DEXTROSE MONOHYDRATE 25 G/50ML
INJECTION, SOLUTION INTRAVENOUS
Status: COMPLETED
Start: 2019-10-31 | End: 2019-10-31

## 2019-10-31 RX ADMIN — PANTOPRAZOLE SODIUM 40 MG: 40 INJECTION, POWDER, FOR SOLUTION INTRAVENOUS at 08:49

## 2019-10-31 RX ADMIN — MELATONIN 6 MG: 3 TAB ORAL at 21:13

## 2019-10-31 RX ADMIN — METOPROLOL TARTRATE 12.5 MG: 25 TABLET ORAL at 08:48

## 2019-10-31 RX ADMIN — LIDOCAINE 1 PATCH: 50 PATCH CUTANEOUS at 08:45

## 2019-10-31 RX ADMIN — DEXTROSE MONOHYDRATE 25 ML: 25 INJECTION, SOLUTION INTRAVENOUS at 21:37

## 2019-10-31 RX ADMIN — DEXTROSE 50 % IN WATER (D50W) INTRAVENOUS SYRINGE 25 ML: at 21:37

## 2019-10-31 RX ADMIN — NYSTATIN AND TRIAMCINOLONE ACETONIDE 1 APPLICATION: 100000; 1 CREAM TOPICAL at 18:09

## 2019-10-31 RX ADMIN — INSULIN LISPRO 2 UNITS: 100 INJECTION, SOLUTION INTRAVENOUS; SUBCUTANEOUS at 01:00

## 2019-10-31 RX ADMIN — METOPROLOL TARTRATE 12.5 MG: 25 TABLET ORAL at 21:13

## 2019-10-31 RX ADMIN — INSULIN LISPRO 2 UNITS: 100 INJECTION, SOLUTION INTRAVENOUS; SUBCUTANEOUS at 06:08

## 2019-10-31 RX ADMIN — LEVOTHYROXINE SODIUM 50 MCG: 50 TABLET ORAL at 05:34

## 2019-10-31 RX ADMIN — BENZOCAINE AND LEVOMENTHOL 1 APPLICATION: 200; 5 SPRAY TOPICAL at 18:09

## 2019-10-31 RX ADMIN — ACETAMINOPHEN 975 MG: 160 SUSPENSION ORAL at 21:13

## 2019-10-31 RX ADMIN — HEPARIN SODIUM 2400 UNITS: 1000 INJECTION INTRAVENOUS; SUBCUTANEOUS at 18:08

## 2019-10-31 RX ADMIN — DORZOLAMIDE HYDROCHLORIDE AND TIMOLOL MALEATE 1 DROP: 20; 5 SOLUTION/ DROPS OPHTHALMIC at 08:53

## 2019-10-31 RX ADMIN — BENZOCAINE AND LEVOMENTHOL: 200; 5 SPRAY TOPICAL at 08:52

## 2019-10-31 RX ADMIN — SERTRALINE HYDROCHLORIDE 25 MG: 20 SOLUTION ORAL at 21:14

## 2019-10-31 RX ADMIN — BUMETANIDE 1 MG: 0.25 INJECTION INTRAMUSCULAR; INTRAVENOUS at 10:37

## 2019-10-31 RX ADMIN — NYSTATIN AND TRIAMCINOLONE ACETONIDE: 100000; 1 CREAM TOPICAL at 08:51

## 2019-10-31 RX ADMIN — INSULIN LISPRO 2 UNITS: 100 INJECTION, SOLUTION INTRAVENOUS; SUBCUTANEOUS at 11:24

## 2019-10-31 RX ADMIN — TRAVOPROST 1 DROP: 0.04 SOLUTION/ DROPS OPHTHALMIC at 21:08

## 2019-10-31 RX ADMIN — INSULIN LISPRO 1 UNITS: 100 INJECTION, SOLUTION INTRAVENOUS; SUBCUTANEOUS at 02:18

## 2019-10-31 RX ADMIN — ACETAMINOPHEN 975 MG: 160 SUSPENSION ORAL at 05:29

## 2019-10-31 NOTE — PROGRESS NOTES
Progress Note - Pulmonary   Colton Rowe 82 y o  female MRN: [id number]  Unit/Bed#: [facility] 530-01 Encounter: [id number]      Impressions:    Acute hypoxic and hypercapnic respiratory failure- Unclear reason for hypercapnea, may be related to prior aspiration vs fibrotic phase of ARDs  Continue with xopenex nebulizer TID  atrovent was d/dante during last hospitalization for urinary retention   New chest x-ray today shows worsening of the left lower lobe with a dense consolidation   - MRSA nares neg  - Sputum culture not obtained  - procal is negative  - Video barium swallow with mod-severe oropharyngeal dysphagia  Plan:  - Continue xopenex  - F/up sputum culture although patient has not been able to produce any sputum  - Continue with O2 NC, wean as tolerated,  on 5 L today saturating 98% which was decreased to 2 5 and patient was saturating at 98%  - patient is no longer on antibiotics, although chest x-ray showed a dense consolidation there were 3-procalcitonin is a stable white blood cell count  - recommend a trial of high-flow nasal cannula to maintain oxygenation and provide some peep support  If repeat ABG after 1 hour of high-flow does not show any significant improvement, can do a trial of BiPAP  This was discussed with daughter at bedside who understands that if the patient is on BiPAP there is a concern for aspiration which could be detrimental and require intubation  She understands and is agreeable to a trial of BiPAP  - recommend decreasing and or stopping diuretics to avoid the persistent metabolic alkalosis which is likely also driving a component of her compensatory respiratory acidosis     Bilateral pleural effusions- RT thoracentesis on 10/25 of 500cc and recent left sided of 600cc both appear to be transudative  Cytology negative for malignancy  Recent chest x-ray does not show reaccumulation of effusion     Abnormal CT chest- likely due to recent aspiration event   Treated with 9 days of abx for aspiration pneumonia  Previous sputum culture with serratia  Candida likely colonizer  ID previously evaluated  WBC improving  Plan:  - Trend WBC  - ID on board     Previous moderate pulm HTN- PASP 52mmHg  with tricuspid regurg  Still has significant peripheral edema  Recommend diuresis and monitor Is/Os closely  Stop lasix as there may be some contraction alkalosis  Echo yesterday with an EF of 65% and a peak PA pressure of 36mmHg which is borderline mild pulm HTN     Lethargy- unclear etiology  No evidence of UTI  Does have urinary retention   ABG with mild hypercarbia but not signficant to explant level of lethargy  Patient is awake and follows all commands but is minimally verbal  CT head negative  Neurology consulted  Received xanax over the weekend        Hx of recent pneumonia- bronch cultures on 10/16 grew serratia  Patient completed 9 days of antibiotics          Subjective:   Patient seen and examined bedside  Very lethargic this a m , nonverbal   Is awake however unable to assess if she is alert or oriented  She does follow simple commands and is able to demonstrate  strength bilaterally and plantar flexion  Review of Systems   Unable to perform ROS: Other    Patient is nonverbal and has altered mental status    Objective:     Vitals:    10/31/19 0338 10/31/19 0544 10/31/19 0700 10/31/19 1100   BP: 107/54  106/53 109/55   BP Location: Left arm  Left arm    Pulse: 85  63 90   Resp: 18  17 17   Temp: 98 6 °F (37 °C)  98 5 °F (36 9 °C)    TempSrc: Oral  Axillary    SpO2: 100%  100%    Weight:  68 7 kg (151 lb 7 3 oz)     Height:               Intake/Output Summary (Last 24 hours) at 10/31/2019 1125  Last data filed at 10/31/2019 0601  Gross per 24 hour   Intake 930 41 ml   Output 1300 ml   Net -369 59 ml         Physical Exam   Constitutional: She is oriented to person, place, and time  No distress  HENT:   Head: Normocephalic and atraumatic     Nose: Nose normal    Mouth/Throat: Oropharynx is clear and moist  No oropharyngeal exudate  Eyes: Pupils are equal, round, and reactive to light  Conjunctivae and EOM are normal  No scleral icterus  Neck: Normal range of motion  Neck supple  No JVD present  No tracheal deviation present  No thyromegaly present  Cardiovascular: Normal rate, regular rhythm, normal heart sounds and intact distal pulses  Exam reveals no gallop and no friction rub  No murmur heard  Pulmonary/Chest: Effort normal and breath sounds normal  No stridor  No respiratory distress  She has no wheezes  She has no rales  Abdominal: Soft  Bowel sounds are normal  She exhibits no distension  There is no tenderness  There is no rebound and no guarding  Musculoskeletal: Normal range of motion  She exhibits edema  She exhibits no deformity  2+ pitting edema in all extremities   Lymphadenopathy:     She has no cervical adenopathy  Neurological: She is alert and oriented to person, place, and time  No cranial nerve deficit or sensory deficit  Skin: Skin is warm  No rash noted  She is not diaphoretic  No erythema  Psychiatric: She has a normal mood and affect  Labs: I have personally reviewed pertinent lab results  , ABG:   Lab Results   Component Value Date    PHART 7 308 (L) 10/31/2019    MIC0BAV 76 3 (HH) 10/31/2019    PO2ART 106 6 10/31/2019    NEV1UKH 37 4 (H) 10/31/2019    BEART 9 4 10/31/2019    SOURCE Radial, Left 10/31/2019   , BNP: No results found for: BNP, CBC:   Lab Results   Component Value Date    WBC 12 14 (H) 10/31/2019    HGB 7 7 (L) 10/31/2019    HCT 26 5 (L) 10/31/2019     (H) 10/31/2019     10/31/2019    MCH 32 1 10/31/2019    MCHC 29 1 (L) 10/31/2019    RDW 16 1 (H) 10/31/2019    MPV 9 6 10/31/2019    NRBC 0 10/31/2019   , CMP:   Lab Results   Component Value Date    SODIUM 136 10/31/2019    K 4 3 10/31/2019    CL 97 (L) 10/31/2019    CO2 37 (H) 10/31/2019    BUN 26 (H) 10/31/2019    CREATININE 0 52 (L) 10/31/2019    CALCIUM 7 5 (L) 10/31/2019 EGFR 89 10/31/2019   , PT/INR: No results found for: PT, INR, Troponin: No results found for: TROPONINI  Results from last 7 days   Lab Units 10/31/19  0859 10/30/19  0500 10/29/19  0452  10/26/19  0439   WBC Thousand/uL 12 14* 12 85* 14 31*   < > 11 73*   HEMOGLOBIN g/dL 7 7* 9 2* 8 3*   < > 9 3*   HEMATOCRIT % 26 5* 30 5* 27 6*   < > 31 5*   PLATELETS Thousands/uL 295 321 317   < > 470*   NEUTROS PCT % 86* 85*  --   --  87*   MONOS PCT % 7 7  --   --  7    < > = values in this interval not displayed  Results from last 7 days   Lab Units 10/31/19  0902 10/30/19  0546 10/29/19  0452 10/28/19  0453 10/27/19  0425 10/26/19  0439   POTASSIUM mmol/L 4 3 4 1 4 1 3 9 3 9 4 2   CHLORIDE mmol/L 97* 98* 99* 100 102 103   CO2 mmol/L 37* 36* 41* 38* 40* 37*   BUN mg/dL 26* 18 25 28* 27* 25   CREATININE mg/dL 0 52* 0 42* 0 42* 0 50* 0 44* 0 42*   CALCIUM mg/dL 7 5* 7 3* 7 1* 7 0* 6 7* 6 7*   ALK PHOS U/L  --   --   --  129* 102 91   ALT U/L  --   --   --  58 48 31   AST U/L  --   --   --  56* 50* 31     Results from last 7 days   Lab Units 10/28/19  0453 10/26/19  0439 10/25/19  0612   MAGNESIUM mg/dL 2 5 2 6 2 3          Results from last 7 days   Lab Units 10/31/19  1041 10/31/19  0327 10/30/19  1955   PTT seconds 78* 108* 99*         0   Lab Value Date/Time    TROPONINI 0 03 10/30/2019 0033    TROPONINI 0 04 10/29/2019 2145    TROPONINI 0 03 10/29/2019 1802    TROPONINI 0 03 10/21/2019 2030    TROPONINI 1 91 (H) 10/07/2019 2035       Microbiology:  Pleural fluid- bacilius not anthracis  MRSA nares neg  Sputum culture- no results  Blood culture neg at 5 days  Urine culture- enterococcus    Imaging and other studies: I have personally reviewed pertinent reports     and I have personally reviewed pertinent films in PACS  CT chest- bilateral multifocal infiltrates with bilateral pleural effusions         Nilo Shin MD  Pulmonary & Critical Care Fellow, [facility]

## 2019-10-31 NOTE — PROGRESS NOTES
RN spoke with primary Dr Salima Malloy  Regarding patient clinical status  Pt lethargic, gasping at times, not following commands and unarousable  Provider came to bedside  Orders for labs and ABG received  Respiratory came to draw ABG  Will await further orders

## 2019-10-31 NOTE — ASSESSMENT & PLAN NOTE
Currently on heparin drip  Was taken off of the IV metoprolol  secondary to bradycardia     Cardiology on board - since went into RVR, was placed on low dose BB again  Heparin drip

## 2019-10-31 NOTE — RESPIRATORY THERAPY NOTE
based on abg results and pt's increased wob bipap was ordered and placed on pt, pt appears to be nasim this well, will cont to monitor and followup with abg in one hour

## 2019-10-31 NOTE — PROGRESS NOTES
Patient lethargic and unarousable most of shift  Brief moments of spontaneous eye opening  No verbal response, some movement of fingers, toes at times

## 2019-10-31 NOTE — PROGRESS NOTES
Gyn Oncology Progress note   Bear Jenkins 82 y o  female MRN: [id number]  Unit/Bed#: [facility] 530-01 Encounter: [id number]    Assessment and Plan:    1)  Acute respiratory failure with hypercapnia  - Managed per primary team  - Currently on 6L NC with SaO2 100%  - CT CAP 10/24/19: worsening extensive multifocal pneumonia and increasing bilateral pleural effusions  However, per ID evaluation, changes appear to be residual effect from recent ARDS as patient has no fever or significant leukocytosis  Procal was normal  Therefore, antibiotics discontinued  - s/p thoracocentesis x2 (10/25 & 10/27), Pleural fluid studies appear transudative  - More tachypnea with cough on 10/29  CXR demonstrated bilateral PNA, worsened on left side  Started on Cefepime and flagyl to cover GNR HCAP/aspiration  ABG demonstrated metabolic alkalosis with respiratory compensation  However, this was discontinued by ID on 10/30 after Procal neg x3  Most-likely related to aspiration event  - Pulmonology following    2) Delirium  - Pt with waxing and waning between consciousness and lethargy   - Likely multi-factorial  - Ct head w/o contrast on admission without any acute abnormalities  - Pt drowsy this AM and non-verbal, however, responded to verbal commands  - On Ketamine gtt per palliative care- plan to discontinue after 48hrs of infusion  - Global delirium precautions  - Neurology following    3) Severe protein calorie malnutrition secondary to poor oral take  - Pt was started on tube feeds on POD#[facility] in [facility]  Poor oral intake since discharge to LT[facility]    - NGT replaced with Keofed on 10/23/19-Jevity 1 2  - Speech pathology following: s/p barium swallow study (10/25): high risk for aspiration, will need alternative means of nutrition as cannot continue keofed for long  Pt expressed wish not to pursue PEG tube, however, primary team discussion with daughter, she wants a PEG tube- GI consult placed        4) Rectal Bleeding, resolved  - Evaluated by GI and s/p 1u PRBCs on 10/22/19  - H/H stable in 9s, continue to monitor  - CT CAP 10/24/19 demonstrated fecal impaction, s/p enema with large BM   - No further bleeding     5) Internal Jugular (IJ) vein thromboembolism, presented with acute cyanotic right fingers, improved  - Repeat doppler on 10/22 demonstrated occluded right radial artery from them id forearm to the wrist  All five digits show flat line doppler waveforms  - Pt's home Eliquis was placed on hold and she was started on Heparin gtt  - s/p Vascular surgery consult, no surgical intervention at this time    6) Type 1 Diabetes  - Hgb A1c 6 4 on 10/22  - Glucose levels overnight: 228 @ 0039 and 210 at 0207  Goal between 140-180  - Insulin gtt discontinued       7) Stage 1B ovarian cancer s/p staging surgery 10/4/2019  - Good prognosis  - GYN ONC to continue to follow    8) Urinary Retention  - Likely multifactorial etiology  - Ji catheter removed on 10/27 at patient request  Replaced on 10/28/19 after failing voiding trial  Nursing changing pads frequently  - Dysuria: U/A unremarkable  Urine culture <10k Enterococcus faecalis, re-incubating  Most-likely related to vulvar vaginitis    9) Decreased Hearing  Per primary team, most-likely due to lasix  Evaluated by ENT on 10/23/19: outpatient follow-up as assessment cannot be done at bedside  Signed off    10) Paroxysmal Afib/bradycardia  On telemetry   Currently on heparin gtt- plan to transition back to eliquis once decision is made regarding feeding tube placement  Cardiology follwoing: likely secondary to medication, no further bradycardia noted  Managed per primary team    11) Adult Onset Hypothyroidism  Levothyroxine 50mcg daily  TSH is minimally elevated  Managed per primary team    12) HTN  Lopressor 12 5mg q12hrs restarted 10/30/19  Bps currently 107-135/50s-60s    13) Weakness  Evaluated by PT/OT: recommending acute STR when stable for discharge   S/p PMR consult on 10/28/19: "recommend discharge to SNF pending medical stability  Can consider admission from SNF to [facility] once endurance improves"    14) Vulvovaginitis   S/p Diflucan x2 (last dose 10/29)  Monistat initiated on 10/27/19  Mycolog II cream started 10/28/19 with significant improvement in vulvar discomfort   Continue perineal care and keep area dry    15) Goals of Care  Palliative following  Pt still level 1 code  Discussion ongoing  May benefit from a team meeting with family  16) Dispo: Inpatient    Lawerence Cam was sleeping upon entry into room, not arousable  Still on ketamine gtt  Unable to obtain ROS  Per nurse at bedside, she has been like this all night  They just recently cleaned her up because she had a small BM  She is on 6L NC      /54 (BP Location: Left arm)   Pulse 85   Temp 98 6 °F (37 °C) (Oral)   Resp 18   Ht 5' 5" (1 651 m)   Wt 68 7 kg (151 lb 7 3 oz)   SpO2 100%   BMI 25 20 kg/m²     I/O last 3 completed shifts: In: 1970 4 [I V :370 4; NG/GT:450; IV Piggyback:150; Feedings:1000]  Out: 7897 [Urine:1610]  I/O this shift: In: 786 8 [I V :126 8; NG/GT:200]  Out: 800 [Urine:800]    Lab Results   Component Value Date    WBC 12 85 (H) 10/30/2019    HGB 9 2 (L) 10/30/2019    HCT 30 5 (L) 10/30/2019     (H) 10/30/2019     10/30/2019       Lab Results   Component Value Date    GLUCOSE 75 01/08/2016    CALCIUM 7 3 (L) 10/30/2019     (L) 01/08/2016    K 4 1 10/30/2019    CO2 36 (H) 10/30/2019    CL 98 (L) 10/30/2019    BUN 18 10/30/2019    CREATININE 0 42 (L) 10/30/2019       Lab Results   Component Value Date/Time    POCGLU 210 (H) 10/31/2019 02:07 AM    POCGLU 228 (H) 10/31/2019 12:39 AM    POCGLU 137 10/30/2019 06:43 PM    POCGLU 109 10/30/2019 11:53 AM    POCGLU 215 (H) 10/30/2019 06:29 AM    POCGLU 164 02/15/2017 02:33 PM     Physical Exam   Constitutional: She is sleeping  She appears ill  Nasal cannula in place  HENT:   Head: Normocephalic and atraumatic     NGT in place Cardiovascular: Normal rate and regular rhythm  Pulmonary/Chest:   Shallow breathing with 6L NC   Abdominal: Soft  She exhibits no distension  Hypoactive bowel sounds  Incision: C/D/I without signs of infection   Genitourinary:   Genitourinary Comments: Ji catheter in place with 700cc of urine in bag   Musculoskeletal: She exhibits edema (2+ bilaterally in LE)  Vitals reviewed        Patricia Ervin MD  OBGYN, PGY-3  10/31/2019 6:09 AM

## 2019-10-31 NOTE — PROGRESS NOTES
Progress Note - Wilfred Villarreal 1935, 82 y o  female MRN: [id number]    Unit/Bed#: [facility] 530-01 Encounter: [id number]    Primary Care Provider: Rickie Almeida MD   Date and time admitted to hospital: 10/21/2019  7:43 PM        Urinary retention  Assessment & Plan  · Patient complaining of severe bladder spasms and burning sensation  · Patient could not tolerate the Ji catheter anymore  Ji catheter was removed but failed voiding trial   Started on Diflucan by gyn Oncology for possible candidiasis  · Patient with severe dysuria  Likely secondary to vulvar vaginitis  · Patient had the Ji catheter back in  · Pain control per palliative care    Critical illness myopathy  Assessment & Plan  · PT OT evaluation  Needs most likely rehabilitation at the time of discharge  · Patient is not appropriate for acute rehab as per PMR physician May need subacute level of rehab at the time of discharge    Hearing loss  Assessment & Plan  · Likely secondary to Lasix  · Discussed with pulmonology/daughter-ENT consultation [id number] as an outpatient    Rectal bleeding  Assessment & Plan  · Patient with episodes of rectal bleeding  Was evaluated by GI   Status post 1 unit of packed RBCs on wednesday  · Patient had a large bowel movements yesterday no further bleeding  Discussed with GI no plan for any further intervention  · No further bleeding    Acute respiratory failure with hypercapnia (HCC)  Assessment & Plan  resp failure with hypoxia & hypercapnea  Patient has history of recent pneumonia and unfortunately ARDS requiring intubation and ICU admission and also was on high-flow oxygen before discharge to [facility]  Patient has to be on BiPAP overnight for elevated pCO2  Currently patient is on 6 L nasal cannula with Oxymizer  Pulmonology evaluation appreciated    ABG reviewed  Acute hypoxic respiratory failure is likely multifactorial secondary to recent pneumonia/ARDS-patient was evaluated along with the pulmonologist and reported that it recovery is going to be  Slow  S/p thora x 2 on 10/25 & 10/27  Infectious Disease evaluation appreciated- discontinued IV antibiotic on Friday  More tachypneic 10/29- CXR repeated - d/w Pulm, ID , bilateral opacities worse on the left - broaden Abx to cover GNR HCAP/aspiration  Place on cefepime, flagyl  ABG shows metabolic alkalosis with resp compensation ph 7 43  since procal neg x 3, stopped Abx, suspect ongoing aspiration  Cardio ordered trial IV lasix    10/31:  More lethargic today, on ABG had pH of 7 3, pCO2 of 76 suggestive of respiratory acidosis  Had extensive discussion with Pulmonary and patient's daughter  Explain her the acute decompensation is secondary to hypercapnia but she has underlying hypoxia and bilateral pulmonary infiltrates which are due to post her DS inflammation/fibrosis for which there is no definitive treatment, cannot be guaranteed immediate reversal recovery     Explain unpredictability of her pulmonary condition as well as this affecting her ability to undergo any procedures like PEG tube placement  Initially daughter decided to take 1 day at a time and treat her current acute status  Hence placed her on high-flow initially repeated ABG will significant improvement hence placed her on BiPAP for 1 hours after which her ABG showed improvement in pH to 7 38 with CO2 of 72  Hence stop BiPAP placed on nasal cannula, use BiPAP p r n  And check ABG in morning  Had multiple discussions with Pulmonary and Cardiology Cardiology of course do not think the patient is since ordered CHF but due to her generalized extremity edema they are recommending IV diuresis with Bumex but there are concerns of metabolic alkalosis which may worsen her respiratory acidosis from pulmonary standpoint hence held off on her by IV Bumex at present    Had extensive goals of care discussion with the patient's daughter or other specialist   Later that are agreed to make the patient to level 3 DNR  Later on after discussion with Geriatrics patient wanted hospice consultation which will happen tomorrow  Ketamine drip helped her  Will hold scheduled narcotics given current goals are not only comfort based    A-fib [facility])  Assessment & Plan  Currently on heparin drip  Was taken off of the IV metoprolol  secondary to bradycardia  Cardiology on board - since went into RVR, was placed on low dose BB again  Heparin drip    Superficial thrombophlebitis of right upper extremity  Assessment & Plan  Supportive care    Internal jugular (IJ) vein thromboembolism, acute, right [facility])  Assessment & Plan  Patient was on Eliquis as an outpatient  Currently on heparin drip given right upper extremity ischemia  Likely transition to Eliquis in the next couple of days once decision regarding feeding tube placement is made    Ovarian cancer [facility])  Assessment & Plan  This is status post hysterectomy  Gyn Oncology on board    Type 1 diabetes mellitus with diabetic neuropathy [facility])  Assessment & Plan  Lab Results   Component Value Date    HGBA1C 6 4 (H) 10/22/2019     Recent Labs     10/31/19  0039 10/31/19  0207 10/31/19  0547 10/31/19  1118   POCGLU 228* 210* 224* 250*       Blood Sugar Average: Last 72 hrs:  (P) 602 [id number]   Patient is type 1 type diabetes mellitus  Now on TF  Insulin drip DCed  Decrease lantus to 20 U HS d/t low BS     Hypertension  Assessment & Plan  Patient was on Lopressor as an outpatient  Restarted after tachy    ANAYA (generalized anxiety disorder)  Assessment & Plan  As patient was seen lethargic trazodone and lorazepam were held  Started on Zoloft at a low dose on Wednesday  Palliative care on board      Adult onset hypothyroidism  Assessment & Plan  Will continue with levothyroxine 50 mcg      TSH is minimally elevated        [facility] Progress [facility]  Patient: Jovan Polk 82 y o  female   MRN: [id number]  PCP: Christopher Paulson MD  Unit/Bed#: [facility] 530-01 Encounter: [id number]  Date Of Visit: 10/31/19    Assessment:    Principal Problem:    Lethargy  Active Problems:    Adult onset hypothyroidism    ANAYA (generalized anxiety disorder)    Hypertension    Type 1 diabetes mellitus with diabetic neuropathy (HCC)    Ovarian cancer ([facility] [address] )    Internal jugular (IJ) vein thromboembolism, acute, right (HCC)    Superficial thrombophlebitis of right upper extremity    A-fib (HCC)    Acute respiratory failure with hypercapnia (HCC)    Rectal bleeding    Hearing loss    Critical illness myopathy    Urinary retention    Goals of care, counseling/discussion      Plan:    ·        VTE Pharmacologic Prophylaxis:   Pharmacologic: Heparin Drip  Mechanical VTE Prophylaxis in Place: Yes    Patient Centered Rounds: I have performed bedside rounds with nursing staff today  Discussions with Specialists or Other Care Team Provider:  Pulmonary, Cardiology, GI, id, palliative care, Geriatrics    Education and Discussions with Family / Patient:  Daughter    Time Spent for Care: 1 hour  More than 50% of total time spent on counseling and coordination of care as described above  Current Length of Stay: 9 day(s)    Current Patient Status: Inpatient   Certification Statement: The patient will continue to require additional inpatient hospital stay due to Not steady    Discharge Plan / Estimated Discharge Date:  Pending definitive goals    Code Status: Level 3 - DNAR and DNI      Subjective:   Patient more lethargic today, briefly opens eyes, not able to hold conversation, following some commands but very weak    Objective:     Vitals:   Temp (24hrs), Av 5 °F (36 9 °C), Min:97 9 °F (36 6 °C), Max:98 7 °F (37 1 °C)    Temp:  [97 9 °F (36 6 °C)-98 7 °F (37 1 °C)] 97 9 °F (36 6 °C)  HR:  [63-94] 70  Resp:  [16-26] 22  BP: ()/(52-64) 114/64  SpO2:  [92 %-100 %] 100 %  Body mass index is 25 2 kg/m²  Input and Output Summary (last 24 hours):        Intake/Output Summary (Last 24 hours) at 10/31/2019 1756  Last data filed at 10/31/2019 1530  Gross per 24 hour   Intake 786 83 ml   Output 2100 ml   Net -1313 17 ml       Physical Exam:     Physical Exam   Constitutional: She appears well-developed and well-nourished  HENT:   Head: Normocephalic and atraumatic  Mouth/Throat: Oropharynx is clear and moist    Eyes: Conjunctivae are normal    Cardiovascular: Normal rate and regular rhythm  Exam reveals no friction rub  No murmur heard  Pulmonary/Chest: Effort normal and breath sounds normal  No stridor  No respiratory distress  Abdominal: Soft  She exhibits no distension  There is no tenderness  Neurological:   Weight lethargy, difficult to arouse, briefly opens eyes tries to follow some commands but very sleepy   Vitals reviewed  Additional Data:     Labs:    Results from last 7 days   Lab Units 10/31/19  0859   WBC Thousand/uL 12 14*   HEMOGLOBIN g/dL 7 7*   HEMATOCRIT % 26 5*   PLATELETS Thousands/uL 295   NEUTROS PCT % 86*   LYMPHS PCT % 5*   MONOS PCT % 7   EOS PCT % 1     Results from last 7 days   Lab Units 10/31/19  0902  10/28/19  0453   POTASSIUM mmol/L 4 3   < > 3 9   CHLORIDE mmol/L 97*   < > 100   CO2 mmol/L 37*   < > 38*   BUN mg/dL 26*   < > 28*   CREATININE mg/dL 0 52*   < > 0 50*   CALCIUM mg/dL 7 5*   < > 7 0*   ALK PHOS U/L  --   --  129*   ALT U/L  --   --  58   AST U/L  --   --  56*    < > = values in this interval not displayed  * I Have Reviewed All Lab Data Listed Above  * Additional Pertinent Lab Tests Reviewed:  All Labs Within Last 24 Hours Reviewed    Imaging:    Imaging Reports Reviewed Today Include:   Imaging Personally Reviewed by Myself Includes:      Recent Cultures (last 7 days):     Results from last 7 days   Lab Units 10/27/19  1129 10/26/19  1127 10/25/19  1100   GRAM STAIN RESULT  Rare Polys  No bacteria seen  --  Rare Polys  Rare Mononuclear Cells  No bacteria seen   URINE CULTURE   --  <10,000 cfu/ml Enterococcus faecalis*  --    BODY FLUID CULTURE, STERILE  No growth  --  Growth in Broth culture only Bacillus species NOT anthracis*       Last 24 Hours Medication List:     Current Facility-Administered Medications:  acetaminophen 975 mg Oral [facility] [address] Tuesday JIM Humphries    acetaminophen 650 mg Rectal Q6H PRN Rosalio Hannah MD    albuterol 2 5 mg Nebulization Q4H PRN Will MD Analy    aluminum-magnesium hydroxide-simethicone 15 mL Per NG Tube Q6H PRN Will MD Analy    artificial tear  Both Eyes HS PRN Will MD Analy    benzocaine-menthol-lanolin-aloe  Topical BID Jermain Betancur MD    bisacodyl 10 mg Rectal Daily PRN Chantal Goyal MD    dextran 70-hypromellose 1 drop Both Eyes Q3H PRN Jennifer Beverage, DO    dorzolamide-timolol 1 drop Both Eyes BID Will MD Analy    heparin (porcine) 3-30 Units/kg/hr (Order-Specific) Intravenous Titrated Gregg Sandoval PA-C Last Rate: 11 Units/kg/hr (10/31/19 0403)   heparin (porcine) 2,400 Units Intravenous PRN Sulema Kumar PA-C    heparin (porcine) 4,800 Units Intravenous PRN Shazia Kumar PA-C    insulin glargine 22 Units Subcutaneous HS Juan Jose Quiroz MD    insulin lispro 1-5 Units Subcutaneous Q6H [address] Chantal Goyal MD    insulin lispro 1-5 Units Subcutaneous 0200 Chantal Goyal MD    levothyroxine 50 mcg Per NG Tube Early Morning Will MD Analy    lidocaine 1 patch Topical Daily Chantal Goyal MD    melatonin 6 mg Oral HS Rosalio Hannah MD    metoprolol 5 mg Intravenous Q6H PRN Juan Jose Quiroz MD    metoprolol tartrate 12 5 mg Oral Q12H [address] JIM Mackenzie    miconazole  Topical BID PRN Chantal Goyal MD    Netarsudil Dimesylate 1 drop Right Eye HS Chantal Goyal MD    nystatin-triamcinolone  Topical BID Jermain Betancur MD    ondansetron 4 mg Intravenous Q6H PRN Will MD Analy    oxyCODONE 2 5 mg Oral Q4H PRN Juan Jose Quiroz MD    oxyCODONE 5 mg Oral Q4H PRN Juan Jose Quiroz MD    pantoprazole 40 mg Intravenous Q24H [address] MD ceci Hernandez 8 8 mg Per NG Tube Daily PRN Cedric Gregory MD    sertraline 25 mg Per NG Tube HS Richardson Hooper MD    simethicone 40 mg Oral Q6H PRN Richardson Hooper MD    travoprost 1 drop Both Eyes HS Cedric Gregory MD    traZODone 25 mg Oral HS Geovany Carbajal MD         Today, Patient Was Seen By: Whit Nagy MD    ** Please Note: This note has been constructed using a voice recognition system   **

## 2019-10-31 NOTE — ASSESSMENT & PLAN NOTE
· Likely secondary to Lasix  · Discussed with pulmonology/daughter-ENT consultation [id number] as an outpatient

## 2019-10-31 NOTE — ASSESSMENT & PLAN NOTE
Lab Results   Component Value Date    HGBA1C 6 4 (H) 10/22/2019     Recent Labs     10/31/19  0039 10/31/19  0207 10/31/19  0547 10/31/19  1118   POCGLU 228* 210* 224* 250*       Blood Sugar Average: Last 72 hrs:  (P) 332 [id number]   Patient is type 1 type diabetes mellitus  Now on TF  Insulin drip DCed  Decrease lantus to 20 U HS d/t low BS

## 2019-10-31 NOTE — PROGRESS NOTES
Progress Note - Infectious Disease   Margaret Chirinos 82 y o  female MRN: [id number]  Unit/Bed#: [facility] 530-01 Encounter: [id number]      Impression/Recommendations:  1  Abnormal CXR, with intermittently worsening infiltrates  I suspect this is all secondary to chronic and ongoing aspiration  Antibiotics were started with no change in respiratory status  Repeat procalcitonin was normal   Doubt pneumonia  antibiotics were discontinued yesterday  Patient should be NPO  She may need PEG for nutrition  Observe off antibiotic  Monitor respiratory symptoms      2  Bilateral pleural effusions   No evidence of loculation on CT   Without clinical pneumonia, doubt empyema   Patient is status post thoracentesis x 2   Pleural fluid parameters not consistent with empyema   Bacillus growing in pleural fluid culture is most likely contaminant  Monitor respiratory symptoms      3  Acute hypoxic respiratory failure   Initially, I suspect this is residual effect from recent ARDS   Patient did improve with thoracentesis   However, respiratory status deteriorated over last 48 hours, with CXR showing new infiltrate  Suspect ongoing aspiration, as in above  Plan for BiPAP noted  O2 support/BiPAP per primary and Pulmonary service  Monitor respiratory status      4   Urinary retention with dysuria  Jie Saha is benign   Dysuria improved with multiple maneuvers  Urine culture grew Enterococcus  With improving urinary symptoms despite no coverage for Enterococcus in urine, doubt that this is true pathogen  Doubt UTI clinically  No antibiotic needed for this  Monitor urinary symptoms      5  Candida vulvovaginitis   Patient is on topical antifungal   One dose fluconazole noted  Continue topical antifungal      6  Lethargy, multifactorial   Patient is overall stable    Monitor      7  Abnormal abdominal CT with extensive stool in rectum   Abdominal exam is mildly distended but otherwise benign   No clinical colitis/proctitis  Monitor      8  Ovarian cancer, status post hysterectomy/oophorectomy  Gyn oncology follow-up      Discussed with patient  Discussed with Dr Moran from [facility]      Antibiotics:  Off antibiotic       Subjective:  Patient with  unchanged dyspnea  She is much more lethargic and less arousable today  Temperature stays down   No chills  She is tolerating antibiotic well   No nausea, vomiting or diarrhea  Objective:  Vitals:  Temp:  [98 5 °F (36 9 °C)-98 9 °F (37 2 °C)] 98 5 °F (36 9 °C)  HR:  [63-90] 90  Resp:  [17-18] 17  BP: (106-120)/(53-58) 109/55  SpO2:  [92 %-100 %] 98 %  Temp (24hrs), Av 7 °F (37 1 °C), Min:98 5 °F (36 9 °C), Max:98 9 °F (37 2 °C)  Current: Temperature: 98 5 °F (36 9 °C)    Physical Exam:     General: Awake, alert, cooperative, no distress  Neck:  Supple  No mass  No lymphadenopathy  Lungs: Decreased breath sounds, stable bibasilar rales, no wheezing, respirations unlabored  Heart:  Mildly tachycardic with regular rhythm, S1 and S2 normal, no murmur  Abdomen: Soft, nondistended, non-tender, bowel sounds active all four quadrants,        no masses, no organomegaly  Extremities: Stable leg edema  No erythema/warmth  No ulcer  Nontender to palpation  Skin:  No rash  Neuro: Moves all extremities  Invasive Devices     Peripherally Inserted Central Catheter Line            PICC Line  Left Basilic 16 days          Peripheral Intravenous Line            Peripheral IV 10/29/19 Left Arm 2 days          Drain            NG/OG/Enteral Tube Nasogastric 8 Fr Right nares 7 days    Urethral Catheter 3 days                Labs studies:   I have personally reviewed pertinent labs    Results from last 7 days   Lab Units 10/31/19  0902 10/30/19  0546 10/29/19  0452 10/28/19  0453 10/27/19  0425 10/26/19  0439   POTASSIUM mmol/L 4 3 4 1 4 1 3 9 3 9 4 2   CHLORIDE mmol/L 97* 98* 99* 100 102 103   CO2 mmol/L 37* 36* 41* 38* 40* 37*   BUN mg/dL 26* 18 25 28* 27* 25   CREATININE mg/dL 0 52* 0 42* 0 42* 0 50* 0 44* 0 42*   EGFR ml/min/1 73sq m 89 96 96 90 94 96   CALCIUM mg/dL 7 5* 7 3* 7 1* 7 0* 6 7* 6 7*   AST U/L  --   --   --  56* 50* 31   ALT U/L  --   --   --  58 48 31   ALK PHOS U/L  --   --   --  129* 102 91     Results from last 7 days   Lab Units 10/31/19  0859 10/30/19  0500 10/29/19  0452   WBC Thousand/uL 12 14* 12 85* 14 31*   HEMOGLOBIN g/dL 7 7* 9 2* 8 3*   PLATELETS Thousands/uL 295 321 317     Results from last 7 days   Lab Units 10/27/19  1129 10/26/19  1127 10/25/19  1100   GRAM STAIN RESULT  Rare Polys  No bacteria seen  --  Rare Polys  Rare Mononuclear Cells  No bacteria seen   URINE CULTURE   --  <10,000 cfu/ml Enterococcus faecalis*  --    BODY FLUID CULTURE, STERILE  No growth  --  Growth in Broth culture only Bacillus species NOT anthracis*       Imaging Studies:   I have personally reviewed pertinent imaging study reports and images in PACS  EKG, Pathology, and Other Studies:   I have personally reviewed pertinent reports

## 2019-10-31 NOTE — SOCIAL WORK
Consult received for hospice referral  Discussed with Dr Kalpana Willams and spoke to daughter  She would like to speak to liaison tomorrow  MECHELLE referral made and spoke to geraldine Schwab

## 2019-10-31 NOTE — PROGRESS NOTES
PHYSICAL MEDICINE AND REHABILITATION FOLLOW UP NOTE  Brent Nagy 82 y o  female MRN: [id number]  Unit/Bed#: [facility] 530-01 Encounter: [id number]    Requested by (Physician/Service): Helder De La Torre MD  Reason for Consultation:  Assessment of rehabilitation needs    Assessment:  Rehabilitation Diagnosis: Debility following hysterectomy       Recommendations:  Rehabilitation Plan:  Continue PT/OT while on acute care once medically stable  At this time the patient is very ill and not appropriate to participate in therapies  Family meeting today to discuss goals of care  If inpatient rehabilitation is pursued she will likely need sub acute level of rehabilitation  Will follow peripherally at this time  Medical Co-morbidities:  Ovarian cancer s/p hysterectomy   Acute respiratory failure now on hiflo likely to transition to BiPAP  ARDS s/p throacentesis  Right UE thrombophlebitis on heparin  PAF on Eliquis at home, currently on heparin drip  Bilateral plural effusion  Volume overload  Dysphagia   Urinary retention  Diabetes  HTN    DVT ppx: Heparin drip and SCD    Thank you for this consultation  Do not hesitate to contact service with further questions  JIM Patel      History of Present Illness: The patient is now having increased respiratory distress  She was placed on hiflo NC and may require BiPAP placement depending on results of ABG  PM&R continue to follow  The patient was not responsive and able to answer questions  Review of Systems: 10 point ROS unobtainable due to mental status  Function:  Prior level of function and living situation:  Independent for ADLs  Current level of function:  Physical Therapy:  Max assist   Occupational Therapy:   Total assistance for LB dressing and transfers, minimal assist for grooming    Physical Exam:  /55   Pulse 90   Temp 98 5 °F (36 9 °C) (Axillary)   Resp 17   Ht 5' 5" (1 651 m)   Wt 68 7 kg (151 lb 7 3 oz)   SpO2 98% BMI 25 20 kg/m²        Intake/Output Summary (Last 24 hours) at 10/31/2019 1211  Last data filed at 10/31/2019 0601  Gross per 24 hour   Intake 930 41 ml   Output 1300 ml   Net -369 59 ml       Body mass index is 25 2 kg/m²  Gen: Lethargic  HEENT:  Normocephalic/Atraumatic, EOMI, Moist mucus membranes  Cardiovascular: Regular rate, rhythm  Extremities: Anasarca  Pulmonary: Hiflo nasal cannula  GI: Soft, non-tender, non-distended  MSK: Not following commands  Neuro: lethargic not following commands      Social History:    Social History     Socioeconomic History    Marital status:       Spouse name: None    Number of children: 2    Years of education: None    Highest education level: None   Occupational History    None   Social Needs    Financial resource strain: None    Food insecurity:     Worry: None     Inability: None    Transportation needs:     Medical: None     Non-medical: None   Tobacco Use    Smoking status: Never Smoker    Smokeless tobacco: Never Used   Substance and Sexual Activity    Alcohol use: Never     Frequency: Monthly or less     Drinks per session: 1 or 2     Binge frequency: Never     Comment: wine    Drug use: No    Sexual activity: None   Lifestyle    Physical activity:     Days per week: None     Minutes per session: None    Stress: None   Relationships    Social connections:     Talks on phone: None     Gets together: None     Attends Zoroastrianism service: None     Active member of club or organization: None     Attends meetings of clubs or organizations: None     Relationship status: None    Intimate partner violence:     Fear of current or ex partner: None     Emotionally abused: None     Physically abused: None     Forced sexual activity: None   Other Topics Concern    None   Social History Narrative    Caffeine use    No preference on Zoroastrianism beliefs    Lives at home alone prior to hysterectomy - now at [address] [place] [address] temporarily        Family History: Family History   Problem Relation Age of Onset    Heart attack Mother     Diabetes type II Mother     Dementia Father     Stroke Father         CVA    Breast cancer Maternal Aunt     Stomach cancer Maternal Uncle     Diabetes Family     Stroke Family         Complications         Medications:     Current Facility-Administered Medications:     acetaminophen (TYLENOL) oral suspension 975 mg, 975 mg, Oral, Q8H [address], Tuesday M JIM Peres, 975 mg at 10/31/19 2255    acetaminophen (TYLENOL) rectal suppository 650 mg, 650 mg, Rectal, Q6H PRN, Ana Thomson MD, 650 mg at 10/27/19 1154    albuterol inhalation solution 2 5 mg, 2 5 mg, Nebulization, Q4H PRN, Meghann Kemp MD, 2 5 mg at 10/29/19 1815    aluminum-magnesium hydroxide-simethicone (MYLANTA) 200-200-20 mg/5 mL oral suspension 15 mL, 15 mL, Per NG Tube, Q6H PRN, Meghann Kemp MD    artificial tear (LUBRIFRESH P M ) ophthalmic ointment, , Both Eyes, HS PRN, Meghann Kemp MD    benzocaine-menthol-lanolin-aloe (DERMOPLAST) 20-0 5 % topical spray, , Topical, BID, Sara Torres MD    bisacodyl (DULCOLAX) rectal suppository 10 mg, 10 mg, Rectal, Daily PRN, Petey Monique MD    bumetanide (BUMEX) injection 1 mg, 1 mg, Intravenous, BID, Charlett Blocker JIM Hopson, 1 mg at 10/31/19 1037    dextran 70-hypromellose (GENTEAL TEARS) 0 1-0 3 % ophthalmic solution 1 drop, 1 drop, Both Eyes, Q3H PRN, Josue Ventura DO, 1 drop at 10/30/19 2146    dorzolamide-timolol (COSOPT) 22 3-6 8 MG/ML ophthalmic solution 1 drop, 1 drop, Both Eyes, BID, Meghann Kemp MD, 1 drop at 10/31/19 0853    heparin (porcine) 25,000 units in 250 mL infusion (premix), 3-30 Units/kg/hr (Order-Specific), Intravenous, Titrated, Shazia Kumar PA-C, Last Rate: 6 6 mL/hr at 10/31/19 0403, 11 Units/kg/hr at 10/31/19 0403    heparin (porcine) injection 2,400 Units, 2,400 Units, Intravenous, PRN, Sushma OBDULIO Ordoñez, 2,400 Units at 10/29/19 0601    heparin (porcine) injection 4,800 Units, 4,800 Units, Intravenous, PRN, Sharyle Plater, PA-C, 4,800 Units at 10/30/19 1323    insulin glargine (LANTUS) subcutaneous injection 22 Units 0 22 mL, 22 Units, Subcutaneous, HS, Patt Moran MD, 22 Units at 10/30/19 2134    insulin lispro (HumaLOG) 100 units/mL subcutaneous injection 1-5 Units, 1-5 Units, Subcutaneous, Q6H [address], 2 Units at 10/31/19 1124 **AND** Fingerstick Glucose (POCT), , , Q6H, Augustina Odonnell MD    insulin lispro (HumaLOG) 100 units/mL subcutaneous injection 1-5 Units, 1-5 Units, Subcutaneous, 0200, Augusitna Odonnell MD, 1 Units at 10/31/19 0218    ketamine 250 mg (STANDARD CONCENTRATION) IV in sodium chloride 0 9% 250 mL, 0 05 mg/kg/hr, Intravenous, Continuous, Adilia López PA-C, Last Rate: 3 3 mL/hr at 10/30/19 2132, 0 05 mg/kg/hr at 10/30/19 2132    levothyroxine tablet 50 mcg, 50 mcg, Per NG Tube, Early Morning, Eva Montesinos MD, 50 mcg at 10/31/19 0534    lidocaine (LIDODERM) 5 % patch 1 patch, 1 patch, Topical, Daily, Augustina Odonnell MD, 1 patch at 10/31/19 0845    melatonin tablet 6 mg, 6 mg, Oral, HS, Taras Bar MD, 6 mg at 10/30/19 2133    metoprolol (LOPRESSOR) injection 5 mg, 5 mg, Intravenous, Q6H PRN, Tristan High MD    metoprolol tartrate (LOPRESSOR) partial tablet 12 5 mg, 12 5 mg, Oral, Q12H GENNARO, JIM Santamaria, 12 5 mg at 10/31/19 0848    miconazole (MONISTAT) external vaginal cream, , Topical, BID PRN **AND** [COMPLETED] miconazole (MONISTAT) vaginal suppository 200 mg, 200 mg, Vaginal, HS, Augustina Odonnell MD, 200 mg at 10/29/19 2111    Netarsudil Dimesylate 0 02 % SOLN 1 drop, 1 drop, Right Eye, HS, Augustina Odonnell MD, 1 drop at 10/30/19 2133    nystatin-triamcinolone (MYCOLOG-II) cream, , Topical, BID, Junior Maryam MD    ondansetron [facility]) injection 4 mg, 4 mg, Intravenous, Q6H PRN, Eva Montesinos MD, 4 mg at 10/26/19 0107    oxyCODONE (ROXICODONE) oral solution 2 5 mg, 2 5 mg, Oral, Q4H PRN, Patt Moran, MD    oxyCODONE (ROXICODONE) oral solution 2 5 mg, 2 5 mg, Oral, 4x Daily, Adilia López PA-C, Stopped at 10/31/19 0848    oxyCODONE (ROXICODONE) oral solution 5 mg, 5 mg, Oral, Q4H PRN, Massimo Vallejo MD    pantoprazole (PROTONIX) injection 40 mg, 40 mg, Intravenous, Q24H [address], Shankar Mc MD, 40 mg at 10/31/19 0849    senna 8 8 mg/5 mL oral syrup 8 8 mg, 8 8 mg, Per NG Tube, Daily PRN, David Hernández MD    sertraline (ZOLOFT) oral concentrated solution 25 mg, 25 mg, Per NG Tube, HS, Shankar Mc MD, 25 mg at 10/30/19 2134    simethicone (MYLICON) oral suspension 40 mg, 40 mg, Oral, Q6H PRN, Shankar Mc MD, 40 mg at 10/24/19 1417    travoprost (TRAVATAN-Z) 0 004 % ophthalmic solution 1 drop, 1 drop, Both Eyes, HS, David Hernández MD, 1 drop at 10/30/19 2146    traZODone (DESYREL) tablet 25 mg, 25 mg, Oral, HS, Stephany Olson MD, 25 mg at 10/30/19 2133    Past Medical History:     Past Medical History:   Diagnosis Date    Anxiety     Arthritis     Constipation     Diabetes mellitus ([address] )     IDDM    Frequent UTI     Glaucoma     Hearing loss     Hyperlipidemia     Hypertension     Hyperthyroidism     in past    Hyponatremia     Hypothyroid     Neuropathy     bles    Right tubo-ovarian mass     Wears glasses         Past Surgical History:     Past Surgical History:   Procedure Laterality Date    APPENDECTOMY  1956    CATARACT EXTRACTION Bilateral     COLONOSCOPY  09/2014    Completed - Dr Dez Garg, due in 5 years    HYSTERECTOMY N/A 10/4/2019    Procedure: LAP TOTAL HYSTERECTOMY, BSO;  Surgeon: Yulissa Presley MD;  Location: [facility];  Service: Gynecology Oncology    IR THORACENTESIS  10/25/2019    LAPAROTOMY N/A 10/4/2019    Procedure: EXPLORATORY LAPAROTOMY  EXLAP OMENTECTOMY  PELVIC AND DEANGELO-AORTIC LYMPH NODE DISSECTION  PERITONEAL BIOPSY TRANS ABDOMINUS BLOCK;  Surgeon: Yulissa Presley MD;  Location: [facility];  Service: Gynecology Oncology    SKIN LESION EXCISION      Ears         Allergies: Allergies   Allergen Reactions    Thiazide-Type Diuretics      Hyponatremia           LABORATORY RESULTS:      Lab Results   Component Value Date    HGB 7 7 (L) 10/31/2019    HGB 13 0 01/08/2016    HCT 26 5 (L) 10/31/2019    HCT 37 9 01/08/2016    WBC 12 14 (H) 10/31/2019    WBC 6 04 01/08/2016     Lab Results   Component Value Date    BUN 26 (H) 10/31/2019    BUN 25 01/08/2016     (L) 01/08/2016    K 4 3 10/31/2019    K 4 8 01/08/2016    CL 97 (L) 10/31/2019    CL 99 (L) 01/08/2016    GLUCOSE 75 01/08/2016    CREATININE 0 52 (L) 10/31/2019    CREATININE 0 94 01/08/2016     Lab Results   Component Value Date    PROTIME 15 7 (H) 10/22/2019    INR 1 29 (H) 10/22/2019        DIAGNOSTIC STUDIES: Reviewed  Ct Chest Abdomen Pelvis Wo Contrast    Result Date: 10/24/2019  Impression: Very large bolus of stool in the rectum strongly suggestive of fecal impaction  Moderate stool and gaseous distention of large bowel loops proximal to the rectum consistent with constipation  This probably accounts for the increasing abdominal distention discussed in the patient's history  Small volume of abdominopelvic ascites, increased when compared to prior examination  Slight progression of body wall edema  Worsening extensive multifocal pneumonia and increasing bilateral pleural effusions  Workstation performed: [id number]     Xr Chest Portable    Result Date: 10/23/2019  Impression: Stable bilateral airspace disease  Workstation performed: [id number]     Xr Chest 1 View Portable    Result Date: 10/22/2019  Impression: Minimal improvement in bilateral airspace opacities  Small pleural effusions  Workstation performed: [id number]     Xr Abdomen 1 View Kub    Result Date: 10/23/2019  Impression: Feeding tube tip in the stomach   Workstation performed: [id number]     Xr Abdomen 1 View Kub    Result Date: 10/22/2019  Impression: There is a large amount of stool in the ascending colon and rectum  Workstation performed: [id number]     Ct Head Without Contrast    Result Date: 10/21/2019  Impression: No acute intracranial abnormality  Workstation performed: [id number]     Ir Thoracentesis    Result Date: 10/25/2019  Impression: Impression: Successful ultrasound-guided thoracentesis  Workstation performed: [id number]     Xr Chest Portable Icu    Result Date: 10/23/2019  Impression: Feeding tube tip terminating in the region of the midesophagus  Grossly stable patchy airspace disease bilaterally small effusions   Workstation performed: [id number]

## 2019-10-31 NOTE — OCCUPATIONAL THERAPY NOTE
Occupational Therapy Cancellation Note    Orders received and chart reviewed  OT spoke to [facility] and [employer], staff reporting Pt is not medically appropriate for therapy this day 2' decreased medical status and currently pending hospice consult  Will continue to follow to be seen for OT treatment as appropriate/when medically cleared           Mynor Arevalo MS, OTR/L

## 2019-10-31 NOTE — PHYSICAL THERAPY NOTE
Physical Therapy Cancellation Note    The pt  With a decline in respiratory status and now on bipap  Pt  Not appropriate for therapy at this time  Will continue to follow as appropriate      Rolly Medeiros, SIMA

## 2019-10-31 NOTE — SPEECH THERAPY NOTE
Patient with overall change in status with decreased alertness  Patient now requiring more O2 and placed on bipap  Most likely from aspiration event  Will follow peripherally and discuss with patient's daughter when/if appropriate

## 2019-10-31 NOTE — PROGRESS NOTES
General Cardiology   Progress Note -  Team One   Ana Helm 82 y o  female MRN: [id number]    Unit/Bed#: [facility] 530-01 Encounter: [id number]    Assessment/ Plan  1  PAF, prior bradycardic episodes  -24 hr telemetry review: NSR, period of afib w/RVR; rates 130's to 150's, yesterday at [id number]  -Was restarted on metoprolol tartrate 12 5 mg BID yesterday  -Home eliquis currently on hold, remains on a IV heparin infusion  2  Acute hypercapnic/hypxoc respiratory failure; multifactorial-in the setting of recent PNA/ARDs, ongoing aspiration, and volume overload  -AB 3//106/37/9 4  -Currently on 3L NC sating 99%  3  B/L pleural effusions--s/p thoracentesis x 2  -Pulm, and ID following  -CXR 10/29: multifocal consolidation; likely as a result of ongoing aspiration  -IV antibiotics discontinued per ID, no clinical evidence of PNA  4   Volume overload; multifactorial the setting of iatrogenic fluid resuscitation, hypoalbuminemia, and prolonged immobility  -On exam remains volume overloaded; predominately 3rd spaced  -Symptomatically unable to express cardiac complaint d/t mental status  -Limited 2D echo--LVEF 65%, mild TR, mild pulm HTN  -Was started on IV lasix 40 mg BID (10/30)  -24 hour I&O balance: -300 ml; overall + 5 2 L  5  RIJ thromboembolism  -On IV heparin drip  -Vasc surgery eval, no plan for intervetion     Plan  -Continue metoprolol; keep at 12 5 mg BID for now; d/t concerns of bradycardia earlier this admission  -Concern for auditory/hearing difficulties per pt's daughter--will switch to IV bumex--1mg BID---also may be a better choice d/t hypoalbuminemia  -Closely monitor volume status; strict I&Os  -Closely monitor renal function and electrolytes  -Goals of care discussions in process with primary team and palliative care    Subjective  Review of Systems   Unable to perform ROS: mental status change       Objective:   Physical Exam   Constitutional:   Minimally responsive  Unable to follow commands HENT:   mucous membranes are pink/dry   Eyes: No scleral icterus  Neck: No JVD present  Cardiovascular: Normal rate, regular rhythm, normal heart sounds and intact distal pulses  Generalized anasarca   Pulmonary/Chest: She has no wheezes  She has no rales  Breath sounds are coarse bilaterally   Abdominal: There is no tenderness  Obese   Musculoskeletal: She exhibits edema  Neurological:   Minimally responsive  Unable to follow commands     Skin: Skin is dry  There is pallor  Psychiatric:   Unable to assess       Vitals: Blood pressure 106/53, pulse 63, temperature 98 5 °F (36 9 °C), temperature source Axillary, resp  rate 17, height 5' 5" (1 651 m), weight 68 7 kg (151 lb 7 3 oz), SpO2 100 %, not currently breastfeeding ,     Body mass index is 25 2 kg/m²  ,   Systolic (15EAB), LEC:981 , Min:106 , ROSSY:559     Diastolic (94LQF), SHF:67, Min:53, Max:69      Intake/Output Summary (Last 24 hours) at 10/31/2019 0909  Last data filed at 10/31/2019 0601  Gross per 24 hour   Intake 930 41 ml   Output 1300 ml   Net -369 59 ml     Weight (last 2 days)     Date/Time   Weight    10/31/19 0544   68 7 (151 46)    10/29/19 0600   66 5 (146 61)              LABORATORY RESULTS  Results from last 7 days   Lab Units 10/30/19  0033 10/29/19  2145 10/29/19  1802   TROPONIN I ng/mL 0 03 0 04 0 03     CBC with diff: Results from last 7 days   Lab Units 10/31/19  0859 10/30/19  0500 10/29/19  0452 10/28/19  0453 10/27/19  0425 10/26/19  1840 10/26/19  1530 10/26/19  0439  10/25/19  0612   WBC Thousand/uL 12 14* 12 85* 14 31* 15 61* 12 42*  --   --  11 73*  --  10 60*   HEMOGLOBIN g/dL 7 7* 9 2* 8 3* 8 8* 8 2* 8 7* 9 0* 9 3*   < > 9 4*  9 1*   HEMATOCRIT % 26 5* 30 5* 27 6* 29 6* 28 6* 30 0* 30 7* 31 5*   < > 31 3*  30 5*   MCV fL 110* 107* 107* 108* 110*  --   --  107*  --  106*   PLATELETS Thousands/uL 295 321 317 361 364  --   --  470*  --  476*   MCH pg 32 1 32 4 32 3 32 2 31 5  --   --  31 5  --  31 5   MCHC g/dL 29 1* 30 2* 30 1* 29 7* 28 7*  --   --  29 5*  --  29 8*   RDW % 16 1* 15 8* 15 6* 15 4* 15 1  --   --  15 2*  --  15 0   MPV fL 9 6 9 9 10 0 10 1 9 5  --   --  9 8  --  9 7   NRBC AUTO /100 WBCs 0 0  --   --   --   --   --  0  --   --     < > = values in this interval not displayed         CMP:  Results from last 7 days   Lab Units 10/31/19  0902 10/30/19  0546 10/29/19  0452 10/28/19  0453 10/27/19  0425 10/26/19  0439 10/25/19  0612   POTASSIUM mmol/L 4 3 4 1 4 1 3 9 3 9 4 2 3 8   CHLORIDE mmol/L 97* 98* 99* 100 102 103 104   CO2 mmol/L 37* 36* 41* 38* 40* 37* 36*   BUN mg/dL 26* 18 25 28* 27* 25 22   CREATININE mg/dL 0 52* 0 42* 0 42* 0 50* 0 44* 0 42* 0 45*   CALCIUM mg/dL 7 5* 7 3* 7 1* 7 0* 6 7* 6 7* 6 3*   AST U/L  --   --   --  56* 50* 31  --    ALT U/L  --   --   --  58 48 31  --    ALK PHOS U/L  --   --   --  129* 102 91  --    EGFR ml/min/1 73sq m 89 96 96 90 94 96 94       BMP:  Results from last 7 days   Lab Units 10/31/19  0902 10/30/19  0546 10/29/19  0452 10/28/19  0453 10/27/19  0425 10/26/19  0439 10/25/19  0612   POTASSIUM mmol/L 4 3 4 1 4 1 3 9 3 9 4 2 3 8   CHLORIDE mmol/L 97* 98* 99* 100 102 103 104   CO2 mmol/L 37* 36* 41* 38* 40* 37* 36*   BUN mg/dL 26* 18 25 28* 27* 25 22   CREATININE mg/dL 0 52* 0 42* 0 42* 0 50* 0 44* 0 42* 0 45*   CALCIUM mg/dL 7 5* 7 3* 7 1* 7 0* 6 7* 6 7* 6 3*       Lab Results   Component Value Date    NTBNP 3,747 (H) 10/12/2019        Results from last 7 days   Lab Units 10/28/19  0453 10/26/19  0439 10/25/19  0612   MAGNESIUM mg/dL 2 5 2 6 2 3                         Lipid Profile:   Lab Results   Component Value Date    CHOL 183 01/08/2016    CHOL 155 08/14/2015     Lab Results   Component Value Date    HDL 93 (H) 09/25/2019    HDL 84 (H) 06/21/2019    HDL 94 (H) 03/12/2019     Lab Results   Component Value Date    LDLCALC 87 09/25/2019    LDLCALC 82 06/21/2019    LDLCALC 91 03/12/2019     Lab Results   Component Value Date    TRIG 52 09/25/2019    TRIG 41 06/21/2019 TRIG 69 2019       Cardiac testing:   Results for orders placed during the hospital encounter of 10/04/19   Echo complete with contrast if indicated    Narrative [address]  [place], [address]  [phone]    Transthoracic Echocardiogram  2D, M-mode, Doppler, and Color Doppler    Study date:  10-Oct-2019    Patient: Chandra Suarez  MR number: [id number]  Account number: [de-identified]  : 1935  Age: 82 years  Gender: Female  Status: Inpatient  Location: Bedside  Height: 65 in  Weight: 149 6 lb  BP: 135/ 57 mmHg    Indications: Atrial fibrillation    Diagnoses: I48 0 - Atrial fibrillation    Sonographer:  RONALDO Charles  Primary Physician:  Christopher Paulson MD  Referring Physician:  Catalina Trejo:  [facility]  Interpreting Physician:  Kameron Coy MD    IMPRESSIONS:  Technical quality: Good  Cardiac rhythm: Sinus tachycardia    1  Normal left ventricular size, prominent basal interventricular septum, normal left ventricular systolic function with hyperdynamic wall motion  Normal diastolic function  EF around 63%  2  No other significant chamber hypertrophy or enlargement  3  Aortic valve sclerosis, no aortic valve stenosis or regurgitation  5  Mild mitral valve regurgitation  5  Moderate tricuspid valve regurgitation  6  Suspected moderate pulmonary hypertension  Estimated RVSP/PASP is 52 mmHg  7  No pericardial effusion  Compared to report of previous echocardiogram from 2015 pulmonary hypertension and tricuspid valve regurgitation are new, otherwise no significant change  SUMMARY    LEFT VENTRICLE:  Normal left ventricular cavity size, normal wall thickness, normal left ventricular systolic function with hyperdynamic wall motion  Ejection fraction is estimated as around 62%  Normal diastolic function  RIGHT VENTRICLE:  Normal right ventricular size and systolic function   Estimated right ventricular systolic pressure moderately increased, 52 mmHg  Moderate pulmonary hypertension  LEFT ATRIUM:  Normal left atrial cavity size  Intact interatrial septum  RIGHT ATRIUM:  Normal right atrial cavity size  MITRAL VALVE:  Mild mitral valve leaflet sclerosis, adequate leaflet mobility  Mild mitral valve regurgitation with posteriorly directed jet  AORTIC VALVE:  Tricuspid aortic valve with sclerosis, adequate cuspal separation  No aortic valve stenosis or regurgitation  TRICUSPID VALVE:  Moderate tricuspid valve regurgitation  PULMONIC VALVE:  No significant pulmonic valve regurgitation  AORTA:  Aortic root and proximal ascending aorta are normal in size on 2D imaging  IVC, HEPATIC VEINS:  Inferior vena cava is normal in size and demonstrates appropriate respiratory phasic changes in diameter  PERICARDIUM:  No pericardial effusion  Suspected moderate to large pleural effusion with organized material in pleural cavity  HISTORY: PRIOR HISTORY: Risk factors: hypertension, diabetes, and hypercholesterolemia  PROCEDURE: The procedure was performed at the bedside  This was a routine study  The transthoracic approach was used  The study included complete 2D imaging, M-mode, complete spectral Doppler, and color Doppler  The heart rate was 97 bpm,  at the start of the study  Images were obtained from the parasternal, apical, subcostal, and suprasternal notch acoustic windows  Image quality was adequate  LEFT VENTRICLE: Normal left ventricular cavity size, normal wall thickness, normal left ventricular systolic function with hyperdynamic wall motion  Ejection fraction is estimated as around 62%  Normal diastolic function  RIGHT VENTRICLE: Normal right ventricular size and systolic function  Estimated right ventricular systolic pressure moderately increased, 52 mmHg  Moderate pulmonary hypertension  LEFT ATRIUM: Normal left atrial cavity size   Intact interatrial septum  RIGHT ATRIUM: Normal right atrial cavity size  MITRAL VALVE: Mild mitral valve leaflet sclerosis, adequate leaflet mobility  Mild mitral valve regurgitation with posteriorly directed jet  AORTIC VALVE: Tricuspid aortic valve with sclerosis, adequate cuspal separation  No aortic valve stenosis or regurgitation  TRICUSPID VALVE: Moderate tricuspid valve regurgitation  PULMONIC VALVE: No significant pulmonic valve regurgitation  PERICARDIUM: No pericardial effusion  Suspected moderate to large pleural effusion with organized material in pleural cavity  AORTA: Aortic root and proximal ascending aorta are normal in size on 2D imaging  SYSTEMIC VEINS: IVC: Inferior vena cava is normal in size and demonstrates appropriate respiratory phasic changes in diameter      SYSTEM MEASUREMENT TABLES    2D  %FS: 33 3 %  AVC: 300 9 ms  Ao Diam: 2 9 cm  EDV(Teich): 58 2 ml  EF(Teich): 62 9 %  ESV(Teich): 21 6 ml  HR_2Ch_Q: 101 4 BPM  HR_4Ch_Q: 99 4 BPM  IVSd: 0 8 cm  LA Diam: 3 4 cm  LAAs A4C: 15 4 cm2  LAESV A-L A4C: 44 7 ml  LAESV MOD A4C: 41 2 ml  LALs A4C: 4 5 cm  LVCO_2Ch_Q: 4 5 L/min  LVCO_4Ch_Q: 4 7 L/min  LVCO_BiP_Q: 4 6 L/min  LVEDV MOD A4C: 62 3 ml  LVEF MOD A4C: 67 1 %  LVEF_2Ch_Q: 53 2 %  LVEF_4Ch_Q: 66 %  LVEF_BiP_Q: 61 5 %  LVESV MOD A4C: 20 5 ml  LVIDd: 3 7 cm  LVIDs: 2 5 cm  LVLd A4C: 6 7 cm  LVLd_2Ch_Q: 6 7 cm  LVLd_4Ch_Q: 7 2 cm  LVLs A4C: 5 7 cm  LVLs_2Ch_Q: 5 7 cm  LVLs_4Ch_Q: 5 8 cm  LVPWd: 0 8 cm  LVSV_2Ch_Q: 44 4 ml  LVSV_4Ch_Q: 47 1 ml  LVSV_BiP_Q: 49 9 ml  LVVED_2Ch_Q: 83 3 ml  LVVED_4Ch_Q: 71 3 ml  LVVED_BiP_Q: 81 1 ml  LVVES_2Ch_Q: 39 ml  LVVES_4Ch_Q: 24 3 ml  LVVES_BiP_Q: 31 2 ml  RA Area: 11 7 cm2  RVIDd: 2 9 cm  RWT: 0 4  SV MOD A4C: 41 8 ml  SV(Teich): 36 6 ml    CW  TR Vmax: 3 2 m/s  TR maxP 6 mmHg    MM  TAPSE: 2 5 cm    PW  E' Av 1 m/s  E' Lat: 0 1 m/s  E' Sept: 0 1 m/s  E/E' Av 6  E/E' Lat: 6 5  E/E' Sept: 6 8  MV A Robin: 0 9 m/s  MV E Robin: 0 7 m/s  MV E/A Ratio: 0 7    [facility]    Prepared and electronically signed by    Altagracia Pelletier MD  Signed 10-Oct-2019 15:19:26       No results found for this or any previous visit  No results found for this or any previous visit  No procedure found  No results found for this or any previous visit      Meds/Allergies   all current active meds have been reviewed, current meds:   Current Facility-Administered Medications   Medication Dose Route Frequency    acetaminophen (TYLENOL) oral suspension 975 mg  975 mg Oral Q8H [address]    acetaminophen (TYLENOL) rectal suppository 650 mg  650 mg Rectal Q6H PRN    albuterol inhalation solution 2 5 mg  2 5 mg Nebulization Q4H PRN    aluminum-magnesium hydroxide-simethicone (MYLANTA) 200-200-20 mg/5 mL oral suspension 15 mL  15 mL Per NG Tube Q6H PRN    artificial tear (LUBRIFRESH P M ) ophthalmic ointment   Both Eyes HS PRN    benzocaine-menthol-lanolin-aloe (DERMOPLAST) 20-0 5 % topical spray   Topical BID    bisacodyl (DULCOLAX) rectal suppository 10 mg  10 mg Rectal Daily PRN    dextran 70-hypromellose (GENTEAL TEARS) 0 1-0 3 % ophthalmic solution 1 drop  1 drop Both Eyes Q3H PRN    dorzolamide-timolol (COSOPT) 22 3-6 8 MG/ML ophthalmic solution 1 drop  1 drop Both Eyes BID    furosemide (LASIX) injection 40 mg  40 mg Intravenous BID (diuretic)    heparin (porcine) 25,000 units in 250 mL infusion (premix)  3-30 Units/kg/hr (Order-Specific) Intravenous Titrated    heparin (porcine) injection 2,400 Units  2,400 Units Intravenous PRN    heparin (porcine) injection 4,800 Units  4,800 Units Intravenous PRN    insulin glargine (LANTUS) subcutaneous injection 22 Units 0 22 mL  22 Units Subcutaneous HS    insulin lispro (HumaLOG) 100 units/mL subcutaneous injection 1-5 Units  1-5 Units Subcutaneous Q6H [address]    insulin lispro (HumaLOG) 100 units/mL subcutaneous injection 1-5 Units  1-5 Units Subcutaneous 0200    ketamine 250 mg (STANDARD CONCENTRATION) IV in sodium chloride 0 9% 250 mL  0 05 mg/kg/hr Intravenous Continuous    levothyroxine tablet 50 mcg  50 mcg Per NG Tube Early Morning    lidocaine (LIDODERM) 5 % patch 1 patch  1 patch Topical Daily    melatonin tablet 6 mg  6 mg Oral HS    metoprolol (LOPRESSOR) injection 5 mg  5 mg Intravenous Q6H PRN    metoprolol tartrate (LOPRESSOR) partial tablet 12 5 mg  12 5 mg Oral Q12H [facility]    miconazole (MONISTAT) external vaginal cream   Topical BID PRN    Netarsudil Dimesylate 0 02 % SOLN 1 drop  1 drop Right Eye HS    nystatin-triamcinolone (MYCOLOG-II) cream   Topical BID    ondansetron (ZOFRAN) injection 4 mg  4 mg Intravenous Q6H PRN    oxyCODONE (ROXICODONE) oral solution 2 5 mg  2 5 mg Oral Q4H PRN    oxyCODONE (ROXICODONE) oral solution 2 5 mg  2 5 mg Oral 4x Daily    oxyCODONE (ROXICODONE) oral solution 5 mg  5 mg Oral Q4H PRN    pantoprazole (PROTONIX) injection 40 mg  40 mg Intravenous Q24H [place]    senna 8 8 mg/5 mL oral syrup 8 8 mg  8 8 mg Per NG Tube Daily PRN    sertraline (ZOLOFT) oral concentrated solution 25 mg  25 mg Per NG Tube HS    simethicone (MYLICON) oral suspension 40 mg  40 mg Oral Q6H PRN    travoprost (TRAVATAN-Z) 0 004 % ophthalmic solution 1 drop  1 drop Both Eyes HS    traZODone (DESYREL) tablet 25 mg  25 mg Oral HS    and PTA meds:   Prior to Admission Medications   Prescriptions Last Dose Informant Patient Reported? Taking?    ERROR: CANNOT USE RATIO BASED PRESCRIPTION MIXTURE NAMING FOR A NON-MIXTURE Unknown at Unknown time  No No   Sig: Take 10 mL (20 mg total) by mouth daily   LORazepam (ATIVAN) 0 5 mg tablet Unknown at Unknown time Self No No   Sig: Take 1 tablet (0 5 mg total) by mouth 3 (three) times a day   Patient taking differently: Take 0 5 mg by mouth 2 (two) times a day    Netarsudil Dimesylate (RHOPRESSA) 0 02 % SOLN Unknown at Unknown time  Yes No   Sig: Apply to eye   RHOPRESSA 0 02 % SOLN Unknown at Unknown time  Yes No   Sig: Administer 1 drop to the right eye daily at bedtime    acetaminophen (TYLENOL) 325 mg tablet Unknown at Unknown time  No No   Sig: Take 2 tablets (650 mg total) by mouth every 6 (six) hours as needed for mild pain   aluminum-magnesium hydroxide-simethicone (MYLANTA) 200-200-20 mg/5 mL suspension Unknown at Unknown time  No No   Sig: Take 30 mL by mouth every 4 (four) hours as needed for indigestion or heartburn   apixaban (ELIQUIS) 5 mg Unknown at Unknown time  No No   Sig: Take 1 tablet (5 mg total) by mouth 2 (two) times a day   artificial tear (LUBRIFRESH P M ) 83-15 % ophthalmic ointment Unknown at Unknown time  No No   Sig: Administer to both eyes daily at bedtime as needed (dry eyes)   dorzolamide-timolol (COSOPT) 22 3-6 8 MG/ML ophthalmic solution Unknown at Unknown time Self Yes No   Sig: Administer 1 drop to both eyes 2 (two) times a day    furosemide (LASIX) 40 mg tablet Unknown at Unknown time  Yes No   Sig: Take 40 mg by mouth daily   insulin glargine (LANTUS) 100 units/mL subcutaneous injection Not Taking at Unknown time  No No   Sig: Inject 20 Units under the skin every 12 (twelve) hours   Patient not taking: Reported on 10/21/2019   insulin lispro (HumaLOG) 100 units/mL injection Unknown at Unknown time  No No   Sig: Inject 2-12 Units under the skin every 6 (six) hours   latanoprost (XALATAN) 0 005 % ophthalmic solution Unknown at Unknown time  Yes No   Si drop daily at bedtime   levalbuterol (XOPENEX) 1 25 mg/0 5 mL nebulizer solution Unknown at Unknown time  No No   Sig: Take 0 5 mL (1 25 mg total) by nebulization 3 (three) times a day   levothyroxine 50 mcg tablet Unknown at Unknown time  No No   Sig: Take 1 tablet (50 mcg total) by mouth daily   metoprolol tartrate (LOPRESSOR) 25 mg tablet Unknown at Unknown time  No No   Sig: Take 1 tablet (25 mg total) by mouth every 12 (twelve) hours   omeprazole (PriLOSEC) 20 mg delayed release capsule Unknown at Unknown time  Yes No   Sig: Take 20 mg by mouth daily   ondansetron (ZOFRAN) 4 mg/2 mL injection Unknown at Unknown time  No No   Sig: Infuse 2 mL (4 mg total) into a venous catheter every 6 (six) hours as needed for nausea   pantoprazole (PROTONIX) 40 mg tablet Unknown at Unknown time  Yes No   Sig: Take 40 mg by mouth daily   polyethylene glycol (MIRALAX) 17 g packet Unknown at Unknown time  No No   Sig: Take 17 g by mouth daily   sodium chloride 0 9 % nebulizer solution Unknown at Unknown time  No No   Sig: Take 3 mL by nebulization 3 (three) times a day   traMADol (ULTRAM) 50 mg tablet Unknown at Unknown time  No No   Sig: Take 1 tablet (50 mg total) by mouth every 6 (six) hours as needed for moderate pain for up to 10 days   traZODone (DESYREL) 50 mg tablet Unknown at Unknown time  No No   Sig: Take 1 tablet (50 mg total) by mouth daily at bedtime   travoprost (TRAVATAN Z) 0 004 % ophthalmic solution Not Taking at Unknown time Self Yes No   Sig: Administer 1 drop to both eyes daily at bedtime       Facility-Administered Medications: None       heparin (porcine) 3-30 Units/kg/hr (Order-Specific) Last Rate: 11 Units/kg/hr (10/31/19 0403)   ketamine 0 05 mg/kg/hr Last Rate: 0 05 mg/kg/hr (10/30/19 2132)     Telemetry Review: Predominate NSR, period of afib w/RVR yesterday around 6911-1569  No further events  EKG personally reviewed by JIM Holder      Assessment:  Principal Problem:    Lethargy  Active Problems:    Adult onset hypothyroidism    ANAYA (generalized anxiety disorder)    Hypertension    Type 1 diabetes mellitus with diabetic neuropathy (HCC)    Ovarian cancer ([facility] [address] )    Internal jugular (IJ) vein thromboembolism, acute, right (HCC)    Superficial thrombophlebitis of right upper extremity    A-fib (HCC)    Acute respiratory failure with hypercapnia (HCC)    Rectal bleeding    Hearing loss    Critical illness myopathy    Urinary retention    Goals of care, counseling/discussion    Counseling / Coordination of Care  Total floor / unit time spent today 20 minutes  Greater than 50% of total time was spent with the patient and / or family counseling and / or coordination of care  ** Please Note: Dragon 360 Dictation voice to text software may have been used in the creation of this document   **

## 2019-10-31 NOTE — ASSESSMENT & PLAN NOTE
resp failure with hypoxia & hypercapnea  Patient has history of recent pneumonia and unfortunately ARDS requiring intubation and ICU admission and also was on high-flow oxygen before discharge to [facility]  Patient has to be on BiPAP overnight for elevated pCO2  Currently patient is on 6 L nasal cannula with Oxymizer  Pulmonology evaluation appreciated  ABG reviewed  Acute hypoxic respiratory failure is likely multifactorial secondary to recent pneumonia/ARDS-patient was evaluated along with the pulmonologist and reported that it recovery is going to be  Slow  S/p thora x 2 on 10/25 & 10/27  Infectious Disease evaluation appreciated- discontinued IV antibiotic on Friday  More tachypneic 10/29- CXR repeated - d/w Pulm, ID , bilateral opacities worse on the left - broaden Abx to cover GNR HCAP/aspiration  Place on cefepime, flagyl  ABG shows metabolic alkalosis with resp compensation ph 7 43  since procal neg x 3, stopped Abx, suspect ongoing aspiration  Cardio ordered trial IV lasix    10/31:  More lethargic today, on ABG had pH of 7 3, pCO2 of 76 suggestive of respiratory acidosis  Had extensive discussion with Pulmonary and patient's daughter  Explain her the acute decompensation is secondary to hypercapnia but she has underlying hypoxia and bilateral pulmonary infiltrates which are due to post her DS inflammation/fibrosis for which there is no definitive treatment, cannot be guaranteed immediate reversal recovery     Explain unpredictability of her pulmonary condition as well as this affecting her ability to undergo any procedures like PEG tube placement  Initially daughter decided to take 1 day at a time and treat her current acute status  Hence placed her on high-flow initially repeated ABG will significant improvement hence placed her on BiPAP for 1 hours after which her ABG showed improvement in pH to 7 38 with CO2 of 72  Hence stop BiPAP placed on nasal cannula, use BiPAP p r n  And check ABG in morning    Had multiple discussions with Pulmonary and Cardiology Cardiology of course do not think the patient is since ordered CHF but due to her generalized extremity edema they are recommending IV diuresis with Bumex but there are concerns of metabolic alkalosis which may worsen her respiratory acidosis from pulmonary standpoint hence held off on her by IV Bumex at present  Had extensive goals of care discussion with the patient's daughter or other specialist   Later that are agreed to make the patient to level 3 DNR  Later on after discussion with Geriatrics patient wanted hospice consultation which will happen tomorrow  Ketamine drip helped her    Will hold scheduled narcotics given current goals are not only comfort based

## 2019-11-01 VITALS
DIASTOLIC BLOOD PRESSURE: 65 MMHG | BODY MASS INDEX: 24.61 KG/M2 | HEIGHT: 65 IN | HEART RATE: 92 BPM | RESPIRATION RATE: 20 BRPM | TEMPERATURE: 98.1 F | OXYGEN SATURATION: 99 % | WEIGHT: 147.71 LBS | SYSTOLIC BLOOD PRESSURE: 137 MMHG

## 2019-11-01 LAB
ALBUMIN SERPL BCP-MCNC: 2.1 G/DL (ref 3.5–5)
ALP SERPL-CCNC: 127 U/L (ref 46–116)
ALT SERPL W P-5'-P-CCNC: 51 U/L (ref 12–78)
ANION GAP SERPL CALCULATED.3IONS-SCNC: 4 MMOL/L (ref 4–13)
APTT PPP: 68 SECONDS (ref 23–37)
APTT PPP: 87 SECONDS (ref 23–37)
ARTERIAL PATENCY WRIST A: YES
AST SERPL W P-5'-P-CCNC: 43 U/L (ref 5–45)
BASE EXCESS BLDA CALC-SCNC: 13.8 MMOL/L
BASOPHILS # BLD AUTO: 0.01 THOUSANDS/ΜL (ref 0–0.1)
BASOPHILS NFR BLD AUTO: 0 % (ref 0–1)
BILIRUB SERPL-MCNC: 0.26 MG/DL (ref 0.2–1)
BUN SERPL-MCNC: 22 MG/DL (ref 5–25)
CALCIUM SERPL-MCNC: 7.8 MG/DL (ref 8.3–10.1)
CHLORIDE SERPL-SCNC: 97 MMOL/L (ref 100–108)
CO2 SERPL-SCNC: 38 MMOL/L (ref 21–32)
CREAT SERPL-MCNC: 0.32 MG/DL (ref 0.6–1.3)
EOSINOPHIL # BLD AUTO: 0.04 THOUSAND/ΜL (ref 0–0.61)
EOSINOPHIL NFR BLD AUTO: 0 % (ref 0–6)
ERYTHROCYTE [DISTWIDTH] IN BLOOD BY AUTOMATED COUNT: 16.3 % (ref 11.6–15.1)
GFR SERPL CREATININE-BSD FRML MDRD: 105 ML/MIN/1.73SQ M
GLUCOSE SERPL-MCNC: 108 MG/DL (ref 65–140)
GLUCOSE SERPL-MCNC: 51 MG/DL (ref 65–140)
GLUCOSE SERPL-MCNC: 52 MG/DL (ref 65–140)
HCO3 BLDA-SCNC: 41.7 MMOL/L (ref 22–28)
HCT VFR BLD AUTO: 26.5 % (ref 34.8–46.1)
HGB BLD-MCNC: 8 G/DL (ref 11.5–15.4)
IMM GRANULOCYTES # BLD AUTO: 0.2 THOUSAND/UL (ref 0–0.2)
IMM GRANULOCYTES NFR BLD AUTO: 1 % (ref 0–2)
IPAP: 12
LYMPHOCYTES # BLD AUTO: 0.79 THOUSANDS/ΜL (ref 0.6–4.47)
LYMPHOCYTES NFR BLD AUTO: 6 % (ref 14–44)
MCH RBC QN AUTO: 32.7 PG (ref 26.8–34.3)
MCHC RBC AUTO-ENTMCNC: 30.2 G/DL (ref 31.4–37.4)
MCV RBC AUTO: 108 FL (ref 82–98)
MONOCYTES # BLD AUTO: 0.79 THOUSAND/ΜL (ref 0.17–1.22)
MONOCYTES NFR BLD AUTO: 6 % (ref 4–12)
NEUTROPHILS # BLD AUTO: 12.51 THOUSANDS/ΜL (ref 1.85–7.62)
NEUTS SEG NFR BLD AUTO: 87 % (ref 43–75)
NON VENT- BIPAP: ABNORMAL
NRBC BLD AUTO-RTO: 0 /100 WBCS
O2 CT BLDA-SCNC: 12.3 ML/DL (ref 16–23)
OXYHGB MFR BLDA: 95.6 % (ref 94–97)
PCO2 BLDA: 77.2 MM HG (ref 36–44)
PEEP MAX SETTING VENT: 5 CM[H2O]
PH BLDA: 7.35 [PH] (ref 7.35–7.45)
PLATELET # BLD AUTO: 335 THOUSANDS/UL (ref 149–390)
PMV BLD AUTO: 9.8 FL (ref 8.9–12.7)
PO2 BLDA: 99.3 MM HG (ref 75–129)
POTASSIUM SERPL-SCNC: 4 MMOL/L (ref 3.5–5.3)
PROT SERPL-MCNC: 5.3 G/DL (ref 6.4–8.2)
RBC # BLD AUTO: 2.45 MILLION/UL (ref 3.81–5.12)
SODIUM SERPL-SCNC: 139 MMOL/L (ref 136–145)
SPECIMEN SOURCE: ABNORMAL
VENT BIPAP FIO2: 40 %
WBC # BLD AUTO: 14.34 THOUSAND/UL (ref 4.31–10.16)

## 2019-11-01 PROCEDURE — 94660 CPAP INITIATION&MGMT: CPT

## 2019-11-01 PROCEDURE — 99232 SBSQ HOSP IP/OBS MODERATE 35: CPT | Performed by: INTERNAL MEDICINE

## 2019-11-01 PROCEDURE — 99233 SBSQ HOSP IP/OBS HIGH 50: CPT | Performed by: OBSTETRICS & GYNECOLOGY

## 2019-11-01 PROCEDURE — 85730 THROMBOPLASTIN TIME PARTIAL: CPT | Performed by: HOSPITALIST

## 2019-11-01 PROCEDURE — 94760 N-INVAS EAR/PLS OXIMETRY 1: CPT

## 2019-11-01 PROCEDURE — 99232 SBSQ HOSP IP/OBS MODERATE 35: CPT | Performed by: NURSE PRACTITIONER

## 2019-11-01 PROCEDURE — 99233 SBSQ HOSP IP/OBS HIGH 50: CPT | Performed by: INTERNAL MEDICINE

## 2019-11-01 PROCEDURE — 80053 COMPREHEN METABOLIC PANEL: CPT | Performed by: HOSPITALIST

## 2019-11-01 PROCEDURE — 82948 REAGENT STRIP/BLOOD GLUCOSE: CPT

## 2019-11-01 PROCEDURE — 85025 COMPLETE CBC W/AUTO DIFF WBC: CPT | Performed by: HOSPITALIST

## 2019-11-01 PROCEDURE — 99239 HOSP IP/OBS DSCHRG MGMT >30: CPT | Performed by: HOSPITALIST

## 2019-11-01 PROCEDURE — C9113 INJ PANTOPRAZOLE SODIUM, VIA: HCPCS | Performed by: FAMILY MEDICINE

## 2019-11-01 PROCEDURE — 82805 BLOOD GASES W/O2 SATURATION: CPT | Performed by: HOSPITALIST

## 2019-11-01 RX ORDER — BISACODYL 10 MG
10 SUPPOSITORY, RECTAL RECTAL DAILY PRN
Status: CANCELLED | OUTPATIENT
Start: 2019-11-01

## 2019-11-01 RX ORDER — GLYCOPYRROLATE 0.2 MG/ML
0.2 INJECTION INTRAMUSCULAR; INTRAVENOUS
Status: DISCONTINUED | OUTPATIENT
Start: 2019-11-01 | End: 2019-11-01 | Stop reason: HOSPADM

## 2019-11-01 RX ORDER — HALOPERIDOL 5 MG/ML
0.5 INJECTION INTRAMUSCULAR
Status: DISCONTINUED | OUTPATIENT
Start: 2019-11-01 | End: 2019-11-01 | Stop reason: HOSPADM

## 2019-11-01 RX ORDER — LIDOCAINE 50 MG/G
1 PATCH TOPICAL DAILY
Qty: 30 PATCH | Refills: 0 | Status: SHIPPED | OUTPATIENT
Start: 2019-11-02

## 2019-11-01 RX ORDER — LORAZEPAM 2 MG/ML
0.5 INJECTION INTRAMUSCULAR
Status: DISCONTINUED | OUTPATIENT
Start: 2019-11-01 | End: 2019-11-01 | Stop reason: HOSPADM

## 2019-11-01 RX ADMIN — LIDOCAINE 1 PATCH: 50 PATCH CUTANEOUS at 08:29

## 2019-11-01 RX ADMIN — MORPHINE SULFATE 2 MG: 2 INJECTION, SOLUTION INTRAMUSCULAR; INTRAVENOUS at 15:03

## 2019-11-01 RX ADMIN — ACETAMINOPHEN 975 MG: 160 SUSPENSION ORAL at 05:38

## 2019-11-01 RX ADMIN — MORPHINE SULFATE 2 MG: 2 INJECTION, SOLUTION INTRAMUSCULAR; INTRAVENOUS at 16:51

## 2019-11-01 RX ADMIN — METOPROLOL TARTRATE 12.5 MG: 25 TABLET ORAL at 08:45

## 2019-11-01 RX ADMIN — HEPARIN SODIUM AND DEXTROSE 13 UNITS/KG/HR: 10000; 5 INJECTION INTRAVENOUS at 04:04

## 2019-11-01 RX ADMIN — MORPHINE SULFATE 2 MG: 2 INJECTION, SOLUTION INTRAMUSCULAR; INTRAVENOUS at 13:15

## 2019-11-01 RX ADMIN — MORPHINE SULFATE 2 MG: 2 INJECTION, SOLUTION INTRAMUSCULAR; INTRAVENOUS at 10:47

## 2019-11-01 RX ADMIN — NYSTATIN AND TRIAMCINOLONE ACETONIDE: 100000; 1 CREAM TOPICAL at 08:30

## 2019-11-01 RX ADMIN — MORPHINE SULFATE 2 MG: 2 INJECTION, SOLUTION INTRAMUSCULAR; INTRAVENOUS at 18:51

## 2019-11-01 RX ADMIN — BENZOCAINE AND LEVOMENTHOL: 200; 5 SPRAY TOPICAL at 08:30

## 2019-11-01 RX ADMIN — PANTOPRAZOLE SODIUM 40 MG: 40 INJECTION, POWDER, FOR SOLUTION INTRAVENOUS at 08:46

## 2019-11-01 RX ADMIN — GLYCOPYRROLATE 0.2 MG: 0.2 INJECTION, SOLUTION INTRAMUSCULAR; INTRAVENOUS at 19:51

## 2019-11-01 RX ADMIN — MORPHINE SULFATE 2 MG: 2 INJECTION, SOLUTION INTRAMUSCULAR; INTRAVENOUS at 19:51

## 2019-11-01 RX ADMIN — DORZOLAMIDE HYDROCHLORIDE AND TIMOLOL MALEATE 1 DROP: 20; 5 SOLUTION/ DROPS OPHTHALMIC at 08:30

## 2019-11-01 RX ADMIN — LEVOTHYROXINE SODIUM 50 MCG: 50 TABLET ORAL at 05:38

## 2019-11-01 RX ADMIN — MORPHINE SULFATE 2 MG: 2 INJECTION, SOLUTION INTRAMUSCULAR; INTRAVENOUS at 20:45

## 2019-11-01 NOTE — PROGRESS NOTES
Orders written for DNR 4 comfort care, telemetry, bp cuff and pulse ox wires all removed  Allevyns removed from heels per daughters request as well as limb alert bracelet  Comfort cart ordered and delivered for family  PRN morphine given and hi flow nc removed  Patient placed on 2L nc per daughters request  Right nare keofed removed  Patient is resting comfortably at this time without and respiratory distress

## 2019-11-01 NOTE — TREATMENT PLAN
11/1/2019 4:48 PM -  Reviewed case with Dr Monica Laguna  Discussed case with ALDAIR Luke, as well as family at bedside  While pt's resps and HR are not robust, they have been stable today  She does not show cheyne-simmons nor resp pauses to me  Family express a strong desire to have pt leave hospital, for spiritual reasons that involve not dying in hospital   We discussed the potential of death in transport, but family are willing to accept this risk, given the very short xport distance bewteen this hospital and hospice House  I have reconciled meds, and have sent advance notice to Dr Gordy Fuentes, on-call hospice physician, to help prepare for pt's arrival   Ms Douglas María Elena working rapidly to obtain [address] for [employer], and Ms Mcgrath has acquired consents for hospice  Family aware of our plans  Larissa Loya MD  Palliative and Supportive Care  Clinic/Answering Service: [phone]  You can find me on [employer]!

## 2019-11-01 NOTE — HOSPICE NOTE
Met with daughter and son at bedside  Pt is approved for IPU and family want to attempt to get her there tonight  Consents for inpatient hospice signed and faxed to [facility]  Transport arranged by [facility] [employer] for 9pm  Nurse, Yaw Otoole to call report to inpatient unit at [phone] prior to transport  Emotional support provided to family

## 2019-11-01 NOTE — PROGRESS NOTES
Progress note - Palliative and Supportive Care   Moira Payment 82 y o  female [phone]    Assessment:   -   Patient Active Problem List   Diagnosis    Adult onset hypothyroidism    ANAYA (generalized anxiety disorder)    Hyperlipidemia    Hypertension    Retinopathy, diabetic, proliferative ([facility] )    Type 1 diabetes mellitus with diabetic neuropathy ([facility] )    Age-related osteoporosis without current pathological fracture    Hyponatremia    Ovarian cancer (HCC)    Type 1 diabetes mellitus with hyperglycemia (HCC)    Internal jugular (IJ) vein thromboembolism, acute, right (HCC)    Superficial thrombophlebitis of right upper extremity    A-fib (HCC)    Acute respiratory failure with hypercapnia (HCC)    Lethargy    Rectal bleeding    Hearing loss    Critical illness myopathy    Urinary retention    Goals of care, counseling/discussion         Plan:  1  Symptom management - medications changed to reflect goal of comfort  - PRN IV morphine- dose administered prior to removal of HiFLO   - PRN IV Ativan   - PRN IV Haldol   - PRN IV Robinul    2  Goals - Level 4, comfort cares  Hospice notified, will plan to re-assess in coming hours to see if stable for transportation  Interval history:       Patient on HiFlo with increased work of breathing  She appears uncomfortable  Family at bedside, they have decided to pursue comfort focused cares       MEDICATIONS / ALLERGIES:     all current active meds have been reviewed and current meds:   Current Facility-Administered Medications   Medication Dose Route Frequency    artificial tear (LUBRIFRESH P M ) ophthalmic ointment   Both Eyes HS PRN    benzocaine-menthol-lanolin-aloe (DERMOPLAST) 20-0 5 % topical spray   Topical BID    dextran 70-hypromellose (GENTEAL TEARS) 0 1-0 3 % ophthalmic solution 1 drop  1 drop Both Eyes Q3H PRN    dorzolamide-timolol (COSOPT) 22 3-6 8 MG/ML ophthalmic solution 1 drop  1 drop Both Eyes BID    glycopyrrolate (ROBINUL) injection 0 2 mg  0 2 mg Intravenous Q1H PRN    haloperidol lactate (HALDOL) injection 0 5 mg  0 5 mg Intravenous Q1H PRN    lidocaine (LIDODERM) 5 % patch 1 patch  1 patch Topical Daily    LORazepam (ATIVAN) 2 mg/mL injection 0 5 mg  0 5 mg Intravenous Q1H PRN    miconazole (MONISTAT) external vaginal cream   Topical BID PRN    morphine injection 2 mg  2 mg Intravenous Q10 Min PRN    Netarsudil Dimesylate 0 02 % SOLN 1 drop  1 drop Right Eye HS    nystatin-triamcinolone (MYCOLOG-II) cream   Topical BID    ondansetron (ZOFRAN) injection 4 mg  4 mg Intravenous Q6H PRN       Allergies   Allergen Reactions    Thiazide-Type Diuretics      Hyponatremia       OBJECTIVE:    Physical Exam  Physical Exam   Constitutional: No distress  HENT:   Head: Normocephalic and atraumatic  Right Ear: External ear normal    Left Ear: External ear normal    Eyes: Right eye exhibits no discharge  Left eye exhibits no discharge  Cardiovascular: Normal rate and intact distal pulses  No murmur heard  Pulmonary/Chest: She is in respiratory distress  She has rales  Abdominal: Soft  She exhibits no distension  Musculoskeletal: She exhibits no edema  Neurological: She is alert  Skin: Skin is warm and dry  There is pallor  Nursing note and vitals reviewed  Lab Results:   I have personally reviewed pertinent labs  , CBC:   Lab Results   Component Value Date    WBC 14 34 (H) 11/01/2019    HGB 8 0 (L) 11/01/2019    HCT 26 5 (L) 11/01/2019     (H) 11/01/2019     11/01/2019    MCH 32 7 11/01/2019    MCHC 30 2 (L) 11/01/2019    RDW 16 3 (H) 11/01/2019    MPV 9 8 11/01/2019    NRBC 0 11/01/2019   , CMP:   Lab Results   Component Value Date    SODIUM 139 11/01/2019    K 4 0 11/01/2019    CL 97 (L) 11/01/2019    CO2 38 (H) 11/01/2019    BUN 22 11/01/2019    CREATININE 0 32 (L) 11/01/2019    CALCIUM 7 8 (L) 11/01/2019    AST 43 11/01/2019    ALT 51 11/01/2019    ALKPHOS 127 (H) 11/01/2019    EGFR 105 11/01/2019 Imaging Studies: reviewed  EKG, Pathology, and Other Studies: reviewed    Counseling / Coordination of Care  Total floor / unit time spent today 35+ minutes  Greater than 50% of total time was spent with the patient and / or family counseling and / or coordination of care  A description of the counseling / coordination of care: end of life care, comfort orders, hospice cares, supportive listening

## 2019-11-01 NOTE — SOCIAL WORK
CM was informed patient to transfer to Hospice house this evening  Multiple transport companies called  [facility]uled [facility] 9:00[facility]  Completed CMN and placed copy in Medical Records bin  RN, family and hospice aware  Called [facility] to request auth for transport, spoke to Baldomero Cheng, who reports preauth is needed  Called [phone] as directed by Baldomero Cheng but office is closed and instruction says to call the next business day

## 2019-11-01 NOTE — UTILIZATION REVIEW
Continued Stay Review    Date: 11/1/19                       Current Patient Class: IP  Current Level of Care: Med/Surg/Tele    HPI:84 y o  female initially admitted on 10/21 with lethargy and acute hypoxic respiratory failure    Assessment/Plan: Transitioned today from Bipap to HFNC with sats maintaining @ 98%  Remains lethargic opens eyes briefly and intermittently with tactile stimuli  Tube feeds on hold  Monitor off abx  Palliative care following, hospice consulted  Family then decided to make pt a level 4 DNR, comfort care only  -- BP cuff, pox wires removed, keofed tube removed  Morphine IV prn, continue O2 2 lpm nc for comfort    Pertinent Labs/Diagnostic Results:   Results from last 7 days   Lab Units 11/01/19  0423 10/31/19  0859 10/30/19  0500 10/29/19  0452 10/28/19  0453   WBC Thousand/uL 14 34* 12 14* 12 85* 14 31* 15 61*   HEMOGLOBIN g/dL 8 0* 7 7* 9 2* 8 3* 8 8*   HEMATOCRIT % 26 5* 26 5* 30 5* 27 6* 29 6*   PLATELETS Thousands/uL 335 295 321 317 361   NEUTROS ABS Thousands/µL 12 51* 10 48* 11 09*  --   --      Results from last 7 days   Lab Units 11/01/19  0423 10/31/19  0902 10/30/19  0546 10/29/19  0452 10/28/19  0453  10/26/19  0439   SODIUM mmol/L 139 136 137 141 141   < > 141   POTASSIUM mmol/L 4 0 4 3 4 1 4 1 3 9   < > 4 2   CHLORIDE mmol/L 97* 97* 98* 99* 100   < > 103   CO2 mmol/L 38* 37* 36* 41* 38*   < > 37*   ANION GAP mmol/L 4 2* 3* 1* 3*   < > 1*   BUN mg/dL 22 26* 18 25 28*   < > 25   CREATININE mg/dL 0 32* 0 52* 0 42* 0 42* 0 50*   < > 0 42*   EGFR ml/min/1 73sq m 105 89 96 96 90   < > 96   CALCIUM mg/dL 7 8* 7 5* 7 3* 7 1* 7 0*   < > 6 7*   CALCIUM, IONIZED mmol/L  --   --   --   --  0 99*  --  0 93*   MAGNESIUM mg/dL  --   --   --   --  2 5  --  2 6    < > = values in this interval not displayed       Results from last 7 days   Lab Units 11/01/19  0423 10/31/19  0901 10/28/19  0453 10/27/19  0425 10/26/19  0439   AST U/L 43  --  56* 50* 31   ALT U/L 51  --  58 48 31   ALK PHOS U/L 127*  --  129* 102 91   TOTAL PROTEIN g/dL 5 3*  --  5 3* 5 1* 5 3*   ALBUMIN g/dL 2 1*  --  2 1* 2 0* 2 0*   TOTAL BILIRUBIN mg/dL 0 26  --  0 24 0 16* 0 29   AMMONIA umol/L  --  39*  --   --   --      Results from last 7 days   Lab Units 11/01/19  0616 11/01/19  0530 10/31/19  2329 10/31/19  2228 10/31/19  2125 10/31/19  1757 10/31/19  1118 10/31/19  0547 10/31/19  0207 10/31/19  0039   POC GLUCOSE mg/dl 108 52* 90 113 47* 85 250* 224* 210* 228*     Results from last 7 days   Lab Units 11/01/19  0423 10/31/19  0902 10/30/19  0546 10/29/19  0452 10/28/19  0453 10/27/19  0425 10/26/19  0439   GLUCOSE RANDOM mg/dL 51* 227* 208* 159* 152* 115 150*     BETA-HYDROXYBUTYRATE   Date Value Ref Range Status   10/07/2019 0 2 <0 6 mmol/L Final      Results from last 7 days   Lab Units 11/01/19  0528 10/31/19  1456 10/31/19  1231   PH ART  7 350 7 389 7 340*   PCO2 ART mm Hg 77 2* 72 0* 75 2*   PO2 ART mm Hg 99 3 106 3 87 2   HCO3 ART mmol/L 41 7* 42 5* 39 7*   BASE EXC ART mmol/L 13 8 15 3 12 0   O2 CONTENT ART mL/dL 12 3* 12 1* 11 9*   O2 HGB, ARTERIAL % 95 6 96 6 95 4   ABG SOURCE  Radial, Left Radial, Left Radial, Left   NON VENT TYPE HFNC   --   --  HFNC Flow   HFNC FLOW LPM   --   --  50     Results from last 7 days   Lab Units 10/30/19  0033 10/29/19  2145 10/29/19  1802   TROPONIN I ng/mL 0 03 0 04 0 03     Results from last 7 days   Lab Units 11/01/19  0535 11/01/19  0001 10/31/19  1719   PTT seconds 68* 87* 43*     Results from last 7 days   Lab Units 10/30/19  0605 10/29/19  1216 10/28/19  0453 10/27/19  1653   PROCALCITONIN ng/ml 0 10 0 12 0 12 <0 25     Results from last 7 days   Lab Units 10/26/19  1127   CLARITY UA  Turbid   COLOR UA  Red   SPEC GRAV UA  1 011   PH UA  7 5   GLUCOSE UA mg/dl 100 (1/10%)*   KETONES UA mg/dl Negative   BLOOD UA  Large*   PROTEIN UA mg/dl >=1000 (4+)*   NITRITE UA  Negative   BILIRUBIN UA  Interference- unable to analyze*   UROBILINOGEN UA E U /dl 0 2   LEUKOCYTES UA  Trace*   WBC UA /hpf 20-30*   RBC UA /hpf Innumerable*   BACTERIA UA /hpf None Seen   EPITHELIAL CELLS WET PREP /hpf Occasional     Results from last 7 days   Lab Units 10/27/19  1129 10/26/19  1127   GRAM STAIN RESULT  Rare Polys  No bacteria seen  --    URINE CULTURE   --  <10,000 cfu/ml Enterococcus faecalis*   BODY FLUID CULTURE, STERILE  No growth  --      Results from last 7 days   Lab Units 10/27/19  1130   TOTAL COUNTED  100   WBC FLUID /ul 198     Vital Signs:   Date/Time  Temp  Pulse  Resp  BP  MAP (mmHg)  SpO2  O2 Device   11/01/19 0917            99 %  HFNC   11/01/19 0720            100 %  Bipap   11/01/19 0700  98 1 °F (36 7 °C)  92  20  137/65    99 %     11/01/19 0309            98 %     11/01/19 0159  98 6 °F (37 °C)  88  20  139/61    98 %     10/31/19 2324  98 7 °F (37 1 °C)  92  20  154/63    99 %     10/31/19 2323            99 %     10/31/19 2113    101    148/64         10/31/19 1942            97 %     10/31/19 1900  97 4 °F (36 3 °C)Abnormal   77  20  124/56  81  97 %           Medications:   Scheduled Medications:  Medications:  benzocaine-menthol-lanolin-aloe  Topical BID   dorzolamide-timolol 1 drop Both Eyes BID   lidocaine 1 patch Topical Daily   Netarsudil Dimesylate 1 drop Right Eye HS   nystatin-triamcinolone  Topical BID        PRN Meds:    artificial tear  Both Eyes HS PRN   dextran 70-hypromellose 1 drop Both Eyes Q3H PRN   glycopyrrolate 0 2 mg Intravenous Q1H PRN   haloperidol lactate 0 5 mg Intravenous Q1H PRN   LORazepam 0 5 mg Intravenous Q1H PRN   miconazole  Topical BID PRN   morphine injection 2 mg Intravenous Q10 Min PRN x1   ondansetron 4 mg Intravenous Q6H PRN       Discharge Plan: TBD    Network Utilization Review Department  [email or web address]  ATTENTION: Please call with any questions or concerns to [phone] and carefully listen to the prompts so that you are directed to the right person   All voicemails are [facility]   Alexa Byrnedale all requests for admission clinical reviews, approved or denied determinations and any other requests to dedicated fax number below belonging to the campus where the patient is receiving treatment    FACILITY NAME UR FAX NUMBER   ADMISSION DENIALS (Administrative/Medical Necessity) [address] [facility] (Maternity/NICU/Pediatrics) [phone]   [facility] [address] [facility] [address] [facility] [phone]   [address] [phone]   [facility] [address] [facility] [address] [place] [address] [phone]

## 2019-11-01 NOTE — DISCHARGE SUMMARY
Discharge- Ana Helm 1935, 82 y o  female MRN: [id number]    Unit/Bed#: [facility] 530-01 Encounter: [id number]    Primary Care Provider: Omid Vega MD   Date and time admitted to hospital: 10/21/2019  7:43 PM        Goals of care, counseling/discussion  Assessment & Plan  · As above    Acute respiratory failure with hypercapnia (HCC)  Assessment & Plan  resp failure with hypoxia & hypercapnea  Patient has history of recent pneumonia and unfortunately ARDS requiring intubation and ICU admission and also was on high-flow oxygen before discharge to [facility]  Patient has to be on BiPAP overnight for elevated pCO2  Acute hypoxic respiratory failure is likely multifactorial secondary to recent pneumonia/ARDS-patient was evaluated along with the pulmonologist and reported that it recovery is going to be  Slow  S/p thora x 2 on 10/25 & 10/27  Infectious Disease evaluation appreciated- discontinued IV antibiotic on Friday  More tachypneic 10/29- CXR repeated - d/w Pulm, ID , bilateral opacities worse on the left - broaden Abx to cover GNR HCAP/aspiration  Place on cefepime, flagyl  ABG shows metabolic alkalosis with resp compensation ph 7 43  since procal neg x 3, stopped Abx, suspect ongoing aspiration  Cardio ordered trial IV lasix    10/31:  More lethargic today, on ABG had pH of 7 3, pCO2 of 76 suggestive of respiratory acidosis  Had extensive discussion with Pulmonary and patient's daughter  Explain her the acute decompensation is secondary to hypercapnia but she has underlying hypoxia and bilateral pulmonary infiltrates which are due to post her DS inflammation/fibrosis for which there is no definitive treatment, cannot be guaranteed immediate reversal recovery     Explain unpredictability of her pulmonary condition as well as this affecting her ability to undergo any procedures like PEG tube placement  Initially daughter decided to take 1 day at a time and treat her current acute status    Hence placed her on high-flow initially repeated ABG will significant improvement hence placed her on BiPAP for 1 hours after which her ABG showed improvement in pH to 7 38 with CO2 of 72  Hence stop BiPAP placed on nasal cannula, use BiPAP p r n  And check ABG in morning  Had multiple discussions with Pulmonary and Cardiology Cardiology of course do not think the patient is since ordered CHF but due to her generalized extremity edema they are recommending IV diuresis with Bumex but there are concerns of metabolic alkalosis which may worsen her respiratory acidosis from pulmonary standpoint hence held off on her by IV Bumex at present  Had extensive goals of care discussion with the patient's daughter or other specialist   Later that are agreed to make the patient to level 3 DNR  Later on after discussion with Geriatrics patient wanted hospice consultation which will happen tomorrow  Ketamine drip helped her  Will hold scheduled narcotics given current goals are not only comfort based    Despite bipap her CO2 was remaining high, ph improved to normal range but CO2 today was higher to 77  Patient is not a good candidate for intubation, daughter agreed but patient would not want to be intubated, she understands that patient will have worse outcomes if gets intubated  She was clear about DNR for patient & also knew that patient who was very active before would not want to be dependent on others, would not want to be bedbound with feeding tubes, she understands that patient will nto be able to regain her previous quality of life  Daughter & son opted for comfort care    A-fib [facility])  Assessment & Plan  Currently on heparin drip  Was taken off of the IV metoprolol  secondary to bradycardia     Cardiology on board - since went into [facility], was placed on low dose BB again  Heparin drip    Superficial thrombophlebitis of right upper extremity  Assessment & Plan  Supportive care    Internal jugular (IJ) vein thromboembolism, acute, right [facility])  Assessment & Plan  Patient was on Eliquis as an outpatient  Currently on heparin drip given right upper extremity ischemia  Likely transition to Eliquis in the next couple of days once decision regarding feeding tube placement is made    Ovarian cancer [facility])  Assessment & Plan  This is status post hysterectomy  Gyn Oncology on board    Type 1 diabetes mellitus with diabetic neuropathy [facility])  Assessment & Plan  Lab Results   Component Value Date    HGBA1C 6 4 (H) 10/22/2019     Recent Labs     10/31/19  2228 10/31/19  2329 11/01/19  0530 11/01/19  0616   POCGLU 113 90 52* 108       Blood Sugar Average: Last 72 hrs:  (P) 348 [id number]   Patient is type 1 type diabetes mellitus  TF on hold, BS low, stop insulin    Hypertension  Assessment & Plan  Patient was on Lopressor as an outpatient  Restarted after tachy    ANAYA (generalized anxiety disorder)  Assessment & Plan  As patient was seen lethargic trazodone and lorazepam were held  Started on Zoloft at a low dose on Wednesday  Palliative care on board      Adult onset hypothyroidism  Assessment & Plan  Will continue with levothyroxine 50 mcg      TSH is minimally elevated          Transition of Care Discharge Summary - [facility]    Patient Information: Erika Luna 82 y o  female MRN: [id number]  Unit/Bed#: [facility] 530-01 Encounter: [id number]    Discharging Physician / Practitioner: Elis Bell MD  PCP: Stephan Dennison MD  Admission Date: 10/21/2019  Discharge Date: 11/01/19    Disposition:      Other:  hospice house    For Discharges to [facility] SNF:   · Not Applicable to this Patient - Not Applicable to this Patient    Reason for Admission: Lethargy and dysphagia    Discharge Diagnoses:     Principal Problem:    Lethargy  Active Problems:    Adult onset hypothyroidism    ANAYA (generalized anxiety disorder)    Hypertension    Type 1 diabetes mellitus with diabetic neuropathy ([facility] [address] )    [facility] Internal jugular (IJ) vein thromboembolism, acute, right (HCC)    Superficial thrombophlebitis of right upper extremity    A-fib (HCC)    Acute respiratory failure with hypercapnia (HCC)    Rectal bleeding    Hearing loss    Critical illness myopathy    Urinary retention    Goals of care, counseling/discussion  Resolved Problems:    Type 1 diabetes mellitus ([facility] [address] )      Consultations During Hospital Stay:  · [id number] [facility]  · Vascular surgery  · Pulmonary  · Neurology  · Gastroenterology  · Geriatrics  · Infectious Disease  · Critical care  · Palliative care  · Urology    Procedures Performed:         Medication Adjustments and Discharge Medications:  · Summary of Medication Adjustments made as a result of this hospitalization:   · Medication Dosing Tapers - Please refer to Discharge Medication List for details on any medication dosing tapers (if applicable to patient)  · Medications being temporarily held (include recommended restart time):   · Discharge Medication List: See after visit summary for reconciled discharge medications  Wound Care Recommendations:  When applicable, please see wound care section of After Visit Summary  Diet Recommendations at Discharge:  Diet -   Fluid Restriction - No Fluid Restriction at Discharge  Instructions for any Catheters / Lines Present at Discharge (including removal date, if applicable): Ji for comfort    Significant Findings / Test Results:     XR chest portable   Final Result by Tiffanie Bañuelos MD (10/29 1057)      Bilateral pneumonia, worsening on the left since 10/27/2019              Workstation performed: [id number]         XR chest portable   Final Result by Fozia Kat MD (10/27 1704)      Interval worsening in the lingula            Workstation performed: [id number]         XR chest portable   Final Result by Fozia Kat MD (10/27 1234)      Bilateral lung disease which is less impressive than 4 days ago            Workstation performed: [id number]         FL barium swallow video w speech   Final Result by LENCHO, JAMAR (10/25 1526)      IR thoracentesis   Final Result by Camille Heredia MD (10/25 1552)   Impression:      Successful ultrasound-guided thoracentesis  Workstation performed: [id number]         CT chest abdomen pelvis wo contrast   Final Result by Fatou Bertrand MD (10/24 1312)      Very large bolus of stool in the rectum strongly suggestive of fecal impaction  Moderate stool and gaseous distention of large bowel loops proximal to the rectum consistent with constipation  This probably accounts for the increasing abdominal    distention discussed in the patient's history  Small volume of abdominopelvic ascites, increased when compared to prior examination  Slight progression of body wall edema  Worsening extensive multifocal pneumonia and increasing bilateral pleural effusions  Workstation performed: [id number]         XR chest portable ICU   Final Result by Shae Terry MD (10/23 1600)      Feeding tube tip terminating in the region of the midesophagus  Grossly stable patchy airspace disease bilaterally small effusions  Workstation performed: [id number]         XR abdomen 1 view kub   Final Result by Bina Bernard MD (10/23 1603)      Feeding tube tip in the stomach  Workstation performed: [id number]         XR chest portable   Final Result by Padmini Julien DO (10/23 0930)      Stable bilateral airspace disease              Workstation performed: [id number]         VAS upper limb arterial duplex, complete bilateral, graft   Final Result by Radha Covarrubias MD (10/22 1615)      XR abdomen 1 view kub   Final Result by Solitario Ambriz MD (10/22 1624)      There is a large amount of stool in the ascending colon and rectum                Workstation performed: [id number]         XR chest 1 view portable   ED Interpretation by Miguelito Orta MD (10/21 8325)   ED wet read:  Infiltrates improved from prior  Final Result by Christi Ortiz MD (10/75 2382)      Minimal improvement in bilateral airspace opacities  Small pleural effusions  Workstation performed: [id number]         CT head without contrast   Final Result by Jese Bullard MD (10/21 2138)      No acute intracranial abnormality  Workstation performed: [id number]             Incidental Findings:   ·      Test Results Pending at Discharge (will require follow up):   ·      Outpatient Tests Requested:  ·     Complications:      Hospital Course:     Demi Houston is a 82 y o  female patient who originally presented to the hospital on 10/21/2019 due to lethargy and dysphagia  Patientwas admitted in [facility] and was just recently discharged to [facility]  Essentially, the patient was and admitted for an incidental ovarian mass and therefore underwent hysterectomy  However the patient then developed acute no with dysphagia and uncontrolled diabetes mellitus  She was in distress and subsequently coded; went to the ICU  She was then diagnosed to have pneumonia with ARDS  Initially initially, the patient had an NG tube due to dysphonia and dysphagia  But slowly, the NG tube was then removed after she was trained to swallow initially with small ice chips  She was therefore discharged to [place] [address] for further rehabilitation      She was doing quite well until approximately within the past 24 hours when she was noted to be more lethargic than usual   She was also seen to have problems with swallowing and therefore an NG tube was placed this afternoon  No fever    However she was noted to have extreme lethargy and sometimes unresponsive even with deep sternal rub; with that concern, she was then sent to [facility] for further monitoring and workup    Condition at Discharge: critical     Discharge Day Visit / Exam:     Subjective:  Awake but does not interact much  Vitals: Blood Pressure: 137/65 (11/01/19 0700)  Pulse: 92 (11/01/19 0700)  Temperature: 98 1 °F (36 7 °C) (11/01/19 0700)  Temp Source: Axillary (11/01/19 0700)  Respirations: 20 (11/01/19 0700)  Height: 5' 5" (165 1 cm) (10/21/19 1951)  Weight - Scale: 67 kg (147 lb 11 3 oz) (11/01/19 0406)  SpO2: 99 % (11/01/19 0917)  Exam:   Physical Exam   Constitutional: She appears well-developed and well-nourished  HENT:   Head: Normocephalic and atraumatic  Mouth/Throat: Oropharynx is clear and moist    Eyes: Conjunctivae are normal    Cardiovascular: Normal rate and regular rhythm  Exam reveals no friction rub  No murmur heard  Pulmonary/Chest: Effort normal    tachypneic   Abdominal: Soft  She exhibits no distension  There is no tenderness  Neurological:   Awake but interacting much   Vitals reviewed  Discussion with Family: daughter & son    Goals of Care Discussions:  · Code Status at Discharge: Level 4 - Comfort Care  · Were there any Goals of Care Discussions during Hospitalization?: Yes    Discharge instructions/Information to patient and family:   See after visit summary section titled Discharge Instructions for information provided to patient and family  Planned Readmission: no      Discharge Statement:  I spent 45 minutes discharging the patient  This time was spent on the day of discharge  I had direct contact with the patient on the day of discharge  Greater than 50% of the total time was spent examining patient, answering all patient questions, arranging and discussing plan of care with patient as well as directly providing post-discharge instructions  Additional time then spent on discharge activities      ** Please Note: This note has been constructed using a voice recognition system **

## 2019-11-01 NOTE — TRANSPORTATION MEDICAL NECESSITY
Section I - General Information    Name of Patient: Manfred Shafer                 : 1935    Medicare #: [id number]  Transport Date: 19 (PCS is valid for round trips on this date and for all repetitive trips in the 60-day range as noted below )  [address]: [address]  Is the pt's stay covered under Medicare Part A (PPS/DRG)   []     Closest appropriate facility? If no, why is transport to more distant facility required? Yes  If hospice pt, is this transport related to pt's terminal illness? Yes       Section II - Medical Necessity Questionnaire  Ambulance transportation is medically necessary only if other means of transport are contraindicated or would be potentially harmful to the patient  To meet this requirement, the patient must either be "bed confined" or suffer from a condition such that transport by means other than ambulance is contraindicated by the patient's condition  The following questions must be answered by the medical professional signing below for this form to be valid:    1)  Describe the MEDICAL CONDITION (physical and/or mental) of this patient AT [address] [place] [address] that requires the patient to be transported in an ambulance and why transport by other means is contraindicated by the patient's condition:acute hypoxic respiratory failure, hypercapnic, bilateral pleural effusion, ovarian cancer    2) Is the patient "bed confined" as defined below? Yes  To be "be confined" the patient must satisfy all three of the following conditions: (1) unable to get up from bed without Assistance; AND (2) unable to ambulate; AND (3) unable to sit in a chair or wheelchair  3) Can this patient safely be transported by car or wheelchair van (i e , seated during transport without a medical attendant or monitoring)?    No    4) In addition to completing questions 1-3 above, please check any of the following conditions that apply*:   *Note: supporting documentation for any boxes checked must be maintained in the patient's medical records  If hosp-hosp transfer, describe services needed at 2nd facility not available at 1st facility? Patient is confused  Requires oxygen-unable to self administer  Unable to tolerate seated position for time needed to transport       Section III - Signature of Physician or Healthcare Professional  I certify that the above information is true and correct based on my evaluation of this patient, and represent that the patient requires transport by ambulance and that other forms of transport are contraindicated  I understand that this information will be used by the Centers for Medicare and Medicaid Services (CMS) to support the determination of medical necessity for ambulance services, and I represent that I have personal knowledge of the patient's condition at time of transport  []  If this box is checked, I also certify that the patient is physically or mentally incapable of signing the ambulance service's claim and that the institution with which I am affiliated has furnished care, services, or assistance to the patient  My signature below is made on behalf of the patient pursuant to 42 CFR §424 36(b)(4)  In accordance with 42 CFR §424 37, the specific reason(s) that the patient is physically or mentally incapable of signing the claim form is as follows:  [facility] of Physician* or Healthcare Professional______________________________________________________________  Signature Date 11/01/19 (For scheduled repetitive transports, this form is not valid for transports performed more than 60 days after this date)    Printed Name & Credentials of Physician or Healthcare Professional (MD, DO, RN, etc )_SADIE Hilton_______________________________  *Form must be signed by patient's attending physician for scheduled, repetitive transports   For non-repetitive, unscheduled ambulance transports, if unable to obtain the signature of the attending physician, any of the following may sign (choose appropriate option below)  [] Physician Assistant []  Clinical Nurse Specialist []  Registered Nurse  []  Nurse Practitioner  [] Discharge Planner

## 2019-11-01 NOTE — PROGRESS NOTES
Progress Note - Pulmonary   Terese Half 82 y o  female MRN: [id number]  Unit/Bed#: [facility] 530-01 Encounter: [id number]      Impressions:    Acute hypoxic and hypercapnic respiratory failure- Unclear reason for hypercapnea, may be related to prior aspiration vs fibrotic phase of ARDs  Continue with xopenex nebulizer TID  atrovent was d/dante during last hospitalization for urinary retention   New chest x-ray today shows worsening of the left lower lobe with a dense consolidation   - MRSA nares neg  - Sputum culture not obtained  - procal is negative  - Video barium swallow with mod-severe oropharyngeal dysphagia  Plan:  - patient was tried yesterday on high-flow nasal cannula and BiPAP with very minimal improvement and worsening hypercapnia this morning  She she exhibited significant increased work of breathing and tachypnea with concern for respiratory fatigue  This was discussed with her daughter bedside  The family has decided to pursue comfort measures  High-flow nasal cannula was discontinued      Bilateral pleural effusions- RT thoracentesis on 10/25 of 500cc and recent left sided of 600cc both appear to be transudative   Cytology negative for malignancy   Recent chest x-ray does not show reaccumulation of effusion     Abnormal CT chest- likely due to recent aspiration event  Treated with 9 days of abx for aspiration pneumonia  Previous sputum culture with serratia  Candida likely colonizer  ID previously evaluated       Previous moderate pulm HTN- PASP 52mmHg  with tricuspid regurg  Still has significant peripheral edema  Recommend diuresis and monitor Is/Os closely  Stop lasix as there may be some contraction alkalosis  Echo yesterday with an EF of 65% and a peak PA pressure of 36mmHg which is borderline mild pulm HTN     Lethargy- unclear etiology  No evidence of UTI  Does have urinary retention   ABG with significant hypercapnia with a component being compensatory for the metabolic alkalosis    She also exhibits respiratory fatigue      Hx of recent pneumonia- bronch cultures on 10/16 grew serratia  Patient completed 9 days of antibiotics      Given that the family has elected for comfort measures, this was discussed with family bedside  From pulmonary standpoint there is no additional interventions  Will sign off discussed with family and if there are any questions and concerns please do not hesitate to reach out to us    Subjective:   Patient seen and examined bedside  Very lethargic, with significant increased work of breathing and tachypnea  No acute events overnight  Patient is persistently hypoxic in the requiring high-flow nasal cannula  Review of Systems   Unable to perform ROS: Mental status change       Objective:     Vitals:    11/01/19 0406 11/01/19 0700 11/01/19 0720 11/01/19 0917   BP:  137/65     BP Location:       Pulse:  92     Resp:  20     Temp:  98 1 °F (36 7 °C)     TempSrc:  Axillary     SpO2:  99% 100% 99%   Weight: 67 kg (147 lb 11 3 oz)      Height:               Intake/Output Summary (Last 24 hours) at 11/1/2019 1355  Last data filed at 11/1/2019 1045  Gross per 24 hour   Intake 261 97 ml   Output 475 ml   Net -213 03 ml         Physical Exam   Constitutional: No distress  HENT:   Head: Normocephalic and atraumatic  Nose: Nose normal    Mouth/Throat: Oropharynx is clear and moist  No oropharyngeal exudate  Eyes: Pupils are equal, round, and reactive to light  Conjunctivae and EOM are normal  No scleral icterus  Neck: Normal range of motion  Neck supple  No JVD present  No tracheal deviation present  No thyromegaly present  Cardiovascular: Normal rate, regular rhythm, normal heart sounds and intact distal pulses  Exam reveals no gallop and no friction rub  No murmur heard  Pulmonary/Chest: Effort normal and breath sounds normal  No stridor  No respiratory distress  She has no wheezes  She has no rales  Tachypnea   Abdominal: Soft   Bowel sounds are normal  She exhibits no distension  There is no tenderness  There is no rebound and no guarding  Musculoskeletal: Normal range of motion  She exhibits edema  She exhibits no deformity  3+ pitting edema in all extremities   Lymphadenopathy:     She has no cervical adenopathy  Neurological:   Unable to evaluate neurologic status as patient is nonverbal and does not follow commands  Skin: Skin is warm  No rash noted  She is not diaphoretic  No erythema  Labs: I have personally reviewed pertinent lab results  , ABG:   Lab Results   Component Value Date    PHART 7 350 11/01/2019    KHQ7DUR 77 2 (HH) 11/01/2019    PO2ART 99 3 11/01/2019    RAF4VQI 41 7 (H) 11/01/2019    BEART 13 8 11/01/2019    SOURCE Radial, Left 11/01/2019   , BNP: No results found for: BNP, CBC:   Lab Results   Component Value Date    WBC 14 34 (H) 11/01/2019    HGB 8 0 (L) 11/01/2019    HCT 26 5 (L) 11/01/2019     (H) 11/01/2019     11/01/2019    MCH 32 7 11/01/2019    MCHC 30 2 (L) 11/01/2019    RDW 16 3 (H) 11/01/2019    MPV 9 8 11/01/2019    NRBC 0 11/01/2019   , CMP:   Lab Results   Component Value Date    SODIUM 139 11/01/2019    K 4 0 11/01/2019    CL 97 (L) 11/01/2019    CO2 38 (H) 11/01/2019    BUN 22 11/01/2019    CREATININE 0 32 (L) 11/01/2019    CALCIUM 7 8 (L) 11/01/2019    AST 43 11/01/2019    ALT 51 11/01/2019    ALKPHOS 127 (H) 11/01/2019    EGFR 105 11/01/2019   , PT/INR: No results found for: PT, INR, Troponin: No results found for: TROPONINI  Results from last 7 days   Lab Units 11/01/19  0423 10/31/19  0859 10/30/19  0500   WBC Thousand/uL 14 34* 12 14* 12 85*   HEMOGLOBIN g/dL 8 0* 7 7* 9 2*   HEMATOCRIT % 26 5* 26 5* 30 5*   PLATELETS Thousands/uL 335 295 321   NEUTROS PCT % 87* 86* 85*   MONOS PCT % 6 7 7      Results from last 7 days   Lab Units 11/01/19  0423 10/31/19  0902 10/30/19  0546  10/28/19  0453 10/27/19  0425   POTASSIUM mmol/L 4 0 4 3 4 1   < > 3 9 3 9   CHLORIDE mmol/L 97* 97* 98*   < > 100 102   CO2 mmol/L 38* 37* 36*   < > 38* 40*   BUN mg/dL 22 26* 18   < > 28* 27*   CREATININE mg/dL 0 32* 0 52* 0 42*   < > 0 50* 0 44*   CALCIUM mg/dL 7 8* 7 5* 7 3*   < > 7 0* 6 7*   ALK PHOS U/L 127*  --   --   --  129* 102   ALT U/L 51  --   --   --  58 48   AST U/L 43  --   --   --  56* 50*    < > = values in this interval not displayed  Results from last 7 days   Lab Units 10/28/19  0453 10/26/19  0439   MAGNESIUM mg/dL 2 5 2 6          Results from last 7 days   Lab Units 11/01/19  0535 11/01/19  0001 10/31/19  1719   PTT seconds 68* 87* 43*         0   Lab Value Date/Time    TROPONINI 0 03 10/30/2019 0033    TROPONINI 0 04 10/29/2019 2145    TROPONINI 0 03 10/29/2019 1802    TROPONINI 0 03 10/21/2019 2030    TROPONINI 1 91 (H) 10/07/2019 2035       Microbiology:  Pleural fluid- bacilius not anthracis  MRSA nares neg  Sputum culture- no results  Blood culture neg at 5 days  Urine culture- enterococcus     Imaging and other studies: I have personally reviewed pertinent reports     and I have personally reviewed pertinent films in PACS  CT chest- bilateral multifocal infiltrates with bilateral pleural effusions      Chelly Starr MD  Pulmonary & Critical Care Fellow, [facility]

## 2019-11-01 NOTE — ASSESSMENT & PLAN NOTE
resp failure with hypoxia & hypercapnea  Patient has history of recent pneumonia and unfortunately ARDS requiring intubation and ICU admission and also was on high-flow oxygen before discharge to [facility]  Patient has to be on BiPAP overnight for elevated pCO2  Acute hypoxic respiratory failure is likely multifactorial secondary to recent pneumonia/ARDS-patient was evaluated along with the pulmonologist and reported that it recovery is going to be  Slow  S/p thora x 2 on 10/25 & 10/27  Infectious Disease evaluation appreciated- discontinued IV antibiotic on Friday  More tachypneic 10/29- CXR repeated - d/w Pulm, ID , bilateral opacities worse on the left - broaden Abx to cover GNR HCAP/aspiration  Place on cefepime, flagyl  ABG shows metabolic alkalosis with resp compensation ph 7 43  since procal neg x 3, stopped Abx, suspect ongoing aspiration  Cardio ordered trial IV lasix    10/31:  More lethargic today, on ABG had pH of 7 3, pCO2 of 76 suggestive of respiratory acidosis  Had extensive discussion with Pulmonary and patient's daughter  Explain her the acute decompensation is secondary to hypercapnia but she has underlying hypoxia and bilateral pulmonary infiltrates which are due to post her DS inflammation/fibrosis for which there is no definitive treatment, cannot be guaranteed immediate reversal recovery     Explain unpredictability of her pulmonary condition as well as this affecting her ability to undergo any procedures like PEG tube placement  Initially daughter decided to take 1 day at a time and treat her current acute status  Hence placed her on high-flow initially repeated ABG will significant improvement hence placed her on BiPAP for 1 hours after which her ABG showed improvement in pH to 7 38 with CO2 of 72  Hence stop BiPAP placed on nasal cannula, use BiPAP p r n  And check ABG in morning    Had multiple discussions with Pulmonary and Cardiology Cardiology of course do not think the patient is since ordered CHF but due to her generalized extremity edema they are recommending IV diuresis with Bumex but there are concerns of metabolic alkalosis which may worsen her respiratory acidosis from pulmonary standpoint hence held off on her by IV Bumex at present  Had extensive goals of care discussion with the patient's daughter or other specialist   Later that are agreed to make the patient to level 3 DNR  Later on after discussion with Geriatrics patient wanted hospice consultation which will happen tomorrow  Ketamine drip helped her  Will hold scheduled narcotics given current goals are not only comfort based    Despite bipap her CO2 was remaining high, ph improved to normal range but CO2 today was higher to 77  Patient is not a good candidate for intubation, daughter agreed but patient would not want to be intubated, she understands that patient will have worse outcomes if gets intubated  She was clear about DNR for patient & also knew that patient who was very active before would not want to be dependent on others, would not want to be bedbound with feeding tubes, she understands that patient will nto be able to regain her previous quality of life   Daughter & son opted for comfort care

## 2019-11-01 NOTE — PROGRESS NOTES
Progress Note - Janine Eldridge 82 y o  female MRN: [id number]    Unit/Bed#: [facility] 530-01 Encounter: [id number]      Assessment/Plan:  1  Lethargy  Patient opens eyes briefly  Hospice consult pending    2  Anxiety  Zoloft 50 mg daily    3  Acute pain  Palliative care following  Ketamine drip was discontinued  Hospice consult pending    4  Deconditioning  Tube feed on hold  Family to decided next goals of care with hospice    5  Hearing impairment  Decreased hearing to left ear  Speak loudly and clearly  Enunciate words    6  Acute hypoxic hypercapnic respiratory failure  Pulmonary on consult  ID on consult    7  Tachycardia  Cardiology on consult    Subjective:   Upon exam patient in bed  Lethargic  Opens eyes briefly and intermittently with tactile stimuli  Patient is slightly tachypneic  Son at bedside  Hospice consult pending  Objective:     Vitals: Blood pressure 137/65, pulse 92, temperature 98 1 °F (36 7 °C), temperature source Axillary, resp  rate 20, height 5' 5" (1 651 m), weight 67 kg (147 lb 11 3 oz), SpO2 99 %, not currently breastfeeding  ,Body mass index is 24 58 kg/m²  Intake/Output Summary (Last 24 hours) at 11/1/2019 1038  Last data filed at 11/1/2019 0830  Gross per 24 hour   Intake 244 42 ml   Output 1475 ml   Net -1230 58 ml       Physical Exam:   General :  Lethargic  HEENT :  Lips and oral mucosa dry  Heart : Normal rate, no murmur rub or gallop  Lungs :  Decreased, scattered rhonchi, CPAP  Abdomen : Soft, NT/ND, BS auscultated in all 4 quads     Ext :   +2 generalized edema  Skin : Pink, warm, dry, age appropriate turgor and mobility  Neuro : Nonfocal  Psych :  Lethargic      Invasive Devices     Peripherally Inserted Central Catheter Line            PICC Line 67/54/02 Left Basilic 17 days          Peripheral Intravenous Line            Peripheral IV 10/29/19 Left Arm 2 days          Drain            NG/OG/Enteral Tube Nasogastric 8 Fr Right nares 8 days    Urethral Catheter 4 days Lab, Imaging and other studies: I have personally reviewed pertinent reports      VTE Pharmacologic Prophylaxis: Sequential compression device (Venodyne)   VTE Mechanical Prophylaxis: sequential compression device

## 2019-11-01 NOTE — RESPIRATORY THERAPY NOTE
Resp Note: I transitioned pt over to HFNC as discussed with Dr Palak Garcia is nasim this well sats are maintained at 98% and pt's wob has improved I will cont to monitor and place back on Bipap if needed

## 2019-11-01 NOTE — PROGRESS NOTES
Gyn Oncology Progress note   Jennifer Flannery 82 y o  female MRN: [id number]  Unit/Bed#: [facility] 530-01 Encounter: [id number]    Assessment and Plan:    1)  Acute respiratory failure with hypercapnia  - Managed per primary team  - Currently on 6L NC with SaO2 100%  - CT CAP 10/24/19: worsening extensive multifocal pneumonia and increasing bilateral pleural effusions  However, per ID evaluation, changes appear to be residual effect from recent ARDS as patient has no fever or significant leukocytosis  Procal was normal  Therefore, antibiotics discontinued  - s/p thoracocentesis x2 (10/25 & 10/27), Pleural fluid studies appear transudative  - More tachypnea with cough on 10/29  CXR demonstrated bilateral PNA, worsened on left side  Started on Cefepime and flagyl to cover GNR HCAP/aspiration  ABG demonstrated metabolic alkalosis with respiratory compensation  However, this was discontinued by ID on 10/30 after Procal neg x3  Most-likely related to aspiration event  - 10/31: Worsening lethargy, ABG notable for pH of 7 3, pCO2 76, suggestive of respiratory acidosis  Placed on High-flow and then BiPAP with improvement in ABG  Continue to monitor with BiPAP PRN  - Pulmonology following    2) Delirium  - Pt with waxing and waning between consciousness and lethargy   - Likely multi-factorial  - Ct head w/o contrast on admission without any acute abnormalities  - Pt lethargic this AM, non-verbal, however, responded to verbal commands  - s/p Ketamine gtt 10/31/19   - Global delirium precautions  - Neurology following    3) Severe protein calorie malnutrition secondary to poor oral take  - Pt was started on tube feeds on POD#10 in [place]   Poor oral intake since discharge to LTAC    - NGT replaced with Keofed on 10/23/19-Jevity 1 2, currently on hold due to BiPAP to prevent aspiration  - Speech pathology following: s/p barium swallow study (10/25): high risk for aspiration, will need alternative means of nutrition as cannot continue keofed for long  4) Rectal Bleeding, resolved  - Evaluated by GI and s/p 1u PRBCs on 10/22/19  - H/H stable in 9s, continue to monitor  - CT CAP 10/24/19 demonstrated fecal impaction, s/p enema with large BM   - No further bleeding     5) Internal Jugular (IJ) vein thromboembolism, presented with acute cyanotic right fingers, improved  - Repeat doppler on 10/22 demonstrated occluded right radial artery from them id forearm to the wrist  All five digits show flat line doppler waveforms  - Pt's home Eliquis was placed on hold and she was started on Heparin gtt  - s/p Vascular surgery consult, no surgical intervention at this time    6) Type 1 Diabetes  - Hgb A1c 6 4 on 10/22  - Glucose levels overnight: 52 @ 0530; 108 @ 0616  Goal between 140-180  - Insulin gtt discontinued       7) Stage 1B ovarian cancer s/p staging surgery 10/4/2019  - Good prognosis  - GYN ONC to continue to follow    8) Urinary Retention  - Likely multifactorial etiology  - Ji catheter removed on 10/27 at patient request  Replaced on 10/28/19 after failing voiding trial  Nursing changing pads frequently  - Dysuria: U/A unremarkable  Urine culture <10k Enterococcus faecalis, re-incubating  Most-likely related to vulvar vaginitis    9) Decreased Hearing  Per primary team, most-likely due to lasix  Evaluated by ENT on 10/23/19: outpatient follow-up as assessment cannot be done at bedside  Signed off    10) Paroxysmal Afib/bradycardia  On telemetry   Currently on heparin gtt- plan to transition back to eliquis once decision is made regarding feeding tube placement  Cardiology follwoing: likely secondary to medication, no further bradycardia noted     Managed per primary team    11) Adult Onset Hypothyroidism  Levothyroxine 50mcg daily  TSH is minimally elevated  Managed per primary team    12) HTN  Lopressor 12 5mg q12hrs restarted 10/30/19  Bps currently 124-154/50s-60s    13) Weakness  Evaluated by PT/OT: recommending acute STR when stable for discharge  S/p PMR consult on 10/28/19: recommend discharge to SNF pending medical stability  14) Vulvovaginitis   S/p Diflucan x2 (last dose 10/29)  Monistat initiated on 10/27/19  Mycolog II cream started 10/28/19 with significant improvement in vulvar discomfort   Continue perineal care and keep area dry    15) Volume overload  Predominately 3rd space per cardiology  Started Bumex PRN on 10/31 given negative side effects of Lasix (hearing loss)   Cardiology does not believe it to be CHF, limited ECHO demonstrated LVEF 65%, mild TR and mild pulmonary HTN  24hr I&O balance: -1475; overall +3730mL    16) Goals of Care  Palliative following  Pt now level 3 DNR as of 10/31/19  Discussion ongoing  F/u inpatient hospice care consult    17) Dispo: Inpatient    Dave Duty lethargic and on BiPAP  She was non-verbal this morning, but able to respond to verbal commands with squeezing my fingers  Per nursing, she is not on tube feeds at the moment due to BiPAP and risk of aspiration  There is a Hospice consult in place  /61   Pulse 88   Temp 98 6 °F (37 °C)   Resp 20   Ht 5' 5" (1 651 m)   Wt 67 kg (147 lb 11 3 oz)   SpO2 98%   BMI 24 58 kg/m²     I/O last 3 completed shifts: In: 1155 4 [I V :270 4; NG/GT:425; Feedings:460]  Out: 2460 [Urine:2460]  I/O this shift:   In: 0   Out: 475 [Urine:475]    Lab Results   Component Value Date    WBC 14 34 (H) 11/01/2019    HGB 8 0 (L) 11/01/2019    HCT 26 5 (L) 11/01/2019     (H) 11/01/2019     11/01/2019       Lab Results   Component Value Date    GLUCOSE 75 01/08/2016    CALCIUM 7 8 (L) 11/01/2019     (L) 01/08/2016    K 4 0 11/01/2019    CO2 38 (H) 11/01/2019    CL 97 (L) 11/01/2019    BUN 22 11/01/2019    CREATININE 0 32 (L) 11/01/2019       Lab Results   Component Value Date/Time    POCGLU 52 (L) 11/01/2019 05:30 AM    POCGLU 90 10/31/2019 11:29 PM    POCGLU 113 10/31/2019 10:28 PM    POCGLU 47 (L) 10/31/2019 09:25 PM POCGLU 85 10/31/2019 05:57 PM    POCGLU 164 02/15/2017 02:33 PM     Physical Exam   Constitutional: Vital signs are normal  She appears lethargic  She appears ill  Face mask in place  HENT:   Head: Normocephalic and atraumatic  Cardiovascular: Normal rate and regular rhythm  Pulmonary/Chest:   On BiPAP, sating 98%   Abdominal: Soft  She exhibits no distension  There is no rebound and no guarding  Incision: C/D/I without signs of infection   Musculoskeletal: She exhibits edema (2+ bilaterally)  Neurological: She appears lethargic  Vitals reviewed          Willian Reed MD  OBGYN, PGY-3  11/1/2019 6:14 AM

## 2019-11-01 NOTE — ASSESSMENT & PLAN NOTE
Lab Results   Component Value Date    HGBA1C 6 4 (H) 10/22/2019     Recent Labs     10/31/19  2228 10/31/19  2329 11/01/19  0530 11/01/19  0616   POCGLU 113 90 52* 108       Blood Sugar Average: Last 72 hrs:  (P) 667 [id number]   Patient is type 1 type diabetes mellitus  TF on hold, BS low, stop insulin

## 2019-11-01 NOTE — PROGRESS NOTES
Progress Note - Infectious Disease   Francisco Javier Conception 82 y o  female MRN: [id number]  Unit/Bed#: [facility] 530-01 Encounter: [id number]      Impression/Recommendations:  1  Abnormal CXR, with intermittently worsening infiltrates   I suspect this is all secondary to chronic and ongoing aspiration  Pulmonology evaluation noted with concern for ARDS  Patient remains afebrile, hemodynamically stable with repeatedly normal procalcitonin level  Low suspicion for recurrent pneumonia  Continue to observe closely off antibiotics  Monitor respiratory symptoms      2  Bilateral pleural effusions   No evidence of loculation on CT   Without clinical pneumonia, doubt empyema   Patient is status post thoracentesis x 2   Pleural fluid parameters not consistent with empyema   Bacillus growing in pleural fluid culture is most likely contaminant  Monitor respiratory symptoms      3  Acute hypoxic respiratory failure   Initially, I suspect this is residual effect from recent ARDS   Patient did improve with thoracentesis  However, respiratory status deteriorated over last 48 hours, with CXR showing new infiltrate   Suspect ongoing aspiration, as in above  patient currently on high-flow with stable O2 sats  O2 support per primary and Pulmonary service  Monitor respiratory status      4   Urinary retention with dysuria  Zadie Prost is benign   Dysuria improved with multiple maneuvers   Urine culture grew Enterococcus   With improving urinary symptoms despite no coverage for Enterococcus in urine, doubt that this is true pathogen   Doubt UTI clinically  No antibiotic needed for this  Monitor urinary symptoms      5  Candida vulvovaginitis   Patient is on topical antifungal   One dose fluconazole noted  Continue topical antifungal      6  Lethargy, multifactorial   Patient is overall stable    Monitor      7  Abnormal abdominal CT with extensive stool in rectum   Abdominal exam is mildly distended but otherwise benign   No clinical colitis/proctitis  Monitor      8  Ovarian cancer, status post hysterectomy/oophorectomy  Gyn oncology follow-up  Palliative care follow-up ongoing with hospice consult pending         Antibiotics:  Off antibiotic      If patient remains in hospital, will plan to see again on   Please call us with any new questions in the interim  Subjective:  Patient is awake on high-flow oxygen  She is not conversant  She remains afebrile  Blood pressures are stable  Objective:  Vitals:  Temp:  [97 4 °F (36 3 °C)-98 7 °F (37 1 °C)] 98 1 °F (36 7 °C)  HR:  [] 92  Resp:  [17-26] 20  BP: (107-154)/(55-65) 137/65  SpO2:  [97 %-100 %] 99 %  Temp (24hrs), Av 1 °F (36 7 °C), Min:97 4 °F (36 3 °C), Max:98 7 °F (37 1 °C)  Current: Temperature: 98 1 °F (36 7 °C)    Physical Exam:   General:  No acute distress, awake but not communicative, on high-flow oxygen  Eyes:  Conjunctive clear with no hemorrhages or effusions  Oropharynx:  No ulcers, no lesions  Neck:  Supple, no lymphadenopathy  Lungs:  Scattered coarse breath sounds, no accessory muscle use  Cardiac:  Regular rate and rhythm, no murmurs  Abdomen:  Soft, non-tender, non-distented  Extremities:  No peripheral cyanosis, clubbing, or edema  Skin:  No rashes, no ulcers  Neurological:  Moves all four extremities spontaneously    Lab Results:  I have personally reviewed pertinent labs    Results from last 7 days   Lab Units 19  0423 10/31/19  0902 10/30/19  0546  10/28/19  0453 10/27/19  0425   POTASSIUM mmol/L 4 0 4 3 4 1   < > 3 9 3 9   CHLORIDE mmol/L 97* 97* 98*   < > 100 102   CO2 mmol/L 38* 37* 36*   < > 38* 40*   BUN mg/dL 22 26* 18   < > 28* 27*   CREATININE mg/dL 0 32* 0 52* 0 42*   < > 0 50* 0 44*   EGFR ml/min/1 73sq m 105 89 96   < > 90 94   CALCIUM mg/dL 7 8* 7 5* 7 3*   < > 7 0* 6 7*   AST U/L 43  --   --   --  56* 50*   ALT U/L 51  --   --   --  58 48   ALK PHOS U/L 127*  --   --   --  129* 102    < > = values in this interval not displayed  Results from last 7 days   Lab Units 11/01/19  0423 10/31/19  0859 10/30/19  0500   WBC Thousand/uL 14 34* 12 14* 12 85*   HEMOGLOBIN g/dL 8 0* 7 7* 9 2*   PLATELETS Thousands/uL 335 295 321     Results from last 7 days   Lab Units 10/27/19  1129 10/26/19  1127 10/25/19  1100   GRAM STAIN RESULT  Rare Polys  No bacteria seen  --  Rare Polys  Rare Mononuclear Cells  No bacteria seen   URINE CULTURE   --  <10,000 cfu/ml Enterococcus faecalis*  --    BODY FLUID CULTURE, STERILE  No growth  --  Growth in Broth culture only Bacillus species NOT anthracis*       Imaging Studies:   I have personally reviewed pertinent imaging study reports and images in PACS  Chest x-ray from 10/29 showed worsening bilateral infiltrates  EKG, Pathology, and Other Studies:   I have personally reviewed pertinent reports

## 2019-11-01 NOTE — SOCIAL WORK
geraldine Dutta,  Hospice met with daughter and spoke to Dr Stefania Up  Patient is approved for inpatient hospice but not stable for transport  Informed RN

## 2019-11-04 NOTE — SOCIAL WORK
[place], [phone] and spoke to April to request authorization for BLS transport of 11/1/19  She states that since transport hs occurred clinical records must be sent for review to [facility], Medical review, [address] [facility], [place], [address] [place] [address]  Letter of request with records was mailed today

## 2019-11-04 NOTE — UTILIZATION REVIEW
Notification of Discharge  This is a Notification of Discharge from our facility [address]  Please be advised that this patient has been discharge from our facility  Below you will find the admission and discharge date and time including the patients disposition  PRESENTATION DATE: 10/21/2019  7:43 PM    IP ADMISSION DATE: 10/22/19 0003   DISCHARGE DATE: 11/1/2019  8:48 PM  DISPOSITION: Discharge/Transfer to not defined [address] Discharge/Transfer to not defined [address]   Admission Orders listed below:  Admission Orders (From admission, onward)     Ordered        10/22/19 0018  Inpatient Admission  Once                   Please contact the UR Department if additional information is required to close this patient's authorization/case  Christiano Reyesin  Network Utilization Review Department  Main: [phone] x carefully listen to the prompts  All voicemails are confidential   [email or web address]  Send all requests for admission clinical reviews, approved or denied determinations and any other requests to dedicated fax number below belonging to the campus where the patient is receiving treatment    List of dedicated fax numbers:  [address] DENIALS (Administrative/Medical Necessity) [phone]   [address] (Maternity/NICU/Pediatrics) [phone]   Yary Botelloe [phone]   Alicia Tompkinse [phone]   [facility] [phone]   [facility] [address] [facility] [phone]   Daisy Sailnas [address] [phone]

## 2019-11-07 NOTE — UTILIZATION REVIEW
Notification of Discharge  This is a Notification of Discharge from our facility [address]  Please be advised that this patient has been discharge from our facility  Below you will find the admission and discharge date and time including the patients disposition  PRESENTATION DATE: 10/21/2019  7:43 PM    IP ADMISSION DATE: 10/22/19 0003   DISCHARGE DATE: 11/1/2019  8:48 PM  DISPOSITION: Discharge/Transfer to not defined [address] Discharge/Transfer to not defined [address]   Admission Orders listed below:  Admission Orders (From admission, onward)     Ordered        10/22/19 0018  Inpatient Admission  Once                   Please contact the UR Department if additional information is required to close this patient's authorization/case  Sendy Marshall  [facility] Utilization Review Department  Main: [phone] x carefully listen to the prompts  All voicemails are confidential   [email or web address]  Send all requests for admission clinical reviews, approved or denied determinations and any other requests to dedicated fax number below belonging to the campus where the patient is receiving treatment    List of dedicated fax numbers:  [address] DENIALS (Administrative/Medical Necessity) [phone]   [address] (Maternity/NICU/Pediatrics) [phone]   Shellia Lang [phone]   [place] [phone]   Bobby Tapia [phone]   [address] [place] [address] [address] [place] [address] [phone]   Giovanni Nixon [address] [phone]

## 2019-11-07 NOTE — ED ATTENDING ATTESTATION
10/21/2019  I, Gisela Cabrera MD, saw and evaluated the patient  I have discussed the patient with the resident/non-physician practitioner and agree with the resident's/non-physician practitioner's findings, Plan of Care, and MDM as documented in the resident's/non-physician practitioner's note, except where noted  All available labs and Radiology studies were reviewed  I was present for key portions of any procedure(s) performed by the resident/non-physician practitioner and I was immediately available to provide assistance  At this point I agree with the current assessment done in the Emergency Department  I have conducted an independent evaluation of this patient a history and physical is as follows:    ED Course     Patient presents for evaluation due to an episode of altered mental status today  Patient recently had a complicated hospital course were she underwent hysterectomy and oophorectomy after being diagnosed with ovarian cancer  While the hospital, patient suffered a cardiac at rest and was in the intensive care unit  While there, 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  Patient was discharged to [facility] and was doing well until today  Per family, they noted that she has declined over the last day or 2 and required a sternal rub to rouse today  with patient also reportedly had a transient bradycardic episode and was hypoxic during this time of decreased responsiveness  Currently, family states that patient is improved but is still not as awake as she was several days ago  Patient offers no specific complaints  Exam:  Awake , alert, frail,, NAD, RRR, CTA, S/NT/ND, no focal motor/sensory deficits, 2+ LE edema, cool extremities  A/P:  Altered mental status  Will check labs, CT head, chest x-ray, and urine  Will check cultures to evaluate for possible infection as cause  Will plan to admit      Critical Care Time  Procedures

## 2020-02-12 NOTE — ASSESSMENT & PLAN NOTE
[facility], [phone], to ok 3 refills w/90 day supply for both, latanoprost, and dorzo/timolol   · PT OT evaluation    Needs most likely rehabilitation at the time of discharge

## 2020-02-20 NOTE — PROGRESS NOTES
Pastoral Care Progress Note    2019  Patient: Nick García : 1935  Admission Date & Time: 10/21/2019 1943  MRN: [id number] CSN: [id number]                     Chaplaincy Interventions Utilized:   Empowerment: Normalized experience of patient/family             19 1100   Spiritual Beliefs/Perceptions   Concept of God Accepting   Relationship with God Close   Support Systems Children;Friends/neighbors   Coping Responses   Family Coping Accepting; Sadness   Plan of Care   Comments Offered prayer and support with patient and family; family at bedside doing life review and expressing gratitude and sadness           Chaplaincy Outcomes Achieved: (4) no limitation

## 2022-01-14 NOTE — PLAN OF CARE
Delaney Ruano MD FACS Consult Note    Referring Provider: Provider, No Known    Patient Care Team:  Akhil Golden DO as PCP - General (Family Medicine)  Feng Sarabia MD as Referring Physician (Pediatrics)  Srikanth Coker MD as Cardiologist (Cardiology)    Chief complaint GI bleed    Subjective .     History of present illness:  The patient is a 68-year-old female who has a history of ESRD on hemodialysis, PAD, CAD, diabetes mellitus, and CHF who presents from the nursing home with complaint of rectal bleeding.  She was seen and found to have SBP in the 70s and Hgb 6.7.  She is somewhat lethargic, but she complains of intermittent perianal pain.  She denies any abdominal pain or nausea.  Speaking with her two sons, she has recently had left below the knee amputation that required revision to above the knee.  She has been placed into the nursing home since the amputation.  She was having peritoneal dialysis but since placement into the facility, she has been converted to hemodialysis.  Her last treatment was yesterday.  They state that she has been rather confused lately.  She has had several episodes of GI bleed in the past and has undergone several EGD's and colonoscopies without obvious etiology again per the sons.  She is status post gastric bypass.  Speaking with the nurse, the blood was bright red upon presentation.      Review of Systems  All systems were reviewed and negative for    Constitution:chills, fevers, night sweats and weight loss, weight gain  Eyes:  double vision, blurriness and loss of vision  ENT:  earaches, hearing loss and hoarseness  Respiratory:  cough, dry, cough, productive, hemoptysis and shortness of air  Cardiovascular:  chest pressure / pain, at rest, chest pressure / pain, on exertion, irregular pulse and palpitations  Gastrointestinal: bright red blood per rectum, change in bowel habits, constipation, diarrhea, heartburn, hematemesis, melena, nausea, pain and  Problem: PHYSICAL THERAPY ADULT  Goal: Performs mobility at highest level of function for planned discharge setting  See evaluation for individualized goals  Description    Note:   Prognosis: Guarded  Problem List: Decreased strength, Decreased endurance, Impaired balance, Decreased mobility, Decreased cognition, Pain  Assessment: Pt presents to therapy today with reduced mobility, limited endurance, high fall risk, generalized 10/10 pain  These impairments limit the patient by requiring increased assistance for mobility  Pt would benefit from continued skilled therapy while in the hospital to improve overall mobility and work towards a safe d/c  Recommend rehab  At end of session patient was left seated with call bell within reach  Pt was able to complete a sit to stand transfer with 3 person support  Support was involved for blocking B LE to reduce knee buckling  Pt's daughter was present for part of the session in the beginning  Pt also able to sit EOB with support for 15-20 minutes  Education about PLB  Barriers to Discharge: Inaccessible home environment, Decreased caregiver support     Recommendation: Post acute IP rehab     PT - OK to Discharge: Yes    See flowsheet documentation for full assessment  vomiting  Genitourinary:  difficulty / inability to void, pain, blood in urine and painful urination  Integument:  itching, rash, redness and swelling  Breast:  lump / mass, nipple discharge and pain  Hematologic / Lymphatic: easy bruising and lymphadenopathy  Musculoskeletal: joint pain, muscle pain and muscle weakness  Neurological: dizziness, loss of consciousness, numbness, vertigo and weakness  Behavioral/Psych: anxiety and depression  Endocrine: diabetes, thyroid disorder      History  Past Medical History:   Diagnosis Date   • Arthritis     LEFT SHOULDER   • CHF (congestive heart failure) ([facility])    • Chronic pericarditis 4/22/2021   • Collar bone fracture 12/2020    SLING PLACED TO HEAL   • Coronary artery disease     LEFT MAIN AND 3 OTHER AREAS--CABG SCHEDULED FOR APRIL 22, 2021   • Dyslipidemia 9/28/2020   • Elevated cholesterol    • End stage kidney disease ([facility])     currently HD (5/19/2021), hopes to return to PD    • Essential hypertension 11/2/2018   • Gastroesophageal reflux disease without esophagitis 4/28/2021   • GERD (gastroesophageal reflux disease)    • Gout    • History of transfusion    • Hyperlipidemia    • Hypertension    • Overweight (BMI 25.0-29.9) 4/22/2021   • Peritoneal dialysis status ([facility])     EVERY NIGHT FOR 10 HOURS, (5/19/2021 currently on hold)   • PONV (postoperative nausea and vomiting)    • Sleep apnea     RESOLVED   • Status post gastric bypass for obesity 4/22/2021   • Type 2 diabetes mellitus with kidney complication, with long-term current use of insulin ([facility]) 11/2/2018   ,   Past Surgical History:   Procedure Laterality Date   • ABOVE KNEE AMPUTATION Left 1/6/2022    Procedure: LEFT LOWER EXTREMITY AMPUTATION ABOVE KNEE;  Surgeon: Simon Coates MD;  Location: [place] [id number];  Service: Vascular;  Laterality: Left;   • BELOW KNEE AMPUTATION Left 12/13/2021    Procedure: LEFT LOWER EXTREMITY AMPUTATION BELOW KNEE;  Surgeon: Simon Coates MD;  Location:   [facility] OR;  Service: Vascular;  Laterality: Left;   • BREAST BIOPSY Right     FATTY TUMOR   • CARDIAC CATHETERIZATION N/A 10/1/2019    Procedure: Left Heart Cath;  Surgeon: Srikanth Coker MD;  Location:  [facility];  Service: Cardiology   • CARDIAC CATHETERIZATION N/A 7/14/2020    Procedure: Left Heart Cath;  Surgeon: Srikanth Coker MD;  Location:  [facility];  Service: Cardiology;  Laterality: N/A;   • CARDIAC CATHETERIZATION N/A 3/23/2021    Procedure: Left Heart Cath;  Surgeon: Srikanth Coker MD;  Location:  [facility];  Service: Cardiology;  Laterality: N/A;   • CHEST TUBE INSERTION Right 6/7/2021    Procedure: CHEST TUBE INSERTION;  Surgeon: Antony Wood MD;  Location:  [facility];  Service: Cardiothoracic;  Laterality: Right;   • CORONARY ANGIOPLASTY WITH STENT PLACEMENT  2016    X 2   • CORONARY ARTERY BYPASS GRAFT N/A 4/22/2021    Procedure: CORONARY ARTERY BYPASS GRAFT X 3 WITH LEFT INTERNAL MAMMARY ARTERY, RIGHT LOWER EXTREMITY ENDOSCOPIC VEIN HARVEST, AND PERFUSION; TRANSESOPHAGEAL ECHOCARDIOGRAM;  Surgeon: Antony Wood MD;  Location:  [facility];  Service: Cardiothoracic;  Laterality: N/A;   • EYE SURGERY Bilateral     IOC LENS IMPLANTS   • EYE SURGERY Bilateral     RETINAL SURGERY   • GALLBLADDER SURGERY     • GASTRIC BYPASS     • HYSTERECTOMY     • INSERTION HEMODIALYSIS CATHETER Right 4/1/2021    Procedure: HEMODIALYSIS CATHETER INSERTION;  Surgeon: Simon Coates MD;  Location:  [facility]ID [facility] 12;  Service: Vascular;  Laterality: Right;   • INSERTION HEMODIALYSIS CATHETER Right 12/20/2021    Procedure: HEMODIALYSIS CATHETER INSERTION;  Surgeon: Simon Coates MD;  Location:  [facility]ID [facility] 12;  Service: Vascular;  Laterality: Right;   • INSERTION PERITONEAL DIALYSIS CATHETER     • OOPHORECTOMY Bilateral    • SINUS SURGERY     • SINUS SURGERY  07/1980   • TUBAL ABDOMINAL LIGATION     ,   Family History   Problem  Relation Age of Onset   • Hypertension Mother    • Lung disease Mother    • Heart disease Father    • Diabetes Father    • Abnormal EKG Father    • Breast cancer Neg Hx    ,   Social History     Tobacco Use   • Smoking status: Former Smoker     Packs/day: 0.00     Years: 2.00     Pack years: 0.00     Types: Cigarettes     Quit date:      Years since quittin.0   • Smokeless tobacco: Never Used   Vaping Use   • Vaping Use: Never used   Substance Use Topics   • Alcohol use: Not Currently     Comment: haven't drank in 3 years ( 2018)    • Drug use: No   , (Not in a hospital admission)   and Allergies:  Codeine, Demerol [meperidine], Levaquin [levofloxacin], Lisinopril, Morphine, Sulfasalazine, Ultram [tramadol], and Trental [pentoxifylline]    Current Facility-Administered Medications:   •  norepinephrine (LEVOPHED) 8 mg in 250 mL NS infusion (premix), 0.02-0.3 mcg/kg/min, Intravenous, Titrated, Param Payne MD, Last Rate: 5.17 mL/hr at 22 1320, 0.04 mcg/kg/min at 22 1320  •  [COMPLETED] Insert peripheral IV, , , Once **AND** sodium chloride 0.9 % flush 10 mL, 10 mL, Intravenous, PRN, Param Payne MD  •  Tranexamic Acid 689 mg in sodium chloride 0.9 % 100 mL, 10 mg/kg, Intravenous, Once, Param Payne MD, Held at 22 1152    Current Outpatient Medications:   •  acetaminophen (TYLENOL) 325 MG tablet, Take 650 mg by mouth Every 4 (Four) Hours As Needed for Mild Pain ., Disp: , Rfl:   •  allopurinol (ZYLOPRIM) 100 MG tablet, Take 100 mg by mouth Daily., Disp: , Rfl:   •  aluminum-magnesium hydroxide-simethicone (MAALOX MAX) 400-400-40 MG/5ML suspension, Take 15 mL by mouth Every 6 (Six) Hours As Needed for Heartburn., Disp: , Rfl:   •  ascorbic acid (VITAMIN C) 500 MG tablet, Take 500 mg by mouth Daily., Disp: , Rfl:   •  aspirin 81 MG EC tablet, Take 81 mg by mouth Daily., Disp: , Rfl:   •  atorvastatin (LIPITOR) 20 MG tablet, Take 1 tablet by mouth Every Night., Disp: 90 tablet, Rfl: 4  •   bisacodyl (Dulcolax) 10 MG suppository, Insert 10 mg into the rectum Daily As Needed for Constipation., Disp: , Rfl:   •  calcium carbonate (TUMS) 500 MG chewable tablet, Chew 2 tablets Every Night., Disp: , Rfl:   •  citalopram (CeleXA) 40 MG tablet, Take 40 mg by mouth Daily., Disp: , Rfl:   •  clopidogrel (PLAVIX) 75 MG tablet, Take 75 mg by mouth Daily., Disp: , Rfl:   •  cyclobenzaprine (FLEXERIL) 5 MG tablet, Take 1 tablet by mouth 3 (Three) Times a Day As Needed for Muscle Spasms., Disp: , Rfl:   •  docusate sodium (COLACE) 100 MG capsule, Take 200 mg by mouth Daily As Needed for Constipation., Disp: , Rfl:   •  famotidine (PEPCID) 20 MG tablet, Take 1 tablet by mouth Daily., Disp: 30 tablet, Rfl: 0  •  gabapentin (NEURONTIN) 300 MG capsule, Take 1 capsule by mouth Every 12 (Twelve) Hours., Disp: 24 capsule, Rfl: 0  •  gentamicin (GARAMYCIN) 0.1 % cream, APPLY TO EXIT SITE AS NEEDED, Disp: , Rfl:   •  HYDROcodone-acetaminophen (NORCO) 5-325 MG per tablet, Take 1 tablet by mouth Every 4 (Four) Hours As Needed for Moderate Pain  for up to 5 days., Disp: 30 tablet, Rfl: 0  •  Melatonin 5 MG capsule, Take 5 mg by mouth Every Night., Disp: , Rfl:   •  midodrine (PROAMATINE) 10 MG tablet, Take 1 tablet by mouth 3 (Three) Times a Day Before Meals., Disp: , Rfl:   •  Multiple Vitamins-Minerals (MULTIVITAMIN ADULT PO), Take 1 tablet by mouth Daily., Disp: , Rfl:   •  nitroglycerin (NITROSTAT) 0.4 MG SL tablet, 1 under the tongue as needed for angina, may repeat q5mins for up three doses, Disp: 25 tablet, Rfl: 3  •  sucralfate (CARAFATE) 1 g tablet, Take 1 g by mouth 4 (Four) Times a Day Before Meals & at Bedtime. Dissolve 1 tablet in water to create a slurry and drink, Disp: , Rfl:   •  Sucroferric Oxyhydroxide (Velphoro) 500 MG chewable tablet, Chew 2 tablets 3 (Three) Times a Day Before Meals., Disp: , Rfl:   •  vitamin D3 125 MCG (5000 UT) capsule capsule, Take 1 tablet by mouth Daily., Disp: , Rfl:     Objective      Vital Signs   Temp:  [97.7 °F (36.5 °C)-98.2 °F (36.8 °C)] 97.7 °F (36.5 °C)  Heart Rate:  [80-94] 93  Resp:  [16-18] 18  BP: (70-96)/(42-63) 87/48    Physical Exam:  General appearance - alert, pale and ill-appearing  Mental status - alert  Neck - supple, no significant adenopathy  Chest - clear to auscultation, no wheezes, rales or rhonchi, symmetric air entry  Heart - normal rate, regular rhythm, normal S1, S2, no murmurs, rubs, clicks or gallops  Abdomen - soft, nontender, nondistended, PD catheter in place  Rectal - black clots, stool, and blood.  No obvious anorectal source.  No bright red blood  Neurological - alert, obeys commonds  Musculoskeletal - no joint tenderness, deformity or swelling  Extremities - left AKA with staples, no discharge, no erythema    Results Review:    Lab Results (last 24 hours)     Procedure Component Value Units Date/Time    [place] Draw [[id number]] Collected: 01/14/22 1100    Specimen: Blood from Arm, Right Updated: 01/14/22 1201    Narrative:      The following orders were created for panel order [place] Draw.  Procedure                               Abnormality         Status                     ---------                               -----------         ------                     Red Top[[id number]]                                          Final result               [place] Blood Culture Lalit...[[id number]]                      Final result               [place] Top[[id number]]                                         In process                   Please view results for these tests on the individual orders.    [place] Blood Culture Bottle Set [[id number]] Collected: 01/14/22 1100    Specimen: Blood from Arm, Right Updated: 01/14/22 1201     Extra Tube Hold for add-ons.     Comment: Auto resulted.       Red Top [[id number]] Collected: 01/14/22 1100    Specimen: Blood from Arm, Right Updated: 01/14/22 1201     Extra Tube Hold for add-ons.     Comment: Auto resulted.       Comprehensive  Metabolic Panel [[id number]]  (Abnormal) Collected: 01/14/22 1100    Specimen: Blood from Arm, Right Updated: 01/14/22 1158     Glucose 127 mg/dL      BUN 12 mg/dL      Creatinine 3.01 mg/dL      Sodium 134 mmol/L      Potassium 3.3 mmol/L      Chloride 93 mmol/L      CO2 27.0 mmol/L      Calcium 8.3 mg/dL      Total Protein 5.2 g/dL      Albumin 2.10 g/dL      ALT (SGPT) <5 U/L      AST (SGOT) 17 U/L      Alkaline Phosphatase 133 U/L      Total Bilirubin 0.2 mg/dL      eGFR Non African Amer 16 mL/min/1.73      Globulin 3.1 gm/dL      A/G Ratio 0.7 g/dL      BUN/Creatinine Ratio 4.0     Anion Gap 14.0 mmol/L     Narrative:      GFR Normal >60  Chronic Kidney Disease <60  Kidney Failure <15      CBC & Differential [[id number]]  (Abnormal) Collected: 01/14/22 1100    Specimen: Blood from Arm, Right Updated: 01/14/22 1151    Narrative:      The following orders were created for panel order CBC & Differential.  Procedure                               Abnormality         Status                     ---------                               -----------         ------                     CBC Auto Differential[[id number]]        Abnormal            Final result                 Please view results for these tests on the individual orders.    CBC Auto Differential [[id number]]  (Abnormal) Collected: 01/14/22 1100    Specimen: Blood from Arm, Right Updated: 01/14/22 1151     WBC 11.11 10*3/mm3      RBC 2.12 10*6/mm3      Hemoglobin 6.7 g/dL      Hematocrit 22.4 %      .7 fL      MCH 31.6 pg      MCHC 29.9 g/dL      RDW 17.2 %      RDW-SD 66.5 fl      MPV 10.5 fL      Platelets 360 10*3/mm3      Neutrophil % 84.3 %      Lymphocyte % 7.7 %      Monocyte % 6.9 %      Eosinophil % 0.6 %      Basophil % 0.0 %      Immature Grans % 0.5 %      Neutrophils, Absolute 9.36 10*3/mm3      Lymphocytes, Absolute 0.85 10*3/mm3      Monocytes, Absolute 0.77 10*3/mm3      Eosinophils, Absolute 0.07 10*3/mm3      Basophils, Absolute 0.00  10*3/mm3      Immature Grans, Absolute 0.06 10*3/mm3      nRBC 0.0 /100 WBC     Protime-INR [[id number]]  (Abnormal) Collected: 01/14/22 1100    Specimen: Blood from Arm, Right Updated: 01/14/22 1143     Protime 14.3 Seconds      INR 1.15    aPTT [[id number]]  (Abnormal) Collected: 01/14/22 1100    Specimen: Blood from Arm, Right Updated: 01/14/22 1143     PTT 45.5 seconds     [place] Top [[id number]] Collected: 01/14/22 1100    Specimen: Blood from Arm, Right Updated: 01/14/22 1124        Imaging Results (Last 24 Hours)     Procedure Component Value Units Date/Time    XR Chest 1 View [[id number]] Collected: 01/14/22 1205     Updated: 01/14/22 1209    Narrative:      EXAMINATION: XR CHEST 1 VW-     1/14/2022 11:43 AM CST     HISTORY: Hypotension     1 view chest x-ray.     Comparison is made with December 20, 2021.     Stable heart size.  CABG changes.     Unchanged right jugular permacath.     Well-expanded lungs with no pneumonia, pneumothorax, or congestive  failure.     Summary:  1. No acute disease.  This report was finalized on 01/14/2022 12:06 by Dr. Fracisco Leblanc MD.                Assessment/Plan       GI bleed    A long discussion was had with the two sons.  They stated that their mother has progressively worsened and would not want to live this way.  They wish for DNR and DNI.  They wish for her to receive the blood that has been ordered and pressor support.  They also wish for antiacid medicine (PPI gtt).  They do not wish for any surgical procedures including no EGD's or colonoscopies.  If she bleeds again and more blood is required, they would like to think about the transfusion.      Delaney Ruano MD  01/14/22  13:41 CST

## 2023-06-19 NOTE — TELEPHONE ENCOUNTER
RE: Cymbalta    We started with the 30 mg & it made me exhausted  We then went to the 20 mg & but I don't think it's working  I think I would like to go back to the 30  Is that ok? Please call to advise  no

## 2024-03-28 NOTE — PLAN OF CARE
Problem: Potential for Falls  Goal: Patient will remain free of falls  Description  INTERVENTIONS:  - Assess patient frequently for physical needs  -  Identify cognitive and physical deficits and behaviors that affect risk of falls    -  Elmer fall precautions as indicated by assessment   - Educate patient/family on patient safety including physical limitations  - Instruct patient to call for assistance with activity based on assessment  - Modify environment to reduce risk of injury  - Consider OT/PT consult to assist with strengthening/mobility  Outcome: Progressing     Problem: GENITOURINARY - ADULT  Goal: Absence of urinary retention  Description  INTERVENTIONS:  - Assess patient's ability to void and empty bladder  - Monitor I/O  - Bladder scan as needed  - Discuss with physician/AP medications to alleviate retention as needed  - Discuss catheterization for long term situations as appropriate   Outcome: Progressing     Problem: METABOLIC, FLUID AND ELECTROLYTES - ADULT  Goal: Electrolytes maintained within normal limits  Description  INTERVENTIONS:  - Monitor labs and assess patient for signs and symptoms of electrolyte imbalances  - Administer electrolyte replacement as ordered  - Monitor response to electrolyte replacements, including repeat lab results as appropriate  - Instruct patient on fluid and nutrition as appropriate  Outcome: Progressing  Goal: Fluid balance maintained  Description  INTERVENTIONS:  - Monitor labs   - Monitor I/O and WT  - Instruct patient on fluid and nutrition as appropriate  - Assess for signs & symptoms of volume excess or deficit  Outcome: Progressing  Goal: Glucose maintained within target range  Description  INTERVENTIONS:  - Monitor Blood Glucose as ordered  - Assess for signs and symptoms of hyperglycemia and hypoglycemia  - Administer ordered medications to maintain glucose within target range  - Assess nutritional intake and initiate nutrition service referral as needed  Outcome: Progressing     Problem: SKIN/TISSUE INTEGRITY - ADULT  Goal: Skin integrity remains intact  Description  INTERVENTIONS  - Identify patients at risk for skin breakdown  - Assess and monitor skin integrity  - Assess and monitor nutrition and hydration status  - Monitor labs (i e  albumin)  - Assess for incontinence   - Turn and reposition patient  - Assist with mobility/ambulation  - Relieve pressure over bony prominences  - Avoid friction and shearing  - Provide appropriate hygiene as needed including keeping skin clean and dry  - Evaluate need for skin moisturizer/barrier cream  - Collaborate with interdisciplinary team (i e  Nutrition, Rehabilitation, etc )   - Patient/family teaching  Outcome: Progressing  Goal: Incision(s), wounds(s) or drain site(s) healing without S/S of infection  Description  INTERVENTIONS  - Assess and document risk factors for skin impairment   - Assess and document dressing, incision, wound bed, drain sites and surrounding tissue  - Consider nutrition services referral as needed  - Oral mucous membranes remain intact  - Provide patient/ family education  Outcome: Progressing  Goal: Oral mucous membranes remain intact  Description  INTERVENTIONS  - Assess oral mucosa and hygiene practices  - Implement preventative oral hygiene regimen  - Implement oral medicated treatments as ordered  - Initiate Nutrition services referral as needed  Outcome: Progressing     Problem: Prexisting or High Potential for Compromised Skin Integrity  Goal: Skin integrity is maintained or improved  Description  INTERVENTIONS:  - Identify patients at risk for skin breakdown  - Assess and monitor skin integrity  - Assess and monitor nutrition and hydration status  - Monitor labs   - Assess for incontinence   - Turn and reposition patient  - Assist with mobility/ambulation  - Relieve pressure over bony prominences  - Avoid friction and shearing  - Provide appropriate hygiene as needed including keeping skin clean and dry  - Evaluate need for skin moisturizer/barrier cream  - Collaborate with interdisciplinary team   - Patient/family teaching  - Consider wound care consult   Outcome: Progressing     Problem: NEUROSENSORY - ADULT  Goal: Achieves stable or improved neurological status  Description  INTERVENTIONS  - Monitor and report changes in neurological status  - Monitor vital signs such as temperature, blood pressure, glucose, and any other labs ordered   - Initiate measures to prevent increased intracranial pressure  - Monitor for seizure activity and implement precautions if appropriate      Outcome: Progressing  Goal: Remains free of injury related to seizures activity  Description  INTERVENTIONS  - Maintain airway, patient safety  and administer oxygen as ordered  - Monitor patient for seizure activity, document and report duration and description of seizure to physician/advanced practitioner  - If seizure occurs,  ensure patient safety during seizure  - Reorient patient post seizure  - Seizure pads on all 4 side rails  - Instruct patient/family to notify RN of any seizure activity including if an aura is experienced  - Instruct patient/family to call for assistance with activity based on nursing assessment  - Administer anti-seizure medications if ordered    Outcome: Progressing  Goal: Achieves maximal functionality and self care  Description  INTERVENTIONS  - Monitor swallowing and airway patency with patient fatigue and changes in neurological status  - Encourage and assist patient to increase activity and self care     - Encourage visually impaired, hearing impaired and aphasic patients to use assistive/communication devices  Outcome: Progressing     Problem: CARDIOVASCULAR - ADULT  Goal: Maintains optimal cardiac output and hemodynamic stability  Description  INTERVENTIONS:  - Monitor I/O, vital signs and rhythm  - Monitor for S/S and trends of decreased cardiac output  - Administer and titrate ordered vasoactive medications to optimize hemodynamic stability  - Assess quality of pulses, skin color and temperature  - Assess for signs of decreased coronary artery perfusion  - Instruct patient to report change in severity of symptoms  Outcome: Progressing  Goal: Absence of cardiac dysrhythmias or at baseline rhythm  Description  INTERVENTIONS:  - Continuous cardiac monitoring, vital signs, obtain 12 lead EKG if ordered  - Administer antiarrhythmic and heart rate control medications as ordered  - Monitor electrolytes and administer replacement therapy as ordered  Outcome: Progressing     Problem: RESPIRATORY - ADULT  Goal: Achieves optimal ventilation and oxygenation  Description  INTERVENTIONS:  - Assess for changes in respiratory status  - Assess for changes in mentation and behavior  - Position to facilitate oxygenation and minimize respiratory effort  - Oxygen administered by appropriate delivery if ordered  - Initiate smoking cessation education as indicated  - Encourage broncho-pulmonary hygiene including cough, deep breathe, Incentive Spirometry  - Assess the need for suctioning and aspirate as needed  - Assess and instruct to report SOB or any respiratory difficulty  - Respiratory Therapy support as indicated  Outcome: Progressing     Problem: HEMATOLOGIC - ADULT  Goal: Maintains hematologic stability  Description  INTERVENTIONS  - Assess for signs and symptoms of bleeding or hemorrhage  - Monitor labs  - Administer supportive blood products/factors as ordered and appropriate  Outcome: Progressing     Problem: MUSCULOSKELETAL - ADULT  Goal: Maintain or return mobility to safest level of function  Description  INTERVENTIONS:  - Assess patient's ability to carry out ADLs; assess patient's baseline for ADL function and identify physical deficits which impact ability to perform ADLs (bathing, care of mouth/teeth, toileting, grooming, dressing, etc )  - Assess/evaluate cause of self-care deficits   - Assess range of motion  - Assess patient's mobility  - Assess patient's need for assistive devices and provide as appropriate  - Encourage maximum independence but intervene and supervise when necessary  - Involve family in performance of ADLs  - Assess for home care needs following discharge   - Consider OT consult to assist with ADL evaluation and planning for discharge  - Provide patient education as appropriate  Outcome: Progressing  Goal: Maintain proper alignment of affected body part  Description  INTERVENTIONS:  - Support, maintain and protect limb and body alignment  - Provide patient/ family with appropriate education  Outcome: Progressing     Problem: Nutrition/Hydration-ADULT  Goal: Nutrient/Hydration intake appropriate for improving, restoring or maintaining nutritional needs  Description  Monitor and assess patient's nutrition/hydration status for malnutrition  Collaborate with interdisciplinary team and initiate plan and interventions as ordered  Monitor patient's weight and dietary intake as ordered or per policy  Utilize nutrition screening tool and intervene as necessary  Determine patient's food preferences and provide high-protein, high-caloric foods as appropriate       INTERVENTIONS:  - Monitor oral intake, urinary output, labs, and treatment plans  - Assess nutrition and hydration status and recommend course of action  - Evaluate amount of meals eaten  - Assist patient with eating if necessary   - Allow adequate time for meals  - Recommend/ encourage appropriate diets, oral nutritional supplements, and vitamin/mineral supplements  - Order, calculate, and assess calorie counts as needed  - Recommend, monitor, and adjust tube feedings and TPN/PPN based on assessed needs  - Assess need for intravenous fluids  - Provide specific nutrition/hydration education as appropriate  - Include patient/family/caregiver in decisions related to nutrition  Outcome: Progressing none

## (undated) DEVICE — DRAPE SURGIKIT SADDLE BAG LAP

## (undated) DEVICE — GLOVE PI ULTRA TOUCH SZ.8.0

## (undated) DEVICE — BLUE HEAT SCOPE WARMER

## (undated) DEVICE — UNDER BUTTOCKS DRAPE: Brand: CONVERTORS

## (undated) DEVICE — TRAY FOLEY 16FR URIMETER SILICONE SURESTEP

## (undated) DEVICE — BETHLEHEM UNIVERSAL GYN LAP PK: Brand: CARDINAL HEALTH

## (undated) DEVICE — [HIGH FLOW INSUFFLATOR,  DO NOT USE IF PACKAGE IS DAMAGED,  KEEP DRY,  KEEP AWAY FROM SUNLIGHT,  PROTECT FROM HEAT AND RADIOACTIVE SOURCES.]: Brand: PNEUMOSURE

## (undated) DEVICE — PENCIL ELECTROSURG E-Z CLEAN -0035H

## (undated) DEVICE — ENDOPATH 5MM CURVED SCISSORS WITH MONOPOLAR CAUTERY: Brand: ENDOPATH

## (undated) DEVICE — IV EXTENSION TUBING 33 IN

## (undated) DEVICE — INTENDED FOR TISSUE SEPARATION, AND OTHER PROCEDURES THAT REQUIRE A SHARP SURGICAL BLADE TO PUNCTURE OR CUT.: Brand: BARD-PARKER SAFETY BLADES SIZE 10, STERILE

## (undated) DEVICE — PROXIMATE SKIN STAPLERS (35 WIDE) CONTAINS 35 STAINLESS STEEL STAPLES (FIXED HEAD): Brand: PROXIMATE

## (undated) DEVICE — OCCLUDER COLPO-PNEUMO

## (undated) DEVICE — 3M™ IOBAN™ 2 ANTIMICROBIAL INCISE DRAPE 6648EZ: Brand: IOBAN™ 2

## (undated) DEVICE — GLOVE INDICATOR PI UNDERGLOVE SZ 7.5 BLUE

## (undated) DEVICE — ENDOPATH XCEL UNIVERSAL TROCAR STABLILITY SLEEVES: Brand: ENDOPATH XCEL

## (undated) DEVICE — GLOVE PI ULTRA TOUCH SZ.7.5

## (undated) DEVICE — CHLORAPREP HI-LITE 26ML ORANGE

## (undated) DEVICE — ENDOPOUCH RETRIEVER SPECIMEN RETRIEVAL BAGS: Brand: ENDOPOUCH RETRIEVER

## (undated) DEVICE — SYRINGE 50ML LL

## (undated) DEVICE — DRAPE EQUIPMENT RF WAND

## (undated) DEVICE — LIGHT HANDLE COVER SLEEVE DISP BLUE STELLAR

## (undated) DEVICE — SPECIMEN TRAP: Brand: ARGYLE

## (undated) DEVICE — ELECTRODE LAP SPATULA CRV E-Z CLEAN 33CM -0018C

## (undated) DEVICE — SUT VICRYL 0 REEL 54 IN J287G

## (undated) DEVICE — DRAPE LAPAROTOMY W/POUCHES

## (undated) DEVICE — SPONGE STICK WITH PVP-I: Brand: KENDALL

## (undated) DEVICE — 2000CC GUARDIAN II: Brand: GUARDIAN

## (undated) DEVICE — SEPRA FILM 6 X 5

## (undated) DEVICE — ENSEAL LAPAROSCOPIC TISSUE SEALER G2 CURVED JAW FOR USE WITH G2 GENERATOR 5MM DIAMETER 35CM SHAFT LENGTH: Brand: ENSEAL

## (undated) DEVICE — INSUFFLATION TUBING PRIMFLO

## (undated) DEVICE — FLOSEAL HEMOSTATIC MATRIX, 5 ML: Brand: FLOSEAL

## (undated) DEVICE — ADHESIVE SKIN HIGH VISCOSITY EXOFIN 1ML

## (undated) DEVICE — PREMIUM DRY TRAY LF: Brand: MEDLINE INDUSTRIES, INC.

## (undated) DEVICE — SUT PDS II 1 TP-1 96 IN Z880G

## (undated) DEVICE — GLOVE PI ULTRA TOUCH SZ.6.5

## (undated) DEVICE — GLOVE INDICATOR PI UNDERGLOVE SZ 8 BLUE

## (undated) DEVICE — NEEDLE 25G X 1 1/2

## (undated) DEVICE — INTENDED FOR TISSUE SEPARATION, AND OTHER PROCEDURES THAT REQUIRE A SHARP SURGICAL BLADE TO PUNCTURE OR CUT.: Brand: BARD-PARKER SAFETY BLADES SIZE 11, STERILE

## (undated) DEVICE — SUT MONOCRYL 4-0 PS-2 27 IN Y426H

## (undated) DEVICE — UTERINE MANIPULATOR RUMI 6.7 X 10 CM

## (undated) DEVICE — ENDOPATH XCEL BLADELESS TROCARS WITH STABILITY SLEEVES: Brand: ENDOPATH XCEL

## (undated) DEVICE — SCD SEQUENTIAL COMPRESSION COMFORT SLEEVE MEDIUM KNEE LENGTH: Brand: KENDALL SCD

## (undated) DEVICE — BETHLEHEM UNIVERSAL LAPAROTOMY: Brand: CARDINAL HEALTH

## (undated) DEVICE — INTENDED FOR TISSUE SEPARATION, AND OTHER PROCEDURES THAT REQUIRE A SHARP SURGICAL BLADE TO PUNCTURE OR CUT.: Brand: BARD-PARKER SAFETY BLADES SIZE 15, STERILE

## (undated) DEVICE — DRAPE FLUID WARMER (BIRD BATH)

## (undated) DEVICE — GLOVE PI ULTRA TOUCH SZ 6

## (undated) DEVICE — ADHESIVE SKN CLSR HISTOACRYL FLEX 0.5ML LF

## (undated) DEVICE — SUT VICRYL 3-0 SH 27 IN J416H

## (undated) DEVICE — CLOSURE DEVICE ENDO CLOSE

## (undated) DEVICE — TELFA NON-ADHERENT ABSORBENT DRESSING: Brand: TELFA

## (undated) DEVICE — INVIEW CLEAR LEGGINGS: Brand: CONVERTORS

## (undated) DEVICE — SUT VICRYL 2-0 CT-1 36 IN J945H

## (undated) DEVICE — ABDOMINAL PAD: Brand: DERMACEA

## (undated) DEVICE — SUT VICRYL 0 CT-1 36 IN J946H

## (undated) DEVICE — GLOVE INDICATOR PI UNDERGLOVE SZ 6.5 BLUE

## (undated) DEVICE — LAPAROSCOPIC SMOKE EVAC TUBING

## (undated) DEVICE — TROCAR: Brand: KII FIOS FIRST ENTRY

## (undated) DEVICE — DRESSING MEPILEX BORDER 4 X 10 IN

## (undated) DEVICE — MAYO STAND COVER: Brand: CONVERTORS